# Patient Record
Sex: FEMALE | Race: WHITE | NOT HISPANIC OR LATINO | Employment: FULL TIME | URBAN - METROPOLITAN AREA
[De-identification: names, ages, dates, MRNs, and addresses within clinical notes are randomized per-mention and may not be internally consistent; named-entity substitution may affect disease eponyms.]

---

## 2019-03-16 ENCOUNTER — HOSPITAL ENCOUNTER (INPATIENT)
Facility: CLINIC | Age: 54
LOS: 4 days | Discharge: HOME | DRG: 603 | End: 2019-03-20
Attending: EMERGENCY MEDICINE | Admitting: INTERNAL MEDICINE
Payer: MEDICARE

## 2019-03-16 DIAGNOSIS — F20.9 SCHIZOPHRENIA, UNSPECIFIED TYPE (H): ICD-10-CM

## 2019-03-16 DIAGNOSIS — L03.116 CELLULITIS OF LEFT LOWER EXTREMITY: ICD-10-CM

## 2019-03-16 DIAGNOSIS — L03.116 CELLULITIS OF LEFT LOWER LIMB: Primary | ICD-10-CM

## 2019-03-16 DIAGNOSIS — Z59.00 HOMELESS: ICD-10-CM

## 2019-03-16 DIAGNOSIS — F15.10 METHAMPHETAMINE USE (H): ICD-10-CM

## 2019-03-16 LAB
ALBUMIN SERPL-MCNC: 3.4 G/DL (ref 3.4–5)
ALBUMIN UR-MCNC: NEGATIVE MG/DL
ALP SERPL-CCNC: 119 U/L (ref 40–150)
ALT SERPL W P-5'-P-CCNC: 106 U/L (ref 0–50)
AMPHETAMINES UR QL SCN: POSITIVE
ANION GAP SERPL CALCULATED.3IONS-SCNC: 4 MMOL/L (ref 3–14)
APPEARANCE UR: CLEAR
AST SERPL W P-5'-P-CCNC: 73 U/L (ref 0–45)
BARBITURATES UR QL: NEGATIVE
BASOPHILS # BLD AUTO: 0 10E9/L (ref 0–0.2)
BASOPHILS NFR BLD AUTO: 0.2 %
BENZODIAZ UR QL: NEGATIVE
BILIRUB SERPL-MCNC: 0.2 MG/DL (ref 0.2–1.3)
BILIRUB UR QL STRIP: NEGATIVE
BUN SERPL-MCNC: 8 MG/DL (ref 7–30)
CALCIUM SERPL-MCNC: 8.7 MG/DL (ref 8.5–10.1)
CANNABINOIDS UR QL SCN: NEGATIVE
CHLORIDE SERPL-SCNC: 106 MMOL/L (ref 94–109)
CO2 SERPL-SCNC: 30 MMOL/L (ref 20–32)
COCAINE UR QL: NEGATIVE
COLOR UR AUTO: YELLOW
CREAT SERPL-MCNC: 0.59 MG/DL (ref 0.52–1.04)
DIFFERENTIAL METHOD BLD: NORMAL
EOSINOPHIL # BLD AUTO: 0.3 10E9/L (ref 0–0.7)
EOSINOPHIL NFR BLD AUTO: 3.1 %
ERYTHROCYTE [DISTWIDTH] IN BLOOD BY AUTOMATED COUNT: 13.5 % (ref 10–15)
ETHANOL SERPL-MCNC: <0.01 G/DL
GFR SERPL CREATININE-BSD FRML MDRD: >90 ML/MIN/{1.73_M2}
GLUCOSE SERPL-MCNC: 114 MG/DL (ref 70–99)
GLUCOSE UR STRIP-MCNC: NEGATIVE MG/DL
GRAM STN SPEC: ABNORMAL
HCT VFR BLD AUTO: 37.5 % (ref 35–47)
HGB BLD-MCNC: 12.6 G/DL (ref 11.7–15.7)
HGB UR QL STRIP: NEGATIVE
IMM GRANULOCYTES # BLD: 0 10E9/L (ref 0–0.4)
IMM GRANULOCYTES NFR BLD: 0.2 %
INR PPP: 0.99 (ref 0.86–1.14)
KETONES UR STRIP-MCNC: NEGATIVE MG/DL
LACTATE BLD-SCNC: 1.7 MMOL/L (ref 0.7–2)
LEUKOCYTE ESTERASE UR QL STRIP: NEGATIVE
LYMPHOCYTES # BLD AUTO: 3.1 10E9/L (ref 0.8–5.3)
LYMPHOCYTES NFR BLD AUTO: 29.6 %
MCH RBC QN AUTO: 29.7 PG (ref 26.5–33)
MCHC RBC AUTO-ENTMCNC: 33.6 G/DL (ref 31.5–36.5)
MCV RBC AUTO: 88 FL (ref 78–100)
MONOCYTES # BLD AUTO: 1.1 10E9/L (ref 0–1.3)
MONOCYTES NFR BLD AUTO: 10.8 %
NEUTROPHILS # BLD AUTO: 5.8 10E9/L (ref 1.6–8.3)
NEUTROPHILS NFR BLD AUTO: 56.1 %
NITRATE UR QL: NEGATIVE
NRBC # BLD AUTO: 0 10*3/UL
NRBC BLD AUTO-RTO: 0 /100
OPIATES UR QL SCN: NEGATIVE
PCP UR QL SCN: NEGATIVE
PH UR STRIP: 5.5 PH (ref 5–7)
PLATELET # BLD AUTO: 384 10E9/L (ref 150–450)
POTASSIUM SERPL-SCNC: 4 MMOL/L (ref 3.4–5.3)
PROCALCITONIN SERPL-MCNC: 0.07 NG/ML
PROT SERPL-MCNC: 7.6 G/DL (ref 6.8–8.8)
RBC # BLD AUTO: 4.24 10E12/L (ref 3.8–5.2)
RBC #/AREA URNS AUTO: <1 /HPF (ref 0–2)
SODIUM SERPL-SCNC: 140 MMOL/L (ref 133–144)
SOURCE: NORMAL
SP GR UR STRIP: 1.01 (ref 1–1.03)
SPECIMEN SOURCE: ABNORMAL
SQUAMOUS #/AREA URNS AUTO: <1 /HPF (ref 0–1)
UROBILINOGEN UR STRIP-MCNC: NORMAL MG/DL (ref 0–2)
WBC # BLD AUTO: 10.3 10E9/L (ref 4–11)
WBC #/AREA URNS AUTO: <1 /HPF (ref 0–5)

## 2019-03-16 PROCEDURE — 85610 PROTHROMBIN TIME: CPT | Performed by: EMERGENCY MEDICINE

## 2019-03-16 PROCEDURE — 80329 ANALGESICS NON-OPIOID 1 OR 2: CPT | Performed by: PHYSICIAN ASSISTANT

## 2019-03-16 PROCEDURE — 93005 ELECTROCARDIOGRAM TRACING: CPT

## 2019-03-16 PROCEDURE — 80320 DRUG SCREEN QUANTALCOHOLS: CPT | Performed by: EMERGENCY MEDICINE

## 2019-03-16 PROCEDURE — 90791 PSYCH DIAGNOSTIC EVALUATION: CPT

## 2019-03-16 PROCEDURE — 25000128 H RX IP 250 OP 636: Performed by: EMERGENCY MEDICINE

## 2019-03-16 PROCEDURE — 87070 CULTURE OTHR SPECIMN AEROBIC: CPT | Performed by: EMERGENCY MEDICINE

## 2019-03-16 PROCEDURE — 12000000 ZZH R&B MED SURG/OB

## 2019-03-16 PROCEDURE — 25800030 ZZH RX IP 258 OP 636: Performed by: EMERGENCY MEDICINE

## 2019-03-16 PROCEDURE — 85025 COMPLETE CBC W/AUTO DIFF WBC: CPT | Performed by: EMERGENCY MEDICINE

## 2019-03-16 PROCEDURE — 83605 ASSAY OF LACTIC ACID: CPT | Performed by: EMERGENCY MEDICINE

## 2019-03-16 PROCEDURE — 99223 1ST HOSP IP/OBS HIGH 75: CPT | Mod: AI | Performed by: PHYSICIAN ASSISTANT

## 2019-03-16 PROCEDURE — 80307 DRUG TEST PRSMV CHEM ANLYZR: CPT | Performed by: EMERGENCY MEDICINE

## 2019-03-16 PROCEDURE — 87040 BLOOD CULTURE FOR BACTERIA: CPT | Performed by: EMERGENCY MEDICINE

## 2019-03-16 PROCEDURE — 87186 SC STD MICRODIL/AGAR DIL: CPT | Performed by: EMERGENCY MEDICINE

## 2019-03-16 PROCEDURE — 81001 URINALYSIS AUTO W/SCOPE: CPT | Performed by: EMERGENCY MEDICINE

## 2019-03-16 PROCEDURE — 87205 SMEAR GRAM STAIN: CPT | Performed by: EMERGENCY MEDICINE

## 2019-03-16 PROCEDURE — 80053 COMPREHEN METABOLIC PANEL: CPT | Performed by: EMERGENCY MEDICINE

## 2019-03-16 PROCEDURE — 87077 CULTURE AEROBIC IDENTIFY: CPT | Performed by: EMERGENCY MEDICINE

## 2019-03-16 PROCEDURE — 96365 THER/PROPH/DIAG IV INF INIT: CPT

## 2019-03-16 PROCEDURE — 99285 EMERGENCY DEPT VISIT HI MDM: CPT | Mod: 25

## 2019-03-16 PROCEDURE — 84145 PROCALCITONIN (PCT): CPT | Performed by: EMERGENCY MEDICINE

## 2019-03-16 RX ORDER — ONDANSETRON 2 MG/ML
4 INJECTION INTRAMUSCULAR; INTRAVENOUS EVERY 6 HOURS PRN
Status: DISCONTINUED | OUTPATIENT
Start: 2019-03-16 | End: 2019-03-20 | Stop reason: HOSPADM

## 2019-03-16 RX ORDER — PROCHLORPERAZINE MALEATE 5 MG
10 TABLET ORAL EVERY 6 HOURS PRN
Status: DISCONTINUED | OUTPATIENT
Start: 2019-03-16 | End: 2019-03-20 | Stop reason: HOSPADM

## 2019-03-16 RX ORDER — OMEPRAZOLE 10 MG/1
20 CAPSULE, DELAYED RELEASE ORAL DAILY PRN
COMMUNITY
End: 2019-05-30

## 2019-03-16 RX ORDER — CEFAZOLIN SODIUM 1 G/3ML
1 INJECTION, POWDER, FOR SOLUTION INTRAMUSCULAR; INTRAVENOUS EVERY 8 HOURS
Status: DISCONTINUED | OUTPATIENT
Start: 2019-03-17 | End: 2019-03-18

## 2019-03-16 RX ORDER — AMOXICILLIN 250 MG
1 CAPSULE ORAL 2 TIMES DAILY PRN
Status: DISCONTINUED | OUTPATIENT
Start: 2019-03-16 | End: 2019-03-20 | Stop reason: HOSPADM

## 2019-03-16 RX ORDER — LIDOCAINE 40 MG/G
CREAM TOPICAL
Status: DISCONTINUED | OUTPATIENT
Start: 2019-03-16 | End: 2019-03-20 | Stop reason: HOSPADM

## 2019-03-16 RX ORDER — POTASSIUM CHLORIDE 1500 MG/1
20-40 TABLET, EXTENDED RELEASE ORAL
Status: DISCONTINUED | OUTPATIENT
Start: 2019-03-16 | End: 2019-03-20 | Stop reason: HOSPADM

## 2019-03-16 RX ORDER — POTASSIUM CL/LIDO/0.9 % NACL 10MEQ/0.1L
10 INTRAVENOUS SOLUTION, PIGGYBACK (ML) INTRAVENOUS
Status: DISCONTINUED | OUTPATIENT
Start: 2019-03-16 | End: 2019-03-20 | Stop reason: HOSPADM

## 2019-03-16 RX ORDER — AMOXICILLIN 250 MG
2 CAPSULE ORAL 2 TIMES DAILY PRN
Status: DISCONTINUED | OUTPATIENT
Start: 2019-03-16 | End: 2019-03-20 | Stop reason: HOSPADM

## 2019-03-16 RX ORDER — POTASSIUM CHLORIDE 1.5 G/1.58G
20-40 POWDER, FOR SOLUTION ORAL
Status: DISCONTINUED | OUTPATIENT
Start: 2019-03-16 | End: 2019-03-20 | Stop reason: HOSPADM

## 2019-03-16 RX ORDER — AMPICILLIN AND SULBACTAM 2; 1 G/1; G/1
3 INJECTION, POWDER, FOR SOLUTION INTRAMUSCULAR; INTRAVENOUS ONCE
Status: COMPLETED | OUTPATIENT
Start: 2019-03-16 | End: 2019-03-16

## 2019-03-16 RX ORDER — CALCIUM CARBONATE 500 MG/1
1000 TABLET, CHEWABLE ORAL 4 TIMES DAILY PRN
Status: DISCONTINUED | OUTPATIENT
Start: 2019-03-16 | End: 2019-03-20 | Stop reason: HOSPADM

## 2019-03-16 RX ORDER — ACETAMINOPHEN 325 MG/1
650 TABLET ORAL EVERY 4 HOURS PRN
Status: DISCONTINUED | OUTPATIENT
Start: 2019-03-16 | End: 2019-03-20 | Stop reason: HOSPADM

## 2019-03-16 RX ORDER — ONDANSETRON 4 MG/1
4 TABLET, ORALLY DISINTEGRATING ORAL EVERY 6 HOURS PRN
Status: DISCONTINUED | OUTPATIENT
Start: 2019-03-16 | End: 2019-03-20 | Stop reason: HOSPADM

## 2019-03-16 RX ORDER — POTASSIUM CHLORIDE 7.45 MG/ML
10 INJECTION INTRAVENOUS
Status: DISCONTINUED | OUTPATIENT
Start: 2019-03-16 | End: 2019-03-20 | Stop reason: HOSPADM

## 2019-03-16 RX ORDER — POTASSIUM CHLORIDE 29.8 MG/ML
20 INJECTION INTRAVENOUS
Status: DISCONTINUED | OUTPATIENT
Start: 2019-03-16 | End: 2019-03-20 | Stop reason: HOSPADM

## 2019-03-16 RX ORDER — TOLTERODINE 2 MG/1
2 CAPSULE, EXTENDED RELEASE ORAL DAILY
Status: ON HOLD | COMMUNITY
End: 2019-04-25

## 2019-03-16 RX ORDER — TOLTERODINE 2 MG/1
2 CAPSULE, EXTENDED RELEASE ORAL DAILY
Status: DISCONTINUED | OUTPATIENT
Start: 2019-03-17 | End: 2019-03-20 | Stop reason: HOSPADM

## 2019-03-16 RX ORDER — NALOXONE HYDROCHLORIDE 0.4 MG/ML
.1-.4 INJECTION, SOLUTION INTRAMUSCULAR; INTRAVENOUS; SUBCUTANEOUS
Status: DISCONTINUED | OUTPATIENT
Start: 2019-03-16 | End: 2019-03-20 | Stop reason: HOSPADM

## 2019-03-16 RX ORDER — SODIUM CHLORIDE 9 MG/ML
1000 INJECTION, SOLUTION INTRAVENOUS CONTINUOUS
Status: DISCONTINUED | OUTPATIENT
Start: 2019-03-16 | End: 2019-03-18

## 2019-03-16 RX ORDER — PROCHLORPERAZINE 25 MG
25 SUPPOSITORY, RECTAL RECTAL EVERY 12 HOURS PRN
Status: DISCONTINUED | OUTPATIENT
Start: 2019-03-16 | End: 2019-03-20 | Stop reason: HOSPADM

## 2019-03-16 RX ORDER — ACETAMINOPHEN 650 MG/1
650 SUPPOSITORY RECTAL EVERY 4 HOURS PRN
Status: DISCONTINUED | OUTPATIENT
Start: 2019-03-16 | End: 2019-03-20 | Stop reason: HOSPADM

## 2019-03-16 RX ADMIN — AMPICILLIN SODIUM AND SULBACTAM SODIUM 3 G: 2; 1 INJECTION, POWDER, FOR SOLUTION INTRAMUSCULAR; INTRAVENOUS at 20:15

## 2019-03-16 RX ADMIN — SODIUM CHLORIDE 1000 ML: 9 INJECTION, SOLUTION INTRAVENOUS at 23:44

## 2019-03-16 RX ADMIN — SODIUM CHLORIDE 1000 ML: 9 INJECTION, SOLUTION INTRAVENOUS at 20:24

## 2019-03-16 SDOH — ECONOMIC STABILITY - HOUSING INSECURITY: HOMELESSNESS UNSPECIFIED: Z59.00

## 2019-03-16 ASSESSMENT — MIFFLIN-ST. JEOR
SCORE: 1412.35
SCORE: 1384.88

## 2019-03-16 ASSESSMENT — ENCOUNTER SYMPTOMS
HALLUCINATIONS: 1
FATIGUE: 1

## 2019-03-16 NOTE — ED TRIAGE NOTES
Pt here via EMS after family called to have her removed from their home. Pt was given a home from her mother but due to the inability to care for herself or the home, the home has been now condemned. Pt had other homeless people staying at her home as well which helped the home to get to the condition it is now in. Pt has been schizophrenic for decades and not taking any medications for it. Pt states she has had scabies and mites for six years now so she puts large amounts of powder that is used to kill mites off chickens and sheep. EMS stated pt was very argumentative with the police at the family's home. Pt is very unkept and dirty.

## 2019-03-16 NOTE — ED NOTES
Bed: ED16  Expected date:   Expected time:   Means of arrival:   Comments:  Primitivo 535- 53 F unable to care for self, calm/cooperative. hold

## 2019-03-17 LAB
ALBUMIN SERPL-MCNC: 2.7 G/DL (ref 3.4–5)
ALP SERPL-CCNC: 80 U/L (ref 40–150)
ALT SERPL W P-5'-P-CCNC: 94 U/L (ref 0–50)
ANION GAP SERPL CALCULATED.3IONS-SCNC: 6 MMOL/L (ref 3–14)
APAP SERPL-MCNC: <2 MG/L (ref 10–20)
AST SERPL W P-5'-P-CCNC: 77 U/L (ref 0–45)
BILIRUB SERPL-MCNC: 0.4 MG/DL (ref 0.2–1.3)
BUN SERPL-MCNC: 4 MG/DL (ref 7–30)
CALCIUM SERPL-MCNC: 7.9 MG/DL (ref 8.5–10.1)
CHLORIDE SERPL-SCNC: 112 MMOL/L (ref 94–109)
CO2 SERPL-SCNC: 25 MMOL/L (ref 20–32)
CREAT SERPL-MCNC: 0.54 MG/DL (ref 0.52–1.04)
ERYTHROCYTE [DISTWIDTH] IN BLOOD BY AUTOMATED COUNT: 13.7 % (ref 10–15)
GFR SERPL CREATININE-BSD FRML MDRD: >90 ML/MIN/{1.73_M2}
GLUCOSE SERPL-MCNC: 89 MG/DL (ref 70–99)
HCT VFR BLD AUTO: 36 % (ref 35–47)
HGB BLD-MCNC: 11.8 G/DL (ref 11.7–15.7)
INTERPRETATION ECG - MUSE: NORMAL
MCH RBC QN AUTO: 29.3 PG (ref 26.5–33)
MCHC RBC AUTO-ENTMCNC: 32.8 G/DL (ref 31.5–36.5)
MCV RBC AUTO: 89 FL (ref 78–100)
PLATELET # BLD AUTO: 365 10E9/L (ref 150–450)
POTASSIUM SERPL-SCNC: 3.5 MMOL/L (ref 3.4–5.3)
PROT SERPL-MCNC: 6.4 G/DL (ref 6.8–8.8)
RBC # BLD AUTO: 4.03 10E12/L (ref 3.8–5.2)
SALICYLATES SERPL-MCNC: <2 MG/DL
SODIUM SERPL-SCNC: 143 MMOL/L (ref 133–144)
WBC # BLD AUTO: 8 10E9/L (ref 4–11)

## 2019-03-17 PROCEDURE — 25800030 ZZH RX IP 258 OP 636: Performed by: EMERGENCY MEDICINE

## 2019-03-17 PROCEDURE — 12000000 ZZH R&B MED SURG/OB

## 2019-03-17 PROCEDURE — 36415 COLL VENOUS BLD VENIPUNCTURE: CPT | Performed by: PHYSICIAN ASSISTANT

## 2019-03-17 PROCEDURE — 99232 SBSQ HOSP IP/OBS MODERATE 35: CPT | Performed by: INTERNAL MEDICINE

## 2019-03-17 PROCEDURE — A9270 NON-COVERED ITEM OR SERVICE: HCPCS | Mod: GY | Performed by: PHYSICIAN ASSISTANT

## 2019-03-17 PROCEDURE — 25000132 ZZH RX MED GY IP 250 OP 250 PS 637: Mod: GY | Performed by: PHYSICIAN ASSISTANT

## 2019-03-17 PROCEDURE — 25000128 H RX IP 250 OP 636: Performed by: PHYSICIAN ASSISTANT

## 2019-03-17 PROCEDURE — 80053 COMPREHEN METABOLIC PANEL: CPT | Performed by: PHYSICIAN ASSISTANT

## 2019-03-17 PROCEDURE — 85027 COMPLETE CBC AUTOMATED: CPT | Performed by: PHYSICIAN ASSISTANT

## 2019-03-17 PROCEDURE — 99221 1ST HOSP IP/OBS SF/LOW 40: CPT | Performed by: PSYCHIATRY & NEUROLOGY

## 2019-03-17 RX ORDER — OLANZAPINE 5 MG/1
10 TABLET, ORALLY DISINTEGRATING ORAL
Status: DISCONTINUED | OUTPATIENT
Start: 2019-03-17 | End: 2019-03-20 | Stop reason: HOSPADM

## 2019-03-17 RX ORDER — HALOPERIDOL 5 MG/ML
2 INJECTION INTRAMUSCULAR EVERY 6 HOURS PRN
Status: DISCONTINUED | OUTPATIENT
Start: 2019-03-17 | End: 2019-03-20 | Stop reason: HOSPADM

## 2019-03-17 RX ADMIN — Medication 1 MG: at 22:44

## 2019-03-17 RX ADMIN — CEFAZOLIN 1 G: 1 INJECTION, POWDER, FOR SOLUTION INTRAMUSCULAR; INTRAVENOUS at 08:13

## 2019-03-17 RX ADMIN — SODIUM CHLORIDE 1000 ML: 9 INJECTION, SOLUTION INTRAVENOUS at 16:38

## 2019-03-17 RX ADMIN — CEFAZOLIN 1 G: 1 INJECTION, POWDER, FOR SOLUTION INTRAMUSCULAR; INTRAVENOUS at 00:46

## 2019-03-17 RX ADMIN — CEFAZOLIN 1 G: 1 INJECTION, POWDER, FOR SOLUTION INTRAMUSCULAR; INTRAVENOUS at 17:25

## 2019-03-17 RX ADMIN — SODIUM CHLORIDE 1000 ML: 9 INJECTION, SOLUTION INTRAVENOUS at 08:13

## 2019-03-17 RX ADMIN — TOLTERODINE TARTRATE 2 MG: 2 CAPSULE, EXTENDED RELEASE ORAL at 08:13

## 2019-03-17 RX ADMIN — OMEPRAZOLE 20 MG: 20 CAPSULE, DELAYED RELEASE ORAL at 06:38

## 2019-03-17 ASSESSMENT — ACTIVITIES OF DAILY LIVING (ADL)
ADLS_ACUITY_SCORE: 20
ADLS_ACUITY_SCORE: 14
ADLS_ACUITY_SCORE: 19
ADLS_ACUITY_SCORE: 14
ADLS_ACUITY_SCORE: 20
ADLS_ACUITY_SCORE: 11

## 2019-03-17 NOTE — PLAN OF CARE
A&Ox3. VSS on RA. Up with 1 assist, unsteady at times. Regular diet, good appetite. IVF infusing; continues on IV abx. LS clear. Incontinent of urine at times. 72 hour hold remains; sitter at bedside; pt is anxious at times, but cooperative; no hallucinations this shift. Wound care done to L thigh, moderate amount purulent drainage, dressing CDI; pt refused shower/cleansing. Plan to transfer to  after seen by WOC. Will continue to monitor.

## 2019-03-17 NOTE — PHARMACY-ADMISSION MEDICATION HISTORY
Admission medication history interview status for the 3/16/2019  admission is complete. See EPIC admission navigator for prior to admission medications     Medication history source reliability:Moderate    Actions taken by pharmacist (provider contacted, etc):None     Additional medication history information not noted on PTA med list :None    Medication reconciliation/reorder completed by provider prior to medication history? No    Time spent in this activity: 10 minutes    Prior to Admission medications    Medication Sig Last Dose Taking? Auth Provider   omeprazole (PRILOSEC) 10 MG DR capsule Take 20 mg by mouth daily as needed prn Yes Unknown, Entered By History   tolterodine ER (DETROL LA) 2 MG 24 hr capsule Take 2 mg by mouth daily 3/16/2019 at am Yes Unknown, Entered By History

## 2019-03-17 NOTE — PLAN OF CARE
Patient is confused, alert to self.  Up with assist of one to bathroom, unsteady on feet.  72 hour hold in place, sitter at bedside.  IV fluids and antibiotics per orders.  Dressing placed to left upper leg wound, small amount of drainage noted, area is tender and edematous.  Continue to monitor.

## 2019-03-17 NOTE — ED PROVIDER NOTES
"  History     Chief Complaint:  Failure to Thrive and Suicidal Ideation    The history is provided by the patient and the EMS personnel.      Anastasiya Simon is a 53 year old female with history significant for methamphetamine and alcohol abuse as well as schizophrenia who presents via EMS with failure to thrive. Per report, patient has been staying at the house her mother bought her for the past month and states the Fire Department condemned this house and kicked her out of this house tonight due to lack of upkeep as well as the many homeless people staying there. Patient has hallucinations so she has been covering her windows due to  incoming aliens.  Report indicates patient also made suicidal statements to her parents today where she stated she was going to jump off the river into a bridge. Patient states she has a wound to the mid-lateral thigh due to a  vitamin E injection.  She affirms associated pain in this area and also report neck discomfort. Currently, she states she is fatigued.     Allergies:  Tetracycline    Medications:    Medications reviewed. No current medications.     Past Medical History:    Schizophrenia  Alcohol and Meth Abuse    Past Surgical History:    History reviewed. No pertinent surgical history.     Family History:    History reviewed. No pertinent family history.     Social History:  Drug use: Meth  Alcohol Use: Positive    Review of Systems   Constitutional: Positive for fatigue.   Psychiatric/Behavioral: Positive for hallucinations (ailens) and suicidal ideas.   All other systems reviewed and are negative.    Physical Exam     Patient Vitals for the past 24 hrs:   BP Temp Temp src Pulse Resp SpO2 Height Weight   03/16/19 2320 127/73 98.4  F (36.9  C) Oral 97 18 98 % -- 78.9 kg (173 lb 15.1 oz)   03/16/19 2235 138/72 -- -- 100 -- 100 % -- --   03/16/19 2040 -- -- -- -- -- 99 % -- --   03/16/19 1900 157/78 98  F (36.7  C) Oral 101 18 97 % 1.651 m (5' 5\") 81.6 kg (180 lb)   03/16/19 " 1856 157/78 98.6  F (37  C) Oral 101 20 98 % -- --     Physical Exam  Constitutional: White female supine. Heavy set.  HENT: No signs of trauma. Multiple layers of facial makeup caked on face. Oropharynx moist.   Eyes: EOM are normal. Pupils are 3 mm, equal, round, and reactive to light.   Neck: Normal range of motion. No JVD present. No cervical adenopathy.  Cardiovascular: Regular rhythm.  Exam reveals no gallop and no friction rub.    No murmur heard.  Pulmonary/Chest: Bilateral breath sounds normal. No wheezes, rhonchi or rales.  Abdominal: Soft. No tenderness. No rebound or guarding.   Musculoskeletal: No edema. No tenderness. Left lateral mid thigh 8 x 8 cm area of redness with induration with central ulcerative lesions.   Lymphadenopathy: No lymphadenopathy.   Neurological: Alert and oriented to person, place, and time. Normal strength. Coordination normal.   Psychological: Made statements to police about covering her windows because the aliens are coming. Suicidal statements about jumping off bridge. Calm affect.   Skin: Skin is warm and dry. No rash noted. No erythema.     Emergency Department Course     ECG:  ECG taken at 2017  Normal sinus rhythm  Rate 97 bpm. ME interval 146 ms. QRS duration 82 ms. QT/QTc 378/480 ms. P-R-T axes 72 82 69.    Laboratory:  Laboratory findings were communicated with the patient who voiced understanding of the findings.    Gram stain: Pending  Blood culture x2: No growth after 1 hour  CBC: WBC 10.3, HGB 12.6,   INR: 0.99  CMP: glucose 114, , AST 73 o/w WNL (Creatinine 0.59)  Lactic Acid (Resulted: 1958): 1.7  Alcohol ethyl: <0.01  Drug abuse screen: amphetamine positive o/w negative  UA: WNL    Interventions:  2015: Unasyn 3 g in  mL IV  2024: NS 1L IV Bolus     Emergency Department Course:  1831 Nursing notes and vitals reviewed.    1900 I performed an exam of the patient as documented above.     1940 The patient provided a urine sample here in the  emergency department. This was sent for laboratory testing, findings above.    1945 IV was inserted and blood was drawn for laboratory testing, results above.    2017 EKG obtained in the ED, see results above.     2050 Patient declined x-ray.     2136 I spoke with Dr. Blanco of the hospitalist service regarding patient's presentation, findings, and plan of care.    I personally reviewed the laboratory results with the patient and answered all related questions prior to admission.    Impression & Plan      Medical Decision Making:  This is a 53 years old woman who has chronic schizophrenia and was kicked out of her house tonight because it had been condemned. Family had bought her a house to live in and unfortunately she was sharing this with homeless people and was not cleaning it. She states because of the aliens she kept the windows covered. Patient was brought here on hold by ambulance. On exam, she is caked with makeup on her face with some underlying erythema. There was a large area of redness and swelling on her left lateral thigh with some central ulcerations where she states she had been injected with something. Patient has a calm affect here. Her workup includes urine tox which shows methamphetamines. Lactate and white count are okay. She adamantly refused chest x-ray because of radiation concerns. Cultures are pending. I have started her on Unasyn and we will admit her to the hospital for further evaluation.     Diagnosis:    ICD-10-CM   1. Cellulitis of left lower extremity L03.116   2. Schizophrenia, unspecified type (H) F20.9   3. Methamphetamine use (H) F15.10   4. Homeless Z59.0     Disposition:   The patient is admitted into the hospitalist under the care of Dr. Blanco.    Scribe Disclosure:  I, Thierry Felipe, am serving as a scribe at 7:58 PM on 3/16/2019 to document services personally performed by Cirilo French MD based on my observations and the provider's statements to me.     EMERGENCY  DEPARTMENT       Cirilo French MD  03/17/19 001

## 2019-03-17 NOTE — PROGRESS NOTES
"[facility]    Hospitalist Progress Note      Assessment & Plan   Anastasiya Simon is a 53-year-old female with a past medical history of methamphetamine abuse, alcohol abuse, as well as schizophrenia, not currently on any medications, who presents to the Emergency Department via EMS with failure to thrive. She was also found to have a wound on her left lateral thigh.    1.  Left mid-thigh wound with associated cellulitis.    - she states this wound developed after she tried to inject \"vitamin C\" with a needle.  She states she has had the wound for \"a while,\" but cannot provide much more detail.  -  Afebrile, no leucocytosis, LA 1.7, procalcitonin 0.07  - mild tachycardia on admission - improved after IV fluids.    - received Unasyn in ER- switched to Ancef after admission  - wound G stain- positive for Positive cocci  - wound culture pending  - continue with Ancef for now, follow up cultures  - wound care nurse consult    2.  Schizophrenia, decompensated        Suicidal ideation.    - she does not appear to be on any medications PTA  - She has had hallucinations as well as delusions, believing that there are aliens trying to get into her house.  She also made suicidal statements to her parents that she was going to jump off a bridge.  She has not been able to take care of herself.   - Psych consult- plan to transfer her to psych serna once medically stable  - SW consult  - on 72h hold  - sitter at bedside    3.  Failure to thrive       Homelessness.  -  The patient was living at a house that her mother bought for her, but this house has since been condemned due to lack of upkeep, as well as the patient allowing many homeless people to stay there.  She is unable to take care of herself at this time.   - Psych consult  - SW consult  - she will likely need a more structured living environment outside of the hospital.     4.  Alcohol and meth abuse.  -  Last alcoholic drink was 2 days ago, per patient " report.  She has also been using methamphetamine.  Monitor for signs of withdrawal.   - Psych and chemical dependency consult.     5. Overactive bladder  - continue PTA Detrol LA      DVT Prophylaxis: Pneumatic Compression Devices  Code Status: Full Code     Expected discharge: plan to transfer her to Psych serna in 1-2 days pending the plan for her wound care    Vandana Hendrix MD    Interval History   Doing OK, denies chest pain, no SOB, no abd pain, no N/V; discussed with RN, ARAMIS and Dr Castle    -Data reviewed today: I reviewed all new labs and imaging results over the last 24 hours. I personally reviewed no images or EKG's today.    Physical Exam   Temp: 98.2  F (36.8  C) Temp src: Oral BP: 132/76 Pulse: 87   Resp: 18 SpO2: 100 % O2 Device: None (Room air)    Vitals:    03/16/19 1900 03/16/19 2320   Weight: 81.6 kg (180 lb) 78.9 kg (173 lb 15.1 oz)     Vital Signs with Ranges  Temp:  [98  F (36.7  C)-98.6  F (37  C)] 98.2  F (36.8  C)  Pulse:  [] 87  Resp:  [18-20] 18  BP: (127-157)/(72-78) 132/76  SpO2:  [97 %-100 %] 100 %  I/O last 3 completed shifts:  In: 1629 [P.O.:720; I.V.:909]  Out: -     Constitutional: Awake, alert, NAD, disheveled   Respiratory: Bilateral air entry, no wheezing, no rales, no crackles  Cardiovascular: S1S2, RRR, no murmurs, no rubs  GI: abdomen- soft, nonT, nond, BS present  Skin/Integumen: round wound 4cm/4cm over left lateral thigh with dark eschar, with surrounding erythema; dried, scaled skin over her nose bridge  Extremities- no leg swelling      Medications     sodium chloride 1,000 mL (03/17/19 0813)       ceFAZolin  1 g Intravenous Q8H     omeprazole  20 mg Oral QAM AC     sodium chloride (PF)  3 mL Intracatheter Q8H     tolterodine ER  2 mg Oral Daily       Data   Recent Labs   Lab 03/17/19  0805 03/16/19 1945   WBC 8.0 10.3   HGB 11.8 12.6   MCV 89 88    384   INR  --  0.99    140   POTASSIUM 3.5 4.0   CHLORIDE 112* 106   CO2 25 30   BUN 4* 8   CR  0.54 0.59   ANIONGAP 6 4   MATTHEW 7.9* 8.7   GLC 89 114*   ALBUMIN 2.7* 3.4   PROTTOTAL 6.4* 7.6   BILITOTAL 0.4 0.2   ALKPHOS 80 119   ALT 94* 106*   AST 77* 73*       No results found for this or any previous visit (from the past 24 hour(s)).

## 2019-03-17 NOTE — CONSULTS
Initial Psychiatric Consult: Time Spent: 55 Minutes  Timi Castle MD.     Transfer to Psychiatry station 77.Intake confirmed bed.

## 2019-03-17 NOTE — CONSULTS
"[facility] Initial Psychiatric Consult Note      TIME SPENT IN PSYCHIATRY INITIAL CONSULT: 55 MINUTES    Consult ordered by: Dr. Hendrix  Reason: assess transfer to Psychiatry     Initial History     The patient's care was discussed, patient seen and chart notes were reviewed.    Patient examined for psychiatric consultation.         HISTORY OF PRESENT ILLNESS  Pt is now more alert and oriented.  Plan is to transfer to Psychiatry.  Pt previously not transferred second to leg woundwhich is been stabilized.  She has MRSA and is being treated with Clindamycin.  Psychiatry is aware of her MRSA status, the patient will need to be admitted to a [facility] bed.                 Medications     Medications Prior to Admission   Medication Sig Dispense Refill Last Dose     omeprazole (PRILOSEC) 10 MG DR capsule Take 20 mg by mouth daily as needed   prn     tolterodine ER (DETROL LA) 2 MG 24 hr capsule Take 2 mg by mouth daily   3/16/2019 at am       Scheduled Medications    ceFAZolin  1 g Intravenous Q8H     omeprazole  20 mg Oral QAM AC     sodium chloride (PF)  3 mL Intracatheter Q8H     tolterodine ER  2 mg Oral Daily     PRNs:  acetaminophen, acetaminophen, calcium carbonate, lidocaine 4%, lidocaine (buffered or not buffered), melatonin, naloxone, ondansetron **OR** ondansetron, potassium chloride, potassium chloride with lidocaine, potassium chloride, potassium chloride, potassium chloride, prochlorperazine **OR** prochlorperazine **OR** prochlorperazine, senna-docusate **OR** senna-docusate, sodium chloride (PF)      Allergies        Allergies   Allergen Reactions     Tetracycline         Previous Medical History     No past medical history on file.     Medical Review of Systems     /76 (BP Location: Left arm)   Pulse 87   Temp 98.2  F (36.8  C) (Oral)   Resp 18   Ht 1.651 m (5' 5\")   Wt 78.9 kg (173 lb 15.1 oz)   SpO2 100%   BMI 28.95 kg/m    Body mass index is 28.95 kg/m .       Mental Status Examination "     Appearance Lying in bed, dressed in gown. Appears stated age.   Attitude Cooperative   Orientation Oriented to person, place, time   Eye Contact Poor   Speech Regular rate, rhythm, volume and tone   Language Normal   Psychomotor Behavior Normal   Thought Process Goal-Oriented, Intact   Associations + looseness of associations   Thought Content + for paranoia and delusions.   Mood Irritable    Affect agitated   Fund of Knowledge impaired   Insight impaired   Judgement impaired   Attention Span & Concentration poor   Recent & Remote Memory Memory impaired   Gait Normal   Muscle Tone Intact      Labs     Labs reviewed.  Recent Results (from the past 24 hour(s))   Drug abuse screen 77 urine ([place],[facility])    Collection Time: 03/16/19  7:40 PM   Result Value Ref Range    Amphetamine Qual Urine Positive (A) NEG^Negative    Barbiturates Qual Urine Negative NEG^Negative    Benzodiazepine Qual Urine Negative NEG^Negative    Cannabinoids Qual Urine Negative NEG^Negative    Cocaine Qual Urine Negative NEG^Negative    Opiates Qualitative Urine Negative NEG^Negative    PCP Qual Urine Negative NEG^Negative   UA with Microscopic reflex to Culture    Collection Time: 03/16/19  7:40 PM   Result Value Ref Range    Color Urine Yellow     Appearance Urine Clear     Glucose Urine Negative NEG^Negative mg/dL    Bilirubin Urine Negative NEG^Negative    Ketones Urine Negative NEG^Negative mg/dL    Specific Gravity Urine 1.006 1.003 - 1.035    Blood Urine Negative NEG^Negative    pH Urine 5.5 5.0 - 7.0 pH    Protein Albumin Urine Negative NEG^Negative mg/dL    Urobilinogen mg/dL Normal 0.0 - 2.0 mg/dL    Nitrite Urine Negative NEG^Negative    Leukocyte Esterase Urine Negative NEG^Negative    Source Midstream Urine     WBC Urine <1 0 - 5 /HPF    RBC Urine <1 0 - 2 /HPF    Squamous Epithelial /HPF Urine <1 0 - 1 /HPF   CBC with platelets differential    Collection Time: 03/16/19  7:45 PM   Result Value Ref Range    WBC 10.3 4.0 - 11.0 10e9/L     RBC Count 4.24 3.8 - 5.2 10e12/L    Hemoglobin 12.6 11.7 - 15.7 g/dL    Hematocrit 37.5 35.0 - 47.0 %    MCV 88 78 - 100 fl    MCH 29.7 26.5 - 33.0 pg    MCHC 33.6 31.5 - 36.5 g/dL    RDW 13.5 10.0 - 15.0 %    Platelet Count 384 150 - 450 10e9/L    Diff Method Automated Method     % Neutrophils 56.1 %    % Lymphocytes 29.6 %    % Monocytes 10.8 %    % Eosinophils 3.1 %    % Basophils 0.2 %    % Immature Granulocytes 0.2 %    Nucleated RBCs 0 0 /100    Absolute Neutrophil 5.8 1.6 - 8.3 10e9/L    Absolute Lymphocytes 3.1 0.8 - 5.3 10e9/L    Absolute Monocytes 1.1 0.0 - 1.3 10e9/L    Absolute Eosinophils 0.3 0.0 - 0.7 10e9/L    Absolute Basophils 0.0 0.0 - 0.2 10e9/L    Abs Immature Granulocytes 0.0 0 - 0.4 10e9/L    Absolute Nucleated RBC 0.0    INR    Collection Time: 03/16/19  7:45 PM   Result Value Ref Range    INR 0.99 0.86 - 1.14   Comprehensive metabolic panel    Collection Time: 03/16/19  7:45 PM   Result Value Ref Range    Sodium 140 133 - 144 mmol/L    Potassium 4.0 3.4 - 5.3 mmol/L    Chloride 106 94 - 109 mmol/L    Carbon Dioxide 30 20 - 32 mmol/L    Anion Gap 4 3 - 14 mmol/L    Glucose 114 (H) 70 - 99 mg/dL    Urea Nitrogen 8 7 - 30 mg/dL    Creatinine 0.59 0.52 - 1.04 mg/dL    GFR Estimate >90 >60 mL/min/[1.73_m2]    GFR Estimate If Black >90 >60 mL/min/[1.73_m2]    Calcium 8.7 8.5 - 10.1 mg/dL    Bilirubin Total 0.2 0.2 - 1.3 mg/dL    Albumin 3.4 3.4 - 5.0 g/dL    Protein Total 7.6 6.8 - 8.8 g/dL    Alkaline Phosphatase 119 40 - 150 U/L     (H) 0 - 50 U/L    AST 73 (H) 0 - 45 U/L   Lactic acid whole blood    Collection Time: 03/16/19  7:45 PM   Result Value Ref Range    Lactic Acid 1.7 0.7 - 2.0 mmol/L   Alcohol ethyl    Collection Time: 03/16/19  7:45 PM   Result Value Ref Range    Ethanol g/dL <0.01 <0.01 g/dL   Blood culture    Collection Time: 03/16/19  7:45 PM   Result Value Ref Range    Specimen Description Blood Right Arm     Special Requests Aerobic and anaerobic bottles received      Culture Micro No growth after 7 hours    Procalcitonin    Collection Time: 03/16/19  7:45 PM   Result Value Ref Range    Procalcitonin 0.07 ng/ml   Blood culture    Collection Time: 03/16/19  7:53 PM   Result Value Ref Range    Specimen Description Blood Left Arm     Special Requests Aerobic and anaerobic bottles received     Culture Micro No growth after 7 hours    Gram stain    Collection Time: 03/16/19  8:10 PM   Result Value Ref Range    Specimen Description Left Thigh Wound     Gram Stain Many  Gram positive cocci   (A)     Gram Stain Rare  WBC'S seen  PMNs seen       Gram Stain       Preliminary Gram stain report called to and read back by  OCTAVIO BOX IN ER AT 2105 BY MS      Gram Stain       Gram stain review consistent with reported results.  Gram stain slide reviewed at the [facility]     Wound Culture Aerobic Bacterial    Collection Time: 03/16/19  8:10 PM   Result Value Ref Range    Specimen Description Left Thigh Wound     Culture Micro PENDING    EKG 12-lead, tracing only    Collection Time: 03/16/19  8:17 PM   Result Value Ref Range    Interpretation ECG Click View Image link to view waveform and result    Comprehensive metabolic panel    Collection Time: 03/17/19  8:05 AM   Result Value Ref Range    Sodium 143 133 - 144 mmol/L    Potassium 3.5 3.4 - 5.3 mmol/L    Chloride 112 (H) 94 - 109 mmol/L    Carbon Dioxide 25 20 - 32 mmol/L    Anion Gap 6 3 - 14 mmol/L    Glucose 89 70 - 99 mg/dL    Urea Nitrogen 4 (L) 7 - 30 mg/dL    Creatinine 0.54 0.52 - 1.04 mg/dL    GFR Estimate >90 >60 mL/min/[1.73_m2]    GFR Estimate If Black >90 >60 mL/min/[1.73_m2]    Calcium 7.9 (L) 8.5 - 10.1 mg/dL    Bilirubin Total 0.4 0.2 - 1.3 mg/dL    Albumin 2.7 (L) 3.4 - 5.0 g/dL    Protein Total 6.4 (L) 6.8 - 8.8 g/dL    Alkaline Phosphatase 80 40 - 150 U/L    ALT 94 (H) 0 - 50 U/L    AST 77 (H) 0 - 45 U/L   CBC with platelets    Collection Time: 03/17/19  8:05 AM   Result Value Ref  Range    WBC 8.0 4.0 - 11.0 10e9/L    RBC Count 4.03 3.8 - 5.2 10e12/L    Hemoglobin 11.8 11.7 - 15.7 g/dL    Hematocrit 36.0 35.0 - 47.0 %    MCV 89 78 - 100 fl    MCH 29.3 26.5 - 33.0 pg    MCHC 32.8 31.5 - 36.5 g/dL    RDW 13.7 10.0 - 15.0 %    Platelet Count 365 150 - 450 10e9/L        Impression     This is a 53 year old female with  Paranoid schizophrenia, Polysubstance use disoder patient is clearly impaired and needs psychiatric stabilization for MRSA wound is now being treated with clindamycin, so will need to be in ITC bed.  Patient has not required antipsychotics at this point, will  give her Zyprexa 10 mg tonight. Transferred to psychiatry tomorrow.     Diagnoses     1. Paranoid schizophrenia  2. Polysubstance use disorder  3. Cognitive disorder     Plan     1. Explained side effects, benefits and complications of medications to the patient.  2. Medication Changes: Zyprexa 10 mg h.s.  3. Discussed treatment plan with patient and team.  4. Transfer to psychiatry tomorrow when bed available      TIME SPENT IN PSYCHIATRY INITIAL CONSULT: 25 MINUTES     Attestation:   Patient has been seen and evaluated by me, Timi Castle MD.    Patient ID:  Name: Anastasiya Simon MRN: [id number]  Admission: 3/16/2019 YOB: 1965
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
## 2019-03-17 NOTE — CONSULTS
3/17/2019       CD consult acknowledged. Per ERM, patient has Medicare  insurance. Patient would need to seek substance use services from a Medicare eligible facility for substance use. Social work can assist with resource and referral for patient.     Coty Alegria, [facility]  [phone]
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
## 2019-03-18 LAB
BACTERIA SPEC CULT: ABNORMAL
BACTERIA SPEC CULT: ABNORMAL
SPECIMEN SOURCE: ABNORMAL

## 2019-03-18 PROCEDURE — 25000128 H RX IP 250 OP 636: Performed by: PHYSICIAN ASSISTANT

## 2019-03-18 PROCEDURE — 40000915 ZZH STATISTIC SITTER, EVENING HOURS

## 2019-03-18 PROCEDURE — G0463 HOSPITAL OUTPT CLINIC VISIT: HCPCS

## 2019-03-18 PROCEDURE — 12000000 ZZH R&B MED SURG/OB

## 2019-03-18 PROCEDURE — 99233 SBSQ HOSP IP/OBS HIGH 50: CPT | Performed by: INTERNAL MEDICINE

## 2019-03-18 PROCEDURE — 25800030 ZZH RX IP 258 OP 636: Performed by: EMERGENCY MEDICINE

## 2019-03-18 PROCEDURE — A9270 NON-COVERED ITEM OR SERVICE: HCPCS | Mod: GY | Performed by: PHYSICIAN ASSISTANT

## 2019-03-18 PROCEDURE — 25000132 ZZH RX MED GY IP 250 OP 250 PS 637: Mod: GY | Performed by: PHYSICIAN ASSISTANT

## 2019-03-18 PROCEDURE — 25000125 ZZHC RX 250: Performed by: INTERNAL MEDICINE

## 2019-03-18 RX ORDER — CLINDAMYCIN PHOSPHATE 900 MG/50ML
900 INJECTION, SOLUTION INTRAVENOUS EVERY 8 HOURS
Status: DISCONTINUED | OUTPATIENT
Start: 2019-03-18 | End: 2019-03-20 | Stop reason: HOSPADM

## 2019-03-18 RX ADMIN — TOLTERODINE TARTRATE 2 MG: 2 CAPSULE, EXTENDED RELEASE ORAL at 09:13

## 2019-03-18 RX ADMIN — Medication 1 MG: at 21:46

## 2019-03-18 RX ADMIN — CEFAZOLIN 1 G: 1 INJECTION, POWDER, FOR SOLUTION INTRAMUSCULAR; INTRAVENOUS at 16:11

## 2019-03-18 RX ADMIN — OMEPRAZOLE 20 MG: 20 CAPSULE, DELAYED RELEASE ORAL at 06:35

## 2019-03-18 RX ADMIN — CLINDAMYCIN PHOSPHATE 900 MG: 900 INJECTION, SOLUTION INTRAVENOUS at 23:34

## 2019-03-18 RX ADMIN — CEFAZOLIN 1 G: 1 INJECTION, POWDER, FOR SOLUTION INTRAMUSCULAR; INTRAVENOUS at 09:13

## 2019-03-18 RX ADMIN — SODIUM CHLORIDE 1000 ML: 9 INJECTION, SOLUTION INTRAVENOUS at 00:26

## 2019-03-18 RX ADMIN — CEFAZOLIN 1 G: 1 INJECTION, POWDER, FOR SOLUTION INTRAMUSCULAR; INTRAVENOUS at 00:55

## 2019-03-18 ASSESSMENT — ACTIVITIES OF DAILY LIVING (ADL)
ADLS_ACUITY_SCORE: 21
ADLS_ACUITY_SCORE: 19
ADLS_ACUITY_SCORE: 19
ADLS_ACUITY_SCORE: 20

## 2019-03-18 NOTE — PLAN OF CARE
Care Plan Summary Note:  ORIENTATION/BEHAVIOR: A&O x 4, labile mood, cooperative at times and uncooperative at times. Refusing cleaning.   ABNL VS/O2: VSS on RA, saturating mid 90s  MOBILITY/FALL RISK: up x 1 with gait belt, risk  PAIN MANAGMENT: denied pain and SOB  DIET: Regular   BOWEL/BLADDER:  has frequency, uses bathroom, incontinent at times  ABNL LAB/BG: ALT 94 and AST 77  DRAIN/DEVICES: IVF at 125 mL/hr  SKIN: wound on left thigh, dressing changed, CDI  TESTS/PROCEDURES:   AGGRESSION TOOL COLOR: on 72 hours hold, sitter at bedside.  D/C DAY/GOALS/PLACE: Plan to transfer to mental health 1-2 days, pending WOC consult for wound care  OTHER:

## 2019-03-18 NOTE — PLAN OF CARE
A&Ox4; anxious/paranoid at times. VSS on RA. Up with SBA. Regular diet, good appetite. IVF infusing; continues on IV abx. LS clear. Denies pain. Incontinent of urine at times. 72 hour hold remains; sitter at bedside. No hallucinations this shift. Wound care done to L thigh, moderate amount purulent drainage, dressing CDI; pt refused shower/cleansing. Will continue to monitor.

## 2019-03-18 NOTE — PLAN OF CARE
Care Plan Summary Note:  ORIENTATION/BEHAVIOR: A&O x4, anxious/paranoid at times  ABNL VS/O2: /81  MOBILITY/FALL RISK: SBA with sitter at bedside due to being on 72 hr hold  PAIN MANAGMENT: Denies pain  DIET: Regular  BOWEL/BLADDER: Incontinent at times  ABNL LAB/BG: None  DRAIN/DEVICES: IV SL  SKIN: L lateral thigh wound, dressing CDI. Scabbing around scalp covered in makeup, refusing to let staff wash it off. Ruling out mites, trying to transfer to [facility] to get dermatology consult.  TESTS/PROCEDURES: Dermatology consult pending transfer to [facility]  AGGRESSION TOOL COLOR: Yellow  D/C DAY/GOALS/PLACE: Discharge pending, trying to transfer pt to [facility] for dermatology consult.  OTHER: On 72 hr hold, sitter at bedside, showered
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
## 2019-03-18 NOTE — CONSULTS
"Consult Date:  03/17/2019      PSYCHIATRIC CONSULTATION      REQUESTING PHYSICIAN:  LORENZO Colón      REASON FOR CONSULTATION:  Schizophrenia and substance abuse.      IDENTIFICATION:  Ms. Simon is a 53-year-old female with history of schizophrenia, methamphetamine abuse and alcohol abuse.      HISTORY OF PRESENT ILLNESS:  Ms. Simon has had a long history of schizophrenia and polysubstance use disorder.  She has avoided getting help and refused to get help.  The family has wanted her to for some time.  She was difficult to arouse and was very uncooperative with exam.  History came mostly from the chart.  The patient's sister, Tamika, has been contacted by the DEC.  She had indicated that the patient started using LSD when she was in college in [place] and ended up having strange behaviors and dropped out.  She has never been able to keep a job, is on Social Security Disability.  The patient will not go to outpatient providers and will not take her medicines.  She has avoided being civilly committed.  She does not have a guardian.  She told the ER that she thought she had mites and uses a zapper on her body and now has skin problems.  She put powders on her body in an effort to get rid of them.  The patient's parents bought her house in the winter and she is now having it condemned.   came out 2 weeks ago and told her she had to clean it up.  When they came back, she told them that she was unable to clean it because aliens were coming in through the window so she spent her time nailing felt coverings over the windows.  She also got rid of all of her technology items.  Delusions, \"thinks she sees God all the time.\"  [facility] worker found her a crisis bed, but she refused to go, was wandering the streets, the weather was cold, went to the [employer] walked around, she called her family asked for help.  She threatened suicide apparently that she would jump in the river.  Police " were called.  She was brought to the ED.  The patient apparently also let homeless people in her house and also trashed it.  She was unable to provide history for the DEC either.  She was quite disorganized.  She has stated she was very tired from carrying 50-pound boxes around all night.  Apparently had a wound on the mid lateral thigh.  She states she got it a long time ago due to vitamin C injection that had gone wrong.  She told the ER that her last alcohol drink was 2 days ago.  She admits using methamphetamines recently.  Her U-tox was positive for amphetamines only.      PAST MEDICAL HISTORY:  See above.      FAMILY HISTORY:  Unknown, patient not able to give history.      SOCIAL HISTORY:  The patient is living in a house parents bought, it is now trashed and has been condemned.  She has a problem using amphetamines, hallucinogens, alcohol in the past, never had treatment, no preadmission medicines.      ALLERGIES:  SHE IS ALLERGIC TO TETRACYCLINE.      MEDICAL REVIEW OF SYSTEMS:  Was  completed 10-point review of systems was performed and negative other than as previously mentioned above in the history of present illness by LORENZO Colón.  Dr. Castle was not able to review again.  The patient was not willing to give any answers regarding the review of systems.        VITAL SIGNS:  Blood pressure 157/78, pulse 101, respirations 18, temperature 98.      MENTAL STATUS EXAMINATION:  Appearance:  The patient was lying in bed, appeared to be asleep, but was arousable, only sat up partially, answered few questions and then refused to answer any more.  After that, she was uncooperative.  She was disoriented x 3.  Eye contact was poor.  Speech was garbled.  Language impaired.  Psychomotor behavior:  Some psychomotor slowing.  Thought process disorganized, positive for loose associations.  Thought content:  Positive for delusions.  Mood was irritable.  Affect agitated.  Fund of knowledge impaired.  Insight  impaired.  Judgment impaired.  Attention span and concentration poor.  Muscle tone normal.  Gait not tested.      ASSESSMENT:  Ms. Simon is a 53-year-old female, single, never , no children, history of schizophrenia and polysubstance use disorder, presents disorganized, confused, was brought in because her house was being completely trashed, let homeless in and it has been condemned.  She was wandering around the [facility] [employer], called her family.  Police came and brought her in.  She gave some history in the ER which revealed delusions and psychosis.  At this point, the patient was not able to cooperate with exam.  It is clear that she got a wound and it needs to be stabilized.  Once stabilized, would then transfer to Psychiatry.  She was placed on a 72-hour hold.  We will have to reassess commitment for tomorrow.  Would not start ticking until tonight.  We will offer p.r.n. Haldol 1-2 mg every 4 hours p.r.n. , also offer Zyprexa, either p.o. or IM q.4 hours of 5-10 mg p.r.n.         NIKHIL ALDRICH MD             D: 2019   T: 2019   MT: ROSA MARIA      Name:     DIONI SIMON   MRN:      [id number]        Account:       [id number]   :      1965           Consult Date:  2019      Document: [id number]

## 2019-03-18 NOTE — PROGRESS NOTES
"[facility]    Hospitalist Progress Note      Assessment & Plan   Anastasiya Simon is a 53-year-old female with a past medical history of methamphetamine abuse, alcohol abuse, as well as schizophrenia, not currently on any medications, who presents to the Emergency Department via EMS with failure to thrive. She was also found to have a wound on her left lateral thigh.    1.  Left mid-thigh wound with associated cellulitis.    - she states this wound developed after she tried to inject \"vitamin C\" with a needle.  She states she has had the wound for \"a while,\" but cannot provide much more detail.  -  Afebrile, no leucocytosis, LA 1.7, procalcitonin 0.07  - mild tachycardia on admission - improved after IV fluids.    - received Unasyn in ER- switched to Ancef after admission  - wound G stain- positive for heavy growth Staph aureus  - ID consult appreciated  - continue with Ancef for now, follow up cultures  - wound care nurse consult appreciated    2. Possible mites infestation?  - pt reports Demodex infestation for which she used \"permathrin\" in the past but did not help and then she used many other products/shampoons  - her hair is matted, unkept  - refused to take a shower yesterday but today she agreed to shower today  - ID recommends skin scrapings for diagnosis, unfortunately Derm consult not available at [facility]  - discussed with [facility], hospitalist and Dermatology- who recommended to reassess her after she will take a shower vs treating empirically  - Derm also thinks that given her untreated schizophrenia- her statement of Demodex infestation might represent delusion    3.  Schizophrenia, decompensated        Suicidal ideation.    - she does not appear to be on any medications PTA  - She has had hallucinations as well as delusions, believing that there are aliens trying to get into her house.  She also made suicidal statements to her parents that she was going to jump off a bridge.  She has not been " able to take care of herself.   - Psych consult- plan to transfer her to psych serna once medically stable  - on 72h hold  - sitter at bedside  - Haldol and Zyprexa prn for now    4.  Failure to thrive       Homelessness.  -  The patient was living at a house that her mother bought for her, but this house has since been condemned due to lack of upkeep, as well as the patient allowing many homeless people to stay there.  She is unable to take care of herself at this time.   - Psych consult  - SW consult  - she will likely need a more structured living environment outside of the hospital.     5.  Alcohol and meth abuse.  -  Last alcoholic drink was 2 days ago, per patient report.  She has also been using methamphetamine.  Monitor for signs of withdrawal.   - Psych and chemical dependency consult.     6. Overactive bladder  - continue PTA Detrol LA      DVT Prophylaxis: Pneumatic Compression Devices  Code Status: Full Code     I have spend 35 minutes taking care of Mrs Simon, with more than 50% of time spent coordinating the care with other health care team members.    Vandana Hendrix MD    Interval History   Doing fine, denies chest pain, no SOB, no abd pain, no N/V; agreed to shower today; discussed with hospitalist and Dermatology at [facility]    -Data reviewed today: I reviewed all new labs and imaging results over the last 24 hours. I personally reviewed no images or EKG's today.    Physical Exam   Temp: 97.9  F (36.6  C) Temp src: Oral BP: 152/81 Pulse: 93   Resp: 16 SpO2: 96 % O2 Device: None (Room air)    Vitals:    03/16/19 1900 03/16/19 2320   Weight: 81.6 kg (180 lb) 78.9 kg (173 lb 15.1 oz)     Vital Signs with Ranges  Temp:  [97.9  F (36.6  C)-98.2  F (36.8  C)] 97.9  F (36.6  C)  Pulse:  [89-95] 93  Resp:  [16-18] 16  BP: (145-152)/(74-81) 152/81  SpO2:  [95 %-96 %] 96 %  I/O last 3 completed shifts:  In: 2080 [P.O.:1080; I.V.:1000]  Out: -     Constitutional: Awake, alert, NAD, disheveled    Respiratory: Bilateral air entry, no wheezing, no rales, no crackles  Cardiovascular: S1S2, RRR, no murmurs, no rubs  GI: abdomen- soft, nonT, nond, BS present  Skin/Integumen: round wound 4cm/4cm over left lateral thigh with dark eschar, with surrounding erythema; dried, scaled skin over her nose bridge  Extremities- no leg swelling      Medications       ceFAZolin  1 g Intravenous Q8H     omeprazole  20 mg Oral QAM AC     sodium chloride (PF)  3 mL Intracatheter Q8H     tolterodine ER  2 mg Oral Daily       Data   Recent Labs   Lab 03/17/19  0805 03/16/19  1945   WBC 8.0 10.3   HGB 11.8 12.6   MCV 89 88    384   INR  --  0.99    140   POTASSIUM 3.5 4.0   CHLORIDE 112* 106   CO2 25 30   BUN 4* 8   CR 0.54 0.59   ANIONGAP 6 4   MATTHEW 7.9* 8.7   GLC 89 114*   ALBUMIN 2.7* 3.4   PROTTOTAL 6.4* 7.6   BILITOTAL 0.4 0.2   ALKPHOS 80 119   ALT 94* 106*   AST 77* 73*       No results found for this or any previous visit (from the past 24 hour(s)).

## 2019-03-18 NOTE — PLAN OF CARE
Patient is alert and oriented x 3 forgetful and anxious at times.  72 hour hold in place ends on 3-20 at 9:31 pm.  Sitter at bedside.  Lung sounds clear, 95% room air.  IV fluids and antibiotics per orders.  Dressing CDI to left leg.  Continue to monitor.

## 2019-03-18 NOTE — CONSULTS
[facility]    Infectious Disease Consultation     Date of Admission:  3/16/2019  Date of Consult (When I saw the patient): 03/18/19    Assessment & Plan   Anastasiya Simon is a 53 year old female who was admitted on 3/16/2019.     Impression:  1. 53 y.o patient with meth use.   2. Schizophrenia.   3. Had a left thigh wound cultures positive for staph aureus pending BRIGHT.   4. Also matted hair, eyebrow, unclear if a manifestation of mites or unkept from no showers for days.   5. On ancef for the thigh wound.     Recommendations:   To diagnose needs to be cleaned/shower bath first. Then possibly can use skin scrapings in KOH prep, derm consult will be helpful.   Ancef or Keflex for the thigh wound, follow up on the BRIGHT.      Martinez Hernandez MD    Reason for Consult   Reason for consult: I was asked by Dr. Hendrix  to evaluate this patient for matted skin/ eyebrow area lesions, concern for mites.    Primary Care Physician   Physician No Ref-Primary    Chief Complaint   Found wandering     History is obtained from the patient and medical records    History of Present Illness   Anastasiya Simon is a 53 year old female with methamphetamine abuse, alcohol abuse, as well as schizophrenia, not currently on any medications, who presents to the Emergency Department via EMS with failure to thrive. She was also found to have a wound on her left lateral thigh.           Past Medical History   I have reviewed this patient's medical history and updated it with pertinent information if needed.   No past medical history on file.    Past Surgical History   I have reviewed this patient's surgical history and updated it with pertinent information if needed.  No past surgical history on file.    Prior to Admission Medications   Prior to Admission Medications   Prescriptions Last Dose Informant Patient Reported? Taking?   omeprazole (PRILOSEC) 10 MG DR capsule prn Self Yes Yes   Sig: Take 20 mg by mouth daily as needed    tolterodine ER (DETROL LA) 2 MG 24 hr capsule 3/16/2019 at am Self Yes Yes   Sig: Take 2 mg by mouth daily      Facility-Administered Medications: None     Allergies   Allergies   Allergen Reactions     Tetracycline        Immunization History     There is no immunization history on file for this patient.    Social History   I have reviewed this patient's social history and updated it with pertinent information if needed. Anastasiya Simon  reports that  has never smoked. she has never used smokeless tobacco.    Family History   I have reviewed this patient's family history and updated it with pertinent information if needed.   No family history on file.    Review of Systems   The 10 point Review of Systems is negative other than noted in the HPI or here.     Physical Exam   Temp: 98.2  F (36.8  C) Temp src: Oral BP: 147/78 Pulse: 89   Resp: 18 SpO2: 95 % O2 Device: None (Room air)    Vital Signs with Ranges  Temp:  [98  F (36.7  C)-98.2  F (36.8  C)] 98.2  F (36.8  C)  Pulse:  [89-96] 89  Resp:  [16-18] 18  BP: (145-150)/(74-85) 147/78  SpO2:  [95 %] 95 %  173 lbs 15.09 oz  Body mass index is 28.95 kg/m .    GENERAL APPEARANCE:  alert and no distress  EYES: Eyes grossly normal to inspection, PERRL and conjunctivae and sclerae normal  HENT: unkept hair, ? Makeup in the skin lesions   NECK: no adenopathy, no asymmetry, masses, or scars and thyroid normal to palpation  RESP: lungs clear to auscultation - no rales, rhonchi or wheezes  CV: regular rates and rhythm, normal S1 S2, no S3 or S4 and no murmur, click or rub  LYMPHATICS: normal ant/post cervical and supraclavicular nodes  ABDOMEN: soft, nontender, without hepatosplenomegaly or masses and bowel sounds normal  MS: thigh wound   hannah skin. Hair and eyebrow   SKIN: no suspicious lesions or rashes      Data   Lab Results   Component Value Date    WBC 8.0 03/17/2019    HGB 11.8 03/17/2019    HCT 36.0 03/17/2019     03/17/2019     03/17/2019     POTASSIUM 3.5 03/17/2019    CHLORIDE 112 (H) 03/17/2019    CO2 25 03/17/2019    BUN 4 (L) 03/17/2019    CR 0.54 03/17/2019    GLC 89 03/17/2019    AST 77 (H) 03/17/2019    ALT 94 (H) 03/17/2019    ALKPHOS 80 03/17/2019    BILITOTAL 0.4 03/17/2019    INR 0.99 03/16/2019     Recent Labs   Lab 03/16/19 2010 03/16/19 1953 03/16/19 1945   CULT Heavy growth  Staphylococcus aureus  Susceptibility testing in progress  * No growth after 2 days No growth after 2 days     Recent Labs   Lab Test 03/16/19 2010 03/16/19 1953 03/16/19 1945   CULT Heavy growth  Staphylococcus aureus  Susceptibility testing in progress  * No growth after 2 days No growth after 2 days

## 2019-03-19 PROCEDURE — 36415 COLL VENOUS BLD VENIPUNCTURE: CPT | Performed by: INTERNAL MEDICINE

## 2019-03-19 PROCEDURE — 99232 SBSQ HOSP IP/OBS MODERATE 35: CPT | Performed by: PSYCHIATRY & NEUROLOGY

## 2019-03-19 PROCEDURE — 99232 SBSQ HOSP IP/OBS MODERATE 35: CPT | Performed by: INTERNAL MEDICINE

## 2019-03-19 PROCEDURE — 87389 HIV-1 AG W/HIV-1&-2 AB AG IA: CPT | Performed by: INTERNAL MEDICINE

## 2019-03-19 PROCEDURE — A9270 NON-COVERED ITEM OR SERVICE: HCPCS | Mod: GY | Performed by: PHYSICIAN ASSISTANT

## 2019-03-19 PROCEDURE — 12000000 ZZH R&B MED SURG/OB

## 2019-03-19 PROCEDURE — A9270 NON-COVERED ITEM OR SERVICE: HCPCS | Mod: GY | Performed by: INTERNAL MEDICINE

## 2019-03-19 PROCEDURE — 25000125 ZZHC RX 250: Performed by: INTERNAL MEDICINE

## 2019-03-19 PROCEDURE — 25000132 ZZH RX MED GY IP 250 OP 250 PS 637: Mod: GY | Performed by: PHYSICIAN ASSISTANT

## 2019-03-19 PROCEDURE — 25000132 ZZH RX MED GY IP 250 OP 250 PS 637: Mod: GY | Performed by: INTERNAL MEDICINE

## 2019-03-19 RX ORDER — PERMETHRIN 50 MG/G
CREAM TOPICAL ONCE
Status: DISCONTINUED | OUTPATIENT
Start: 2019-04-02 | End: 2019-03-20 | Stop reason: HOSPADM

## 2019-03-19 RX ORDER — PERMETHRIN 50 MG/G
CREAM TOPICAL ONCE
Status: COMPLETED | OUTPATIENT
Start: 2019-03-19 | End: 2019-03-19

## 2019-03-19 RX ADMIN — OMEPRAZOLE 20 MG: 20 CAPSULE, DELAYED RELEASE ORAL at 06:58

## 2019-03-19 RX ADMIN — PERMETHRIN: 50 CREAM TOPICAL at 11:13

## 2019-03-19 RX ADMIN — TOLTERODINE TARTRATE 2 MG: 2 CAPSULE, EXTENDED RELEASE ORAL at 08:01

## 2019-03-19 RX ADMIN — CLINDAMYCIN PHOSPHATE 900 MG: 900 INJECTION, SOLUTION INTRAVENOUS at 14:04

## 2019-03-19 RX ADMIN — Medication 1 MG: at 22:34

## 2019-03-19 RX ADMIN — CLINDAMYCIN PHOSPHATE 900 MG: 900 INJECTION, SOLUTION INTRAVENOUS at 21:24

## 2019-03-19 RX ADMIN — CLINDAMYCIN PHOSPHATE 900 MG: 900 INJECTION, SOLUTION INTRAVENOUS at 06:00

## 2019-03-19 ASSESSMENT — ACTIVITIES OF DAILY LIVING (ADL)
ADLS_ACUITY_SCORE: 18
ADLS_ACUITY_SCORE: 19
ADLS_ACUITY_SCORE: 18

## 2019-03-19 NOTE — PROGRESS NOTES
[facility]    Infectious Disease Progress Note    Date of Service (when I saw the patient): 03/19/2019     Assessment & Plan   Anastasiya Simon is a 53 year old female who was admitted on 3/16/2019.     Impression:  1. 53 y.o patient with meth use.   2. Schizophrenia.   3. Had a left thigh wound cultures positive for MRSA.  Contact iso for MRSA.   4. Also matted hair, eyebrow, unclear if a manifestation of mites or unkept from no showers for days.   5. After shower today no more crusty lesions but multiple scabs, possible picking on skin and then applying makeup to cover.        Recommendations:   Clindamycin for the thigh wound appropriate 7 -10 days course. Can be oral.   Can empirically do permethrin cream. Needs to stay on for 8- 12 hours, after that repeat treatment in 2 weeks. This should eliminate isolation for possible infestation, though on exam no scabies, ? May be pickers disease.   Check for HIV.                Martinez Hernandez MD    Interval History   Afebrile   No more crusty lesions after shower so most likely makeup and dirt       Physical Exam   Temp: 98  F (36.7  C) Temp src: Oral BP: 142/86 Pulse: 74   Resp: 16 SpO2: 97 % O2 Device: None (Room air)    Vitals:    03/16/19 1900 03/16/19 2320   Weight: 81.6 kg (180 lb) 78.9 kg (173 lb 15.1 oz)     Vital Signs with Ranges  Temp:  [97.5  F (36.4  C)-98  F (36.7  C)] 98  F (36.7  C)  Pulse:  [74-93] 74  Resp:  [16] 16  BP: (134-153)/(77-87) 142/86  SpO2:  [96 %-97 %] 97 %    Constitutional: Awake, alert, cooperative, no apparent distress  Lungs: Clear to auscultation bilaterally, no crackles or wheezing  Cardiovascular: Regular rate and rhythm, normal S1 and S2, and no murmur noted  Abdomen: Normal bowel sounds, soft, non-distended, non-tender  Skin: No rashes, no cyanosis, no edema  Other:    Medications       clindamycin  900 mg Intravenous Q8H     omeprazole  20 mg Oral QAM AC     sodium chloride (PF)  3 mL Intracatheter Q8H      tolterodine ER  2 mg Oral Daily       Data   All microbiology laboratory data reviewed.  Recent Labs   Lab Test 03/17/19 0805 03/16/19 1945   WBC 8.0 10.3   HGB 11.8 12.6   HCT 36.0 37.5   MCV 89 88    384     Recent Labs   Lab Test 03/17/19 0805 03/16/19 1945   CR 0.54 0.59     No lab results found.  Recent Labs   Lab Test 03/16/19 2010 03/16/19 1953 03/16/19 1945   CULT Heavy growth  Methicillin resistant Staphylococcus aureus (MRSA)  This isolate DOES NOT demonstrate inducible clindamycin resistance in vitro. Clindamycin   is susceptible and could be used when indicated, however, erythromycin is resistant and   should not be used.  *  Critical Value/Significant Value called to and read back by  TIFFANY ENCINAS, RN  3.18.19 ABIGAIL.   No growth after 3 days No growth after 3 days

## 2019-03-19 NOTE — PROVIDER NOTIFICATION
MD Notification    Notified Person: MD    Notified Person Name: Dr. Fong    Notification Date/Time: 3/18/19 10:06pm    Notification Interaction: Phone    Purpose of Notification: Critical lab MRSA,    Orders Received:    Comments: Md ordered Cleocin, discontinued ancef

## 2019-03-19 NOTE — PROVIDER NOTIFICATION
MD Notification    Notified Person: MD    Notified Person Name: Dr Blanco    Notification Date/Time:3/18/2019, 9:26    Notification Interaction: Phone    Purpose of Notification: Critical lab: MRSA    Orders Received:    Comments: Awaiting return call

## 2019-03-19 NOTE — PROGRESS NOTES
"[facility]  Transfer Triage Note    Date of call: 03/18/19  Time of call: 10:09 PM    Reason for Transfer:Further diagnostic work up, management, and consultation for specialized care  Diagnosis: Possible lice vs Mites needing Derm consult and w/u    Outside Records: Available  Additional records requested to be faxed to [phone].    Stability of Patient: Patient is vitally stable, with no critical labs, and will likely remain stable throughout the transfer process      Recommendations for Management and Stabilization: Not needed    Additional Comments   Patient has acute psychosis 2/2 schizophrenia and has been placed on a 72-hour hold. She reports she has \"demodex\" mites. She has matted hair and unwilling for an exam 2/2 psychosis.  Request was for transfer to [facility] for Derm c/s, skin scrapings, r/o parasites prior to transferring to [facility] for IP Psych  [facility] hospitalist was on the line as well as she could be seen by Dermatology there. However, per Derm who was also on the line, possible this is delusional parasitosis if she is fixated on demodex mites as these are normal skin steve     Plan as of right now is to see if we can wash her hair and do an exam for lice/nits and can proceed with skin scrapings at [facility]. There was also a question of whether we can empirically Rx her with Permethrin.   - If she is felt to need Derm in-person eval, we should try to get her into [facility],as she can get both Derm and Psych issues taken care of there   Currently not being accepted for transfer to [place]    Humza Fleming MD      "

## 2019-03-19 NOTE — PROGRESS NOTES
SW:  D:  Consult was entered to address CD, housing,d/c planning and mental health issues.    Patient will be admitting to mental health and the above concerns will be addressed by the mental health .

## 2019-03-19 NOTE — PROVIDER NOTIFICATION
Brief update:    Staph aureus MRSA from initial wound culture    Clindamycin IV TID    Stopped ancef.    Await sensitivities.    Aron Fong MD  10:30 PM
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
## 2019-03-19 NOTE — PLAN OF CARE
A&Ox self and place. Up with SBA, unsteady gait to BR, amb to shower, enc OOB activity and declined. VSS. Denies pain, although L MARK drsg changed painful to touch, pt screaming. Kael diet, good appetite. Many scabs present on face, softened and cleaned. Pt showered and Rx cream applied at 1115, to remain on for 8-12 hrs, if hands are washed, reapply. MRSA in wound, contact iso, receiving cleocin. Plan to tx to [facility] tomorrow. Sitter removed and VPM started, 72 hr hold in place.

## 2019-03-20 ENCOUNTER — HOSPITAL ENCOUNTER (INPATIENT)
Facility: CLINIC | Age: 54
LOS: 36 days | Discharge: HOME | DRG: 603 | End: 2019-04-25
Attending: PSYCHIATRY & NEUROLOGY | Admitting: PSYCHIATRY & NEUROLOGY
Payer: MEDICARE

## 2019-03-20 VITALS
DIASTOLIC BLOOD PRESSURE: 78 MMHG | HEART RATE: 88 BPM | TEMPERATURE: 97.7 F | BODY MASS INDEX: 28.98 KG/M2 | HEIGHT: 65 IN | RESPIRATION RATE: 16 BRPM | WEIGHT: 173.94 LBS | SYSTOLIC BLOOD PRESSURE: 135 MMHG | OXYGEN SATURATION: 97 %

## 2019-03-20 DIAGNOSIS — L03.116 CELLULITIS OF LEFT LOWER LIMB: ICD-10-CM

## 2019-03-20 DIAGNOSIS — R45.851 SUICIDAL IDEATION: Primary | ICD-10-CM

## 2019-03-20 LAB — HIV 1+2 AB+HIV1 P24 AG SERPL QL IA: NONREACTIVE

## 2019-03-20 PROCEDURE — 97602 WOUND(S) CARE NON-SELECTIVE: CPT

## 2019-03-20 PROCEDURE — A9270 NON-COVERED ITEM OR SERVICE: HCPCS | Mod: GY | Performed by: PHYSICIAN ASSISTANT

## 2019-03-20 PROCEDURE — A9270 NON-COVERED ITEM OR SERVICE: HCPCS | Mod: GY | Performed by: PSYCHIATRY & NEUROLOGY

## 2019-03-20 PROCEDURE — 25000132 ZZH RX MED GY IP 250 OP 250 PS 637: Mod: GY | Performed by: PHYSICIAN ASSISTANT

## 2019-03-20 PROCEDURE — G0463 HOSPITAL OUTPT CLINIC VISIT: HCPCS | Mod: 25

## 2019-03-20 PROCEDURE — 25000125 ZZHC RX 250: Performed by: INTERNAL MEDICINE

## 2019-03-20 PROCEDURE — 99238 HOSP IP/OBS DSCHRG MGMT 30/<: CPT | Performed by: INTERNAL MEDICINE

## 2019-03-20 PROCEDURE — 25000132 ZZH RX MED GY IP 250 OP 250 PS 637: Mod: GY | Performed by: PSYCHIATRY & NEUROLOGY

## 2019-03-20 PROCEDURE — 12400000 ZZH R&B MH

## 2019-03-20 RX ORDER — CLINDAMYCIN HCL 300 MG
600 CAPSULE ORAL 3 TIMES DAILY
Qty: 48 CAPSULE | Refills: 0 | Status: ON HOLD | OUTPATIENT
Start: 2019-03-20 | End: 2019-04-25

## 2019-03-20 RX ORDER — HYDROXYZINE HYDROCHLORIDE 25 MG/1
25 TABLET, FILM COATED ORAL EVERY 4 HOURS PRN
Status: DISCONTINUED | OUTPATIENT
Start: 2019-03-20 | End: 2019-04-25 | Stop reason: HOSPADM

## 2019-03-20 RX ORDER — ACETAMINOPHEN 325 MG/1
650 TABLET ORAL EVERY 4 HOURS PRN
Status: DISCONTINUED | OUTPATIENT
Start: 2019-03-20 | End: 2019-04-25 | Stop reason: HOSPADM

## 2019-03-20 RX ORDER — ACETAMINOPHEN 325 MG/1
650 TABLET ORAL EVERY 4 HOURS PRN
Status: ON HOLD
Start: 2019-03-20 | End: 2019-04-25

## 2019-03-20 RX ORDER — CLINDAMYCIN HCL 300 MG
600 CAPSULE ORAL 3 TIMES DAILY
Status: DISCONTINUED | OUTPATIENT
Start: 2019-03-20 | End: 2019-03-24

## 2019-03-20 RX ORDER — CLINDAMYCIN HCL 300 MG
600 CAPSULE ORAL 3 TIMES DAILY
Qty: 48 CAPSULE | Refills: 0 | Status: SHIPPED | OUTPATIENT
Start: 2019-03-20 | End: 2019-03-20

## 2019-03-20 RX ORDER — OLANZAPINE 5 MG/1
5-10 TABLET, ORALLY DISINTEGRATING ORAL EVERY 4 HOURS PRN
Status: DISCONTINUED | OUTPATIENT
Start: 2019-03-20 | End: 2019-04-25 | Stop reason: HOSPADM

## 2019-03-20 RX ORDER — TOLTERODINE 2 MG/1
2 CAPSULE, EXTENDED RELEASE ORAL DAILY
Status: DISCONTINUED | OUTPATIENT
Start: 2019-03-21 | End: 2019-04-25

## 2019-03-20 RX ADMIN — CLINDAMYCIN HYDROCHLORIDE 600 MG: 300 CAPSULE ORAL at 21:53

## 2019-03-20 RX ADMIN — TOLTERODINE TARTRATE 2 MG: 2 CAPSULE, EXTENDED RELEASE ORAL at 08:02

## 2019-03-20 RX ADMIN — CLINDAMYCIN PHOSPHATE 900 MG: 900 INJECTION, SOLUTION INTRAVENOUS at 06:08

## 2019-03-20 RX ADMIN — OMEPRAZOLE 20 MG: 20 CAPSULE, DELAYED RELEASE ORAL at 07:16

## 2019-03-20 RX ADMIN — Medication 1 MG: at 21:53

## 2019-03-20 RX ADMIN — CLINDAMYCIN PHOSPHATE 900 MG: 900 INJECTION, SOLUTION INTRAVENOUS at 14:12

## 2019-03-20 ASSESSMENT — ACTIVITIES OF DAILY LIVING (ADL)
ADLS_ACUITY_SCORE: 18
COGNITION: 0 - NO COGNITION ISSUES REPORTED
AMBULATION: 0-->INDEPENDENT
ADLS_ACUITY_SCORE: 18
ADLS_ACUITY_SCORE: 18
AMBULATION: 0-->INDEPENDENT
TOILETING: 0-->INDEPENDENT
ADLS_ACUITY_SCORE: 18
RETIRED_COMMUNICATION: 0-->UNDERSTANDS/COMMUNICATES WITHOUT DIFFICULTY
RETIRED_COMMUNICATION: 2-->DIFFICULTY UNDERSTANDING (NOT RELATED TO LANGUAGE BARRIER)
TRANSFERRING: 0-->INDEPENDENT
BATHING: 0-->INDEPENDENT
DRESS: 0-->INDEPENDENT
COGNITION: 0 - NO COGNITION ISSUES REPORTED
ADLS_ACUITY_SCORE: 18
TRANSFERRING: 0-->INDEPENDENT
TOILETING: 0-->INDEPENDENT
SWALLOWING: 0-->SWALLOWS FOODS/LIQUIDS WITHOUT DIFFICULTY
FALL_HISTORY_WITHIN_LAST_SIX_MONTHS: NO
FALL_HISTORY_WITHIN_LAST_SIX_MONTHS: NO
BATHING: 0-->INDEPENDENT
DRESS: 0-->INDEPENDENT
SWALLOWING: 0-->SWALLOWS FOODS/LIQUIDS WITHOUT DIFFICULTY
RETIRED_EATING: 0-->INDEPENDENT
RETIRED_EATING: 0-->INDEPENDENT

## 2019-03-20 NOTE — PLAN OF CARE
A&Ox3, napping between cares. Up with SBA to BR, amb to shower. VSS. C/O pain in left thigh with wound drsg changes. Kael diet, good appetite. WOCN redressed and updated orders for drsg changes, cont cleocin. Contact iso, ID s/o. Plan to tx to [facility] when bed avail next shift.
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
## 2019-03-20 NOTE — PLAN OF CARE
Pt is A&O x 4, anxious and cooperative. VSS on RA, denied pain and SOB. Up SBA to bathroom, regular diet with good appetite. Receiving IV abx. Wound on left thigh CDI. Contact precaution maintained. VBM in placed, on 72 hour hold. Psych following. Discharge pending in progress.

## 2019-03-20 NOTE — PLAN OF CARE
Care Plan Summary Note:  ORIENTATION/BEHAVIOR: A&Ox4  ABNL VS/O2: VSS on RA  MOBILITY/FALL RISK: Yes, SBA  PAIN MANAGMENT: Denies pain  DIET: Regular  BOWEL/BLADDER: Continent  ABNL LAB/BG:  DRAIN/DEVICES: PIV SL  SKIN: Left lateral thigh wound, small dry drainage on dressing  TESTS/PROCEDURES:  AGGRESSION TOOL COLOR: Green  D/C DAY/GOALS/PLACE: Pending   OTHER: VPM in use, long arm sit. She is on 72 hour Hold. Contact precaution for MRSA
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
## 2019-03-20 NOTE — PROGRESS NOTES
.Welcome packet reviewed with patient. Information reviewed includes getting emergency help, preventing infections, understanding your care, using medication safely, reducing falls, preventing pressure ulcers, smoking cessation, powerful choices and Patients Bill of Rights. Pt. given tour of the unit and instruction on use of facility including emergency call light. Program schedule reviewed with patient. Questions regarding the unit addressed. Pt. Search completed and belongings inventoried.    .Nursing assessment complete including patient and medication profiles. Risk assessments completed addressing suicide,fall,skin,nutrition and safety issues. Care plan initiated. Assessments reviewed with physician and admit orders received. Video monitoring in progress, Patient Informed.

## 2019-03-20 NOTE — PLAN OF CARE
Discharge    Patient discharged to MHU via wheelchair with staff and security  Care plan note: Pt is A&O x 4, calm and cooperative for cares. VSS on RA, denied pain and SOB. AVS printout explained to pt, questions answered. Pt expressed understanding. Pt on 72 hour hold. Pt is being discharged to mental health unit. Security escorted to MHU. Discharge medication sent with pt. Report given to receiving RN. Pt left  from station 66 at 1820.    Listed belongings gathered and returned to patient. Yes  Care Plan and Patient education resolved: Yes  Prescriptions if needed, hard copies sent with patient  Yes  Home and hospital acquired medications returned to patient: Yes  Medication Bin checked and emptied on discharge Yes  Follow up appointment made for patient: No

## 2019-03-21 PROCEDURE — A9270 NON-COVERED ITEM OR SERVICE: HCPCS | Mod: GY | Performed by: STUDENT IN AN ORGANIZED HEALTH CARE EDUCATION/TRAINING PROGRAM

## 2019-03-21 PROCEDURE — 12400000 ZZH R&B MH

## 2019-03-21 PROCEDURE — A9270 NON-COVERED ITEM OR SERVICE: HCPCS | Mod: GY | Performed by: PSYCHIATRY & NEUROLOGY

## 2019-03-21 PROCEDURE — 25000132 ZZH RX MED GY IP 250 OP 250 PS 637: Mod: GY | Performed by: PSYCHIATRY & NEUROLOGY

## 2019-03-21 PROCEDURE — G0463 HOSPITAL OUTPT CLINIC VISIT: HCPCS

## 2019-03-21 PROCEDURE — 40000901 ZZH STATISTIC WOC PT EDUCATION, 0-15 MIN

## 2019-03-21 PROCEDURE — 25000132 ZZH RX MED GY IP 250 OP 250 PS 637: Mod: GY | Performed by: STUDENT IN AN ORGANIZED HEALTH CARE EDUCATION/TRAINING PROGRAM

## 2019-03-21 RX ORDER — PERMETHRIN 50 MG/G
CREAM TOPICAL ONCE
Status: COMPLETED | OUTPATIENT
Start: 2019-03-21 | End: 2019-03-21

## 2019-03-21 RX ORDER — OLANZAPINE 10 MG/1
10 TABLET ORAL 2 TIMES DAILY
Status: DISCONTINUED | OUTPATIENT
Start: 2019-03-21 | End: 2019-03-26

## 2019-03-21 RX ADMIN — CLINDAMYCIN HYDROCHLORIDE 600 MG: 300 CAPSULE ORAL at 21:12

## 2019-03-21 RX ADMIN — MICONAZOLE NITRATE: 2 POWDER TOPICAL at 21:12

## 2019-03-21 RX ADMIN — TOLTERODINE TARTRATE 2 MG: 2 CAPSULE, EXTENDED RELEASE ORAL at 09:39

## 2019-03-21 RX ADMIN — PERMETHRIN: 50 CREAM TOPICAL at 17:08

## 2019-03-21 RX ADMIN — CLINDAMYCIN HYDROCHLORIDE 600 MG: 300 CAPSULE ORAL at 17:21

## 2019-03-21 RX ADMIN — OLANZAPINE 10 MG: 10 TABLET, FILM COATED ORAL at 21:12

## 2019-03-21 RX ADMIN — CLINDAMYCIN HYDROCHLORIDE 600 MG: 300 CAPSULE ORAL at 09:38

## 2019-03-21 RX ADMIN — OLANZAPINE 5 MG: 5 TABLET, ORALLY DISINTEGRATING ORAL at 17:21

## 2019-03-21 NOTE — PLAN OF CARE
Isolative to room, withdrawn, quiet. Med compliant. Mood stable but one episode of irritablity about bathroom door locked and not getting access to coloring supplies in her locker.

## 2019-03-21 NOTE — PROGRESS NOTES
03/20/19 1910   Patient Belongings   Did you bring any home meds/supplements to the hospital?  No   Patient Belongings locker   Patient Belongings Put in Hospital Secure Location (Security or Locker, etc.) other (see comments)   Belongings Search Yes   Clothing Search Yes   Second Staff [place]           5 bags of contaminated personal belongings retrieved from Station [address] double bagged for isolation and placed in locker. Do not open!               A               Admission:  I am responsible for any personal items that are not sent to the safe or pharmacy.  [facility] is not responsible for loss, theft or damage of any property in my possession.    Signature:  _________________________________ Date: _______  Time: _____                                              Staff Signature:  ____________________________ Date: ________  Time: _____      2nd Staff person, if patient is unable/unwilling to sign:    Signature: ________________________________ Date: ________  Time: _____     Discharge:  [place] has returned all of my personal belongings:    Signature: _________________________________ Date: ________  Time: _____                                          Staff Signature:  ____________________________ Date: ________  Time: _____
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
## 2019-03-21 NOTE — PROGRESS NOTES
"[facility] Psychiatric Progress Note       Interim History   The patient's care was discussed with the treatment team and chart notes were reviewed. Patient was transferred from Medicine to Psychiatry on 3/02/19 for medication management and further stabilization. Pt seen on 3/21/19 by Dr. Castle. The patient is still flat, disorganized, and delusional. Per staff report, the patient stated she was going to [employer] \"because \"bad instructions for giving a shot\". Due to her her disorganization, will increase Zyprexa to 10mg bid. Continue to monitor for psychosis and agitation.      Hospital Course   This is 53 year old single female with a psychiatric history of schizophrenia and polysubstance use disorder. She was initially presented to [facility] in a disorganized and confused state. Patient was cared for on Medicine and was then transferred to Psychiatry on 3/20/19 for medication management and stabilization. On 03/21/19, the patient's Zyprexa was increased to 10mg bid.      Medications     Current Facility-Administered Medications Ordered in Epic   Medication Dose Route Frequency Last Rate Last Dose     acetaminophen (TYLENOL) tablet 650 mg  650 mg Oral Q4H PRN         clindamycin (CLEOCIN) capsule 600 mg  600 mg Oral TID   600 mg at 03/21/19 0938     hydrOXYzine (ATARAX) tablet 25 mg  25 mg Oral Q4H PRN         melatonin tablet 1 mg  1 mg Oral At Bedtime PRN   1 mg at 03/20/19 2153     OLANZapine zydis (zyPREXA) ODT tab 5-10 mg  5-10 mg Oral Q4H PRN         omeprazole (priLOSEC) CR capsule 20 mg  20 mg Oral Daily PRN         tolterodine ER (DETROL LA) 24 hr capsule 2 mg  2 mg Oral Daily   2 mg at 03/21/19 0939     No current [facility]-ordered outpatient medications on file.         Allergies      Allergies   Allergen Reactions     Tetracycline         Medical Review of Systems     There were no vitals taken for this visit.  There is no height or weight on file to calculate BMI.  A 10-point " review of systems was performed by Timi Castle MD and is negative, no new findings.      Psychiatric Examination     Appearance Sitting in chair, dressed in hospital scrubs. Appears stated age.   Attitude Cooperative   Orientation Oriented to person, place, time   Eye Contact Poor   Speech Regular rate, rhythm, volume and tone   Language Normal   Psychomotor Behavior Normal   Mood Disorganized    Affect Erratic    Thought Process Goal-Oriented, Intact   Associations Intact   Thought Content Patient is currently negative for suicidal ideation, negative for plan or intent, able to contract no self harm and identify barriers to suicide. Positive for obsessions, compulsions or psychosis.     Fund of Knowledge Impaired   Insight Impaired   Judgement Impaired   Attention Span & Concentration Poor   Recent & Remote Memory Intact   Gait Normal   Muscle Tone Intact        Labs     Labs reviewed.  No results found for this or any previous visit (from the past 24 hour(s)).     Impression   This is 53 year old single female with a psychiatric history of schizophrenia and polysubstance use disorder. She was initially presented to [facility] in a disorganized and confused state. Patient was cared for on Medicine and was then transferred to Psychiatry on 3/20/19 for medication management and further stabilization. The patient continues to present as disorganized and erratic. Dr. Castle has  increased Zyprexa dose to 10mg BID. Continue to monitor.      Diagnoses   1. Paranoid schizophrenia  2. Polysubstance use disorder  3. Cognitive disorder     Plan     1. Explained side effects, benefits, and complications of medications to the patient, Pt gave verbal consent.  2. Medication changes: Increase Zyprexa to 10mg BID. Continue all other medications.  3. Discussed treatment plan with patient and team.  4. Projected length of stay: 7+ days      Attestation:   Patient has been seen and evaluated by me, Timi Castle,  MD.    Patient ID:  Name: Anastasiya Simon    MRN: [id number]  Admission: 3/20/2019   YOB: 1965

## 2019-03-21 NOTE — PLAN OF CARE
"Flat affect, pt remained in her room all shift except to come out to get tray and to use the bathroom. Pt still disorganized in thought, stated she was going to \"[employer]\" because of the \"bad instructions for giving a shot\". Observed talking to herself in her room, pt denies auditory or visual hallucinations. Wound cleaned and dressing changed (wound care instructions will be updated by Nadja ([facility])). Med compliant  "

## 2019-03-22 LAB
BACTERIA SPEC CULT: NO GROWTH
BACTERIA SPEC CULT: NO GROWTH
Lab: NORMAL
Lab: NORMAL
SPECIMEN SOURCE: NORMAL
SPECIMEN SOURCE: NORMAL

## 2019-03-22 PROCEDURE — 25000132 ZZH RX MED GY IP 250 OP 250 PS 637: Mod: GY | Performed by: PSYCHIATRY & NEUROLOGY

## 2019-03-22 PROCEDURE — A9270 NON-COVERED ITEM OR SERVICE: HCPCS | Mod: GY | Performed by: PSYCHIATRY & NEUROLOGY

## 2019-03-22 PROCEDURE — 12400000 ZZH R&B MH

## 2019-03-22 RX ADMIN — CLINDAMYCIN HYDROCHLORIDE 600 MG: 300 CAPSULE ORAL at 21:19

## 2019-03-22 RX ADMIN — CLINDAMYCIN HYDROCHLORIDE 600 MG: 300 CAPSULE ORAL at 09:05

## 2019-03-22 RX ADMIN — CLINDAMYCIN HYDROCHLORIDE 600 MG: 300 CAPSULE ORAL at 16:17

## 2019-03-22 RX ADMIN — TOLTERODINE TARTRATE 2 MG: 2 CAPSULE, EXTENDED RELEASE ORAL at 09:05

## 2019-03-22 RX ADMIN — HYDROXYZINE HYDROCHLORIDE 25 MG: 25 TABLET, FILM COATED ORAL at 09:05

## 2019-03-22 RX ADMIN — OLANZAPINE 10 MG: 10 TABLET, FILM COATED ORAL at 09:05

## 2019-03-22 RX ADMIN — OLANZAPINE 10 MG: 10 TABLET, FILM COATED ORAL at 21:20

## 2019-03-22 NOTE — PLAN OF CARE
Adult Behavioral Health Plan of Care  Team Discussion  Description  Team Plan:  3/21/2019 2245 - No Change by Chata Moreno  Note  BEHAVIORAL TEAM DISCUSSION    Participants: Dr. Castle, , nursing staff, and psych assistants  Progress: No change. Remains disorganized and delusional.   Continued Stay Criteria/Rationale: Needs further stabilization.   Medical/Physical: n/a   Precautions: n/a  Behavioral Orders   Procedures    Code 1 - Restrict to Unit    Routine Programming     As clinically indicated    Status 15     Every 15 minutes.     Plan: Increase Zyprexa to 10 mg BID   Rationale for change in precautions or plan: Needs further stabilization.

## 2019-03-22 NOTE — PLAN OF CARE
Pt kept to her room for much of the shift.  Irritable and short with staff, demanding some soiled belongings.  Staff offered some replacement items.  She was able to calm and did take her offered PRN.  Pt declined showering this evening, but allowed staff to apply her topical lotion to scalp.  Pt reported irritation to her chris area, area is reddened; good hygiene, topical PRN powdered ordered.  After voicing her issues to staff pt calmed and has been agreeable.  Med compliant this evening. Ate >%75 of meal.

## 2019-03-22 NOTE — PROGRESS NOTES
[facility] Psychiatric Progress Note       Interim History   The patient's care was discussed with the treatment team and chart notes were reviewed. According to attending nursing and psych associate staff members on [address], the patient has spent much of her time in her room, isolative. She has been irritable and short with staff members at times, however is agreeable after discussing. Patient was attempted to be seen on 3/22/19 by Dr. Castle, however patient refused to wake up and speak with Dr. Castle this morning. She has not expressed any concerns to attending staff in regards of the Zyprexa medication, thus there will be no changes made. Continue to monitor for psychosis and agitation.      Hospital Course   This is 53 year old single female with a psychiatric history of schizophrenia and polysubstance use disorder. She was initially presented to [facility] in a disorganized and confused state. Patient was cared for on Medicine and was then transferred to Psychiatry on 3/20/19 for medication management and stabilization. On 03/21/19, the patient's Zyprexa was increased to 10mg bid.      Medications     Current Facility-Administered Medications Ordered in Epic   Medication Dose Route Frequency Last Rate Last Dose     acetaminophen (TYLENOL) tablet 650 mg  650 mg Oral Q4H PRN         clindamycin (CLEOCIN) capsule 600 mg  600 mg Oral TID   600 mg at 03/21/19 2112     hydrOXYzine (ATARAX) tablet 25 mg  25 mg Oral Q4H PRN         melatonin tablet 1 mg  1 mg Oral At Bedtime PRN   1 mg at 03/20/19 2153     miconazole (MICATIN/MICRO GUARD) 2 % powder   Topical Q1H PRN         OLANZapine (zyPREXA) tablet 10 mg  10 mg Oral BID   10 mg at 03/21/19 2112     OLANZapine zydis (zyPREXA) ODT tab 5-10 mg  5-10 mg Oral Q4H PRN   5 mg at 03/21/19 1721     omeprazole (priLOSEC) CR capsule 20 mg  20 mg Oral Daily PRN         tolterodine ER (DETROL LA) 24 hr capsule 2 mg  2 mg Oral Daily   2 mg at 03/21/19  0939     No current [facility]-ordered outpatient medications on file.         Allergies      Allergies   Allergen Reactions     Tetracycline         Medical Review of Systems     /87   Pulse 89   Temp 97.7  F (36.5  C) (Oral)   Resp 16   There is no height or weight on file to calculate BMI.  A 10-point review of systems was performed by Timi Castle MD and is negative, no new findings.      Psychiatric Examination     Appearance Sitting in chair, dressed in hospital scrubs. Appears stated age.   Attitude Cooperative   Orientation Oriented to person, place, time   Eye Contact Poor   Speech Regular rate, rhythm, volume and tone   Language Normal   Psychomotor Behavior Normal   Mood Disorganized    Affect Erratic    Thought Process Goal-Oriented, Intact   Associations Intact   Thought Content Patient is currently negative for suicidal ideation, negative for plan or intent, able to contract no self harm and identify barriers to suicide. Positive for obsessions, compulsions or psychosis.     Fund of Knowledge Impaired   Insight Impaired   Judgement Impaired   Attention Span & Concentration Poor   Recent & Remote Memory Intact   Gait Normal   Muscle Tone Intact        Labs     Labs reviewed.  No results found for this or any previous visit (from the past 24 hour(s)).     Impression   This is 53 year old single female with a psychiatric history of schizophrenia and polysubstance use disorder. Per attending psych associates and nursing staff, the patient has been quite isolative to her room. She proceeds to be somewhat irritable and agitated, particularly with staff members. She however is redirectable. When trying to speak with patient this morning, she refused to wake up and talk with Dr. Castle. From this, there will be medication adjustments made today. Continue to monitor.      Diagnoses   1. Paranoid schizophrenia  2. Polysubstance use disorder  3. Cognitive disorder     Plan     1. Explained side  effects, benefits, and complications of medications to the patient, Pt gave verbal consent.  2. Medication changes: None today  3. Discussed treatment plan with patient and team.  4. Projected length of stay: 7+ days      Attestation:   Patient has been seen and evaluated by me, Timi Castle MD.    Patient ID:  Name: Anastasiya Simon    MRN: [id number]  Admission: 3/20/2019   YOB: 1965

## 2019-03-22 NOTE — PLAN OF CARE
Pt sleep most of the day. Was compliant with cares. Left thigh wound dressing changed completed. Wound is tunneling, drainage is brownish tan, moderate amount oozing out of dressing. Grimacing noted with dressing change. Pt denies SI, AVH. Will continue monitor for safety.

## 2019-03-22 NOTE — PROGRESS NOTES
03/22/19 1300   General Information   Has Not Attended OT as of: 03/22/19     Pt has not attended OT since admit.  Will continue to encourage participation and completion of  self-assessment as able. OT staff will explain the purpose of being involved with treatment plan and provide options to meet current needs and goals.

## 2019-03-23 PROCEDURE — 25000132 ZZH RX MED GY IP 250 OP 250 PS 637: Mod: GY | Performed by: PSYCHIATRY & NEUROLOGY

## 2019-03-23 PROCEDURE — 12400000 ZZH R&B MH

## 2019-03-23 PROCEDURE — 25000131 ZZH RX MED GY IP 250 OP 636 PS 637: Mod: GY | Performed by: PHYSICIAN ASSISTANT

## 2019-03-23 PROCEDURE — A9270 NON-COVERED ITEM OR SERVICE: HCPCS | Mod: GY | Performed by: PSYCHIATRY & NEUROLOGY

## 2019-03-23 RX ORDER — ONDANSETRON 4 MG/1
4 TABLET, ORALLY DISINTEGRATING ORAL EVERY 6 HOURS PRN
Status: DISCONTINUED | OUTPATIENT
Start: 2019-03-23 | End: 2019-04-25 | Stop reason: HOSPADM

## 2019-03-23 RX ORDER — ALUMINA, MAGNESIA, AND SIMETHICONE 2400; 2400; 240 MG/30ML; MG/30ML; MG/30ML
30 SUSPENSION ORAL
Status: COMPLETED | OUTPATIENT
Start: 2019-03-23 | End: 2019-03-23

## 2019-03-23 RX ADMIN — TOLTERODINE TARTRATE 2 MG: 2 CAPSULE, EXTENDED RELEASE ORAL at 09:17

## 2019-03-23 RX ADMIN — OLANZAPINE 10 MG: 10 TABLET, FILM COATED ORAL at 20:52

## 2019-03-23 RX ADMIN — OLANZAPINE 10 MG: 10 TABLET, FILM COATED ORAL at 09:17

## 2019-03-23 RX ADMIN — ONDANSETRON 4 MG: 4 TABLET, ORALLY DISINTEGRATING ORAL at 21:24

## 2019-03-23 RX ADMIN — ALUMINUM HYDROXIDE, MAGNESIUM HYDROXIDE, AND DIMETHICONE 30 ML: 400; 400; 40 SUSPENSION ORAL at 16:47

## 2019-03-23 RX ADMIN — CLINDAMYCIN HYDROCHLORIDE 600 MG: 300 CAPSULE ORAL at 15:36

## 2019-03-23 RX ADMIN — CLINDAMYCIN HYDROCHLORIDE 600 MG: 300 CAPSULE ORAL at 09:17

## 2019-03-23 RX ADMIN — CLINDAMYCIN HYDROCHLORIDE 600 MG: 300 CAPSULE ORAL at 20:45

## 2019-03-23 RX ADMIN — OMEPRAZOLE 20 MG: 20 CAPSULE, DELAYED RELEASE ORAL at 12:42

## 2019-03-23 ASSESSMENT — ACTIVITIES OF DAILY LIVING (ADL)
HYGIENE/GROOMING: SHOWER;WITH ASSISTANCE
DRESS: SCRUBS (BEHAVIORAL HEALTH)

## 2019-03-23 NOTE — PLAN OF CARE
"Spent most of shift sleeping. Did get up and get lunch tray. Showered w/ encouragement with minimal assistance. States she \"feels better than yesterday.\" Withdrawn, isolative. Slightly more interactive this afternoon.   "

## 2019-03-23 NOTE — PLAN OF CARE
Pt has been bed resting and napping since the start of the evening shift. Med compliant. No shower; untidy. Presents a flat, anxious, tense, irritable, hopeless and labile affect. Contact ISO

## 2019-03-24 LAB
ANION GAP SERPL CALCULATED.3IONS-SCNC: 8 MMOL/L (ref 3–14)
BUN SERPL-MCNC: 16 MG/DL (ref 7–30)
CALCIUM SERPL-MCNC: 8.2 MG/DL (ref 8.5–10.1)
CHLORIDE SERPL-SCNC: 104 MMOL/L (ref 94–109)
CO2 SERPL-SCNC: 26 MMOL/L (ref 20–32)
CREAT SERPL-MCNC: 0.62 MG/DL (ref 0.52–1.04)
ERYTHROCYTE [DISTWIDTH] IN BLOOD BY AUTOMATED COUNT: 13.4 % (ref 10–15)
GFR SERPL CREATININE-BSD FRML MDRD: >90 ML/MIN/{1.73_M2}
GLUCOSE SERPL-MCNC: 165 MG/DL (ref 70–99)
HCT VFR BLD AUTO: 42.3 % (ref 35–47)
HGB BLD-MCNC: 14.3 G/DL (ref 11.7–15.7)
MCH RBC QN AUTO: 29.8 PG (ref 26.5–33)
MCHC RBC AUTO-ENTMCNC: 33.8 G/DL (ref 31.5–36.5)
MCV RBC AUTO: 88 FL (ref 78–100)
PLATELET # BLD AUTO: 356 10E9/L (ref 150–450)
POTASSIUM SERPL-SCNC: 3.6 MMOL/L (ref 3.4–5.3)
RBC # BLD AUTO: 4.8 10E12/L (ref 3.8–5.2)
SODIUM SERPL-SCNC: 138 MMOL/L (ref 133–144)
WBC # BLD AUTO: 8.9 10E9/L (ref 4–11)

## 2019-03-24 PROCEDURE — 85027 COMPLETE CBC AUTOMATED: CPT | Performed by: PSYCHIATRY & NEUROLOGY

## 2019-03-24 PROCEDURE — 80048 BASIC METABOLIC PNL TOTAL CA: CPT | Performed by: PHYSICIAN ASSISTANT

## 2019-03-24 PROCEDURE — 25000132 ZZH RX MED GY IP 250 OP 250 PS 637: Mod: GY | Performed by: PSYCHIATRY & NEUROLOGY

## 2019-03-24 PROCEDURE — 36415 COLL VENOUS BLD VENIPUNCTURE: CPT | Performed by: PHYSICIAN ASSISTANT

## 2019-03-24 PROCEDURE — A9270 NON-COVERED ITEM OR SERVICE: HCPCS | Mod: GY | Performed by: PSYCHIATRY & NEUROLOGY

## 2019-03-24 PROCEDURE — 12400000 ZZH R&B MH

## 2019-03-24 PROCEDURE — 25000131 ZZH RX MED GY IP 250 OP 636 PS 637: Mod: GY | Performed by: PHYSICIAN ASSISTANT

## 2019-03-24 PROCEDURE — 85027 COMPLETE CBC AUTOMATED: CPT | Performed by: PHYSICIAN ASSISTANT

## 2019-03-24 PROCEDURE — 25000132 ZZH RX MED GY IP 250 OP 250 PS 637: Mod: GY | Performed by: STUDENT IN AN ORGANIZED HEALTH CARE EDUCATION/TRAINING PROGRAM

## 2019-03-24 PROCEDURE — 82306 VITAMIN D 25 HYDROXY: CPT | Performed by: PHYSICIAN ASSISTANT

## 2019-03-24 PROCEDURE — A9270 NON-COVERED ITEM OR SERVICE: HCPCS | Mod: GY | Performed by: STUDENT IN AN ORGANIZED HEALTH CARE EDUCATION/TRAINING PROGRAM

## 2019-03-24 RX ORDER — SULFAMETHOXAZOLE/TRIMETHOPRIM 800-160 MG
1 TABLET ORAL 2 TIMES DAILY
Status: COMPLETED | OUTPATIENT
Start: 2019-03-24 | End: 2019-03-28

## 2019-03-24 RX ADMIN — ONDANSETRON 4 MG: 4 TABLET, ORALLY DISINTEGRATING ORAL at 11:15

## 2019-03-24 RX ADMIN — SULFAMETHOXAZOLE AND TRIMETHOPRIM 1 TABLET: 800; 160 TABLET ORAL at 20:22

## 2019-03-24 RX ADMIN — ACETAMINOPHEN 650 MG: 325 TABLET, FILM COATED ORAL at 13:12

## 2019-03-24 RX ADMIN — TOLTERODINE TARTRATE 2 MG: 2 CAPSULE, EXTENDED RELEASE ORAL at 09:01

## 2019-03-24 RX ADMIN — CLINDAMYCIN HYDROCHLORIDE 300 MG: 300 CAPSULE ORAL at 15:48

## 2019-03-24 RX ADMIN — CLINDAMYCIN HYDROCHLORIDE 600 MG: 300 CAPSULE ORAL at 09:01

## 2019-03-24 RX ADMIN — OLANZAPINE 10 MG: 10 TABLET, FILM COATED ORAL at 09:01

## 2019-03-24 RX ADMIN — ONDANSETRON 4 MG: 4 TABLET, ORALLY DISINTEGRATING ORAL at 17:04

## 2019-03-24 RX ADMIN — OLANZAPINE 10 MG: 10 TABLET, FILM COATED ORAL at 20:22

## 2019-03-24 ASSESSMENT — ACTIVITIES OF DAILY LIVING (ADL): DRESS: SCRUBS (BEHAVIORAL HEALTH)

## 2019-03-24 NOTE — PROGRESS NOTES
Pt vomited during shift that appeared a dark brown coffee ground resemblance in color and texture.

## 2019-03-24 NOTE — PROGRESS NOTES
BRIEF IM PROGRESS NOTE    Had emesis overnight. No further symptoms. Possible side effect of Clindamycin. Will switch to Bactrim BID, also provides MRSA coverage Continue Zofran as needed. Will re-assess in AM    /73 (BP Location: Left arm)   Pulse 100   Temp 97.9  F (36.6  C) (Oral)   Resp 16     Scott Barkley MD

## 2019-03-24 NOTE — PLAN OF CARE
Pt. remains isolative to room. Bed resting on and off. Exhausted after shower. Vomited late afternoon. Nausea eventually resolved with Maalox and gingerale. Denies any suicidal ideation. Denies anxiety. Pain of wound during dressing change  but declined pain medication.  Minimal to no drainage of wound. Cleaned and re-dressed. After HS medication had another episode of vomiting. Call placed to hospitalist on-call. Plan-ordered zofran and BMP for AM. Hospitalist will see in AM. Zofran given. Currently afebrile.

## 2019-03-24 NOTE — PROGRESS NOTES
Called regarding emesis x 2 today after receiving oral clindamycin. No further symptoms. VSS. ? If related Clindamycin. Zofran ordered. BMP in am. Hospitalist tomorrow to determine if change in antibiotic indicated.       Altagracia Schmitt PA-C  Hospitalist Service  [phone]

## 2019-03-24 NOTE — PROVIDER NOTIFICATION
Pt. has vomited x 2 this evening. Once after taking 1600 medication, having a shower and dressing change. The second after 2100 medication. Given Maalox earlier due to patient c/o of indigestion. Call placed to hospitalist after second episode. Vitals 137/87,  115 and 98.5.

## 2019-03-24 NOTE — PLAN OF CARE
Spent entire shift bedresting except came out to lounge to get meal tray. Isolative. Irritable. Guarded. Somewhat pressured speech. Med compliant. C/o nausea about midmorning, denied previously, ate majority of breakfast. PRN Zofran given x 1. C/o indigestion but states she wants her prilosec in the evening. Tylenol given before dressing change. Dressing change completed. Diarrhea x 1 this shift.

## 2019-03-25 PROCEDURE — A9270 NON-COVERED ITEM OR SERVICE: HCPCS | Mod: GY | Performed by: PSYCHIATRY & NEUROLOGY

## 2019-03-25 PROCEDURE — 25000132 ZZH RX MED GY IP 250 OP 250 PS 637: Mod: GY | Performed by: PSYCHIATRY & NEUROLOGY

## 2019-03-25 PROCEDURE — 25000132 ZZH RX MED GY IP 250 OP 250 PS 637: Mod: GY | Performed by: STUDENT IN AN ORGANIZED HEALTH CARE EDUCATION/TRAINING PROGRAM

## 2019-03-25 PROCEDURE — 12400000 ZZH R&B MH

## 2019-03-25 PROCEDURE — 99232 SBSQ HOSP IP/OBS MODERATE 35: CPT | Performed by: STUDENT IN AN ORGANIZED HEALTH CARE EDUCATION/TRAINING PROGRAM

## 2019-03-25 PROCEDURE — A9270 NON-COVERED ITEM OR SERVICE: HCPCS | Mod: GY | Performed by: STUDENT IN AN ORGANIZED HEALTH CARE EDUCATION/TRAINING PROGRAM

## 2019-03-25 RX ORDER — PERMETHRIN 50 MG/G
CREAM TOPICAL SEE ADMIN INSTRUCTIONS
Status: DISCONTINUED | OUTPATIENT
Start: 2019-03-25 | End: 2019-03-25

## 2019-03-25 RX ORDER — PERMETHRIN 50 MG/G
CREAM TOPICAL SEE ADMIN INSTRUCTIONS
Status: DISCONTINUED | OUTPATIENT
Start: 2019-04-02 | End: 2019-04-01

## 2019-03-25 RX ORDER — CALCIUM CARBONATE 500 MG/1
1000 TABLET, CHEWABLE ORAL EVERY 4 HOURS PRN
Status: DISCONTINUED | OUTPATIENT
Start: 2019-03-25 | End: 2019-04-25 | Stop reason: HOSPADM

## 2019-03-25 RX ADMIN — TOLTERODINE TARTRATE 2 MG: 2 CAPSULE, EXTENDED RELEASE ORAL at 08:09

## 2019-03-25 RX ADMIN — SULFAMETHOXAZOLE AND TRIMETHOPRIM 1 TABLET: 800; 160 TABLET ORAL at 08:09

## 2019-03-25 RX ADMIN — CALCIUM CARBONATE (ANTACID) CHEW TAB 500 MG 1000 MG: 500 CHEW TAB at 17:57

## 2019-03-25 RX ADMIN — OLANZAPINE 10 MG: 10 TABLET, FILM COATED ORAL at 20:03

## 2019-03-25 RX ADMIN — SULFAMETHOXAZOLE AND TRIMETHOPRIM 1 TABLET: 800; 160 TABLET ORAL at 20:03

## 2019-03-25 RX ADMIN — Medication 1 MG: at 20:03

## 2019-03-25 RX ADMIN — OLANZAPINE 10 MG: 10 TABLET, FILM COATED ORAL at 08:09

## 2019-03-25 NOTE — PLAN OF CARE
"Continued with nausea and refused 1/2 of antibiotic dose so call placed to hospitalist and antibiotic changed.  Nausea slowly improved as evening progressed. No vomiting tonight and no further diarrhea. Slightly more alert. Mood stable. Talking about wanting to get back to her home and the people who she cares about ( other homeless people she has invited into her home-feels she has \"saved lives\" by taking them in)   "

## 2019-03-25 NOTE — PROGRESS NOTES
[facility] Psychiatric Progress Note       Interim History   The patient's care was discussed with the treatment team and chart notes were reviewed. According to attending hospital staff members, the patient proceeds to be isolative, irritable, and guarded. She has spent majoirty of her time in her room. Patient was attempted to be seen on 3/25/19 by Dr. Castle. On interview, the patient was found laying in bed. As Dr. Castle tried to talk with her, the patient reported she did not feel good thus did not want to talk.      Hospital Course   This is 53 year old single female with a psychiatric history of schizophrenia and polysubstance use disorder. She was initially presented to [facility] in a disorganized and confused state. Patient was cared for on Medicine and was then transferred to Psychiatry on 3/20/19 for medication management and stabilization. On 03/21/19, the patient's Zyprexa was increased to 10mg bid. When trying to speak with patient on 3/22, patient refused to wake up and talk with Dr. Castle. From this, there were  no medication adjustments made. Again, the patient did not wish to converse with Dr. Castle on 3/25 due to her expressing that she did not feel well physically.      Medications     Current Facility-Administered Medications Ordered in Epic   Medication Dose Route Frequency Last Rate Last Dose     acetaminophen (TYLENOL) tablet 650 mg  650 mg Oral Q4H PRN   650 mg at 03/24/19 1312     hydrOXYzine (ATARAX) tablet 25 mg  25 mg Oral Q4H PRN   25 mg at 03/22/19 0905     melatonin tablet 1 mg  1 mg Oral At Bedtime PRN   1 mg at 03/20/19 2153     miconazole (MICATIN/MICRO GUARD) 2 % powder   Topical Q1H PRN         OLANZapine (zyPREXA) tablet 10 mg  10 mg Oral BID   10 mg at 03/25/19 0809     OLANZapine zydis (zyPREXA) ODT tab 5-10 mg  5-10 mg Oral Q4H PRN   5 mg at 03/21/19 1721     omeprazole (priLOSEC) CR capsule 20 mg  20 mg Oral Daily PRN   20 mg at 03/23/19 1242      ondansetron (ZOFRAN-ODT) ODT tab 4 mg  4 mg Oral Q6H PRN   4 mg at 03/24/19 1704     sulfamethoxazole-trimethoprim (BACTRIM DS/SEPTRA DS) 800-160 MG per tablet 1 tablet  1 tablet Oral BID   1 tablet at 03/25/19 0809     tolterodine ER (DETROL LA) 24 hr capsule 2 mg  2 mg Oral Daily   2 mg at 03/25/19 0809     No current Epic-ordered outpatient medications on file.         Allergies      Allergies   Allergen Reactions     Tetracycline         Medical Review of Systems     /71   Pulse 83   Temp 97.9  F (36.6  C) (Oral)   Resp 16   There is no height or weight on file to calculate BMI.  A 10-point review of systems was performed by Timi Castle MD and is negative, no new findings.      Psychiatric Examination     Appearance Sitting in chair, dressed in hospital scrubs. Appears stated age.   Attitude Cooperative   Orientation Oriented to person, place, time   Eye Contact Poor   Speech Regular rate, rhythm, volume and tone   Language Normal   Psychomotor Behavior Normal   Mood Disorganized    Affect Erratic    Thought Process Goal-Oriented, Intact   Associations Intact   Thought Content Patient is currently negative for suicidal ideation, negative for plan or intent, able to contract no self harm and identify barriers to suicide. Positive for obsessions, compulsions or psychosis.     Fund of Knowledge Impaired   Insight Impaired   Judgement Impaired   Attention Span & Concentration Poor   Recent & Remote Memory Intact   Gait Unsteady    Muscle Tone Intact        Labs     Labs reviewed.  No results found for this or any previous visit (from the past 24 hour(s)).     Impression   This is 53 year old single female with a psychiatric history of schizophrenia and polysubstance use disorder. Over the weekend, the patient was found to be irritable, isolative, and guarded. She spent majority of her time secluded to her room however was medication compliant and cooperative in care. When Dr. Castle  attempted to speak with patient today, she denied wanting to participate in the interview due to her feeling physically unwell. There will be no medication adjustments made today.      Diagnoses   1. Paranoid schizophrenia  2. Polysubstance use disorder  3. Cognitive disorder     Plan     1. Explained side effects, benefits, and complications of medications to the patient, Pt gave verbal consent.  2. Medication changes: None today  3. Discussed treatment plan with patient and team.  4. Projected length of stay: 7+ days      Attestation:   Patient has been seen and evaluated by me, Timi Castle MD.    Patient ID:  Name: Anastasiya Simon    MRN: [id number]  Admission: 3/20/2019   YOB: 1965

## 2019-03-25 NOTE — PROGRESS NOTES
"Patient's sister Tamika Sawyer [phone] called.  Patient signed a release of information.  She provided the following history.  The home patient was living in which is owned by their parents has been condemned and is being put for sale. Tamika and three other sisters have been trying to help patient.  Tamika stated patient is a hoarder and not clean.  For the past 10 years she has taken in homeless people, many of whom are drug addicts. For the last 2 years she has been believing she has mites and has been purchasing electrical \"Zappers\" of increasing strength to zap the mites on her body.  This has resulted in numerous infections.  Patient neglects her personal care.  Her bedroom windows were covered with wool to \"keep out aliens\".  The room is so full of things they could barely get the door opened.  The police and  found methamphetamine paraphernalia and propane tanks in the kitchen near the gas stove.  There was stuff all around the furnace making it a fire hazard.  She stated there is broken furniture everywhere.  Tamika stated she and her 3 sisters try to help patient but patient doesn't cooperate.  They made arrangements for patient to go to a crisis home just prior to admission but patient refused and ended on the street for a night before she called her family again.  Patient's parents ages 90 and 93 have dementia and have been placed in an assisted living.  The family has spoken with patient about the fact that the home has been condemned and that she can not return.  But she still insists she has to go home.  Tamika met with the  and was given a list of things that had to be taken care of in 2 weeks.  Patient was given this list but made no attempt to clean up.  She had some homeless friends that made an attempt.  Tamika and her sisters are cleaning the home now, but to sell the home.  The family believes patient is a danger to herself.  She has no home to discharge " to.  Patient's given name is Radha and this is what her family calls her.

## 2019-03-25 NOTE — PLAN OF CARE
BEHAVIORAL TEAM DISCUSSION    Participants: Dr. Castle, nurses, social workers, occupational therapist, psych assistants, scribe.  Progress: No change. Pt was not feeling good today and did not want to talk to Dr. Castle.   Continued Stay Criteria/Rationale: Until pt becomes adequately stabilized.   Medical/Physical: n/a  Precautions:   Behavioral Orders   Procedures    Code 1 - Restrict to Unit    Routine Programming     As clinically indicated    Status 15     Every 15 minutes.     Plan: Continue with current treatment plan.   Rationale for change in precautions or plan: Pt needs further observation here on the unit.

## 2019-03-25 NOTE — PLAN OF CARE
Pt spent entire shift isolative to room and coming out only to use bathroom possibl due to MRSA isolation Precaution. Pt presents with blunt affect , irritable but redirectable mood. Pt's sister called and told writer that she is a hoarder and her house has been seized by the [facility] for clean up. Pt is now homeless.   Still monitoring for multiple stools to collect sample for C-diff. Antibiotic that was probably causing nausea and vomiting.  Pt denies any SI and was medication compliant. Wound care done with purulent drainage noted on the dressing with some slight discomfort from patient.

## 2019-03-26 PROCEDURE — 25000131 ZZH RX MED GY IP 250 OP 636 PS 637: Mod: GY | Performed by: PHYSICIAN ASSISTANT

## 2019-03-26 PROCEDURE — A9270 NON-COVERED ITEM OR SERVICE: HCPCS | Mod: GY | Performed by: STUDENT IN AN ORGANIZED HEALTH CARE EDUCATION/TRAINING PROGRAM

## 2019-03-26 PROCEDURE — 25000132 ZZH RX MED GY IP 250 OP 250 PS 637: Mod: GY | Performed by: PSYCHIATRY & NEUROLOGY

## 2019-03-26 PROCEDURE — 12400000 ZZH R&B MH

## 2019-03-26 PROCEDURE — G0463 HOSPITAL OUTPT CLINIC VISIT: HCPCS

## 2019-03-26 PROCEDURE — 25000132 ZZH RX MED GY IP 250 OP 250 PS 637: Mod: GY | Performed by: STUDENT IN AN ORGANIZED HEALTH CARE EDUCATION/TRAINING PROGRAM

## 2019-03-26 PROCEDURE — 99232 SBSQ HOSP IP/OBS MODERATE 35: CPT | Performed by: INTERNAL MEDICINE

## 2019-03-26 PROCEDURE — 87493 C DIFF AMPLIFIED PROBE: CPT | Performed by: PSYCHIATRY & NEUROLOGY

## 2019-03-26 PROCEDURE — A9270 NON-COVERED ITEM OR SERVICE: HCPCS | Mod: GY | Performed by: PSYCHIATRY & NEUROLOGY

## 2019-03-26 PROCEDURE — 99207 ZZC CDG-MDM COMPONENT: MEETS MODERATE - UP CODED: CPT | Performed by: INTERNAL MEDICINE

## 2019-03-26 RX ORDER — OLANZAPINE 10 MG/1
10 TABLET ORAL DAILY
Status: DISCONTINUED | OUTPATIENT
Start: 2019-03-27 | End: 2019-04-25 | Stop reason: HOSPADM

## 2019-03-26 RX ORDER — OLANZAPINE 10 MG/1
20 TABLET ORAL AT BEDTIME
Status: DISCONTINUED | OUTPATIENT
Start: 2019-03-26 | End: 2019-04-25 | Stop reason: HOSPADM

## 2019-03-26 RX ADMIN — OLANZAPINE 20 MG: 10 TABLET, FILM COATED ORAL at 20:11

## 2019-03-26 RX ADMIN — ONDANSETRON 4 MG: 4 TABLET, ORALLY DISINTEGRATING ORAL at 22:21

## 2019-03-26 RX ADMIN — Medication 1 MG: at 20:14

## 2019-03-26 RX ADMIN — SULFAMETHOXAZOLE AND TRIMETHOPRIM 1 TABLET: 800; 160 TABLET ORAL at 20:11

## 2019-03-26 RX ADMIN — OLANZAPINE 10 MG: 10 TABLET, FILM COATED ORAL at 09:16

## 2019-03-26 RX ADMIN — SULFAMETHOXAZOLE AND TRIMETHOPRIM 1 TABLET: 800; 160 TABLET ORAL at 09:16

## 2019-03-26 RX ADMIN — TOLTERODINE TARTRATE 2 MG: 2 CAPSULE, EXTENDED RELEASE ORAL at 09:16

## 2019-03-26 RX ADMIN — ONDANSETRON 4 MG: 4 TABLET, ORALLY DISINTEGRATING ORAL at 11:08

## 2019-03-26 ASSESSMENT — ACTIVITIES OF DAILY LIVING (ADL)
HYGIENE/GROOMING: PROMPTS
HYGIENE/GROOMING: PROMPTS
DRESS: SCRUBS (BEHAVIORAL HEALTH)

## 2019-03-26 NOTE — PROGRESS NOTES
"[facility]    Medicine Progress Note - Hospitalist Service       Date of Admission:  3/20/2019  Date of Service: 03/26/2019    Assessment & Plan      1.  Left mid-thigh wound with associated cellulitis.         MRSA infection  - she states this wound developed after she tried to inject \"vitamin C\" with a needle.  She states she has had the wound for \"a while,\" but cannot provide much more detail.  - on admission. Afebrile, no leucocytosis, LA 1.7, procalcitonin 0.07  - received Unasyn in ER- switched to Ancef after admission  - wound G stain- positive for heavy growth MRSA  - switched to Clindamycin iv on 3/18; plan for a total 10 days course but had nausea/vomiting and was switched to Bactrim DS on 03/25, continue until 03/28.  - wound care and dressing as per wound care nurse who saw her again today     2. Possible mites infestation?  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  Schizophrenia, decompensated        Suicidal ideation.    - she does not appear to be on any medications PTA  - She has had hallucinations as well as delusions, believing that there are aliens trying to get into her house.  She also made suicidal statements to her parents that she was going to jump off a bridge.  She has not been able to take care of herself.   - Psych managing      4.  Failure to thrive       Homelessness.  -  The patient was living at a house that her mother bought for her, but this house has since been condemned due to lack of upkeep, as well as the patient allowing many homeless people to stay there.  She is unable to take " care of herself at this time.   - Psych following   - she will likely need a more structured living environment outside of the hospital.      5.  Alcohol and meth abuse.  -  Last alcoholic drink was 2 days PTA per patient report.  She has also been using methamphetamine.   - no evidence of withdrawal.   - Psych managing     6. Overactive bladder  - continue PTA Detrol LA    7. GERD  - PTA on Prilosec prn  - has intermittent nausea/vomiting which initially attributed to Doxy but she states that she has long h/o intermittent episodes of emesis which are related to GERD, as per the patient  - c/w Prilosec, Tums, prn, Zofran prn         Diet: Regular Diet Adult  Snacks/Supplements Pediatric: Ensure Plus; Between Meals    DVT Prophylaxis: Defer to primary service  Perez Catheter: not present  Code Status: Full Code      Disposition Plan   Expected discharge: unknown, may be committed, recommended to transitional care unit once discharged by psychiatry.  Entered: Vandana Hendrix MD 03/26/2019, 6:33 PM       The patient's care was discussed with the Bedside Nurse and Patient.    Vandana Hendrix MD  Hospitalist Service  [facility]    ______________________________________________________________________    Interval History    Had 2 episodes of emesis today; no abd pain, no chest pain, no SOB; told me that she had problems with vomiting before admission; states that her left thigh wound is less painful.      Data reviewed today: I reviewed all medications, new labs and imaging results over the last 24 hours. I personally reviewed no images or EKG's today.    Physical Exam   Vital Signs: Temp: 98.6  F (37  C) Temp src: Oral BP: 151/83 Pulse: 100   Resp: 16        Weight: 0 lbs 0 oz     Constitutional: NAD, disheveled appearing  Respiratory: Bilateral air entry, no wheezing, no rales, no crackles  Cardiovascular: S1S2, RRR, no murmurs, no rubs  GI: abdomen- soft, nonT, nond, BS  present  Skin/Integumen: left lateral thigh wound dressed; her face looks much  now  Extremities- no leg swelling         Data   Recent Labs   Lab 03/24/19  0928   WBC 8.9   HGB 14.3   MCV 88         POTASSIUM 3.6   CHLORIDE 104   CO2 26   BUN 16   CR 0.62   ANIONGAP 8   MATTHEW 8.2*   *     No results found for this or any previous visit (from the past 24 hour(s)).  Medications       [START ON 3/27/2019] OLANZapine  10 mg Oral Daily     OLANZapine  20 mg Oral At Bedtime     [START ON 4/2/2019] permethrin   Topical See Admin Instructions     sulfamethoxazole-trimethoprim  1 tablet Oral BID     tolterodine ER  2 mg Oral Daily

## 2019-03-26 NOTE — PLAN OF CARE
"Patient asked if she was hearing any voices and she said\" I am dreaming so I don\"t know. Pt rambles in conversation and at times does not make sense. She is compliant with AM meds and states she did have some diarrhea last fito. Room is a mess and her bathroom is in disarray. Her floor is sticky. This writer will clean up and use bleach on the floors. Pt C/O  nausea at 11:00  and was given Zofran for this. Pt is eating lunch so nausea is much better. Zyprexa at HS increased to 20 mg.  "
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
## 2019-03-26 NOTE — PLAN OF CARE
"Withdrawn, resting in bed most of the shift. Unkempt. Pt became irritated when questioned about thoughts stating \"it may be God talking to me. I don't know. That's very disrespectful. I know in another dimension she is trying to make me more sick and wipe my brain out\". Wound red, drainage on mepilex, painful with touch and movement. C/o slight nausea. Denies any bowel movements. Afebrile. Med compliant. Contact precautions maintained.   "

## 2019-03-27 LAB
C DIFF TOX B STL QL: NEGATIVE
SPECIMEN SOURCE: NORMAL

## 2019-03-27 PROCEDURE — 25000132 ZZH RX MED GY IP 250 OP 250 PS 637: Mod: GY | Performed by: INTERNAL MEDICINE

## 2019-03-27 PROCEDURE — A9270 NON-COVERED ITEM OR SERVICE: HCPCS | Mod: GY | Performed by: STUDENT IN AN ORGANIZED HEALTH CARE EDUCATION/TRAINING PROGRAM

## 2019-03-27 PROCEDURE — 25000132 ZZH RX MED GY IP 250 OP 250 PS 637: Mod: GY | Performed by: STUDENT IN AN ORGANIZED HEALTH CARE EDUCATION/TRAINING PROGRAM

## 2019-03-27 PROCEDURE — A9270 NON-COVERED ITEM OR SERVICE: HCPCS | Mod: GY | Performed by: PSYCHIATRY & NEUROLOGY

## 2019-03-27 PROCEDURE — 25000131 ZZH RX MED GY IP 250 OP 636 PS 637: Mod: GY | Performed by: PHYSICIAN ASSISTANT

## 2019-03-27 PROCEDURE — 25000132 ZZH RX MED GY IP 250 OP 250 PS 637: Mod: GY | Performed by: PSYCHIATRY & NEUROLOGY

## 2019-03-27 PROCEDURE — 12400000 ZZH R&B MH

## 2019-03-27 PROCEDURE — A9270 NON-COVERED ITEM OR SERVICE: HCPCS | Mod: GY | Performed by: INTERNAL MEDICINE

## 2019-03-27 RX ORDER — DIVALPROEX SODIUM 500 MG/1
500 TABLET, EXTENDED RELEASE ORAL AT BEDTIME
Status: DISCONTINUED | OUTPATIENT
Start: 2019-03-27 | End: 2019-04-05

## 2019-03-27 RX ADMIN — TOLTERODINE TARTRATE 2 MG: 2 CAPSULE, EXTENDED RELEASE ORAL at 09:16

## 2019-03-27 RX ADMIN — ONDANSETRON 4 MG: 4 TABLET, ORALLY DISINTEGRATING ORAL at 20:20

## 2019-03-27 RX ADMIN — SULFAMETHOXAZOLE AND TRIMETHOPRIM 1 TABLET: 800; 160 TABLET ORAL at 20:20

## 2019-03-27 RX ADMIN — HYDROXYZINE HYDROCHLORIDE 25 MG: 25 TABLET, FILM COATED ORAL at 16:42

## 2019-03-27 RX ADMIN — OMEPRAZOLE 20 MG: 20 CAPSULE, DELAYED RELEASE ORAL at 16:42

## 2019-03-27 RX ADMIN — ONDANSETRON 4 MG: 4 TABLET, ORALLY DISINTEGRATING ORAL at 14:01

## 2019-03-27 RX ADMIN — OLANZAPINE 20 MG: 10 TABLET, FILM COATED ORAL at 20:20

## 2019-03-27 RX ADMIN — DIVALPROEX SODIUM 500 MG: 500 TABLET, FILM COATED, EXTENDED RELEASE ORAL at 20:20

## 2019-03-27 RX ADMIN — SULFAMETHOXAZOLE AND TRIMETHOPRIM 1 TABLET: 800; 160 TABLET ORAL at 09:16

## 2019-03-27 RX ADMIN — OLANZAPINE 10 MG: 10 TABLET, FILM COATED ORAL at 09:16

## 2019-03-27 RX ADMIN — Medication 1 MG: at 20:20

## 2019-03-27 ASSESSMENT — ACTIVITIES OF DAILY LIVING (ADL)
HYGIENE/GROOMING: PROMPTS;HANDWASHING
DRESS: PROMPTS;INDEPENDENT;SCRUBS (BEHAVIORAL HEALTH)
ORAL_HYGIENE: PROMPTS
LAUNDRY: WITH SUPERVISION
HYGIENE/GROOMING: PROMPTS
ORAL_HYGIENE: PROMPTS
LAUNDRY: UNABLE TO COMPLETE
DRESS: INDEPENDENT

## 2019-03-27 NOTE — PLAN OF CARE
Pt has been isolative to her room bed resting.Pt c/o feeling very tired. Pt c/o mild stomach pain. Dressing changed to left thigh wound. Pt's wound appears to be healing with some granulation and bed of the wound is pink and red. Dressing has small amount of drainage. Continue contact precautions. Pt remains disorganized at times needs prompts to do ADLs.Pt has been encouraged to keep her room clean by picking up after herself. Pt did not shower. Pt had one incident of emesis after lunch given Zofran. No diarrhea or BM this shift.

## 2019-03-27 NOTE — PROGRESS NOTES
Late entry:  Pt ,s room in disarray 3/26 with food on the floor. Floor was stickey from juice being spilled. Pt had some brownish  drainage on the bed and  On her scrubs ,not sure what this is from. Room cleaned and multiple empty cups and food removed and put in iso bags. Wound care done and it is deep with moderate bloody drainage. ISO clean of room and bathroom done

## 2019-03-27 NOTE — PROGRESS NOTES
Patient spent the shift bed resting. She had frequent loose stools x6 this shift as well as 2 episodes of vomiting. C-diff culture sent down and zofran given. Patient mood is calm but depressed. Affect is blunt. Speech is rambling.

## 2019-03-27 NOTE — PROGRESS NOTES
Chart-check: she will continue with Bactrim for 3 more doses to complete 10 days course of ABX; wound care as per [facility] nurse; 2nd dose of Permethrin ordered for 4/2/2019; will sign off at this time but can call Hospitalist service again if any new issues arise.    Radha Hendrix MD

## 2019-03-27 NOTE — PROGRESS NOTES
[facility] Psychiatric Progress Note       Interim History   The patient's care was discussed with the treatment team and chart notes were reviewed. Per attending hospital staff members on [address], the patient proceeds to isolative to her room, bed resting with stomach pain. She continues to attain delusional thoughts and mumbled and pressured speech. In regards of patient's wound, it appears to be healing properly. There has been a small amount of drainage, will need to continue with contact precaution. Again, was not able to participate in interview with Dr. Castle this afternoon. Dr. Castle would like to start the patient on Depakote 500mg in hopes to clear patient up.      Hospital Course   This is 53 year old single female with a psychiatric history of schizophrenia and polysubstance use disorder. She was initially presented to [facility] in a disorganized and confused state. Patient was cared for on Medicine and was then transferred to Psychiatry on 3/20/19 for medication management and stabilization. On 03/21/19, the patient's Zyprexa was increased to 10mg bid. When trying to speak with patient on 3/22, patient refused to wake up and talk with Dr. Castle. From this, there were  no medication adjustments made. Again, the patient did not wish to converse with Dr. Castle on 3/25 due to her expressing that she did not feel well physically.      Medications     Current Facility-Administered Medications Ordered in Epic   Medication Dose Route Frequency Last Rate Last Dose     acetaminophen (TYLENOL) tablet 650 mg  650 mg Oral Q4H PRN   650 mg at 03/24/19 1312     calcium carbonate (TUMS) chewable tablet 1,000 mg  1,000 mg Oral Q4H PRN   1,000 mg at 03/25/19 1757     divalproex sodium extended-release (DEPAKOTE ER) 24 hr tablet 500 mg  500 mg Oral At Bedtime         hydrOXYzine (ATARAX) tablet 25 mg  25 mg Oral Q4H PRN   25 mg at 03/22/19 0905     melatonin tablet 1 mg  1 mg Oral At  Bedtime PRN   1 mg at 03/26/19 2014     miconazole (MICATIN/MICRO GUARD) 2 % powder   Topical Q1H PRN         OLANZapine (zyPREXA) tablet 10 mg  10 mg Oral Daily   10 mg at 03/27/19 0916     OLANZapine (zyPREXA) tablet 20 mg  20 mg Oral At Bedtime   20 mg at 03/26/19 2011     OLANZapine zydis (zyPREXA) ODT tab 5-10 mg  5-10 mg Oral Q4H PRN   5 mg at 03/21/19 1721     omeprazole (priLOSEC) CR capsule 20 mg  20 mg Oral Daily PRN   20 mg at 03/23/19 1242     ondansetron (ZOFRAN-ODT) ODT tab 4 mg  4 mg Oral Q6H PRN   4 mg at 03/26/19 2221     [START ON 4/2/2019] permethrin (ELIMITE) 5 % cream   Topical See Admin Instructions         sulfamethoxazole-trimethoprim (BACTRIM DS/SEPTRA DS) 800-160 MG per tablet 1 tablet  1 tablet Oral BID   1 tablet at 03/27/19 0916     tolterodine ER (DETROL LA) 24 hr capsule 2 mg  2 mg Oral Daily   2 mg at 03/27/19 0916     No current Epic-ordered outpatient medications on file.         Allergies      Allergies   Allergen Reactions     Tetracycline         Medical Review of Systems     /73   Pulse 86   Temp 98.2  F (36.8  C) (Oral)   Resp 16   There is no height or weight on file to calculate BMI.  A 10-point review of systems was performed by Timi Castle MD and is negative, no new findings.      Psychiatric Examination     Appearance Sitting in chair, dressed in hospital scrubs. Appears stated age.   Attitude Cooperative   Orientation Oriented to person, place, time   Eye Contact Poor   Speech Regular rate, rhythm, volume and tone   Language Normal   Psychomotor Behavior Normal   Mood Disorganized    Affect Erratic    Thought Process Goal-Oriented, Intact   Associations Intact   Thought Content Patient is currently negative for suicidal ideation, negative for plan or intent, able to contract no self harm and identify barriers to suicide. Positive for obsessions, compulsions or psychosis.     Fund of Knowledge Impaired   Insight Impaired   Judgement Impaired   Attention  Span & Concentration Poor   Recent & Remote Memory Intact   Gait Unsteady    Muscle Tone Intact        Labs     Labs reviewed.  Recent Results (from the past 24 hour(s))   Clostridium difficile toxin B PCR    Collection Time: 03/26/19  8:46 PM   Result Value Ref Range    Specimen Description Feces     C Diff Toxin B PCR Negative NEG^Negative        Impression   This is 53 year old single female with a psychiatric history of schizophrenia and polysubstance use disorder. The patient continues to be isolative to her room. Contact precaution is still in place due to patient's wound still draining small amounts. Patient was unable to participate in today's interview once again due to complaining of being in pain. Staff however note that patient proceeds to be delusional in thought process. From this, Dr. Castle started the patient on Depakote 500mg daily in hopes to clear patient.      Diagnoses   1. Paranoid schizophrenia  2. Polysubstance use disorder  3. Cognitive disorder     Plan     1. Explained side effects, benefits, and complications of medications to the patient, Pt gave verbal consent.  2. Medication changes: Started Depakote 500mg at bedtime   3. Discussed treatment plan with patient and team.  4. Projected length of stay: 7+ days      Attestation:   Patient has been seen and evaluated by me, Timi Castle MD.    Patient ID:  Name: Anastasiya Simon    MRN: [id number]  Admission: 3/20/2019   YOB: 1965

## 2019-03-28 LAB — DEPRECATED CALCIDIOL+CALCIFEROL SERPL-MC: 36 UG/L (ref 20–75)

## 2019-03-28 PROCEDURE — A9270 NON-COVERED ITEM OR SERVICE: HCPCS | Mod: GY | Performed by: PSYCHIATRY & NEUROLOGY

## 2019-03-28 PROCEDURE — 12400000 ZZH R&B MH

## 2019-03-28 PROCEDURE — 25000132 ZZH RX MED GY IP 250 OP 250 PS 637: Mod: GY | Performed by: PSYCHIATRY & NEUROLOGY

## 2019-03-28 PROCEDURE — 25000132 ZZH RX MED GY IP 250 OP 250 PS 637: Mod: GY | Performed by: INTERNAL MEDICINE

## 2019-03-28 PROCEDURE — A9270 NON-COVERED ITEM OR SERVICE: HCPCS | Mod: GY | Performed by: INTERNAL MEDICINE

## 2019-03-28 RX ADMIN — SULFAMETHOXAZOLE AND TRIMETHOPRIM 1 TABLET: 800; 160 TABLET ORAL at 09:06

## 2019-03-28 RX ADMIN — Medication 1 MG: at 20:15

## 2019-03-28 RX ADMIN — OLANZAPINE 20 MG: 10 TABLET, FILM COATED ORAL at 20:11

## 2019-03-28 RX ADMIN — OLANZAPINE 10 MG: 10 TABLET, FILM COATED ORAL at 09:06

## 2019-03-28 RX ADMIN — DIVALPROEX SODIUM 500 MG: 500 TABLET, FILM COATED, EXTENDED RELEASE ORAL at 20:11

## 2019-03-28 RX ADMIN — OMEPRAZOLE 20 MG: 20 CAPSULE, DELAYED RELEASE ORAL at 20:15

## 2019-03-28 RX ADMIN — TOLTERODINE TARTRATE 2 MG: 2 CAPSULE, EXTENDED RELEASE ORAL at 09:06

## 2019-03-28 RX ADMIN — SULFAMETHOXAZOLE AND TRIMETHOPRIM 1 TABLET: 800; 160 TABLET ORAL at 20:11

## 2019-03-28 ASSESSMENT — ACTIVITIES OF DAILY LIVING (ADL)
ORAL_HYGIENE: PROMPTS
DRESS: PROMPTS
HYGIENE/GROOMING: PROMPTS

## 2019-03-28 NOTE — PLAN OF CARE
Flat affect, mood calm. Still disorganized in thought. Pt spent all of shift resting in her room. No complaints of nausea or reports of loose stools. Pt eating and drinking appropriately. Wound care done, pt cooperative throughout the process. Med compliant.

## 2019-03-28 NOTE — PLAN OF CARE
BEHAVIORAL TEAM DISCUSSION    Participants:  Dr. Castle, nurses, social workers, occupational therapist, psych assistants, medical scribe.   Progress: No change. Pt's sister visited last night and the pt made paranoid accusations towards her sister.   Continued Stay Criteria/Rationale: Until pt becomes adequately stabilized.   Medical/Physical: Pt proceeds to be isolative and confined to her room due to contact isolation restrictions.   Precautions:   Behavioral Orders   Procedures     Code 1 - Restrict to Unit     Routine Programming     As clinically indicated     Status 15     Every 15 minutes.     Plan: Continue with current treatment plan.   Rationale for change in precautions or plan: Pt needs further observation here on the unit.

## 2019-03-28 NOTE — PLAN OF CARE
Pt again spent a bulk of the shift resting in her room, she is aware of her contact isolation restrictions and was mindful of them this evening.  Reported some nausea with no emesis. No loose stools this shift.  Sister visited, she helped her get some bills in order.  The visited ended poorly, with Pt making paranoid accusations towards her sister.  Later she was agreeable to let staff assist with a bedding change and clean her room.  Med compliant.

## 2019-03-28 NOTE — PROGRESS NOTES
[facility] Psychiatric Progress Note       Interim History   The patient's care was discussed with the treatment team and chart notes were reviewed. Patient proceeds to be isolative and confined to her room due to contact isolation restrictions. Patient's sister had visited last night, which apparently ended up poorly due to patient making paranoid accusations towards her sister. Patient seen on 3/28/19 by Dr. Castle. Today the patient was able to and willing to engage in interview. She reports she is doing fine today. Dr. Castle informed patient about the medication adjustment made yesterday (the start of Depakote). Patient denies any issues with this addition. We will continue to monitor and care for patient's wound.      Hospital Course   This is 53 year old single female with a psychiatric history of schizophrenia and polysubstance use disorder. She was initially presented to [facility] in a disorganized and confused state. Patient was cared for on Medicine and was then transferred to Psychiatry on 3/20/19 for medication management and stabilization. On 03/21/19, the patient's Zyprexa was increased to 10mg bid. When trying to speak with patient on 3/22, patient refused to wake up and talk with Dr. Castle. From this, there were  no medication adjustments made. Again, the patient did not wish to converse with Dr. Castle on 3/25 due to her expressing that she did not feel well physically. Contact precaution is still in place on 3/28 due to patient's wound still draining small amounts. Patient was unable to participate in 3/28 interview once again due to complaining of being in pain. Staff however noted that patient proceeded to be delusional in thought process. From this, Dr. Castle started the patient on Depakote 500mg daily in hopes to clear patient.      Medications     Current Facility-Administered Medications Ordered in Epic   Medication Dose Route Frequency Last Rate Last Dose      acetaminophen (TYLENOL) tablet 650 mg  650 mg Oral Q4H PRN   650 mg at 03/24/19 1312     calcium carbonate (TUMS) chewable tablet 1,000 mg  1,000 mg Oral Q4H PRN   1,000 mg at 03/25/19 1757     divalproex sodium extended-release (DEPAKOTE ER) 24 hr tablet 500 mg  500 mg Oral At Bedtime   500 mg at 03/27/19 2020     hydrOXYzine (ATARAX) tablet 25 mg  25 mg Oral Q4H PRN   25 mg at 03/27/19 1642     melatonin tablet 1 mg  1 mg Oral At Bedtime PRN   1 mg at 03/27/19 2020     miconazole (MICATIN/MICRO GUARD) 2 % powder   Topical Q1H PRN         OLANZapine (zyPREXA) tablet 10 mg  10 mg Oral Daily   10 mg at 03/28/19 0906     OLANZapine (zyPREXA) tablet 20 mg  20 mg Oral At Bedtime   20 mg at 03/27/19 2020     OLANZapine zydis (zyPREXA) ODT tab 5-10 mg  5-10 mg Oral Q4H PRN   5 mg at 03/21/19 1721     omeprazole (priLOSEC) CR capsule 20 mg  20 mg Oral Daily PRN   20 mg at 03/27/19 1642     ondansetron (ZOFRAN-ODT) ODT tab 4 mg  4 mg Oral Q6H PRN   4 mg at 03/27/19 2020     [START ON 4/2/2019] permethrin (ELIMITE) 5 % cream   Topical See Admin Instructions         sulfamethoxazole-trimethoprim (BACTRIM DS/SEPTRA DS) 800-160 MG per tablet 1 tablet  1 tablet Oral BID   1 tablet at 03/28/19 0906     tolterodine ER (DETROL LA) 24 hr capsule 2 mg  2 mg Oral Daily   2 mg at 03/28/19 0906     No current Epic-ordered outpatient medications on file.         Allergies      Allergies   Allergen Reactions     Tetracycline         Medical Review of Systems     /58   Pulse 80   Temp 98  F (36.7  C) (Oral)   Resp 16   There is no height or weight on file to calculate BMI.  A 10-point review of systems was performed by Timi Castle MD and is negative, no new findings.      Psychiatric Examination     Appearance Sitting in chair, dressed in hospital scrubs. Appears stated age.   Attitude Cooperative   Orientation Oriented to person, place, time   Eye Contact Poor   Speech Regular rate, rhythm, volume and tone    Language Normal   Psychomotor Behavior Normal   Mood Disorganized    Affect Erratic    Thought Process Goal-Oriented, Intact   Associations Intact   Thought Content Patient is currently negative for suicidal ideation, negative for plan or intent, able to contract no self harm and identify barriers to suicide. Positive for obsessions, compulsions or psychosis.     Fund of Knowledge Impaired   Insight Impaired   Judgement Impaired   Attention Span & Concentration Poor   Recent & Remote Memory Intact   Gait Unsteady    Muscle Tone Intact        Labs     Labs reviewed.  No results found for this or any previous visit (from the past 24 hour(s)).     Impression   This is 53 year old single female with a psychiatric history of schizophrenia and polysubstance use disorder. Patient's wound continues to heal each day, however contact isolation restrictions are still in action. Patient was able and willing to engage in an interview today with Dr. Castle. Her medications were reviewed in detail, particularly the start of Depakote 500mg. Patient denied any issues with the start of this medication. Will continue to monitor and care for patient's healing wound.      Diagnoses   1. Paranoid schizophrenia  2. Polysubstance use disorder  3. Cognitive disorder     Plan     1. Explained side effects, benefits, and complications of medications to the patient, Pt gave verbal consent.  2. Medication changes: None today   3. Discussed treatment plan with patient and team.  4. Projected length of stay: 7+ days  5. Patient care order has been made: wound care is to be left in patient's room     Attestation:   Patient has been seen and evaluated by me, Timi Castle MD.    Patient ID:  Name: Anastasiya Simon    MRN: [id number]  Admission: 3/20/2019   YOB: 1965

## 2019-03-29 PROCEDURE — A9270 NON-COVERED ITEM OR SERVICE: HCPCS | Mod: GY | Performed by: PSYCHIATRY & NEUROLOGY

## 2019-03-29 PROCEDURE — 12400000 ZZH R&B MH

## 2019-03-29 PROCEDURE — 25000132 ZZH RX MED GY IP 250 OP 250 PS 637: Mod: GY | Performed by: PSYCHIATRY & NEUROLOGY

## 2019-03-29 RX ADMIN — DIVALPROEX SODIUM 500 MG: 500 TABLET, FILM COATED, EXTENDED RELEASE ORAL at 19:51

## 2019-03-29 RX ADMIN — TOLTERODINE TARTRATE 2 MG: 2 CAPSULE, EXTENDED RELEASE ORAL at 08:56

## 2019-03-29 RX ADMIN — Medication 1 MG: at 19:59

## 2019-03-29 RX ADMIN — OLANZAPINE 10 MG: 10 TABLET, FILM COATED ORAL at 08:56

## 2019-03-29 RX ADMIN — OLANZAPINE 20 MG: 10 TABLET, FILM COATED ORAL at 19:50

## 2019-03-29 ASSESSMENT — ACTIVITIES OF DAILY LIVING (ADL)
ORAL_HYGIENE: PROMPTS
HYGIENE/GROOMING: PROMPTS
DRESS: PROMPTS

## 2019-03-29 NOTE — PLAN OF CARE
"Flat affect, mood calm. Patient more able to have conversation and answer questions when asked by staff. Pt cooperative with all cares. Bed linen changed and patient agreed to bed bath, but still will not allow staff to wash her hair. Pt inquired about when her \"MRSA will clear up\" so \"I can go out and watch TV\". Staff explained isolation precautions and why it was still needed. Patient drinking and eating appropriately. Med compliant  "

## 2019-03-29 NOTE — PROGRESS NOTES
[facility] Psychiatric Progress Note       Interim History   The patient's care was discussed with the treatment team and chart notes were reviewed. No change, pt still has weeping MRSA infected wound and facility is not designed for taking care of her medical care.      Hospital Coursebatrhro   This is 53 year old single female with a psychiatric history of schizophrenia and polysubstance use disorder. She was initially presented to [facility] in a disorganized and confused state. Patient was cared for on Medicine and was then transferred to Psychiatry on 3/20/19 for medication management and stabilization. On 03/21/19, the patient's Zyprexa was increased to 10mg bid. When trying to speak with patient on 3/22, patient refused to wake up and talk with Dr. Castle. From this, there were  no medication adjustments made. Again, the patient did not wish to converse with Dr. Castle on 3/25 due to her expressing that she did not feel well physically. Contact precaution is still in place on 3/28 due to patient's wound still draining small amounts. Patient was unable to participate in 3/28 interview once again due to complaining of being in pain. Staff however noted that patient proceeded to be delusional in thought process. From this, Dr. Castle started the patient on Depakote 500mg daily in hopes to clear patient.      Medications     Current Facility-Administered Medications Ordered in Epic   Medication Dose Route Frequency Last Rate Last Dose     acetaminophen (TYLENOL) tablet 650 mg  650 mg Oral Q4H PRN   650 mg at 03/24/19 1312     calcium carbonate (TUMS) chewable tablet 1,000 mg  1,000 mg Oral Q4H PRN   1,000 mg at 03/25/19 1757     divalproex sodium extended-release (DEPAKOTE ER) 24 hr tablet 500 mg  500 mg Oral At Bedtime   500 mg at 03/28/19 2011     hydrOXYzine (ATARAX) tablet 25 mg  25 mg Oral Q4H PRN   25 mg at 03/27/19 1642     melatonin tablet 1 mg  1 mg Oral At Bedtime PRN   1 mg at  03/28/19 2015     miconazole (MICATIN/MICRO GUARD) 2 % powder   Topical Q1H PRN         OLANZapine (zyPREXA) tablet 10 mg  10 mg Oral Daily   10 mg at 03/29/19 0856     OLANZapine (zyPREXA) tablet 20 mg  20 mg Oral At Bedtime   20 mg at 03/28/19 2011     OLANZapine zydis (zyPREXA) ODT tab 5-10 mg  5-10 mg Oral Q4H PRN   5 mg at 03/21/19 1721     omeprazole (priLOSEC) CR capsule 20 mg  20 mg Oral Daily PRN   20 mg at 03/28/19 2015     ondansetron (ZOFRAN-ODT) ODT tab 4 mg  4 mg Oral Q6H PRN   4 mg at 03/27/19 2020     [START ON 4/2/2019] permethrin (ELIMITE) 5 % cream   Topical See Admin Instructions         tolterodine ER (DETROL LA) 24 hr capsule 2 mg  2 mg Oral Daily   2 mg at 03/29/19 0856     No current Epic-ordered outpatient medications on file.         Allergies      Allergies   Allergen Reactions     Tetracycline         Medical Review of Systems     /70 (BP Location: Left arm)   Pulse 86   Temp 98.2  F (36.8  C) (Oral)   Resp 16   There is no height or weight on file to calculate BMI.  A 10-point review of systems was performed by Timi Castle MD and is negative, no new findings.      Psychiatric Examination     Appearance Sitting in chair, dressed in hospital scrubs. Appears stated age.   Attitude Cooperative   Orientation Oriented to person, place, time   Eye Contact Poor   Speech Regular rate, rhythm, volume and tone   Language Normal   Psychomotor Behavior Normal   Mood Disorganized    Affect Erratic    Thought Process Goal-Oriented, Intact   Associations Intact   Thought Content Patient is currently negative for suicidal ideation, negative for plan or intent, able to contract no self harm and identify barriers to suicide. Positive for obsessions, compulsions or psychosis.     Fund of Knowledge Impaired   Insight Impaired   Judgement Impaired   Attention Span & Concentration Poor   Recent & Remote Memory Intact   Gait Unsteady    Muscle Tone Intact        Labs     Labs reviewed.  No  results found for this or any previous visit (from the past 24 hour(s)).     Impression   This is 53 year old single female with a psychiatric history of schizophrenia and polysubstance use disorder. No change, patient still has weeping MRSA infected wound. Facility is not designed for taking care of her medical care.       Diagnoses   1. Paranoid schizophrenia  2. Polysubstance use disorder  3. Cognitive disorder     Plan     1. Explained side effects, benefits, and complications of medications to the patient, Pt gave verbal consent.  2. Medication changes: None today   3. Discussed treatment plan with patient and team.  4. Projected length of stay: 7+ days  5. Patient care order has been made: wound care is to be left in patient's room     Attestation:   Patient has been seen and evaluated by me, Timi Castle MD.    Patient ID:  Name: Anastasiya Simon    MRN: [id number]  Admission: 3/20/2019   YOB: 1965

## 2019-03-29 NOTE — PLAN OF CARE
Patient has been up moving around in her room. [facility] to use the phone or bathroom.   Patient did not make any delusional or paranoid comments to writer this shift (except for some talk about mites?). Patient has been eating and drinking well. Multiple snacks given to patient. She washed her hair in her bathroom sink, but did not shower.

## 2019-03-30 PROCEDURE — 25000132 ZZH RX MED GY IP 250 OP 250 PS 637: Mod: GY | Performed by: PSYCHIATRY & NEUROLOGY

## 2019-03-30 PROCEDURE — 12400000 ZZH R&B MH

## 2019-03-30 PROCEDURE — A9270 NON-COVERED ITEM OR SERVICE: HCPCS | Mod: GY | Performed by: PSYCHIATRY & NEUROLOGY

## 2019-03-30 RX ADMIN — Medication 1 MG: at 21:01

## 2019-03-30 RX ADMIN — OLANZAPINE 20 MG: 10 TABLET, FILM COATED ORAL at 21:01

## 2019-03-30 RX ADMIN — OMEPRAZOLE 20 MG: 20 CAPSULE, DELAYED RELEASE ORAL at 08:20

## 2019-03-30 RX ADMIN — TOLTERODINE TARTRATE 2 MG: 2 CAPSULE, EXTENDED RELEASE ORAL at 08:20

## 2019-03-30 RX ADMIN — DIVALPROEX SODIUM 500 MG: 500 TABLET, FILM COATED, EXTENDED RELEASE ORAL at 21:01

## 2019-03-30 RX ADMIN — OLANZAPINE 10 MG: 10 TABLET, FILM COATED ORAL at 08:20

## 2019-03-30 RX ADMIN — Medication 1 LOZENGE: at 22:19

## 2019-03-30 RX ADMIN — ACETAMINOPHEN 650 MG: 325 TABLET, FILM COATED ORAL at 11:45

## 2019-03-30 RX ADMIN — Medication 1 LOZENGE: at 15:12

## 2019-03-30 RX ADMIN — OLANZAPINE 10 MG: 5 TABLET, ORALLY DISINTEGRATING ORAL at 02:06

## 2019-03-30 ASSESSMENT — ACTIVITIES OF DAILY LIVING (ADL)
HYGIENE/GROOMING: PROMPTS
ORAL_HYGIENE: PROMPTS
HYGIENE/GROOMING: PROMPTS
DRESS: PROMPTS
LAUNDRY: UNABLE TO COMPLETE
LAUNDRY: WITH SUPERVISION
ORAL_HYGIENE: PROMPTS
DRESS: PROMPTS

## 2019-03-30 NOTE — PLAN OF CARE
Pt presents with flat affect and increasing insight. Pt denies any SI nor hallucination. Wound care dressing done per protocol and wound care order. Pt tolerated procedure as pt was premedicated with tylenol for comfort. Pt was medication and reported no diarrhea this shift.

## 2019-03-30 NOTE — PLAN OF CARE
Flat affect , mood calm and no irritability . Able to converse more sensibly . Eating and drinking well . Room is messy and needs reminders to clean it up. Withdrawn , quiet and  isolative . Bed resting and sleeping . Mood stable , pleasant and cooperative . Med compliant. Her 3 sisters visited and this went well.

## 2019-03-31 PROCEDURE — A9270 NON-COVERED ITEM OR SERVICE: HCPCS | Mod: GY | Performed by: PSYCHIATRY & NEUROLOGY

## 2019-03-31 PROCEDURE — 25000132 ZZH RX MED GY IP 250 OP 250 PS 637: Mod: GY | Performed by: PSYCHIATRY & NEUROLOGY

## 2019-03-31 PROCEDURE — 12400000 ZZH R&B MH

## 2019-03-31 RX ADMIN — OLANZAPINE 10 MG: 10 TABLET, FILM COATED ORAL at 08:51

## 2019-03-31 RX ADMIN — Medication 1 LOZENGE: at 13:42

## 2019-03-31 RX ADMIN — HYDROXYZINE HYDROCHLORIDE 25 MG: 25 TABLET, FILM COATED ORAL at 20:10

## 2019-03-31 RX ADMIN — DIVALPROEX SODIUM 500 MG: 500 TABLET, FILM COATED, EXTENDED RELEASE ORAL at 20:10

## 2019-03-31 RX ADMIN — TOLTERODINE TARTRATE 2 MG: 2 CAPSULE, EXTENDED RELEASE ORAL at 08:51

## 2019-03-31 RX ADMIN — ACETAMINOPHEN 650 MG: 325 TABLET, FILM COATED ORAL at 19:15

## 2019-03-31 RX ADMIN — OLANZAPINE 20 MG: 10 TABLET, FILM COATED ORAL at 20:10

## 2019-03-31 RX ADMIN — HYDROXYZINE HYDROCHLORIDE 25 MG: 25 TABLET, FILM COATED ORAL at 08:51

## 2019-03-31 RX ADMIN — Medication 1 MG: at 20:10

## 2019-03-31 RX ADMIN — Medication 1 LOZENGE: at 22:39

## 2019-03-31 ASSESSMENT — ACTIVITIES OF DAILY LIVING (ADL)
ORAL_HYGIENE: PROMPTS
DRESS: PROMPTS
HYGIENE/GROOMING: PROMPTS
HYGIENE/GROOMING: PROMPTS
DRESS: PROMPTS
ORAL_HYGIENE: PROMPTS

## 2019-03-31 NOTE — PLAN OF CARE
Pt remains in the room the entire shift except coming to use bathroom. Pt appears depressed but calm. Pt care done and with some noticeable drainage on the dressing. Pt reports minimal pain and communicates with some insight. Pt is med compliant and denies SI and no more loose stools.

## 2019-03-31 NOTE — PLAN OF CARE
"Withdrawn and isolative . Stayed in room bed resting and sleeping. Less call light activity. Mood stable. Flat affect , pleasant, calm and cooperative. Eating and taking fluids well. Speech more sensible and logical. Improving some. Stated \" I am okay, just bored. \" .   "

## 2019-04-01 PROCEDURE — 25000132 ZZH RX MED GY IP 250 OP 250 PS 637: Mod: GY | Performed by: PSYCHIATRY & NEUROLOGY

## 2019-04-01 PROCEDURE — A9270 NON-COVERED ITEM OR SERVICE: HCPCS | Mod: GY | Performed by: PSYCHIATRY & NEUROLOGY

## 2019-04-01 PROCEDURE — 25000132 ZZH RX MED GY IP 250 OP 250 PS 637: Mod: GY | Performed by: STUDENT IN AN ORGANIZED HEALTH CARE EDUCATION/TRAINING PROGRAM

## 2019-04-01 PROCEDURE — 12400000 ZZH R&B MH

## 2019-04-01 PROCEDURE — A9270 NON-COVERED ITEM OR SERVICE: HCPCS | Mod: GY | Performed by: STUDENT IN AN ORGANIZED HEALTH CARE EDUCATION/TRAINING PROGRAM

## 2019-04-01 RX ORDER — PERMETHRIN 50 MG/G
CREAM TOPICAL ONCE
Status: COMPLETED | OUTPATIENT
Start: 2019-04-01 | End: 2019-04-01

## 2019-04-01 RX ADMIN — OLANZAPINE 10 MG: 5 TABLET, ORALLY DISINTEGRATING ORAL at 00:44

## 2019-04-01 RX ADMIN — Medication 1 MG: at 21:11

## 2019-04-01 RX ADMIN — OLANZAPINE 20 MG: 10 TABLET, FILM COATED ORAL at 21:11

## 2019-04-01 RX ADMIN — TOLTERODINE TARTRATE 2 MG: 2 CAPSULE, EXTENDED RELEASE ORAL at 08:40

## 2019-04-01 RX ADMIN — DIVALPROEX SODIUM 500 MG: 500 TABLET, FILM COATED, EXTENDED RELEASE ORAL at 21:11

## 2019-04-01 RX ADMIN — PERMETHRIN: 50 CREAM TOPICAL at 21:11

## 2019-04-01 RX ADMIN — OMEPRAZOLE 20 MG: 20 CAPSULE, DELAYED RELEASE ORAL at 08:40

## 2019-04-01 RX ADMIN — OLANZAPINE 10 MG: 10 TABLET, FILM COATED ORAL at 08:40

## 2019-04-01 ASSESSMENT — ACTIVITIES OF DAILY LIVING (ADL)
ORAL_HYGIENE: PROMPTS
HYGIENE/GROOMING: PROMPTS
LAUNDRY: WITH SUPERVISION
DRESS: SCRUBS (BEHAVIORAL HEALTH)

## 2019-04-01 NOTE — PLAN OF CARE
"Spent shift in room  mostly in bed and napping. Withdrawn, quiet and speech a little more sensible and logical. Eating well and taking fluids. She changed all sheets and linen in room and put on clean scrubs. Stated ' I am bored here . And I am worried about these mites and how to get rid of them. I think they are getting worse. \" .   "

## 2019-04-01 NOTE — PLAN OF CARE
Pt is alert and oriented. Pt beliefs she is getting better, denies any SI nor hallucination. Stated that the pain from the wound area is much decreased. Continues to be isolated and withdrawn to room. Wound care done and patient tolerated it well.

## 2019-04-01 NOTE — PROGRESS NOTES
[facility] Psychiatric Progress Note       Interim History   The patient's care was discussed with the treatment team and chart notes were reviewed. According to nursing and psych associate staff members, the patient proceeds to be withdrawn to her room. Patient's speech and insight have slightly improved in comparison to admission. She has reported multiple times that she is bored her on Psych, however has denied any thoughts of suicide of SI. Wound care is still in place.      Hospital Coursebatrhro   This is 53 year old single female with a psychiatric history of schizophrenia and polysubstance use disorder. She was initially presented to [facility] in a disorganized and confused state. Patient was cared for on Medicine and was then transferred to Psychiatry on 3/20/19 for medication management and stabilization. On 03/21/19, the patient's Zyprexa was increased to 10mg bid. When trying to speak with patient on 3/22, patient refused to wake up and talk with Dr. Castle. From this, there were  no medication adjustments made. Again, the patient did not wish to converse with Dr. Castle on 3/25 due to her expressing that she did not feel well physically. Contact precaution is still in place on 3/28 due to patient's wound still draining small amounts. Patient was unable to participate in 3/28 interview once again due to complaining of being in pain. Staff however noted that patient proceeded to be delusional in thought process. From this, Dr. Castle started the patient on Depakote 500mg daily in hopes to clear patient.      Medications     Current Facility-Administered Medications Ordered in Epic   Medication Dose Route Frequency Last Rate Last Dose     acetaminophen (TYLENOL) tablet 650 mg  650 mg Oral Q4H PRN   650 mg at 03/31/19 1915     benzocaine-menthol (CHLORASEPTIC) 6-10 MG lozenge 1 lozenge  1 lozenge Buccal Q1H PRN   1 lozenge at 03/31/19 2232     calcium carbonate (TUMS) chewable tablet  1,000 mg  1,000 mg Oral Q4H PRN   1,000 mg at 03/25/19 1757     divalproex sodium extended-release (DEPAKOTE ER) 24 hr tablet 500 mg  500 mg Oral At Bedtime   500 mg at 03/31/19 2010     hydrOXYzine (ATARAX) tablet 25 mg  25 mg Oral Q4H PRN   25 mg at 03/31/19 2010     melatonin tablet 1 mg  1 mg Oral At Bedtime PRN   1 mg at 03/31/19 2010     miconazole (MICATIN/MICRO GUARD) 2 % powder   Topical Q1H PRN         OLANZapine (zyPREXA) tablet 10 mg  10 mg Oral Daily   10 mg at 03/31/19 0851     OLANZapine (zyPREXA) tablet 20 mg  20 mg Oral At Bedtime   20 mg at 03/31/19 2010     OLANZapine zydis (zyPREXA) ODT tab 5-10 mg  5-10 mg Oral Q4H PRN   10 mg at 04/01/19 0044     omeprazole (priLOSEC) CR capsule 20 mg  20 mg Oral Daily PRN   20 mg at 03/30/19 0820     ondansetron (ZOFRAN-ODT) ODT tab 4 mg  4 mg Oral Q6H PRN   4 mg at 03/27/19 2020     [START ON 4/2/2019] permethrin (ELIMITE) 5 % cream   Topical See Admin Instructions         tolterodine ER (DETROL LA) 24 hr capsule 2 mg  2 mg Oral Daily   2 mg at 03/31/19 0851     No current Epic-ordered outpatient medications on file.         Allergies      Allergies   Allergen Reactions     Tetracycline         Medical Review of Systems     /75 (BP Location: Right arm)   Pulse 100   Temp 98.8  F (37.1  C) (Oral)   Resp 18   There is no height or weight on file to calculate BMI.  A 10-point review of systems was performed by Timi Castle MD and is negative, no new findings.      Psychiatric Examination     Appearance Sitting in chair, dressed in hospital scrubs. Appears stated age.   Attitude Cooperative   Orientation Oriented to person, place, time   Eye Contact Poor   Speech Regular rate, rhythm, volume and tone   Language Normal   Psychomotor Behavior Normal   Mood Disorganized    Affect Erratic    Thought Process Goal-Oriented, Intact   Associations Intact   Thought Content Patient is currently negative for suicidal ideation, negative for plan or intent,  able to contract no self harm and identify barriers to suicide. Positive for obsessions, compulsions or psychosis.     Fund of Knowledge Impaired   Insight Impaired   Judgement Impaired   Attention Span & Concentration Poor   Recent & Remote Memory Intact   Gait Unsteady    Muscle Tone Intact        Labs     Labs reviewed.  No results found for this or any previous visit (from the past 24 hour(s)).     Impression   This is 53 year old single female with a psychiatric history of schizophrenia and polysubstance use disorder. Per attending staff members on [address], the patient continues withdrawn to her room. Both speech and insight have improved slightly. Wound care is still active.      Diagnoses   1. Paranoid schizophrenia  2. Polysubstance use disorder  3. Cognitive disorder     Plan     1. Explained side effects, benefits, and complications of medications to the patient, Pt gave verbal consent.  2. Medication changes: None today   3. Discussed treatment plan with patient and team.  4. Projected length of stay: 7+ days  5. Patient care order has been made: wound care is to be left in patient's room     Attestation:   Patient has been seen and evaluated by me, Timi Castle MD.    Patient ID:  Name: Anastasiya Simon    MRN: [id number]  Admission: 3/20/2019   YOB: 1965

## 2019-04-01 NOTE — PLAN OF CARE
A&O. VSS. Denies pain. Pt has been restless, states she is bored. Dressing CDI. Denies SI or hallucination. Has been isolative in room. Will continue to monitor.

## 2019-04-01 NOTE — PLAN OF CARE
BEHAVIORAL TEAM DISCUSSION    Participants: RN, PA, CM, Psychiatrist  Progress: No Change  Continued Stay Criteria/Rationale: Continue treatment plan  Medical/Physical: MRSA, potential mites/lice  Precautions: MRSA  Behavioral Orders   Procedures    Code 1 - Restrict to Unit    Routine Programming     As clinically indicated    Status 15     Every 15 minutes.     Plan: Continue treatment plan  Rationale for change in precautions or plan: No change in care plan.

## 2019-04-02 LAB — VALPROATE SERPL-MCNC: 56 MG/L (ref 50–100)

## 2019-04-02 PROCEDURE — 36415 COLL VENOUS BLD VENIPUNCTURE: CPT | Performed by: PSYCHIATRY & NEUROLOGY

## 2019-04-02 PROCEDURE — 12400000 ZZH R&B MH

## 2019-04-02 PROCEDURE — 25000132 ZZH RX MED GY IP 250 OP 250 PS 637: Mod: GY | Performed by: PSYCHIATRY & NEUROLOGY

## 2019-04-02 PROCEDURE — A9270 NON-COVERED ITEM OR SERVICE: HCPCS | Mod: GY | Performed by: PSYCHIATRY & NEUROLOGY

## 2019-04-02 PROCEDURE — G0463 HOSPITAL OUTPT CLINIC VISIT: HCPCS

## 2019-04-02 PROCEDURE — 40000901 ZZH STATISTIC WOC PT EDUCATION, 0-15 MIN

## 2019-04-02 PROCEDURE — 80164 ASSAY DIPROPYLACETIC ACD TOT: CPT | Performed by: PSYCHIATRY & NEUROLOGY

## 2019-04-02 RX ADMIN — Medication 1 MG: at 20:36

## 2019-04-02 RX ADMIN — OLANZAPINE 10 MG: 10 TABLET, FILM COATED ORAL at 10:19

## 2019-04-02 RX ADMIN — TOLTERODINE TARTRATE 2 MG: 2 CAPSULE, EXTENDED RELEASE ORAL at 10:19

## 2019-04-02 RX ADMIN — DIVALPROEX SODIUM 500 MG: 500 TABLET, FILM COATED, EXTENDED RELEASE ORAL at 20:31

## 2019-04-02 RX ADMIN — OLANZAPINE 20 MG: 10 TABLET, FILM COATED ORAL at 20:31

## 2019-04-02 ASSESSMENT — ACTIVITIES OF DAILY LIVING (ADL)
ORAL_HYGIENE: PROMPTS
DRESS: PROMPTS
HYGIENE/GROOMING: PROMPTS

## 2019-04-02 NOTE — PLAN OF CARE
Flat affect, mood calm. Pleasant and cooperative when interacting with staff. Denies auditory or visuall hallucinations, thoughts are still somewhat disorganized, speech clear, but tangential at times. Wound care done, denies pain. [facility] nurse came to see patient, wound continues to improve. Med compliant
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
## 2019-04-02 NOTE — PLAN OF CARE
Patient has been isolative in room during shift. Pleasant, calm and cooperative. Med compliant. Tolerated cream to head and face. States mites are coming from her ears, staff looked in ears and nothing was seen.  Wound is CDI w/mepilex dressing covering. Denies SI and hallucinations. Will continue to monitor.

## 2019-04-03 PROCEDURE — A9270 NON-COVERED ITEM OR SERVICE: HCPCS | Mod: GY | Performed by: PSYCHIATRY & NEUROLOGY

## 2019-04-03 PROCEDURE — 12400000 ZZH R&B MH

## 2019-04-03 PROCEDURE — 25000132 ZZH RX MED GY IP 250 OP 250 PS 637: Mod: GY | Performed by: PSYCHIATRY & NEUROLOGY

## 2019-04-03 RX ORDER — LOPERAMIDE HCL 2 MG
2 CAPSULE ORAL 4 TIMES DAILY PRN
Status: DISCONTINUED | OUTPATIENT
Start: 2019-04-03 | End: 2019-04-25 | Stop reason: HOSPADM

## 2019-04-03 RX ADMIN — OLANZAPINE 10 MG: 10 TABLET, FILM COATED ORAL at 09:04

## 2019-04-03 RX ADMIN — TOLTERODINE TARTRATE 2 MG: 2 CAPSULE, EXTENDED RELEASE ORAL at 09:06

## 2019-04-03 RX ADMIN — LOPERAMIDE HYDROCHLORIDE 2 MG: 2 CAPSULE ORAL at 16:02

## 2019-04-03 RX ADMIN — Medication 1 MG: at 20:07

## 2019-04-03 RX ADMIN — DIVALPROEX SODIUM 500 MG: 500 TABLET, FILM COATED, EXTENDED RELEASE ORAL at 20:07

## 2019-04-03 RX ADMIN — OLANZAPINE 20 MG: 10 TABLET, FILM COATED ORAL at 20:07

## 2019-04-03 ASSESSMENT — ACTIVITIES OF DAILY LIVING (ADL)
ORAL_HYGIENE: PROMPTS
HYGIENE/GROOMING: PROMPTS
DRESS: PROMPTS
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
## 2019-04-03 NOTE — PLAN OF CARE
Pt is A&O, presents as tense and restless , mood is labile, with blunted affect. Speech is tangential and circumstantial. Behavior is appropriate for situation. Pt has limited insight into her situation and slightly improving. Denies any  suicidal ideation. Pt showered and washed her hair before dressing was changed. Left Lateral Thigh wound had scant blood with granulation tissue at the base. She did not c/o any pruritus. Med compliant and no med changes today. Nursing will continue to monitor.

## 2019-04-03 NOTE — PROGRESS NOTES
[facility] Psychiatric Progress Note       Interim History   The patient's care was discussed with the treatment team and chart notes were reviewed. According to hospital staff members, the patient has been pleasant and cooperative when interacting with staff members. She has denies experiencing auditory or visual hallucinations. It should be noted that she continues to be disorganized and tangential at times. Upon interview with Dr. Castle, the patient is found sleeping in bed. Patient reports the pain in her wound continues to subside. When asked how she received this wound, she states she was trying to inject vitamins from [place] into her. Patient continues to be in need of an aftercare plan, however it has been difficult to become in contact with patient's family.      Hospital Course   This is 53 year old single female with a psychiatric history of schizophrenia and polysubstance use disorder. She was initially presented to [facility] in a disorganized and confused state. Patient was cared for on Medicine and was then transferred to Psychiatry on 3/20/19 for medication management and stabilization. On 03/21/19, the patient's Zyprexa was increased to 10mg bid. When trying to speak with patient on 3/22, patient refused to wake up and talk with Dr. Castle. From this, there were  no medication adjustments made. Again, the patient did not wish to converse with Dr. Castle on 3/25 due to her expressing that she did not feel well physically. Contact precaution is still in place on 3/28 due to patient's wound still draining small amounts. Patient was unable to participate in 3/28 interview once again due to complaining of being in pain. Staff however noted that patient proceeded to be delusional in thought process. From this, Dr. Castle started the patient on Depakote 500mg daily in hopes to clear patient.      Medications     Current Facility-Administered Medications Ordered in Epic    Medication Dose Route Frequency Last Rate Last Dose     acetaminophen (TYLENOL) tablet 650 mg  650 mg Oral Q4H PRN   650 mg at 03/31/19 1915     benzocaine-menthol (CHLORASEPTIC) 6-10 MG lozenge 1 lozenge  1 lozenge Buccal Q1H PRN   1 lozenge at 03/31/19 2239     calcium carbonate (TUMS) chewable tablet 1,000 mg  1,000 mg Oral Q4H PRN   1,000 mg at 03/25/19 1757     divalproex sodium extended-release (DEPAKOTE ER) 24 hr tablet 500 mg  500 mg Oral At Bedtime   500 mg at 04/02/19 2031     hydrOXYzine (ATARAX) tablet 25 mg  25 mg Oral Q4H PRN   25 mg at 03/31/19 2010     melatonin tablet 1 mg  1 mg Oral At Bedtime PRN   1 mg at 04/02/19 2036     miconazole (MICATIN/MICRO GUARD) 2 % powder   Topical Q1H PRN         OLANZapine (zyPREXA) tablet 10 mg  10 mg Oral Daily   10 mg at 04/03/19 0904     OLANZapine (zyPREXA) tablet 20 mg  20 mg Oral At Bedtime   20 mg at 04/02/19 2031     OLANZapine zydis (zyPREXA) ODT tab 5-10 mg  5-10 mg Oral Q4H PRN   10 mg at 04/01/19 0044     omeprazole (priLOSEC) CR capsule 20 mg  20 mg Oral Daily PRN   20 mg at 04/01/19 0840     ondansetron (ZOFRAN-ODT) ODT tab 4 mg  4 mg Oral Q6H PRN   4 mg at 03/27/19 2020     tolterodine ER (DETROL LA) 24 hr capsule 2 mg  2 mg Oral Daily   2 mg at 04/03/19 0906     No current Epic-ordered outpatient medications on file.         Allergies      Allergies   Allergen Reactions     Tetracycline         Medical Review of Systems     /80   Pulse 95   Temp 98.8  F (37.1  C) (Oral)   Resp 17   Wt 79.9 kg (176 lb 3.2 oz)   BMI 29.32 kg/m    Body mass index is 29.32 kg/m .  A 10-point review of systems was performed by Timi Castle MD and is negative, no new findings.      Psychiatric Examination     Appearance Sitting in chair, dressed in hospital scrubs. Appears stated age.   Attitude Cooperative   Orientation Oriented to person, place, time   Eye Contact Poor   Speech Regular rate, rhythm, volume and tone   Language Normal   Psychomotor  Behavior Normal   Mood Disorganized    Affect Erratic    Thought Process Goal-Oriented, Intact   Associations Intact   Thought Content Patient is currently negative for suicidal ideation, negative for plan or intent, able to contract no self harm and identify barriers to suicide. Positive for obsessions, compulsions or psychosis.     Fund of Knowledge Impaired   Insight Impaired   Judgement Impaired   Attention Span & Concentration Poor   Recent & Remote Memory Intact   Gait Unsteady    Muscle Tone Intact        Labs     Labs reviewed.  Recent Results (from the past 24 hour(s))   Valproic acid    Collection Time: 04/02/19 12:12 PM   Result Value Ref Range    Valproic Acid Level 56 50 - 100 mg/L        Impression   This is 53 year old single female with a psychiatric history of schizophrenia and polysubstance use disorder. Patient proceeds to be on contact isolation restrictions due to a MRSA infected wound. Today, the patient still demonstrated tangential and disorganized thought process, however has shown slight improvement. Her Valproic Acid level read to be 56 on 4/02, however Dr. Castle will not make any changes to medications today. We will need to continue to arrange for an aftercare plan for patient.      Diagnoses   1. Paranoid schizophrenia  2. Polysubstance use disorder  3. Cognitive disorder     Plan     1. Explained side effects, benefits, and complications of medications to the patient, Pt gave verbal consent.  2. Medication changes: None today   3. Discussed treatment plan with patient and team.  4. Projected length of stay: 7+ days  5. Patient care order has been made: wound care is to be left in patient's room     Attestation:   Patient has been seen and evaluated by me, Timi Castle MD.    Patient ID:  Name: Anastasiya Simon    MRN: [id number]  Admission: 3/20/2019   YOB: 1965

## 2019-04-03 NOTE — PLAN OF CARE
Pt remains withdrawn and isolative to room for the most part of the shift. Pt is pleasant and cooperative with blunt affect with bright mood upon approach.  Pt stated that she wish she can go home as she is so bored here in the hospital nothing to do. Med compliant and denies any SI nor Hallucination. Pt was able to come and socialized in the lounge on the chairs close to her door.

## 2019-04-04 PROCEDURE — 25000132 ZZH RX MED GY IP 250 OP 250 PS 637: Mod: GY | Performed by: PSYCHIATRY & NEUROLOGY

## 2019-04-04 PROCEDURE — A9270 NON-COVERED ITEM OR SERVICE: HCPCS | Mod: GY | Performed by: PSYCHIATRY & NEUROLOGY

## 2019-04-04 PROCEDURE — 90791 PSYCH DIAGNOSTIC EVALUATION: CPT

## 2019-04-04 PROCEDURE — 12400000 ZZH R&B MH

## 2019-04-04 PROCEDURE — 25000131 ZZH RX MED GY IP 250 OP 636 PS 637: Mod: GY | Performed by: PHYSICIAN ASSISTANT

## 2019-04-04 RX ADMIN — OLANZAPINE 10 MG: 10 TABLET, FILM COATED ORAL at 09:40

## 2019-04-04 RX ADMIN — ONDANSETRON 4 MG: 4 TABLET, ORALLY DISINTEGRATING ORAL at 12:03

## 2019-04-04 RX ADMIN — Medication 1 LOZENGE: at 20:26

## 2019-04-04 RX ADMIN — Medication 1 MG: at 20:26

## 2019-04-04 RX ADMIN — TOLTERODINE TARTRATE 2 MG: 2 CAPSULE, EXTENDED RELEASE ORAL at 09:39

## 2019-04-04 RX ADMIN — LOPERAMIDE HYDROCHLORIDE 2 MG: 2 CAPSULE ORAL at 09:40

## 2019-04-04 RX ADMIN — OLANZAPINE 20 MG: 10 TABLET, FILM COATED ORAL at 20:25

## 2019-04-04 RX ADMIN — DIVALPROEX SODIUM 500 MG: 500 TABLET, FILM COATED, EXTENDED RELEASE ORAL at 20:26

## 2019-04-04 ASSESSMENT — ACTIVITIES OF DAILY LIVING (ADL)
LAUNDRY: UNABLE TO COMPLETE
ORAL_HYGIENE: INDEPENDENT
HYGIENE/GROOMING: INDEPENDENT
DRESS: SCRUBS (BEHAVIORAL HEALTH)
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
## 2019-04-04 NOTE — PLAN OF CARE
Withdrawn and isolative . Stayed in room bed resting or drawing and coloring . Mood more stable with blunt affect . Speech tangential and rambling. Pleasant and cooperative. Had acid reflux earlier in shift and diarrhea. Was given Tums and Imodium and experienced relief. Sister visited and this went well.

## 2019-04-04 NOTE — PROGRESS NOTES
Contacted [facility] case management intake. They need diagnostic assessment/ H&P and a needs statement ( form being emailed) to initiate process.

## 2019-04-04 NOTE — PLAN OF CARE
flat/blunt affect, mood calm. Able to answer staff questions more appropriately. Told writer about how she lost her house and that her parents will be moving to nursing home. Pt stated that she believes she can get her house back and that is what she wants to do. Observed in room, coloring or resting in bed. Eating and drinking appropriately. Tolerated wound care/dressing change. Linen changed/scrubs changed. Complaints of some nausea around lunchtime, prn zofran given. Med compliant

## 2019-04-04 NOTE — PROGRESS NOTES
[facility] Psychiatric Progress Note       Interim History   The patient's care was discussed with the treatment team and chart notes were reviewed. Per attending staff members on [address], the patients speech proceeds to be tangential and rambling. Her insight and judgment continue to be poor. She however has been medication compliant and cooperative.  Antonietta was able to meet with patient yesterday evening, for the first time since admission, in order to undergo an assessment. We continue to plan for aftercare plans for patient, considering TCU due to her inability to care for her wound.      Hospital Course   This is 53 year old single female with a psychiatric history of schizophrenia and polysubstance use disorder. She was initially presented to [facility] in a disorganized and confused state. Patient was cared for on Medicine and was then transferred to Psychiatry on 3/20/19 for medication management and stabilization. On 03/21/19, the patient's Zyprexa was increased to 10mg bid. When trying to speak with patient on 3/22, patient refused to wake up and talk with Dr. Castle. From this, there were  no medication adjustments made. Again, the patient did not wish to converse with Dr. Castle on 3/25 due to her expressing that she did not feel well physically. Contact precaution is still in place on 3/28 due to patient's wound still draining small amounts. Patient was unable to participate in 3/28 interview once again due to complaining of being in pain. Staff however noted that patient proceeded to be delusional in thought process. From this, Dr. Castle started the patient on Depakote 500mg daily in hopes to clear patient.      Medications     Current Facility-Administered Medications Ordered in Epic   Medication Dose Route Frequency Last Rate Last Dose     acetaminophen (TYLENOL) tablet 650 mg  650 mg Oral Q4H PRN   650 mg at 03/31/19 1915     benzocaine-menthol (CHLORASEPTIC) 6-10 MG  lozenge 1 lozenge  1 lozenge Buccal Q1H PRN   1 lozenge at 03/31/19 2239     calcium carbonate (TUMS) chewable tablet 1,000 mg  1,000 mg Oral Q4H PRN   1,000 mg at 03/25/19 1757     divalproex sodium extended-release (DEPAKOTE ER) 24 hr tablet 500 mg  500 mg Oral At Bedtime   500 mg at 04/03/19 2007     hydrOXYzine (ATARAX) tablet 25 mg  25 mg Oral Q4H PRN   25 mg at 03/31/19 2010     loperamide (IMODIUM) capsule 2 mg  2 mg Oral 4x Daily PRN   2 mg at 04/04/19 0940     melatonin tablet 1 mg  1 mg Oral At Bedtime PRN   1 mg at 04/03/19 2007     miconazole (MICATIN/MICRO GUARD) 2 % powder   Topical Q1H PRN         OLANZapine (zyPREXA) tablet 10 mg  10 mg Oral Daily   10 mg at 04/04/19 0940     OLANZapine (zyPREXA) tablet 20 mg  20 mg Oral At Bedtime   20 mg at 04/03/19 2007     OLANZapine zydis (zyPREXA) ODT tab 5-10 mg  5-10 mg Oral Q4H PRN   10 mg at 04/01/19 0044     omeprazole (priLOSEC) CR capsule 20 mg  20 mg Oral Daily PRN   20 mg at 04/01/19 0840     ondansetron (ZOFRAN-ODT) ODT tab 4 mg  4 mg Oral Q6H PRN   4 mg at 04/04/19 1203     tolterodine ER (DETROL LA) 24 hr capsule 2 mg  2 mg Oral Daily   2 mg at 04/04/19 0939     No current Epic-ordered outpatient medications on file.         Allergies      Allergies   Allergen Reactions     Tetracycline         Medical Review of Systems     /90   Pulse 95   Temp 98.8  F (37.1  C) (Oral)   Resp 16   Wt 79.9 kg (176 lb 3.2 oz)   BMI 29.32 kg/m    Body mass index is 29.32 kg/m .  A 10-point review of systems was performed by Timi Castle MD and is negative, no new findings.      Psychiatric Examination     Appearance Sitting in chair, dressed in hospital scrubs. Appears stated age.   Attitude Cooperative   Orientation Oriented to person, place, time   Eye Contact Poor   Speech Regular rate, rhythm, volume and tone   Language Normal   Psychomotor Behavior Normal   Mood Disorganized    Affect Erratic    Thought Process Goal-Oriented, Intact    Associations Intact   Thought Content Patient is currently negative for suicidal ideation, negative for plan or intent, able to contract no self harm and identify barriers to suicide. Positive for obsessions, compulsions or psychosis.     Fund of Knowledge Impaired   Insight Impaired   Judgement Impaired   Attention Span & Concentration Poor   Recent & Remote Memory Intact   Gait Unsteady    Muscle Tone Intact        Labs     Labs reviewed.  No results found for this or any previous visit (from the past 24 hour(s)).     Impression   This is 53 year old single female with a psychiatric history of schizophrenia and polysubstance use disorder. Patient proceeds to be on contact isolation restrictions due to a MRSA infected wound. She continues to be tangential and disorganized, however has been more compliant in care in comparison to admission. We continue to be planning for after care plans with .      Diagnoses   1. Paranoid schizophrenia  2. Polysubstance use disorder  3. Cognitive disorder     Plan     1. Explained side effects, benefits, and complications of medications to the patient, Pt gave verbal consent.  2. Medication changes: None today   3. Discussed treatment plan with patient and team.  4. Projected length of stay: 7+ days  5. Patient care order has been made: wound care is to be left in patient's room     Attestation:   Patient has been seen and evaluated by me, Timi Castle MD.    Patient ID:  Name: Anastasiya Simon    MRN: [id number]  Admission: 3/20/2019   YOB: 1965

## 2019-04-05 PROCEDURE — 25000132 ZZH RX MED GY IP 250 OP 250 PS 637: Mod: GY | Performed by: PSYCHIATRY & NEUROLOGY

## 2019-04-05 PROCEDURE — 25000131 ZZH RX MED GY IP 250 OP 636 PS 637: Mod: GY | Performed by: PHYSICIAN ASSISTANT

## 2019-04-05 PROCEDURE — 12400000 ZZH R&B MH

## 2019-04-05 PROCEDURE — A9270 NON-COVERED ITEM OR SERVICE: HCPCS | Mod: GY | Performed by: PSYCHIATRY & NEUROLOGY

## 2019-04-05 RX ORDER — DIVALPROEX SODIUM 500 MG/1
1000 TABLET, EXTENDED RELEASE ORAL AT BEDTIME
Status: DISCONTINUED | OUTPATIENT
Start: 2019-04-05 | End: 2019-04-25 | Stop reason: HOSPADM

## 2019-04-05 RX ADMIN — Medication 1 LOZENGE: at 16:14

## 2019-04-05 RX ADMIN — ONDANSETRON 4 MG: 4 TABLET, ORALLY DISINTEGRATING ORAL at 08:53

## 2019-04-05 RX ADMIN — DIVALPROEX SODIUM 1000 MG: 500 TABLET, FILM COATED, EXTENDED RELEASE ORAL at 21:52

## 2019-04-05 RX ADMIN — TOLTERODINE TARTRATE 2 MG: 2 CAPSULE, EXTENDED RELEASE ORAL at 08:56

## 2019-04-05 RX ADMIN — OMEPRAZOLE 20 MG: 20 CAPSULE, DELAYED RELEASE ORAL at 06:07

## 2019-04-05 RX ADMIN — OLANZAPINE 10 MG: 10 TABLET, FILM COATED ORAL at 08:53

## 2019-04-05 RX ADMIN — HYDROXYZINE HYDROCHLORIDE 25 MG: 25 TABLET, FILM COATED ORAL at 23:32

## 2019-04-05 RX ADMIN — OLANZAPINE 20 MG: 10 TABLET, FILM COATED ORAL at 21:52

## 2019-04-05 RX ADMIN — Medication 1 MG: at 22:39

## 2019-04-05 ASSESSMENT — ACTIVITIES OF DAILY LIVING (ADL)
HYGIENE/GROOMING: SHOWER
ORAL_HYGIENE: INDEPENDENT
DRESS: SCRUBS (BEHAVIORAL HEALTH)
LAUNDRY: UNABLE TO COMPLETE

## 2019-04-05 NOTE — PLAN OF CARE
Pt is A&O x4. Continues to remain stable in mood. She c/o of nausea resolved with prn Zofran. Pt is pleasant and cooperative. Speech is less rambling and more sensible. Pt's Depakote dose was increased from 500mg to 1,000mg at HS. Pt showered and washed her hair before dressing was changed. She tolerated dressing change well. Left lateral thigh wound continues to have scant blood with granulation tissue at the base. Med compliant.

## 2019-04-05 NOTE — PLAN OF CARE
Flat affect  , mood calm and stable . Able to answer questions more sensibly. Less rambling . Withdrawn, isolative and bed resting / sleeping. Using call light less. Pleasant and cooperative. Eating and taking fluids well . Med compliant. .

## 2019-04-05 NOTE — PROGRESS NOTES
[facility] Psychiatric Progress Note       Interim History   The patient's care was discussed with the treatment team and chart notes were reviewed. Patient proceeds to be on contact isolation restrictions due to a MRSA infected wound. According to nursing and PA staff members on station 77, the patient has proceeded to be stable, calm, cooperative, medication compliant, and isolative to her room. She has been less rambling and more sensible. Patient's Valproic Acid level read to be 56 on 4/02/19, thus Dr. Castle has increased Depakote dose from 500mg to 1,000mg at bedtime to continue to stabilize patient.      Hospital Course   This is 53 year old single female with a psychiatric history of schizophrenia and polysubstance use disorder. She was initially presented to [facility] in a disorganized and confused state. Patient was cared for on Medicine and was then transferred to Psychiatry on 3/20/19 for medication management and stabilization. On 03/21/19, the patient's Zyprexa was increased to 10mg bid. When trying to speak with patient on 3/22, patient refused to wake up and talk with Dr. Castle. From this, there were  no medication adjustments made. Again, the patient did not wish to converse with Dr. Castle on 3/25 due to her expressing that she did not feel well physically. Contact precaution is still in place on 3/28 due to patient's wound still draining small amounts. Patient was unable to participate in 3/28 interview once again due to complaining of being in pain. Staff however noted that patient proceeded to be delusional in thought process. From this, Dr. Castle started the patient on Depakote 500mg daily in hopes to clear patient.      Medications     Current Facility-Administered Medications Ordered in Epic   Medication Dose Route Frequency Last Rate Last Dose     acetaminophen (TYLENOL) tablet 650 mg  650 mg Oral Q4H PRN   650 mg at 03/31/19 1915     benzocaine-menthol  (CHLORASEPTIC) 6-10 MG lozenge 1 lozenge  1 lozenge Buccal Q1H PRN   1 lozenge at 04/04/19 2026     calcium carbonate (TUMS) chewable tablet 1,000 mg  1,000 mg Oral Q4H PRN   1,000 mg at 03/25/19 1757     divalproex sodium extended-release (DEPAKOTE ER) 24 hr tablet 1,000 mg  1,000 mg Oral At Bedtime         hydrOXYzine (ATARAX) tablet 25 mg  25 mg Oral Q4H PRN   25 mg at 03/31/19 2010     loperamide (IMODIUM) capsule 2 mg  2 mg Oral 4x Daily PRN   2 mg at 04/04/19 0940     melatonin tablet 1 mg  1 mg Oral At Bedtime PRN   1 mg at 04/04/19 2026     miconazole (MICATIN/MICRO GUARD) 2 % powder   Topical Q1H PRN         OLANZapine (zyPREXA) tablet 10 mg  10 mg Oral Daily   10 mg at 04/04/19 0940     OLANZapine (zyPREXA) tablet 20 mg  20 mg Oral At Bedtime   20 mg at 04/04/19 2025     OLANZapine zydis (zyPREXA) ODT tab 5-10 mg  5-10 mg Oral Q4H PRN   10 mg at 04/01/19 0044     omeprazole (priLOSEC) CR capsule 20 mg  20 mg Oral Daily PRN   20 mg at 04/05/19 0607     ondansetron (ZOFRAN-ODT) ODT tab 4 mg  4 mg Oral Q6H PRN   4 mg at 04/04/19 1203     tolterodine ER (DETROL LA) 24 hr capsule 2 mg  2 mg Oral Daily   2 mg at 04/04/19 0939     No current Epic-ordered outpatient medications on file.         Allergies      Allergies   Allergen Reactions     Tetracycline         Medical Review of Systems     /82   Pulse 106   Temp 98  F (36.7  C) (Oral)   Resp 16   Wt 79.9 kg (176 lb 3.2 oz)   SpO2 95%   BMI 29.32 kg/m    Body mass index is 29.32 kg/m .  A 10-point review of systems was performed by Timi Castle MD and is negative, no new findings.      Psychiatric Examination     Appearance Sitting in chair, dressed in hospital scrubs. Appears stated age.   Attitude Cooperative   Orientation Oriented to person, place, time   Eye Contact Fair    Speech Regular rate, rhythm, volume and tone   Language Normal   Psychomotor Behavior Normal   Mood Less disorganized    Affect Calmer     Thought Process  Goal-Oriented, Intact   Associations Intact   Thought Content Patient is currently negative for suicidal ideation, negative for plan or intent, able to contract no self harm and identify barriers to suicide. Positive for obsessions, compulsions or psychosis.     Fund of Knowledge Impaired   Insight Impaired   Judgement Impaired   Attention Span & Concentration Poor   Recent & Remote Memory Intact   Gait Unsteady    Muscle Tone Intact        Labs     Labs reviewed.  No results found for this or any previous visit (from the past 24 hour(s)).     Impression   This is 53 year old single female with a psychiatric history of schizophrenia and polysubstance use disorder. Patient proceeds to be on contact isolation restrictions due to a MRSA infected wound. According to nursing and PA staff members on station 77, the patient has proceeded to be stable, calm, cooperative, medication compliant, and isolative to her room. She has been less rambling and more sensible. Patient's Valproic Acid level read to be 56 on 4/02/19, thus Dr. Castle has increased Depakote dose from 500mg to 1,000mg at bedtime to continue to stabilize patient.      Diagnoses   1. Paranoid schizophrenia  2. Polysubstance use disorder  3. Cognitive disorder     Plan     1. Explained side effects, benefits, and complications of medications to the patient, Pt gave verbal consent.  2. Medication changes: Increased Depakote to 1,000mg at bedtime   3. Discussed treatment plan with patient and team.  4. Projected length of stay: 7+ days  5. Patient care order has been made: wound care is to be left in patient's room     Attestation:   Patient has been seen and evaluated by me, Timi Castle MD.    Patient ID:  Name: Anastasiya Simon    MRN: [id number]  Admission: 3/20/2019   YOB: 1965

## 2019-04-06 LAB — VALPROATE SERPL-MCNC: 73 MG/L (ref 50–100)

## 2019-04-06 PROCEDURE — A9270 NON-COVERED ITEM OR SERVICE: HCPCS | Mod: GY | Performed by: PSYCHIATRY & NEUROLOGY

## 2019-04-06 PROCEDURE — 12400000 ZZH R&B MH

## 2019-04-06 PROCEDURE — 25000132 ZZH RX MED GY IP 250 OP 250 PS 637: Mod: GY | Performed by: PSYCHIATRY & NEUROLOGY

## 2019-04-06 PROCEDURE — 36415 COLL VENOUS BLD VENIPUNCTURE: CPT | Performed by: PSYCHIATRY & NEUROLOGY

## 2019-04-06 PROCEDURE — 80164 ASSAY DIPROPYLACETIC ACD TOT: CPT | Performed by: PSYCHIATRY & NEUROLOGY

## 2019-04-06 RX ADMIN — DIVALPROEX SODIUM 1000 MG: 500 TABLET, FILM COATED, EXTENDED RELEASE ORAL at 20:44

## 2019-04-06 RX ADMIN — TOLTERODINE TARTRATE 2 MG: 2 CAPSULE, EXTENDED RELEASE ORAL at 08:15

## 2019-04-06 RX ADMIN — OLANZAPINE 10 MG: 5 TABLET, ORALLY DISINTEGRATING ORAL at 14:55

## 2019-04-06 RX ADMIN — OLANZAPINE 20 MG: 10 TABLET, FILM COATED ORAL at 20:44

## 2019-04-06 RX ADMIN — OLANZAPINE 10 MG: 10 TABLET, FILM COATED ORAL at 08:15

## 2019-04-06 NOTE — PLAN OF CARE
"Patient appears to be brighter in affect. She c/o being \"bored\", so writer got patient art supplies (in locker now) to paint. Patient has been working on her painting all night . She likes to talk about art and \"persecuted artists.\" Patient is aware that she will not be returning to her home. She would like to set up appointments to look for housing. Patient states that if she could, she would like to live in a loft in [facility]. Patient expressed wanting to leave her room. \"I can't stand this solitary confinement, It's not good for my psychology.\" She also misses her parents and would like to visit them at their nursing home.   "

## 2019-04-06 NOTE — PROGRESS NOTES
"Patient in bed but very restless, rolling from side to side and moving feet continuously. States she feels like she has \"cabin fever from being here to long.\" patient requesting to leave to go shopping.  Upon questioning she states she is still hearing voices \"but they are always there.\" States the voices tell her that they are going to \"annihilate her family\" and she is feeling anxious about protecting them. States she would feel safer if she was \"sitting at the [place].\" She also states \"they are still watching me.\" She jesters to the left side of the room and indicates that is where they are watching her from.  Patient states \"they make everything look like a [employer] Kend[employer] cartoon.\" Patient agreed to take zyprexa.  "

## 2019-04-06 NOTE — PLAN OF CARE
VSS. A&O. Denies pain. Pt has been resting majority of shift. She denies SI or hallucinations. Pt showered  today. Has been declining dressing change twice, states she wants to wait until later. Will continue to monitor.

## 2019-04-07 PROCEDURE — 25000132 ZZH RX MED GY IP 250 OP 250 PS 637: Performed by: PSYCHIATRY & NEUROLOGY

## 2019-04-07 PROCEDURE — A9270 NON-COVERED ITEM OR SERVICE: HCPCS | Performed by: PSYCHIATRY & NEUROLOGY

## 2019-04-07 PROCEDURE — 12400000 ZZH R&B MH

## 2019-04-07 RX ADMIN — DIVALPROEX SODIUM 1000 MG: 500 TABLET, FILM COATED, EXTENDED RELEASE ORAL at 21:03

## 2019-04-07 RX ADMIN — OLANZAPINE 10 MG: 10 TABLET, FILM COATED ORAL at 09:03

## 2019-04-07 RX ADMIN — OLANZAPINE 20 MG: 10 TABLET, FILM COATED ORAL at 21:03

## 2019-04-07 RX ADMIN — Medication 1 MG: at 22:41

## 2019-04-07 RX ADMIN — TOLTERODINE TARTRATE 2 MG: 2 CAPSULE, EXTENDED RELEASE ORAL at 09:03

## 2019-04-07 RX ADMIN — OMEPRAZOLE 20 MG: 20 CAPSULE, DELAYED RELEASE ORAL at 06:51

## 2019-04-07 NOTE — PLAN OF CARE
Pt. Rambling during contacts tonight -talking about various subjects. Slightly more sedated tonight. Had visit with sister which appeared uneventful. Offered several activities to night to cope with boredom but declined due to lack of energy. Dressing change completed with minimal pain/very scant brown drainage. Medication compliant.

## 2019-04-07 NOTE — PROGRESS NOTES
"Squirmy in bed, anxious, she requested a pass to go to the [employer] \"  I am beginning to get bored here and I would like to get out\"  Reported hearing voices,  They are always there, she is complaint with medications.   Dressing changed on left leg wound, is healing the size of wound slowly decreasing.  On Contact Precautions for MRSA.  "

## 2019-04-08 PROCEDURE — A9270 NON-COVERED ITEM OR SERVICE: HCPCS | Performed by: PSYCHIATRY & NEUROLOGY

## 2019-04-08 PROCEDURE — 25000132 ZZH RX MED GY IP 250 OP 250 PS 637: Performed by: PSYCHIATRY & NEUROLOGY

## 2019-04-08 PROCEDURE — 12400000 ZZH R&B MH

## 2019-04-08 RX ADMIN — TOLTERODINE TARTRATE 2 MG: 2 CAPSULE, EXTENDED RELEASE ORAL at 08:16

## 2019-04-08 RX ADMIN — OMEPRAZOLE 20 MG: 20 CAPSULE, DELAYED RELEASE ORAL at 08:17

## 2019-04-08 RX ADMIN — CALCIUM CARBONATE (ANTACID) CHEW TAB 500 MG 1000 MG: 500 CHEW TAB at 17:09

## 2019-04-08 RX ADMIN — Medication 1 MG: at 23:08

## 2019-04-08 RX ADMIN — DIVALPROEX SODIUM 1000 MG: 500 TABLET, FILM COATED, EXTENDED RELEASE ORAL at 21:11

## 2019-04-08 RX ADMIN — OLANZAPINE 20 MG: 10 TABLET, FILM COATED ORAL at 21:11

## 2019-04-08 RX ADMIN — OLANZAPINE 10 MG: 10 TABLET, FILM COATED ORAL at 08:16

## 2019-04-08 ASSESSMENT — ACTIVITIES OF DAILY LIVING (ADL)
LAUNDRY: WITH SUPERVISION
HYGIENE/GROOMING: PROMPTS
LAUNDRY: WITH SUPERVISION
ORAL_HYGIENE: PROMPTS
ORAL_HYGIENE: PROMPTS
DRESS: SCRUBS (BEHAVIORAL HEALTH)
DRESS: SCRUBS (BEHAVIORAL HEALTH)
HYGIENE/GROOMING: PROMPTS

## 2019-04-08 NOTE — PLAN OF CARE
"Pt. Increasingly alert. Spent most of the evening out of her room. Rambling conversation - at times delusional content. Talked about the \"new meth\" being much safer than in the past-\"much safer than whiskey' and \"little to no side-effects\" Talking about needing to buy a heater to expose her belongings to a \"high temperature \" to \"get rid\" of various parasites. Very concerned about finding a place to live after discharge. Reports plan to stay in an extended stay hotel until she finds a realtor to find her a new place to live. Talking about needing to listen to sound frequencies on you tube to clear her \"chakaras\" .Very disorganized and unkept. Spilling her food and drink all over the lounge. Requires ongoing re-direction and assist to clean up after herself.  Medication compliant. No nausea tonight.   "

## 2019-04-08 NOTE — PLAN OF CARE
BEHAVIORAL TEAM DISCUSSION    Participants: Dr. Castle, nurses, social workers, occupational therapist, psych assistants, medical scribe.  Progress: No change.   Continued Stay Criteria/Rationale: Until pt becomes adequately stabilized.  Medical/Physical: n/a  Precautions:   Behavioral Orders   Procedures     Code 1 - Restrict to Unit     Routine Programming     As clinically indicated     Status 15     Every 15 minutes.     Plan: Continue with current treatment plan.  Rationale for change in precautions or plan: Pt needs further observation here on the unit.

## 2019-04-08 NOTE — PLAN OF CARE
"Pt is more visible in the unit this shift and presents with calm mood, blunt affect and cooperative. Denies any SI and tolerated wound care well. Pt is medication compliant. Pt spent time in the lounge painting but remains withdrawn. Pt\"s Speech continue to ramble but able to understand directions although occasional delusions.  Pt is not on isolation precautions per there the policy of the company but staff still needs to infection control precautions.         "

## 2019-04-08 NOTE — PLAN OF CARE
Pt presents with anxious affect and tense mood. Observed improvement in her ability to politely express her requests. Less demanding, more patient, more willingness to wait if staff is busy. Spent significant amount of time painting in the lounge. Still on isolative precautions, styrofoam meal tray, and extensive cleaning needed after use of table for painting. No SI stated.

## 2019-04-09 PROCEDURE — A9270 NON-COVERED ITEM OR SERVICE: HCPCS | Performed by: PSYCHIATRY & NEUROLOGY

## 2019-04-09 PROCEDURE — 25000132 ZZH RX MED GY IP 250 OP 250 PS 637: Performed by: PSYCHIATRY & NEUROLOGY

## 2019-04-09 PROCEDURE — 12400000 ZZH R&B MH

## 2019-04-09 PROCEDURE — G0463 HOSPITAL OUTPT CLINIC VISIT: HCPCS

## 2019-04-09 RX ADMIN — OMEPRAZOLE 20 MG: 20 CAPSULE, DELAYED RELEASE ORAL at 07:57

## 2019-04-09 RX ADMIN — OLANZAPINE 10 MG: 10 TABLET, FILM COATED ORAL at 07:57

## 2019-04-09 RX ADMIN — OLANZAPINE 20 MG: 10 TABLET, FILM COATED ORAL at 20:48

## 2019-04-09 RX ADMIN — DIVALPROEX SODIUM 1000 MG: 500 TABLET, FILM COATED, EXTENDED RELEASE ORAL at 20:48

## 2019-04-09 RX ADMIN — Medication 1 MG: at 20:48

## 2019-04-09 RX ADMIN — TOLTERODINE TARTRATE 2 MG: 2 CAPSULE, EXTENDED RELEASE ORAL at 07:57

## 2019-04-09 ASSESSMENT — ACTIVITIES OF DAILY LIVING (ADL)
HYGIENE/GROOMING: PROMPTS
ORAL_HYGIENE: PROMPTS
DRESS: SCRUBS (BEHAVIORAL HEALTH)

## 2019-04-09 NOTE — PROGRESS NOTES
Patient asked to speak to  about housing. Told her a TCU placement is under consideration for ongoing wound care and reminded her that a case management referral has been made. A  should be able to help look at housing resources. Patient advised to speak with her family about place to stay following discharge from hospital and reminding her she does have a small income through social security. She said there is also money available through a trust.
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
## 2019-04-09 NOTE — PLAN OF CARE
Pt has been sitting in the lounge off and on. Talking with staff and peers. Wound dressing change done by WOC, Wound care simplified, pt to do with guidance from staff. See WCO note. Pt is concerned about housing and spoke with SW. Napping off and on through day. Speech is understandable, rambles.

## 2019-04-09 NOTE — PROGRESS NOTES
Call placed to [facility] Case management. Asked if referral information received yet. Was told nothing posted on spread sheet- which is completed through 4/2/19. Was told to check in end of week. Also was told intakes are running 4-5 weeks after referral information received.

## 2019-04-09 NOTE — PROGRESS NOTES
"[facility] Psychiatric Progress Note       Interim History   The patient's care was discussed with the treatment team and chart notes were reviewed. Contact precautions continue to be in place. According to attending hospital staff members on [address], the patient proceeds to be disorganized, unkept, rambling in speech, and in need of on-going redirection. She had expressed hearing voices over this past weekend, however declared \"they are always there,\" for her. Patient proceeds to make delusional statements, however is more pleasant, cooperative in care.      Hospital Course   This is 53 year old single female with a psychiatric history of schizophrenia and polysubstance use disorder. She was initially presented to [facility] in a disorganized and confused state. Patient was cared for on Medicine and was then transferred to Psychiatry on 3/20/19 for medication management and stabilization. On 03/21/19, the patient's Zyprexa was increased to 10mg bid. When trying to speak with patient on 3/22, patient refused to wake up and talk with Dr. Castle. From this, there were  no medication adjustments made. Again, the patient did not wish to converse with Dr. Castle on 3/25 due to her expressing that she did not feel well physically. Contact precaution is still in place on 3/28 due to patient's wound still draining small amounts. Patient was unable to participate in 3/28 interview once again due to complaining of being in pain. Staff however noted that patient proceeded to be delusional in thought process. From this, Dr. Castle started the patient on Depakote 500mg daily in hopes to clear patient. Patient's Valproic Acid level read to be 56 on 4/02/19, thus Dr. Castle has increased Depakote dose from 500mg to 1,000mg at bedtime to continue to stabilize patient.      Medications     Current Facility-Administered Medications Ordered in Epic   Medication Dose Route Frequency Last Rate Last Dose     " acetaminophen (TYLENOL) tablet 650 mg  650 mg Oral Q4H PRN   650 mg at 03/31/19 1915     benzocaine-menthol (CHLORASEPTIC) 6-10 MG lozenge 1 lozenge  1 lozenge Buccal Q1H PRN   1 lozenge at 04/05/19 1614     calcium carbonate (TUMS) chewable tablet 1,000 mg  1,000 mg Oral Q4H PRN   1,000 mg at 04/08/19 1709     divalproex sodium extended-release (DEPAKOTE ER) 24 hr tablet 1,000 mg  1,000 mg Oral At Bedtime   1,000 mg at 04/08/19 2111     hydrOXYzine (ATARAX) tablet 25 mg  25 mg Oral Q4H PRN   25 mg at 04/05/19 2332     loperamide (IMODIUM) capsule 2 mg  2 mg Oral 4x Daily PRN   2 mg at 04/04/19 0940     melatonin tablet 1 mg  1 mg Oral At Bedtime PRN   1 mg at 04/08/19 2308     miconazole (MICATIN/MICRO GUARD) 2 % powder   Topical Q1H PRN         OLANZapine (zyPREXA) tablet 10 mg  10 mg Oral Daily   10 mg at 04/08/19 0816     OLANZapine (zyPREXA) tablet 20 mg  20 mg Oral At Bedtime   20 mg at 04/08/19 2111     OLANZapine zydis (zyPREXA) ODT tab 5-10 mg  5-10 mg Oral Q4H PRN   10 mg at 04/06/19 1455     omeprazole (priLOSEC) CR capsule 20 mg  20 mg Oral Daily PRN   20 mg at 04/08/19 0817     ondansetron (ZOFRAN-ODT) ODT tab 4 mg  4 mg Oral Q6H PRN   4 mg at 04/05/19 0853     tolterodine ER (DETROL LA) 24 hr capsule 2 mg  2 mg Oral Daily   2 mg at 04/08/19 0816     No current Epic-ordered outpatient medications on file.         Allergies      Allergies   Allergen Reactions     Tetracycline         Medical Review of Systems     /83 (BP Location: Left arm)   Pulse 108   Temp 98.9  F (37.2  C) (Oral)   Resp 16   Wt 79.9 kg (176 lb 3.2 oz)   SpO2 98%   BMI 29.32 kg/m    Body mass index is 29.32 kg/m .  A 10-point review of systems was performed by Timi Castle MD and is negative, no new findings.      Psychiatric Examination     Appearance Sitting in chair, dressed in hospital scrubs. Appears stated age.   Attitude Cooperative   Orientation Oriented to person, place, time   Eye Contact Fair    Speech  Regular rate, rhythm, volume and tone   Language Normal   Psychomotor Behavior Normal   Mood Less disorganized    Affect Calmer     Thought Process Goal-Oriented, Intact   Associations Intact   Thought Content Patient is currently negative for suicidal ideation, negative for plan or intent, able to contract no self harm and identify barriers to suicide. Positive for obsessions, compulsions or psychosis.     Fund of Knowledge Impaired   Insight Impaired   Judgement Impaired   Attention Span & Concentration Poor   Recent & Remote Memory Intact   Gait Unsteady    Muscle Tone Intact        Labs     Labs reviewed.  No results found for this or any previous visit (from the past 24 hour(s)).     Impression   This is 53 year old single female with a psychiatric history of schizophrenia and polysubstance use disorder. Per [address] nursing and PA staff members, the patient proceeds to be unkept, disorganized, rambling in speech, and in need of constant redirection. She however has been medication compliant, cooperative in care, and more pleasant in comparison to admission. Isolation precautions continue to be in place.      Diagnoses   1. Paranoid schizophrenia  2. Polysubstance use disorder  3. Cognitive disorder     Plan     1. Explained side effects, benefits, and complications of medications to the patient, Pt gave verbal consent.  2. Medication changes: None    3. Discussed treatment plan with patient and team.  4. Projected length of stay: 7+ days  5. Patient care order has been made: wound care is to be left in patient's room     Attestation:   Patient has been seen and evaluated by me, Timi Castle MD.    Patient ID:  Name: Anastasiya Simon    MRN: [id number]  Admission: 3/20/2019   YOB: 1965

## 2019-04-10 PROCEDURE — A9270 NON-COVERED ITEM OR SERVICE: HCPCS | Performed by: PSYCHIATRY & NEUROLOGY

## 2019-04-10 PROCEDURE — 12400000 ZZH R&B MH

## 2019-04-10 PROCEDURE — 25000132 ZZH RX MED GY IP 250 OP 250 PS 637: Performed by: PSYCHIATRY & NEUROLOGY

## 2019-04-10 RX ADMIN — TOLTERODINE TARTRATE 2 MG: 2 CAPSULE, EXTENDED RELEASE ORAL at 08:45

## 2019-04-10 RX ADMIN — DIVALPROEX SODIUM 1000 MG: 500 TABLET, FILM COATED, EXTENDED RELEASE ORAL at 20:45

## 2019-04-10 RX ADMIN — OLANZAPINE 10 MG: 10 TABLET, FILM COATED ORAL at 08:45

## 2019-04-10 RX ADMIN — Medication 1 MG: at 20:45

## 2019-04-10 RX ADMIN — Medication 1 LOZENGE: at 20:45

## 2019-04-10 RX ADMIN — HYDROXYZINE HYDROCHLORIDE 25 MG: 25 TABLET, FILM COATED ORAL at 02:18

## 2019-04-10 RX ADMIN — OLANZAPINE 20 MG: 10 TABLET, FILM COATED ORAL at 20:45

## 2019-04-10 ASSESSMENT — ACTIVITIES OF DAILY LIVING (ADL)
ORAL_HYGIENE: PROMPTS
DRESS: SCRUBS (BEHAVIORAL HEALTH);INDEPENDENT
HYGIENE/GROOMING: PROMPTS
HYGIENE/GROOMING: PROMPTS
ORAL_HYGIENE: PROMPTS
DRESS: SCRUBS (BEHAVIORAL HEALTH)

## 2019-04-10 NOTE — PROGRESS NOTES
[facility] Psychiatric Progress Note       Interim History   The patient's care was discussed with the treatment team and chart notes were reviewed. On interview with Dr. Castle on 4/11/19, the patient proceeds to be elevated in mood and attains racing thoughts with pressured speech. She however has demonstrated some improvement in coherence. She proceeds to be fixated on discharge date and where she will reside after hospitalization. Our  continues to be working on possible TCU placement.      Hospital Course   This is 53 year old single female with a psychiatric history of schizophrenia and polysubstance use disorder. She was initially presented to [facility] in a disorganized and confused state. Patient was cared for on Medicine and was then transferred to Psychiatry on 3/20/19 for medication management and stabilization. On 03/21/19, the patient's Zyprexa was increased to 10mg bid. When trying to speak with patient on 3/22, patient refused to wake up and talk with Dr. Castle. From this, there were  no medication adjustments made. Again, the patient did not wish to converse with Dr. Castle on 3/25 due to her expressing that she did not feel well physically. Contact precaution is still in place on 3/28 due to patient's wound still draining small amounts. Patient was unable to participate in 3/28 interview once again due to complaining of being in pain. Staff however noted that patient proceeded to be delusional in thought process. From this, Dr. Castle started the patient on Depakote 500mg daily in hopes to clear patient. Patient's Valproic Acid level read to be 56 on 4/02/19, thus Dr. Castle has increased Depakote dose from 500mg to 1,000mg at bedtime to continue to stabilize patient.      Medications     Current Facility-Administered Medications Ordered in Epic   Medication Dose Route Frequency Last Rate Last Dose     acetaminophen (TYLENOL) tablet 650 mg  650 mg Oral  Q4H PRN   650 mg at 03/31/19 1915     benzocaine-menthol (CHLORASEPTIC) 6-10 MG lozenge 1 lozenge  1 lozenge Buccal Q1H PRN   1 lozenge at 04/05/19 1614     calcium carbonate (TUMS) chewable tablet 1,000 mg  1,000 mg Oral Q4H PRN   1,000 mg at 04/08/19 1709     divalproex sodium extended-release (DEPAKOTE ER) 24 hr tablet 1,000 mg  1,000 mg Oral At Bedtime   1,000 mg at 04/09/19 2048     hydrOXYzine (ATARAX) tablet 25 mg  25 mg Oral Q4H PRN   25 mg at 04/10/19 0218     loperamide (IMODIUM) capsule 2 mg  2 mg Oral 4x Daily PRN   2 mg at 04/04/19 0940     melatonin tablet 1 mg  1 mg Oral At Bedtime PRN   1 mg at 04/09/19 2048     miconazole (MICATIN/MICRO GUARD) 2 % powder   Topical Q1H PRN         OLANZapine (zyPREXA) tablet 10 mg  10 mg Oral Daily   10 mg at 04/09/19 0757     OLANZapine (zyPREXA) tablet 20 mg  20 mg Oral At Bedtime   20 mg at 04/09/19 2048     OLANZapine zydis (zyPREXA) ODT tab 5-10 mg  5-10 mg Oral Q4H PRN   10 mg at 04/06/19 1455     omeprazole (priLOSEC) CR capsule 20 mg  20 mg Oral Daily PRN   20 mg at 04/09/19 0757     ondansetron (ZOFRAN-ODT) ODT tab 4 mg  4 mg Oral Q6H PRN   4 mg at 04/05/19 0853     tolterodine ER (DETROL LA) 24 hr capsule 2 mg  2 mg Oral Daily   2 mg at 04/09/19 0757     No current Epic-ordered outpatient medications on file.         Allergies      Allergies   Allergen Reactions     Tetracycline         Medical Review of Systems     /74   Pulse 102   Temp 97.7  F (36.5  C) (Oral)   Resp 16   Wt 82.1 kg (181 lb 1.6 oz)   SpO2 98%   BMI 30.14 kg/m    Body mass index is 30.14 kg/m .  A 10-point review of systems was performed by Timi Castle MD and is negative, no new findings.      Psychiatric Examination     Appearance Sitting in chair, dressed in hospital scrubs. Appears stated age.   Attitude Cooperative   Orientation Oriented to person, place, time   Eye Contact Fair    Speech Regular rate, rhythm, volume and tone   Language Normal   Psychomotor  Behavior Normal   Mood Less disorganized    Affect Calmer     Thought Process Goal-Oriented, Intact   Associations Intact   Thought Content Patient is currently negative for suicidal ideation, negative for plan or intent, able to contract no self harm and identify barriers to suicide. Positive for obsessions, compulsions or psychosis.     Fund of Knowledge Impaired   Insight Impaired   Judgement Impaired   Attention Span & Concentration Poor   Recent & Remote Memory Intact   Gait Unsteady    Muscle Tone Intact        Labs     Labs reviewed.  No results found for this or any previous visit (from the past 24 hour(s)).     Impression   This is 53 year old single female with a psychiatric history of schizophrenia and polysubstance use disorder. While discussing with Dr. Castle this afternoon, the patient was found to be a little more coherent, however proceeds to be elevated in mood, pressured speech, with racing thoughts. She has been fixated on discharge date and living arrangements for after hospitalization.  proceeds to work on arrangements, considering TCU.       Diagnoses   1. Paranoid schizophrenia  2. Polysubstance use disorder  3. Cognitive disorder     Plan     1. Explained side effects, benefits, and complications of medications to the patient, Pt gave verbal consent.  2. Medication changes: None    3. Discussed treatment plan with patient and team.  4. Projected length of stay: 7+ days  5. Patient care order has been made: wound care is to be left in patient's room     Attestation:   Patient has been seen and evaluated by me, Timi Castle MD.    Patient ID:  Name: Anastasiya Simon    MRN: [id number]  Admission: 3/20/2019   YOB: 1965
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
## 2019-04-10 NOTE — PLAN OF CARE
"[facility] but remains withdrawn. Upon talking with her she states she is feeling better but remains fearful that she is being watched and that \"they are trying to steal one of my bodies.\" States she does not want to back to the place she was previously living in as it is \"too scary their\" States she still hears voices but that they are always present and are not getting worse. Mood is anxious. Affect is flat and blunt   "

## 2019-04-10 NOTE — PLAN OF CARE
Pt withdrawn in room most of day. Apperance disheveled refused shower but did change scrubs. Out to lounge to eat dressing changed. Appears paranoid.

## 2019-04-11 PROCEDURE — A9270 NON-COVERED ITEM OR SERVICE: HCPCS | Performed by: PSYCHIATRY & NEUROLOGY

## 2019-04-11 PROCEDURE — 25000132 ZZH RX MED GY IP 250 OP 250 PS 637: Performed by: PSYCHIATRY & NEUROLOGY

## 2019-04-11 PROCEDURE — 12400000 ZZH R&B MH

## 2019-04-11 PROCEDURE — 25000131 ZZH RX MED GY IP 250 OP 636 PS 637: Performed by: PHYSICIAN ASSISTANT

## 2019-04-11 RX ADMIN — OLANZAPINE 10 MG: 10 TABLET, FILM COATED ORAL at 08:28

## 2019-04-11 RX ADMIN — TOLTERODINE TARTRATE 2 MG: 2 CAPSULE, EXTENDED RELEASE ORAL at 08:28

## 2019-04-11 RX ADMIN — CALCIUM CARBONATE (ANTACID) CHEW TAB 500 MG 1000 MG: 500 CHEW TAB at 09:59

## 2019-04-11 RX ADMIN — OLANZAPINE 20 MG: 10 TABLET, FILM COATED ORAL at 20:24

## 2019-04-11 RX ADMIN — ONDANSETRON 4 MG: 4 TABLET, ORALLY DISINTEGRATING ORAL at 10:55

## 2019-04-11 RX ADMIN — DIVALPROEX SODIUM 1000 MG: 500 TABLET, FILM COATED, EXTENDED RELEASE ORAL at 20:24

## 2019-04-11 ASSESSMENT — ACTIVITIES OF DAILY LIVING (ADL)
HYGIENE/GROOMING: PROMPTS
ORAL_HYGIENE: PROMPTS
HYGIENE/GROOMING: PROMPTS
DRESS: SCRUBS (BEHAVIORAL HEALTH)
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
## 2019-04-11 NOTE — PLAN OF CARE
Goal: Team Discussion  Outcome: Improving   BEHAVIORAL TEAM DISCUSSION     Participants: Dr. Castle, nurses, social workers, psych asst   Progress: Pt is out of her room talking to other pts, pt is more social and improving.   Continued Stay Criteria/Rationale: Social workers are looking for a TCU for pt after discharge.   Medical/Physical:N/A  Precautions:        Behavioral Orders   Procedures    Code 1 - Restrict to Unit    Routine Programming       As clinically indicated    Seizure precautions    Status 15       Every 15 minutes.      Plan: Pt will remain on the ITC side, pt is not allowed to go to groups.   Rationale for change in precautions or plan: Pt has shown improvement.

## 2019-04-11 NOTE — PLAN OF CARE
Pt up in lounge most of day. Showered refused to cut or comb hair. C/o of nausea asking for milk and meds given, Wound changed.

## 2019-04-12 PROCEDURE — 25000132 ZZH RX MED GY IP 250 OP 250 PS 637: Performed by: PSYCHIATRY & NEUROLOGY

## 2019-04-12 PROCEDURE — A9270 NON-COVERED ITEM OR SERVICE: HCPCS | Performed by: PSYCHIATRY & NEUROLOGY

## 2019-04-12 PROCEDURE — 12400000 ZZH R&B MH

## 2019-04-12 RX ADMIN — LOPERAMIDE HYDROCHLORIDE 2 MG: 2 CAPSULE ORAL at 14:30

## 2019-04-12 RX ADMIN — DIVALPROEX SODIUM 1000 MG: 500 TABLET, FILM COATED, EXTENDED RELEASE ORAL at 20:17

## 2019-04-12 RX ADMIN — Medication 1 MG: at 01:33

## 2019-04-12 RX ADMIN — LOPERAMIDE HYDROCHLORIDE 2 MG: 2 CAPSULE ORAL at 18:09

## 2019-04-12 RX ADMIN — TOLTERODINE TARTRATE 2 MG: 2 CAPSULE, EXTENDED RELEASE ORAL at 08:34

## 2019-04-12 RX ADMIN — OLANZAPINE 20 MG: 10 TABLET, FILM COATED ORAL at 20:17

## 2019-04-12 RX ADMIN — OLANZAPINE 10 MG: 10 TABLET, FILM COATED ORAL at 08:34

## 2019-04-12 RX ADMIN — Medication 1 MG: at 20:17

## 2019-04-12 ASSESSMENT — ACTIVITIES OF DAILY LIVING (ADL)
HYGIENE/GROOMING: PROMPTS
HYGIENE/GROOMING: PROMPTS
ORAL_HYGIENE: PROMPTS
ORAL_HYGIENE: PROMPTS
LAUNDRY: WITH SUPERVISION
DRESS: SCRUBS (BEHAVIORAL HEALTH)
DRESS: SCRUBS (BEHAVIORAL HEALTH)
LAUNDRY: WITH SUPERVISION

## 2019-04-12 NOTE — PROGRESS NOTES
Patient woke up at 0130 stating she was having trouble sleeping. PRN melatonin given, during 2am round patient was back alseep.

## 2019-04-12 NOTE — PROGRESS NOTES
[facility] Psychiatric Progress Note       Interim History   The patient's care was discussed with the treatment team and chart notes were reviewed. Attending hospital staff on [address] have noted the patient continues to attain rambling and pressured speech, however no psychosis or internal stimuli have been noted. She was able to report some effectiveness in regards of her current medications.      Hospital Course   This is 53 year old single female with a psychiatric history of schizophrenia and polysubstance use disorder. She was initially presented to [facility] in a disorganized and confused state. Patient was cared for on Medicine and was then transferred to Psychiatry on 3/20/19 for medication management and stabilization. On 03/21/19, the patient's Zyprexa was increased to 10mg bid. When trying to speak with patient on 3/22, patient refused to wake up and talk with Dr. Castle. From this, there were  no medication adjustments made. Again, the patient did not wish to converse with Dr. Castle on 3/25 due to her expressing that she did not feel well physically. Contact precaution is still in place on 3/28 due to patient's wound still draining small amounts. Patient was unable to participate in 3/28 interview once again due to complaining of being in pain. Staff however noted that patient proceeded to be delusional in thought process. From this, Dr. Castle started the patient on Depakote 500mg daily in hopes to clear patient. Patient's Valproic Acid level read to be 56 on 4/02/19, thus Dr. Castle has increased Depakote dose from 500mg to 1,000mg at bedtime to continue to stabilize patient.      Medications     Current Facility-Administered Medications Ordered in Epic   Medication Dose Route Frequency Last Rate Last Dose     acetaminophen (TYLENOL) tablet 650 mg  650 mg Oral Q4H PRN   650 mg at 03/31/19 1915     benzocaine-menthol (CHLORASEPTIC) 6-10 MG lozenge 1 lozenge  1 lozenge  Buccal Q1H PRN   1 lozenge at 04/10/19 2045     calcium carbonate (TUMS) chewable tablet 1,000 mg  1,000 mg Oral Q4H PRN   1,000 mg at 04/11/19 0959     divalproex sodium extended-release (DEPAKOTE ER) 24 hr tablet 1,000 mg  1,000 mg Oral At Bedtime   1,000 mg at 04/11/19 2024     hydrOXYzine (ATARAX) tablet 25 mg  25 mg Oral Q4H PRN   25 mg at 04/10/19 0218     loperamide (IMODIUM) capsule 2 mg  2 mg Oral 4x Daily PRN   2 mg at 04/04/19 0940     melatonin tablet 1 mg  1 mg Oral At Bedtime PRN   1 mg at 04/12/19 0133     miconazole (MICATIN/MICRO GUARD) 2 % powder   Topical Q1H PRN         OLANZapine (zyPREXA) tablet 10 mg  10 mg Oral Daily   10 mg at 04/11/19 0828     OLANZapine (zyPREXA) tablet 20 mg  20 mg Oral At Bedtime   20 mg at 04/11/19 2024     OLANZapine zydis (zyPREXA) ODT tab 5-10 mg  5-10 mg Oral Q4H PRN   10 mg at 04/06/19 1455     omeprazole (priLOSEC) CR capsule 20 mg  20 mg Oral Daily PRN   20 mg at 04/09/19 0757     ondansetron (ZOFRAN-ODT) ODT tab 4 mg  4 mg Oral Q6H PRN   4 mg at 04/11/19 1055     tolterodine ER (DETROL LA) 24 hr capsule 2 mg  2 mg Oral Daily   2 mg at 04/11/19 0828     No current Epic-ordered outpatient medications on file.         Allergies      Allergies   Allergen Reactions     Tetracycline         Medical Review of Systems     /88   Pulse 95   Temp 98.5  F (36.9  C) (Oral)   Resp 16   Wt 82.1 kg (181 lb 1.6 oz)   SpO2 98%   BMI 30.14 kg/m    Body mass index is 30.14 kg/m .  A 10-point review of systems was performed by Timi Castle MD and is negative, no new findings.      Psychiatric Examination     Appearance Sitting in chair, dressed in hospital scrubs. Appears stated age.   Attitude Cooperative   Orientation Oriented to person, place, time   Eye Contact Fair    Speech Regular rate, rhythm, volume and tone   Language Normal   Psychomotor Behavior Normal   Mood Less disorganized    Affect Calmer     Thought Process Goal-Oriented, Intact   Associations  Intact   Thought Content Patient is currently negative for suicidal ideation, negative for plan or intent, able to contract no self harm and identify barriers to suicide. Positive for obsessions, compulsions or psychosis.     Fund of Knowledge Impaired   Insight Impaired   Judgement Impaired   Attention Span & Concentration Poor   Recent & Remote Memory Intact   Gait Unsteady    Muscle Tone Intact        Labs     Labs reviewed.  No results found for this or any previous visit (from the past 24 hour(s)).     Impression   This is 53 year old single female with a psychiatric history of schizophrenia and polysubstance use disorder. Per attending hospital staff notes, the patient proceeds to demonstrate pressured and rambled speech, but no internal stimuli or psychosis was noted. Patient has been able to acknowledge that her current medication regiment has been effective. She remains cooperative and medication compliant.      Diagnoses   1. Paranoid schizophrenia  2. Polysubstance use disorder  3. Cognitive disorder     Plan     1. Explained side effects, benefits, and complications of medications to the patient, Pt gave verbal consent.  2. Medication changes: None    3. Discussed treatment plan with patient and team.  4. Projected length of stay: 7+ days  5. Patient care order has been made: wound care is to be left in patient's room     Attestation:   Patient has been seen and evaluated by me, Timi Castle MD.    Patient ID:  Name: Anastasiya Simon    MRN: [id number]  Admission: 3/20/2019   YOB: 1965

## 2019-04-12 NOTE — PLAN OF CARE
"Pt isolative to self, spent time in lounge eating lunch and watching TV at times. Pt continues to be resistive to prompts to attend to personal hygiene, refused shower, changed into new scrubs and underwear with staff assistance. Pt irritable regarding being in the hospital for so long, stating that she feels she should be able to go home. Dressing change complete, wound appears to be healing well. Per MD, pt is okay to participate in milieu as long as wound is covered and contained. Pt reports diarrhea, Imodium given per PRN list, pt offered assistance to clean up and stated she would shower in \"10 minutes\"      "

## 2019-04-12 NOTE — PLAN OF CARE
3-7 PM update. Pt observed in milieu watching TV, eating. Minimally social with peers. Cooperative with medications.

## 2019-04-12 NOTE — PLAN OF CARE
Pt napped for about an hour this evening. States that she is doing well but is unsure about the doctor's plan. Pt was visible in the lounge, med compliant, denying any SI, AVH, and pain. Pt complained of upset stomach, requested Prilosec and ginger ale. Pt states some effectiveness of meds, ate 100% of supper. No psychosis noted, no internal stimuli noted. Pt does exhibit some rambling of speech and some momentarily confusion with waking up.

## 2019-04-13 PROCEDURE — A9270 NON-COVERED ITEM OR SERVICE: HCPCS | Performed by: PSYCHIATRY & NEUROLOGY

## 2019-04-13 PROCEDURE — 25000132 ZZH RX MED GY IP 250 OP 250 PS 637: Performed by: PSYCHIATRY & NEUROLOGY

## 2019-04-13 PROCEDURE — 12400000 ZZH R&B MH

## 2019-04-13 RX ADMIN — OLANZAPINE 10 MG: 10 TABLET, FILM COATED ORAL at 08:34

## 2019-04-13 RX ADMIN — Medication 1 MG: at 20:54

## 2019-04-13 RX ADMIN — OLANZAPINE 20 MG: 10 TABLET, FILM COATED ORAL at 20:54

## 2019-04-13 RX ADMIN — DIVALPROEX SODIUM 1000 MG: 500 TABLET, FILM COATED, EXTENDED RELEASE ORAL at 20:54

## 2019-04-13 RX ADMIN — TOLTERODINE TARTRATE 2 MG: 2 CAPSULE, EXTENDED RELEASE ORAL at 08:34

## 2019-04-13 RX ADMIN — OMEPRAZOLE 20 MG: 20 CAPSULE, DELAYED RELEASE ORAL at 08:34

## 2019-04-13 ASSESSMENT — ACTIVITIES OF DAILY LIVING (ADL)
LAUNDRY: WITH SUPERVISION
DRESS: SCRUBS (BEHAVIORAL HEALTH)
ORAL_HYGIENE: PROMPTS
HYGIENE/GROOMING: PROMPTS

## 2019-04-13 NOTE — PLAN OF CARE
Pt stayed in room during and rested.  Cooperative with staff and taking medications.  Pt still refused a shower at this time.

## 2019-04-13 NOTE — PLAN OF CARE
"A/O. Up independently. Head to toe assessment WDL Ex wound: slightly erythremic and moist dressing change complete. Patient took a shower this shift. Refused any help from staff but it appears that she used soap. Refused any help combing out her hair stating that the comb would pull it all out due to the tangles. Upon conversation patient stated that she would like to be discharged from the hospital and that she could stay with her friend KATHLEEN SALDANA [phone]. Was advised that only the doctor could write a discharge order and that a safe situation needs to be verified and that that [facility]  paperwork goes through. Patient seemed accepting of this information. Also mentioned paranoid thoughts that she does note \"feel safe out there.\" That she is targeted and that they target kids. When asked to clarify, patient stated, \"Oh like communists, I don't want to get into the whole thing.\"      "

## 2019-04-14 PROCEDURE — A9270 NON-COVERED ITEM OR SERVICE: HCPCS | Performed by: PSYCHIATRY & NEUROLOGY

## 2019-04-14 PROCEDURE — 12400000 ZZH R&B MH

## 2019-04-14 PROCEDURE — 25000132 ZZH RX MED GY IP 250 OP 250 PS 637: Performed by: PSYCHIATRY & NEUROLOGY

## 2019-04-14 RX ADMIN — Medication 1 MG: at 21:15

## 2019-04-14 RX ADMIN — DIVALPROEX SODIUM 1000 MG: 500 TABLET, FILM COATED, EXTENDED RELEASE ORAL at 21:15

## 2019-04-14 RX ADMIN — OMEPRAZOLE 20 MG: 20 CAPSULE, DELAYED RELEASE ORAL at 08:17

## 2019-04-14 RX ADMIN — TOLTERODINE TARTRATE 2 MG: 2 CAPSULE, EXTENDED RELEASE ORAL at 08:17

## 2019-04-14 RX ADMIN — HYDROXYZINE HYDROCHLORIDE 25 MG: 25 TABLET, FILM COATED ORAL at 01:03

## 2019-04-14 RX ADMIN — OLANZAPINE 20 MG: 10 TABLET, FILM COATED ORAL at 21:15

## 2019-04-14 RX ADMIN — OLANZAPINE 10 MG: 5 TABLET, ORALLY DISINTEGRATING ORAL at 23:03

## 2019-04-14 RX ADMIN — OLANZAPINE 10 MG: 10 TABLET, FILM COATED ORAL at 08:17

## 2019-04-14 ASSESSMENT — ACTIVITIES OF DAILY LIVING (ADL)
LAUNDRY: UNABLE TO COMPLETE
HYGIENE/GROOMING: PROMPTS
DRESS: PROMPTS
ORAL_HYGIENE: PROMPTS
ORAL_HYGIENE: PROMPTS
DRESS: SCRUBS (BEHAVIORAL HEALTH)
HYGIENE/GROOMING: PROMPTS
LAUNDRY: WITH SUPERVISION

## 2019-04-14 NOTE — PLAN OF CARE
Pt has been visible for the majority of the shift watching TV in the [facility] lounge and presents with blunt affect calm mood. Pt denies any SI but a little nervous about when and where she be discharge to. Wound care done and minimal drainage observed. Pt is med compliant and ate all meals. No diarrhea reported this shift.

## 2019-04-14 NOTE — PLAN OF CARE
"Pt has been visible for the majority of the shift watching TV in the lounge.  Pt social with peers and staff.  Pt anxious about where she is going to go after discharge.  Pt reported to writer that she is anxious about infecting other people and wants her own place to go to. Pt interested in looking at hotels or lofts.  Pt expressed frustration that her belongings are locked away.  Pt denied suicidal thoughts. Pt expressed to writer her medications make her feel \"weird\".  Medication and meal compliant.    "

## 2019-04-15 PROCEDURE — A9270 NON-COVERED ITEM OR SERVICE: HCPCS | Performed by: PSYCHIATRY & NEUROLOGY

## 2019-04-15 PROCEDURE — 12400000 ZZH R&B MH

## 2019-04-15 PROCEDURE — 25000132 ZZH RX MED GY IP 250 OP 250 PS 637: Performed by: PSYCHIATRY & NEUROLOGY

## 2019-04-15 PROCEDURE — G0463 HOSPITAL OUTPT CLINIC VISIT: HCPCS

## 2019-04-15 RX ADMIN — OLANZAPINE 20 MG: 10 TABLET, FILM COATED ORAL at 21:43

## 2019-04-15 RX ADMIN — Medication 1 LOZENGE: at 05:49

## 2019-04-15 RX ADMIN — Medication 1 MG: at 21:43

## 2019-04-15 RX ADMIN — OLANZAPINE 10 MG: 10 TABLET, FILM COATED ORAL at 08:54

## 2019-04-15 RX ADMIN — TOLTERODINE TARTRATE 2 MG: 2 CAPSULE, EXTENDED RELEASE ORAL at 08:54

## 2019-04-15 RX ADMIN — HYDROXYZINE HYDROCHLORIDE 25 MG: 25 TABLET, FILM COATED ORAL at 21:43

## 2019-04-15 RX ADMIN — HYDROXYZINE HYDROCHLORIDE 25 MG: 25 TABLET, FILM COATED ORAL at 00:43

## 2019-04-15 RX ADMIN — DIVALPROEX SODIUM 1000 MG: 500 TABLET, FILM COATED, EXTENDED RELEASE ORAL at 21:43
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
## 2019-04-15 NOTE — SAFE
"Blunt affect, slightly anxious, she still talks about bugs and parasites, refuses to cut her hair, stated \" I want to get out of here, I am going to live with a friend\"   Teaching provided by the wound nurse to change her dressing, has two wounds on left lateral leg,  There a small packet on the large wound, but they are healing. Small amount of serous sanguineous drainage and erythema noted.  Rambles at times, will encourage to take a shower today.  working on a plan to discharge patient.  "

## 2019-04-15 NOTE — PROGRESS NOTES
Call placed to Tamika Owensdeacontariq, patient's sister, to discuss discharge planning. No answer. Message left requesting return call.

## 2019-04-15 NOTE — PLAN OF CARE
Problem: Adult Behavioral Health Plan of Care  Goal: Team Discussion  Description  Team Plan:  4/15/2019 0859 by Arthur Coles  Outcome: Improving  Note:   BEHAVIORAL TEAM DISCUSSION    Participants: Dr. Castle, , Nursing staff and PA's   Progress: Pt's speech has become more fluid and slow, Pt's affect and behavior have improved  Continued Stay Criteria/Rationale: Until aftercare is in place  Medical/Physical: Continue wound changes and teach Pt how to dress wound  Precautions:   Behavioral Orders   Procedures    Code 1 - Restrict to Unit    Routine Programming     As clinically indicated    Status 15     Every 15 minutes.     Plan: Pt was given medication information and gave verbal consent. Plan of care was discussed with patient and team. Pt needs to be taught to change the dressing of her own wound. Continue plans for aftercare, while under hospitalization and continue current medication.  Rationale for change in precautions or plan: N/A

## 2019-04-15 NOTE — PLAN OF CARE
Pt presents with flat blunt affect and anxious mood. Has difficulty socializing with peers. Labor intensive with many requests for food. Pt refused to change out of her dirty scrubs. Outburst on the phone today but calmed herself down. Spent majority of the shift in the lounge - med compliant, no Si.

## 2019-04-15 NOTE — PROGRESS NOTES
Call placed to [facility] Case management to request confirmation referral being processed. Was told last update to pending list was on April 1. Instructed to call back next week.   [facility]. Called [facility]. Was told there was nothing that could be done to check on referral. Last recording of referrals done 4/1/19. It is now 3 weeks later and cannot even get confirmation referral information received.

## 2019-04-15 NOTE — PROGRESS NOTES
"[facility] Psychiatric Progress Note       Interim History   The patient's care was discussed with the treatment team and chart notes were reviewed. Per nursing and PA staff members on Station [address], the patient has been spending her time both isolative to her room and in the [facility] lounge. She has been more social with her peers and staff members, but continues to be anxious about her aftercare plans such as living arrangements. Patient seen on 4/15/19 by Dr. Castle. Upon interview patient is found in bed sleeping. She appears more coherent along with her speech being less pressured and less rambled. She notes that her wound is healing properly, and that there, \"is almost no pain.\" Patient asks more about her discharge date and reports that she is able to discharge to a friend's home. Dr. Castle will talk more with our  in regards of this.       Hospital Course   This is 53 year old single female with a psychiatric history of schizophrenia and polysubstance use disorder. She was initially presented to [facility] in a disorganized and confused state. Patient was cared for on Medicine and was then transferred to Psychiatry on 3/20/19 for medication management and stabilization. On 03/21/19, the patient's Zyprexa was increased to 10mg bid. When trying to speak with patient on 3/22, patient refused to wake up and talk with Dr. Castle. From this, there were  no medication adjustments made. Again, the patient did not wish to converse with Dr. Castle on 3/25 due to her expressing that she did not feel well physically. Contact precaution is still in place on 3/28 due to patient's wound still draining small amounts. Patient was unable to participate in 3/28 interview once again due to complaining of being in pain. Staff however noted that patient proceeded to be delusional in thought process. From this, Dr. Castle started the patient on Depakote 500mg daily in hopes to clear patient. " Patient's Valproic Acid level read to be 56 on 4/02/19, thus Dr. Castle has increased Depakote dose from 500mg to 1,000mg at bedtime to continue to stabilize patient.      Medications     Current Facility-Administered Medications Ordered in Epic   Medication Dose Route Frequency Last Rate Last Dose     acetaminophen (TYLENOL) tablet 650 mg  650 mg Oral Q4H PRN   650 mg at 03/31/19 1915     benzocaine-menthol (CHLORASEPTIC) 6-10 MG lozenge 1 lozenge  1 lozenge Buccal Q1H PRN   1 lozenge at 04/15/19 0549     calcium carbonate (TUMS) chewable tablet 1,000 mg  1,000 mg Oral Q4H PRN   1,000 mg at 04/11/19 0959     divalproex sodium extended-release (DEPAKOTE ER) 24 hr tablet 1,000 mg  1,000 mg Oral At Bedtime   1,000 mg at 04/14/19 2115     hydrOXYzine (ATARAX) tablet 25 mg  25 mg Oral Q4H PRN   25 mg at 04/15/19 0043     loperamide (IMODIUM) capsule 2 mg  2 mg Oral 4x Daily PRN   2 mg at 04/12/19 1809     melatonin tablet 1 mg  1 mg Oral At Bedtime PRN   1 mg at 04/14/19 2115     miconazole (MICATIN/MICRO GUARD) 2 % powder   Topical Q1H PRN         OLANZapine (zyPREXA) tablet 10 mg  10 mg Oral Daily   10 mg at 04/14/19 0817     OLANZapine (zyPREXA) tablet 20 mg  20 mg Oral At Bedtime   20 mg at 04/14/19 2115     OLANZapine zydis (zyPREXA) ODT tab 5-10 mg  5-10 mg Oral Q4H PRN   10 mg at 04/14/19 2303     omeprazole (priLOSEC) CR capsule 20 mg  20 mg Oral Daily PRN   20 mg at 04/14/19 0817     ondansetron (ZOFRAN-ODT) ODT tab 4 mg  4 mg Oral Q6H PRN   4 mg at 04/11/19 1055     tolterodine ER (DETROL LA) 24 hr capsule 2 mg  2 mg Oral Daily   2 mg at 04/14/19 0817     No current [facility]-ordered outpatient medications on file.         Allergies      Allergies   Allergen Reactions     Tetracycline         Medical Review of Systems     /57   Pulse 95   Temp 98  F (36.7  C) (Oral)   Resp 16   Wt 82.1 kg (181 lb 1.6 oz)   SpO2 98%   BMI 30.14 kg/m    Body mass index is 30.14 kg/m .  A 10-point review of systems was  performed by Timi Castle MD and is negative, no new findings.      Psychiatric Examination     Appearance Sitting in chair, dressed in hospital scrubs. Appears stated age.   Attitude Cooperative   Orientation Oriented to person, place, time   Eye Contact Fair    Speech Regular rate, rhythm, volume and tone   Language Normal   Psychomotor Behavior Normal   Mood Less disorganized   Affect More coherent     Thought Process Goal-Oriented, Intact   Associations Intact   Thought Content Patient is currently negative for suicidal ideation, negative for plan or intent, able to contract no self harm and identify barriers to suicide. Positive for obsessions, compulsions or psychosis.     Fund of Knowledge Impaired   Insight Impaired   Judgement Impaired   Attention Span & Concentration Poor   Recent & Remote Memory Intact   Gait Unsteady    Muscle Tone Intact        Labs     Labs reviewed.  No results found for this or any previous visit (from the past 24 hour(s)).     Impression   This is 53 year old single female with a psychiatric history of schizophrenia and polysubstance use disorder. While discussing with Dr. Castle this morning, the patient appeared much more coherent, her speech was less pressured and rambled. She however continues to be fixated on discharge date and living arrangements after this hospitalization. She apparently has a male friend that is willing to have patient stay with him. We will need to discuss this idea with our .      Diagnoses   1. Paranoid schizophrenia  2. Polysubstance use disorder  3. Cognitive disorder     Plan     1. Explained side effects, benefits, and complications of medications to the patient, Pt gave verbal consent.  2. Medication changes: None    3. Discussed treatment plan with patient and team.  4. Projected length of stay: 7+ days  5. Patient care order has been made: wound care is to be left in patient's room     Attestation:   Patient has been seen  and evaluated by me, Timi Castle MD.    Patient ID:  Name: Anastasiya Simon    MRN: [id number]  Admission: 3/20/2019   YOB: 1965

## 2019-04-15 NOTE — PROGRESS NOTES
Patient has trouble sleeping through the night, often wakes up several times a night to use the bathroom, ask for snacks or prn medications.

## 2019-04-16 PROCEDURE — A9270 NON-COVERED ITEM OR SERVICE: HCPCS | Performed by: PSYCHIATRY & NEUROLOGY

## 2019-04-16 PROCEDURE — 12400000 ZZH R&B MH

## 2019-04-16 PROCEDURE — 25000131 ZZH RX MED GY IP 250 OP 636 PS 637: Performed by: PHYSICIAN ASSISTANT

## 2019-04-16 PROCEDURE — 25000132 ZZH RX MED GY IP 250 OP 250 PS 637: Performed by: PSYCHIATRY & NEUROLOGY

## 2019-04-16 RX ADMIN — OLANZAPINE 10 MG: 10 TABLET, FILM COATED ORAL at 09:25

## 2019-04-16 RX ADMIN — OLANZAPINE 20 MG: 10 TABLET, FILM COATED ORAL at 20:09

## 2019-04-16 RX ADMIN — Medication 1 MG: at 20:56

## 2019-04-16 RX ADMIN — TOLTERODINE TARTRATE 2 MG: 2 CAPSULE, EXTENDED RELEASE ORAL at 09:25

## 2019-04-16 RX ADMIN — HYDROXYZINE HYDROCHLORIDE 25 MG: 25 TABLET, FILM COATED ORAL at 20:56

## 2019-04-16 RX ADMIN — OLANZAPINE 10 MG: 5 TABLET, ORALLY DISINTEGRATING ORAL at 22:47

## 2019-04-16 RX ADMIN — OMEPRAZOLE 20 MG: 20 CAPSULE, DELAYED RELEASE ORAL at 16:24

## 2019-04-16 RX ADMIN — DIVALPROEX SODIUM 1000 MG: 500 TABLET, FILM COATED, EXTENDED RELEASE ORAL at 20:09

## 2019-04-16 RX ADMIN — ONDANSETRON 4 MG: 4 TABLET, ORALLY DISINTEGRATING ORAL at 16:24

## 2019-04-16 ASSESSMENT — ACTIVITIES OF DAILY LIVING (ADL)
ORAL_HYGIENE: PROMPTS
HYGIENE/GROOMING: PROMPTS
LAUNDRY: UNABLE TO COMPLETE
DRESS: SCRUBS (BEHAVIORAL HEALTH);PROMPTS

## 2019-04-16 NOTE — PLAN OF CARE
Pt reported that she was able to sleep better last night. Visible out in lounge watching TV for short periods to eat or watch TV, otherwise spent the majority of the shift sleeping. Med compliant

## 2019-04-16 NOTE — PROGRESS NOTES
Spoke with patient's sister, Tamika Johnson.- [phone]. Discussed patient's progress healing from wounds. Wound care has pronounced patient able to care for her wounds without assistance at this time, so no referral toTCU appropriate. [facility] case management referral has been made but [facility] extremely slow to respond- expect 4 weeks before an intake can be done.  has access to other housing alternatives. Patient has proposed moving in with a male friend. Sister suspects it would be a fellow meth user. Patient cannot return to her former home. Family will discuss and look at what resources they can provide for patient. Tamika out of town until Monday.

## 2019-04-16 NOTE — PLAN OF CARE
Pt presents calm and flat spending entire shift in lounge. Pt states she thinks she will be here forever and does not see why she can not go live with her friend. Was educated by wound nurse on how to dress her wound. Pt was advised she should be doing her own dressing changes daily to be prepared to do them once she discharges. Requested more medication to help her sleep since she claims she has not been sleeping at night.

## 2019-04-17 PROCEDURE — A9270 NON-COVERED ITEM OR SERVICE: HCPCS | Performed by: PSYCHIATRY & NEUROLOGY

## 2019-04-17 PROCEDURE — 25000132 ZZH RX MED GY IP 250 OP 250 PS 637: Performed by: PSYCHIATRY & NEUROLOGY

## 2019-04-17 PROCEDURE — 12400000 ZZH R&B MH

## 2019-04-17 RX ADMIN — OLANZAPINE 10 MG: 10 TABLET, FILM COATED ORAL at 09:07

## 2019-04-17 RX ADMIN — OLANZAPINE 20 MG: 10 TABLET, FILM COATED ORAL at 21:25

## 2019-04-17 RX ADMIN — LOPERAMIDE HYDROCHLORIDE 2 MG: 2 CAPSULE ORAL at 11:11

## 2019-04-17 RX ADMIN — TOLTERODINE TARTRATE 2 MG: 2 CAPSULE, EXTENDED RELEASE ORAL at 09:07

## 2019-04-17 RX ADMIN — DIVALPROEX SODIUM 1000 MG: 500 TABLET, FILM COATED, EXTENDED RELEASE ORAL at 21:25

## 2019-04-17 ASSESSMENT — ACTIVITIES OF DAILY LIVING (ADL)
LAUNDRY: UNABLE TO COMPLETE
ORAL_HYGIENE: PROMPTS
DRESS: INDEPENDENT;SCRUBS (BEHAVIORAL HEALTH)
HYGIENE/GROOMING: PROMPTS

## 2019-04-17 NOTE — PLAN OF CARE
"Patient has been trying to get RNs (\"people in charge\") to talk with her male friend, \"Emmit,\" whom she wants to move in with after discharge. Per ARAMIS's note, pt's sister \"suspects it would be a fellow meth user.\" When he called the office to talk about her discharge plans, he sounded intoxicated and was using aggressive language towards RN. She continues to talk to him on the phone and to try to get staff to talk to him from the lounge phone.  "

## 2019-04-17 NOTE — PLAN OF CARE
Pt was visible in the [facility] unit lounge watching TV. Pt told writer she was able to wash her hair. Writer tied to have pt do her own wound but she keep bringing excuses and staff had to do wound care. PT  Friend Yaya  Called to find out if pt could be discharge to his place but the lucio sound to rude with F and S words and speech was slured like he was drunk or on drugs. Pt is med compliant. Prn melatonin and hydroxyzine given.

## 2019-04-17 NOTE — PLAN OF CARE
"A&O. Denies pain. Denies SI or hallucinations. Pt incontinent to BM this am. Feces noted all over floor, walls, and toilet. Pt used blanket  to cover shelf after incident and used same dirty blanket to dry hair after she had showered. When asked if that is the dirty blanket, pt states \"well, no one gave me a towel.\" Pt  received 2 towels for shower and did not ask for anymore. Lots of soap also noted on pt's hair after shower. Staff member encouraged pt to change bed sheets and clean room. Pt first stated that she was \"not a maid.\" Staff told pt that it is part of being accountable. Pt changed her sheets and cleaned her room. Will continue to monitor.   "

## 2019-04-17 NOTE — PROGRESS NOTES
[facility] Psychiatric Progress Note       Interim History   The patient's care was discussed with the treatment team and chart notes were reviewed. According to , she has been trying to be in contact with patient's sister, Tamika Johnson for the past week. Tamika was finally able to be contacted yesterday afternoon where she was updated on patient's current state and aftercare plans. As of right now, the patient is not deemed appropriate or TCU placement due to her ability to care for her own wound (per wound care team). Although the patient has noted that she is allowed to stay with a male friend at his home, the patient's sister suspects this male friend is a fellow meth user. Patient is not allowed to her former home, thus her family will disucss and look at what rescoures they can provide for patient. During team meeting, staff noted that patient had an episode approximately 30 minutes prior to team meeting where she went to the bathroom and spread her feces everywhere in the bathroom, herself, and her bedroom. She did not have the ability to recognize these actions were not appropriate.      Hospital Course   This is 53 year old single female with a psychiatric history of schizophrenia and polysubstance use disorder. She was initially presented to [facility] in a disorganized and confused state. Patient was cared for on Medicine and was then transferred to Psychiatry on 3/20/19 for medication management and stabilization. On 03/21/19, the patient's Zyprexa was increased to 10mg bid. When trying to speak with patient on 3/22, patient refused to wake up and talk with Dr. Castle. From this, there were  no medication adjustments made. Again, the patient did not wish to converse with Dr. Castle on 3/25 due to her expressing that she did not feel well physically. Contact precaution is still in place on 3/28 due to patient's wound still draining small amounts. Patient was unable to  participate in 3/28 interview once again due to complaining of being in pain. Staff however noted that patient proceeded to be delusional in thought process. From this, Dr. Castle started the patient on Depakote 500mg daily in hopes to clear patient. Patient's Valproic Acid level read to be 56 on 4/02/19, thus Dr. Castle has increased Depakote dose from 500mg to 1,000mg at bedtime to continue to stabilize patient. On 4/12, the patient appeared much more coherent, her speech was less pressured and rambled. She however continues to be fixated on discharge date and living arrangements      Medications     Current Facility-Administered Medications Ordered in Epic   Medication Dose Route Frequency Last Rate Last Dose     acetaminophen (TYLENOL) tablet 650 mg  650 mg Oral Q4H PRN   650 mg at 03/31/19 1915     benzocaine-menthol (CHLORASEPTIC) 6-10 MG lozenge 1 lozenge  1 lozenge Buccal Q1H PRN   1 lozenge at 04/15/19 0549     calcium carbonate (TUMS) chewable tablet 1,000 mg  1,000 mg Oral Q4H PRN   1,000 mg at 04/11/19 0959     divalproex sodium extended-release (DEPAKOTE ER) 24 hr tablet 1,000 mg  1,000 mg Oral At Bedtime   1,000 mg at 04/16/19 2009     hydrOXYzine (ATARAX) tablet 25 mg  25 mg Oral Q4H PRN   25 mg at 04/16/19 2056     loperamide (IMODIUM) capsule 2 mg  2 mg Oral 4x Daily PRN   2 mg at 04/12/19 1809     melatonin tablet 1 mg  1 mg Oral At Bedtime PRN   1 mg at 04/16/19 2056     miconazole (MICATIN/MICRO GUARD) 2 % powder   Topical Q1H PRN         OLANZapine (zyPREXA) tablet 10 mg  10 mg Oral Daily   10 mg at 04/16/19 0925     OLANZapine (zyPREXA) tablet 20 mg  20 mg Oral At Bedtime   20 mg at 04/16/19 2009     OLANZapine zydis (zyPREXA) ODT tab 5-10 mg  5-10 mg Oral Q4H PRN   10 mg at 04/16/19 2247     omeprazole (priLOSEC) CR capsule 20 mg  20 mg Oral Daily PRN   20 mg at 04/16/19 1624     ondansetron (ZOFRAN-ODT) ODT tab 4 mg  4 mg Oral Q6H PRN   4 mg at 04/16/19 1624     tolterodine ER (DETROL  LA) 24 hr capsule 2 mg  2 mg Oral Daily   2 mg at 04/16/19 0925     No current Epic-ordered outpatient medications on file.         Allergies      Allergies   Allergen Reactions     Tetracycline         Medical Review of Systems     /86   Pulse 95   Temp 98  F (36.7  C) (Oral)   Resp 18   Wt 82.1 kg (181 lb 1.6 oz)   SpO2 98%   BMI 30.14 kg/m    Body mass index is 30.14 kg/m .  A 10-point review of systems was performed by Timi Castle MD and is negative, no new findings.      Psychiatric Examination     Appearance Sitting in chair, dressed in hospital scrubs. Appears stated age.   Attitude Cooperative   Orientation Oriented to person, place, time   Eye Contact Fair    Speech Regular rate, rhythm, volume and tone   Language Normal   Psychomotor Behavior Normal   Mood Less disorganized   Affect More coherent     Thought Process Goal-Oriented, Intact   Associations Intact   Thought Content Patient is currently negative for suicidal ideation, negative for plan or intent, able to contract no self harm and identify barriers to suicide. Positive for obsessions, compulsions or psychosis.     Fund of Knowledge Impaired   Insight Impaired   Judgement Impaired   Attention Span & Concentration Poor   Recent & Remote Memory Intact   Gait Unsteady    Muscle Tone Intact        Labs     Labs reviewed.  No results found for this or any previous visit (from the past 24 hour(s)).     Impression   This is 53 year old single female with a psychiatric history of schizophrenia and polysubstance use disorder. Our  was able to contact patient's sister, Tamika Johnson, where she was updated on patient's current state and aftercare plans. As of right now, the patient is not deemed appropriate for TCU placement due to her ability to care for her own wound. Although the patient has noted that she will be allowed to stay at a male friends house, the patient's sister suspects this male friend is a fellow meth  user. Patient is not allowed to her former home, thus her family will disucss and look at what rescoures they can provide for patient.        Diagnoses   1. Paranoid schizophrenia  2. Polysubstance use disorder  3. Cognitive disorder     Plan     1. Explained side effects, benefits, and complications of medications to the patient, Pt gave verbal consent.  2. Medication changes: None    3. Discussed treatment plan with patient and team.  4. Projected length of stay: 7+ days  5. Patient care order has been made: wound care is to be left in patient's room     Attestation:   Patient has been seen and evaluated by me, Timi Castle MD.    Patient ID:  Name: Anastasiya Simon    MRN: [id number]  Admission: 3/20/2019   YOB: 1965

## 2019-04-18 PROCEDURE — 25000132 ZZH RX MED GY IP 250 OP 250 PS 637: Performed by: PSYCHIATRY & NEUROLOGY

## 2019-04-18 PROCEDURE — 12400000 ZZH R&B MH

## 2019-04-18 PROCEDURE — A9270 NON-COVERED ITEM OR SERVICE: HCPCS | Performed by: PSYCHIATRY & NEUROLOGY

## 2019-04-18 RX ADMIN — Medication 1 MG: at 20:16

## 2019-04-18 RX ADMIN — OLANZAPINE 20 MG: 10 TABLET, FILM COATED ORAL at 20:16

## 2019-04-18 RX ADMIN — LOPERAMIDE HYDROCHLORIDE 2 MG: 2 CAPSULE ORAL at 20:16

## 2019-04-18 RX ADMIN — TOLTERODINE TARTRATE 2 MG: 2 CAPSULE, EXTENDED RELEASE ORAL at 08:36

## 2019-04-18 RX ADMIN — LOPERAMIDE HYDROCHLORIDE 2 MG: 2 CAPSULE ORAL at 00:00

## 2019-04-18 RX ADMIN — DIVALPROEX SODIUM 1000 MG: 500 TABLET, FILM COATED, EXTENDED RELEASE ORAL at 20:16

## 2019-04-18 RX ADMIN — OLANZAPINE 10 MG: 10 TABLET, FILM COATED ORAL at 08:36

## 2019-04-18 RX ADMIN — LOPERAMIDE HYDROCHLORIDE 2 MG: 2 CAPSULE ORAL at 09:06

## 2019-04-18 RX ADMIN — CALCIUM CARBONATE (ANTACID) CHEW TAB 500 MG 1000 MG: 500 CHEW TAB at 20:16

## 2019-04-18 ASSESSMENT — ACTIVITIES OF DAILY LIVING (ADL)
ORAL_HYGIENE: PROMPTS
HYGIENE/GROOMING: PROMPTS
LAUNDRY: UNABLE TO COMPLETE
DRESS: SCRUBS (BEHAVIORAL HEALTH)

## 2019-04-18 NOTE — PLAN OF CARE
Problem: Adult Behavioral Health Plan of Care  Goal: Team Discussion  Description  Team Plan:  4/18/2019 1520 by Nanette Milian  Outcome: Improving  Note:   BEHAVIORAL TEAM DISCUSSION    Participants: Dr. Castle, Social Workers, Nursing Staff and PA's  Progress: Improving   Continued Stay Criteria/Rationale: Until aftercare in place  Medical/Physical: N/A  Precautions: Behavioral Orders   Procedures    Code 1 - Restrict to Unit    Routine Programming     As clinically indicated    Status 15     Every 15 minutes.     Plan: Plan of care discussed with patient and treatment team. Continue hospitalization until stabilized with aftercare in place. Working on aftercare/placement. Working on obtaining county .  Rationale for change in precautions or plan: Aftercare

## 2019-04-18 NOTE — PLAN OF CARE
"Blunt affect, mood calm. Pt still reporting that stools are \"kind of loose\" but no incontinence noted, prn imodium given. Pt visble in lounge and various times during shift  watching TV and listening to headphones. Pt able to make her needs known. Still needs reminders about cleaning up after herself. Med compliant  "

## 2019-04-18 NOTE — PROGRESS NOTES
[facility] Psychiatric Progress Note       Interim History   The patient's care was discussed with the treatment team and chart notes were reviewed. According to attending hospital staff, the patient has been spending her time either sitting in the [facility] lounge or in her room. She has been medication compliant, cooperative in care, and respectful to staff. Patient seen on 4/18/19 by Dr. Castle. On interview, the patient appears more congruent in comparison to previous days. She was able to hold an appropriate conversation today, however was fixated on her discharge. Patient expressed her desire to be discharged before [facility], however this is not likely. We continue to be in contact with patient's family in regards of after care living arrangements.      Hospital Course   This is 53 year old single female with a psychiatric history of schizophrenia and polysubstance use disorder. She was initially presented to [facility] in a disorganized and confused state. Patient was cared for on Medicine and was then transferred to Psychiatry on 3/20/19 for medication management and stabilization. On 03/21/19, the patient's Zyprexa was increased to 10mg bid. When trying to speak with patient on 3/22, patient refused to wake up and talk with Dr. Castle. From this, there were  no medication adjustments made. Again, the patient did not wish to converse with Dr. Castle on 3/25 due to her expressing that she did not feel well physically. Contact precaution is still in place on 3/28 due to patient's wound still draining small amounts. Patient was unable to participate in 3/28 interview once again due to complaining of being in pain. Staff however noted that patient proceeded to be delusional in thought process. From this, Dr. Castle started the patient on Depakote 500mg daily in hopes to clear patient. Patient's Valproic Acid level read to be 56 on 4/02/19, thus Dr. Castle has increased Depakote dose from  500mg to 1,000mg at bedtime to continue to stabilize patient. On 4/12, the patient appeared much more coherent, her speech was less pressured and rambled. She however continues to be fixated on discharge date and living arrangements      Medications     Current Facility-Administered Medications Ordered in Epic   Medication Dose Route Frequency Last Rate Last Dose     acetaminophen (TYLENOL) tablet 650 mg  650 mg Oral Q4H PRN   650 mg at 03/31/19 1915     benzocaine-menthol (CHLORASEPTIC) 6-10 MG lozenge 1 lozenge  1 lozenge Buccal Q1H PRN   1 lozenge at 04/15/19 0549     calcium carbonate (TUMS) chewable tablet 1,000 mg  1,000 mg Oral Q4H PRN   1,000 mg at 04/11/19 0959     divalproex sodium extended-release (DEPAKOTE ER) 24 hr tablet 1,000 mg  1,000 mg Oral At Bedtime   1,000 mg at 04/17/19 2125     hydrOXYzine (ATARAX) tablet 25 mg  25 mg Oral Q4H PRN   25 mg at 04/16/19 2056     loperamide (IMODIUM) capsule 2 mg  2 mg Oral 4x Daily PRN   2 mg at 04/18/19 0906     melatonin tablet 1 mg  1 mg Oral At Bedtime PRN   1 mg at 04/16/19 2056     miconazole (MICATIN/MICRO GUARD) 2 % powder   Topical Q1H PRN         OLANZapine (zyPREXA) tablet 10 mg  10 mg Oral Daily   10 mg at 04/18/19 0836     OLANZapine (zyPREXA) tablet 20 mg  20 mg Oral At Bedtime   20 mg at 04/17/19 2125     OLANZapine zydis (zyPREXA) ODT tab 5-10 mg  5-10 mg Oral Q4H PRN   10 mg at 04/16/19 2247     omeprazole (priLOSEC) CR capsule 20 mg  20 mg Oral Daily PRN   20 mg at 04/16/19 1624     ondansetron (ZOFRAN-ODT) ODT tab 4 mg  4 mg Oral Q6H PRN   4 mg at 04/16/19 1624     tolterodine ER (DETROL LA) 24 hr capsule 2 mg  2 mg Oral Daily   2 mg at 04/18/19 0836     No current [facility]-ordered outpatient medications on file.         Allergies      Allergies   Allergen Reactions     Tetracycline         Medical Review of Systems     /67   Pulse 104   Temp 98  F (36.7  C) (Oral)   Resp 16   Wt 82.1 kg (181 lb 1.6 oz)   SpO2 98%   BMI 30.14 kg/m     Body mass index is 30.14 kg/m .  A 10-point review of systems was performed by Timi Castle MD and is negative, no new findings.      Psychiatric Examination     Appearance Sitting in chair, dressed in hospital scrubs. Appears stated age.   Attitude Cooperative   Orientation Oriented to person, place, time   Eye Contact Fair    Speech Regular rate, rhythm, volume and tone   Language Normal   Psychomotor Behavior Normal   Mood Less disorganized   Affect More coherent     Thought Process Goal-Oriented, Intact   Associations Intact   Thought Content Patient is currently negative for suicidal ideation, negative for plan or intent, able to contract no self harm and identify barriers to suicide. Positive for obsessions, compulsions or psychosis.     Fund of Knowledge Impaired   Insight Impaired   Judgement Impaired   Attention Span & Concentration Poor   Recent & Remote Memory Intact   Gait Unsteady    Muscle Tone Intact        Labs     Labs reviewed.  No results found for this or any previous visit (from the past 24 hour(s)).     Impression   This is 53 year old single female with a psychiatric history of schizophrenia and polysubstance use disorder. Per attending nursing and PA staff members, the patient has been pleasant, cooperative in care, and medication compliant. Today while discussing with Dr. Castle, the patient did appear far more congruent in comparison to admission. She was able to hold a coherent conversation, however proceeded to be fixated on the idea of her discharging. Patient believes she needs to be discharged before [facility], however this does not deem appropriate due to not having any aftercare plans made as of right now.  continues to be in contact with patient's family members, particularly patient's sister, in regards of living arrangements.      Diagnoses   1. Paranoid schizophrenia  2. Polysubstance use disorder  3. Cognitive disorder     Plan     1. Explained side  effects, benefits, and complications of medications to the patient, Pt gave verbal consent.  2. Medication changes: None    3. Discussed treatment plan with patient and team.  4. Projected length of stay: 7+ days  5. Patient care order has been made: wound care is to be left in patient's room     Attestation:   Patient has been seen and evaluated by me, Timi Castle MD.    Patient ID:  Name: Anastasiya Simon    MRN: [id number]  Admission: 3/20/2019   YOB: 1965

## 2019-04-18 NOTE — PLAN OF CARE
Problem: Depression  Goal: Improved Mood  4/17/2019 2148 by Arthur Coles  Outcome: No Change  Note:   Pt was alert and oriented within the ITC. Pt presents with a blunt affect and calm mood. Pt spent the majority of the shift in the lounge watching tv. Pt was minimally social with staff and peers, cordial and appropriate upon interaction. Pt was tasked with being more aware of the mess she makes and to clean up after herself, including in her room. Pt ate all of her food and was med compliant.

## 2019-04-19 PROCEDURE — A9270 NON-COVERED ITEM OR SERVICE: HCPCS | Performed by: PSYCHIATRY & NEUROLOGY

## 2019-04-19 PROCEDURE — 12400000 ZZH R&B MH

## 2019-04-19 PROCEDURE — 25000132 ZZH RX MED GY IP 250 OP 250 PS 637: Performed by: PSYCHIATRY & NEUROLOGY

## 2019-04-19 RX ADMIN — TOLTERODINE TARTRATE 2 MG: 2 CAPSULE, EXTENDED RELEASE ORAL at 07:34

## 2019-04-19 RX ADMIN — OMEPRAZOLE 20 MG: 20 CAPSULE, DELAYED RELEASE ORAL at 16:28

## 2019-04-19 RX ADMIN — OLANZAPINE 10 MG: 10 TABLET, FILM COATED ORAL at 07:34

## 2019-04-19 RX ADMIN — DIVALPROEX SODIUM 1000 MG: 500 TABLET, FILM COATED, EXTENDED RELEASE ORAL at 20:45

## 2019-04-19 RX ADMIN — OLANZAPINE 20 MG: 10 TABLET, FILM COATED ORAL at 20:45

## 2019-04-19 ASSESSMENT — ACTIVITIES OF DAILY LIVING (ADL)
LAUNDRY: WITH SUPERVISION
ORAL_HYGIENE: INDEPENDENT
HYGIENE/GROOMING: INDEPENDENT
DRESS: SCRUBS (BEHAVIORAL HEALTH);INDEPENDENT

## 2019-04-19 NOTE — PLAN OF CARE
PT MOVED TO CHAIR FOR DINNER. PT HAS SOME SWELLING IN HIS RIGHT WRIST, CUT OFF
BAND AND WILL REPLACE. PT DENIES PAIN OR FEELING OF SWELLING IN HIS HAND OR
WRIST. STATES IT FEELS NORMAL. ADMINSITERED LIPITOR. Patient pleasant and cooperative. Spent some time in lounge hanging out, painting.

## 2019-04-19 NOTE — PLAN OF CARE
Pt has been present and pleasant in the lounge throughout the day shift. Presents an anxious but full range affect and has been watching TV or staying occupied with drawings. Pt is respectful to peers and staff. Med compliant. No shower; untidy and neglected grooming. Another peer came out of their room around 1245 and took some of her drawing pencils and they were yelling at each other, but the situation was revolved and the pencils were returned to her after talking with the other individual.

## 2019-04-19 NOTE — PLAN OF CARE
Pt is visible in the unit watching TV and painting  with Blunt affect , calm mood. Pt requested for imodium x 1. Pt is able to make her needs. Pt denies SI and participated in the wound care done this shift. Med compliant.

## 2019-04-20 PROCEDURE — 12400000 ZZH R&B MH

## 2019-04-20 PROCEDURE — 25000132 ZZH RX MED GY IP 250 OP 250 PS 637: Performed by: PSYCHIATRY & NEUROLOGY

## 2019-04-20 PROCEDURE — A9270 NON-COVERED ITEM OR SERVICE: HCPCS | Performed by: PSYCHIATRY & NEUROLOGY

## 2019-04-20 RX ADMIN — Medication 1 MG: at 20:26

## 2019-04-20 RX ADMIN — TOLTERODINE TARTRATE 2 MG: 2 CAPSULE, EXTENDED RELEASE ORAL at 08:23

## 2019-04-20 RX ADMIN — DIVALPROEX SODIUM 1000 MG: 500 TABLET, FILM COATED, EXTENDED RELEASE ORAL at 20:25

## 2019-04-20 RX ADMIN — LOPERAMIDE HYDROCHLORIDE 2 MG: 2 CAPSULE ORAL at 09:06

## 2019-04-20 RX ADMIN — OLANZAPINE 10 MG: 10 TABLET, FILM COATED ORAL at 08:23

## 2019-04-20 RX ADMIN — OLANZAPINE 20 MG: 10 TABLET, FILM COATED ORAL at 20:25

## 2019-04-20 NOTE — PLAN OF CARE
Pt presents as disheveled, hair unkempt, disorganized, flat in affect, calm in mood. Pt spent time in her room bed resting and in the lounge watching tv. She socialized when prompted. Reports she is doing okay. Pt is eating and taking fluids adequately.

## 2019-04-20 NOTE — PLAN OF CARE
Disheveled, unkept, disorganized, need reassurance to do activities of daily care, she is complaint with medications.  Changed her own dressing with nurses guidance, wound is getting smaller, small amount of serous sanguineous drainage.    A care plan was written for patient.

## 2019-04-21 PROCEDURE — A9270 NON-COVERED ITEM OR SERVICE: HCPCS | Performed by: PSYCHIATRY & NEUROLOGY

## 2019-04-21 PROCEDURE — 12400000 ZZH R&B MH

## 2019-04-21 PROCEDURE — 25000132 ZZH RX MED GY IP 250 OP 250 PS 637: Performed by: PSYCHIATRY & NEUROLOGY

## 2019-04-21 RX ADMIN — HYDROXYZINE HYDROCHLORIDE 25 MG: 25 TABLET, FILM COATED ORAL at 22:47

## 2019-04-21 RX ADMIN — Medication 1 MG: at 20:38

## 2019-04-21 RX ADMIN — OMEPRAZOLE 20 MG: 20 CAPSULE, DELAYED RELEASE ORAL at 16:06

## 2019-04-21 RX ADMIN — OLANZAPINE 20 MG: 10 TABLET, FILM COATED ORAL at 20:38

## 2019-04-21 RX ADMIN — OLANZAPINE 10 MG: 10 TABLET, FILM COATED ORAL at 08:52

## 2019-04-21 RX ADMIN — DIVALPROEX SODIUM 1000 MG: 500 TABLET, FILM COATED, EXTENDED RELEASE ORAL at 20:38

## 2019-04-21 RX ADMIN — LOPERAMIDE HYDROCHLORIDE 2 MG: 2 CAPSULE ORAL at 10:50

## 2019-04-21 RX ADMIN — TOLTERODINE TARTRATE 2 MG: 2 CAPSULE, EXTENDED RELEASE ORAL at 08:52

## 2019-04-21 RX ADMIN — OLANZAPINE 5 MG: 5 TABLET, ORALLY DISINTEGRATING ORAL at 22:47

## 2019-04-21 NOTE — PLAN OF CARE
Pt presents flat and calm on unit spending half of shift in lounge and half of shift in bed resting. Pt expresses desire to go live with her friend and get out of here. Care plan was put in place for patient to clean room, shower, and do her own dressing change with assistance of nurse daily. Patient agreed to follow this plan of care. Denies any SI or hallucinations.

## 2019-04-21 NOTE — PROGRESS NOTES
Irritable at times, wants to get out of here. She cleaned her room, changed her scrubs and did her own treatment on her leg.  Wound is slowly healing, getting better ,small amount of serosanguineous drainage.  Patient needs to be encourage to follow her care plan.

## 2019-04-22 PROCEDURE — A9270 NON-COVERED ITEM OR SERVICE: HCPCS | Performed by: PSYCHIATRY & NEUROLOGY

## 2019-04-22 PROCEDURE — 12400000 ZZH R&B MH

## 2019-04-22 PROCEDURE — 25000132 ZZH RX MED GY IP 250 OP 250 PS 637: Performed by: PSYCHIATRY & NEUROLOGY

## 2019-04-22 RX ADMIN — DIVALPROEX SODIUM 1000 MG: 500 TABLET, FILM COATED, EXTENDED RELEASE ORAL at 20:46

## 2019-04-22 RX ADMIN — OMEPRAZOLE 20 MG: 20 CAPSULE, DELAYED RELEASE ORAL at 08:13

## 2019-04-22 RX ADMIN — OLANZAPINE 20 MG: 10 TABLET, FILM COATED ORAL at 20:46

## 2019-04-22 RX ADMIN — TOLTERODINE TARTRATE 2 MG: 2 CAPSULE, EXTENDED RELEASE ORAL at 08:13

## 2019-04-22 RX ADMIN — Medication 1 MG: at 20:47

## 2019-04-22 RX ADMIN — OLANZAPINE 10 MG: 10 TABLET, FILM COATED ORAL at 08:13

## 2019-04-22 ASSESSMENT — ACTIVITIES OF DAILY LIVING (ADL)
DRESS: SCRUBS (BEHAVIORAL HEALTH)
DRESS: INDEPENDENT;PROMPTS
ORAL_HYGIENE: INDEPENDENT
HYGIENE/GROOMING: INDEPENDENT
HYGIENE/GROOMING: INDEPENDENT;PROMPTS
LAUNDRY: WITH SUPERVISION
ORAL_HYGIENE: INDEPENDENT;PROMPTS
LAUNDRY: WITH SUPERVISION

## 2019-04-22 NOTE — PROGRESS NOTES
Spoke with patient's sister, Tamika Johnson.- [phone]. She and her sisters discussed situation and said their plan is to provide an extended stay motel for patient until such time as case management services can be identified. None are willing to take er in due to her long history of noncompliance with rules, self care, use of drugs. Discussed that she should continue to reside in [facility] to keep her residence there. They will set up hotel and be prepared for Thursday discharge. Will confer with Dr. Castle tomorrow about services needed with a view to patient's history of non-compliance. Will then communicate plan to Tamika.

## 2019-04-22 NOTE — PLAN OF CARE
Problem: Adult Behavioral Health Plan of Care  Goal: Team Discussion  Description  Team Plan:  Outcome: No Change  Note:   BEHAVIORAL TEAM DISCUSSION    Participants: Dr. Castle, Social Workers, Nursing Staff and PA's  Progress: No change  Continued Stay Criteria/Rationale: Until stabilized with aftercare in place  Medical/Physical: N/A  Precautions:   Behavioral Orders   Procedures    Code 1 - Restrict to Unit    Routine Programming     As clinically indicated    Status 15     Every 15 minutes.     Plan: Plan of care discussed with patient and treatment team. Continue hospitalization until stabilized with aftercare in place. Encourage independence with dressing change/cleaning.   Rationale for change in precautions or plan: N/A

## 2019-04-22 NOTE — PLAN OF CARE
Pt presents calm but irritable on unit spending time in lounge and bed resting. Pt had visitors late evening and was overheard by staff that she wanted to leave. Pt has not been compliant with care plan as she has not showered and would not shower on either shift. Denies SI.

## 2019-04-22 NOTE — PLAN OF CARE
Pt spent most of the shift in her room napping/resting and keeps asking why she is discharging today. Pt participated in wound care although not interested in infection control precautions during wound care. Wound care is progressing appropriately. Pt is planned for possible discharge on Thursday to a [facility] while they look for long term placement. SW discuss with Sister Tamika MILAN and no one wants to her to their house because she is very non compliant with medications and likes doing drugs. Pt wants to remain within the [facility] when finally discharge.

## 2019-04-23 PROCEDURE — A9270 NON-COVERED ITEM OR SERVICE: HCPCS | Performed by: PSYCHIATRY & NEUROLOGY

## 2019-04-23 PROCEDURE — G0463 HOSPITAL OUTPT CLINIC VISIT: HCPCS

## 2019-04-23 PROCEDURE — 12400000 ZZH R&B MH

## 2019-04-23 PROCEDURE — 25000132 ZZH RX MED GY IP 250 OP 250 PS 637: Performed by: PSYCHIATRY & NEUROLOGY

## 2019-04-23 RX ADMIN — TOLTERODINE TARTRATE 2 MG: 2 CAPSULE, EXTENDED RELEASE ORAL at 08:43

## 2019-04-23 RX ADMIN — OLANZAPINE 20 MG: 10 TABLET, FILM COATED ORAL at 21:08

## 2019-04-23 RX ADMIN — OLANZAPINE 10 MG: 10 TABLET, FILM COATED ORAL at 08:43

## 2019-04-23 RX ADMIN — DIVALPROEX SODIUM 1000 MG: 500 TABLET, FILM COATED, EXTENDED RELEASE ORAL at 21:08

## 2019-04-23 RX ADMIN — Medication 1 MG: at 21:08

## 2019-04-23 ASSESSMENT — ACTIVITIES OF DAILY LIVING (ADL)
ORAL_HYGIENE: INDEPENDENT;PROMPTS
DRESS: INDEPENDENT;PROMPTS
HYGIENE/GROOMING: INDEPENDENT;PROMPTS
LAUNDRY: WITH SUPERVISION

## 2019-04-23 NOTE — PROGRESS NOTES
[facility] Psychiatric Progress Note       Interim History   The patient's care was discussed with the treatment team and chart notes were reviewed. According to attending hospital staff members, the patient proceeds to be anxious in regards of discharge plans and date. Patient has been able to participate in wound care, however is not interested in infection control precautions during care. In regards of patients aftercare plans, our  has been in contact with [facility] case management in regards of intake. They have reported expected 4-5 weeks for intake to occur. In addition to this, family members have declared their unwillingness to take patient in after hospital stay due to patients history of non-compliance with medications and her drug abuse.      Hospital Course   This is 53 year old single female with a psychiatric history of schizophrenia and polysubstance use disorder. She was initially presented to [facility] in a disorganized and confused state. Patient was cared for on Medicine and was then transferred to Psychiatry on 3/20/19 for medication management and stabilization. On 03/21/19, the patient's Zyprexa was increased to 10mg bid. When trying to speak with patient on 3/22, patient refused to wake up and talk with Dr. Castle. From this, there were  no medication adjustments made. Again, the patient did not wish to converse with Dr. Castle on 3/25 due to her expressing that she did not feel well physically. Contact precaution is still in place on 3/28 due to patient's wound still draining small amounts. Patient was unable to participate in 3/28 interview once again due to complaining of being in pain. Staff however noted that patient proceeded to be delusional in thought process. From this, Dr. Castle started the patient on Depakote 500mg daily in hopes to clear patient. Patient's Valproic Acid level read to be 56 on 4/02/19, thus Dr. Castle has  increased Depakote dose from 500mg to 1,000mg at bedtime to continue to stabilize patient. On 4/12, the patient appeared much more coherent, her speech was less pressured and rambled. She however continues to be fixated on discharge date and living arrangements      Medications     Current Facility-Administered Medications Ordered in Epic   Medication Dose Route Frequency Last Rate Last Dose     acetaminophen (TYLENOL) tablet 650 mg  650 mg Oral Q4H PRN   650 mg at 03/31/19 1915     benzocaine-menthol (CHLORASEPTIC) 6-10 MG lozenge 1 lozenge  1 lozenge Buccal Q1H PRN   1 lozenge at 04/15/19 0549     calcium carbonate (TUMS) chewable tablet 1,000 mg  1,000 mg Oral Q4H PRN   1,000 mg at 04/18/19 2016     divalproex sodium extended-release (DEPAKOTE ER) 24 hr tablet 1,000 mg  1,000 mg Oral At Bedtime   1,000 mg at 04/22/19 2046     hydrOXYzine (ATARAX) tablet 25 mg  25 mg Oral Q4H PRN   25 mg at 04/21/19 2247     loperamide (IMODIUM) capsule 2 mg  2 mg Oral 4x Daily PRN   2 mg at 04/21/19 1050     melatonin tablet 1 mg  1 mg Oral At Bedtime PRN   1 mg at 04/22/19 2047     miconazole (MICATIN/MICRO GUARD) 2 % powder   Topical Q1H PRN         OLANZapine (zyPREXA) tablet 10 mg  10 mg Oral Daily   10 mg at 04/23/19 0843     OLANZapine (zyPREXA) tablet 20 mg  20 mg Oral At Bedtime   20 mg at 04/22/19 2046     OLANZapine zydis (zyPREXA) ODT tab 5-10 mg  5-10 mg Oral Q4H PRN   5 mg at 04/21/19 2247     omeprazole (priLOSEC) CR capsule 20 mg  20 mg Oral Daily PRN   20 mg at 04/22/19 0813     ondansetron (ZOFRAN-ODT) ODT tab 4 mg  4 mg Oral Q6H PRN   4 mg at 04/16/19 1624     tolterodine ER (DETROL LA) 24 hr capsule 2 mg  2 mg Oral Daily   2 mg at 04/23/19 0843     No current [facility]-ordered outpatient medications on file.         Allergies      Allergies   Allergen Reactions     Tetracycline         Medical Review of Systems     /78   Pulse 98   Temp 97.2  F (36.2  C) (Oral)   Resp 16   Wt 83.7 kg (184 lb 8 oz)   SpO2  98%   BMI 30.70 kg/m    Body mass index is 30.7 kg/m .  A 10-point review of systems was performed by Timi Castle MD and is negative, no new findings.      Psychiatric Examination     Appearance Sitting in chair, dressed in hospital scrubs. Appears stated age.   Attitude Cooperative   Orientation Oriented to person, place, time   Eye Contact Fair    Speech Regular rate, rhythm, volume and tone   Language Normal   Psychomotor Behavior Normal   Mood Less disorganized   Affect More coherent     Thought Process Goal-Oriented, Intact   Associations Intact   Thought Content Patient is currently negative for suicidal ideation, negative for plan or intent, able to contract no self harm and identify barriers to suicide. Positive for obsessions, compulsions or psychosis.     Fund of Knowledge Impaired   Insight Impaired   Judgement Impaired   Attention Span & Concentration Poor   Recent & Remote Memory Intact   Gait Unsteady    Muscle Tone Intact        Labs     Labs reviewed.  No results found for this or any previous visit (from the past 24 hour(s)).     Impression   This is 53 year old single female with a psychiatric history of schizophrenia and polysubstance use disorder. In regards of patients aftercare plans, our  has been in contact with [facility] case management in regards of intake status. They have reported expected 4-5 weeks for intake to occur. In addition to this, family members have declared their unwillingness to take patient in after hospital stay due to patients history of non-compliance with medications and her drug abuse.      Diagnoses   1. Paranoid schizophrenia  2. Polysubstance use disorder  3. Cognitive disorder     Plan     1. Explained side effects, benefits, and complications of medications to the patient, Pt gave verbal consent.  2. Medication changes: None    3. Discussed treatment plan with patient and team.  4. Projected length of stay: 7+  days  5. Patient care order has been made: wound care is to be left in patient's room     Attestation:   Patient has been seen and evaluated by me, Timi Castle MD.    Patient ID:  Name: Anastasiya Simon    MRN: [id number]  Admission: 3/20/2019   YOB: 1965

## 2019-04-23 NOTE — PLAN OF CARE
Pt divided her time between the lounge and her room. Pt has not been social today keeping to herself. Pt is med compliant. [facility] Nurse saw pt today and did dressing change with pt. Dressing changes are daily until discharge then pt can do dressing change every other day. Pt is med compliant. Pt is refusing to have hair cut prefers to keep the dread locks.

## 2019-04-23 NOTE — PROGRESS NOTES
Spoke with patient's sister. They are having difficulty finding an extended stay place in [place] that is reasonable- also looking for a hotel without a pool, since patient would likely go to the pool with her wounds. They will try for hotel in [facility] to keep residence consistent and will be looking for apartment to rent furnished. Ruby bullard would like home care for wound rather than appointment if possible. She is willing to bring patient to a psychiatric appointment- hoping patient will comply. Tamika will call with address when a place is secured.    [facility] in [place] - room has been secured according to Tamika. She will be looking for an apartment from there. Told her medication management has been scheduled at [facility] and [facility] in [place]. Will work on a home care visit for wound care through [facility].   Home care cannot see patient until she establishes a primary care provider outpatient (as in completes an appointment there). [facility] R.N. will review wound care with patient's sister prior to patient discharging. Home Care said that once seen in primary care, care coordinator could involve home care if necessary.   Primary care appointment set up at [facility] in [place] to connect with a provider who can provide ongoing care.
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
## 2019-04-23 NOTE — PLAN OF CARE
Pt has present on the unit. Pt has been following care plan completed ADLs with prompts. Pt took shower dressing changed. Pt anxious about when she is going to DC. Pt encouraged to stay focused on daily tasks and that she is able to take care of herself. Pt is pleasant and cooperative. Plan to discharge Thursday to hotel or friend.

## 2019-04-24 PROCEDURE — A9270 NON-COVERED ITEM OR SERVICE: HCPCS | Performed by: PSYCHIATRY & NEUROLOGY

## 2019-04-24 PROCEDURE — 25000132 ZZH RX MED GY IP 250 OP 250 PS 637: Performed by: PSYCHIATRY & NEUROLOGY

## 2019-04-24 PROCEDURE — 12400000 ZZH R&B MH

## 2019-04-24 RX ADMIN — OLANZAPINE 10 MG: 10 TABLET, FILM COATED ORAL at 08:40

## 2019-04-24 RX ADMIN — Medication 1 LOZENGE: at 11:23

## 2019-04-24 RX ADMIN — DIVALPROEX SODIUM 1000 MG: 500 TABLET, FILM COATED, EXTENDED RELEASE ORAL at 20:32

## 2019-04-24 RX ADMIN — OMEPRAZOLE 20 MG: 20 CAPSULE, DELAYED RELEASE ORAL at 19:12

## 2019-04-24 RX ADMIN — OLANZAPINE 20 MG: 10 TABLET, FILM COATED ORAL at 20:32

## 2019-04-24 RX ADMIN — CALCIUM CARBONATE (ANTACID) CHEW TAB 500 MG 1000 MG: 500 CHEW TAB at 16:53

## 2019-04-24 RX ADMIN — TOLTERODINE TARTRATE 2 MG: 2 CAPSULE, EXTENDED RELEASE ORAL at 08:40

## 2019-04-24 ASSESSMENT — ACTIVITIES OF DAILY LIVING (ADL)
HYGIENE/GROOMING: PROMPTS;INDEPENDENT
LAUNDRY: WITH SUPERVISION
LAUNDRY: WITH SUPERVISION
HYGIENE/GROOMING: INDEPENDENT
ORAL_HYGIENE: INDEPENDENT
ORAL_HYGIENE: INDEPENDENT;PROMPTS
DRESS: STREET CLOTHES;SCRUBS (BEHAVIORAL HEALTH);INDEPENDENT;PROMPTS

## 2019-04-24 NOTE — PLAN OF CARE
Pt has been presents on the unit out in the lounge watching TV. Pt remains anxious and speech is pressured. Pt still lacks insight and is not sure about her wound care despite nursing education provided and staff reinforcing treatment plan. Pt has some disorganized thinking. Pt encouraged to complete ALDs and follow daily care plan. Plan to find home care to manage wound and discharge to hotel.

## 2019-04-24 NOTE — PLAN OF CARE
Pt has been cooperative with staff, spent much of the morning resting in her room.  Accepting of medications.  Needing some prompting to complete her menu and encourage ADLs.  Later in the shift she did shower and complete WOC care independently.  Watched a film in the [facility] lounge this afternoon.

## 2019-04-25 VITALS
RESPIRATION RATE: 16 BRPM | BODY MASS INDEX: 30.7 KG/M2 | SYSTOLIC BLOOD PRESSURE: 112 MMHG | OXYGEN SATURATION: 98 % | DIASTOLIC BLOOD PRESSURE: 68 MMHG | WEIGHT: 184.5 LBS | HEART RATE: 99 BPM | TEMPERATURE: 97.7 F

## 2019-04-25 PROCEDURE — A9270 NON-COVERED ITEM OR SERVICE: HCPCS | Performed by: PSYCHIATRY & NEUROLOGY

## 2019-04-25 PROCEDURE — 25000132 ZZH RX MED GY IP 250 OP 250 PS 637: Performed by: PSYCHIATRY & NEUROLOGY

## 2019-04-25 RX ORDER — LOPERAMIDE HCL 2 MG
2 CAPSULE ORAL 4 TIMES DAILY PRN
Qty: 7 CAPSULE | Refills: 0 | Status: SHIPPED | OUTPATIENT
Start: 2019-04-25 | End: 2019-05-30

## 2019-04-25 RX ORDER — OLANZAPINE 20 MG/1
20 TABLET ORAL AT BEDTIME
Qty: 60 TABLET | Refills: 0 | Status: SHIPPED | OUTPATIENT
Start: 2019-04-25 | End: 2019-07-03

## 2019-04-25 RX ORDER — OXYBUTYNIN CHLORIDE 5 MG/1
5 TABLET, EXTENDED RELEASE ORAL AT BEDTIME
Qty: 7 TABLET | Refills: 0 | Status: SHIPPED | OUTPATIENT
Start: 2019-04-25 | End: 2019-05-30

## 2019-04-25 RX ORDER — TOLTERODINE TARTRATE 1 MG/1
1 TABLET, EXTENDED RELEASE ORAL 2 TIMES DAILY
Status: DISCONTINUED | OUTPATIENT
Start: 2019-04-25 | End: 2019-04-25

## 2019-04-25 RX ORDER — OXYBUTYNIN CHLORIDE 5 MG/1
5 TABLET, EXTENDED RELEASE ORAL AT BEDTIME
Status: DISCONTINUED | OUTPATIENT
Start: 2019-04-25 | End: 2019-04-25 | Stop reason: HOSPADM

## 2019-04-25 RX ORDER — DIVALPROEX SODIUM 500 MG/1
1000 TABLET, EXTENDED RELEASE ORAL AT BEDTIME
Qty: 120 TABLET | Refills: 0 | Status: SHIPPED | OUTPATIENT
Start: 2019-04-25 | End: 2019-07-03

## 2019-04-25 RX ORDER — TOLTERODINE 2 MG/1
2 CAPSULE, EXTENDED RELEASE ORAL DAILY
Qty: 7 CAPSULE | Refills: 0 | Status: SHIPPED | OUTPATIENT
Start: 2019-04-26 | End: 2019-04-25

## 2019-04-25 RX ORDER — TOLTERODINE TARTRATE 1 MG/1
1 TABLET, EXTENDED RELEASE ORAL 2 TIMES DAILY
Qty: 14 TABLET | Refills: 0 | Status: SHIPPED | OUTPATIENT
Start: 2019-04-25 | End: 2019-04-25

## 2019-04-25 RX ORDER — OLANZAPINE 10 MG/1
10 TABLET ORAL DAILY
Qty: 60 TABLET | Refills: 0 | Status: SHIPPED | OUTPATIENT
Start: 2019-04-26 | End: 2019-07-03

## 2019-04-25 RX ADMIN — TOLTERODINE TARTRATE 2 MG: 2 CAPSULE, EXTENDED RELEASE ORAL at 08:32

## 2019-04-25 RX ADMIN — OLANZAPINE 10 MG: 10 TABLET, FILM COATED ORAL at 08:32

## 2019-04-25 NOTE — PLAN OF CARE
Problem: General Plan of Care (Inpatient Behavioral)  Goal: Team Discussion  Description  Team Plan:   4/25/2019 1628 by Arthur Coles  Outcome: Adequate for Discharge  Note:   BEHAVIORAL TEAM DISCUSSION    Participants: Dr. Castle, , Nursing staff and PA's  Progress: Pt has returned to baseline  Continued Stay Criteria/Rationale: Pt is discharging today  Medical/Physical: N/A  Precautions:   Behavioral Orders   Procedures    Code 1 - Restrict to Unit    Routine Programming     As clinically indicated    Status 15     Every 15 minutes.     Plan: Pt was given medication information and gave verbal consent. Plan of care was discussed with patient and team. Pt will be discharging today to [facility].  Rationale for change in precautions or plan: Pt is adequate for discharge.

## 2019-04-25 NOTE — PLAN OF CARE
Reviewed AVS/discharge medications with patient and her sister. Demonstrated wound care and went over supplies and instructions. Pt and sister verbalized understanding of discharge plan. Denies SI or self harm intent. Patient discharged with sister.

## 2019-04-25 NOTE — DISCHARGE SUMMARY
[facility] Psychiatric Discharge Summary      DATE OF ADMISSION: 3/20/2019     DATE OF DISCHARGE: 4/25/19    PRIMARY CARE PHYSICIAN: No Ref-Primary, Physician    IDENTIFICATION: For history, see dictation by Dr. Castle on 3/17/19. For physical summary, see dictation by Cirilo French MD on 3/16/19.     HOSPITAL COURSE:   This is 53 year old single female with a psychiatric history of schizophrenia and polysubstance use disorder. She was initially presented to [facility] on 3/16/19 in a disorganized and confused state. Patient was cared for on the Medicine Unit for some time and was then transferred to Psychiatry on 3/20/19 for medication management and further stabilization. While on Station 77, the patient refused to speak to Dr. Iyre for the few 4-5 days. From this, patient was started on the medication Zyprexa. She was started on 10mg BID in attempts to help clear patient from her delusional state, however this medication was needed to be increased to 10mg daily and 20mg at bedtime. Patient was needed to be on strict contact precaution while on Station 77 due to her wound on her leg which had been draining. Due to patient continued delusional state even with Zyprexa, Dr. Castle started the patient on Depakote 500mg daily in hopes to clear this apparent delusion. Her Valproic Acid level read to be 56 on 4/02/19, thus Dr. Castle had increased Depakote dose from 500mg to 1,000mg at bedtime to continue to stabilize patient. Finally, on 4/12, the patient appeared much more coherent, with her speech much less pressured and much less rambled. Our  had been in contact with patients family members throughout patients stay, which was a long process. It was finally finalized by family members that they were not willing to take patient into their homes after patients hospital stay due to patients history of non-compliance with medications and her drug abuse. Our   had also tried to arrange with [facility] case management multiple times throughout patients hospital stay. They finally were able to report that patient should expect 4-5 weeks for intake to occur.       DISCHARGE MENTAL STATUS EXAMINATION:  Appearance Sitting in chair, dressed in hospital scrubs. Appears stated age.   Attitude Cooperative   Orientation Oriented to person, place, time   Eye Contact Fair    Speech Regular rate, rhythm, volume and tone   Language Normal   Psychomotor Behavior Normal   Mood Less disorganized   Affect More coherent     Thought Process Goal-Oriented, Intact   Associations Intact   Thought Content Patient is currently negative for suicidal ideation, negative for plan or intent, able to contract no self harm and identify barriers to suicide. Positive for obsessions, compulsions or psychosis.     Fund of Knowledge Impaired   Insight Impaired   Judgement Impaired   Attention Span & Concentration Poor   Recent & Remote Memory Intact   Gait Unsteady    Muscle Tone Intact       LABORATORY DATA:    Refer to hospitalist admission dictation.  No results found for this or any previous visit (from the past 24 hour(s)).     /68   Pulse 99   Temp 97.7  F (36.5  C) (Oral)   Resp 16   Wt 83.7 kg (184 lb 8 oz)   SpO2 98%   BMI 30.70 kg/m       DISCHARGE MEDICATIONS:      Review of your medicines      START taking      Dose / Directions   divalproex sodium extended-release 500 MG 24 hr tablet  Commonly known as:  DEPAKOTE ER      Dose:  1000 mg  Take 2 tablets (1,000 mg) by mouth At Bedtime  Quantity:  120 tablet  Refills:  0     loperamide 2 MG capsule  Commonly known as:  IMODIUM      Dose:  2 mg  Take 1 capsule (2 mg) by mouth 4 times daily as needed for diarrhea  Quantity:  7 capsule  Refills:  0     * OLANZapine 20 MG tablet  Commonly known as:  zyPREXA      Dose:  20 mg  Take 1 tablet (20 mg) by mouth At Bedtime  Quantity:  60 tablet  Refills:  0     * OLANZapine 10 MG  tablet  Commonly known as:  zyPREXA      Dose:  10 mg  Start taking on:  4/26/2019  Take 1 tablet (10 mg) by mouth daily  Quantity:  60 tablet  Refills:  0         * This list has 2 medication(s) that are the same as other medications prescribed for you. Read the directions carefully, and ask your doctor or other care provider to review them with you.            CONTINUE these medicines which have NOT CHANGED      Dose / Directions   omeprazole 10 MG DR capsule  Commonly known as:  priLOSEC      Dose:  20 mg  Take 20 mg by mouth daily as needed  Refills:  0     tolterodine ER 2 MG 24 hr capsule  Commonly known as:  DETROL LA      Dose:  2 mg  Start taking on:  4/26/2019  Take 1 capsule (2 mg) by mouth daily for 7 days  Quantity:  7 capsule  Refills:  0        STOP taking    acetaminophen 325 MG tablet  Commonly known as:  TYLENOL        clindamycin 300 MG capsule  Commonly known as:  CLEOCIN              Where to get your medicines      These medications were sent to [facility] [place] - [address] [address]  [address] [address] [address]    Phone:  [phone]     divalproex sodium extended-release 500 MG 24 hr tablet    loperamide 2 MG capsule    OLANZapine 10 MG tablet    OLANZapine 20 MG tablet    tolterodine ER 2 MG 24 hr capsule         DISCHARGE DIAGNOSES:  1. Paranoid schizophrenia  2. Polysubstance use disorder  3. Cognitive disorder    DISCHARGE PLAN:   1. Continue with current medication regiment: Zyprexa 10mg daily, Zyprexa 20mg at bedtime, Depakote 1,000mg daily  2. Patient will discharge to the [facility] in [place], [place]  3. Patient will follow-up with LORENZO Arguelles on Wednesday (5/29/19) at [facility]   4. For on-going medical care, patient will be seen by Dr. Tiwari on Monday (4/29/19) through [facility]     DISCHARGE FOLLOW-UP:  See Reconciliation Note     Attestation:   Patient has been seen and evaluated by me, Timi  CLAUDIA Castle MD.    Patient ID:    Name: Anastasiya Simon MRN: [id number]  Admission: 3/20/2019  YOB: 1965

## 2019-04-25 NOTE — PLAN OF CARE
Patient has been complaining of acid reflux. Requested tums, prilosec, and milk. She presents as withdrawn and isolative. She has not been social. Went to bed early.

## 2019-04-25 NOTE — DISCHARGE INSTRUCTIONS
Behavioral Discharge Planning and Instructions     Summary: Admitted to hospital with psychosis, polysubstance use and wounds with MRSA    Main Diagnosis: Paranoid schizophrenia;Polysustance use disorder;Cognitive disorder     Major Treatments, Procedures and Findings: Psychiatric assessment;medication adjustment; wound care    Symptoms to Report: feeling more aggressive, increased confusion,  mood getting worse, craving use of drugs.    Lifestyle Adjustment: Take medications as prescribed; attend medical and mental health appointments and follow recommendations. Sober lifestyle essential to your well-being. Follow through with case management referral for housing and other resources.    Psychiatry Follow-up:     Patient to discharge  to:  [facility]  [address]  [place], [place]  [phone]  Family will be searching for apartment for patient to transition into.     Case management referral made to [facility]. They have patient on a wait list for intake. Phone # to check on status is . Referral made 4/4/19. Was posted on wait list on 4/12/19. There is a 4-5 week wait for intake.    Medication management appointment with LORENZO Arguelles, on Wednesday 5/29/19 at noon. Come at 11 am to complete paperwork. Bring photo ID and insurance card.  There is a 24 hour notice to cancel policy.( she will need med refill at discharge).  [facility]  [address], [place] [id number] [phone] fax: [phone]    To establish primary care- for ongoing medical care.  Appointment with Dr. Twiari - Monday 4/29/19 @ 11:20 am  [facility]  [address] [place] [address]  [phone]    Wound care:  Upon discharge may change dressing every other to every third day    Plan of care for wound located on left lateral thigh: daily  1. Clean wound with MicroKlenz spray, pat dry  2.  Apply small smear of Iodosorb Gel to  Mepilex Dressing and press to wound bed.  Time and date dressing changeTime and date dressing change.            Resources:   [facility] Crisis- [phone]  Crisis Intervention: [phone] or 701-381-0937 (TTY: [phone]).  Call anytime for help.  National [place] [employer] Mental Illness (www.[place].org): 946.308.1097 or 573-375-7617.  Alcoholics Anonymous (www.alcoholics-anonymous.org): Check your phone book for your local chapter.  National Suicide Prevention Line (www.mentalSpeek.org): 348-058-KPLG (0194)    General Medication Instructions:   See your medication sheet(s) for instructions.   Take all medicines as directed.  Make no changes unless your doctor suggests them.   Go to all your doctor visits.  Be sure to have all your required lab tests. This way, your medicines can be refilled on time.  Do not use any drugs not prescribed by your doctor.  Avoid alcohol.    Plan of care for wound on left lateral thigh:  Daily in the hospital  Upon discharge may change dressing every other to every third day  1. Clean with wound cleanser, pat dry  2. Cover with clean dressing (mepilex dressing, polymem foam or dry gauze and tape)  Time and date dressing change.

## 2019-04-25 NOTE — PLAN OF CARE
Flat affect, mood calm. Pt visible all shift out in [facility] lounge watching TV. Pt's sister will be picking her up this afternoon. Wound care supplies ordered. Appointments set-up. Will need to show sister how to do dressing change before pt leaves. Med compliant

## 2019-04-26 NOTE — CONSULTS
"[facility]      TIME SPENT IN PSYCHIATRY FOLLOW UP CONSULT: 15 MINUTES    Consult ordered by: Dr. Hendrix  Reason: assess transfer to Psychiatry     Initial History     The patient's care was discussed, patient seen and chart notes were reviewed.    Patient examined for psychiatric consultation.         Interval history:    Pt is now more alert and oriented to person only. plan is to transfer to Psychiatry.  Pt previously not transferred second to leg wound which is been stabilized.  She has MRSA and is being treated with Clindamycin.  Psychiatry is aware of her MRSA status, the patient will need to be admitted to a [facility] bed.  We will continue Zyprexa sublingual and/or IM.                 Medications     No medications prior to admission.       Scheduled Medications    PRNs:        Allergies        Allergies   Allergen Reactions     Tetracycline         Previous Medical History     No past medical history on file.     Medical Review of Systems     /78   Pulse 88   Temp 97.7  F (36.5  C) (Oral)   Resp 16   Ht 1.651 m (5' 5\")   Wt 78.9 kg (173 lb 15.1 oz)   SpO2 97%   BMI 28.95 kg/m    Body mass index is 28.95 kg/m .       Mental Status Examination     Appearance Lying in bed, dressed in gown. Appears stated age.   Attitude Cooperative   Orientation Oriented to person, place, time   Eye Contact Poor   Speech Regular rate, rhythm, volume and tone   Language Normal   Psychomotor Behavior Normal   Thought Process Goal-Oriented, Intact   Associations + looseness of associations   Thought Content + for paranoia and delusions.   Mood Irritable    Affect agitated   Fund of Knowledge impaired   Insight impaired   Judgement impaired   Attention Span & Concentration poor   Recent & Remote Memory Memory impaired   Gait Normal   Muscle Tone Intact      Labs     Labs reviewed.  No results found for this or any previous visit (from the past 24 hour(s)).     Impression     This is " a 53 year old female with  Paranoid schizophrenia, Polysubstance use disoder patient is clearly impaired and needs psychiatric stabilization for MRSA wound is now being treated with clindamycin, so will need to be in ITC bed.  Patient has not required antipsychotics at this point, will  give her Zyprexa 10 mg tonight. Transferred to psychiatry tomorrow.     Diagnoses     1. Paranoid schizophrenia  2. Polysubstance use disorder  3. Cognitive disorder     Plan     1. Explained side effects, benefits and complications of medications to the patient.  2. Medication Changes: Zyprexa 10 mg h.s.  3. Discussed treatment plan with patient and team.  4. Transfer to psychiatry todaywhen bed available      TIME SPENT IN PSYCHIATRY INITIAL CONSULT: 15 MINUTES     Attestation:   Patient has been seen and evaluated by me, Timi Castle MD.    Patient ID:  Name: Anastasiya Simon MRN: [id number]  Admission: 3/16/2019 YOB: 1965

## 2019-04-26 NOTE — CONSULTS
"Consult Date:  03/17/2019      PSYCHIATRIC CONSULTATION       REQUESTING PHYSICIAN:  LORENZO Colón       REASON FOR CONSULTATION:  Schizophrenia and substance abuse.       IDENTIFICATION:  Ms. Simon is a 53-year-old female with history of schizophrenia, methamphetamine abuse and alcohol abuse.       HISTORY OF PRESENT ILLNESS:  Ms. Simon has had a long history of schizophrenia and polysubstance use disorder.  She has avoided getting help and refused to get help.  The family has wanted her to for some time.  She was difficult to arouse and was very uncooperative with exam.  History came mostly from the chart.  The patient's sister, Tamika, has been contacted by the DEC.  She had indicated that the patient started using LSD when she was in college in [place] and ended up having strange behaviors and dropped out.  She has never been able to keep a job, is on Social Security Disability.  The patient will not go to outpatient providers and will not take her medicines.  She has avoided being civilly committed.  She does not have a guardian.  She told the ER that she thought she had mites and uses a zapper on her body and now has skin problems.  She put powders on her body in an effort to get rid of them.  The patient's parents bought her house in the winter and she is now having it condemned.   came out 2 weeks ago and told her she had to clean it up.  When they came back, she told them that she was unable to clean it because aliens were coming in through the window so she spent her time nailing felt coverings over the windows.  She also got rid of all of her technology items.  Delusions, \"thinks she sees God all the time.\"  [facility] worker found her a crisis bed, but she refused to go, was wandering the streets, the weather was cold, went to the [place] walked around, she called her family asked for help.  She threatened suicide apparently that she would jump in the river.  " Police were called.  She was brought to the ED.  The patient apparently also let homeless people in her house and also trashed it.  She was unable to provide history for the DEC either.  She was quite disorganized.  She has stated she was very tired from carrying 50-pound boxes around all night.  Apparently had a wound on the mid lateral thigh.  She states she got it a long time ago due to vitamin C injection that had gone wrong.  She told the ER that her last alcohol drink was 2 days ago.  She admits using methamphetamines recently.  Her U-tox was positive for amphetamines only.       PAST MEDICAL HISTORY:  See above.       FAMILY HISTORY:  Unknown, patient not able to give history.       SOCIAL HISTORY:  The patient is living in a house parents bought, it is now trashed and has been condemned.  She has a problem using amphetamines, hallucinogens, alcohol in the past, never had treatment, no preadmission medicines.       ALLERGIES:  SHE IS ALLERGIC TO TETRACYCLINE.       MEDICAL REVIEW OF SYSTEMS:  Was  completed 10-point review of systems was performed and negative other than as previously mentioned above in the history of present illness by LORENZO Colón.  Dr. Castle was not able to review again.  The patient was not willing to give any answers regarding the review of systems.          VITAL SIGNS:  Blood pressure 157/78, pulse 101, respirations 18, temperature 98.       MENTAL STATUS EXAMINATION:  Appearance:  The patient was lying in bed, appeared to be asleep, but was arousable, only sat up partially, answered few questions and then refused to answer any more.  After that, she was uncooperative.  She was disoriented x 3.  Eye contact was poor.  Speech was garbled.  Language impaired.  Psychomotor behavior:  Some psychomotor slowing.  Thought process disorganized, positive for loose associations.  Thought content:  Positive for delusions.  Mood was irritable.  Affect agitated.  Fund of knowledge  impaired.  Insight impaired.  Judgment impaired.  Attention span and concentration poor.  Muscle tone normal.  Gait not tested.       ASSESSMENT:  Ms. Simon is a 53-year-old female, single, never , no children, history of schizophrenia and polysubstance use disorder, presents disorganized, confused, was brought in because her house was being completely trashed, let homeless in and it has been condemned.  She was wandering around the [facility] [employer], called her family.  Police came and brought her in.  She gave some history in the ER which revealed delusions and psychosis.  At this point, the patient was not able to cooperate with exam.  It is clear that she got a wound and it needs to be stabilized.  Once stabilized, would then transfer to Psychiatry.  She was placed on a 72-hour hold.  We will have to reassess commitment for tomorrow.  Would not start ticking until tonight.  We will offer p.r.n. Haldol 1-2 mg every 4 hours p.r.n. , also offer Zyprexa, either p.o. or IM q.4 hours of 5-10 mg p.r.n.       Revised acct lg 19                  NIKHIL ALDRICH MD             D: 2019   T: 2019   MT: ROSA MARIA      Name:     DIONI SIMON   MRN:      [id number]        Account:       [id number]   :      1965           Consult Date:  2019      Document: [id number]

## 2019-04-30 ENCOUNTER — OFFICE VISIT (OUTPATIENT)
Dept: FAMILY MEDICINE | Facility: CLINIC | Age: 54
End: 2019-04-30
Payer: MEDICARE

## 2019-04-30 VITALS
OXYGEN SATURATION: 97 % | HEIGHT: 65 IN | BODY MASS INDEX: 31.16 KG/M2 | HEART RATE: 115 BPM | SYSTOLIC BLOOD PRESSURE: 120 MMHG | WEIGHT: 187 LBS | DIASTOLIC BLOOD PRESSURE: 70 MMHG | TEMPERATURE: 98.4 F

## 2019-04-30 DIAGNOSIS — L03.119 CELLULITIS AND ABSCESS OF LEG: Primary | ICD-10-CM

## 2019-04-30 DIAGNOSIS — F20.9 SCHIZOPHRENIA, UNSPECIFIED TYPE (H): ICD-10-CM

## 2019-04-30 DIAGNOSIS — L02.419 CELLULITIS AND ABSCESS OF LEG: Primary | ICD-10-CM

## 2019-04-30 PROCEDURE — 99204 OFFICE O/P NEW MOD 45 MIN: CPT | Performed by: FAMILY MEDICINE

## 2019-04-30 RX ORDER — CLINDAMYCIN HCL 300 MG
300 CAPSULE ORAL 3 TIMES DAILY
Qty: 21 CAPSULE | Refills: 0 | Status: SHIPPED | OUTPATIENT
Start: 2019-04-30 | End: 2019-05-30

## 2019-04-30 ASSESSMENT — MIFFLIN-ST. JEOR: SCORE: 1444.11

## 2019-04-30 NOTE — PATIENT INSTRUCTIONS
Wound look infected slighlty  Take antibiotics for 5-7 days   Follow up with provider to re check  Leave the wound alone, no picking at it     3. Patient will follow-up with LORENZO Arguelles on Wednesday (5/29/19) at [facility]

## 2019-04-30 NOTE — PROGRESS NOTES
SUBJECTIVE:   Anastasiya Simon is a 53 year old female who presents to clinic today for the following   health issues:          Hospital Follow-up Visit:    Hospital/Nursing Home/IP Rehab Facility: [facility]  Date of Admission: 3/20/2019  Date of Discharge: 4/25/2019  Reason(s) for Admission: phychiatric issues             Problems taking medications regularly:  None       Medication changes since discharge: None       Problems adhering to non-medication therapy:  None    Summary of hospitalization:  [facility] discharge summary reviewed  Diagnostic Tests/Treatments reviewed.  Follow up needed: none  Other Healthcare Providers Involved in Patient s Care:         None  Update since discharge: improved.       Post Discharge Medication Reconciliation: discharge medications reconciled, continue medications without change.  Plan of care communicated with patient     Coding guidelines for this visit:  Type of Medical   Decision Making Face-to-Face Visit       within 7 Days of discharge Face-to-Face Visit        within 14 days of discharge   Moderate Complexity 18490 [id number]   High Complexity 09547 64699          Patient is new to this clinic , she reports of being a drug addict and     Here to recheck her infection  Left thigh      History schizophrenia who as hopitailzed for  of suicidal ideation from 3-4/25/19.  She Is taking all her meds.  She is saying she needs adderall and energy pills  She reports of being her with her nurse  She lives in [facility]    She was treated for wounds on her lateral thigh in the hopsital and reports that she was here to have it rechecked, no notation in her discharge summary of this    Left lateral thigh  2 open  Sores with redness open wound  No fever ., no chills, no pus drainag, no other issues      She denies any other medical problems          Additional history: as documented    Reviewed  and updated as needed this visit by clinical staff  Tobacco   "Allergies  Meds  Med Hx  Surg Hx  Fam Hx  Soc Hx        Reviewed and updated as needed this visit by Provider         Patient Active Problem List   Diagnosis     Cellulitis of left lower limb     Suicidal ideation     History reviewed. No pertinent surgical history.    Social History     Tobacco Use     Smoking status: Never Smoker     Smokeless tobacco: Never Used   Substance Use Topics     Alcohol use: Not on file     History reviewed. No pertinent family history.        ROS:  Constitutional, HEENT, cardiovascular, pulmonary, GI, , musculoskeletal, neuro, skin, endocrine and psych systems are negative, except as otherwise noted.    OBJECTIVE:     /70   Pulse 115   Temp 98.4  F (36.9  C) (Oral)   Ht 1.651 m (5' 5\")   Wt 84.8 kg (187 lb)   SpO2 97%   BMI 31.12 kg/m    Body mass index is 31.12 kg/m .  GENERAL: healthy,sleepy, disheveled   NECK: no adenopathy, no asymmetry, masses, or scars and thyroid normal to palpation  RESP: lungs clear to auscultation - no rales, rhonchi or wheezes  CV: regular rate and rhythm, normal S1 S2, no S3 or S4, no murmur, click or rub, no peripheral edema and peripheral pulses strong  ABDOMEN: soft, nontender, no hepatosplenomegaly, no masses and bowel sounds normal  MS: no gross musculoskeletal defects noted, no edema  PSYCH: answers questions, sleepy in the room  Skin -2 open sores that are granulation on the lateral thigh with erythema and tenderness and swelling     Diagnostic Test Results:  none     ASSESSMENT/PLAN:       ICD-10-CM    1. Cellulitis and abscess of leg L03.119 clindamycin (CLEOCIN) 300 MG capsule    L02.419    2. Schizophrenia, unspecified type (H) F20.9    new patient to this clinic   Wound look infected slighlty  Take antibiotics for 5-7 days   Follow up with provider to re check  Leave the wound alone, no picking at it     3. Patient will follow-up with LORENZO Arguelles on Wednesday (5/29/19) at [facility]       See Patient " Instructions    Paul Tiwari, [facility]

## 2019-05-17 DIAGNOSIS — L03.119 CELLULITIS AND ABSCESS OF LEG: ICD-10-CM

## 2019-05-17 DIAGNOSIS — L02.419 CELLULITIS AND ABSCESS OF LEG: ICD-10-CM

## 2019-05-17 NOTE — TELEPHONE ENCOUNTER
"Reason for Call:  Medication or medication refill:    Do you use a [facility]?  Name of the pharmacy and phone number for the current request:   [facility] [address] [place] [phone]      Name of the medication requested: clindamycin (CLEOCIN) 300 MG capsule    Other request: Patient would like a return call to discuss refills for \"all\" medications.      Can we leave a detailed message on this number? YES    Phone number patient can be reached at: Home number on file [phone] (home)    Best Time: any    Call taken on 5/17/2019 at 12:08 PM by Omaira Ibanez      "

## 2019-05-17 NOTE — TELEPHONE ENCOUNTER
clindamycin (CLEOCIN) 300 MG capsule      Last Written Prescription Date:  4/30/2019  Last Fill Quantity: 21 capsule,   # refills: 0  Last Office Visit: 4/30/2019  charity/ BETH Tiwari    Future Office visit:       Routing refill request to provider for review/approval because:  Drug not on the [facility], [facility] or [facility] refill protocol or controlled substance

## 2019-05-21 NOTE — TELEPHONE ENCOUNTER
Patient needs to establish care with provider, was seen as acute visit  Not a medicine that needs to be refilled  Paul Tiwari D.O.

## 2019-05-21 NOTE — TELEPHONE ENCOUNTER
"Reached out to patient to relay that she will need another visit in clinic to establish care and for discussion of medications  to be prescribed safely, as she only saw Dr. Tiwari once for an acute issue. Patient was irate, began raising her voice and speaking so quickly and loudly that I could barely understand her. I did ask her to lower her voice and warned that I would need to disconnect the call if she continued to yell at me on the phone. She states she is refusing to come in and see provider again, has seen 30 doctors in the hospital and \"could die if I don't get my medications.\"  She continued to yell, so I ended the call. Will notify supervisor and route back to provider as FYI.   Per chart review, most meds on list that are long-term were filled until 6/25/19 by hospitalist, so patient will not be without medication anytime soon.    Cass Torres RN    "

## 2019-05-21 NOTE — TELEPHONE ENCOUNTER
Per [facility], routing 10 refills to each provider due to being non-compliant.      Anderson Jacobs RN

## 2019-05-22 RX ORDER — CLINDAMYCIN HCL 300 MG
300 CAPSULE ORAL 3 TIMES DAILY
Qty: 21 CAPSULE | Refills: 0 | OUTPATIENT
Start: 2019-05-22

## 2019-05-22 NOTE — TELEPHONE ENCOUNTER
"Spoke to patient. I explained to her that Dr. Tiwari does not prescribe psych medications. Also, if she still has an infection, she needs to see the provider that if following her treatment for this.    Patient was angry. She states that she only wants \"energy pills\". She went on a long dissertation about why she needs energy. Is also upset that she can't get \"white crosses\" on the street anymore.    I advised her to keep her appointment at [facility] so that she can discuss her healthcare.     She does not have a primary care physician. Usually goes to ER when she needs care.    She has gone to [facility] a few times. I advised her to think about picking a PCP once she see's [facility].    Cancelled appointment with Dr. Tiwari.    Eric Tineo RN    "

## 2019-05-22 NOTE — TELEPHONE ENCOUNTER
She was seen for hospital follow up for psych reasons and cellulitis  If not resolving infection, needs to be seen sooner than that  If for psych reasons then needs to see psych  Paul Tiwari D.O.

## 2019-05-22 NOTE — TELEPHONE ENCOUNTER
Called pt to request pt follow up in clinic per provider request in order to completer refill request.    Scheduled pt to be seen in clinic 5/29/19 at 1pm.    Pt recently established care with Dr. Tiwari on 4/30/19.    Will forward to provider to determine if appointment is still needed.    Aster George RN [facility] on 5/22/2019 at 9:59 AM

## 2019-05-30 ENCOUNTER — PATIENT OUTREACH (OUTPATIENT)
Dept: CARE COORDINATION | Facility: CLINIC | Age: 54
End: 2019-05-30

## 2019-05-30 ENCOUNTER — TELEPHONE (OUTPATIENT)
Dept: FAMILY MEDICINE | Facility: CLINIC | Age: 54
End: 2019-05-30

## 2019-05-30 ENCOUNTER — OFFICE VISIT (OUTPATIENT)
Dept: FAMILY MEDICINE | Facility: CLINIC | Age: 54
End: 2019-05-30
Payer: MEDICARE

## 2019-05-30 VITALS
HEIGHT: 65 IN | WEIGHT: 170.6 LBS | HEART RATE: 87 BPM | RESPIRATION RATE: 21 BRPM | DIASTOLIC BLOOD PRESSURE: 60 MMHG | TEMPERATURE: 97.8 F | BODY MASS INDEX: 28.42 KG/M2 | OXYGEN SATURATION: 95 % | SYSTOLIC BLOOD PRESSURE: 108 MMHG

## 2019-05-30 DIAGNOSIS — E11.65 TYPE 2 DIABETES MELLITUS WITH HYPERGLYCEMIA, WITHOUT LONG-TERM CURRENT USE OF INSULIN (H): ICD-10-CM

## 2019-05-30 DIAGNOSIS — Z11.59 NEED FOR HEPATITIS C SCREENING TEST: ICD-10-CM

## 2019-05-30 DIAGNOSIS — R73.09 OTHER ABNORMAL GLUCOSE: ICD-10-CM

## 2019-05-30 DIAGNOSIS — Z12.4 SCREENING FOR MALIGNANT NEOPLASM OF CERVIX: ICD-10-CM

## 2019-05-30 DIAGNOSIS — L03.116 CELLULITIS OF LEFT LOWER EXTREMITY: ICD-10-CM

## 2019-05-30 DIAGNOSIS — Z13.220 SCREENING FOR HYPERLIPIDEMIA: ICD-10-CM

## 2019-05-30 DIAGNOSIS — Z11.4 ENCOUNTER FOR SCREENING FOR HIV: ICD-10-CM

## 2019-05-30 DIAGNOSIS — F22 DELUSIONS OF PARASITOSIS (H): ICD-10-CM

## 2019-05-30 DIAGNOSIS — R32 URINARY INCONTINENCE, UNSPECIFIED TYPE: Primary | ICD-10-CM

## 2019-05-30 DIAGNOSIS — R81 GLUCOSURIA: Primary | ICD-10-CM

## 2019-05-30 DIAGNOSIS — K21.9 GASTROESOPHAGEAL REFLUX DISEASE, ESOPHAGITIS PRESENCE NOT SPECIFIED: ICD-10-CM

## 2019-05-30 DIAGNOSIS — L29.9 PRURITUS: ICD-10-CM

## 2019-05-30 DIAGNOSIS — F20.89 OTHER SCHIZOPHRENIA (H): ICD-10-CM

## 2019-05-30 DIAGNOSIS — R32 URINARY INCONTINENCE, UNSPECIFIED TYPE: ICD-10-CM

## 2019-05-30 LAB
ALBUMIN UR-MCNC: NEGATIVE MG/DL
APPEARANCE UR: CLEAR
BILIRUB UR QL STRIP: NEGATIVE
COLOR UR AUTO: YELLOW
GLUCOSE UR STRIP-MCNC: 500 MG/DL
HBA1C MFR BLD: 12.5 % (ref 0–5.6)
HGB UR QL STRIP: NEGATIVE
KETONES UR STRIP-MCNC: NEGATIVE MG/DL
LEUKOCYTE ESTERASE UR QL STRIP: NEGATIVE
NITRATE UR QL: NEGATIVE
PH UR STRIP: 7 PH (ref 5–7)
SOURCE: ABNORMAL
SP GR UR STRIP: 1.01 (ref 1–1.03)
UROBILINOGEN UR STRIP-ACNC: 0.2 EU/DL (ref 0.2–1)

## 2019-05-30 PROCEDURE — 87522 HEPATITIS C REVRS TRNSCRPJ: CPT | Performed by: FAMILY MEDICINE

## 2019-05-30 PROCEDURE — 36415 COLL VENOUS BLD VENIPUNCTURE: CPT | Performed by: FAMILY MEDICINE

## 2019-05-30 PROCEDURE — 84443 ASSAY THYROID STIM HORMONE: CPT | Performed by: FAMILY MEDICINE

## 2019-05-30 PROCEDURE — G0472 HEP C SCREEN HIGH RISK/OTHER: HCPCS | Performed by: FAMILY MEDICINE

## 2019-05-30 PROCEDURE — 99000 SPECIMEN HANDLING OFFICE-LAB: CPT | Performed by: FAMILY MEDICINE

## 2019-05-30 PROCEDURE — 80061 LIPID PANEL: CPT | Performed by: FAMILY MEDICINE

## 2019-05-30 PROCEDURE — 81003 URINALYSIS AUTO W/O SCOPE: CPT | Performed by: FAMILY MEDICINE

## 2019-05-30 PROCEDURE — 83036 HEMOGLOBIN GLYCOSYLATED A1C: CPT | Performed by: FAMILY MEDICINE

## 2019-05-30 PROCEDURE — G0476 HPV COMBO ASSAY CA SCREEN: HCPCS | Performed by: FAMILY MEDICINE

## 2019-05-30 PROCEDURE — G0145 SCR C/V CYTO,THINLAYER,RESCR: HCPCS | Performed by: FAMILY MEDICINE

## 2019-05-30 PROCEDURE — 80053 COMPREHEN METABOLIC PANEL: CPT | Performed by: FAMILY MEDICINE

## 2019-05-30 PROCEDURE — 87389 HIV-1 AG W/HIV-1&-2 AB AG IA: CPT | Performed by: FAMILY MEDICINE

## 2019-05-30 PROCEDURE — 87902 NFCT AGT GNTYP ALYS HEP C: CPT | Mod: 90 | Performed by: FAMILY MEDICINE

## 2019-05-30 PROCEDURE — 99214 OFFICE O/P EST MOD 30 MIN: CPT | Performed by: FAMILY MEDICINE

## 2019-05-30 RX ORDER — FLUCONAZOLE 200 MG/1
200 TABLET ORAL DAILY
Qty: 7 TABLET | Refills: 0 | Status: SHIPPED | OUTPATIENT
Start: 2019-05-30 | End: 2019-07-03

## 2019-05-30 RX ORDER — TOLTERODINE 2 MG/1
2 CAPSULE, EXTENDED RELEASE ORAL DAILY
Qty: 90 CAPSULE | Refills: 1 | Status: SHIPPED | OUTPATIENT
Start: 2019-05-30 | End: 2019-07-03

## 2019-05-30 RX ORDER — OXYBUTYNIN CHLORIDE 15 MG/1
15 TABLET, EXTENDED RELEASE ORAL DAILY
Qty: 90 TABLET | Refills: 1 | Status: SHIPPED | OUTPATIENT
Start: 2019-05-30 | End: 2019-09-03

## 2019-05-30 RX ORDER — OMEPRAZOLE 20 MG/1
20 TABLET, DELAYED RELEASE ORAL DAILY
COMMUNITY
End: 2019-05-30

## 2019-05-30 RX ORDER — TOLTERODINE 2 MG/1
2 CAPSULE, EXTENDED RELEASE ORAL DAILY
COMMUNITY
Start: 2012-05-31 | End: 2019-05-30

## 2019-05-30 ASSESSMENT — PAIN SCALES - GENERAL: PAINLEVEL: NO PAIN (0)

## 2019-05-30 ASSESSMENT — MIFFLIN-ST. JEOR: SCORE: 1369.72

## 2019-05-30 NOTE — TELEPHONE ENCOUNTER
Reason for Call:  Other prescription    Detailed comments: Medication that was prescribed today is not cover by insurance. Patient would like to discuss other options.   Please call sister number, patient number does not work    Phone Number Patient can be reached at: Cell number on file:    Telephone Information:   Mobile  [phone]       Best Time: Any time.    Can we leave a detailed message on this number? YES    Call taken on 5/30/2019 at 1:13 PM by Mirta Roa

## 2019-05-30 NOTE — PROGRESS NOTES
"   SUBJECTIVE:   CC: Anastasiya Simon is an 53 year old woman who presents for preventive health visit.     HPI  {Add if <65 person on Medicare  - Required Questions (Optional):612002}  {Outside tests to abstract? :535460}    {additional problems to add (Optional):016509}    Today's PHQ-2 Score: No flowsheet data found.    Abuse: Current or Past(Physical, Sexual or Emotional)- { :802982}  Do you feel safe in your environment? { :512943}    Social History     Tobacco Use     Smoking status: Never Smoker     Smokeless tobacco: Never Used   Substance Use Topics     Alcohol use: Not on file     {Rooming Staff- Complete this question if Prescreen response is not shown below for today's visit. If you drink alcohol do you typically have >3 drinks per day or >7 drinks per week? (Optional):757786}    No flowsheet data found.{add AUDIT responses (Optional) (A score of 7 for adult men is an indication of hazardous drinking; a score of 8 or more is an indication of an alcohol use disorder.  A score of 7 or more for adult women is an indication of hazardous drinking or an alchohol use disorder):707458}    Reviewed orders with patient.  Reviewed health maintenance and updated orders accordingly - { :471733::\"Yes\"}  {Chronicprobdata (optional):139460}    {Mammo Decision Support (Optional):924966}    Pertinent mammograms are reviewed under the imaging tab.  History of abnormal Pap smear: { :222592}     Reviewed and updated as needed this visit by clinical staff         Reviewed and updated as needed this visit by Provider        {HISTORY OPTIONS (Optional):725032}    Review of Systems  {FEMALE ROS (Optional):028102}     OBJECTIVE:   There were no vitals taken for this visit.  Physical Exam  {Exam Choices (Optional):343847}    {Diagnostic Test Results (Optional):808115::\"Diagnostic Test Results:\",\"Labs reviewed in Epic\"}    ASSESSMENT/PLAN:   {Diag Picklist:057790}    COUNSELING:  {FEMALE COUNSELING MESSAGES:067958::\"Reviewed " "preventive health counseling, as reflected in patient instructions\"}    Estimated body mass index is 31.12 kg/m  as calculated from the following:    Height as of 4/30/19: 1.651 m (5' 5\").    Weight as of 4/30/19: 84.8 kg (187 lb).    {Weight Management Plan (ACO) Complete if BMI is abnormal-  Ages 18-64  BMI >24.9.  Age 65+ with BMI <23 or >30 (Optional):686129}     reports that she has never smoked. She has never used smokeless tobacco.  {Tobacco Cessation -- Complete if patient is a smoker (Optional):516311}    Counseling Resources:  ATP IV Guidelines  Pooled Cohorts Equation Calculator  Breast Cancer Risk Calculator  FRAX Risk Assessment  ICSI Preventive Guidelines  Dietary Guidelines for Americans, 2010  USDA's MyPlate  ASA Prophylaxis  Lung CA Screening    Trudi Rubio,   [facility]  "

## 2019-05-30 NOTE — TELEPHONE ENCOUNTER
Reason for Call:  Other call back    Detailed comments: Sister called back and phone to reach PT is [phone] (room)    Phone Number Patient can be reached at: Other phone number:  [phone] room 320    Best Time: Anytime    Can we leave a detailed message on this number? NO    Call taken on 5/30/2019 at 6:08 PM by Elva Wright

## 2019-05-30 NOTE — PROGRESS NOTES
"Subjective     Anastasiya Simon is a 53 year old female who presents to clinic today for the following health issues:    HPI     Patient was recently admitted at [facility] from 3/16-3/20 with a LLE cellulitis and possible mite infestation.  She was discharged on oral clindamycin (total 10 day course).  She was then re-admitted from 3/20-4/25 for SI related to her schizophrenia and polysubstance use disorder.  She followed up with Dr. Tiwari on 4/30 and she thought it appeared infected again, so treated her with clindamycin again.      At this point pt feels that her leg infection is much better.  The sores that were previously there are gone.  She worried she might have a yeast infection.  She has had vaginal discharge for the past few weeks.  She reports some mild pain when she urinates.      She needs some refills on her medications.  She would like more omeprazole, detrol, and is also asking for a prescription to \"treat internal mange\".  Previous admission mentioned concern for mite infestation.  She states that right now the only spot on her body that is itchy is her scalp.  She has been using Demodex on her scalp and reports that it is helping.  She also shaved a portion of her head, which helped.      Feeling tired and would like thyroid checked.       Patient Active Problem List   Diagnosis     Cellulitis of left lower limb     Suicidal ideation     Delusions of parasitosis (H)     GERD (gastroesophageal reflux disease)     Other schizophrenia (H)     No past surgical history on file.    Social History     Tobacco Use     Smoking status: Never Smoker     Smokeless tobacco: Never Used   Substance Use Topics     Alcohol use: Not on file     No family history on file.      Reviewed and updated as needed this visit by Provider         Review of Systems   ROS COMP: Constitutional, HEENT, cardiovascular, pulmonary, GI, , musculoskeletal, neuro, skin, endocrine and psych systems are negative, except as " "otherwise noted.      Objective    /60 (BP Location: Right arm, Patient Position: Chair, Cuff Size: Adult Large)   Pulse 87   Temp 97.8  F (36.6  C) (Oral)   Resp 21   Ht 1.651 m (5' 5\")   Wt 77.4 kg (170 lb 9.6 oz)   SpO2 95%   BMI 28.39 kg/m    Body mass index is 28.39 kg/m .  Physical Exam   GENERAL: healthy, alert and no distress  RESP: lungs clear to auscultation - no rales, rhonchi or wheezes  CV: regular rate and rhythm, normal S1 S2, no S3 or S4, no murmur, click or rub, no peripheral edema and peripheral pulses strong  ABDOMEN: soft, nontender, no hepatosplenomegaly, no masses and bowel sounds normal   (female): normal urethral meatus , vaginal mucosa pink, moist, well rugated, vaginal skin findings: thickened beefy red rash on vulva, and normal cervix, adnexae, and uterus without masses.  SKIN: Site of previous open sores and cellulitis on left lateral thigh appears to be well healed with no current signs of infection   PSYCH: tangential, affect flat, judgement and insight impaired and appearance disheveled    Diagnostic Test Results:  Results for orders placed or performed in visit on 05/30/19 (from the past 24 hour(s))   UA reflex to Microscopic and Culture   Result Value Ref Range    Color Urine Yellow     Appearance Urine Clear     Glucose Urine 500 (A) NEG^Negative mg/dL    Bilirubin Urine Negative NEG^Negative    Ketones Urine Negative NEG^Negative mg/dL    Specific Gravity Urine 1.010 1.003 - 1.035    Blood Urine Negative NEG^Negative    pH Urine 7.0 5.0 - 7.0 pH    Protein Albumin Urine Negative NEG^Negative mg/dL    Urobilinogen Urine 0.2 0.2 - 1.0 EU/dL    Nitrite Urine Negative NEG^Negative    Leukocyte Esterase Urine Negative NEG^Negative    Source Midstream Urine            Assessment & Plan     1. Intertrigo labialis  - fluconazole (DIFLUCAN) 200 MG tablet; Take 1 tablet (200 mg) by mouth daily for 7 days  Dispense: 7 tablet; Refill: 0    2. Glucosuria  - Concern for diabetes, " especially since she's been on chronic antipsychotics   - Check further labs   - UA reflex to Microscopic and Culture  - TSH with free T4 reflex  - Comprehensive metabolic panel  - Hemoglobin A1c    3. Other schizophrenia (H)  4. Delusions of parasitosis (H)  - Patient is currently refusing to see a psychiatrist since she feels they have not been helpful in the past   - Currently on Zyprexa, which was given to her after discharge, but will run out next month     5. Cellulitis of left lower extremity  - Resolved     6. Screening for malignant neoplasm of cervix  - Pap imaged thin layer screen with HPV - recommended age 30 - 65 years (select HPV order below)    7. Screening for hyperlipidemia  - Lipid panel reflex to direct LDL Non-fasting    8. Gastroesophageal reflux disease, esophagitis presence not specified  - omeprazole (PRILOSEC) 20 MG DR capsule; Take 1 capsule (20 mg) by mouth daily as needed (GERD)  Dispense: 90 capsule; Refill: 1    9. Urinary incontinence, unspecified type  - tolterodine ER (DETROL LA) 2 MG 24 hr capsule; Take 1 capsule (2 mg) by mouth daily  Dispense: 90 capsule; Refill: 1    10. Need for hepatitis C screening test  - Hepatitis C Screen Reflex to HCV RNA Quant and Genotype    11. Encounter for screening for HIV  - HIV Antigen Antibody Combo    12. Pruritus   - TSH with free T4 reflex    13. Other abnormal glucose   - Hemoglobin A1c       Return in about 1 week (around 6/6/2019). To go over labs and further discuss psych referral     Trudi Rubio,   [facility]

## 2019-05-30 NOTE — LETTER
June 5, 2019    Anastasiya Braswellyayo  [address] [facility] [address]    Dear ,  This letter is regarding your recent Pap smear (cervical cancer screening) and Human Papillomavirus (HPV) test.  We are happy to inform you that your Pap smear result is normal. Cervical cancer is closely linked with certain types of HPV. Your results showed no evidence of high-risk HPV.  We recommend you have your next PAP smear in 3 years.  You will still need to return to the clinic every year for an annual exam and other preventive tests.  If you have additional questions regarding this result, please call our registered nurse, Aga at [phone].  Sincerely,    Trudi Rubio DO/raji

## 2019-05-30 NOTE — PROGRESS NOTES
Clinic Care Coordination Contact    (I am covering for MOHIT Pearl who is away from the office today.)      Situation: Patient chart reviewed by care coordinator.    Background: Pt in clinic today seeing provider at [facility]. Referral received for CC involvement:   Reason for Referral: Patient with schizophrenia.  Multiple ER visits in the past.  Refusing outpt psychiatric care.  Diagnosed with diabetes today with A1C of 12.      Additional pertinent details:  Did not discuss referral with pt yet.  Care coordinator will likely need to work with pt in the clinic because she currently resides in a hotel.  Consider visit with pt next week when pt comes for lab follow up     Assessment: PCP recommends f/u in clinic with pt face to face next week when comes in for lab work. Pt currently homeless/residing in hotel.     Plan/Recommendations: Will route to primary SW CC for that clinic. May need to involve RN CC as well with new diagnosis of diabetes.       VANCE Chowdary   Primary Care Clinic- Social Work Care Coordinator  [facility], and [facility]  5/30/2019 2:25 PM  [phone]  Covering for MOHIT Pearl  Primary Care Clinic- Social Work Care Coordinator for [facility]

## 2019-05-30 NOTE — PATIENT INSTRUCTIONS
[facility]     Discharged by : Danika Serrano CMA      If you have any questions regarding your visit please contact your care team:     Team Kate              Clinic Hours Telephone Number     Dr. Samuel Lux, CNP   7am-7pm  Monday - Thursday   7am-5pm  Fridays  [phone]   (Appointment scheduling available 24/7)     RN Line  [phone] option 2     Urgent Care - [place] and [place] - 11am-9pm Monday-Friday Saturday-Sunday- 9am-5pm     [place] -   5pm-9pm Monday-Friday Saturday-Sunday- 9am-5pm    [phone] - [place]    [phone] - [place]     For a Price Quote for your services, please call our [employer] Price Line at [phone].     What options do I have for visits at the clinic other than the traditional office visit?     To expand how we care for you, many of our providers are utilizing electronic visits (e-visits) and telephone visits, when medically appropriate, for interactions with their patients rather than a visit in the clinic. We also offer nurse visits for many medical concerns. Just like any other service, we will bill your insurance company for this type of visit based on time spent on the phone with your provider. Not all insurance companies cover these visits. Please check with your medical insurance if this type of visit is covered. You will be responsible for any charges that are not paid by your insurance.     E-visits via [employer]: generally incur a $45.00 fee.     Telephone visits:  Time spent on the phone: *charged based on time that is spent on the phone in increments of 10 minutes. Estimated cost:   5-10 mins $30.00   11-20 mins. $59.00   21-30 mins. $85.00       Use [employer] (secure email communication and access to your chart) to send your primary care provider a message or make an appointment. Ask someone on your Team how to sign up for [employer].     As always, Thank you for  trusting us with your health care needs!      [facility]:    Scheduling Appointments  [facility]  Call: [phone]    [facility], [facility] [place]  Call: [phone]    [facility]  Call: [phone]    For Gastroenterology referrals   [facility]   Clinics and Surgery Center, 4th Floor   [address] [place] [address]   Appointments: [phone]    WHERE TO GO FOR CARE?    Clinic    Make an appointment if you:       Are sick (cold, cough, flu, sore throat, earache or in pain).       Have a small injury (sprain, small cut, burn or broken bone).       Need a physical exam, Pap smear, vaccine or prescription refill.       Have questions about your health or medicines.    To reach us:      Call [phone] ([phone]). Open 24 hours every day. (For counseling services, call [phone].)    Log into [employer] at [employer]. (Visit [facility] to create an account.) Hospital emergency room    An emergency is a serious or life- threatening problem that must be treated right away.    Call 889 or get to the hospital if you have:      Very bad or sudden:            - Chest pain or pressure         - Bleeding         - Head or belly pain         - Dizziness or trouble seeing, walking or                          Speaking      Problems breathing      Blood in your vomit or you are coughing up blood      A major injury (knocked out, loss of a finger or limb, rape, broken bone protruding from skin)    A mental health crisis. (Or call the Mental Health Crisis line at 1-831.204.4615 or Suicide Prevention Hotline at 1-536.876.3664.)    Open 24 hours every day. You don't need an appointment.     Urgent care    Visit urgent care for sickness or small injuries when the clinic is closed. You don't need an appointment. To check hours or find an urgent care near you, visit [email or web address]. Online  care    Get online care from [facility] for more than 70 common problems, like colds, allergies and infections. Open 24 hours every day at:   [email or web address]   Need help deciding?    For advice about where to be seen, you may call your clinic and ask to speak with a nurse. We're here for you 24 hours every day.         If you are deaf or hard of hearing, please let us know. We provide many free services including sign language interpreters, oral interpreters, TTYs, telephone amplifiers, note takers and written materials.

## 2019-05-30 NOTE — TELEPHONE ENCOUNTER
Sister states that the Detrol is not covered by insurance. She does not know what is.    Eric Tineo RN

## 2019-05-30 NOTE — TELEPHONE ENCOUNTER
Attempted to reach patient at the hotel number given below. Line rings with no answer and then requests to enter a remote access code. Will have to try reaching patient at the hotel number again later.     Jessica Resendiz RN

## 2019-05-30 NOTE — TELEPHONE ENCOUNTER
Reached out to patient's sister, Tamika to get in contact with the patient. Tamika stated that patient was staying in a hotel and would call back with the information.     Jessica Resendiz RN

## 2019-05-31 LAB
ALBUMIN SERPL-MCNC: 3.7 G/DL (ref 3.4–5)
ALP SERPL-CCNC: 162 U/L (ref 40–150)
ALT SERPL W P-5'-P-CCNC: 597 U/L (ref 0–50)
ANION GAP SERPL CALCULATED.3IONS-SCNC: 9 MMOL/L (ref 3–14)
AST SERPL W P-5'-P-CCNC: 431 U/L (ref 0–45)
BILIRUB SERPL-MCNC: 0.3 MG/DL (ref 0.2–1.3)
BUN SERPL-MCNC: 15 MG/DL (ref 7–30)
CALCIUM SERPL-MCNC: 9.2 MG/DL (ref 8.5–10.1)
CHLORIDE SERPL-SCNC: 102 MMOL/L (ref 94–109)
CHOLEST SERPL-MCNC: 147 MG/DL
CO2 SERPL-SCNC: 23 MMOL/L (ref 20–32)
CREAT SERPL-MCNC: 0.59 MG/DL (ref 0.52–1.04)
GFR SERPL CREATININE-BSD FRML MDRD: >90 ML/MIN/{1.73_M2}
GLUCOSE SERPL-MCNC: 369 MG/DL (ref 70–99)
HCV AB SERPL QL IA: REACTIVE
HDLC SERPL-MCNC: 28 MG/DL
HIV 1+2 AB+HIV1 P24 AG SERPL QL IA: NONREACTIVE
LDLC SERPL CALC-MCNC: 80 MG/DL
NONHDLC SERPL-MCNC: 119 MG/DL
POTASSIUM SERPL-SCNC: 4.2 MMOL/L (ref 3.4–5.3)
PROT SERPL-MCNC: 8.2 G/DL (ref 6.8–8.8)
SODIUM SERPL-SCNC: 134 MMOL/L (ref 133–144)
TRIGL SERPL-MCNC: 195 MG/DL
TSH SERPL DL<=0.005 MIU/L-ACNC: 1.71 MU/L (ref 0.4–4)

## 2019-06-03 LAB
COPATH REPORT: NORMAL
PAP: NORMAL

## 2019-06-04 LAB
FINAL DIAGNOSIS: NORMAL
HPV HR 12 DNA CVX QL NAA+PROBE: NEGATIVE
HPV16 DNA SPEC QL NAA+PROBE: NEGATIVE
HPV18 DNA SPEC QL NAA+PROBE: NEGATIVE
SPECIMEN DESCRIPTION: NORMAL
SPECIMEN SOURCE CVX/VAG CYTO: NORMAL

## 2019-06-05 ENCOUNTER — PATIENT OUTREACH (OUTPATIENT)
Dept: CARE COORDINATION | Facility: CLINIC | Age: 54
End: 2019-06-05

## 2019-06-05 ASSESSMENT — ACTIVITIES OF DAILY LIVING (ADL): DEPENDENT_IADLS:: INDEPENDENT

## 2019-06-05 NOTE — LETTER
Health Care Home - Access Care Plan    About Me:    Patient Name:  Anastasiya Simon    YOB: 1965  Age:                      53 year old   [place] MRN:     [id number] Telephone Information:   Home Phone [phone]   Mobile [phone]       Address:  [place] [address]  [place] [id number] Email address:  No e-mail address on record      Emergency Contact(s)   Name Relationship Lgl Grd Work Phone Home Phone Mobile Phone   TUNDE BYRNE Sister No  [phone]             Health Maintenance: Routine Health maintenance Reviewed: Due/Overdue   Health Maintenance Due   Topic Date Due     MICROALBUMIN  1965     DIABETIC FOOT EXAM  1965     EYE EXAM  1965     MAMMO SCREENING  1965     COLONOSCOPY  07/08/1973     ZOSTER IMMUNIZATION (1 of 2) 07/08/2013       My Access Plan  Medical Emergency 911   Questions or concerns during clinic hours Primary Clinic Line, I will call the clinic directly:   -     24 Hour Appointment Line [phone] or  [phone]) (toll free)   24 Hour Nurse Line [phone] (toll free)   Questions or concerns outside clinic hours 24 Hour Appointment Line, I will call the after-hours on-call line:   [facility] [phone] or [phone]) (toll-free)   Preferred Urgent Care [facility] [phone]   Preferred Hospital [facility]  [phone]   Preferred Pharmacy [facility] [address] [place] [facility] [address] [place] [address]     Behavioral Health Crisis Line The National Suicide Prevention Lifeline at 1-515.591.4180 or 911                     My Care Team Members  Patient Care Team       Relationship Specialty Notifications Start End    No Ref-Primary, Physician PCP - General   5/22/19     Fax: [phone]         Paul Tiwari,  Assigned PCP   4/26/19     Phone: [phone] Fax: [phone]  [facility] [address]    Mariia Eduardo BSW Lead Care Coordinator Primary Care - CC Admissions 5/30/19     Phone: [phone] Fax: [phone]               My Medical and Care Information  Problem List   Patient Active Problem List   Diagnosis     Cellulitis of left lower limb     Suicidal ideation     Delusions of parasitosis (H)     GERD (gastroesophageal reflux disease)     Other schizophrenia (H)

## 2019-06-05 NOTE — LETTER
[facility]  Complex Care Plan  About Me:    Patient Name:  Anastasiya Simon    YOB: 1965  Age:         53 year old   [place] MRN:    [id number] Telephone Information:  Home Phone [phone]   Mobile [phone]       Address:  [address]  [place] [id number] Email address:  No e-mail address on record      Emergency Contact(s)    Name Relationship Lgl Grd Work Phone Home Phone Mobile Phone   TUNDE BYRNE Sister No  [phone]           Primary language:  English     needed? No   [facility] [employer]:  [phone] op. 1  Other communication barriers: Other(tangential )  Preferred Method of Communication:     Current living arrangement: Other(hotel)  Mobility Status/ Medical Equipment: Independent    Health Maintenance  Health Maintenance Reviewed: Due/Overdue   Health Maintenance Due   Topic Date Due     MICROALBUMIN  1965     DIABETIC FOOT EXAM  1965     EYE EXAM  1965     MAMMO SCREENING  1965     COLONOSCOPY  07/08/1973     ZOSTER IMMUNIZATION (1 of 2) 07/08/2013       My Access Plan  Medical Emergency 911   Primary Clinic Line   -     24 Hour Appointment Line [phone] or  [phone]) (toll-free)   24 Hour Nurse Line [phone] (toll-free)   Preferred Urgent Care [facility] [phone]   Preferred Hospital [facility]  [phone]   Preferred Pharmacy [facility] [address] [place] [facility] [address]     Behavioral Health Crisis Line The National Suicide Prevention Lifeline at 1-744.648.2904 or 911             My Care Team Members  Patient Care Team       Relationship Specialty Notifications Start End    No Ref-Primary, Physician PCP - General   5/22/19     Fax: [phone]         Paul Tiwari DO Assigned PCP   4/26/19     Phone: [phone] Fax: [phone]  [place] [facility] [address]    Mariia Eduardo BSW Lead Care Coordinator Primary Care - CC Admissions 5/30/19     Phone: [phone] Fax: [phone]                My Care Plans  Self Management and Treatment Plan  Goals and (Comments)  Goals        General    1. Mental Health Management (pt-stated)     Notes - Note created  6/5/2019 11:47 AM by Mariia Eduardo BSW    Goal Statement: I will call housing person noted below to discuss next steps for housing within 2 weeks   Measure of Success: Obtain housing   Supportive Steps to Achieve: Patient will call housing person noted below to discuss next steps for housing within 2 weeks   Barriers: Patient currently living in a hotel and needs housing   Strengths: Patient reports she is working with someone to assist with housing, believes her mental health will improve if she finds housing   Date to Achieve By: 6/19/2019  Patient expressed understanding of goal: Yes               Action Plans on File: none                      Advance Care Plans/Directives Type: none       My Medical and Care Information  Problem List   Patient Active Problem List   Diagnosis     Cellulitis of left lower limb     Suicidal ideation     Delusions of parasitosis (H)     GERD (gastroesophageal reflux disease)     Other schizophrenia (H)          Care Coordination Start Date: 6/5/2019   Frequency of Care Coordination: 2 weeks   Form Last Updated: 06/05/2019

## 2019-06-05 NOTE — PROGRESS NOTES
"Clinic Care Coordination Contact--Social Work Initial Call/Assessment    Clinic Care Coordination Contact  OUTREACH    Referral Information: Patient has schizophrenia, multiple ED visits in past.  Declines psychiatric services. Diagnosed with DM recently at clinic visit    Referral Source: Care Team    Primary Diagnosis: Psychosocial    Chief Complaint   Patient presents with     Clinic Care Coordination - Initial     SW        Universal Utilization: Multiple ED visits in the past.  Most recent 3/20/2019 for suicidal ideation   Clinic Utilization  Difficulty keeping appointments:: Yes  Compliance Concerns: Yes  No-Show Concerns: No  No PCP office visit in Past Year: Yes(No PCP)  Utilization    Last refreshed: 6/5/2019  9:31 AM:  Hospital Admissions 1           Last refreshed: 6/5/2019  9:31 AM:  ED Visits 0           Last refreshed: 6/5/2019  9:31 AM:  No Show Count (past year) 0              Current as of: 6/5/2019  9:31 AM            Clinical Concerns:  Patient recently diagnosed with DM, A1C of 12.  Patient stated she was unaware she had DM     Current Medical Concerns:    Patient Active Problem List   Diagnosis     Cellulitis of left lower limb     Suicidal ideation     Delusions of parasitosis (H)     GERD (gastroesophageal reflux disease)     Other schizophrenia (H)     Current Behavioral Concerns: Patient has diagnosis of schizophrenia, she was incredibly tangential in our conversation and somewhat paranoid of SW until she connected that she worked with the provider that she liked.  Patient has stated plan to establish with this provider (ARABELLA Rubio)    Patient reports she is currently under \"psychological\" stress having been \"thrown out\" of her townhouse, having no housing options an living in a hotel.  She reports her family is paying for the hotel at present.  She reports she lost prior housing as the \"government got mad\", she was allowing several people (at least 5-6 regulars and then their friends, " "possibly 20 people at once) to stay with her in subsidized housing, she stated she could not allow them to freeze in the tents this winter when it was 30 below zero.  She reports she did not obtain an eviction notice and should be eligible for alternate housing but has not found as of yet.  She is not interested in housing in [facility] as does not trust that area, also stated there are \"not enough trees\" in that area.  SW discussed psychiatry, Patient states she does not find this helpful.  Patient states she was \"dull and confused\" when previously taking medications for her mood, she does not want to take anything such as this again.  Patient could not recall the medication and states \"she does not need that in her life\".      Patient tangential, overwhelmed and somewhat paranoid in our conversation     Education Provided to patient: SW discussed Patient's current circumstances of living in a hotel, and housing plan going forward.  Patient states she is working with a friend of her sister's and is waiting to look at 4 places in the near future.  SW discussed an [facility] worker to assist with housing search, applications and mental health resources.  Patient is mildly accepting.  She does not wish to use [facility] for ARMHS services, as does not wish for a \"young girl\" and would like someone with life experience.  SW stated she can utilize other resource for ARM services but cannot guarantee the person will be an older person.  Patient is accepting of this referral    Pain  Pain (GOAL):: No  Health Maintenance Reviewed: Due/Overdue   Health Maintenance Due   Topic Date Due     MICROALBUMIN  1965     DIABETIC FOOT EXAM  1965     EYE EXAM  1965     MAMMO SCREENING  1965     COLONOSCOPY  07/08/1973     ZOSTER IMMUNIZATION (1 of 2) 07/08/2013     Clinical Pathway: None    Medication Management: Patient states she is taking her medications as prescribed, this is questionable as Patient could not " "recall which ones SW was referring to and again was tangential     Functional Status:  Dependent ADL's:: Independent  Dependent IADLs:: Independent  Bed or wheelchair confined:: No  Mobility Status: Independent  Fallen 2 or more times in the past year?: No  Any fall with injury in the past year?: No    Living Situation:  Current living arrangement:: Other(hotel)  Type of residence:: Other    Diet/Exercise/Sleep: Most likely inadequate nutrition as is living in a hotel   Inadequate nutrition (GOAL):: No  Food Insecurity: No  Tube Feeding: No  Exercise:: Currently not exercising  Inadequate activity/exercise (GOAL):: No  Significant changes in sleep pattern (GOAL): No    Transportation:  Transportation concerns (GOAL):: (needs assessment )     Psychosocial:  Mormonism or spiritual beliefs that impact treatment:: No  Mental health DX:: Yes  Mental health DX how managed:: Medication(declines psychiatrist, therapist as \"these did not work for her\")  Mental health management concern (GOAL):: Yes  Informal Support system:: Family(she reports somewhat supportive)     Financial/Insurance: not assessed   Financial/Insurance concerns (GOAL):: No    Resources and Interventions: SW discussed Patient's current circumstances of living in a hotel, and housing plan going forward.  Patient states she is working with a friend of her sister's and is waiting to look at 4 places in the near future.  SW discussed an [facility] worker to assist with housing search, applications and mental health resources.  Patient is mildly accepting.  She does not wish to use [facility] for ARMHS services, as does not wish for a \"young girl\" and would like someone with life experience.  SW stated she can utilize other resource for ARMHS services but cannot guarantee the person will be an older person.  Patient is accepting of this referral.  SW will make to [facility]     Current Resources: informal family support, \"someone\" assisting " her with housing search      Community Resources: Other (see comment)(It seems Patient is working with someone for housing--see SW note.  She is accepting of [facility] referral to [facility])  Supplies used at home:: None  Equipment Currently Used at Home: none    Advance Care Plan/Directive  Did not address, Patient very overwhelmed in this call   Advanced Care Plans/Directives on file:: No    Referrals Placed: None     Goals:   Goals        General    1. Mental Health Management (pt-stated)     Notes - Note created  6/5/2019 11:47 AM by Mariia Eduardo BSW    Goal Statement: I will call housing person noted below to discuss next steps for housing within 2 weeks   Measure of Success: Obtain housing   Supportive Steps to Achieve: Patient will call housing person noted below to discuss next steps for housing within 2 weeks   Barriers: Patient currently living in a hotel and needs housing   Strengths: Patient reports she is working with someone to assist with housing, believes her mental health will improve if she finds housing   Date to Achieve By: 6/19/2019  Patient expressed understanding of goal: Yes              Patient/Caregiver understanding: Patient and SW agreed that Patient will contact her housing person to discuss next steps for housing    This call was cut short as Patient is very overwhelmed with discussion of [facility] services, we agreed to talk again in 2 weeks, SW provided her contact information     Outreach Frequency: 2 weeks  Future Appointments              Tomorrow Trudi Rubio,  [place], [place]          Plan:   1)  Patient will call housing person noted below to discuss next steps for housing within 2 weeks   2) SW will send introduction letter only--Patient is easily overwhelmed and Access, Complex plans may be too much for her  3) SW will make [facility] referral to [facility]  today   4)  SW will call Patient in 2 weeks for update and needs assessment, will update provider who recently saw  Patient     Mariia Eduardo, VANCE, MSW   [facility]   [phone]  6/5/2019 12:09 PM

## 2019-06-05 NOTE — LETTER
Pillow CARE COORDINATION    June 5, 2019    Anastasiya Simon  [facility]  [address]  [facility] [address]      Dear Anastasiya,    Thank you for your time today.     The clinic care coordinator is a registered nurse and/or  who understand the health care system. The goal of clinic care coordination is to help you manage your health and improve access to the [facility] system in the most efficient manner. The registered nurse can assist you in meeting your health care goals by providing education, coordinating services, and strengthening the communication among your providers. The  can assist you with financial, behavioral, psychosocial, chemical dependency, counseling, and/or psychiatric resources.    Please feel free to contact me at [phone] with any questions or concerns. We at [facility] are focused on providing you with the highest-quality healthcare experience possible and that all starts with you.     Sincerely,     Mariia Eduardo, VANCE, MSW    Clinical Care Coordination  [facility]  [phone]    Enclosed: I have enclosed a copy of a 24 Hour Access Plan. This has helpful phone numbers for you to call when needed. Please keep this in an easy to access place to use as needed.

## 2019-06-06 ENCOUNTER — OFFICE VISIT (OUTPATIENT)
Dept: FAMILY MEDICINE | Facility: CLINIC | Age: 54
End: 2019-06-06
Payer: MEDICARE

## 2019-06-06 VITALS
OXYGEN SATURATION: 98 % | TEMPERATURE: 99.1 F | DIASTOLIC BLOOD PRESSURE: 90 MMHG | HEART RATE: 111 BPM | SYSTOLIC BLOOD PRESSURE: 130 MMHG | BODY MASS INDEX: 28.59 KG/M2 | WEIGHT: 171.8 LBS

## 2019-06-06 DIAGNOSIS — B19.20 HEPATITIS C VIRUS INFECTION, UNSPECIFIED CHRONICITY: Primary | ICD-10-CM

## 2019-06-06 DIAGNOSIS — E11.65 TYPE 2 DIABETES MELLITUS WITH HYPERGLYCEMIA, WITHOUT LONG-TERM CURRENT USE OF INSULIN (H): ICD-10-CM

## 2019-06-06 PROCEDURE — 99214 OFFICE O/P EST MOD 30 MIN: CPT | Performed by: FAMILY MEDICINE

## 2019-06-06 RX ORDER — FLASH GLUCOSE SENSOR
1 KIT MISCELLANEOUS
Qty: 9 EACH | Refills: 3 | Status: SHIPPED | OUTPATIENT
Start: 2019-06-06 | End: 2019-09-03

## 2019-06-06 RX ORDER — FLASH GLUCOSE SCANNING READER
1 EACH MISCELLANEOUS
Qty: 1 DEVICE | Refills: 3 | Status: SHIPPED | OUTPATIENT
Start: 2019-06-06 | End: 2019-09-03

## 2019-06-06 ASSESSMENT — PAIN SCALES - GENERAL: PAINLEVEL: MILD PAIN (2)

## 2019-06-06 NOTE — PROGRESS NOTES
Subjective     Anastasiya Simon is a 53 year old female who presents to clinic today for the following health issues:    HPI     Patient is here to follow up regarding recent lab results.  Labs show that she has a new diagnosis of DM2 as well as hepatitis C.  She was previously unaware of these issues.  Need to discuss treatment plan.    Patient is schizophrenic and currently living in a hotel.  Her sister is here with her today and states that they are working together to obtain housing for her.  Patient has been in contact with our  and pts sister is requesting another call back from .    Patient Active Problem List   Diagnosis     Cellulitis of left lower limb     Suicidal ideation     Delusions of parasitosis (H)     GERD (gastroesophageal reflux disease)     Other schizophrenia (H)     Type 2 diabetes mellitus with hyperglycemia, without long-term current use of insulin (H)     Hepatitis C virus infection, unspecified chronicity     History reviewed. No pertinent surgical history.    Social History     Tobacco Use     Smoking status: Never Smoker     Smokeless tobacco: Never Used   Substance Use Topics     Alcohol use: Not on file     Family History   Problem Relation Age of Onset     Diabetes Mother            Reviewed and updated as needed this visit by Provider         Review of Systems   ROS COMP: Constitutional, HEENT, cardiovascular, pulmonary, gi and gu systems are negative, except as otherwise noted.      Objective    /90 (BP Location: Right arm, Patient Position: Sitting, Cuff Size: Adult Small)   Pulse 111   Temp 99.1  F (37.3  C) (Tympanic)   Wt 77.9 kg (171 lb 12.8 oz)   SpO2 98%   BMI 28.59 kg/m    Body mass index is 28.59 kg/m .  Physical Exam   GENERAL: healthy, alert and no distress    Diagnostic Test Results:  Results for orders placed or performed in visit on 05/30/19   Hepatitis C Screen Reflex to HCV RNA Quant and Genotype   Result Value Ref Range    Hepatitis C  Antibody Reactive (AA) NR^Nonreactive   Lipid panel reflex to direct LDL Non-fasting   Result Value Ref Range    Cholesterol 147 <200 mg/dL    Triglycerides 195 (H) <150 mg/dL    HDL Cholesterol 28 (L) >49 mg/dL    LDL Cholesterol Calculated 80 <100 mg/dL    Non HDL Cholesterol 119 <130 mg/dL   Pap imaged thin layer screen with HPV - recommended age 30 - 65 years (select HPV order below)   Result Value Ref Range    PAP NIL     Copath Report         Patient Name: DIONI VO  MR#: [id number]  Specimen #: [id number]  Collected: 5/30/2019  Received: 5/31/2019  Reported: 6/3/2019 14:29  Ordering Phy(s): BAUTISTA PUGH    For improved result formatting, select 'View Enhanced Report Format' under   Linked Documents section.    SPECIMEN/STAIN PROCESS:  Pap imaged thin layer prep screening (Surepath, FocalPoint with guided   screening)       Pap-Cyto x 1, HPV ordered x 1    SOURCE: Cervical, endocervical  ----------------------------------------------------------------   Pap imaged thin layer prep screening (Surepath, FocalPoint with guided   screening)  SPECIMEN ADEQUACY:  Satisfactory for evaluation.  -Transformation zone component absent.    CYTOLOGIC INTERPRETATION:    Negative for intraepithelial lesion or malignancy    Electronically signed out by:  ERIN Brown (ASCP)    CLINICAL HISTORY:    Papanicolaou Test Limitations:  Cervical cytology is a screening test with   limited sensitivity; regular  screening is critical  for cancer prevention; Pap tests are primarily   effective for the diagnosis/prevention of  squamous cell carcinoma, not adenocarcinomas or other cancers.    COLLECTION SITE:  Client:  [facility]  Location: [facility] (B)    The [facility] component of [facility], with the professional component performed   at the [facility]  [address] [address] ([phone])       UA reflex to Microscopic and Culture   Result Value Ref Range    Color Urine Yellow     Appearance Urine Clear     Glucose Urine 500 (A) NEG^Negative mg/dL    Bilirubin Urine Negative NEG^Negative    Ketones Urine Negative NEG^Negative mg/dL    Specific Gravity Urine 1.010 1.003 - 1.035    Blood Urine Negative NEG^Negative    pH Urine 7.0 5.0 - 7.0 pH    Protein Albumin Urine Negative NEG^Negative mg/dL    Urobilinogen Urine 0.2 0.2 - 1.0 EU/dL    Nitrite Urine Negative NEG^Negative    Leukocyte Esterase Urine Negative NEG^Negative    Source Midstream Urine    TSH with free T4 reflex   Result Value Ref Range    TSH 1.71 0.40 - 4.00 mU/L   Comprehensive metabolic panel   Result Value Ref Range    Sodium 134 133 - 144 mmol/L    Potassium 4.2 3.4 - 5.3 mmol/L    Chloride 102 94 - 109 mmol/L    Carbon Dioxide 23 20 - 32 mmol/L    Anion Gap 9 3 - 14 mmol/L    Glucose 369 (H) 70 - 99 mg/dL    Urea Nitrogen 15 7 - 30 mg/dL    Creatinine 0.59 0.52 - 1.04 mg/dL    GFR Estimate >90 >60 mL/min/[1.73_m2]    GFR Estimate If Black >90 >60 mL/min/[1.73_m2]    Calcium 9.2 8.5 - 10.1 mg/dL    Bilirubin Total 0.3 0.2 - 1.3 mg/dL    Albumin 3.7 3.4 - 5.0 g/dL    Protein Total 8.2 6.8 - 8.8 g/dL    Alkaline Phosphatase 162 (H) 40 - 150 U/L     (HH) 0 - 50 U/L     (H) 0 - 45 U/L   Hemoglobin A1c   Result Value Ref Range    Hemoglobin A1C 12.5 (H) 0 - 5.6 %   HIV Antigen Antibody Combo   Result Value Ref Range    HIV Antigen Antibody Combo Nonreactive NR^Nonreactive       Hepatitis C RNA Quantitative   Result Value Ref Range    HCV RNA Quant IU/ml 7,607,921 (A) HCVND^HCV RNA Not Detected [IU]/mL    Log of HCV RNA Qt 6.9 (H) <1.2 Log IU/mL   HPV High Risk Types DNA Cervical   Result Value Ref Range    HPV Source SurePath     HPV 16 DNA Negative NEG^Negative    HPV 18 DNA Negative NEG^Negative    Other HR HPV Negative NEG^Negative    Final  Diagnosis This patient's sample is negative for HPV DNA.     Specimen Description Cervical Cells            Assessment & Plan     1. Hepatitis C virus infection, unspecified chronicity  - Referred to hepatology for further eval/treatment   - Discussed with pt that cost of the treatment could be prohibitive for her   - GASTROENTEROLOGY ADULT REF CONSULT ONLY    2. Type 2 diabetes mellitus with hyperglycemia, without long-term current use of insulin (H)  - New diagnosis  - A1C 12.5  - Start metformin 500mg BID  - Plan to start slow with medication for this patient since she's been paranoid about medications in the past.  I want to try to prevent side effects because I'm worried she won't take medications for her diabetes otherwise  - We discussed the option of starting with insulin, but she is scared of using needles and is also in a living situation where I have concern for others stealing her needles and potentially spreading hepatitis c   - Will get her a Freestyle Tanya monitor since she is hesitant to check her blood sugars.  Advised her to bring it with her to her next appt.    - Offered DM education, but she refuses at this time.  She feels like it would be overwhelming for her  - Plan to increase dose of metformin at her next visit as long as she isn't having side effects  - metFORMIN (GLUCOPHAGE) 500 MG tablet; Take 1 tablet (500 mg) by mouth 2 times daily (with meals)  Dispense: 60 tablet; Refill: 0  - Continuous Blood Gluc  (FREESTYLE TANYA 14 DAY READER) STACI; 1 each every 10 days  Dispense: 1 Device; Refill: 3  - Continuous Blood Gluc Sensor (FREESTYLE TANYA 14 DAY SENSOR) MISC; 1 each every 10 days  Dispense: 9 each; Refill: 3    Greater than 50% of this visit was spent counseling face to face regarding the above diagnosis  Visit lasted approximately 30 minutes      Return in about 1 month (around 7/4/2019).    Trudi Rubio DO  [facility]

## 2019-06-06 NOTE — PATIENT INSTRUCTIONS
[facility]     Discharged by : Amilcar Comer CMA on 6/6/2019 at 2:32 PM    Paper scripts provided to patient :      If you have any questions regarding your visit please contact your care team:     Team Kate              Clinic Hours Telephone Number     Dr. Samuel Lux, [facility]   7am-7pm  Monday - Thursday   7am-5pm  Fridays  [phone]   (Appointment scheduling available 24/7)     RN Line  [phone] option 2     Urgent Care - [place] and [place] - 11am-9pm Monday-Friday Saturday-Sunday- 9am-5pm     [place] -   5pm-9pm Monday-Friday Saturday-Sunday- 9am-5pm    [phone] - [place]    [phone] - [place]     For a Price Quote for your services, please call our Consumer Price Line at [phone].     What options do I have for visits at the clinic other than the traditional office visit?     To expand how we care for you, many of our providers are utilizing electronic visits (e-visits) and telephone visits, when medically appropriate, for interactions with their patients rather than a visit in the clinic. We also offer nurse visits for many medical concerns. Just like any other service, we will bill your insurance company for this type of visit based on time spent on the phone with your provider. Not all insurance companies cover these visits. Please check with your medical insurance if this type of visit is covered. You will be responsible for any charges that are not paid by your insurance.     E-visits via 7fgame: generally incur a $45.00 fee.     Telephone visits:  Time spent on the phone: *charged based on time that is spent on the phone in increments of 10 minutes. Estimated cost:   5-10 mins $30.00   11-20 mins. $59.00   21-30 mins. $85.00       Use 7fgame (secure email communication and access to your chart) to send your primary care provider a message or make an appointment. Ask someone on your Team how  to sign up for [employer].     As always, Thank you for trusting us with your health care needs!      [facility]:    Scheduling Appointments  [place] [place]  Call: [phone]    [facility]  Call: [phone]    [facility]  Call: [phone]    For Gastroenterology referrals   [facility]enter[facility]   Clinics and Surgery Center, 4th Floor   [address] [place] [address]   Appointments: [phone]    WHERE TO GO FOR CARE?    Clinic    Make an appointment if you:       Are sick (cold, cough, flu, sore throat, earache or in pain).       Have a small injury (sprain, small cut, burn or broken bone).       Need a physical exam, Pap smear, vaccine or prescription refill.       Have questions about your health or medicines.    To reach us:      Call [phone] ([phone]). Open 24 hours every day. (For counseling services, call [phone].)    Log into [employer] at [email or web address]. (Visit [email or web address] to create an account.) Hospital emergency room    An emergency is a serious or life- threatening problem that must be treated right away.    Call 956 or get to the hospital if you have:      Very bad or sudden:            - Chest pain or pressure         - Bleeding         - Head or belly pain         - Dizziness or trouble seeing, walking or                          Speaking      Problems breathing      Blood in your vomit or you are coughing up blood      A major injury (knocked out, loss of a finger or limb, rape, broken bone protruding from skin)    A mental health crisis. (Or call the Mental Health Crisis line at 1-748.173.3546 or Suicide Prevention Hotline at 1-540.438.2892.)    Open 24 hours every day. You don't need an appointment.     Urgent care    Visit urgent care for sickness or small injuries when the clinic is closed. You don't need an appointment. To check hours or find an urgent  care near you, visit [email or web address]. Online care    Get online care from [facility] for more than 70 common problems, like colds, allergies and infections. Open 24 hours every day at:   [email or web address]   Need help deciding?    For advice about where to be seen, you may call your clinic and ask to speak with a nurse. We're here for you 24 hours every day.         If you are deaf or hard of hearing, please let us know. We provide many free services including sign language interpreters, oral interpreters, TTYs, telephone amplifiers, note takers and written materials.

## 2019-06-07 LAB
HCV GENTYP SERPL NAA+PROBE: NORMAL
HCV RNA SERPL NAA+PROBE-ACNC: ABNORMAL [IU]/ML
HCV RNA SERPL NAA+PROBE-LOG IU: 6.9 LOG IU/ML

## 2019-06-13 DIAGNOSIS — B19.20 HEPATITIS C VIRUS INFECTION, UNSPECIFIED CHRONICITY: Primary | ICD-10-CM

## 2019-06-13 DIAGNOSIS — Z11.59 ENCOUNTER FOR SCREENING FOR OTHER VIRAL DISEASES: ICD-10-CM

## 2019-06-21 NOTE — TELEPHONE ENCOUNTER
RECORDS RECEIVED FROM: Hep C // Referred by Trudi Rubio // scheduled per patient's sister Melvi   DATE RECEIVED: 06.26.2019   NOTES STATUS DETAILS   OFFICE NOTE from referring provider Internal 06.06.2019   OFFICE NOTES from other specialists N/A    DISCHARGE SUMMARY from hospital N/A    MEDICATION LIST Internal    LIVER BIOSPY (IF APPLICABLE)      PATHOLOGY REPORTS  N/A    IMAGING     ENDOSCOPY (IF AVAILABLE) N/A    COLONOSCOPY (IF AVAILABLE) N/A    ULTRASOUND LIVER N/A    CT OF ABDOMEN N/A    MRI OF LIVER N/A    FIBROSCAN, US ELASTOGRAPHY, FIBROSIS SCAN, MR ELASTOGRAPHY N/A    LABS     HEPATIC PANEL (LIVER PANEL) In process    BASIC METABOLIC PANEL In process    COMPLETE METABOLIC PANEL In process    COMPLETE BLOOD COUNT (CBC) In process    INTERNATIONAL NORMALIZED RATIO (INR) In process    HEPATITIS C ANTIBODY N/A    HEPATITIS C VIRAL LOAD/PCR N/A    HEPATITIS C GENOTYPE N/A    HEPATITIS B SURFACE ANTIGEN In process    HEPATITIS B SURFACE ANTIBODY In process    HEPATITIS B DNA QUANT LEVEL N/A    HEPATITIS B CORE ANTIBODY In process

## 2019-06-25 NOTE — PROGRESS NOTES
Hepatology Clinic Note  Anastasiya Simon   Date of Birth 1965  Date of Service 6/25/2019    REASON FOR CONSULTATION: Hepatitis C  REFERRING PROVIDER: Trudi Rubio,           Assessment/plan:   Anastasiya Simon is a 53 year old female with Hepatitis C, genotype 3a. Currently there are no biochemical or physical signs of cirrhosis. Transaminases trending up over the past few months due to uncontrolled diabetes. We discussed the natural course of Hepatitis C virus and the benefits of treating the disease. We discussed the treatment regimen and medication side effects.    Patient currently living in hotel and will possibly be moving in coming months. She has a history of schizophrenia and has not been taking her medications for the past month so concern for noncompliance. Lately, she has been experiencing significant insomnia. Discussed that would like to see her have a more stable living situation and have her set up with a consistent . Discussed that I would also like her to follow up with psychiatry to discuss insomnia and alternative medications for schizophrenia and to demonstrate compliance with medications.     - Fibrosis scan today to assess baseline fibrosis   - Follow up in clinic in a few months to discuss ability to start Hepatitis C treatment   - Optimization of blood glucose levels  - Follow up with PCP for optimization of medical co-morbidities  - Follow up in clinic in 2 months     Cristina Flood PA-C   [facility]    -----------------------------------------------------       HPI:   Anastasiya Simon is a 53 year old female  presenting for evaluation and treatment of Hepatitis C. She is accompanied by her two sisters today.     Hepatitis C   -Genotype 3a  -Diagnosed:   -History: ? Tattoo, polysubstance abuse   -Prior biopsy: No   -Prior treatments: Naive     Patient states she was diagnosed with Hep C in the past few months. She is not certain how she acquired the  virus. She does have a history of amateur tattoos and per chart review, history of meth use. Recently diagnosed with DM type 2 and hemoglobin A1c is 12.5.     Patient denies jaundice, lower extremity edema, abdominal distension or confusion.  Patient also denies melena, hematochezia or hematemesis. Patient denies weight loss, fevers, sweats or chills.    She has not been taking her Zyprexa or Depakote over the past month. She did not like the way the medications made her feel. She states she is not an aggressive person and thinks they are only used for individuals that are aggressive and violent.     She has complaints of insomnia and not sleeping. She states she might sleep two hours a night.     PMH: DM type 2, paranoid schizophrenia, history of suicide ideation, history of alcohol intoxication, OAB, GERD, insomnia    SMH: No significant surgical history     Medications: See below     She does not smoke cigarettes. She denies a history of IV/ANTONIA. Per chart review, she has history of meth use. She has been in a hotel the past few months. She was hospitalized for schizophrenia and suicidal ideation a few months ago and has been in a hotel since that time. Her sisters are working with [employer] and looking to get her into housing closer to the [facility]. They are working with [employer]. Her previous  is not longer working there.  Patient has a history of homelessness.     Lab work-up thus far:  HCV RNA [id number]  HAV Ab unknown   HBV SAb unknown   HBV SAg unknown   HBV CAb unknown   HIV nonreactive      Medical hx Surgical hx   No past medical history on file. No past surgical history on file.              Medications:     Current Outpatient Medications   Medication     Continuous Blood Gluc  (FREESTYLE EVANGELIST 14 DAY READER) STACI     Continuous Blood Gluc Sensor (FREESTYLE EVANGELIST 14 DAY SENSOR) MISC     metFORMIN (GLUCOPHAGE) 500 MG tablet     omeprazole (PRILOSEC) 20  MG DR capsule     oxybutynin ER (DITROPAN XL) 15 MG 24 hr tablet     tolterodine ER (DETROL LA) 2 MG 24 hr capsule     divalproex sodium extended-release (DEPAKOTE ER) 500 MG 24 hr tablet     OLANZapine (ZYPREXA) 10 MG tablet     OLANZapine (ZYPREXA) 20 MG tablet     No current facility-administered medications for this visit.             Allergies:     Allergies   Allergen Reactions     Tetracycline             Social History:     Social History     Socioeconomic History     Marital status: Single     Spouse name: Not on file     Number of children: Not on file     Years of education: Not on file     Highest education level: Not on file   Occupational History     Not on file   Social Needs     Financial resource strain: Not on file     Food insecurity:     Worry: Not on file     Inability: Not on file     Transportation needs:     Medical: Not on file     Non-medical: Not on file   Tobacco Use     Smoking status: Never Smoker     Smokeless tobacco: Never Used   Substance and Sexual Activity     Alcohol use: Not on file     Drug use: Not on file     Sexual activity: Not on file   Lifestyle     Physical activity:     Days per week: Not on file     Minutes per session: Not on file     Stress: Not on file   Relationships     Social connections:     Talks on phone: Not on file     Gets together: Not on file     Attends Amish service: Not on file     Active member of club or organization: Not on file     Attends meetings of clubs or organizations: Not on file     Relationship status: Not on file     Intimate partner violence:     Fear of current or ex partner: Not on file     Emotionally abused: Not on file     Physically abused: Not on file     Forced sexual activity: Not on file   Other Topics Concern     Not on file   Social History Narrative     Not on file            Family History:     Family History   Problem Relation Age of Onset     Diabetes Mother             Review of Systems:   GEN: See HPI  HEENT: No  "change in vision or hearing, mouth sores, dysphagia, lymph nodes  Resp: No shortness of breath, coughing, hx of asthma  CV: No chest pain, palpitations, syncope   GI: See HPI  : No dysuria, history of stones, urine color    Skin: No rash; no pruritus or psoriasis  MS: No arthralgias, myalgias, joint swelling  Neuro: No memory changes, confusion, numbness    Heme: No difficulty clotting, bruising, bleeding  Psych:  No anxiety, depression, agitation          Physical Exam:   VS:  /84   Pulse 109   Temp 97.6  F (36.4  C)   Ht 1.651 m (5' 5\")   Wt 78 kg (172 lb)   SpO2 95%   BMI 28.62 kg/m        Gen: A&Ox3, NAD, well developed  HEENT: non-icteric   CV: RRR, no overt murmurs  Lung: CTA Bilatererally, no wheezing or crackles.   Lym- no palpable lymphadenopathy  Abd: central adiposity, soft, NT, ND, no palpable splenomegaly, liver is not palpable.   Ext: no edema, intact pulses.   Skin: No rash, no palmar erythema, telangiectasias or jaundice  Neuro: grossly intact, no asterixis   Psych: appropriate mood, anxious           Data:   Reviewed in person and significant for:    Lab Results   Component Value Date     05/30/2019      Lab Results   Component Value Date    POTASSIUM 4.2 05/30/2019     Lab Results   Component Value Date    CHLORIDE 102 05/30/2019     Lab Results   Component Value Date    CO2 23 05/30/2019     Lab Results   Component Value Date    BUN 15 05/30/2019     Lab Results   Component Value Date    CR 0.59 05/30/2019       Lab Results   Component Value Date    WBC 8.9 03/24/2019     Lab Results   Component Value Date    HGB 14.3 03/24/2019     Lab Results   Component Value Date    HCT 42.3 03/24/2019     Lab Results   Component Value Date    MCV 88 03/24/2019     Lab Results   Component Value Date     03/24/2019       Lab Results   Component Value Date     05/30/2019     Lab Results   Component Value Date     05/30/2019     No results found for: KISHA   Lab Results "   Component Value Date    BILITOTAL 0.3 05/30/2019       Lab Results   Component Value Date    ALBUMIN 3.7 05/30/2019     Lab Results   Component Value Date    PROTTOTAL 8.2 05/30/2019      Lab Results   Component Value Date    ALKPHOS 162 05/30/2019       Lab Results   Component Value Date    INR 0.99 03/16/2019

## 2019-06-26 ENCOUNTER — TELEPHONE (OUTPATIENT)
Dept: GASTROENTEROLOGY | Facility: CLINIC | Age: 54
End: 2019-06-26

## 2019-06-26 ENCOUNTER — OFFICE VISIT (OUTPATIENT)
Dept: GASTROENTEROLOGY | Facility: CLINIC | Age: 54
End: 2019-06-26
Attending: FAMILY MEDICINE
Payer: MEDICARE

## 2019-06-26 ENCOUNTER — PRE VISIT (OUTPATIENT)
Dept: GASTROENTEROLOGY | Facility: CLINIC | Age: 54
End: 2019-06-26

## 2019-06-26 VITALS
SYSTOLIC BLOOD PRESSURE: 155 MMHG | WEIGHT: 172 LBS | BODY MASS INDEX: 28.66 KG/M2 | TEMPERATURE: 97.6 F | HEIGHT: 65 IN | OXYGEN SATURATION: 95 % | DIASTOLIC BLOOD PRESSURE: 84 MMHG | HEART RATE: 109 BPM

## 2019-06-26 DIAGNOSIS — B19.20 HEPATITIS C VIRUS INFECTION, UNSPECIFIED CHRONICITY: ICD-10-CM

## 2019-06-26 DIAGNOSIS — Z11.59 ENCOUNTER FOR SCREENING FOR OTHER VIRAL DISEASES: ICD-10-CM

## 2019-06-26 LAB
ALBUMIN SERPL-MCNC: 3.6 G/DL (ref 3.4–5)
ALP SERPL-CCNC: 190 U/L (ref 40–150)
ALT SERPL W P-5'-P-CCNC: 476 U/L (ref 0–50)
ANION GAP SERPL CALCULATED.3IONS-SCNC: 12 MMOL/L (ref 3–14)
AST SERPL W P-5'-P-CCNC: 402 U/L (ref 0–45)
BILIRUB DIRECT SERPL-MCNC: 0.1 MG/DL (ref 0–0.2)
BILIRUB SERPL-MCNC: 0.3 MG/DL (ref 0.2–1.3)
BUN SERPL-MCNC: 12 MG/DL (ref 7–30)
CALCIUM SERPL-MCNC: 9.3 MG/DL (ref 8.5–10.1)
CHLORIDE SERPL-SCNC: 99 MMOL/L (ref 94–109)
CO2 SERPL-SCNC: 22 MMOL/L (ref 20–32)
CREAT SERPL-MCNC: 0.47 MG/DL (ref 0.52–1.04)
ERYTHROCYTE [DISTWIDTH] IN BLOOD BY AUTOMATED COUNT: 12.7 % (ref 10–15)
GFR SERPL CREATININE-BSD FRML MDRD: >90 ML/MIN/{1.73_M2}
GLUCOSE SERPL-MCNC: 351 MG/DL (ref 70–99)
HBV CORE AB SERPL QL IA: NONREACTIVE
HBV SURFACE AB SERPL IA-ACNC: 852.77 M[IU]/ML
HBV SURFACE AG SERPL QL IA: NONREACTIVE
HCT VFR BLD AUTO: 45.6 % (ref 35–47)
HGB BLD-MCNC: 15 G/DL (ref 11.7–15.7)
INR PPP: 1.12 (ref 0.86–1.14)
MCH RBC QN AUTO: 30.1 PG (ref 26.5–33)
MCHC RBC AUTO-ENTMCNC: 32.9 G/DL (ref 31.5–36.5)
MCV RBC AUTO: 91 FL (ref 78–100)
PLATELET # BLD AUTO: 294 10E9/L (ref 150–450)
POTASSIUM SERPL-SCNC: 3.8 MMOL/L (ref 3.4–5.3)
PROT SERPL-MCNC: 8.1 G/DL (ref 6.8–8.8)
RBC # BLD AUTO: 4.99 10E12/L (ref 3.8–5.2)
SODIUM SERPL-SCNC: 133 MMOL/L (ref 133–144)
WBC # BLD AUTO: 6 10E9/L (ref 4–11)

## 2019-06-26 PROCEDURE — 80048 BASIC METABOLIC PNL TOTAL CA: CPT | Performed by: PHYSICIAN ASSISTANT

## 2019-06-26 PROCEDURE — 85027 COMPLETE CBC AUTOMATED: CPT | Performed by: PHYSICIAN ASSISTANT

## 2019-06-26 PROCEDURE — 85610 PROTHROMBIN TIME: CPT | Performed by: PHYSICIAN ASSISTANT

## 2019-06-26 PROCEDURE — 86706 HEP B SURFACE ANTIBODY: CPT | Performed by: PHYSICIAN ASSISTANT

## 2019-06-26 PROCEDURE — 86704 HEP B CORE ANTIBODY TOTAL: CPT | Performed by: PHYSICIAN ASSISTANT

## 2019-06-26 PROCEDURE — G0463 HOSPITAL OUTPT CLINIC VISIT: HCPCS | Mod: ZF

## 2019-06-26 PROCEDURE — 87340 HEPATITIS B SURFACE AG IA: CPT | Performed by: PHYSICIAN ASSISTANT

## 2019-06-26 PROCEDURE — 91200 LIVER ELASTOGRAPHY: CPT | Mod: ZF

## 2019-06-26 PROCEDURE — 36415 COLL VENOUS BLD VENIPUNCTURE: CPT | Performed by: PHYSICIAN ASSISTANT

## 2019-06-26 PROCEDURE — 80076 HEPATIC FUNCTION PANEL: CPT | Performed by: PHYSICIAN ASSISTANT

## 2019-06-26 ASSESSMENT — MIFFLIN-ST. JEOR: SCORE: 1376.07

## 2019-06-26 ASSESSMENT — PAIN SCALES - GENERAL: PAINLEVEL: NO PAIN (0)

## 2019-06-26 NOTE — LETTER
6/26/2019      RE: Anastasiya Simon  [facility]  [address]  [place] [address]       Hepatology Clinic Note  Anastasiya Simon   Date of Birth 1965  Date of Service 6/25/2019    REASON FOR CONSULTATION: Hepatitis C  REFERRING PROVIDER: Trudi Rubio DO          Assessment/plan:   Anastasiya Simon is a 53 year old female with Hepatitis C, genotype 3a. Currently there are no biochemical or physical signs of cirrhosis. Transaminases trending up over the past few months due to uncontrolled diabetes. We discussed the natural course of Hepatitis C virus and the benefits of treating the disease. We discussed the treatment regimen and medication side effects.    Patient currently living in [facility] and will possibly be moving in coming months. She has a history of schizophrenia and has not been taking her medications for the past month so concern for noncompliance. Lately, she has been experiencing significant insomnia. Discussed that would like to see her have a more stable living situation and have her set up with a consistent . Discussed that I would also like her to follow up with psychiatry to discuss insomnia and alternative medications for schizophrenia and to demonstrate compliance with medications.     - Fibrosis scan today to assess baseline fibrosis   - Follow up in clinic in a few months to discuss ability to start Hepatitis C treatment   - Optimization of blood glucose levels  - Follow up with PCP for optimization of medical co-morbidities  - Follow up in clinic in 2 months     Cristina Flood PA-C   [facility]    -----------------------------------------------------       HPI:   Anastasiya Simon is a 53 year old female  presenting for evaluation and treatment of Hepatitis C. She is accompanied by her two sisters today.     Hepatitis C   -Genotype 3a  -Diagnosed:   -History: ? Tattoo, polysubstance abuse   -Prior biopsy: No   -Prior treatments: Naive     Patient  states she was diagnosed with Hep C in the past few months. She is not certain how she acquired the virus. She does have a history of amateur tattoos and per chart review, history of meth use. Recently diagnosed with DM type 2 and hemoglobin A1c is 12.5.     Patient denies jaundice, lower extremity edema, abdominal distension or confusion.  Patient also denies melena, hematochezia or hematemesis. Patient denies weight loss, fevers, sweats or chills.    She has not been taking her Zyprexa or Depakote over the past month. She did not like the way the medications made her feel. She states she is not an aggressive person and thinks they are only used for individuals that are aggressive and violent.     She has complaints of insomnia and not sleeping. She states she might sleep two hours a night.     PMH: DM type 2, paranoid schizophrenia, history of suicide ideation, history of alcohol intoxication, OAB, GERD, insomnia    SMH: No significant surgical history     Medications: See below     She does not smoke cigarettes. She denies a history of IV/ANTONIA. Per chart review, she has history of meth use. She has been in a hotel the past few months. She was hospitalized for schizophrenia and suicidal ideation a few months ago and has been in a hotel since that time. Her sisters are working with [facility] and looking to get her into housing closer to the [facility]. They are working with [facility]. Her previous  is not longer working there.  Patient has a history of homelessness.     Lab work-up thus far:  HCV RNA [id number]  HAV Ab unknown   HBV SAb unknown   HBV SAg unknown   HBV CAb unknown   HIV nonreactive      Medical hx Surgical hx   No past medical history on file. No past surgical history on file.              Medications:     Current Outpatient Medications   Medication     Continuous Blood Gluc  (FREESTYLE EVANGELIST 14 DAY READER) STACI     Continuous Blood Gluc Sensor  (FREESTYLE EVANGELIST 14 DAY SENSOR) MISC     metFORMIN (GLUCOPHAGE) 500 MG tablet     omeprazole (PRILOSEC) 20 MG DR capsule     oxybutynin ER (DITROPAN XL) 15 MG 24 hr tablet     tolterodine ER (DETROL LA) 2 MG 24 hr capsule     divalproex sodium extended-release (DEPAKOTE ER) 500 MG 24 hr tablet     OLANZapine (ZYPREXA) 10 MG tablet     OLANZapine (ZYPREXA) 20 MG tablet     No current facility-administered medications for this visit.             Allergies:     Allergies   Allergen Reactions     Tetracycline             Social History:     Social History     Socioeconomic History     Marital status: Single     Spouse name: Not on file     Number of children: Not on file     Years of education: Not on file     Highest education level: Not on file   Occupational History     Not on file   Social Needs     Financial resource strain: Not on file     Food insecurity:     Worry: Not on file     Inability: Not on file     Transportation needs:     Medical: Not on file     Non-medical: Not on file   Tobacco Use     Smoking status: Never Smoker     Smokeless tobacco: Never Used   Substance and Sexual Activity     Alcohol use: Not on file     Drug use: Not on file     Sexual activity: Not on file   Lifestyle     Physical activity:     Days per week: Not on file     Minutes per session: Not on file     Stress: Not on file   Relationships     Social connections:     Talks on phone: Not on file     Gets together: Not on file     Attends Moravian service: Not on file     Active member of club or organization: Not on file     Attends meetings of clubs or organizations: Not on file     Relationship status: Not on file     Intimate partner violence:     Fear of current or ex partner: Not on file     Emotionally abused: Not on file     Physically abused: Not on file     Forced sexual activity: Not on file   Other Topics Concern     Not on file   Social History Narrative     Not on file            Family History:     Family History  "  Problem Relation Age of Onset     Diabetes Mother             Review of Systems:   GEN: See HPI  HEENT: No change in vision or hearing, mouth sores, dysphagia, lymph nodes  Resp: No shortness of breath, coughing, hx of asthma  CV: No chest pain, palpitations, syncope   GI: See HPI  : No dysuria, history of stones, urine color    Skin: No rash; no pruritus or psoriasis  MS: No arthralgias, myalgias, joint swelling  Neuro: No memory changes, confusion, numbness    Heme: No difficulty clotting, bruising, bleeding  Psych:  No anxiety, depression, agitation          Physical Exam:   VS:  /84   Pulse 109   Temp 97.6  F (36.4  C)   Ht 1.651 m (5' 5\")   Wt 78 kg (172 lb)   SpO2 95%   BMI 28.62 kg/m         Gen: A&Ox3, NAD, well developed  HEENT: non-icteric   CV: RRR, no overt murmurs  Lung: CTA Bilatererally, no wheezing or crackles.   Lym- no palpable lymphadenopathy  Abd: central adiposity, soft, NT, ND, no palpable splenomegaly, liver is not palpable.   Ext: no edema, intact pulses.   Skin: No rash, no palmar erythema, telangiectasias or jaundice  Neuro: grossly intact, no asterixis   Psych: appropriate mood, anxious           Data:   Reviewed in person and significant for:    Lab Results   Component Value Date     05/30/2019      Lab Results   Component Value Date    POTASSIUM 4.2 05/30/2019     Lab Results   Component Value Date    CHLORIDE 102 05/30/2019     Lab Results   Component Value Date    CO2 23 05/30/2019     Lab Results   Component Value Date    BUN 15 05/30/2019     Lab Results   Component Value Date    CR 0.59 05/30/2019       Lab Results   Component Value Date    WBC 8.9 03/24/2019     Lab Results   Component Value Date    HGB 14.3 03/24/2019     Lab Results   Component Value Date    HCT 42.3 03/24/2019     Lab Results   Component Value Date    MCV 88 03/24/2019     Lab Results   Component Value Date     03/24/2019       Lab Results   Component Value Date     05/30/2019 "     Lab Results   Component Value Date     05/30/2019     No results found for: BILICONJ   Lab Results   Component Value Date    BILITOTAL 0.3 05/30/2019       Lab Results   Component Value Date    ALBUMIN 3.7 05/30/2019     Lab Results   Component Value Date    PROTTOTAL 8.2 05/30/2019      Lab Results   Component Value Date    ALKPHOS 162 05/30/2019       Lab Results   Component Value Date    INR 0.99 03/16/2019       QI AvilaC

## 2019-06-26 NOTE — NURSING NOTE
"Chief Complaint   Patient presents with     Consult     New Patient Hepatitis C     /84   Pulse 109   Temp 97.6  F (36.4  C)   Ht 1.651 m (5' 5\")   Wt 78 kg (172 lb)   SpO2 95%   BMI 28.62 kg/m    Ibeth Arriola CMA on 6/26/2019 at 1:02 PM    "

## 2019-06-27 ENCOUNTER — PATIENT OUTREACH (OUTPATIENT)
Dept: CARE COORDINATION | Facility: CLINIC | Age: 54
End: 2019-06-27

## 2019-06-27 NOTE — PROGRESS NOTES
"Clinic Care Coordination Contact--Social Work Follow Up Call   [facility]/Voicemail       Clinical Data: Care Coordinator Outreach.  ARAMIS recently spoke with Patient, her main goal is housing.  SW was informed that Patient's sister was recently at her clinic visit and is now working on this goal with her.  SW noted in recent clinic visit that she was to call Patient's sister to discuss Patient needs     Outreach attempted x 1.  ARAMIS left message for Patient's sister Tamika requesting return call   Plan: Care Coordinator will try to reach patient again in 3-5 business days.    6/28/2019:     Patient's sister left message for SW requesting return call.      SW will call Patient and sister early next week for needs assessment     Mariia Eduardo, KEYONAW, MSW   [facility]   [phone]  6/28/2019 11:48 AM      Clinic Care Coordination Contact--Social Work Follow Up Call   [facility]/Voicemail    Clinical Data: Care Coordinator Outreach.  ARAMIS recently spoke with Patient, her main goal is housing.  SW was informed that Patient's sister was recently at her clinic visit and is now working on this goal with her.  ARAMIS noted in recent clinic visit that she was to call Patient's sister to discuss Patient needs     Outreach attempted x 1.  ARAMIS left message for Patient's sister Tamika requesting return call   Plan: Care Coordinator will try to reach patient again in 3-5 business days.    Patient's sister Tamika left message for SW yesterday requesting return call.  Per her message, Patient needs a \"ARAMIS real bad\" and needs support.  She is requesting SW to attend Patient's scheduled clinic visit on 7/3/2019 to discuss Patient needs.      Call to Patient's sister Tamika.  ARAMIS informed she cannot attend 7/3/2019 appointment.  ARAMIS discussed and suggested she make referral for an Adult Mental Health  to assist Patient.  Sister is accepting.  ARAMIS is however, uncertain where to make this referral.  " Patient's last address was [facility], she is currently living in [facility] [place] in [place] with potential move to [place]/[facility].  SW will call to clarify.  Patient's sister reports their goal to have Patient move to a trailer home, they are trying to obtain for her.  Patient's sister reports that Patient was living in her father's home, but the home was condemned as she has hoarding behavior, she also allowed many homeless individuals  live with her who were addicts..  She reports Patient has no rental history.  Patient's sister reports that Patient [facility], but they only met a few times and lost contact.  SW mentioned that another opportunity is [facility], which is a minimal daily rate.      SW stated she will call [facility] to address next steps for Patient.  Call to [facility] to discuss.  After lengthy hold, hung up. Will try in a few days     VANCE Perez, MSW   [facility]   [phone]  7/1/2019 2:47 PM

## 2019-06-28 ENCOUNTER — TELEPHONE (OUTPATIENT)
Dept: GASTROENTEROLOGY | Facility: CLINIC | Age: 54
End: 2019-06-28

## 2019-07-03 ENCOUNTER — TELEPHONE (OUTPATIENT)
Dept: FAMILY MEDICINE | Facility: CLINIC | Age: 54
End: 2019-07-03

## 2019-07-03 ENCOUNTER — OFFICE VISIT (OUTPATIENT)
Dept: FAMILY MEDICINE | Facility: CLINIC | Age: 54
End: 2019-07-03
Payer: MEDICARE

## 2019-07-03 VITALS
WEIGHT: 171.6 LBS | DIASTOLIC BLOOD PRESSURE: 80 MMHG | HEIGHT: 65 IN | TEMPERATURE: 97.9 F | OXYGEN SATURATION: 98 % | HEART RATE: 78 BPM | BODY MASS INDEX: 28.59 KG/M2 | SYSTOLIC BLOOD PRESSURE: 142 MMHG

## 2019-07-03 DIAGNOSIS — E11.65 TYPE 2 DIABETES MELLITUS WITH HYPERGLYCEMIA, WITHOUT LONG-TERM CURRENT USE OF INSULIN (H): ICD-10-CM

## 2019-07-03 DIAGNOSIS — B19.20 HEPATITIS C VIRUS INFECTION, UNSPECIFIED CHRONICITY: Primary | ICD-10-CM

## 2019-07-03 DIAGNOSIS — B18.2 CHRONIC HEPATITIS C WITHOUT HEPATIC COMA (H): ICD-10-CM

## 2019-07-03 DIAGNOSIS — F20.89 OTHER SCHIZOPHRENIA (H): ICD-10-CM

## 2019-07-03 PROCEDURE — 99214 OFFICE O/P EST MOD 30 MIN: CPT | Performed by: FAMILY MEDICINE

## 2019-07-03 RX ORDER — TOLTERODINE 2 MG/1
2 CAPSULE, EXTENDED RELEASE ORAL DAILY
Qty: 90 CAPSULE | Refills: 1 | Status: CANCELLED | OUTPATIENT
Start: 2019-07-03

## 2019-07-03 ASSESSMENT — MIFFLIN-ST. JEOR: SCORE: 1374.25

## 2019-07-03 NOTE — Clinical Note
Just an FYI that I saw Anastasiya and her sister on 7/3.  She signed a consent to communicate.  She is waiting on approval to get into a new apartment in [place], so may be transferring to the [facility].

## 2019-07-03 NOTE — LETTER
7/3/2019    INSURER: Payor: MEDICARE / Plan: MEDICARE / Product Type: Medicare /     Re: Prior Authorization Request  Patient: Anastasiya Simon  Policy ID#:  [id number]  : 1965      To Whom it May Concern:    I am writing to formally request a prior authorization of coverage for my patient,  Anastasiya Simon, for treatment using the Freestyle Tanya Continuous Glucose Monitor.  I am requesting authorization for applicable provider professional and facility services associated with this therapy.    The therapy involves using a continuous glucose monitor to track blood glucose.    The benefits of the therapy include better glucose control in newly diagnosed diabetes.    I have treated Anastasiya iSmon since 17 and I have determined that it is medically appropriate for  this patient to receive a Freestyle Tanya Continuous Glucose monitor  for the reason(s) stated below:      Patient has schizophrenia.  She was diagnosed with diabetes type 2 in May 2019 and is unable to use traditional fingerstick glucose monitoring due to her psychiatric symptoms.  Her A1C is over 12 and we are requesting a continuous glucose monitor to help track her glucose more closely.       If the patient does not receive this device, her diabetes will likely remain uncontrolled and could cause numerous complications including hospital admission, pancreatitis, kidney failure, and myocardial infarction     I have included medical records pertaining to the patient s medical history, current condition and treatment plan.  In addition, the following billing codes will be used for therapy and follow-up: E11.65 and F20.89    I firmly believe that this therapy is clinically appropriate and that Anastasiya Simon would benefit from improved clinical outcomes if allowed the opportunity to receive this device.  Please contact me at Dept: [phone] if you require additional information to ensure the prompt approval for coverage.    Please send  your written decision to me at this address:  [address] [address]  [phone]  Dept: [phone]  E-mail: [email or web address]      Sincerely,        Trudi Rubio MD

## 2019-07-03 NOTE — TELEPHONE ENCOUNTER
TC, please fax prior authorization letter for continuous glucose meter to insurance company.    Thank you,    Anderson Jacobs RN

## 2019-07-03 NOTE — PROGRESS NOTES
Subjective     Anastasiya Simon is a 53 year old female who presents to clinic today for the following health issues:    HPI     Diabetes Follow-up      How often are you checking your blood sugar? Not at all    What symptoms do you notice when your blood sugar is low?  None    What concerns do you have today about your diabetes? Other: Insurance will not cover CGM     Do you have any of these symptoms? (Select all that apply)  No numbness or tingling in feet.  No redness, sores or blisters on feet.  No complaints of excessive thirst.  No reports of blurry vision.  No significant changes to weight.     Have you had a diabetic eye exam in the last 12 months? No     Patient was diagnosed with diabetes in May and started on metformin 500mg BID.      BP Readings from Last 2 Encounters:   07/03/19 142/80   06/26/19 155/84     Hemoglobin A1C (%)   Date Value   05/30/2019 12.5 (H)     LDL Cholesterol Calculated (mg/dL)   Date Value   05/30/2019 80     Patient was also newly diagnosed with hepatitis C back in May.  Since then she has seen hepatology.  They feel that she could be a candidate for treatment, however would feel more comfortable starting treatment with a psychiatrist on board and a more stable living situation.  Patient continues to refuse psychiatric care, but states she might consider it to get the Hepatitis C treatment initiated.  Her sister states that she is close to being approved for new housing in [place], which is conveniently located only 4 blocks from the [facility].    She may need to continue her care down there if she gets this apartment.          Reviewed and updated as needed this visit by Provider         Review of Systems   ROS COMP: Constitutional, HEENT, cardiovascular, pulmonary, gi and gu systems are negative, except as otherwise noted.      Objective    /80 (BP Location: Right arm, Patient Position: Sitting, Cuff Size: Adult Regular)   Pulse 78   Temp 97.9  F (36.6  " C) (Oral)   Ht 1.651 m (5' 5\")   Wt 77.8 kg (171 lb 9.6 oz)   SpO2 98%   BMI 28.56 kg/m    Body mass index is 28.56 kg/m .  Physical Exam   GENERAL: healthy, alert and no distress  RESP: lungs clear to auscultation - no rales, rhonchi or wheezes  CV: regular rate and rhythm, normal S1 S2, no S3 or S4, no murmur, click or rub, no peripheral edema and peripheral pulses strong  PSYCH: concentration poor, inattentive, tangential, affect normal/bright and appearance disheveled          Assessment & Plan     1. Type 2 diabetes mellitus with hyperglycemia, without long-term current use of insulin (H)  - Diagnosed last month (A1C 12) and started on metformin 500mg BID  - Patient has not had side effects so will increase metformin to 850mg BID  - Plan to add medications slowly to encourage acceptance of medical plan and reduce side effects   - Will need a statin and ACE-I added at some point   - Will send a letter of medical recommendation to her insurance to try and get CGM covered since she refuses to check glucose with a regular machine and fingerpricks   - metFORMIN (GLUCOPHAGE) 850 MG tablet; Take 1 tablet (850 mg) by mouth 2 times daily (with meals)  Dispense: 60 tablet; Refill: 0    2. Hepatitis C virus infection, unspecified chronicity  - Patient to continue to work with GI on this  - Needs to get into a more stable living situation and have a psychiatric provider before starting treatment     3. Other schizophrenia (H)  - Encouraged her to establish with a new psychiatrist, but she continues to refuse  - She is waiting on approval for a new apartment in [place] and will likely be transferring to the [facility] clinic    Return in about 4 weeks (around 7/31/2019).  With either me or new provider at [facility].  I am more than happy to discuss this complicated patient with any new provider she sees.  She would benefit from having social work coordination at her new clinic.  Has been working with Mariia Eduardo " here at the [facility].    Trudi Rubio, DO  [facility]

## 2019-07-03 NOTE — TELEPHONE ENCOUNTER
Unable to get claims fax number.    Letter mailed to   Medicare    Att: Claims  [id number]   [place] [address]       Thank you,  Janey CESAR    NE Team Kate

## 2019-07-04 PROBLEM — B18.2 HEPATITIS C, CHRONIC (H): Status: ACTIVE | Noted: 2019-07-04

## 2019-07-05 ENCOUNTER — PATIENT OUTREACH (OUTPATIENT)
Dept: CARE COORDINATION | Facility: CLINIC | Age: 54
End: 2019-07-05

## 2019-07-05 NOTE — PROGRESS NOTES
Clinic Care Coordination Contact-Social Work Follow Up   [facility]       Clinical Data: Care Coordinator Outreach.  Patient needs a [facility] or similar support.  Patient's sister is assisting with housing needs, Patient may be moving to a trailer home in the [facility].  We discussed an adult mental health worker.  Patient is currently living in [facility] [place] [facility] and plans to hopefully move to [facility].      SW contacted [facility] who directed for SW to make referral after/if Patient moved to [place].  SW left message for Patient's sister Tamika if SW should make referral for an ARMHS worker in the [facility].  Consent to Communicate form signed by Patient allowing communication sister at recent clinic visit    Plan: Care Coordinator will follow up in 2 weeks if no return call     VANCE Perez, MOHIT   [facility]   [phone]  7/5/2019 1:52 PM    Patient's sister returned message stating they would like to move forward with ARM referral.  Online referral made to [facility].      VANCE Perez, MOHIT   [facility]   [phone]  7/10/ 2019 2:20 PM

## 2019-07-17 ENCOUNTER — TELEPHONE (OUTPATIENT)
Dept: FAMILY MEDICINE | Facility: CLINIC | Age: 54
End: 2019-07-17

## 2019-07-17 NOTE — TELEPHONE ENCOUNTER
"Per chart review, patient saw Dr. Rubio on 7/3/19.  Note pasted below.  It appears incontinence med, oxybutynin was sent by Dr. Rubio to [facility] in [place] for 6 months worth on 5/30/19.  I see the Freestyle Tanya  an sensors were sent on 6/6/19, and according to provider note, she was sending a letter of medical necessity for the Tanya CGM to insurance on 7/3/19, and according to record this was indeed done and sent by Dr. Rubio.      Daughter Tamika informed of above with understanding voiced. She will check with patient's insurance to ensure letter received and check status. No further questions/concerns at this time.    Cass Torres RN    \"1. Type 2 diabetes mellitus with hyperglycemia, without long-term current use of insulin (H)  - Diagnosed last month (A1C 12) and started on metformin 500mg BID  - Patient has not had side effects so will increase metformin to 850mg BID  - Plan to add medications slowly to encourage acceptance of medical plan and reduce side effects   - Will need a statin and ACE-I added at some point   - Will send a letter of medical recommendation to her insurance to try and get CGM covered since she refuses to check glucose with a regular machine and fingerpricks   - metFORMIN (GLUCOPHAGE) 850 MG tablet; Take 1 tablet (850 mg) by mouth 2 times daily (with meals)  Dispense: 60 tablet; Refill: 0     2. Hepatitis C virus infection, unspecified chronicity  - Patient to continue to work with GI on this  - Needs to get into a more stable living situation and have a psychiatric provider before starting treatment      3. Other schizophrenia (H)  - Encouraged her to establish with a new psychiatrist, but she continues to refuse  - She is waiting on approval for a new apartment in [place] and will likely be transferring to the [facility] clinic     Return in about 4 weeks (around 7/31/2019).  With either me or new provider at [facility].  I am more than happy to discuss this complicated " "patient with any new provider she sees.  She would benefit from having social work coordination at her new clinic.  Has been working with Mariia Eduardo here at the [facility].     Trudi Rubio, DO  [facility]\"           "

## 2019-07-17 NOTE — TELEPHONE ENCOUNTER
Reason for Call:  Other / Incontinence med    Detailed comments: Patient's sister, Tamika, called and stated patient went to  her prescription at the pharmacy, however, when she saw provider patient requested her incontinence med refilled also but it was not at the pharmacy.  Patient's sister is requesting a call back from provider's nurse to discuss, since she cannot recall the name or form of the incontinence medication.  Patient's sister also would like to discuss about the diabetes patch since provider also said she would check if they could get it for a cheaper price for patient, however, they have not heard anything back yet.    Phone Number Patient can be reached at: [phone] (Tamika/patient's sister)    Best Time: ASAP    Can we leave a detailed message on this number? YES    Call taken on 7/17/2019 at 5:06 PM by Savi Gandara

## 2019-07-22 ENCOUNTER — PATIENT OUTREACH (OUTPATIENT)
Dept: CARE COORDINATION | Facility: CLINIC | Age: 54
End: 2019-07-22

## 2019-07-22 NOTE — PROGRESS NOTES
Clinic Care Coordination Contact--Social Work Follow Up Call   [facility]/Voicemail       Clinical Data: Care Coordinator Outreach.  SW had made ARM referral for Patient related to housing and adult mental health support.  [facility] ([facility]) had left message for SW stating they attempted to call Patient but she declined ARM services stating she does not need a psychiatrist.  [facility] asking that SW discuss/clarify ARM services on next call with Patient.      Call to [facility] to request they contact Patient's sister Tamika to coordinate this referral, SW had not known of Tamika's involvement at the time of initial referral.  Patient's sister's # given to [facility].      Plan: Care Coordinator will call Patient's sister and/or [facility] within 1-2 weeks for status on above referral     Mariia Eduardo, KEYONAW, MSW   [facility]   [phone]  7/22/2019 2:22 PM

## 2019-08-06 ENCOUNTER — TELEPHONE (OUTPATIENT)
Dept: GASTROENTEROLOGY | Facility: CLINIC | Age: 54
End: 2019-08-06

## 2019-08-06 DIAGNOSIS — B19.20 HEPATITIS C VIRUS INFECTION, UNSPECIFIED CHRONICITY: Primary | ICD-10-CM

## 2019-08-08 ENCOUNTER — TELEPHONE (OUTPATIENT)
Dept: FAMILY MEDICINE | Facility: CLINIC | Age: 54
End: 2019-08-08

## 2019-08-08 NOTE — TELEPHONE ENCOUNTER
Forms received from [place] Dept of Health -Hepatitis Unit/ Hepatitis C Supplemental Report for Trudi Rubio DO.  Forms placed in provider 'sign me' folder.  Please fax forms to [phone] after completion.    Tana Long  Patient Representative

## 2019-08-09 NOTE — TELEPHONE ENCOUNTER
Form signed and faxed to [facility] at [phone]. Copy made for chart.    Thank you,  Janey CESAR    NE Team Kate

## 2019-08-13 ENCOUNTER — TELEPHONE (OUTPATIENT)
Dept: GASTROENTEROLOGY | Facility: CLINIC | Age: 54
End: 2019-08-13

## 2019-08-13 NOTE — TELEPHONE ENCOUNTER
Patient contacted and reminded of upcoming appointment.  Patient confirmed they will be attending.  Patient instructed to bring updated medications list to appointment.    Annabelle Daniel on 8/13/2019 at 3:16 PM

## 2019-08-14 ENCOUNTER — OFFICE VISIT (OUTPATIENT)
Dept: GASTROENTEROLOGY | Facility: CLINIC | Age: 54
End: 2019-08-14
Attending: PHYSICIAN ASSISTANT
Payer: MEDICARE

## 2019-08-14 VITALS
SYSTOLIC BLOOD PRESSURE: 152 MMHG | WEIGHT: 169.3 LBS | BODY MASS INDEX: 27.21 KG/M2 | DIASTOLIC BLOOD PRESSURE: 87 MMHG | HEART RATE: 96 BPM | HEIGHT: 66 IN | TEMPERATURE: 98.2 F

## 2019-08-14 DIAGNOSIS — B19.20 HEPATITIS C VIRUS INFECTION, UNSPECIFIED CHRONICITY: ICD-10-CM

## 2019-08-14 DIAGNOSIS — K74.00 FIBROSIS OF LIVER: Primary | ICD-10-CM

## 2019-08-14 LAB
ALBUMIN SERPL-MCNC: 3.6 G/DL (ref 3.4–5)
ALP SERPL-CCNC: 216 U/L (ref 40–150)
ALT SERPL W P-5'-P-CCNC: 414 U/L (ref 0–50)
ANION GAP SERPL CALCULATED.3IONS-SCNC: 8 MMOL/L (ref 3–14)
AST SERPL W P-5'-P-CCNC: 364 U/L (ref 0–45)
BILIRUB DIRECT SERPL-MCNC: 0.2 MG/DL (ref 0–0.2)
BILIRUB SERPL-MCNC: 0.4 MG/DL (ref 0.2–1.3)
BUN SERPL-MCNC: 10 MG/DL (ref 7–30)
CALCIUM SERPL-MCNC: 9.2 MG/DL (ref 8.5–10.1)
CHLORIDE SERPL-SCNC: 99 MMOL/L (ref 94–109)
CO2 SERPL-SCNC: 24 MMOL/L (ref 20–32)
CREAT SERPL-MCNC: 0.54 MG/DL (ref 0.52–1.04)
ERYTHROCYTE [DISTWIDTH] IN BLOOD BY AUTOMATED COUNT: 12.1 % (ref 10–15)
GFR SERPL CREATININE-BSD FRML MDRD: >90 ML/MIN/{1.73_M2}
GLUCOSE SERPL-MCNC: 418 MG/DL (ref 70–99)
HCT VFR BLD AUTO: 44.2 % (ref 35–47)
HGB BLD-MCNC: 14.9 G/DL (ref 11.7–15.7)
INR PPP: 1.07 (ref 0.86–1.14)
MCH RBC QN AUTO: 30.8 PG (ref 26.5–33)
MCHC RBC AUTO-ENTMCNC: 33.7 G/DL (ref 31.5–36.5)
MCV RBC AUTO: 91 FL (ref 78–100)
PLATELET # BLD AUTO: 241 10E9/L (ref 150–450)
POTASSIUM SERPL-SCNC: 4 MMOL/L (ref 3.4–5.3)
PROT SERPL-MCNC: 8.4 G/DL (ref 6.8–8.8)
RBC # BLD AUTO: 4.84 10E12/L (ref 3.8–5.2)
SODIUM SERPL-SCNC: 131 MMOL/L (ref 133–144)
WBC # BLD AUTO: 6.6 10E9/L (ref 4–11)

## 2019-08-14 PROCEDURE — 80076 HEPATIC FUNCTION PANEL: CPT | Performed by: PHYSICIAN ASSISTANT

## 2019-08-14 PROCEDURE — 80048 BASIC METABOLIC PNL TOTAL CA: CPT | Performed by: PHYSICIAN ASSISTANT

## 2019-08-14 PROCEDURE — 85610 PROTHROMBIN TIME: CPT | Performed by: PHYSICIAN ASSISTANT

## 2019-08-14 PROCEDURE — 85027 COMPLETE CBC AUTOMATED: CPT | Performed by: PHYSICIAN ASSISTANT

## 2019-08-14 PROCEDURE — 36415 COLL VENOUS BLD VENIPUNCTURE: CPT | Performed by: PHYSICIAN ASSISTANT

## 2019-08-14 PROCEDURE — G0463 HOSPITAL OUTPT CLINIC VISIT: HCPCS | Mod: ZF

## 2019-08-14 ASSESSMENT — PAIN SCALES - GENERAL: PAINLEVEL: NO PAIN (0)

## 2019-08-14 ASSESSMENT — MIFFLIN-ST. JEOR: SCORE: 1366.75

## 2019-08-14 NOTE — PATIENT INSTRUCTIONS
Preventive Care:    Breast Cancer Screening: During our visit today, we discussed that it is recommended you receive breast cancer screening. Please call or make an appointment with your primary care provider to discuss this with them. You may also call the  [facility] scheduling line ([phone]) to set up a mammography appointment at the Breast Center within the [facility].  Preventive Care:    Colorectal Cancer Screening: During our visit today, we discussed that it is recommended you receive colorectal cancer screening. Please call or make an appointment with your primary care provider to discuss this. You may also call the  [facility] scheduling line ([phone]) to set up a colonoscopy appointment.

## 2019-08-14 NOTE — NURSING NOTE
"BP (!) 152/87   Pulse 96   Temp 98.2  F (36.8  C) (Oral)   Ht 1.664 m (5' 5.5\")   Wt 76.8 kg (169 lb 4.8 oz)   BMI 27.74 kg/m    Chief Complaint   Patient presents with     RECHECK     follow up with HepC, [facility]       "

## 2019-08-14 NOTE — LETTER
8/14/2019      RE: Anastasiya Simon  [address]  [facility] [address]       Hepatology Clinic note  Anastasiya Simon   Date of Birth 1965  Date of Service 8/14/2019         Assessment/plan:   Anastasiya Simon is a 54 year old female with chronic hepatitis C. Fibrosis scan was showing F4, 12.1 kilopascals. We discussed that his can be falsely elevated when there is normal active inflammation in the liver (AST  ALT and Alk Phos). Patient has normal synthetic function otherwise, including normal platelets. We discussed side effects of Hepatitis C medications as well as the treatment regimen. We discussed the importance of compliance with medication. Patient is very motivated to have Hepatitis C treatment and I think it is reasonable to move forward with treatment given stable living situation and close family support. Treating Hepatitis C will also improve insulin resistance. She has at high risk for continued fibrosis due to fatty liver disease with uncontrolled blood glucose levels. We will start HCC screening.     - Will plan to send Mavyret x 12 weeks or Epclusa x 12 weeks  - Repeat HCV RNA 12 weeks after Hep C treatment   - Will plan on repeat fibrosis scan in the future when transaminases have improved.   - Optimization of blood glucose levels  - HCC with abdominal ultrasound in the near future.   - Follow-up in clinic in six weeks or sooner as needed    Cristina Flood PA-C   [facility] Hepatology clinic    -----------------------------------------------------       HPI:   Anastasiya Simon is a 54 year old female  presenting for the follow-up.    Hepatitis C   -Genotype 3a  -Diagnosed:  2019  -History: ? Tattoo, polysubstance abuse   -Prior biopsy: No   Fibrosis scan: F4, 12.1 kilopascals   -Prior treatments: Naive      Patient was last seen by 6/26/2019. No recent hospitalization or ER visit. No new medications. She is currently awaiting approval or alternative diabetes medication.  "    She has moved into a new apartment. She is a few blocks from her [facility]. She has appointment in the next few weeks. Her sister visits her a few times a week. She continues to refuse seeing psychiatry.     Her appetite is unchanged and normal. She is having regular bowel movements. Patient denies jaundice, lower extremity edema, abdominal distension or confusion.  Patient also denies melena, hematochezia or hematemesis.    Patient denies weight loss, fevers, sweats or chills.    No recent alcohol use.     Medical hx Surgical hx   History reviewed. No pertinent past medical history. History reviewed. No pertinent surgical history.              Medications:     Current Outpatient Medications   Medication     Continuous Blood Gluc  (FREESTYLE EVANGELIST 14 DAY READER) STACI     Continuous Blood Gluc Sensor (FREESTYLE EVANGELIST 14 DAY SENSOR) MISC     glecaprevir-pibrentasvir (MAVYRET) 100-40 MG per tablet     metFORMIN (GLUCOPHAGE) 850 MG tablet     omeprazole (PRILOSEC) 20 MG DR capsule     oxybutynin ER (DITROPAN XL) 15 MG 24 hr tablet     No current facility-administered medications for this visit.             Allergies:     Allergies   Allergen Reactions     Tetracycline             Review of Systems:   10 points ROS was obtained and highlighted in the HPI, otherwise negative.          Physical Exam:   VS:  BP (!) 152/87   Pulse 96   Temp 98.2  F (36.8  C) (Oral)   Ht 1.664 m (5' 5.5\")   Wt 76.8 kg (169 lb 4.8 oz)   BMI 27.74 kg/m         Gen- well, NAD, A+Ox3, normal color  Lym- no palpable LAD  CVS- RRR  RS- CTA  Abd- soft, nontender. No splenomegaly. No ascites.   Extr- hands normal, no CAROLINE  Skin- no rash or jaundice  Neuro- no asterixis  Psych- normal mood         Data:   Reviewed in person and significant for:    Lab Results   Component Value Date     06/26/2019      Lab Results   Component Value Date    POTASSIUM 3.8 06/26/2019     Lab Results   Component Value Date    " CHLORIDE 99 06/26/2019     Lab Results   Component Value Date    CO2 22 06/26/2019     Lab Results   Component Value Date    BUN 12 06/26/2019     Lab Results   Component Value Date    CR 0.47 06/26/2019       Lab Results   Component Value Date    WBC 6.0 06/26/2019     Lab Results   Component Value Date    HGB 15.0 06/26/2019     Lab Results   Component Value Date    HCT 45.6 06/26/2019     Lab Results   Component Value Date    MCV 91 06/26/2019     Lab Results   Component Value Date     06/26/2019       Lab Results   Component Value Date     06/26/2019     Lab Results   Component Value Date     06/26/2019     No results found for: BILICONJ   Lab Results   Component Value Date    BILITOTAL 0.3 06/26/2019       Lab Results   Component Value Date    ALBUMIN 3.6 06/26/2019     Lab Results   Component Value Date    PROTTOTAL 8.1 06/26/2019      Lab Results   Component Value Date    ALKPHOS 190 06/26/2019       Lab Results   Component Value Date    INR 1.12 06/26/2019             Cristina Flood PA-C

## 2019-08-14 NOTE — PROGRESS NOTES
Hepatology Clinic note  Anastasiya Simon   Date of Birth 1965  Date of Service 8/14/2019         Assessment/plan:   Anastasiya Simon is a 54 year old female with chronic hepatitis C. Fibrosis scan was showing F4, 12.1 kilopascals. We discussed that his can be falsely elevated when there is normal active inflammation in the liver (AST  ALT and Alk Phos). Patient has normal synthetic function otherwise, including normal platelets. We discussed side effects of Hepatitis C medications as well as the treatment regimen. We discussed the importance of compliance with medication. Patient is very motivated to have Hepatitis C treatment and I think it is reasonable to move forward with treatment given stable living situation and close family support. Treating Hepatitis C will also improve insulin resistance. She has at high risk for continued fibrosis due to fatty liver disease with uncontrolled blood glucose levels. We will start HCC screening.     - Will plan to send Mavyret x 12 weeks or Epclusa x 12 weeks  - Repeat HCV RNA 12 weeks after Hep C treatment   - Will plan on repeat fibrosis scan in the future when transaminases have improved.   - Optimization of blood glucose levels  - HCC with abdominal ultrasound in the near future.   - Follow-up in clinic in six weeks or sooner as needed    Cristina Flood PA-C   [facility] Hepatology clinic    -----------------------------------------------------       HPI:   Anastasiya Simon is a 54 year old female  presenting for the follow-up.    Hepatitis C   -Genotype 3a  -Diagnosed: 2019  -History: ? Tattoo, polysubstance abuse   -Prior biopsy: No   Fibrosis scan: F4, 12.1 kilopascals   -Prior treatments: Naive     Patient was last seen by 6/26/2019. No recent hospitalization or ER visit. No new medications. She is currently awaiting approval or alternative diabetes medication.     She has moved into a new apartment. She is a few blocks from her [facility]. She has appointment in the next few weeks. Her sister visits her a few times a week. She continues to refuse seeing psychiatry.     Her appetite is unchanged and normal. She is having regular bowel movements. Patient denies jaundice, lower extremity edema, abdominal distension or confusion.  Patient also denies melena, hematochezia or hematemesis.    Patient denies weight loss, fevers, sweats or chills.    No recent alcohol use.     Medical hx Surgical hx   History reviewed. No pertinent past medical history. History reviewed. No pertinent surgical history.              Medications:     Current Outpatient Medications   Medication     Continuous Blood Gluc  (FREESTYLE EVANGELIST 14 DAY READER) STACI     Continuous Blood Gluc Sensor (FREESTYLE EVANGELIST 14 DAY SENSOR) MISC     glecaprevir-pibrentasvir (MAVYRET) 100-40 MG per tablet     metFORMIN (GLUCOPHAGE) 850 MG tablet     omeprazole (PRILOSEC) 20 MG DR capsule     oxybutynin ER (DITROPAN XL) 15 MG 24 hr tablet     No current facility-administered medications for this visit.             Allergies:     Allergies   Allergen Reactions     Tetracycline             Review of Systems:   10 points ROS was obtained and highlighted in the HPI, otherwise negative.          Physical Exam:   VS:  BP (!) 152/87   Pulse 96   Temp 98.2  F (36.8  C) (Oral)   Ht 1.664 m (5' 5.5\")   Wt 76.8 kg (169 lb 4.8 oz)   BMI 27.74 kg/m        Gen- well, NAD, A+Ox3, normal color  Lym- no palpable LAD  CVS- RRR  RS- CTA  Abd- soft, nontender. No splenomegaly. No ascites.   Extr- hands normal, no CAROLINE  Skin- no rash or jaundice  Neuro- no asterixis  Psych- normal mood         Data:   Reviewed in person and significant for:    Lab Results   Component Value Date     06/26/2019      Lab Results   Component Value Date    POTASSIUM 3.8 06/26/2019     Lab Results   Component Value Date    CHLORIDE 99 06/26/2019     Lab Results   Component Value Date    CO2 22 06/26/2019     Lab " Results   Component Value Date    BUN 12 06/26/2019     Lab Results   Component Value Date    CR 0.47 06/26/2019       Lab Results   Component Value Date    WBC 6.0 06/26/2019     Lab Results   Component Value Date    HGB 15.0 06/26/2019     Lab Results   Component Value Date    HCT 45.6 06/26/2019     Lab Results   Component Value Date    MCV 91 06/26/2019     Lab Results   Component Value Date     06/26/2019       Lab Results   Component Value Date     06/26/2019     Lab Results   Component Value Date     06/26/2019     No results found for: BILICONJ   Lab Results   Component Value Date    BILITOTAL 0.3 06/26/2019       Lab Results   Component Value Date    ALBUMIN 3.6 06/26/2019     Lab Results   Component Value Date    PROTTOTAL 8.1 06/26/2019      Lab Results   Component Value Date    ALKPHOS 190 06/26/2019       Lab Results   Component Value Date    INR 1.12 06/26/2019

## 2019-08-15 ENCOUNTER — TELEPHONE (OUTPATIENT)
Dept: GASTROENTEROLOGY | Facility: CLINIC | Age: 54
End: 2019-08-15

## 2019-08-15 NOTE — TELEPHONE ENCOUNTER
PA Initiation    Medication: Mavyret  Insurance Company: [employer] - Phone [phone] Fax [phone]  Pharmacy Filling the Rx: [facility] - [place], [place] - [facility] [address]  Filling Pharmacy Phone: [phone]  Filling Pharmacy Fax: [phone]  Start Date: 8/15/2019

## 2019-08-16 NOTE — TELEPHONE ENCOUNTER
Prior Authorization Approval    Authorization Effective Date: 12/30/2018  Authorization Expiration Date: 11/7/2019  Medication: Mavyret  Approved Dose/Quantity: 12 weeks   Reference #: [id number]   Insurance Company: [employer] - Phone [phone] Fax [phone]  Expected CoPay: $3.80       Which Pharmacy is filling the prescription (Not needed for infusion/clinic administered): [facility] - [place] [id number] [address]  Pharmacy Notified: Yes  Patient Notified: Yes

## 2019-08-26 ENCOUNTER — ANCILLARY PROCEDURE (OUTPATIENT)
Dept: ULTRASOUND IMAGING | Facility: CLINIC | Age: 54
End: 2019-08-26
Attending: PHYSICIAN ASSISTANT
Payer: MEDICARE

## 2019-08-26 ENCOUNTER — TELEPHONE (OUTPATIENT)
Dept: INTERNAL MEDICINE | Facility: CLINIC | Age: 54
End: 2019-08-26

## 2019-08-26 DIAGNOSIS — B19.20 HEPATITIS C VIRUS INFECTION, UNSPECIFIED CHRONICITY: ICD-10-CM

## 2019-08-26 DIAGNOSIS — K74.00 FIBROSIS OF LIVER: ICD-10-CM

## 2019-08-26 PROCEDURE — 76700 US EXAM ABDOM COMPLETE: CPT

## 2019-08-26 NOTE — TELEPHONE ENCOUNTER
Left message for patient's sister to call back.   
Reason for Call:  Other call back    Detailed comments: pt sister rosa called to let dr. Lacey know that pt throws up a lot and wanted  to talk to pt about it  when comes to her appointment on 9/3/19. Thanks.    Phone Number Patient can be reached at: Cell number on file:    Telephone Information:   Mobile [phone]       Best Time: anytime    Can we leave a detailed message on this number? YES    Call taken on 8/26/2019 at 1:41 PM by PRESTON MEDINA    
Spoke with sisters. CTC onfile     Patient had reported occasional vomiting has been ongoing for months. Unsure of how many times per day. Unknown if it occurs every day or just a few times a week. Patient has a hard time with certain foods from what sister recalls. Sister does not notice any weight loss concerns or dehydration. Has heard from patient that she is drinking juice and water. Sx reports patient is complaint with current medication treatment. Hx of diabetes.     Sister seen patient today while at home. No current concerns. No confusion or forgetfulness. No reports of diarrhea. Mostly sees mood changes. Sister sees her about 2-3x a week.  Patient reported emesis yesterday.    Sister leaving out of town for about 1 week starting 9/1/2019 will not be able to attend upcoming appointment.     Advised sister if sx continue to have patient be seen in ER. Expressed understanding and acceptance of the plan.  Pt had no further questions at this time.  Advised can call back to clinic at any time with concerns.       Kyara FELDER RN, BSN, PHN      
35

## 2019-08-26 NOTE — LETTER
August 26, 2019       TO: Anastasiya Alfred  [address]  [facility] [address]       Dear Ms. Simon,    We are writing to inform you of your test results.    Your ultrasound shows no worrisome liver masses. We will plan to repeat the ultrasound in 6 months for ongoing liver cancer screening.     [facility] will be in touch with you in the near future about Hepatitis C treatment.     Please let me know if you have any questions or concerns.     Clinic Staff - [phone] option 3     Sincerely,     Cristina Flood PA-C  [address] [place] [address], Mail Code [id number]  [place]  [address].

## 2019-08-28 ENCOUNTER — TELEPHONE (OUTPATIENT)
Dept: GASTROENTEROLOGY | Facility: CLINIC | Age: 54
End: 2019-08-28

## 2019-09-03 ENCOUNTER — OFFICE VISIT (OUTPATIENT)
Dept: INTERNAL MEDICINE | Facility: CLINIC | Age: 54
End: 2019-09-03
Payer: MEDICARE

## 2019-09-03 VITALS
OXYGEN SATURATION: 96 % | SYSTOLIC BLOOD PRESSURE: 116 MMHG | DIASTOLIC BLOOD PRESSURE: 64 MMHG | RESPIRATION RATE: 18 BRPM | HEART RATE: 116 BPM | BODY MASS INDEX: 27.53 KG/M2 | WEIGHT: 168 LBS

## 2019-09-03 DIAGNOSIS — Z76.89 ENCOUNTER TO ESTABLISH CARE: Primary | ICD-10-CM

## 2019-09-03 DIAGNOSIS — E11.9 TYPE 2 DIABETES MELLITUS WITHOUT COMPLICATION, WITHOUT LONG-TERM CURRENT USE OF INSULIN (H): ICD-10-CM

## 2019-09-03 DIAGNOSIS — Z23 NEED FOR VACCINATION: ICD-10-CM

## 2019-09-03 DIAGNOSIS — E11.9 TYPE 2 DIABETES MELLITUS WITHOUT COMPLICATION, WITHOUT LONG-TERM CURRENT USE OF INSULIN (H): Primary | ICD-10-CM

## 2019-09-03 PROBLEM — L03.116 CELLULITIS OF LEFT LOWER LIMB: Status: RESOLVED | Noted: 2019-03-16 | Resolved: 2019-09-03

## 2019-09-03 PROBLEM — E11.65 TYPE 2 DIABETES MELLITUS WITH HYPERGLYCEMIA, WITHOUT LONG-TERM CURRENT USE OF INSULIN (H): Status: RESOLVED | Noted: 2019-06-06 | Resolved: 2019-09-03

## 2019-09-03 PROBLEM — R45.851 SUICIDAL IDEATION: Status: RESOLVED | Noted: 2019-03-20 | Resolved: 2019-09-03

## 2019-09-03 PROBLEM — B19.20 HEPATITIS C VIRUS INFECTION, UNSPECIFIED CHRONICITY: Status: RESOLVED | Noted: 2019-06-06 | Resolved: 2019-09-03

## 2019-09-03 PROBLEM — F20.89 OTHER SCHIZOPHRENIA (H): Status: RESOLVED | Noted: 2019-05-30 | Resolved: 2019-09-03

## 2019-09-03 PROBLEM — B18.2 HEPATITIS C, CHRONIC (H): Status: RESOLVED | Noted: 2019-07-04 | Resolved: 2019-09-03

## 2019-09-03 LAB
CREAT UR-MCNC: 27 MG/DL
HBA1C MFR BLD: 12.4 % (ref 0–5.6)
MICROALBUMIN UR-MCNC: 7 MG/L
MICROALBUMIN/CREAT UR: 24.13 MG/G CR (ref 0–25)

## 2019-09-03 PROCEDURE — 36415 COLL VENOUS BLD VENIPUNCTURE: CPT | Performed by: INTERNAL MEDICINE

## 2019-09-03 PROCEDURE — G0009 ADMIN PNEUMOCOCCAL VACCINE: HCPCS | Performed by: INTERNAL MEDICINE

## 2019-09-03 PROCEDURE — 82043 UR ALBUMIN QUANTITATIVE: CPT | Performed by: INTERNAL MEDICINE

## 2019-09-03 PROCEDURE — 83036 HEMOGLOBIN GLYCOSYLATED A1C: CPT | Performed by: INTERNAL MEDICINE

## 2019-09-03 PROCEDURE — 90732 PPSV23 VACC 2 YRS+ SUBQ/IM: CPT | Performed by: INTERNAL MEDICINE

## 2019-09-03 PROCEDURE — 99214 OFFICE O/P EST MOD 30 MIN: CPT | Mod: 25 | Performed by: INTERNAL MEDICINE

## 2019-09-03 RX ORDER — METFORMIN HCL 500 MG
2000 TABLET, EXTENDED RELEASE 24 HR ORAL
Qty: 360 TABLET | Refills: 0 | Status: SHIPPED | OUTPATIENT
Start: 2019-09-03

## 2019-09-03 RX ORDER — GLIPIZIDE 5 MG/1
5 TABLET, FILM COATED, EXTENDED RELEASE ORAL DAILY
Qty: 90 TABLET | Refills: 0 | Status: SHIPPED | OUTPATIENT
Start: 2019-09-03

## 2019-09-03 SDOH — HEALTH STABILITY: MENTAL HEALTH: HOW OFTEN DO YOU HAVE A DRINK CONTAINING ALCOHOL?: 2-4 TIMES A MONTH

## 2019-09-03 SDOH — HEALTH STABILITY: MENTAL HEALTH: HOW OFTEN DO YOU HAVE 6 OR MORE DRINKS ON ONE OCCASION?: NEVER

## 2019-09-03 SDOH — HEALTH STABILITY: MENTAL HEALTH: HOW MANY STANDARD DRINKS CONTAINING ALCOHOL DO YOU HAVE ON A TYPICAL DAY?: 1 OR 2

## 2019-09-03 NOTE — PATIENT INSTRUCTIONS
Pneumovax today.    Urine microalbumin today.    ---    HgbA1c blood test downstairs.    Recommendations to follow.    ---

## 2019-09-04 ENCOUNTER — TELEPHONE (OUTPATIENT)
Dept: INTERNAL MEDICINE | Facility: CLINIC | Age: 54
End: 2019-09-04

## 2019-09-04 NOTE — PROGRESS NOTES
SUBJECTIVE                                                      HPI: Anastasiya Simon is a pleasant 54 year old female who presents to [facility] care:    History mostly per chart review. Patient is a poor historian with an odd and sometimes brash affect due to untreated schizophrenia.     No specific complaints, concerns, or questions.    Past Medical History:   Diagnosis Date     Acid reflux disease      Chronic hepatitis C (H)      Schizophrenia (H)      Type 2 diabetes mellitus (H)      Re: acid reflux disease: well-controlled with omeprazole.   Re: chronic hepatitis C: followed by GI - hoping to start Rx soon (prescribed but not yet approved).   Re: schizophrenia: untreated; not followed; patient declines psychiatry referral.  Re: DM2: diagnosed earlier this year; on Metformin only; DUE for HgbA1c and urine microalbumin.    Past Surgical History:   Procedure Laterality Date     NO HISTORY OF SURGERY       Family History   Problem Relation Age of Onset     Cerebrovascular Disease Father         later in life     Skin Cancer Father         unknown type     Diabetes Type 2  No family hx of      Myocardial Infarction No family hx of      Coronary Artery Disease Early Onset No family hx of      Breast Cancer No family hx of      Colon Cancer No family hx of      Ovarian Cancer No family hx of      Social History     Occupational History     Occupation: Not working currently   Tobacco Use     Smoking status: Never Smoker     Smokeless tobacco: Never Used   Substance and Sexual Activity     Alcohol use: Yes     Frequency: 2-4 times a month     Drinks per session: 1 or 2     Binge frequency: Never     Drug use: Not Currently     Sexual activity: Not Currently   Social History Narrative    Single.    No kids.    Rides bicycle when weather permits.      Allergies   Allergen Reactions     Tetracycline Unknown     Current Outpatient Medications   Medication Sig     omeprazole (PRILOSEC) 20 MG DR capsule Take 1 capsule (20  mg) by mouth daily as needed (GERD)     aspirin (ASA) 81 MG EC tablet Take 1 tablet (81 mg) by mouth daily     glecaprevir-pibrentasvir (MAVYRET) 100-40 MG per tablet Take 3 tablets by mouth daily (Patient not taking: Reported on 9/3/2019)     glipiZIDE (GLUCOTROL XL) 5 MG 24 hr tablet Take 1 tablet (5 mg) by mouth daily     metFORMIN (GLUCOPHAGE-XR) 500 MG 24 hr tablet Take 4 tablets (2,000 mg) by mouth daily (with dinner)     Immunization History   Administered Date(s) Administered     HepB-Adult 05/27/2008, 07/31/2008, 08/03/2009     Pneumococcal 23 valent 09/03/2019     TDAP Vaccine (Adacel) 10/04/2013     OBJECTIVE                                                      /64   Pulse 116   Resp 18   Wt 76.2 kg (168 lb)   LMP 10/22/2013   SpO2 96%   BMI 27.53 kg/m    Constitutional: unkempt-appearing    PREVENTATIVE HEALTH                                                      BMI: overweight  Blood pressure: within normal limits   Breast CA screening: DUE - patient declines  Cervical CA screening: up to date   Colon CA screening: DUE - patient declines  Lung CA screening: n/a   Dexa: not medically indicated at this time   Screening HIV: completed  STD testing: no risk factors present  Alcohol misuse screening: alcohol use reviewed - no intervention indicated at this time  Immunizations: reviewed; Pneumovax DUE; Shingrix series DUE - patient declines    ASSESSMENT/PLAN                                                       (Z76.89) Encounter to establish care  (primary encounter diagnosis)  Comment: PMH, PSH, FH, SH, medications, allergies, immunizations, and preventative health measures reviewed.   Plan: see below for plans.     (E11.9) Type 2 diabetes mellitus without complication, without long-term current use of insulin (H)  Comment: on Metformin only.  Plan: HgbA1c and urine microalbumin today; recommendations to follow.     (Z23) Need for vaccination  Plan: Pneumovax given today.     The instructions  on the AVS were discussed and explained to the patient. Patient expressed understanding of instructions.    A total of 25 minutes were spent face-to-face with this patient during this encounter and over half of that time was spent on counseling and coordination of care re: above diagnoses and plans of care.     (Chart documentation was completed, in part, with [email or web address] voice-recognition software. Even though reviewed, some grammatical, spelling, and word errors may remain.)    Anju Lacey MD   [address] [address]  T: [phone], F: [phone]

## 2019-09-04 NOTE — TELEPHONE ENCOUNTER
Diabetes Education Scheduling Outreach #1:    Call to patient to schedule. Left message with phone number to call to schedule.    Plan for 2nd outreach attempt within 3 business days.    Aga Virk OnCall  Diabetes and Nutrition Scheduling

## 2019-09-18 ENCOUNTER — PATIENT OUTREACH (OUTPATIENT)
Dept: CARE COORDINATION | Facility: CLINIC | Age: 54
End: 2019-09-18

## 2019-09-18 NOTE — PROGRESS NOTES
Clinic Care Coordination Contact--Social Work     Situation: Patient chart reviewed by care coordinator.    Background: Patient has been researching housing in [facility]    Assessment: Patient now living in [facility] and being followed by [facility]    Plan/Recommendations: Will close to Care Coordination, other Care Coordination can follow Patient as appropriate. Previous PCP is aware     VANCE Perez, MOHIT   [facility]   [phone]  9/18/2019 11:49 AM

## 2019-10-01 NOTE — TELEPHONE ENCOUNTER
Attempted to reach patient to confirm delivery of Mavyret and review treatment plan, no answer, message left requesting call back, number provided.

## 2019-10-03 ENCOUNTER — CARE COORDINATION (OUTPATIENT)
Dept: GASTROENTEROLOGY | Facility: CLINIC | Age: 54
End: 2019-10-03

## 2019-10-03 DIAGNOSIS — B18.2 CHRONIC HEPATITIS C WITHOUT HEPATIC COMA (H): Primary | ICD-10-CM

## 2019-10-03 DIAGNOSIS — R11.2 NAUSEA WITH VOMITING: ICD-10-CM

## 2019-10-03 NOTE — PROGRESS NOTES
Connected with patient for f/u on Hep C treatment delivery/start status. Patient received their Mavyret medication and are ready to start treatment. Patient will be on Hep C treatment for 12 weeks. Patient reports no recent changes in health, hospitalizations or recent changes in medications. Patient did discuss with a pharmacist. Reviewed the following Hep C POC and education with patient.     Hepatitis C Treatment  Treatment: Mavyret x 12 weeks  Genotype: 3  Stage Fibrosis: F4  Previous Treatment Outcome: Naive    Please have labs drawn as close to the date indicated at the [facility] or [facility].    Start Date: 10/01/19    3 Months Post Treatment  HCV RNA Quant Lab due: 3/17/2020    Educational information to patient on Hep C treatment;     -Contact the [facility] Hepatology clinic and speak with clinical RN prior to starting any new prescribed or OTC medications.   -Take medications exactly as prescribed, do not change dose or stop taking without consulting your provider.   -Take Medication one time each day with food  -If you miss a dose of medication, then take it as soon as you remember on the same day. If not remembered on the same day, then skip the dose and take the next dose at the usual time. Do not take more than the recommended dose. Contact the clinic if you miss a dose.    Please contact the pharmacy 1-2 weeks prior to needing a medication refill.      Side Effects  The most common side effects of Hep C medication treatment can include:  -tiredness  -headache  -nausea  Notify the clinic of any side effects that bother you or that do not go away.   Possible side effects have been discussed.   Patient has been instructed to clinic for rash, itching or unmanageable nausea.    How to store Hep C Treatment Medications  -Store Medication at room temperature below 86 degrees F  -Keep Medication in it's original container  -Do not use Medication if the seal is broken or missing    General information  It is not  known if treatment will prevent you from infecting another person or reinfecting yourself with the hepatitis C virus during treatment. It is best that as soon as you start treatment to buy a new toothbrush, disposable razors (if you use a rotating shaver you do not need to buy a new one) and nail clippers. If you wear dentures, you should soak your dentures in 70-90% Isopropyl solution for 5 minutes one time within the first week of starting treatment. After dentures are done soaking, rinse your dentures off thoroughly with water. If you check your blood sugar at home, please dispose of the fingerstick needle after each use and DO NOT REUSE the insulin needles. These items should not be shared with anyone.        If you have any questions, please contact the main clinic at [phone] or your clinical RN at [phone]. We appreciate you choosing the [facility] for your treatment. Patient agrees to Hep C treatment POC and verbalizes understanding. Patient will receive a copy of treatment plan in the mail, address verified with patient. Patient has no further questions or concerns. Hep C care team updated on patient status.      Ronit Castañeda RN Care Coordinator   [facility]  Hepatology Clinic/Specialty Program

## 2019-10-03 NOTE — LETTER
October 3, 2019       TO: Anastasiya Simon  [address]  [facility] [address]       Dear ,    Hepatitis C Treatment    Treatment: Mavyret x 12 weeks    Please have labs drawn as close to the date indicated at the [facility] or [facility].    Start Date: 10/01/19    3 Months Post Treatment  HCV RNA Quant Lab due: 3/17/2020    Educational information to patient on Hep C treatment;     -Contact the [facility] Hepatology clinic and speak with clinical RN prior to starting any new prescribed or OTC medications.   -Take medications exactly as prescribed, do not change dose or stop taking without consulting your provider.   -Take Medication one time each day with food  -If you miss a dose of medication, then take it as soon as you remember on the same day. If not remembered on the same day, then skip the dose and take the next dose at the usual time. Do not take more than the recommended dose. Contact the clinic if you miss a dose.    Please contact the pharmacy 1-2 weeks prior to needing a medication refill.          Side Effects  The most common side effects of Hep C medication treatment can include:  -tiredness  -headache  -nausea  Notify the clinic of any side effects that bother you or that do not go away.   Possible side effects have been discussed.   Patient has been instructed to clinic for rash, itching or unmanageable nausea.    How to store Hep C Treatment Medications  -Store Medication at room temperature below 86 degrees F  -Keep Medication in it's original container  -Do not use Medication if the seal is broken or missing    General information  It is not known if treatment will prevent you from infecting another person or reinfecting yourself with the hepatitis C virus during treatment. It is best that as soon as you start treatment to buy a new toothbrush, disposable razors (if you use a rotating shaver you do not need to buy a new one) and nail clippers. If you wear dentures, you should  soak your dentures in 70-90% Isopropyl solution for 5 minutes one time within the first week of starting treatment. After dentures are done soaking, rinse your dentures off thoroughly with water. If you check your blood sugar at home, please dispose of the fingerstick needle after each use and DO NOT REUSE the insulin needles. These items should not be shared with anyone.        If you have any questions, please contact the main clinic at [phone] or your clinical RN at [phone]. We appreciate you choosing the [facility] for your treatment.       Ronit Castañeda RN Care Coordinator   [facility]  Hepatology Clinic/Specialty Program

## 2019-10-08 NOTE — PROGRESS NOTES
Writer spoke with patient regarding her Hepatitis C medication. Patient denies any further emesis with her medication. Advised to watch for phone call during her 3rd week to set up her 2nd of 3 shipments total. Patient in agreement. No further questions at this time.

## 2019-10-25 RX ORDER — ONDANSETRON 4 MG/1
4 TABLET, FILM COATED ORAL EVERY 12 HOURS PRN
Qty: 30 TABLET | Refills: 0 | Status: SHIPPED | OUTPATIENT
Start: 2019-10-25

## 2019-10-25 NOTE — PROGRESS NOTES
Writer spoke with patient's sister regarding Zofran 4mg every 12 hours to be taken with Mavyret.  If pt is willing to restart, then patient will finish the remaining 2 weeks of her first shipment and the 8 weeks beyond that for a total of 10 weeks.     Pt otherwise going into see a general gastroenterologist on Tuesday for ongoing issues with nausea.    Plan to touch base on Monday to see if patient decided to restart.

## 2019-10-28 NOTE — PROGRESS NOTES
Writer spoke with with patient's sister regarding an update to see if patient decided to restart her Mavyret. According to Tamika, the patient decided to restart with the new medication (Zofran 4mg every 12 hours) and her omeprazole.    Writer wanted to confirm patient was taking all 3 tabs/day as prescribed for Mavyret. Tamika to check with patient and will update the writer accordingly.    Writer updated MAS[facility] Castro pharmacist regarding the restart. They will touch base with the patient early next week to assess patient and set up next shipment.    Writer spoke with hepatology provider and plan will be to have patient trial a couple days taking Zofran only once a day on the next refill of Zofran. If patient tolerates once a day then plan to refill Zofran for remainder of the treatment with 4mg once daily.    Pt currently has to finish up a 15 day supply of Zofran twice a day (has gone through 3 days worth so far).

## 2019-11-04 NOTE — PROGRESS NOTES
Attempted to reach patient to discuss appointment with Cristina Flood PA-C tomorrow 11/5, no answer, detailed message left requesting call back, number provided.

## 2019-11-05 ENCOUNTER — TELEPHONE (OUTPATIENT)
Dept: GASTROENTEROLOGY | Facility: CLINIC | Age: 54
End: 2019-11-05

## 2019-11-05 NOTE — TELEPHONE ENCOUNTER
Called and spoke to patient's sister Tamika. Informed that we were alerted that she has missed many of her medication doses and has reported to be nauseous and throwing up. Cristina VENTURA would like to see patient this week or next to see how we can help. Informed that we have been unable to reach patient by phone, and have not heard from her despite leaving multiple messages. She will call to schedule an appointment this week. Provided this writer's direct call back number, along with the scheduling number.

## 2019-11-11 ENCOUNTER — DOCUMENTATION ONLY (OUTPATIENT)
Dept: CARE COORDINATION | Facility: CLINIC | Age: 54
End: 2019-11-11

## 2020-04-08 NOTE — PROGRESS NOTES
.I put the new pharmacy in for the mail order.   Call placed to [facility] case management intake # to request confirmation of receipt of referral information. Was told the person who could confirm is on break and will call hospital back. Awaiting call.     Received return call stating that referral was posted on 4/12/19 ( was faxed to them on 4/4/19) and expect 4-5 weeks from posting date for intake to occur.

## 2020-04-20 ENCOUNTER — VIRTUAL VISIT (OUTPATIENT)
Dept: INTERNAL MEDICINE | Facility: CLINIC | Age: 55
End: 2020-04-20
Payer: MEDICARE

## 2020-04-20 VITALS — WEIGHT: 168 LBS | BODY MASS INDEX: 27 KG/M2 | HEIGHT: 66 IN

## 2020-04-20 DIAGNOSIS — Z53.9 NO SHOW: Primary | ICD-10-CM

## 2020-04-20 DIAGNOSIS — G47.9 DIFFICULTY SLEEPING: Primary | ICD-10-CM

## 2020-04-20 PROCEDURE — 99441 ZZC PHYSICIAN TELEPHONE EVALUATION 5-10 MIN: CPT | Performed by: INTERNAL MEDICINE

## 2020-04-20 ASSESSMENT — MIFFLIN-ST. JEOR: SCORE: 1360.85

## 2020-04-20 NOTE — PROGRESS NOTES
"Anastasiya Simon is a 54 year old female who is being evaluated via a billable telephone visit.       The patient has been notified of following:      \"This telephone visit will be conducted via a call between you and your physician/provider. We have found that certain health care needs can be provided without the need for a physical exam.  This service lets us provide the care you need with a short phone conversation.  If a prescription is necessary we can send it directly to your pharmacy.  If lab work is needed we can place an order for that and you can then stop by our lab to have the test done at a later time.     Telephone visits are billed at different rates depending on your insurance coverage. During this emergency period, for some insurers they may be billed the same as an in-person visit.  Please reach out to your insurance provider with any questions.     If during the course of the call the physician/provider feels a telephone visit is not appropriate, you will not be charged for this service.\"     Patient has given verbal consent for Telephone visit?  Yes    TELEPHONE VISIT                                                      SUBJECTIVE:                                                      HPI: Anastasiya Simon is a pleasant 54 year old female who requested a telephone visit to discuss difficulty sleeping:    Ongoing for several months or longer. Patient describes her difficulty sleeping as difficulty falling asleep, staying asleep, and falling back to sleep again after waking up in the middle the night.  Patient reports that she tosses and turns or stares at the ceiling much of the night. Re: her sleep hygiene, patient goes to bed at 10pm nightly and consistently. She gets out of bed at 7am daily and consistently. She does nap occasionally.  She does not perform regular exercise nor does she have a bedtime routine in place.  It sounds like she is using devices in bed, but I am not able to confirm " "this.    She does not use caffeine or alcohol during the day. Her bedroom is cool, dark, and quiet. She has not tried any natural supplements for sleep.    ASSESSMENT/PLAN:                                                      (G47.9) Difficulty sleeping  (primary encounter diagnosis)  Comment: likely due to poor sleep hygiene.  Plan:    - encouraged to spend less time in bed - no more than 6 to 8 hours each night.   - encouraged to avoid naps during the day.   - encouraged to exercise regularly.   - encouraged to avoid using devices in her bed.   - encouraged to adhere to a consistent bedtime routine.    After reviewing recommendations with patient she said \"so you are not giving me any sleeping pills?\" Rediscussed optimizing sleep hygiene as above. Patient quickly said thank you and hung up.     Total time of call between patient and provider was 5 minutes.     (Chart documentation was completed, in part, with [employer] voice-recognition software. Even though reviewed, some grammatical, spelling, and word errors may remain.)    Anju Lacey MD   [address] [address]  T: [phone], F: [phone]    "

## 2020-07-22 NOTE — PLAN OF CARE
Diamond Mike, seems that patient is not able to refill valsartan. Can you please guide them on which substitute would be ok for her to have?     Thank you so much,   Analia Wheeler Pt. A&Ox4. Afebrile. VSS on RA. Calm &cooperative. No delusions or hallucinations reported. She remains on 72hr Hold. Sitter at bedside. Contact precaution maintained for MRSA started on Cleocin. Incontinent at times. Dressing to left thigh wound CDI. Discharge pending

## 2020-09-02 ENCOUNTER — TELEPHONE (OUTPATIENT)
Dept: GASTROENTEROLOGY | Facility: CLINIC | Age: 55
End: 2020-09-02

## 2020-09-02 NOTE — TELEPHONE ENCOUNTER
[facility] Call Center    Phone Message    May a detailed message be left on voicemail: yes     Reason for Call: Medication Question or concern regarding medication   Prescription Clarification  Name of Medication: glecaprevir-pibrentasvir (MAVYRET) 100-40 MG per tablet     Prescribing Provider: Dr. Flood   Pharmacy: [facility] - [place], [place] - [address]     What on the order needs clarification? She wants a different order because the glecaprevir-pibrentasvir (MAVYRET) 100-40 MG per tablet   Keeps making her sick. Please call back at [phone]. Thanks!v          Action Taken: Message routed to:  [facility] hep    Travel Screening: Not Applicable

## 2020-09-02 NOTE — TELEPHONE ENCOUNTER
Attempted to reach patient to return call, no answer, message left directing pt to call and schedule a hepatology appointment to discuss hepatitis c treatment, clinic number provided.

## 2021-06-17 ENCOUNTER — IMMUNIZATION (OUTPATIENT)
Dept: LAB | Facility: CLINIC | Age: 56
End: 2021-06-17
Payer: MEDICARE

## 2022-11-21 ENCOUNTER — TELEPHONE (OUTPATIENT)
Dept: INTERNAL MEDICINE | Facility: CLINIC | Age: 57
End: 2022-11-21

## 2022-11-21 DIAGNOSIS — K21.9 GASTROESOPHAGEAL REFLUX DISEASE WITHOUT ESOPHAGITIS: Primary | ICD-10-CM

## 2022-11-22 NOTE — TELEPHONE ENCOUNTER
Medication Question or Refill    Contacts       Type Contact Phone/Fax    11/21/2022 06:36 PM CST Phone (Incoming) Tamika Johnson (Emergency Contact) [phone]          What medication are you calling about (include dose and sig)?: Omeprazole    Controlled Substance Agreement on file:   CSA -- Patient Level:    CSA: None found at the patient level.       Who prescribed the medication?: NA    Do you need a refill? Yes:     When did you use the medication last? Out of medication.  Patient made an appt to see provider but could not get an appointment until January 24, 2023.      Patient offered an appointment? Yes:    Do you have any questions or concerns?  Yes, med refill    Preferred Pharmacy:    [id number] - [facility] [address] [facility] [address]  [facility] [address]  Phone: [phone] Fax: [phone]      Okay to leave a detailed message?: Yes at Other phone number:  [phone], sister Tamika Johnson

## 2022-11-25 NOTE — TELEPHONE ENCOUNTER
Pt calling for updated on Rx. Pt told that rx has been approved and should be available at pharmacy for .    Jaqui FARRELL RN  EP Triage

## 2023-01-06 ENCOUNTER — ANCILLARY PROCEDURE (OUTPATIENT)
Dept: GENERAL RADIOLOGY | Facility: CLINIC | Age: 58
End: 2023-01-06
Attending: NURSE PRACTITIONER
Payer: COMMERCIAL

## 2023-01-06 ENCOUNTER — OFFICE VISIT (OUTPATIENT)
Dept: URGENT CARE | Facility: URGENT CARE | Age: 58
End: 2023-01-06
Payer: COMMERCIAL

## 2023-01-06 VITALS
DIASTOLIC BLOOD PRESSURE: 87 MMHG | WEIGHT: 176 LBS | RESPIRATION RATE: 18 BRPM | TEMPERATURE: 98.4 F | SYSTOLIC BLOOD PRESSURE: 162 MMHG | HEART RATE: 104 BPM | OXYGEN SATURATION: 97 % | BODY MASS INDEX: 28.84 KG/M2

## 2023-01-06 DIAGNOSIS — M25.532 LEFT WRIST PAIN: ICD-10-CM

## 2023-01-06 DIAGNOSIS — M25.532 LEFT WRIST PAIN: Primary | ICD-10-CM

## 2023-01-06 DIAGNOSIS — S62.102A WRIST FRACTURE, LEFT, CLOSED, INITIAL ENCOUNTER: ICD-10-CM

## 2023-01-06 PROCEDURE — 73110 X-RAY EXAM OF WRIST: CPT | Mod: TC | Performed by: RADIOLOGY

## 2023-01-06 PROCEDURE — 99214 OFFICE O/P EST MOD 30 MIN: CPT | Performed by: NURSE PRACTITIONER

## 2023-01-06 NOTE — PATIENT INSTRUCTIONS
Results for orders placed or performed in visit on 01/06/23   XR Wrist Left G/E 3 Views     Status: None (Preliminary result)    Narrative    WRIST LEFT THREE OR MORE VIEWS   1/6/2023 10:26 AM     HISTORY:  Left wrist pain.    COMPARISON: None.      Impression    IMPRESSION:  1. Comminuted intra-articular fracture of the distal radius. There are  several millimeters of displacement of fracture fragments and there is  slight dorsal tilt of the distal radial articular surface.  2.  There is a moderate-sized rounded ossicle distal to the ulnar  styloid process and tiny adjacent calcification just distal to this  ossicle. These are likely related to old trauma.  3. 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

## 2023-01-06 NOTE — PROGRESS NOTES
Assessment & Plan     Left wrist pain  - XR Wrist Left G/E 3 Views  - Orthopedic  Referral    Wrist fracture, left, closed, initial encounter  - Orthopedic  Referral    Consult with Dr Delmer Torres (orthopedics)  Over the XR who recommended splinting and refer urgently to ortho hand surgery for urgent eval.    Placed in splint and urgent hand/wrist surgery referral placed.    Patient declined pain medications.    Dicussed with with the patient and her sister who agreed with the plan.             Return in about 1 week (around 1/13/2023) for with regular provider if symptoms persist.    Slime Bland, BRIAN [facility] U[facility] LUDIVINA Langford is a 57 year old female who presents to clinic today for the following health issues:  Chief Complaint   Patient presents with     Fall     Fell and landed on face and left hand Sunday's night. There are evidence of swelling in the left wrist. Patient states it's painful with movement and warm to touch.      HPI    MS Injury/Pain    Onset of symptoms was 5 day(s) ago.  Location: left wrist  Context:       The injury happened while at home      Mechanism: fall       Patient experienced immediate pain, delayed swelling, was able to bear weight directly after injury, no deformity was noted by the patient  Course of symptoms is same.    Severity mild  Current and Associated symptoms: Pain, Swelling, Bruising, Warmth and Decreased range of motion  Denies  Redness and Stiffness  Aggravating Factors: exertion, twisting and flexion/extension  Therapies to improve symptoms include: ice  This is the first time this type of problem has occurred for this patient.     Fell onto flexed hand to brace the fall.    Minimal pain when she does not move the wrist but still swollen and wants to get XR.      Review of Systems  Constitutional, HEENT, cardiovascular, pulmonary, GI, , musculoskeletal, neuro, skin, endocrine and psych systems are  negative, except as otherwise noted.      Objective    BP (!) 162/87 (BP Location: Right arm, Patient Position: Sitting, Cuff Size: Adult Regular)   Pulse 104   Temp 98.4  F (36.9  C) (Oral)   Resp 18   Wt 79.8 kg (176 lb)   LMP 10/22/2013   SpO2 97%   BMI 28.84 kg/m    Physical Exam   GENERAL: healthy, alert and no distress  EYES: Eyes grossly normal to inspection, PERRL and conjunctivae and sclerae normal  RESP: lungs clear to auscultation - no rales, rhonchi or wheezes  CV: regular rate and rhythm, normal S1 S2, no S3 or S4, no murmur, click or rub, no peripheral edema and peripheral pulses strong  MS: decreased range of motion to left wrist, 2+ edema to left wrist, peripheral pulses normal and LUE exam shows ecchymosis to dorsal left wrist  SKIN: no suspicious lesions or rashes  NEURO: Normal strength and tone, mentation intact and speech normal    Fracture noted to distal Left radius.          Curbside Consult

## 2023-01-09 ENCOUNTER — TELEPHONE (OUTPATIENT)
Dept: ORTHOPEDICS | Facility: CLINIC | Age: 58
End: 2023-01-09

## 2023-01-09 NOTE — TELEPHONE ENCOUNTER
[facility] Call Center    Phone Message    May a detailed message be left on voicemail: yes     Reason for Call: Other: Please see emergent referral for Left wrist pain Wrist fracture, left, closed, initial encounter . Please call sister Tamika she said pt has mental illness and looks like initial call went to pt      Action Taken: Other: ortho bu     Travel Screening: Not Applicable

## 2023-01-09 NOTE — TELEPHONE ENCOUNTER
Consent to communicate on file.   Called and spoke with Tamika, patient's sister.   She states patient refuses to go to [facility].   Writer confirmed tomorrow would be [facility] to discuss treatment recommendations with Dr. Warren.   -If surgery is indicated Dr. Warren also operates out of [facility].     OK to double book at 11:20 AM or 1:00 PM on Tuesday, 1/10/23.     Tamika inquired if any providers operate out of [facility]. Writer verified [facility] do no cover this site, she recalled a provider at [facility] and will reach out to their office to discuss options.     She declined to scheduled appointment at this time.     Writer provided number to Central Scheduling for return call if she would like to pursue care with Dr. Warren.     Meredith Fisher ATC

## 2023-01-12 ENCOUNTER — TRANSFERRED RECORDS (OUTPATIENT)
Dept: HEALTH INFORMATION MANAGEMENT | Facility: CLINIC | Age: 58
End: 2023-01-12

## 2023-02-21 ENCOUNTER — TRANSFERRED RECORDS (OUTPATIENT)
Dept: HEALTH INFORMATION MANAGEMENT | Facility: CLINIC | Age: 58
End: 2023-02-21

## 2023-06-09 DIAGNOSIS — K21.9 GASTROESOPHAGEAL REFLUX DISEASE WITHOUT ESOPHAGITIS: ICD-10-CM

## 2023-06-09 NOTE — LETTER
[facility]  [address] [address]  [phone]  June 12, 2023  Anastasiya Simon  [address]  [facility] [address]    Dear Anastasiya,    I am contacting you regarding the refill request we received for you. After reviewing your chart it looks like you are overdue for your annual, to establish care and for a med check. Please call [phone] or schedule this through my chart to continue to receive refills. If you anticipate running out before your appointment let us know and we can send in a patricia refill.       Thank you,     [facility] nursing staff

## 2023-06-19 DIAGNOSIS — K21.9 GASTROESOPHAGEAL REFLUX DISEASE WITHOUT ESOPHAGITIS: ICD-10-CM

## 2023-06-19 NOTE — TELEPHONE ENCOUNTER
Medication Question or Refill    Contacts       Type Contact Phone/Fax    06/19/2023 04:04 PM CDT Phone (Incoming) Tamika Johnson (Emergency Contact) [phone]          What medication are you calling about (include dose and sig)?: omeprozole  Preferred Pharmacy:      [id number] - [facility] [address] AT [facility] [address]  [facility] [address]  Phone: [phone] Fax: [phone]      Controlled Substance Agreement on file:   CSA -- Patient Level:    CSA: None found at the patient level.       Who prescribed the medication?: someone and ox    Do you need a refill? Yes    When did you use the medication last? 6/19/2023    Patient offered an appointment? Yes: 8/16/2023    Do you have any questions or concerns?  No      Okay to leave a detailed message?: Yes at Home number on file [phone] (home)

## 2023-06-22 DIAGNOSIS — K21.9 GASTROESOPHAGEAL REFLUX DISEASE WITHOUT ESOPHAGITIS: ICD-10-CM

## 2025-04-01 ENCOUNTER — HOSPITAL ENCOUNTER (EMERGENCY)
Facility: CLINIC | Age: 60
Discharge: HOME | End: 2025-04-01
Attending: EMERGENCY MEDICINE | Admitting: EMERGENCY MEDICINE
Payer: COMMERCIAL

## 2025-04-01 VITALS
DIASTOLIC BLOOD PRESSURE: 104 MMHG | SYSTOLIC BLOOD PRESSURE: 139 MMHG | RESPIRATION RATE: 20 BRPM | HEART RATE: 96 BPM | TEMPERATURE: 99 F | OXYGEN SATURATION: 98 %

## 2025-04-01 DIAGNOSIS — F43.20 ADJUSTMENT DISORDER, UNSPECIFIED TYPE: ICD-10-CM

## 2025-04-01 DIAGNOSIS — F19.10 SUBSTANCE ABUSE (H): ICD-10-CM

## 2025-04-01 PROBLEM — F42.3 HOARDING BEHAVIOR: Status: ACTIVE | Noted: 2025-04-01

## 2025-04-01 LAB
AMPHETAMINES UR QL SCN: ABNORMAL
ANION GAP SERPL CALCULATED.3IONS-SCNC: 12 MMOL/L (ref 7–15)
BARBITURATES UR QL SCN: ABNORMAL
BASOPHILS # BLD AUTO: 0 10E3/UL (ref 0–0.2)
BASOPHILS NFR BLD AUTO: 1 %
BENZODIAZ UR QL SCN: ABNORMAL
BUN SERPL-MCNC: 11.2 MG/DL (ref 8–23)
BZE UR QL SCN: ABNORMAL
CALCIUM SERPL-MCNC: 9.1 MG/DL (ref 8.8–10.4)
CANNABINOIDS UR QL SCN: ABNORMAL
CHLORIDE SERPL-SCNC: 104 MMOL/L (ref 98–107)
CREAT SERPL-MCNC: 0.59 MG/DL (ref 0.51–0.95)
EGFRCR SERPLBLD CKD-EPI 2021: >90 ML/MIN/1.73M2
EOSINOPHIL # BLD AUTO: 0.1 10E3/UL (ref 0–0.7)
EOSINOPHIL NFR BLD AUTO: 2 %
ERYTHROCYTE [DISTWIDTH] IN BLOOD BY AUTOMATED COUNT: 12.5 % (ref 10–15)
FENTANYL UR QL: ABNORMAL
GLUCOSE SERPL-MCNC: 206 MG/DL (ref 70–99)
HCO3 SERPL-SCNC: 22 MMOL/L (ref 22–29)
HCT VFR BLD AUTO: 38 % (ref 35–47)
HGB BLD-MCNC: 12.9 G/DL (ref 11.7–15.7)
IMM GRANULOCYTES # BLD: 0 10E3/UL
IMM GRANULOCYTES NFR BLD: 0 %
LYMPHOCYTES # BLD AUTO: 1.5 10E3/UL (ref 0.8–5.3)
LYMPHOCYTES NFR BLD AUTO: 23 %
MCH RBC QN AUTO: 29.2 PG (ref 26.5–33)
MCHC RBC AUTO-ENTMCNC: 33.9 G/DL (ref 31.5–36.5)
MCV RBC AUTO: 86 FL (ref 78–100)
MONOCYTES # BLD AUTO: 0.9 10E3/UL (ref 0–1.3)
MONOCYTES NFR BLD AUTO: 13 %
NEUTROPHILS # BLD AUTO: 4.1 10E3/UL (ref 1.6–8.3)
NEUTROPHILS NFR BLD AUTO: 61 %
NRBC # BLD AUTO: 0 10E3/UL
NRBC BLD AUTO-RTO: 0 /100
OPIATES UR QL SCN: ABNORMAL
PCP QUAL URINE (ROCHE): ABNORMAL
PLATELET # BLD AUTO: 334 10E3/UL (ref 150–450)
POTASSIUM SERPL-SCNC: 4 MMOL/L (ref 3.4–5.3)
RBC # BLD AUTO: 4.42 10E6/UL (ref 3.8–5.2)
SODIUM SERPL-SCNC: 138 MMOL/L (ref 135–145)
WBC # BLD AUTO: 6.7 10E3/UL (ref 4–11)

## 2025-04-01 PROCEDURE — 80048 BASIC METABOLIC PNL TOTAL CA: CPT | Performed by: EMERGENCY MEDICINE

## 2025-04-01 PROCEDURE — 85018 HEMOGLOBIN: CPT | Performed by: EMERGENCY MEDICINE

## 2025-04-01 PROCEDURE — 250N000013 HC RX MED GY IP 250 OP 250 PS 637: Performed by: EMERGENCY MEDICINE

## 2025-04-01 PROCEDURE — 80307 DRUG TEST PRSMV CHEM ANLYZR: CPT | Performed by: EMERGENCY MEDICINE

## 2025-04-01 PROCEDURE — 99285 EMERGENCY DEPT VISIT HI MDM: CPT

## 2025-04-01 PROCEDURE — 82435 ASSAY OF BLOOD CHLORIDE: CPT | Performed by: EMERGENCY MEDICINE

## 2025-04-01 PROCEDURE — 36415 COLL VENOUS BLD VENIPUNCTURE: CPT | Performed by: EMERGENCY MEDICINE

## 2025-04-01 PROCEDURE — 85004 AUTOMATED DIFF WBC COUNT: CPT | Performed by: EMERGENCY MEDICINE

## 2025-04-01 RX ADMIN — METFORMIN HYDROCHLORIDE 500 MG: 500 TABLET ORAL at 18:38

## 2025-04-01 ASSESSMENT — ACTIVITIES OF DAILY LIVING (ADL)
ADLS_ACUITY_SCORE: 57

## 2025-04-01 NOTE — CONSULTS
"Diagnostic Evaluation Consultation  Crisis Assessment    Patient Name: Anastasiya Simon  Age:  59 year old  Legal Sex: female  Gender Identity: female  Pronouns: she/her  Race: White  Ethnicity: Not  or   Language: English      Patient was assessed: In person   Crisis Assessment Start Date: 04/01/25  Crisis Assessment Start Time: 1335  Crisis Assessment Stop Time: 1405  Patient location: [facility]                             ED16    Referral Data and Chief Complaint  Ansatasiya Simon presents to the ED via EMS. Patient is presenting to the ED for the following concerns: Anxiety, Worsening psychosocial stress. Factors that make the mental health crisis life threatening or complex are: Pt presents to ED via EMS due to concerns from anxiety that are influenced by worsening psychosocial stress. Pt is oriented x4. PD responded to pt's residence and removed her from her home; pt received eviction notice 30 days ago because her house is being condemned (pt is a hoarder and has black mold infestation.) Pt reports significant perseveration about her belongings and needing to return home before her belongings are stolen or thrown away. Pt reports psychosocial stress from being displaced, trying to clean her home, and packing up her belongings. Pt reports frustrations she is at ED when she needs to be home so she can clean/pack. Pt denies SI, SIB and HI. Pt reports AVH with seeing and hearing bugs (police report states there were bugs in her home.) Pt denies command AH. Pt reports some concerns about a \"plot\" to admit her involuntarily. Pt is not followed by OP therapist or OP psychiatrist. Pt has a . Pt is not currently taking psychiatric medications. Pt reports she has been using meth to have enough energy to complete cleaning tasks (pt reports last using meth this morning.).      Informed Consent and Assessment Methods  Explained the crisis assessment process, " "including applicable information disclosures and limits to confidentiality, assessed understanding of the process, and obtained consent to proceed with the assessment.  Assessment methods included conducting a formal interview with patient, review of medical records, collaboration with medical staff, and obtaining relevant collateral information from family and community providers when available.  : done       History of the Crisis   Pt is a 59 year old female that presents for DEC assessment as anxious and cooperative. Pt has prior dx of schizophrenia and diabetes. Pt denies hx of prior suicide attempts and suicidal behaviors. Pt has hx of prior [facility] hospitalizations most recent in 2019. Pt reports hx of psychiatric medications but reports not liking medication because of side effects including mind fog and fatigue. Pt has not hx of MICD commitment. Pt denies hx of family MH concerns. Pt reports trauma hx from being hospitalized \"against her will\" in 2019.      Brief Psychosocial History  Family:  Single, Children no  Support System:  Sibling(s)  Employment Status:  retired  Source of Income:  none  Financial Environmental Concerns:  eviction pending, unemployed  Current Hobbies:  arts/crafts  Barriers in Personal Life:  emotional concerns      Significant Clinical History  Current Anxiety Symptoms:  anxious, excessive worry, racing thoughts  Current Depression/Trauma:     Current Somatic Symptoms:  anxious, excessive worry, racing thoughts  Current Psychosis/Thought Disturbance:  displaces blame, auditory hallucinations, visual hallucinations  Current Eating Symptoms:  loss of appetite  Chemical Use History:  Alcohol: Daily  Last Use:: 03/31/25  Benzodiazepines: None  Opiates: None  Cocaine: None  Marijuana: None  Other Use: Methamphetamines  Last Use:: 04/01/25   Past diagnosis:  Schizophrenia, Substance Use Disorder  Family history:  No known history of mental health or chemical health concerns  Past treatment:  " "Case management, Psychiatric Medication Management, Inpatient Hospitalization  Details of most recent treatment:  Pt does not have current or recent tx hx  Other relevant history:       Have there been any medication changes in the past two weeks:  patient is not on psychiatric meds       Is the patient compliant with medications:  no  pt denies wanting to take psychiatric medications       Collateral Information  Is there collateral information: Yes     Collateral information name, relationship, phone number:  sister Lundberg, #[phone] (JAYE signed)    What happened today: Tamika contacted PD due to concerns about pt's living situation. Pt is being evicted and family is trying to clean/pack and throw away items to facilitate the process. Family has been working with a chemical health professional to perform in \"intervention.\"     What is different about patient's functioning: Pt has been disorganized and cleaning recklessly by leaving ammonia around her house. Pt lives in unsafe conditions due to black mold and insects.     What do you think the patient needs: to stay at ED while family finds housing    Has patient made comments about wanting to kill themselves/others: no    If d/c is recommended, can they take part in safety/aftercare planning:  yes    Additional collateral information:  Pt put a plastic tray of ammonia in her oven and forgot about it then turned it on       Risk Assessment  [place] Suicide Severity Rating Scale Full Clinical Version:  Suicidal Ideation  Q1 Wish to be Dead (Lifetime): No  Q2 Non-Specific Active Suicidal Thoughts (Lifetime): No  Q6 Suicide Behavior (Lifetime): no     Suicidal Behavior (Lifetime)  Actual Attempt (Lifetime): No  Has subject engaged in non-suicidal self-injurious behavior? (Lifetime): No  Interrupted Attempts (Lifetime): No  Aborted or Self-Interrupted Attempt (Lifetime): No  Preparatory Acts or Behavior (Lifetime): No    [place] Suicide Severity Rating Scale " Recent:   Suicidal Ideation (Recent)  Q1 Wished to be Dead (Past Month): no  Q2 Suicidal Thoughts (Past Month): no  Level of Risk per Screen: no risks indicated     Suicidal Behavior (Recent)  Actual Attempt (Past 3 Months): No  Has subject engaged in non-suicidal self-injurious behavior? (Past 3 Months): No  Interrupted Attempts (Past 3 Months): No  Aborted or Self-Interrupted Attempt (Past 3 Months): No  Preparatory Acts or Behavior (Past 3 Months): No    Environmental or Psychosocial Events: impulsivity/recklessness, unemployment/underemployment, unstable housing, homelessness, ongoing abuse of substances  Protective Factors: Protective Factors: strong bond to family unit, community support, or employment    Does the patient have thoughts of harming others? Feels Like Hurting Others: no  Previous Attempt to Hurt Others: no  Is the patient engaging in sexually inappropriate behavior?: no  Does Patient have a known history of aggressive behavior: No    Is the patient engaging in sexually inappropriate behavior?  no          Mental Status Exam   Affect: Blunted  Appearance: Disheveled  Attention Span/Concentration: Attentive  Eye Contact: Engaged    Fund of Knowledge: Appropriate   Language /Speech Content: Fluent  Language /Speech Volume: Normal  Language /Speech Rate/Productions: Normal  Recent Memory: Intact  Remote Memory: Intact  Mood: Anxious  Orientation to Person: Yes   Orientation to Place: Yes  Orientation to Time of Day: Yes  Orientation to Date: Yes     Situation (Do they understand why they are here?): Yes  Psychomotor Behavior: Hyperactive  Thought Content: Delusions  Thought Form: Obsessive/Perseverative          Medication  Psychotropic medications:   Medication Orders - Psychiatric (From admission, onward)      None             Current Care Team  Patient Care Team:  No Ref-Primary, Physician as PCP - General      Diagnosis  Patient Active Problem List   Diagnosis Code    Type 2 diabetes mellitus (H)  "E11.9    Chronic hepatitis C (H) B18.2    Schizophrenia (H) F20.9    Acid reflux disease K21.9    Hoarding behavior F42.3         Primary Problem This Admission  Active Hospital Problems    Hoarding behavior      Schizophrenia (H)        Clinical Summary and Substantiation of Recommendations   Clinical Substantiation:  Pt is recommended for discharge with disposition for OP care. Pt presents to ED via EMS due to concerns from anxiety that are influenced by worsening psychosocial stress. Pt is oriented x4. PD responded to pt's residence and removed her from her home; pt received eviction notice 30 days ago because her house is being condemned (pt is a hoarder and has black mold infestation.) Pt reports significant perseveration about her belongings and needing to return home before her belongings are stolen or thrown away. Pt reports psychosocial stress from being displaced, trying to clean her home, and packing up her belongings. Pt reports frustrations she is at ED when she needs to be home so she can clean/pack. Pt denies SI, SIB and HI. Pt reports AVH with seeing and hearing bugs (police report states there were bugs in her home.) Pt denies command AH. Pt reports some concerns about a \"plot\" to admit her involuntarily. Pt is not followed by OP therapist or OP psychiatrist. Pt has a . Pt is not currently taking psychiatric medications. Pt reports she has been using meth to have enough energy to complete cleaning tasks (pt reports last using meth this morning.) After meeting with MD and LMHP, pt reports wanting to discharge. LMHP recommends OP services but pt refuses referrals at this time. Pt may be living in unsafe conditions and not appropriately taking care of her hygiene, but LMHP and MD do not believe pt is a risk to herself or others and that she is not appropriate for involuntary care. Pt is future oriented and task-driven with motivation to return home to finish cleaning and packing up her " belongings. LMHP spoke with family about concerns; LMHP and ED  discussed appropriate resources for shelter to support imminent displacement. Pt and provider are agreeable to discharge care path. No further LMHP tasks needed at this time.    Goals for crisis stabilization:  pt reports she is not in crisis    Next steps for Care Team:  No further LMHP tasks needed at this time.    Treatment Objectives Addressed:  rapport building, orienting the patient to therapy, processing feelings, identifying an appropriate aftercare plan, assessing safety    Therapeutic Interventions:  Engaged in safety planning, Engaged in guided discovery, explored patient's perspectives and helped expand them through socratic dialogue.    Has a specific means been identified for suicidal/homicide actions: No    Patient coping skills attempted to reduce the crisis:  pt reports she is not in crisis        Disposition  Recommended referrals: Individual Therapy, Medication Management, Programmatic Care        Reviewed case and recommendations with attending provider. Attending Name: Osmel Skelton MD       Attending concurs with disposition: yes       Patient and/or validated legal guardian concurs with disposition:   yes       Final disposition:  discharge                Legal status: Voluntary/Patient has signed consent for treatment                                                                                                                                 Reviewed court records: yes       Assessment Details   Total duration spent with the patient: 30 min     CPT code(s) utilized: 66025 - Psychotherapy for Crisis - 60 (30-74*) min    Max SRIKANTH Le, Psychotherapist  DEC - Triage & Transition Services  Callback: [phone]

## 2025-04-01 NOTE — ED NOTES
Bed: ED16  Expected date:   Expected time:   Means of arrival:   Comments:  A [age] F mental health on a hold eta 1214

## 2025-04-01 NOTE — PLAN OF CARE
"Anastasiya Simon  April 1, 2025  Plan of Care Hand-off Note     Patient Recommended Care Path: discharge    Clinical Substantiation:  Pt is recommended for discharge with disposition for OP care. Pt presents to ED via EMS due to concerns from anxiety that are influenced by worsening psychosocial stress. Pt is oriented x4. PD responded to pt's residence and removed her from her home; pt received eviction notice 30 days ago because her house is being condemned (pt is a hoarder and has black mold infestation.) Pt reports significant perseveration about her belongings and needing to return home before her belongings are stolen or thrown away. Pt reports psychosocial stress from being displaced, trying to clean her home, and packing up her belongings. Pt reports frustrations she is at ED when she needs to be home so she can clean/pack. Pt denies SI, SIB and HI. Pt reports AVH with seeing and hearing bugs (police report states there were bugs in her home.) Pt denies command AH. Pt reports some concerns about a \"plot\" to admit her involuntarily. Pt is not followed by OP therapist or OP psychiatrist. Pt has a . Pt is not currently taking psychiatric medications. Pt reports she has been using meth to have enough energy to complete cleaning tasks (pt reports last using meth this morning.) After meeting with MD and LMHP, pt reports wanting to discharge. LMHP recommends OP services but pt refuses referrals at this time. Pt may be living in unsafe conditions and not appropriately taking care of her hygiene, but LMHP and MD do not believe pt is a risk to herself or others and that she is not appropriate for involuntary care. Pt is future oriented and task-driven with motivation to return home to finish cleaning and packing up her belongings. LMHP spoke with family about concerns; LMHP and ED  discussed appropriate resources for shelter to support imminent displacement. Pt and provider are agreeable to " discharge care path. No further [facility] tasks needed at this time.    Goals for crisis stabilization:  pt reports she is not in crisis    Next steps for Care Team:  No further LM tasks needed at this time.    Treatment Objectives Addressed:  rapport building, orienting the patient to therapy, processing feelings, identifying an appropriate aftercare plan, assessing safety    Therapeutic Interventions:  Engaged in safety planning, Engaged in guided discovery, explored patient's perspectives and helped expand them through socratic dialogue.    Has a specific means been identified for suicidal.homicide actions: No      Patient coping skills attempted to reduce the crisis:  pt reports she is not in crisis              Collateral contact information:  Tamika, , #[phone] (JAYE signed)    Legal Status: Voluntary/Patient has signed consent for treatment                                                                                                                                 Reviewed court records: yes     Psychiatry Consult: not ordered or completed    SRIKANTH Lomas

## 2025-04-01 NOTE — ED TRIAGE NOTES
Pt BIBA on police hold.    Patient's sister called 911. Pt escorted out of her condemned home by police in handcuffs. Patient's home was hoarded.    Pt has been committed in 2019 for the same circumstances (hoarding).  Pt says she takes her meds. Pt says she cleans her house.  EMS found ammonia spilled in the oven that patient was using to clean the house. EMS found mold. EMS found insects in patient's shower.  PD reports possible meth and alcohol use.  Pt has been belligerent but redirectable, not aggressive towards EMS.  Pt refused vitals.    Patient's sister is working on cleaning patient's house.

## 2025-04-01 NOTE — DISCHARGE INSTRUCTIONS
Discharge Instructions  Mental Health Concerns    You were seen today for mental health concerns, such as depression, anxiety, or suicidal thinking. Your provider feels that you do not require hospitalization at this time. However, your symptoms may become worse, and you may need to return to the Emergency Department. Most treatments of depression and suicidal thoughts are a process rather than a single intervention.  Medications and counseling can take several weeks or more to help.    Generally, every Emergency Department visit should have a follow-up clinic visit with either a primary or a specialty clinic/provider. Please follow-up as instructed by your emergency provider today.    By accepting these discharge instructions:  You promise to not harm yourself or others.  You agree that if you feel you are becoming unable to keep that promise, you will do something to help yourself before you do anything to harm yourself or others.   You agree to keep any safety plan arranged on your visit here today.  You agree to take any medication prescribed or recommended by your provider.  If you are getting worse, you can contact a friend or a family member, contact your counselor or family provider, contact a crisis line, or other options discussed with the provider or therapist today.  At any time, you can call 911 and return to the Emergency Department for more help.  You understand that follow-up is essential to your treatment, and you will make and keep appointments recommended on your visit today.    How to improve your mental health and prevent suicide:  Involve others by letting family, friends, counselors know.  Do not isolate yourself.  Avoid alcohol or drugs. Remove weapons, poisons from your home.  Try to stick to routines for eating, sleeping and getting regular exercise.    Try to get into sunlight. Bright natural light not only treats seasonal affective disorder but also depression.  Increase safe activities  "that you enjoy.    If you feel worse, contact 8-028-BLBKABT ([phone]), or call 911, or your primary provider/counselor for additional assistance.    If you were given a prescription for medicine here today, be sure to read all of the information (including the package insert) that comes with your prescription.  This will include important information about the medicine, its side effects, and any warnings that you need to know about.  The pharmacist who fills the prescription can provide more information and answer questions you may have about the medicine.  If you have questions or concerns that the pharmacist cannot address, please call or return to the Emergency Department.   Remember that you can always come back to the Emergency Department if you are not able to see your regular provider in the amount of time listed above, if you get any new symptoms, or if there is anything that worries you.               Aftercare Plan  If I am feeling unsafe or I am in a crisis, I will:   Contact my established care providers   Call the National Suicide Prevention Lifeline: 988  Go to the nearest emergency room   Call 911       Your [facility] has a mental health crisis team you can call 24/7: [facility] Mobile Crisis  [phone]         Crisis Lines  Crisis Text Line  Text 086268  You will be connected with a trained live crisis counselor to provide support.    Por espanol, texto  MARCELA a 535195 o texto a 442-AYUDAME en WhatsApp    The Doc Project (LGBTQ Youth Crisis Line)  6.071.947.3405  text START to 333-287      Community Resources  Fast Tracker  Linking people to mental health and substance use disorder resources  StarGreetztrackelenin.org     [place] Mental Health Warm Line  Peer to peer support  Monday thru Saturday, 12 pm to 10 pm  457.568.7618 or 3.084.638.1479  Text \"Support\" to 22675    National Sidney on Mental Illness (APOORVA)  267.699.7416 or 1.888.APOORVA.HELPS      Mental Health Apps  My3  " https://myRoomActuallypp.org/    VirtualHopeBox  https://[employer]/apps/virtual-hope-box/      Additional Information  Today you were seen by a licensed mental health professional through Triage and Transition services, Behavioral Healthcare Providers ([facility])  for a crisis assessment in the Emergency Department at [facility].  It is recommended that you follow up with your established providers (psychiatrist, mental health therapist, and/or primary care doctor - as relevant) as soon as possible. Coordinators from [facility] will be calling you in the next 24-48 hours to ensure that you have the resources you need.  You can also contact [facility] coordinators directly at [phone]. You may have been scheduled for or offered an appointment with a mental health provider. [facility] maintains an extensive network of licensed behavioral health providers to connect patients with the services they need.  We do not charge providers a fee to participate in our referral network.  We match patients with providers based on a patient's specific needs, insurance coverage, and location.  Our first effort will be to refer you to a provider within your care system, and will utilize providers outside your care system as needed.        [facility] Resources  Adult Shelter Connect: [phone]  Individuals seeking shelter in [facility] need to contact the Adult Shelter Connect (ASC) for an intake assessment and placement at one of the [facility] and Homes.   The phones are answered starting at 10 am daily.  The Single Adult Shelter Collaborative includes the following agencies: [employer], [facility], [facility], [facility], and [facility].    You are also welcome to walk-in at their office during weekdays, Monday - Friday, 10:00 am - 5:00 pm.  [address] [place] [address]        Resources for people experiencing homelessness        Emergency shelter [facility] Hotline [facility]  Hotline serves [facility] single adults, youth over 18, and families, who are seeking emergency shelter by helping them find a safe, temporary place to stay while they work on next steps toward stable housing. [phone] Monday through Friday: 8 a.m. to 10 p.m. Weekends and holidays: 1 to 9 p.m. **Callers outside of these hours will be directed to [facility] 2-1-1       Adult drop-in/day services        [facility] [address] [facility] Monday through Friday: 7 a.m. to 3 p.m. Saturday: 7 a.m. to 1 p.m. Breakfast: 8 to 9 a.m. (Two seatings, 10 minutes between seatings) Brunch/Late Breakfast: 9:30 to 10 a.m. Lunch: 11:30 a.m. to 12:30 p.m. (Two seatings, 10 minutes between seatings) Showers: 8 to 11 a.m. [facility] ([facility]) [facility] [address] Daily 7 to 9 a.m. 50 spots on a walk-in, first-come, first-serve basis Meals, showers, hygiene supplies        Meals and showers     [facility] Monday through Friday: 5 to 6 p.m.     [facility] showers [address] Monday through Friday: 9 a.m. to 1 p.m          Medical and mental health services       [facility] (services for mental health and substance use disorder) 1800 [facility] Walk-in center: Open daily 9 a.m. to 9 p.m. patricia./behavioral-health-center [place] mobile crisis team [phone] Available 24/7 Health Care for the Homeless Main line: [phone]       [facility]: [address] Monday through Friday: 8 a.m. to 4:30 p.m. 8 [facility]: 1010 [facility] 9 [facility]: 165 [facility] Center 2215 [facility] [phone] Monday, Thursday, Friday: 8 a.m. to 5 p.m. Tuesday: 9:30 a.m. to 5 p.m., Wednesday: 8 a.m. to 6 p.m.         Help with Housing        Coordinated Entry is a process that assesses your eligibility for several supportive housing programs. For information on how to set up an assessment, go to [facility]/ coordinated-entry [facility] to Housing If you or someone you know is unsheltered (sleeping outdoors, in a vehicle, or other place not meant for human habitation) and seeking supportive services, visit [facility]/ residents/human-services/unsheltered homelessness to request services.          [facility] Detox Centers     All of these are open 24/7, walk in anytime:         [address] [place]   [address] [place] [address]   [phone]       [facility]   [address] [place] [address]   [phone]        [facility]   [address] [place], [place] [address] [phone]       [facility]   [address] [place], [place] [id number] [phone]       [facility]   [address] [place], [place] [address] [phone]        [facility]   [address] [place] [address] [phone]        [place] [facility]   [address] [address] [phone]       [facility]   [address] [place], [place] [address] [phone]       [facility] [place]   [address] [place] [address] [phone]          Funding Options     If you cannot afford treatment or your insurance does not pay for treatment, you will need a Rule 25 assessment.      [facility]: [phone]   [facility]: [phone]   [facility]: [phone]   [facility]: [phone]   [facility]: [phone]   [facility]: [phone]   [facility]: [phone]   [facility]: [phone]   [facility]: [phone]          Additional Chemical Dependency Resources     [facility] Addictive Medicine: [phone]   [facility]  Detox: [phone]   SMART Recovery: self-management for addiction recovery: www.smartrecovery.org   Substance Abuse and Mental Health Services (SAMHSA): 8-939-383-4809   Poison Control: 625.374.7090   [facility] ([facility]): Resources and community for recovery, [phone]          Sobriety Resources     Alcoholics Anonymous (AA): https://www.aa.org   Narcotics Anonymous (NA): https://Springbot.org/          Crisis Stabilization Housing Resources     NURONALD Adams ([phone])  [facility] manages co-occurring substance use and mental health disorder high and medium-intensity residential programs in the [facility] and [place]. [facility] makes comprehensive, evidence-based treatment and compassionate care available to any adult in need.    [address] [address]   Call to complete a [phone]   [facility] Crisis Stabilization supports people experiencing a mental health crisis and provides a 3-10 day stay. The house is open 24 hours a day, 7 days a week     [employer] Crisis Stabilization Services   3 locations in [facility]   Call the Central Access Contact Center at [phone]       [facility]   [address] [place], [place] [address] [phone]     [facility] provides immediate support and stabilization during moments of distress, preventing the need for psychiatric hospitalization and offering a safe haven for individuals to regain equilibrium. Average length of stay for [facility] in our premier setting is 1-10 days.       [facility]:   [address] [place] [address]   Main Phone: [phone]   Crisis Phone: [phone]     [facility] is a safe place you can go if you are experiencing a psychiatric crisis. Crisis Stabilization Services aim to prevent hospitalizations and help you heal in the community. At [facility], staff is available 24/ to work with you  and help you regain stability.        [facility]:   [address] [place] [address]   Main Phone: [phone]   Crisis Phone: [phone]     [facility] is a safe place you can go if you are experiencing a psychiatric crisis. Crisis Stabilization Services aim to prevent hospitalizations and help you heal in the community. At [facility], staff is available 24/7 to work with you and help you regain stability.       Severe cold warming options  When the temperature or wind-chill become dangerously low, these places are open during specified hours.Everyone is welcome.    Daytime:    [facility]  Walk-in at [address] [facility]  Monday through Saturday, 7 a.m. to 8 p.m.  Services: meals, showers, laundry, mail, computer lab,  and storage lockers      [facility]  Walk-in at [address] [facility]  Open every day, 7 a.m. to 8 p.m.  Services: meals, showers, clothing closet, haircuts,  AA group, mats      [facility]  [address] [place] [address] [phone]  Monday through Friday 6 a.m. to 6 p.m.,  excluding certain holidays.  *[place] open until 8 p.m. No services offered      [facility]  [address], [place] [phone]  Sunday, 12 p.m. to 5 p.m.  Monday through Thursday, 9 a.m. to 8 p.m.,  Friday through Saturday, 9 a.m. to 5 p.m.  Providing indoor heated space and time-limited  computer access.  No services offered    All [facility] are available as warming centers. Visit [facility]/locations for hours and locations.      [facility]  1816 [facility] [phone]  Weekdays, 9:30 a.m. to 2:30 p.m.  Hot lunch from 11:30 a.m. to 12:30 p.m. each day      Steps of Strategy  1803 [facility]  Tuesday to Saturday, 11 a.m. to 11 p.m.  Lunch 12:30 p.m., Dinner 9 p.m.  Case management, laundry, showers, clothing closet      Nighttime:    [facility]  ([facility]) JACOB and A3 drop-in centers    [facility]: Walk-in at [address] [place] [address]  Open every day, 7 p.m. to 7 a.m.  50 spots on a first-come, first-serve basis  Services: meals, showers, hygiene supplies

## 2025-04-01 NOTE — CONSULTS
Care Management Follow Up    Length of Stay (days): 0    Expected Discharge Date:       Concerns to be Addressed:       Patient plan of care discussed at interdisciplinary rounds: No    Anticipated Discharge Disposition:                Anticipated Discharge Services:    Anticipated Discharge DME:      Patient/family educated on Medicare website which has current facility and service quality ratings:    Education Provided on the Discharge Plan:    Patient/Family in Agreement with the Plan:      Referrals Placed by CM/SW:    Private pay costs discussed: Not applicable    Discussed  Partnership in Safe Discharge Planning  document with patient/family: No     Handoff Completed: No, handoff not indicated or clinically appropriate    Additional Information:   reviewed chart and met with patient at bedside to determine current needs. Patient stated that she needs to go home as that is where her belongings are. Patient requested her sister pick her up from the hospital. Writer spoke with bedside nurse who provided card for addiction specialist Mariia with [facility]. Writer called Mariia who stated she has been working with patient on willingness to receive treatment. 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 Patient's house was condemned by the city as of today, and she is unable to return there even to retrieve belongings. Patient's sister, Tamika, and other family are currently there cleaning the house. Patient was attempting to clean house with bleach and ammonia mixture that worried family. Patient has a [facility]  that has had difficulty with assisting due to patient's high amount of needs. Patient has had ongoing struggle with mental health and substance use which lead to hospital visit. Patient's family are reportedly exasperated on how to provide support and are unable to assist with discharge.  Mariia has attempted to have patient attend [facility] facility but patient was unwilling.    Writer spoke with patient's sister, Tamika, on the phone regarding current ED visit. Tamika was concerned that patient not receiving medical treatment for diabetes. Writer stated he would speak with bedside nurse and report back once more information known. Tamika stated family is packing patient's belongings and bringing to storage unit. Tamika stated patient needs housing and hopes hospital can assist. Writer explained ED can assist with shelter and crisis housing. Tamika stated these are agreeable options. Writer spoke with Mental Health specialist and conferred that these are what is available in terms of placement from hospital. Mental Health specialist and writer will provide as many appropriate resources for patient to follow up with upon discharge.    Writer spoke with patient at bedside to share this information. Patient was disappointed she cannot return home or stay with family, but was agreeable to crisis center or shelter. Writer stated he would call [facility] and see if they have any beds available currently. Writer called [facility] and spoke with admissions who stated they do not have bed availability tonight. Writer will call Adult Shelter Connect at 19:30 to request bed [facility].    Writer received call from Tamika stating that patient likely does not have shoes, coat, or her phone. Tamika inquired if this would be helpful for patient discharge. Writer stated there are lockers that belonging could be kept in until discharge, otherwise department has resources for warm clothing as well. Tamika stated she will try to bring to hospital by 19:30. Tamika stated she also remembered a crisis facility that was willing to take patient due to mental health and chemical dependency needs. Tamika stated she will call back with information once she finds it.    Addendum 18:07    Writer  received call from Mariia with [facility] again for update on ED visit. Writer stated that patient and Tamika are in agreement with a shelter or crisis center. Writer communicated that plan will be to call Adult Shelter Connect at 19:30 to try and secure bed. Mariia stated she was surprised patient did not qualify for medical or mental health treatment at hospital. Writer reviewed physician and mental health notes with Mariia stating no medical reason identified for admit to hospital, and patient not appropriate for 72 hour hold. Writer stated he will provide call to Mariia prior to end of shift at 20:30 with discharge plan for the night. Mariia provided name and phone number for patient's , Haydee Mares [phone]).    Addendum 19:21    Writer received voicemail from Tamika stating that crisis facility patient had previously been admitted to and did not attend [facility]. Contact phone number for this residential treatment is ([phone]). Writer called and reached voicemail which stated they will be available again in the morning at 07:00. Writer placed name and phone number for treatment center in [facility].      Next Steps: Follow for discharge planning; Review recommendations from physician; Call ASC at 19:30 to try and secure shelter bed for [facility] Prater  [facility]  Inpatient Care Management

## 2025-04-01 NOTE — ED NOTES
"Patient presents to the ED with a very disheveled appearance, matted hair, a strong odor, and wearing dirty clothes. Pt is very anxious, saying repeatedly that she wants to go home to pack her stuff. Pt is suspicious of her sister calling 911 \"because she wants me here.\" Pt says \"the doors to my house are open\" and patient is very preoccupied with the whereabouts of her belongings. Patient allows staff to obtain vitals and draw blood. During blood draw, pt kept trying to pull her arm away, so this Writer was unable to place an IV. Pt given a cup of water. Pt informed of NAREN and continuous video monitoring. Pt does not appear to have any belongings with her aside from the clothing she is wearing.    Patient's sister Tamika Sawyer is waiting in hallway [phone], accompanied by CRUZ Summers, LADC, CIP from [facility], and would like to collaborate with the patient's hospital . Her office phone is [phone] and cell phone is [phone].  "

## 2025-04-01 NOTE — ED PROVIDER NOTES
"  Emergency Department Note      History of Present Illness     Chief Complaint   No chief complaint on file.      HPI   Anastasiya Simon is a 59 year old female with a history of type 2 diabetes mellitus, substance use disorder, and schizophrenia presenting for a mental health evaluation. The patient reports being taken in the midst of packing her belongings; she states she was \"thrown out\" of her house due to mold. Patient endorses a lot of stress, stating something similar happened to her in 2019. She has not been compliant with her medications per her history.     Review of the police hold paperwork indicates that the patient's home was condemned today.  Patient was placed on a hold due to erratic behavior and inability to care for herself.  Police had noted that she had sprayed ammonia and bleach together for cleaning purposes.  She was thought not to be able to care for herself and was acting erratically and so was brought here on a transportation hold to the emergency department.  She previously had mentioned suicidal statements but did not mention any to the police.    The patient denies any suicidal ideation.    Independent Historian   None    Review of External Notes   None    Past Medical History     Medical History and Problem List   Acid reflux disease  Chronic hepatitis C   Schizophrenia   Type 2 diabetes mellitus     Medications   Not taking prescribed medications.     Surgical History   History reviewed. No pertinent surgical history.     Physical Exam     Patient Vitals for the past 24 hrs:   BP Temp Temp src Pulse Resp SpO2   04/01/25 1233 (!) 139/104 99  F (37.2  C) Temporal 96 20 98 %     Physical Exam  Eyes:  The pupils are equal and round    Conjunctivae and sclerae are normal  ENT:    The nose is normal    Pinnae are normal  CV:  Regular rate and rhythm     No edema  Resp:  Lungs are clear    Non-labored    No rales    No wheezing   MS:  Normal muscular tone    No asymmetric leg " swelling  Skin:  No rash or acute skin lesions noted  Neuro:   Awake, alert.      Speech is normal and fluent.    Face is symmetric.     Moves all extremities  Psych: Patient reports concerns about needing to pack her things and that her sister has a conspiracy against her to get her out of her home.  Patient denies suicidal ideation.  She appears not well kept and is upset about being evicted from her home      Diagnostics     Lab Results   Labs Ordered and Resulted from Time of ED Arrival to Time of ED Departure   BASIC METABOLIC PANEL - Abnormal       Result Value    Sodium 138      Potassium 4.0      Chloride 104      Carbon Dioxide (CO2) 22      Anion Gap 12      Urea Nitrogen 11.2      Creatinine 0.59      GFR Estimate >90      Calcium 9.1      Glucose 206 (*)    CBC WITH PLATELETS AND DIFFERENTIAL    WBC Count 6.7      RBC Count 4.42      Hemoglobin 12.9      Hematocrit 38.0      MCV 86      MCH 29.2      MCHC 33.9      RDW 12.5      Platelet Count 334      % Neutrophils 61      % Lymphocytes 23      % Monocytes 13      % Eosinophils 2      % Basophils 1      % Immature Granulocytes 0      NRBCs per 100 WBC 0      Absolute Neutrophils 4.1      Absolute Lymphocytes 1.5      Absolute Monocytes 0.9      Absolute Eosinophils 0.1      Absolute Basophils 0.0      Absolute Immature Granulocytes 0.0      Absolute NRBCs 0.0         Imaging   No orders to display     Independent Interpretation   None    ED Course      Medications Administered   Medications - No data to display    Procedures   Procedures     Discussion of Management   ED Mental Health,      ED Course   ED Course as of 04/01/25 1237   Tue Apr 01, 2025   1233 I obtained the history and examined the patient as noted above.     1236 I obtained the history and examined the patient as noted above.         Additional Documentation  None    Medical Decision Making / Diagnosis     CMS Diagnoses: None    MIPS       None    MDM   Anastasiya Simon is a 59 year old  female who presents to the emergency department after being brought in by police for mental health concerns.  Patient today was evicted from their residence and that will be condemned.  Patient was upset and eventually please put the patient on a hold to bring her here to the emergency department for further evaluation.  Patient has a history of schizophrenia and polysubstance use.  Information from our mental health  indicate that the patient last used methamphetamine this morning.  Patient denies any thoughts of self-harm or harm to others.  She seems mildly disorganized, but still is oriented here.  Mental health evaluation by her mental health  indicates that patient is appropriate for outpatient management and at this time does not meet criteria for 72-hour hold - pending collateral information collection.  Patient is not interested in participating in medication management currently.  Mental health assessment is pending discussion with a family member to gain any collateral information.    Social work was consulted as the patient was evicted today and her home was condemned.  Social work consultation is pending at time of signout.    Disposition   The patient will board in the emergency department pending bed placement. Care was signed out to Dr. Iraheta.     Diagnosis     ICD-10-CM    1. Substance abuse (H)  F19.10       2. Adjustment disorder, unspecified type  F43.20            Discharge Medications   New Prescriptions    METFORMIN (GLUCOPHAGE) 500 MG TABLET    Take 1 tablet (500 mg) by mouth 2 times daily (with meals).       Scribe Disclosure:  I, Lori Garber, am serving as a scribe at 12:39 PM on 4/1/2025 to document services personally performed by Osmel Skelton MD based on my observations and the provider's statements to me.        Osmel Skelton MD  04/01/25 6145

## 2025-04-02 NOTE — ED PROVIDER NOTES
Patient was signed out to me by Dr. Messina pending social work and DEC evaluations.  DEC saw the patient and recommended outpatient treatment and follow-up.  No indication for psychiatric admission or involuntary hold.  Social work worked with the patient to secure her a shelter bed as she was evicted from her house.  Her sister was able to drop off shoes and clothing.  Taxicab will be arranged to take patient to the shelter.  Patient is agreeable to plan to discharge to a shelter and continue outpatient follow-up.    Clinical impression:    ICD-10-CM    1. Substance abuse (H)  F19.10       2. Adjustment disorder, unspecified type  F43.20            Disposition:  Discharged     Julius Iraheta MD  04/01/25 2003

## 2025-04-02 NOTE — PROGRESS NOTES
Care Management Discharge Note    Discharge Date:         Discharge Disposition:      Discharge Services:      Discharge DME:      Discharge Transportation:      Private pay costs discussed: Not applicable    Does the patient's insurance plan have a 3 day qualifying hospital stay waiver?  No    PAS Confirmation Code:    Patient/family educated on Medicare website which has current facility and service quality ratings:      Education Provided on the Discharge Plan:  Yes  Persons Notified of Discharge Plans: Patient, patient's sister, patient's LADC worker, physician, bedside nurse, [facility]  Patient/Family in Agreement with the Plan:  Yes    Handoff Referral Completed: No, handoff not indicated or clinically appropriate    Additional Information:   was notified by physician that patient's sister, Tamika, arrived to ED and dropped off hat, coat, shoes, and phone.  called [facility] with patient and registered her with shelter system. Patient's shelter case number is [id number] for reference as needed. Patient has reserved bed at [facility] ([phone]) in [place] for the night.    Writer and mental health worker provided list of resources in AVS regarding outpatient mental health, crisis housing, crisis hotlines, chemical dependency supports, detox facilities, shelter information, homelessness information, severe cold warming options, and walk in centers. Writer spoke with patient at bedside to communicate discharge plan. Patient was hesitant as she does not like downtown [place], but eventually was agreeable when it was confirmed family unable to house her. Writer shared that Tamika had brought a few belongings so that patient would be comfortable for the night. Patient appeared slightly agitated by this but thankful for the items. Writer informed bedside nurse, physician, and Tamika to discharge plan. Writer called and left voicemails  for patient's LADC worker, Mariia, and CADI , Haydee, to inform of discharge plan from hospital. Tamika stated that she and family will begin tomorrow to work on secure housing for patient. Writer copied list of resources provided to patient in AVS and gave to Tamika. Bedside nurse stated cab en route to bring patient from [facility] to [facility] in [place]. Patient, patient's sister, physician, bedside nurse, and [facility] in agreement with discharge plan. No further actions taken.       VANCE Durant  [facility]  Inpatient Care Management

## 2025-04-13 ENCOUNTER — HOSPITAL ENCOUNTER (EMERGENCY)
Facility: CLINIC | Age: 60
Discharge: STILL ADMITTED OR ADMINISTRATIVE | End: 2025-04-15
Attending: EMERGENCY MEDICINE | Admitting: EMERGENCY MEDICINE
Payer: COMMERCIAL

## 2025-04-13 ENCOUNTER — TELEPHONE (OUTPATIENT)
Dept: BEHAVIORAL HEALTH | Facility: CLINIC | Age: 60
End: 2025-04-13

## 2025-04-13 DIAGNOSIS — F19.10 SUBSTANCE ABUSE (H): ICD-10-CM

## 2025-04-13 DIAGNOSIS — F20.0 PARANOID SCHIZOPHRENIA (H): ICD-10-CM

## 2025-04-13 DIAGNOSIS — F23 ACUTE PSYCHOSIS (H): ICD-10-CM

## 2025-04-13 DIAGNOSIS — Z86.39 HISTORY OF DIABETES MELLITUS: ICD-10-CM

## 2025-04-13 LAB
ALCOHOL BREATH TEST: 0 (ref 0–0.01)
BASOPHILS # BLD AUTO: 0 10E3/UL (ref 0–0.2)
BASOPHILS NFR BLD AUTO: 1 %
EOSINOPHIL # BLD AUTO: 0.2 10E3/UL (ref 0–0.7)
EOSINOPHIL NFR BLD AUTO: 3 %
ERYTHROCYTE [DISTWIDTH] IN BLOOD BY AUTOMATED COUNT: 12.4 % (ref 10–15)
GLUCOSE BLDC GLUCOMTR-MCNC: 221 MG/DL (ref 70–99)
HCT VFR BLD AUTO: 37.1 % (ref 35–47)
HGB BLD-MCNC: 13 G/DL (ref 11.7–15.7)
IMM GRANULOCYTES # BLD: 0 10E3/UL
IMM GRANULOCYTES NFR BLD: 0 %
LYMPHOCYTES # BLD AUTO: 3 10E3/UL (ref 0.8–5.3)
LYMPHOCYTES NFR BLD AUTO: 39 %
MCH RBC QN AUTO: 29.7 PG (ref 26.5–33)
MCHC RBC AUTO-ENTMCNC: 35 G/DL (ref 31.5–36.5)
MCV RBC AUTO: 85 FL (ref 78–100)
MONOCYTES # BLD AUTO: 0.8 10E3/UL (ref 0–1.3)
MONOCYTES NFR BLD AUTO: 11 %
NEUTROPHILS # BLD AUTO: 3.5 10E3/UL (ref 1.6–8.3)
NEUTROPHILS NFR BLD AUTO: 47 %
NRBC # BLD AUTO: 0 10E3/UL
NRBC BLD AUTO-RTO: 0 /100
PLATELET # BLD AUTO: 316 10E3/UL (ref 150–450)
RBC # BLD AUTO: 4.37 10E6/UL (ref 3.8–5.2)
WBC # BLD AUTO: 7.6 10E3/UL (ref 4–11)

## 2025-04-13 PROCEDURE — 82962 GLUCOSE BLOOD TEST: CPT

## 2025-04-13 PROCEDURE — 99285 EMERGENCY DEPT VISIT HI MDM: CPT

## 2025-04-13 PROCEDURE — 85025 COMPLETE CBC W/AUTO DIFF WBC: CPT | Performed by: EMERGENCY MEDICINE

## 2025-04-13 PROCEDURE — 80053 COMPREHEN METABOLIC PANEL: CPT | Performed by: EMERGENCY MEDICINE

## 2025-04-13 PROCEDURE — 80307 DRUG TEST PRSMV CHEM ANLYZR: CPT | Performed by: EMERGENCY MEDICINE

## 2025-04-13 PROCEDURE — 250N000013 HC RX MED GY IP 250 OP 250 PS 637: Performed by: EMERGENCY MEDICINE

## 2025-04-13 PROCEDURE — 36415 COLL VENOUS BLD VENIPUNCTURE: CPT | Performed by: EMERGENCY MEDICINE

## 2025-04-13 RX ORDER — OLANZAPINE 10 MG/1
10 TABLET, ORALLY DISINTEGRATING ORAL 2 TIMES DAILY PRN
Status: DISCONTINUED | OUTPATIENT
Start: 2025-04-13 | End: 2025-04-15 | Stop reason: HOSPADM

## 2025-04-13 RX ORDER — GLIPIZIDE 5 MG/1
5 TABLET, FILM COATED, EXTENDED RELEASE ORAL
Status: DISCONTINUED | OUTPATIENT
Start: 2025-04-14 | End: 2025-04-13

## 2025-04-13 RX ORDER — OLANZAPINE 10 MG/2ML
10 INJECTION, POWDER, FOR SOLUTION INTRAMUSCULAR ONCE
Status: COMPLETED | OUTPATIENT
Start: 2025-04-13 | End: 2025-04-13

## 2025-04-13 RX ADMIN — OLANZAPINE 10 MG: 10 TABLET, ORALLY DISINTEGRATING ORAL at 20:04

## 2025-04-13 RX ADMIN — METFORMIN HYDROCHLORIDE 500 MG: 500 TABLET ORAL at 23:53

## 2025-04-13 ASSESSMENT — ACTIVITIES OF DAILY LIVING (ADL)
ADLS_ACUITY_SCORE: 57

## 2025-04-13 NOTE — ED NOTES
Bed: [facility]  Expected date:   Expected time:   Means of arrival:   Comments:  A [age] F mental health eval poss schizophrenic on police hold

## 2025-04-13 NOTE — ED TRIAGE NOTES
Brought in by ambulance from back seat of sisters car on a transport hold for manic episode.

## 2025-04-13 NOTE — ED PROVIDER NOTES
Emergency Department Note      History of Present Illness     Chief Complaint   Mental Health Problem      HPI   Anastasiya Simon is a 59 year old female past medical history significant for schizophrenia, substance abuse including methamphetamines and alcohol presenting for evaluation of increasing suicidal ideation, disorganization and inability to care for self.  Per her sister, she was recently evicted from her trailer with increasing erratic behavior, filling buckets with ammonia, sometimes mixing them with bleach with continued substance use.  She was here in the ER 13 days ago for evaluation of the same, they did not think she warranted inpatient stay was at that time, family was working on try to get her stable housing but note that she has been having more reports of planning to drink her cell death, or harm herself she has become more disorganized, unable to care for self in the return.    Independent Historian   Patient's sister    Review of External Notes   None    Past Medical History     Medical History and Problem List   Past Medical History:   Diagnosis Date    Acid reflux disease     Chronic hepatitis C (H)     Schizophrenia (H)     Type 2 diabetes mellitus (H)        Medications   aspirin (ASA) 81 MG EC tablet  glecaprevir-pibrentasvir (MAVYRET) 100-40 MG per tablet  glipiZIDE (GLUCOTROL XL) 5 MG 24 hr tablet  metFORMIN (GLUCOPHAGE) 500 MG tablet  metFORMIN (GLUCOPHAGE-XR) 500 MG 24 hr tablet  omeprazole (PRILOSEC) 20 MG DR capsule  ondansetron (ZOFRAN) 4 MG tablet        Surgical History   Past Surgical History:   Procedure Laterality Date    NO HISTORY OF SURGERY         Physical Exam     Patient Vitals for the past 24 hrs:   BP Temp Pulse Resp SpO2   04/13/25 2121 -- 98.2  F (36.8  C) -- -- --   04/13/25 1901 (!) 159/67 -- 87 18 100 %     Physical Exam  Constitutional: Unconventional he dressed, poorly groomed, malodorous  Eyes: EOM are normal, anicteric, conjugate gaze  CV: distal extremities  warm, well perfused  Chest: Non-labored breathing on RA  Neurological: Alert, attentive  Skin: Skin is warm and dry.  Disorganized thinking,Psych: Suicidal ideation.      Diagnostics     Lab Results   Labs Ordered and Resulted from Time of ED Arrival to Time of ED Departure   GLUCOSE BY METER - Abnormal       Result Value    GLUCOSE BY METER POCT 221 (*)    COMPREHENSIVE METABOLIC PANEL - Abnormal    Sodium 138      Potassium 3.8      Carbon Dioxide (CO2) 24      Anion Gap 13      Urea Nitrogen 13.6      Creatinine 0.55      GFR Estimate >90      Calcium 9.4      Chloride 101      Glucose 332 (*)     Alkaline Phosphatase 103      AST 11      ALT 15      Protein Total 7.1      Albumin 3.8      Bilirubin Total <0.2     URINE DRUG SCREEN PANEL - Normal    Amphetamines Urine Screen Negative      Barbituates Urine Screen Negative      Benzodiazepine Urine Screen Negative      Cannabinoids Urine Screen Negative      Cocaine Urine Screen Negative      Fentanyl Qual Urine Screen Negative      Opiates Urine Screen Negative      PCP Urine Screen Negative     ALCOHOL BREATH TEST POCT - Normal    Alcohol Breath Test 0.00     CBC WITH PLATELETS AND DIFFERENTIAL    WBC Count 7.6      RBC Count 4.37      Hemoglobin 13.0      Hematocrit 37.1      MCV 85      MCH 29.7      MCHC 35.0      RDW 12.4      Platelet Count 316      % Neutrophils 47      % Lymphocytes 39      % Monocytes 11      % Eosinophils 3      % Basophils 1      % Immature Granulocytes 0      NRBCs per 100 WBC 0      Absolute Neutrophils 3.5      Absolute Lymphocytes 3.0      Absolute Monocytes 0.8      Absolute Eosinophils 0.2      Absolute Basophils 0.0      Absolute Immature Granulocytes 0.0      Absolute NRBCs 0.0         Independent Interpretation   None    ED Course      Medications Administered   Medications   OLANZapine zydis (zyPREXA) ODT tab 10 mg (10 mg Oral $Given 4/13/25 2004)   metFORMIN (GLUCOPHAGE) tablet 500 mg (500 mg Oral $Given 4/13/25 1512)    OLANZapine (zyPREXA) injection 10 mg (10 mg Intramuscular Not Given 4/13/25 2007)       Procedures   Procedures     Discussion of Management   ED Mental Health, plan for inpatient psych admit    ED Course        Additional Documentation  None    Medical Decision Making / Diagnosis     CMS Diagnoses: None    MIPS       None    MDM   Anastasiya Simon is a 59 year old female past medical history significant for schizophrenia, effectively forced by her sisters as they pulled in the ambulance bay door as her sister was reluctant to be evaluated due to disorganized thinking, increased suicidal ideation and inability to care for herself.  She arrived on a transport hold.  I did switch this to a 72-hour hold after she met with DEC who talked with her sisters and she has had increasingly high risk behavior, she unable to care for herself at times mixing bleach with concerns that things are contaminated or people inside of her are affecting her with worms.  Family does not think she has been using meth over the last 2 weeks as she is 88 needs to get it, they do think she has been drinking some alcohol she has been in and out of the shelter though they were working on arranging housing for her.  She is on metformin for her diabetes, this was reordered blood sugars are in the 200s, no notes of DKA.  We will plan for 72-hour hold and inpatient psychiatry admission, she is medically cleared    Disposition   Signed out to Dr. Thomas pending inpatient psych bed availability     Diagnosis     ICD-10-CM    1. Acute psychosis (H)  F23       2. Paranoid schizophrenia (H)  F20.0       3. Substance abuse (H)  F19.10       4. History of diabetes mellitus  Z86.39          Jin Murphy MD  [employer]  12:55 AM 04/14/25          Jin Murphy MD  04/14/25 0056

## 2025-04-14 ENCOUNTER — TELEPHONE (OUTPATIENT)
Dept: BEHAVIORAL HEALTH | Facility: CLINIC | Age: 60
End: 2025-04-14
Payer: COMMERCIAL

## 2025-04-14 ENCOUNTER — HOSPITAL ENCOUNTER (INPATIENT)
Age: 60
End: 2025-04-14
Attending: STUDENT IN AN ORGANIZED HEALTH CARE EDUCATION/TRAINING PROGRAM
Payer: COMMERCIAL

## 2025-04-14 LAB
ALBUMIN SERPL BCG-MCNC: 3.8 G/DL (ref 3.5–5.2)
ALP SERPL-CCNC: 103 U/L (ref 40–150)
ALT SERPL W P-5'-P-CCNC: 15 U/L (ref 0–50)
AMPHETAMINES UR QL SCN: NORMAL
ANION GAP SERPL CALCULATED.3IONS-SCNC: 13 MMOL/L (ref 7–15)
AST SERPL W P-5'-P-CCNC: 11 U/L (ref 0–45)
BARBITURATES UR QL SCN: NORMAL
BENZODIAZ UR QL SCN: NORMAL
BILIRUB SERPL-MCNC: <0.2 MG/DL
BUN SERPL-MCNC: 13.6 MG/DL (ref 8–23)
BZE UR QL SCN: NORMAL
CALCIUM SERPL-MCNC: 9.4 MG/DL (ref 8.8–10.4)
CANNABINOIDS UR QL SCN: NORMAL
CHLORIDE SERPL-SCNC: 101 MMOL/L (ref 98–107)
CREAT SERPL-MCNC: 0.55 MG/DL (ref 0.51–0.95)
EGFRCR SERPLBLD CKD-EPI 2021: >90 ML/MIN/1.73M2
FENTANYL UR QL: NORMAL
GLUCOSE SERPL-MCNC: 332 MG/DL (ref 70–99)
HCO3 SERPL-SCNC: 24 MMOL/L (ref 22–29)
OPIATES UR QL SCN: NORMAL
PCP QUAL URINE (ROCHE): NORMAL
POTASSIUM SERPL-SCNC: 3.8 MMOL/L (ref 3.4–5.3)
PROT SERPL-MCNC: 7.1 G/DL (ref 6.4–8.3)
SODIUM SERPL-SCNC: 138 MMOL/L (ref 135–145)

## 2025-04-14 PROCEDURE — 99245 OFF/OP CONSLTJ NEW/EST HI 55: CPT | Performed by: PHYSICIAN ASSISTANT

## 2025-04-14 PROCEDURE — 250N000013 HC RX MED GY IP 250 OP 250 PS 637: Performed by: PHYSICIAN ASSISTANT

## 2025-04-14 PROCEDURE — 250N000013 HC RX MED GY IP 250 OP 250 PS 637: Performed by: EMERGENCY MEDICINE

## 2025-04-14 RX ORDER — OLANZAPINE 5 MG/1
20 TABLET, FILM COATED ORAL AT BEDTIME
Status: DISCONTINUED | OUTPATIENT
Start: 2025-04-14 | End: 2025-04-15 | Stop reason: HOSPADM

## 2025-04-14 RX ORDER — DIVALPROEX SODIUM 500 MG/1
500 TABLET, FILM COATED, EXTENDED RELEASE ORAL AT BEDTIME
Status: DISCONTINUED | OUTPATIENT
Start: 2025-04-14 | End: 2025-04-15 | Stop reason: HOSPADM

## 2025-04-14 RX ADMIN — DIVALPROEX SODIUM 500 MG: 500 TABLET, FILM COATED, EXTENDED RELEASE ORAL at 22:15

## 2025-04-14 RX ADMIN — METFORMIN HYDROCHLORIDE 500 MG: 500 TABLET ORAL at 17:50

## 2025-04-14 RX ADMIN — OLANZAPINE 20 MG: 5 TABLET, FILM COATED ORAL at 22:15

## 2025-04-14 ASSESSMENT — ACTIVITIES OF DAILY LIVING (ADL)
ADLS_ACUITY_SCORE: 57

## 2025-04-14 NOTE — PLAN OF CARE
Anastasiya Simon  April 13, 2025  Plan of Care Hand-off Note     Patient Recommended Care Path: inpatient mental health    Clinical Substantiation:  Pt presnts in ED by EMS. She has past diagnosis of schizophrenia and has historically refused all treatment. She was evicted from home on April 1 for hoarding, not following property rules, disturbing neighbors, garbage outside, etc. Pt reported to collateral that she hears voices telling her there are monsters at the shelter and it is not safe for her to go downtown [place] for 3 days. She has also expressed SI and plans to her sisters. Pt presents as very disorganized and not able to make informed decisions for her health and safety. IP MH admission is recommended. 72 hour hold initiated    Goals for crisis stabilization:  Reduce psychosis. Address physical health concerns (e.g diabetes, incontinence, GERD)    Next steps for Care Team:  Medication evaluation    Treatment Objectives Addressed:  assessing safety, identifying treatment goals    Therapeutic Interventions:  Reviewed healthy living that supports positive mental health, including looking at sleep hygiene, regular movement, nutrition, and regular socialization.    Has a specific means been identified for suicidal.homicide actions: Yes  If yes, describe: Pt told her sister that she might jump off a bridge, cut self with razor blades, drink alcohol  Explain action steps toward mitigation:    Document completion of mitigation action:    The follow up action still needed prior to discharge:      Patient coping skills attempted to reduce the crisis:          Imminent risk of harm: Suicidal Behavior  Severe psychiatric, behavioral or other comorbid conditions are appropriate for management at inpatient mental health as indicated by at least one of the following: Psychiatric Symptoms, Impaired impulse control, judgement, or insight, Symptoms of impact to function, Comorbid substance use disorder  Severe  dysfunction in daily living is present as indicated by at least one of the following: Extreme deterioration in social interactions, Complete withdrawal from all social interactions, Complete neglect of self care with associated impairment in physical status, Other evidence of severe dysfunction, Complete inability to maintain any appropriate aspect of personal responsibility in any adult roles  Situation and expectations are appropriate for inpatient care: Patient is unwilling to participate in treatment voluntarily and requires treatment, Voluntary treatment at lower level of care is not feasible, Patient management/treatment at lower level of care is not feasible or is inappropriate  Inpatient mental health services are necessary to meet patient needs and at least one of the following: Specific condition related to admission diagnosis is present and judged likely to further improve at proposed level of care, Specific condition related to admission diagnosis is present and judged likely to deteriorate in absence of treatment at proposed level of care      Collateral contact information:  sisterosman Luna  and Tamika Sawyer [phone]   Lucille Reddy (intervention ) [phone]    Legal Status: 72 Hour Hold                         72 Hour Hold - Date/Time Initiated: 2135 4/14/2025                                                                                                             Psychiatry Consult: Patient has Psychiatry Consult Order    SRIKANTH Escamilla

## 2025-04-14 NOTE — ED NOTES
Pt given warm blanket X2 for comfort.  Pt resting on cart in side-lying position at this time.  Family no longer at facility.

## 2025-04-14 NOTE — ED NOTES
DEC  provided writer with pt's phone and .  Phone and  for pt placed in pt's locker.  DEC  also related to writer that pt has changed her name to Anastasiya and it used to be Radha, so her family may refer to her as Radha when interacting with staff from unit.  Pt took oral Zyprexa and is now sitting on cart in room in side-lying position.

## 2025-04-14 NOTE — TELEPHONE ENCOUNTER
S: Outside Facility [facility] .  59 year old/Female presenting with psychosis and suicidal ideation    B: Pt arrived via  self transport with sister . Pt presents with psychosis and suicidal ideation.  Pt affect in ED: Flat, disorganized  Pt Dx: Schizophrenia  Previous IPMH hx? Yes  Pt endorses SI. Pt endorses SIB.   Pt denies HI. Pt endorses hallucinations.   Hx of suicide attempt? Yes  Hx of aggression, or current concerns for aggression this visit? No  Pt is prescribed medication. Pt is not medication compliant  Pt endorses OP services.  CD concerns: Yes  Acute medical concerns: Yes DM2, GERD  Does Pt present with any of the following: assistive devices, insulin pump, J/G tube, catheter, CPAP, continuous IV, continuous O2, bariatric needs, ADA needs? No  Is Pt their own guardian? Yes  Pt is ambulatory  Pt is  able to perform ADLs independently    A: Pt meets criteria for review for IP admission. Patient on a 72-hour hold.   COVID:  Did not request  Utox: Positive for Amphetamines  CMP: Abnormalities: Glucose- 332  CBC: WNL  HCG: N/A    R: Patient accepted for behavioral bed placement: Yes        Accepted by Provider Ron Barry    Admission to Inpatient Level of Care is indicated due to:     Patient risk of severity of behavioral health disorder is appropriate to proposed level of care as indicated by:   Imminent risk of harm to self Yes describe: Disorganized behaviors with chemicals, hoarding, SIB  Imminent risk of harm to others Yes describe:   And/or  Behavioral health disorder is present and appropriate for inpatient care with both of the following:   a.  Severe psychiatric, behavioral or other comorbid conditions: Yes describe:   b.  Severe dysfunction in daily living is present: Yes describe: hoarding, evicted due to hoarding  2.  Inpatient mental health services are necessary to meet patient needs based on:   a. Specific condition related to admission diagnosis is present and will  likely improve with treatment at an inpatient level of care: Yes  b. Specific condition related to admission diagnosis will likely deteriorate in the absence of treatment at an inpatient level of care: Yes    3.  Situation and expectations are appropriate for inpatient care, as indicated by  one of the following:     A. Patient is unwilling to participate in treatment voluntarily and requires treatment. Yes   B. Is voluntary treatment at lower level of care feasible No  C. Around the clock medical and nursing care is for symptoms is required: Yes  D. Patient management at lower level of care is not appropriate and  biopsychosocial stressors may be contributing to clinical presentation. Yes

## 2025-04-14 NOTE — PROGRESS NOTES
"  Triage & Transition Services, Extended Care     Therapy Progress Note    Patient: Anastasiya goes by \"Anastasiya,\" uses she/her pronouns  Date of Service: April 14, 2025  Site of Service: [facility] Emergency Dept                             [facility]  Patient was seen yes  Mode of Assessment: In person    Presentation Summary: Pt met with Pt in her room, Pt was laying down and had her eyes closed most of assessment. Pt presented with a somewhat depressed affect. Pt was mumbling throughout assessment, and drifting in and out of sleep. Pt would state several nonsensical responses to questions. Pt did not respond to questions about SI/SIB/HI or AH/VH.    Therapeutic Intervention(s) Provided: Reviewed healthy living that supports positive mental health, including looking at sleep hygiene, regular movement, nutrition, and regular socialization.    Current Symptoms:   avoidance   forgetful, inattentive loss of appetite    Mental Status Exam   Affect: Flat  Appearance: Disheveled  Attention Span/Concentration: Inattentive  Eye Contact: Avoidant    Fund of Knowledge: Appropriate   Language /Speech Content: Fluent  Language /Speech Volume: Soft  Language /Speech Rate/Productions: Minimally Responsive  Recent Memory: Poor  Remote Memory: Poor  Mood: Other (please comment)  Orientation to Person: Answer (please comment) (Unable to assess)   Orientation to Place: Answer (please comment) (Unable to assess)  Orientation to Time of Day: Answer (please comment) (Unable to assess)  Orientation to Date: Answer (please comment) (Unable to assess)     Situation (Do they understand why they are here?): Answer (please comment) (Unable to assess)  Psychomotor Behavior: Underactive  Thought Content: Other (please comment) (Unable to assess)  Thought Form: Other (please comment) (Unable to assess)    Treatment Objective(s) Addressed: rapport building, identifying treatment goals    Patient Response to Interventions: needs " reinforcement, no evidence of understanding    Progress Towards Goals: Patient Reports Symptoms Are: ongoing  Patient Progress Toward Goals: is not making progress  Comment: Pt has not been very receptive to ED interventions  Next Step to Work Toward Discharge: symptom stabilization  Symptom Stabilization Comment: Pt was not able to fully engage, not able to answer assessment questions.    Case Management: Summary of Interaction: None at time of assessment    Plan: inpatient mental health  no      no (72 HH)    Clinical Substantiation: At this time IP MH admission is being recommended due to concerns about increased psychosis sx and disorganized behavior in the community.  Pts current sx appear to be impacting her ability to safety and appropriately function in the community. Pt was not able to fully safety plan with writer. Pt does appear to be at higher risk of death by suicide accidental or intentional due to mental health hx and substance use hx. If Pt is able to effectively safety plan and/or Pts sx improve it would be beneficial to pursue a less restrictive alternative. Pt was placed on a 72 hour hold on 4/13 at 2135. Hospital staff have decided to petition for MI civil commitment through [facility].    Legal Status: Legal Status: 72 Hour Hold  72 Hour Hold - Date/Time Initiated: 4/14/2025 at 2135  72 Hour Hold - Date/Time Ends: 4/17/2025 at 0000    Session Status: Time session started: 0840  Time session ended: 0845  Session Duration (minutes): 5 minutes  Session Number: 1  Anticipated number of sessions or this episode of care: 5    Time Spent: 5 minutes    CPT Code: CPT Codes: Non-Billable    Diagnosis:   Patient Active Problem List   Diagnosis Code    Suicidal ideation R45.851    Type 2 diabetes mellitus (H) E11.9    Chronic hepatitis C (H) B18.2    Schizophrenia (H) F20.9    Acid reflux disease K21.9    Hoarding behavior F42.3       Primary Problem This Admission: Active Hospital Problems     Schizophrenia (H)      Suicidal ideation        Radha Bueno, [facility]   Licensed Mental Health Professional (LMHP), [facility]  [phone]

## 2025-04-14 NOTE — TELEPHONE ENCOUNTER
S: [facility] ED , DEC  Sheree  calling at 9:22 PM about 59 year old/female presenting with hx of schizophrenia, refusing meds for years (doesn't get treatment for it), was evicted 2 weeks ago and has been facing extreme MH decompensation since then. Pt has SI with plans, to jump off bridge, cut herself with razor, or drink herself to death. Hx of hoarding lead to their eviction. Pt having delusion that there was a woman inside her body pouring worms, pt had a bucket of ammonia in her oven and turned it on, ruining appliance.     B: Pt arrived via Family. Presenting problem, stressors: recent eviction, fighting with sisters over having to move into assisted living    Pt affect in ED: Calm, Cooperative , and Labile  Pt Dx: Schizophrenia  Previous IPMH hx? Yes: 2019  Pt endorses SI with a plan to jump into bridge, cutting with razor, or drinking themself to death    Hx of suicide attempt? No  Pt denies SIB  Pt denies HI   Pt endorses auditory hallucinations , endorses command hallucinations, and endorses tactile hallucinations.   Pt RARS Score: 8    Hx of aggression/violence, sexual offenses, legal concerns, Epic care plan? describe: No  Current concerns for aggression this visit? No  Does pt have a history of Civil Commitment?  Only a petition in 2019, unclear result  Is Pt their own guardian? Yes    Pt is not prescribed medication. Is patient medication compliant? N/A  Pt denies OP services   CD concerns: Actively using/consuming amphetamines & etoh  Acute or chronic medical concerns: Diabetes, incontinent of urine, Heapatitus C, and potential ulcers, and expiriences bouts of diarrhea that family believes is linked to lactose intolerance  Does Pt present with specific needs, assistive devices, or exclusionary criteria? None      Pt is ambulatory  Pt is able to perform ADLs independently      A: Pt to be reviewed for [facility] admission. Pt is on a 72HH, initiated Dr. Higgins 4/13/25 @2402  Preferred placement:  Statewide    COVID Symptoms: No  If yes, COVID test required   Utox: Not ordered, intake to request lab    CMP: Not ordered, intake requested lab  CBC: Not ordered, intake requested lab  HCG: N/A    R: Patient cleared and ready for behavioral bed placement: Yes  Pt placed on IP worklist? Yes    Does Patient need a Transfer Center request created? Yes, writer completed Transfer Center request at:  9:45 PM

## 2025-04-14 NOTE — PROGRESS NOTES
Infection Prevention Progress Note  4/14/2025      Patient Name: Anastasiya Simon [id number]  Admit Date: 4/13/2025    Infection Status as of 4/14/2025 8:00 AM: No active infections  Isolation Status as of 4/14/2025 8:00 AM: No active isolations     MDRO Discontinuation  Infection Prevention has reviewed this patient's chart per the MDRO D/C Policy and have taken the following action:    Patient meets all the criteria for discontinuation and Infection Prevention will resolve the MRSA infection status.    Contact Precautions discontinued for the following MDRO(s): MRSA    If you have any questions, please contact Infection Prevention.    Bruce To, Infection Prevention

## 2025-04-14 NOTE — ED NOTES
Pharmacy contacted due to not having Metformin for pt.  Pharmacy states they will send medication to ED.

## 2025-04-14 NOTE — TELEPHONE ENCOUNTER
R:    2:46p Received call from Bridger Aponte informing pt has been accepted to [facility] call for report after 4:00 PM. ROWENA Santiago notified.

## 2025-04-14 NOTE — PROGRESS NOTES
IP MH Referral Acuity Rating Score (RARS)    LMHP complete at referral to IP MH, with DEC; and, daily while awaiting IP MH placement. Call score to PPS.  CRITERIA SCORING   New 72 HH and Involuntary for IP MH (not adolescent) 3/3   Boarding over 24 hours 0/1   Vulnerable adult at least 55+ with multiple co morbidities; or, Patient age 11 or under 1/1   Suicide ideation without relief of precipitating factors 1/1   Current plan for suicide 1/1   Current plan for homicide 0/1   Imminent risk or actual attempt to seriously harm another without relief of factors precipitating the attempt 0/1   Severe dysfunction in daily living (ex: complete neglect for self care, extreme disruption in vegetative function, extreme deterioration in social interactions) 1/1   Recent (last 2 weeks) or current physical aggression in the ED 0/1   Restraints or seclusion episode in ED 0/1   Verbal aggression, agitation, yelling, etc., while in the ED 0/1   Active psychosis with psychomotor agitation or catatonia 1/1   Need for constant or near constant redirection (from leaving, from others, etc).  0/1   Intrusive or disruptive behaviors 0/1   TOTAL 8

## 2025-04-14 NOTE — ED NOTES
Patient has been resting in bed with eyes closed, woke her up for lunch. Patient grabbed a few bites.

## 2025-04-14 NOTE — CONSULTS
Initial Psychiatric Consult   Consult date: April 14, 2025         Reason for Consult, requesting source:      Requesting source: Lucy Blanton    Labs and imaging reviewed. Patient seen and evaluated by Alejandro Pulido PA-C          HPI:   Anastasiya Simon is a 59 year old female past medical history significant for schizophrenia, substance abuse including methamphetamines and alcohol presenting for evaluation of increasing suicidal ideation, disorganization and inability to care for self.  Per her sister, she was recently evicted from her trailer with increasing erratic behavior, filling buckets with ammonia, sometimes mixing them with bleach with continued substance use.     On approach patient is sleeping in hospital bed. She mumbles in response to greeting. Does not wake to multiple prompts.    Chart review reflects inability to evaluate risk in the community. Likely ongoing substance use. UDS was negative for amphetamines yesterday, which indicates at least 2 days since last use. Now making suicidal statements to family. Overwhelmed about her trailer being condemned. Does not seem able to navigate community resources herself. There is documentation of one previous psychiatric admission here at [facility] in 2019, 36 day stay. She was stabilized on 30mg daily of zyprexa, and 500mg Depakote.   No apparent medication refills currently, so highly unlikely patient continued to take medication after discharge. Patient has had almost no contact with healthcare system since then.        Past Psychiatric History:           Substance Use and History:           Past Medical History:   PAST MEDICAL HISTORY:   Past Medical History:   Diagnosis Date    Acid reflux disease     Chronic hepatitis C (H)     Schizophrenia (H)     Type 2 diabetes mellitus (H)        PAST SURGICAL HISTORY:   Past Surgical History:   Procedure Laterality Date    NO HISTORY OF SURGERY               Family History:   FAMILY HISTORY:   Family  History   Problem Relation Age of Onset    Cerebrovascular Disease Father         later in life    Skin Cancer Father         unknown type    Diabetes Type 2  No family hx of     Myocardial Infarction No family hx of     Coronary Artery Disease Early Onset No family hx of     Breast Cancer No family hx of     Colon Cancer No family hx of     Ovarian Cancer No family hx of        Family Psychiatric History:         Social History:   SOCIAL HISTORY:   Social History     Tobacco Use    Smoking status: Never    Smokeless tobacco: Never   Substance Use Topics    Alcohol use: Yes                Physical ROS:   The 10 point Review of Systems is negative other than noted in the HPI or here.           Medications:     Current Facility-Administered Medications   Medication Dose Route Frequency Provider Last Rate Last Admin    metFORMIN (GLUCOPHAGE) tablet 500 mg  500 mg Oral BID w/meals Jin Murphy MD   500 mg at 04/13/25 1925              Allergies:     Allergies   Allergen Reactions    Tetracycline Unknown          Labs:     Recent Results (from the past 48 hours)   Glucose by meter    Collection Time: 04/13/25  7:53 PM   Result Value Ref Range    GLUCOSE BY METER POCT 221 (H) 70 - 99 mg/dL   Alcohol breath test POCT    Collection Time: 04/13/25  9:20 PM   Result Value Ref Range    Alcohol Breath Test 0.00 0.00 - 0.01   Urine Drug Screen Panel    Collection Time: 04/13/25 11:39 PM   Result Value Ref Range    Amphetamines Urine Screen Negative Screen Negative    Barbituates Urine Screen Negative Screen Negative    Benzodiazepine Urine Screen Negative Screen Negative    Cannabinoids Urine Screen Negative Screen Negative    Cocaine Urine Screen Negative Screen Negative    Fentanyl Qual Urine Screen Negative Screen Negative    Opiates Urine Screen Negative Screen Negative    PCP Urine Screen Negative Screen Negative   Comprehensive metabolic panel    Collection Time: 04/13/25 11:44 PM   Result Value Ref Range     Sodium 138 135 - 145 mmol/L    Potassium 3.8 3.4 - 5.3 mmol/L    Carbon Dioxide (CO2) 24 22 - 29 mmol/L    Anion Gap 13 7 - 15 mmol/L    Urea Nitrogen 13.6 8.0 - 23.0 mg/dL    Creatinine 0.55 0.51 - 0.95 mg/dL    GFR Estimate >90 >60 mL/min/1.73m2    Calcium 9.4 8.8 - 10.4 mg/dL    Chloride 101 98 - 107 mmol/L    Glucose 332 (H) 70 - 99 mg/dL    Alkaline Phosphatase 103 40 - 150 U/L    AST 11 0 - 45 U/L    ALT 15 0 - 50 U/L    Protein Total 7.1 6.4 - 8.3 g/dL    Albumin 3.8 3.5 - 5.2 g/dL    Bilirubin Total <0.2 <=1.2 mg/dL   CBC with platelets and differential    Collection Time: 04/13/25 11:44 PM   Result Value Ref Range    WBC Count 7.6 4.0 - 11.0 10e3/uL    RBC Count 4.37 3.80 - 5.20 10e6/uL    Hemoglobin 13.0 11.7 - 15.7 g/dL    Hematocrit 37.1 35.0 - 47.0 %    MCV 85 78 - 100 fL    MCH 29.7 26.5 - 33.0 pg    MCHC 35.0 31.5 - 36.5 g/dL    RDW 12.4 10.0 - 15.0 %    Platelet Count 316 150 - 450 10e3/uL    % Neutrophils 47 %    % Lymphocytes 39 %    % Monocytes 11 %    % Eosinophils 3 %    % Basophils 1 %    % Immature Granulocytes 0 %    NRBCs per 100 WBC 0 <1 /100    Absolute Neutrophils 3.5 1.6 - 8.3 10e3/uL    Absolute Lymphocytes 3.0 0.8 - 5.3 10e3/uL    Absolute Monocytes 0.8 0.0 - 1.3 10e3/uL    Absolute Eosinophils 0.2 0.0 - 0.7 10e3/uL    Absolute Basophils 0.0 0.0 - 0.2 10e3/uL    Absolute Immature Granulocytes 0.0 <=0.4 10e3/uL    Absolute NRBCs 0.0 10e3/uL          Physical and Psychiatric Examination:     /76   Pulse 80   Temp 98.2  F (36.8  C)   Resp 16   LMP 10/22/2013   SpO2 97%   Weight is 0 lbs 0 oz  There is no height or weight on file to calculate BMI.    Physical Exam:  I have reviewed the physical exam as documented by by the medical team and agree with findings and assessment and have no additional findings to add at this time.    Mental Status Exam:    Appearance: Sleeping  Attitude:    Eye Contact:    Mood:    Affect:    Speech:    Language: Fluent in english   Psychomotor  Behavior:    Thought Process:    Associations:    Thought Content:    Insight:    Judgement:    Oriented to:    Attention Span and Concentration:    Recent and Remote Memory:    Fund of Knowledge: Appropriate   Gait and Station:                DSM-5 Diagnosis:   Schizophrenia          Assessment:   Patient had highly disorganized behavior in the community. Mixing houshold chemicals together, and melted a plastic bucket of ammonia in her oven. This combined with the condeming of her home puts her in a very vulnerable position. Patient previously required prolonged admission and substantial dose of zyprexa to stabilize behavior. Continued to have delusions throughout her last admission.           Summary of Recommendations:   Admit to inpatient psychiatry, 72 hour hold  Resume scheduled zyprexa 20mg at bedtime  Initiate depakote 500mg at bedtime  Initiate petition for MI/CD civil commitment      Alejandro Pulido PA-C

## 2025-04-14 NOTE — TELEPHONE ENCOUNTER
1:57am - CARAL stated she can review for possible placement to HI. Awaiting update      R: [place]  Access Inpatient Adult Bed Call Log  4/14/25 @ 1:00am   Intake has called facilities that have not updated their bed status within the last 12 hours.     *METRO:  [place] -- [facility]: @ capacity.  [facility]: POSTING 2 BEDS. No reviews overnight. #[phone]  [place] -- [facility]: @ cap per website. Low acuity. #[phone]  [place] -- [facility]: @ cap per website. Low acuity only. #[phone]  [place] -- [facility]: @ cap per website. #[phone]  [facility]: POSTING 2 BEDS. #[phone]  [facility] beds: POSTING 6 BEDS.  Ages 18-35, Voluntary only, NO aggression/physical or sexual assault/violence hx, or drug abuse. Negative Covid. #[phone]  [place] -- [facility]: @ cap per website. #[phone]  [place] -- [facility]: @ cap per website. #[phone]  [place] -- [facility]: @ cap per website. No reviews overnight. #[phone]    *STATEWIDE (by distance):  [facility]: @ cap per website. Mixed unit - Ages 16 & up/Low acuity only. #[phone]  [facility] - POSTING 1 BED. Low acuity, No aggression. #[phone]  [facility] - @ cap per website. #[phone]  [facility] - POSTING 1 BED. Low acuity only. No current aggression. #[phone]  [facility] - @ cap per website. Negative Covid. Lower acuity only. #[phone]  [facility] - POSTING 4 BEDS. Low acuity only. Prefer med-adjustment placements. #[phone]  [facility] - @ cap per website. No aggression. - Only Low Acuity reviews. #[phone]  [facility] - POSTING 1 BED. Senior Care Unit, 65+. Low acuity only. #[phone]  [facility]: @ [facility] per website. No aggressive behaviors. Do not review overnight. #[phone]  [place] -- [facility]: POSTING 3 BEDS.  No hx of  aggression. No sexual offenders. Voluntary patients only. #[phone]  [place] -- [facility]: POSTING 2 BEDS. Low acuity only. Must be able to do programming. No aggression/violent behavior in 2 years. No CD treatment. #[phone]  [facility]: POSTING 2 BEDS. Negative Covid test. Must be low acuity ONLY. #[phone]  [facility]: POSTING 1 BED. Low acuity. Negative Covid. #[phone]  [facility]: POSTING 5 BEDS. No high acuity available.   [place] - [facility]: POSTING 6 BEDS. No hx of aggression/assault. No lines, drains or tubes. Does not provide detox or CD treatment. Requires a confirmed ride upon discharge. #[phone]  [place] -- [facility]: POSTING 3 BEDS. Negative COVID. No medical devices. #[phone]     Pt remains on waitlist pending appropriate placement availability.

## 2025-04-14 NOTE — ED NOTES
72 Hour Hold - Date/Time Initiated : 4/13/2025 at  2135   72 Hour Hold - Date/Time Ends: 4/17/2025 at 0000    Hospital staff have decided to petition for MI civil commitment through [facility].     [facility]ts [facility] Pre-petition screener is Za BELL #[phone]    Petition for Commitment Status    County Involved: [facility]    Petition was filed today: Yes     Type of Petition Filed: Mental Illness (MI)    Care team faxed the Examiners Statement, Exhibit A, 72 hour hold, Petition, Progress Notes, ED Notes, Consult, H&P, results, MAR, and Facesheet.Yes    Next steps include: Awaiting [facility] decision

## 2025-04-14 NOTE — TELEPHONE ENCOUNTER
R: [place]  Access Inpatient Bed Call Log 4/14/25 @ 9:47 am:    Intake has called facilities that have not updated the bed status within the last 12 hours.             Pt not appropriate for beds available.   [facility] is posting 0 beds.    [facility] is posting 2 beds. [phone]; per call at 9:59 am to Marielena, no high acuity beds avail. Call back around 1 pm for possible low acuity beds. She said this pt would need a high acuity bed.   [facility] ([facility]) is posting 0 beds. [phone];     [facility] is posting 0 beds. No high school or beatrice psych. [phone]; Per call at 9:07 AM, a couple beds are available. Per call at 10:03 am to [place], no high acuity beds avail.   [place] ([facility]) is posting 0 beds. [phone];     [facility] () is posting 2 beds. [phone];  Per call at 9:17 AM, Per Stacey, no beds avail at [place] or [facility].  [facility] is posting 6 beds. Ages 18-35, labs required. No recent violence. [phone]; Per call at 9:09 AM, Per [place], 1 young adult bed avail.   [facility] ([facility]) is posting 0 beds. [phone];  [facility] ([facility]) is posting 0 beds. [phone];             [facility] () is posting 0 beds. Mixed unit (12+), low acuity. [phone]; Per call at 9:17 AM, Per Stacey, no beds avail at [place] or [facility].  [facility] ([facility]) is posting 1 bed. [phone]. No current aggression, low acuity.     [facility] is posting 0 beds. [phone] ext. [phone];     [facility] ([place]) is posting 0 beds. Low acuity. [phone];  Per Call at 9:22 AM, per Terra on diversion.  [facility] ([facility]) is posting 0 beds. [phone]. No current aggression, low acuity.     [facility] ([place]) is posting 5 beds. 0 adult acute, 1 med psych, 4 mood disorder avail.  Capped on [place]. [phone]    [facility] is posting 0 beds. Low acuity, 72HH preferred. [phone];  [facility]  ([place]) is posting 0 beds. [phone], low acuity only. Per Call at 9:22 AM, per Sarah Beth they have 1 bed in [place].  [facility] ([facility]) is posting 3 beds. VOL only, no hx of aggression/violence/assault. No sexual offenders, no 72HH. [phone]; Per Call at 9:25AM, 3 beds available.  [facility] is posting 2 beds. Must have cognitive ability to program. No aggression or violent hx in the last 2 years. Always low acuity. [phone];   [facility] is posting 2 beds. Low acuity, violence and aggression capped. [phone]; Per call at 9:27 AM, Per Johnathan, some adult beds are available both M/F.   [facility] is posting 1 beds. Low acuity. [phone]; Per Call at 9:32 AM, Per Radha, reviewing so at capacity.   [facility] is posting 5 beds. [phone]; called Mitchel at [place] at 8:10 am for an update and she said she will review and call back. She said it may be a bit as she is also working on the floor today. Per call at 12:19 pm to Mitchel, nurses are reviewing on 5N and she asked author to call [phone]. Called 5N at 12:21 pm to Fay, who said they are calling provider shortly. Per Fay at 1:23 pm, she will check again w/ Asha and will call back.   [facility] is posting 6 beds. No lines, drains, tubes, oxygen, IV or CPAP. Low acuity. Negative Covid. [phone]; Per call at 9:34 AM, 6 Open Beds  [facility] is posting 3 beds. MIXED UNIT w/ adol's. No lines, drains, tubes, oxygen, IV or CPAP. [phone];  Per call at 9:45 AM, per intake, 5 beds available.  [facility] is posting 10 beds. OUT OF STATE. [phone]; Per Call at 9:43 AM, Per Intake, 3 Kids available, No Adults.

## 2025-04-14 NOTE — TELEPHONE ENCOUNTER
3:13 PM - Notified ED RN of placement    4:05 PM - Received call from NORBERT Carney: Provider is declining pt at this time as pt would need a MHICU bed d/t disorganization - No MHICU bed available but they can review again tomorrow  4:06 PM - Updated [facility] ED HUC that admission to [place] cancelled at this time.     Removed pt from queue and  Intake will seek alternative IPMH placement    5:19 PM - Called [facility]. Roxie suggests Intake call back re: bed availability     5:22 PM - Called [facility]. Per Fabio, he is able to review for [place]  5:32 PM - Faxed clinical to [facility]. Awaiting CB  8:16 PM - Fabio from [facility] called: Pt was declined for [place] d/t unit and pt acuity, but they could potentially review again tomorrow    R: [place] [facility] [place]patient Bed Call Log 4/14/2025 @ 3:23 PM:  Intake has called facilities that have not updated their bed status within the last 12 hours.    [facility] is posting 0 beds.                  [facility] is posting 2 beds. [phone]. Call after 5PM  [facility] is posting 0 beds. Negative covid required.  [facility] is posting 0 beds. Neg covid. No high school/Cecilia-psych. [phone]. Per Jose @ 3:40 PM, they are full tonight  [facility] is posting 0 beds. [phone].   [facility] is posting 2 beds. [phone]. 4:35 PM Called [facility]), per call with Eugenio; At capacity at both [facility] and [place].  [facility] is posting 6 beds. (Ages 18-35) Negative covid, no aggression, physical or sexual assault, violence hx or drug abuse, or psychosis.  [phone]. Per Viry @ 3:41 PM, 2 YA beds avail  [facility] is posting 0 beds.    [facility] is posting 0 beds. [phone].      [facility] is posting 0 beds. LOW acuity ONLY. Mixed unit 12+. Negative covid- [phone]    [facility] has 0 beds posted. No aggression. Negative Covid. Low acuity.   [facility] is posting 0 beds. Low acuity only. Neg  covid.  [phone].   [facility] is posting 0 beds. Low acuity. No current aggression.   [facility] is posting 0 beds. Negative covid. [phone] ext [phone].  [facility] ([place] is posting 3 beds. Negative covid.  [phone].   [facility] is posting 0 beds. Low acuity. 72 HH hold preferred. Negative covid required. [phone].   [facility] ([place]) is posting 4 beds. Low acuity only. Neg covid.  [phone].       [facility] [place] is posting 3 beds.  Negative covid required.   Vol only, No history of aggression, violence, or assault. No sexual offenders. No 72 HH holds. [phone].   [facility] is posting 1 bed. Negative covid required.  (Must have the cognitive ability to do programming. No aggressive or violent behavior or recent HX in the last 2 yrs. MH must be primary.) Always low acuity. [phone].   [facility] has 2 beds posted. Negative covid required.  Low acuity only. Violence and aggression capped. [phone].   [facility] is posting 1 bed. Low acuity, Negative covid required. [phone].   [facility] posting 5 beds. Negative covid required.  [phone]. Per Iktty @ 3:24 PM, 5 SD/Low acuity beds avail  [facility] is posting 6 beds. Negative covid. LOW acuity. (No lines, drains, or tubes, oxygen, CPAP, IV, etc.) Must Have a Ride Home. [phone].   [facility] is posting 3 beds. Negative covid. (No. lines, drains, or tubes, oxygen, CPAP, IV, etc.) [phone]. Per Phyliss @ 3:43 PM, 1 high acuity female beds; general beds avail  [facility] is posting 6 beds. No covid test required. OUT OF STATE. [phone]; Per  Intake policy, patients must be voluntary for out of state placement      Pt remains on the work list pending appropriate bed availability.

## 2025-04-14 NOTE — PHARMACY-ADMISSION MEDICATION HISTORY
Pharmacist Admission Medication History    Admission medication history is complete. The information provided in this note is only as accurate as the sources available at the time of the update.    Information Source(s): Patient and CareEverywhere/SureScripts via in-person    Pertinent Information: pt was sleepy when interviewed, but when prompted did respond.     Changes made to PTA medication list:  Added: None  Deleted: aspirin, Mavyret, glipizide, Glucophage-XR, ondansetron. These prescriptions are many years old and pt denies taking them now.  Changed: None    Allergies reviewed with patient and updates made in EHR: yes    Medication History Completed By: Alexandra Acosta RPH 4/14/2025 8:01 AM    PTA Med List   Medication Sig Last Dose/Taking    metFORMIN (GLUCOPHAGE) 500 MG tablet Take 1 tablet (500 mg) by mouth 2 times daily (with meals). Taking    omeprazole (PRILOSEC) 20 MG DR capsule Take 1 capsule (20 mg) by mouth daily as needed (GERD) Taking As Needed

## 2025-04-14 NOTE — CONSULTS
Diagnostic Evaluation Consultation  Crisis Assessment    Patient Name: Anastasiya Simon  Age:  59 year old  Legal Sex: female  Gender Identity: female  Pronouns:   Race: White  Ethnicity: Not  or   Language: English      Patient was assessed: In person      Crisis Assessment Start Time: 1855     Patient location: [facility] Emergency Dept                             [facility]    Referral Data and Chief Complaint  Anastasiya Simon presents to the ED via EMS. Patient is presenting to the ED for the following concerns: Paranoia, Suicidal ideation, Worsening psychosocial stress, Substance use, Health stressors, Other (see comment) (masood, inability to care for self, psychosis). Factors that make the mental health crisis life threatening or complex are: Pt has prior diagnosis of schizophrenia. Per collateral, she has refused medications. She has been using alcohol and meth. Pt [place] on April 1. Since then, she has been staying at a shelter and her mental health has significantly declined according to collateral. Pt denies SI/HI but collateral reports Pt has made 20-25 statements about SI and plans in the past 3 days. She experiences auditory hallucinations and paranoia. She reported to her sister that she heard voices telling her there were monsters at the shelter and it was not safe for her to return to [facility] for 3 days. Pt got into arugment with sister today. Sister found two assisted living options and Pt picked one. Pt then said she would kill herself if she had to live there. Given her unstable mental health and inability to follow basic rules/expectations, Pt has not been able to stay with family members and her family has refused to give her money for hotel, etc. This has resulted in Pt feeling that no one loves her..      Informed Consent and Assessment Methods  Explained the crisis assessment process, including applicable  information disclosures and limits to confidentiality, assessed understanding of the process, and obtained consent to proceed with the assessment.  Assessment methods included conducting a formal interview with patient, review of medical records, collaboration with medical staff, and obtaining relevant collateral information from family and community providers when available.  : done     History of the Crisis   Pt has been evicted from 2 homes for hoarding and not following property rules.Most recently, she believed there was a woman living inside her who was pouring worms out of her. Pt had buckets of amonia throughout her home, including in the oven. She forgot it was there and started the oven--melting the bucket. She also attempted to mix bleach and amonia.    Brief Psychosocial History  Family:  Single, Children no  Support System:  Sibling(s)  Employment Status:  disabled  Source of Income:  disability  Financial Environmental Concerns:  other (see comments) (homeless)  Current Hobbies:     Barriers in Personal Life:       Significant Clinical History  Current Anxiety Symptoms:  anxious  Current Depression/Trauma:  impaired decision making, helplessness, thoughts of death/suicide  Current Somatic Symptoms:     Current Psychosis/Thought Disturbance:  displaces blame, agitation, distractability, high risk behavior, auditory hallucinations  Current Eating Symptoms:     Chemical Use History:  Alcohol: Other (comments) (abuse--unknown amount and frequency)  Last Use:: 04/13/25  Benzodiazepines: None  Opiates: None  Cocaine: None  Marijuana: None  Other Use: Methamphetamines   Past diagnosis:  Schizophrenia  Family history:  No known history of mental health or chemical health concerns  Past treatment:  Case management, Other (CADI waiver)  Details of most recent treatment:  Pt has CADI .  Family working with intervention  to get help for Pt.  Other relevant history:  Pt has refused psych meds and  medical care in general. Has not seen doctor in 3-4 years as she believes she will get arrested for meth use. Petition for civil commitment in 2019    Have there been any medication changes in the past two weeks:  patient is not on psychiatric meds       Is the patient compliant with medications:  yes (Pt was precribed metformin for diabetes in ED and she said that has been helpful)        Collateral Information  Is there collateral information: Yes     Collateral information name, relationship, phone number:  sisters Nadja Luna  and Tamika Sawyer [phone]   Lucille Gallatin (intervention ) [phone]    What happened today: Got into fight with sisters re:moving to assisted livonmg. Said she would kill herself if she had to live there     What is different about patient's functioning: MH has declined since eviction April 1. Staying in shelter at night. Making suicidal statements with plans. Not able to care for hygiene. Tryong not tp drink so she can stay at shelter. Auditory and tactile hallucinations.     What do you think the patient needs:      Has patient made comments about wanting to kill themselves/others: yes    If d/c is recommended, can they take part in safety/aftercare planning:  yes    Additional collateral information:  Family and intervention  request commitment. Pt has been denied at multiple programs due to urinary incontinance and refual of MH treatment. [facility] may be an option if Pt's MH stabilizes     Risk Assessment  [place] Suicide Severity Rating Scale Full Clinical Version:             [place] Suicide Severity Rating Scale Recent:   Suicidal Ideation (Recent)  Q1 Wished to be Dead (Past Month): yes  Q2 Suicidal Thoughts (Past Month): yes  Q3 Suicidal Thought Method: yes  Level of Risk per Screen: moderate risk     Suicidal Behavior (Recent)  Actual Attempt (Past 3 Months): No  Has subject engaged in non-suicidal self-injurious behavior? (Past 3 Months):  No  Interrupted Attempts (Past 3 Months): No  Aborted or Self-Interrupted Attempt (Past 3 Months): No  Preparatory Acts or Behavior (Past 3 Months): No    Environmental or Psychosocial Events: impulsivity/recklessness, helplessness/hopelessness, barriers to accessing healthcare, challenging interpersonal relationships, unstable housing, homelessness, other life stressors, ongoing abuse of substances, recent life events (see comment) (recent eviction)  Protective Factors: Protective Factors:  (none identified)    Does the patient have thoughts of harming others? Feels Like Hurting Others: no  Previous Attempt to Hurt Others: no  Is the patient engaging in sexually inappropriate behavior?: no  Does Patient have a known history of aggressive behavior: No    Is the patient engaging in sexually inappropriate behavior?  no        Mental Status Exam   Affect: Labile  Appearance: Disheveled (very poor hygiene, body odor, matted hair)  Attention Span/Concentration: Attentive  Eye Contact: Variable    Fund of Knowledge: Appropriate   Language /Speech Content: Fluent  Language /Speech Volume: Normal  Language /Speech Rate/Productions: Hyperverbal  Recent Memory: Variable  Remote Memory: Variable  Mood: Irritable, Sad  Orientation to Person: Yes   Orientation to Place: Yes  Orientation to Time of Day:    Orientation to Date:       Situation (Do they understand why they are here?): Yes  Psychomotor Behavior: Agitated, Normal  Thought Content: Delusions, Hallucinations, Paranoia  Thought Form: Paranoia, Obsessive/Perseverative, Tangential     Mini-Cog Assessment  Number of Words Recalled:    Clock-Drawing Test:     Three Item Recall:    Mini-Cog Total Score:       Medication  Psychotropic medications:   Medication Orders - Psychiatric (From admission, onward)      Start     Dose/Rate Route Frequency Ordered Stop    04/13/25 1836  OLANZapine zydis (zyPREXA) ODT tab 10 mg         10 mg Oral 2 TIMES DAILY PRN 04/13/25 1837                Current Care Team  Patient Care Team:  No Ref-Primary, Physician as PCP - General    Diagnosis  Patient Active Problem List   Diagnosis Code    Suicidal ideation R45.851    Type 2 diabetes mellitus (H) E11.9    Chronic hepatitis C (H) B18.2    Schizophrenia (H) F20.9    Acid reflux disease K21.9    Hoarding behavior F42.3       Primary Problem This Admission  Active Hospital Problems    Schizophrenia (H)      Suicidal ideation        Clinical Summary and Substantiation of Recommendations   Clinical Substantiation:  Pt presnts in ED by EMS. She has past diagnosis of schizophrenia and has historically refused all treatment. She was evicted from home on April 1 for hoarding, not following property rules, disturbing neighbors, garbage outside, etc. Pt reported to collateral that she hears voices telling her there are monsters at the shelter and it is not safe for her to go downtown [place] for 3 days. She has also expressed SI and plans to her sisters. Pt presents as very disorganized and not able to make informed decisions for her health and safety. IP MH admission is recommended. 72 hour hold initiated    Goals for crisis stabilization:  Reduce psychosis. Address physical health concerns (e.g diabetes, incontinence, GERD)    Next steps for Care Team:  Medication evaluation    Treatment Objectives Addressed:  assessing safety, identifying treatment goals    Therapeutic Interventions:  Reviewed healthy living that supports positive mental health, including looking at sleep hygiene, regular movement, nutrition, and regular socialization.    Has a specific means been identified for suicidal/homicide actions: Yes    If yes, describe:  Pt told her sister that she might jump off a bridge, cut self with razor blades, drink alcohol    Explain action steps toward mitigation:       Document completion of mitigation actions:       The follow up action still needed prior to discharge:       Patient coping skills  attempted to reduce the crisis:       Disposition  Recommended referrals: Medication Management, Other. please comment (IRTS)        Reviewed case and recommendations with attending provider. Attending Name:         Attending concurs with disposition: yes       Patient and/or validated legal guardian concurs with disposition:   yes (Pt was initially amenable to staying but she does not seem to be able to make informed decision about her care and MD initiated 72 hour hold)       Final disposition:  inpatient mental health         Imminent risk of harm: Suicidal Behavior  Severe psychiatric, behavioral or other comorbid conditions are appropriate for management at inpatient mental health as indicated by at least one of the following: Psychiatric Symptoms, Impaired impulse control, judgement, or insight, Symptoms of impact to function, Comorbid substance use disorder  Severe dysfunction in daily living is present as indicated by at least one of the following: Extreme deterioration in social interactions, Complete withdrawal from all social interactions, Complete neglect of self care with associated impairment in physical status, Other evidence of severe dysfunction, Complete inability to maintain any appropriate aspect of personal responsibility in any adult roles  Situation and expectations are appropriate for inpatient care: Patient is unwilling to participate in treatment voluntarily and requires treatment, Voluntary treatment at lower level of care is not feasible, Patient management/treatment at lower level of care is not feasible or is inappropriate  Inpatient mental health services are necessary to meet patient needs and at least one of the following: Specific condition related to admission diagnosis is present and judged likely to further improve at proposed level of care, Specific condition related to admission diagnosis is present and judged likely to deteriorate in absence of treatment at proposed level of  care      Legal status: 72 Hour Hold                         72 Hour Hold - Date/Time Initiated: 2135 4/14/2025                                                                                                               Assessment Details   Total duration spent with the patient: 32 min (plus 31 billable minutes with collateral)     CPT code(s) utilized: 06840 - Psychotherapy for Crisis - 60 (30-74*) min    SRIKANTH Escamilla, Psychotherapist  [facility] - Triage & Transition Services  Callback: [phone]

## 2025-04-15 ENCOUNTER — TELEPHONE (OUTPATIENT)
Dept: BEHAVIORAL HEALTH | Facility: CLINIC | Age: 60
End: 2025-04-15
Payer: COMMERCIAL

## 2025-04-15 ENCOUNTER — HOSPITAL ENCOUNTER (INPATIENT)
Facility: CLINIC | Age: 60
End: 2025-04-15
Attending: STUDENT IN AN ORGANIZED HEALTH CARE EDUCATION/TRAINING PROGRAM | Admitting: STUDENT IN AN ORGANIZED HEALTH CARE EDUCATION/TRAINING PROGRAM
Payer: COMMERCIAL

## 2025-04-15 VITALS
TEMPERATURE: 98 F | SYSTOLIC BLOOD PRESSURE: 122 MMHG | RESPIRATION RATE: 18 BRPM | HEART RATE: 99 BPM | OXYGEN SATURATION: 100 % | DIASTOLIC BLOOD PRESSURE: 57 MMHG

## 2025-04-15 DIAGNOSIS — E11.69 TYPE 2 DIABETES MELLITUS WITH OTHER SPECIFIED COMPLICATION, WITHOUT LONG-TERM CURRENT USE OF INSULIN (H): Primary | ICD-10-CM

## 2025-04-15 DIAGNOSIS — K21.9 GASTROESOPHAGEAL REFLUX DISEASE, UNSPECIFIED WHETHER ESOPHAGITIS PRESENT: ICD-10-CM

## 2025-04-15 DIAGNOSIS — G25.71 AKATHISIA: ICD-10-CM

## 2025-04-15 DIAGNOSIS — N32.81 OVERACTIVE BLADDER: ICD-10-CM

## 2025-04-15 DIAGNOSIS — F20.9 SCHIZOPHRENIA, UNSPECIFIED TYPE (H): ICD-10-CM

## 2025-04-15 DIAGNOSIS — K58.2 IRRITABLE BOWEL SYNDROME WITH BOTH CONSTIPATION AND DIARRHEA: ICD-10-CM

## 2025-04-15 LAB — GLUCOSE BLDC GLUCOMTR-MCNC: 300 MG/DL (ref 70–99)

## 2025-04-15 PROCEDURE — 250N000013 HC RX MED GY IP 250 OP 250 PS 637: Performed by: EMERGENCY MEDICINE

## 2025-04-15 PROCEDURE — 250N000013 HC RX MED GY IP 250 OP 250 PS 637

## 2025-04-15 PROCEDURE — 124N000002 HC R&B MH UMMC

## 2025-04-15 RX ORDER — HYDROXYZINE HYDROCHLORIDE 25 MG/1
25 TABLET, FILM COATED ORAL EVERY 4 HOURS PRN
Status: DISPENSED | OUTPATIENT
Start: 2025-04-15

## 2025-04-15 RX ORDER — DIVALPROEX SODIUM 500 MG/1
500 TABLET, FILM COATED, EXTENDED RELEASE ORAL AT BEDTIME
Status: DISCONTINUED | OUTPATIENT
Start: 2025-04-15 | End: 2025-04-16

## 2025-04-15 RX ORDER — OLANZAPINE 10 MG/1
10 TABLET, FILM COATED ORAL 3 TIMES DAILY PRN
Status: ACTIVE | OUTPATIENT
Start: 2025-04-15

## 2025-04-15 RX ORDER — PANTOPRAZOLE SODIUM 40 MG/1
40 TABLET, DELAYED RELEASE ORAL
Status: DISPENSED | OUTPATIENT
Start: 2025-04-16

## 2025-04-15 RX ORDER — DEXTROSE MONOHYDRATE 25 G/50ML
25-50 INJECTION, SOLUTION INTRAVENOUS
Status: ACTIVE | OUTPATIENT
Start: 2025-04-15

## 2025-04-15 RX ORDER — PANTOPRAZOLE SODIUM 40 MG/1
40 TABLET, DELAYED RELEASE ORAL ONCE
Status: COMPLETED | OUTPATIENT
Start: 2025-04-15 | End: 2025-04-15

## 2025-04-15 RX ORDER — NICOTINE POLACRILEX 4 MG
15-30 LOZENGE BUCCAL
Status: ACTIVE | OUTPATIENT
Start: 2025-04-15

## 2025-04-15 RX ORDER — OLANZAPINE 10 MG/2ML
10 INJECTION, POWDER, FOR SOLUTION INTRAMUSCULAR 3 TIMES DAILY PRN
Status: ACTIVE | OUTPATIENT
Start: 2025-04-15

## 2025-04-15 RX ADMIN — METFORMIN HYDROCHLORIDE 500 MG: 500 TABLET ORAL at 08:29

## 2025-04-15 RX ADMIN — PANTOPRAZOLE SODIUM 40 MG: 40 TABLET, DELAYED RELEASE ORAL at 15:52

## 2025-04-15 RX ADMIN — METFORMIN HYDROCHLORIDE 500 MG: 500 TABLET, FILM COATED ORAL at 19:42

## 2025-04-15 RX ADMIN — DIVALPROEX SODIUM 500 MG: 500 TABLET, FILM COATED, EXTENDED RELEASE ORAL at 20:50

## 2025-04-15 ASSESSMENT — ACTIVITIES OF DAILY LIVING (ADL)
CHANGE_IN_FUNCTIONAL_STATUS_SINCE_ONSET_OF_CURRENT_ILLNESS/INJURY: YES
DOING_ERRANDS_INDEPENDENTLY_DIFFICULTY: NO
WEAR_GLASSES_OR_BLIND: YES
ADLS_ACUITY_SCORE: 57
ADLS_ACUITY_SCORE: 57
DIFFICULTY_EATING/SWALLOWING: NO
ADLS_ACUITY_SCORE: 57
ADLS_ACUITY_SCORE: 52
ADLS_ACUITY_SCORE: 52
VISION_MANAGEMENT: VISION
ADLS_ACUITY_SCORE: 57
ADLS_ACUITY_SCORE: 52
CONCENTRATING,_REMEMBERING_OR_MAKING_DECISIONS_DIFFICULTY: YES
ADLS_ACUITY_SCORE: 57
HEARING_DIFFICULTY_OR_DEAF: NO
ADLS_ACUITY_SCORE: 52
FALL_HISTORY_WITHIN_LAST_SIX_MONTHS: YES
ADLS_ACUITY_SCORE: 52
DRESSING/BATHING_DIFFICULTY: NO
WALKING_OR_CLIMBING_STAIRS_DIFFICULTY: NO
ADLS_ACUITY_SCORE: 57
TOILETING_ISSUES: NO
NUMBER_OF_TIMES_PATIENT_HAS_FALLEN_WITHIN_LAST_SIX_MONTHS: 3
ADLS_ACUITY_SCORE: 57
DIFFICULTY_COMMUNICATING: NO
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
## 2025-04-15 NOTE — PROGRESS NOTES
LMHP Note:    Writer took a phone call from Za ('s [facility] Pre-petition screener: #[phone]) and was informed they will be supporting the petition for commitment.

## 2025-04-15 NOTE — TELEPHONE ENCOUNTER
R: [place]  Access Inpatient Bed Call Log 04/15/2025 @ 8:07 AM:  Intake has called facilities that have not updated the bed status within the last 12 hours.           [place]:  [facility] is posting 0 beds.  [facility] is posting 2 beds. [phone] Per call @ 8:35 AM, call after 9AM.   [facility] ([facility]) is posting 0 beds.   [facility] is posting  0 beds. No high school or beatrice psych. [phone] Call after morning meeting.  [facility] ([facility]) is posting  0 beds.  [facility] () is posting  2 beds. [phone]   [facility] is posting  6 beds. Ages 18-35, labs required. [phone] Per call @ 8:25 AM, 2 YA beds.  [facility] ([facility]) is posting  0 beds.  [facility] ([facility]) is posting  0 beds. [phone]     [place]:   [facility] () is posting  0 beds. Mixed unit (12+), low acuity. [phone]   [facility] ([facility]) is posting  0 beds. [phone]. No current aggression, low acuity.   [facility] is posting  0 beds. [phone]. [phone] Per VM @ 8:29 AM, adult/adolescent at capacity.  [facility] ([place]) is posting  0 beds. [phone]   [facility] ([facility]) is posting  0 beds. [phone]. No current aggression, low acuity.   [facility] ([place]) is posting  4 beds. [phone]; 0 adult acute, 2 med psych, 2 mood disorder. Capped on IA  [facility] is posting  1 bed. Low acuity, 72HH preferred. [phone]    [facility] ([facility]) is posting  0 beds. [phone]     [facility] ([facility]) is posting  2 beds. VOL only, no hx of aggression/violence/assault. No sexual offenders, no 72HH. [phone]   [facility] is posting  1 bed. Must have cognitive ability to program. No aggression or violent hx in the last 2 years. [phone]   [facility] is posting  2 beds. Low acuity, violence and aggression capped. [phone] Per call @ 8:33  AM, requested cb from CARLA.   [facility] is posting 1 bed. [phone] Per call @ 8:32 AM, no beds.    [facility] is posting  5 beds. [phone] Per MB's call @ 6:30AM, 6 SDU beds  [facility] is posting  6 beds. No lines, drains, tubes, oxygen, IV or CPAP. [phone] Per call @ 8:31 AM, 6 open beds.   [facility] is posting  5 beds. MIXED UNIT. No lines, drains, tubes, oxygen, IV or CPAP. [phone]     Pt remains on work list pending appropriate bed availability.       8:50 AM Provided [facility] JAREK Veras with BO for review as unit is case by case for admission.   9:38 AM Per call back from [facility] Veras, pt appropriate for admission.   9:42 AM Paged Machelle to review for [facility].   10:35 AM Paged Machelle.   11:41 AM Per call with Machelle, pt is accepted for admission to [facility].  11:47 AM [facility] in [facility]. She is going to call back with a timeline for report.   11:54 AM Per call back from [facility] Veras call call unit for report @ 2PM.   11:56 AM Provided ED with placement.        Final Disposition: Norah/[facility]le

## 2025-04-15 NOTE — PROGRESS NOTES
"Patient is disorganized , forgetful, her insight is poor, she does not remember  mixing ammonia and bleach, hoarding and having her trailer condemned.  She is evasive and guarded.  She blames to her sister for her problems  \" She is crazy, she is making things up, they are not a reliable source \"  "

## 2025-04-15 NOTE — ED NOTES
IP MH Referral Acuity Rating Score (RARS)    LMHP complete at referral to IP MH, with DEC; and, daily while awaiting IP MH placement. Call score to PPS.  CRITERIA SCORING   New 72 HH and Involuntary for IP MH (not adolescent) 3/3   Boarding over 24 hours 1/1   Vulnerable adult at least 55+ with multiple co morbidities; or, Patient age 11 or under 1/1   Suicide ideation without relief of precipitating factors 0/1   Current plan for suicide 0/1   Current plan for homicide 0/1   Imminent risk or actual attempt to seriously harm another without relief of factors precipitating the attempt 0/1   Severe dysfunction in daily living (ex: complete neglect for self care, extreme disruption in vegetative function, extreme deterioration in social interactions) 1/1   Recent (last 2 weeks) or current physical aggression in the ED 0/1   Restraints or seclusion episode in ED 0/1   Verbal aggression, agitation, yelling, etc., while in the ED 0/1   Active psychosis with psychomotor agitation or catatonia 1/1   Need for constant or near constant redirection (from leaving, from others, etc).  0/1   Intrusive or disruptive behaviors 0/1   TOTAL 7

## 2025-04-15 NOTE — TELEPHONE ENCOUNTER
R: [place]  Access Inpatient Bed Call Log 4/15/2025 @ 12:00 AM:  Intake has called facilities that have not updated their bed status within the last 12 hours.   Adults:    [facility] is posting 0 beds.                  [facility] is posting 1 bed. [phone].  Per Jourdan @ 12:01 AM 1 F shared bed.  [facility] is posting 0 beds. Negative covid required.  [facility] is posting 0 beds. Neg covid. No high school/Cecilia-psych. [phone]. Per Jose @ 3:40 PM, they are full tonight  [facility] is posting 0 beds. [phone].  [facility] is posting 0 beds. [phone]. Per Cat @ 12:03 AM @ [facility]  [facility] is posting 2 beds. (Ages 18-35) Negative covid, no aggression, physical or sexual assault, violence hx or drug abuse, or psychosis.  [phone]. Per Nadja @ 12:05 AM.  [facility] is posting 0 beds.   [facility] ([facility]) is posting 0 beds. [phone].       [facility] is posting 0 beds. LOW acuity ONLY. Mixed unit 12+. Negative covid- [phone]   [facility] has 0 beds posted. No aggression. Negative Covid. Low acuity.  [facility] ([place]) is posting 0 beds. Low acuity only. Neg covid. [phone]. Per Ada @ 12:11 AM @ [facility]  [facility] is posting 0 beds. Low acuity. No current aggression.  [facility] is posting 0 beds. Negative covid. [phone] ext [phone].  [facility] ([place]) is posting 0 beds. Negative covid.  [phone]. Per Ada @ 12:11 AM @ [facility]  [facility] is posting 1 bed. Low acuity. 72 HH hold preferred. Negative covid required. [phone].  [facility] ([place]) is posting 0 beds. Low acuity only. Neg covid.  [phone]. Per Ada @ 12:11 AM @ [facility]     [facility] in [place] is posting 2 beds.  Negative covid required.   Vol only, No history of aggression, violence, or assault. No sexual offenders. No 72  holds. [phone]. Per Dianne @  12:15 AM.  [facility] is posting 1 bed. Negative covid required.  (Must have the cognitive ability to do programming. No aggressive or violent behavior or recent HX in the last 2 yrs. MH must be primary.) Always low acuity. [phone].  [facility] has 3 beds posted. Negative covid required.  Low acuity only. Violence and aggression capped. [phone]. Per Vanessa @ 12:17 AM.  [facility] is posting 0 beds. Low acuity, Negative covid required. [phone]. Elissa @ 12:19 AM @ [facility] [place] posting 2 beds. Negative covid required.  [phone]. Per Za @ 12:22 AM, Low acuity beds avail  [facility] [place] is posting 0 beds. Negative covid. LOW acuity. (No lines, drains, or tubes, oxygen, CPAP, IV, etc.) Must Have a Ride Home. [phone]. Per Staff @ 12:24 AM, call back after 8:00 AM for availability.  [facility] is posting 2 beds. Negative covid. (No. lines, drains, or tubes, oxygen, CPAP, IV, etc.) [phone]. Per Aga @ 12:23 AM, low acuity; general beds avail  [facility] is posting 5 beds. No covid test required. OUT OF STATE. [phone]; Per  Intake policy, patients must be voluntary for out of state placement Per Amanda @ 12:26 AM.      Pt remains on the work list pending appropriate bed availability.

## 2025-04-15 NOTE — PHARMACY-ADMISSION MEDICATION HISTORY
Please see Admission Medication History completed on 4/14/2025 under previous encounter at [facility] Emergency Department for information regarding prior to admission medications.     Millie Cantu, AmayaD

## 2025-04-15 NOTE — PLAN OF CARE
"s: Pt is a 58 yo female admitted to station 20 from [facility] ED for SI with a plan: cut themself with a blade, jump off a bridge, or drink herself to death.    b: Pt with hx of psychiatric dx for schizophrenia and polysubstance use. (ETOH and meth) Pt hasn't been med compliant for years, according to ED. She was recently evicted from two homes in the past for hoarding, where once she had buckets of ammonia in the house, she once turned on the oven with the ammonia bucket in. On 4/1/25, the patient was again ev[place] from [place] trailer park; later, the patient went to a shelter where she refused to return for the last 5 days, d/t AH and paranoia, and voices were telling her there were monsters at the shelter. Sister offered to get her to Assisted Living, and she agreed, then refused and said she'd kill herself if she had to live there.      a: UTOX positive for amphetamines. RASHIDA negative. Diabetic. Pt states she has never been suicidal but was forced to tell \"them\" that she'd kill herself to scare \"them\" and stop putting her in an institution. Pt also complained that \"they\" are unreasonable, chased her from her property, a trailer she was renting from the family, and controlled her on what she should and should not do. Pt states her problem is her legs and that she needs vitamins. She also states her diabetes is a concern. She is disorganized and verbal and requires redirection to focus, but she is cooperative. She denies SI and AVH. She is paranoid that people are not \"nice\" to her, and she has to shout from frustration. HS . Pt had elevated BP, provider was notified, BP (!) 170/90 (BP Location: Left arm, Patient Position: Sitting, Cuff Size: Adult Regular)   Pulse 103   Temp 97.6  F (36.4  C) (Temporal)   Resp 16   Ht 1.651 m (5' 5\")   Wt 71.4 kg (157 lb 8 oz)   LMP 10/22/2013   BMI 26.21 kg/m    . She reported pain at HS but declined medication intervention and stated all she needs are vitamins. She is, " however, compliant with scheduled meds. Pt is cooperative.    r: Follow up psyche eval.

## 2025-04-15 NOTE — H&P
"  ----------------------------------------------------------------------------------------------------------  [facility]   Psychiatry History and Physical    Name: Anastasiya Simon   MRN#: [id number]  Age: 59 year old YOB: 1965    Date of Admission: (Not on file)  Attending Physician: ***     Contacts:     Primary Outpatient Psychiatrist: *** M.D. @ ***   Primary Physician: Libra Ref-Primary, Physician  Therapist: ***  [facility] : ***  Probation/: ***  Family Members: ***     Chief Concern:     \"***\"     History of Present Illness:     Anastasiya Simon is a 59 year old female with previous psychiatric diagnoses of *** admitted from the {admitplace:817884} on 04/15/2025 due to concern for {psychadmitreason:442451} in the context of {psychadmitcontext:783526}.    [facility]/DEC Assessment:  *** (briefly summarize notable presenting information)    ED/Hospital Course:  Anastasiya Simon was medically cleared for admission to inpatient psychiatric unit. In the ED, *** include brief ED course including psychiatric medications given, reason why, & response.     Patient interview:  ***     Psychiatric Review of Systems:     Depressive:   Reports {Depressive Sxs:628996}    Denies {Depressive Sxs:450820}   Dysregulation:    Reports {Dysregulation sxs:834769}    Denies {Dysregulation sxs:610929}   Psychosis:    Reports {Psychotic Sxs:356026}   Denies {Psychotic Sxs:118855}  Starr:    Reports {Starr Sxs:444989}   Denies {Starr Sxs:652328}  Anxiety:    Reports {Anxiety Sxs:323587}   Denies {Anxiety Sxs:619048}  PTSD:    Reports {PTSD Sxs:843541}   Denies {PTSD Sxs:668026}  ADHD:    Reports {ADHD Sxs:584432}   Denies {ADHD Sxs:963980}  Disordered Eating:   Reports {ED sxs:351295}, {:657288}  Denies {ED sxs:162861}, {:408688}  Cluster B:   Reports {clusterbsx:540428}  Denies {clusterbsx:481434}     Medical Review of Systems:     The Review of Systems is " negative other than what is noted in the HPI.     Psychiatric History:     Prior diagnoses: Previous psychiatric diagnoses include ***.     Hospitalizations: {PREVHOSP:219329}. Most recent hospitalization was at *** in ***/*** for ***.     Court Commitments: None per {PatientReported_ChartReview:820891}. *** Currently committed with [facility] until *** . [facility] meds include ***     Suicide attempts: None per {PatientReported_ChartReview:030420}..     Self-injurious behavior: None per {PatientReported_ChartReview:392861}..     Violence towards others: None per {PatientReported_ChartReview:860959}..     ECT/TMS: None per {PatientReported_ChartReview:688237}..    Past medications:   Per {PatientReported_ChartReview:699169}.: ***     Substance Use History:     Alcohol: {EndorsesDenies:414379} ***      Nicotine: {EndorsesDenies:343724} ***    Illicit Substances: {EndorsesDenies:081033}  current or past addiction to {subuse:475514}    Chemical Dependency Treatment: {EndorsesDenies:174099} history of chemical dependency treatment      Social History:     Upbringing: Grew up in ***.     Family/Relationships: {RelationshipStatus:214455}    Living Situation: {LivingSettin}    Education: Highest level of education obtained is: {Education:972743}    Occupation: Works as a ***     Legal: {EndorsesDenies:666842} history of legal issues.     Guns: {guns:415654}    Abuse/Trauma: {EndorsesDenies:139564} history of trauma      Service: None ***    Spirituality: ***     Hobbies/Interests: ***      Past Medical/Surgical History:     {:8896697}  Reports *** Denies history of: {Head trauma, seizures, HIV, hepatitis:619046}  Past Medical History:   Diagnosis Date    Acid reflux disease     Chronic hepatitis C (H)     Schizophrenia (H)     Type 2 diabetes mellitus (H)        {:0603358}  Past Surgical History:   Procedure Laterality Date    NO HISTORY OF SURGERY          Family History:     Psychiatric Family Hx:  {psyfax:600670}  Family History   Problem Relation Age of Onset    Cerebrovascular Disease Father         later in life    Skin Cancer Father         unknown type    Diabetes Type 2  No family hx of     Myocardial Infarction No family hx of     Coronary Artery Disease Early Onset No family hx of     Breast Cancer No family hx of     Colon Cancer No family hx of     Ovarian Cancer No family hx of         Allergies:      Allergies   Allergen Reactions    Tetracycline Unknown        Medications:     No medications prior to admission.       See current inpatient medications below.     Vitals and Physical Exam:     [facility] 10/22/2013     See ED assessment note by ED physician on ***.     Labs and Imaging:     Recent Results (from the past 72 hours)   Glucose by meter    Collection Time: 04/13/25  7:53 PM   Result Value Ref Range    GLUCOSE BY METER POCT 221 (H) 70 - 99 mg/dL   Alcohol breath test POCT    Collection Time: 04/13/25  9:20 PM   Result Value Ref Range    Alcohol Breath Test 0.00 0.00 - 0.01   Urine Drug Screen Panel    Collection Time: 04/13/25 11:39 PM   Result Value Ref Range    Amphetamines Urine Screen Negative Screen Negative    Barbituates Urine Screen Negative Screen Negative    Benzodiazepine Urine Screen Negative Screen Negative    Cannabinoids Urine Screen Negative Screen Negative    Cocaine Urine Screen Negative Screen Negative    Fentanyl Qual Urine Screen Negative Screen Negative    Opiates Urine Screen Negative Screen Negative    PCP Urine Screen Negative Screen Negative   Comprehensive metabolic panel    Collection Time: 04/13/25 11:44 PM   Result Value Ref Range    Sodium 138 135 - 145 mmol/L    Potassium 3.8 3.4 - 5.3 mmol/L    Carbon Dioxide (CO2) 24 22 - 29 mmol/L    Anion Gap 13 7 - 15 mmol/L    Urea Nitrogen 13.6 8.0 - 23.0 mg/dL    Creatinine 0.55 0.51 - 0.95 mg/dL    GFR Estimate >90 >60 mL/min/1.73m2    Calcium 9.4 8.8 - 10.4 mg/dL    Chloride 101 98 - 107 mmol/L    Glucose 332 (H) 70 -  "99 mg/dL    Alkaline Phosphatase 103 40 - 150 U/L    AST 11 0 - 45 U/L    ALT 15 0 - 50 U/L    Protein Total 7.1 6.4 - 8.3 g/dL    Albumin 3.8 3.5 - 5.2 g/dL    Bilirubin Total <0.2 <=1.2 mg/dL   CBC with platelets and differential    Collection Time: 04/13/25 11:44 PM   Result Value Ref Range    WBC Count 7.6 4.0 - 11.0 10e3/uL    RBC Count 4.37 3.80 - 5.20 10e6/uL    Hemoglobin 13.0 11.7 - 15.7 g/dL    Hematocrit 37.1 35.0 - 47.0 %    MCV 85 78 - 100 fL    MCH 29.7 26.5 - 33.0 pg    MCHC 35.0 31.5 - 36.5 g/dL    RDW 12.4 10.0 - 15.0 %    Platelet Count 316 150 - 450 10e3/uL    % Neutrophils 47 %    % Lymphocytes 39 %    % Monocytes 11 %    % Eosinophils 3 %    % Basophils 1 %    % Immature Granulocytes 0 %    NRBCs per 100 WBC 0 <1 /100    Absolute Neutrophils 3.5 1.6 - 8.3 10e3/uL    Absolute Lymphocytes 3.0 0.8 - 5.3 10e3/uL    Absolute Monocytes 0.8 0.0 - 1.3 10e3/uL    Absolute Eosinophils 0.2 0.0 - 0.7 10e3/uL    Absolute Basophils 0.0 0.0 - 0.2 10e3/uL    Absolute Immature Granulocytes 0.0 <=0.4 10e3/uL    Absolute NRBCs 0.0 10e3/uL        Mental Status Examination:     Oriented to:  {PSYCHORIENTATION:947742}  General:  {General:464875}  Appearance:  {Appearance:849798}  Behavior/Attitude:  {Behavior:984055}  Eye Contact: {EyeContact:967853}  Psychomotor: {PSYCHMOT:217065} {Catatonia:686688}  Speech:  {Speech:250385}  Language: Fluent in English with appropriate syntax and vocabulary.  Mood:  \"***\"  Affect:  {Affect:241287}  Thought Process:  {ThoughtProcess:278674}  Thought Content:   {ThoughtContent:848689}; {Delusions:852541}  Associations:  {Association:199685}  Insight:  {Quality:962090} due to ***  Judgment:  {Quality:393247} due to ***  Impulse control: {Quality:250527}  Attention Span:  {Attention:649539}  Concentration:  {Concentration:200706}  Recent and Remote Memory:  {Memory:823219}  Fund of Knowledge: {Intellect:264839}  Muscle Strength and Tone: { :254736}  Gait and Station: { :991553}     " Psychiatric Assessment:     Anastasiya Simon is a 59 year old female previously diagnosed with *** who presented voluntarily/by *** with *** in the context of ***. Most recent psychiatric hospitalization was***. Significant symptoms on admission include ***. The MSE on admission was pertinent for***. Biological contributions to mental health presentation include *** (previous diagnosis, medications, substance use, PMH). Psychological contributions to mental health presentation include *** (insight, coping, personality). Social factors contributing to mental health presentation include *** (support system, interpersonal conflicts, work, education, legal, financial). Protective factors include ***.     In summary, the patient's reported symptoms of *** in the context of *** are consistent with ***. (OR Patient's definitive diagnosis is still in evolution; differential includes ***.) She will likely benefit from *** this admission.    Given that she currently has {psychadmitreason:643579}, patient warrants inpatient psychiatric hospitalization to maintain her safety.      Psychiatric Plan by Diagnosis      # ***  1. Medications:  - ***     2. Pertinent Labs/Monitoring:   - *** (QTc, medication levels, ANC, etc)     3. Additional Plans:  - Patient will be treated in therapeutic milieu with appropriate individual and group therapies as described  - ***    # ***  -     Psychiatric Hospital Course:      (***for discharge summary - explain in past tense what was done this hospitalization, include medication choices/changes/rationale/efficacy)  Anastasiya Simon was admitted to Station *** as a voluntary patient/ on a 72 hour hold***.   Medications:  PTA *** & *** were continued.   PTA *** and *** were held due to ***.    New medications started at the time of admission include ***.     The risks, benefits, alternatives, and side effects were discussed and understood by the patient*** and other caregivers***.     Medical  Assessment and Plan     Medical diagnoses to be addressed this admission:    # ***    # ***     Medical course: (****medical work up (ie: admission labs), PTA medical problems, consult summary and changes) Patient was physically examined by the ED prior to being transferred to the unit and was found to be medically stable and appropriate for admission.     Consults:  {consultpsych:530630}     Checklist     Legal Status: None      Safety Assessment:        Risk Assessment:  Risk for harm is {harm:835495}.  Risk factors: {RF harm:126593}  Protective factors: {pf harm:363633}     SIO: ***    Dispo: TBD. Disposition pending clinical stabilization, medication optimization and development of an appropriate discharge plan.     Attestations:     {ATTESTATIONS:170953}

## 2025-04-15 NOTE — PROGRESS NOTES
Nurse to nurses report given to Eda RIVERA from Station 20.  Dispatch called and they will be here in an hour.

## 2025-04-15 NOTE — PROGRESS NOTES
"Triage & Transition Services, Extended Care     Therapy Progress Note    Patient: Anastasiya goes by \"Anastasiya,\" uses she/her pronouns  Date of Service: April 15, 2025  Site of Service: [facility] Emergency Dept                             BH1  Patient was seen yes  Mode of Assessment: In person    Presentation Summary: Writer presented to Pt's room to engage in therapeutic check-in. Writer introduced self and stated purpose of interaction. Pt was observed to be resting on her bed. Pt reports she was \"tired\" and wanted to meet at another time. Writer expressed understanding Pt's request. Writer discussed how he could return after lunch if Pt would like to which Pt asked what the questions were that were going to be asked. Writer stated he could do them now or return and Pt again asked what the questions were. When prompted, Pt denies any SI, HI, VH. Pt does endorse auditory hallucinations, which Pt described as \"noises\" that are \"bothering\" her that occur \"all the time.\" When pressed for futher descriptions, Pt denies them as voices or commanding her to do anything and identified them as \"loud, scary, big huge monsters, percussion\" and reports they can be close or they can be \"blocks away.\" Writer thanked Pt for answering his questions and stated he could return in a little bit after more time of rest, to which Pt did not provide a verbal response to. Writer stated he will check back in a while to provide Pt rest. Pt stated \"okay.\"    Therapeutic Intervention(s) Provided: Engaged in social skills training.    Current Symptoms:   avoidance   auditory hallucinations loss of appetite    Mental Status Exam   Affect: Blunted  Appearance: Disheveled  Attention Span/Concentration: Other (please comment) (varying)  Eye Contact: Variable, Other (please comment) (Pt was on her side, resting, with her eyes closed, yet would open them for brief moments and then close them.)    Fund of Knowledge: Appropriate " "  Language /Speech Content: Fluent  Language /Speech Volume: Soft  Language /Speech Rate/Productions: Minimally Responsive  Recent Memory: Poor  Remote Memory: Poor  Mood: Other (please comment) (Pt reports feeling \"tired.\")  Orientation to Person: Yes   Orientation to Place: Answer (please comment) (did not assess)  Orientation to Time of Day: Answer (please comment) (did not assess)  Orientation to Date: Answer (please comment) (did not assess)     Situation (Do they understand why they are here?): Answer (please comment) (did not assess)  Psychomotor Behavior: Underactive  Thought Content: Hallucinations  Thought Form: Intact, Other (please comment) (Was able to identify and describe auditory hallucinations)    Treatment Objective(s) Addressed: rapport building, assessing safety    Patient Response to Interventions: verbalizes understanding    Progress Towards Goals: Patient Reports Symptoms Are: ongoing  Patient Progress Toward Goals: is not making progress  Comment: Pt continues to endorse auditory hallucinations.  Next Step to Work Toward Discharge: symptom stabilization  Symptom Stabilization Comment: Awaiting IP level of care. Petition for commitment has been submitted.    Case Management: Summary of Interaction: None at time of reassessment.    Plan: inpatient mental health  yes provider, RN Dr. Becca galan (72 hh, petitioning for commitment)    Clinical Substantiation:     Pt was observed to be resting on her bed. Pt reports she was \"tired\" and wanted to meet at another time. When prompted, Pt denies any SI, HI, VH. Pt does endorse auditory hallucinations, which Pt described as \"noises\" that are \"bothering\" her that occur \"all the time.\" When pressed for futher descriptions, Pt denies them as voices or commanding her to do anything and identified them as \"loud, scary, big huge monsters, percussion\" and reports they can be close or they can be \"blocks away.\" Writer thanked Pt for answering his questions and " "stated he could return in a little bit after more time of rest, to which Pt did not provide a verbal response to. Writer stated he will check back in a while to provide Pt rest. Pt stated \"okay.\"    At this time IP MH admission is being recommended due to concerns about increased psychosis symptoms and disorganized behavior in the community. Pt's current symptoms appear to be impacting her ability to safety and appropriately function in the community. Writer did not engage Pt in safety planning. Pt does appear to be at higher risk of death by suicide accidental or intentional due to mental health history and substance use history. If Pt is able to effectively safety plan and/or Pt's symptoms improve it would be beneficial to pursue a less restrictive alternative. Pt has been placed on a 72 hour hold on 4/13 at 2135. Hospital staff have decided to petition for MI civil commitment through [facility]. Chart review indicates Pt completed a phone interview with the [facility] PPS screener on 04/15/2025.      Legal Status: Legal Status: 72 Hour Hold  72 Hour Hold - Date/Time Initiated: 4/14/2025 at 2135  72 Hour Hold - Date/Time Ends: 4/17/2025 at 0000    Session Status: Time session started: 0955  Time session ended: 0958  Session Duration (minutes): 3 minutes  Session Number: 2  Anticipated number of sessions or this episode of care: 2    Time Spent: 3 minutes    CPT Code: CPT Codes: Non-Billable    Diagnosis:   Patient Active Problem List   Diagnosis Code    Suicidal ideation R45.851    Type 2 diabetes mellitus (H) E11.9    Chronic hepatitis C (H) B18.2    Schizophrenia (H) F20.9    Acid reflux disease K21.9    Hoarding behavior F42.3       Primary Problem This Admission: Active Hospital Problems    Schizophrenia (H)      Suicidal ideation        Alverto Royal, [facility]   Licensed Mental Health Professional (LMHP), Extended Care  [phone]          "

## 2025-04-15 NOTE — PLAN OF CARE
Goal Outcome Evaluation:    ...A               Admission:  I am responsible for any personal items that are not sent to the safe or pharmacy.  [facility] is not responsible for loss, theft or damage of any property in my possession.    Signature:  _________________________________ Date: _______  Time: _____                                              Staff Signature:  ____________________________ Date: ________  Time: _____      2nd Staff person, if patient is unable/unwilling to sign:    Signature: ________________________________ Date: ________  Time: _____     Discharge:  [facility] has returned all of my personal belongings:    Signature: _________________________________ Date: ________  Time: _____                                          Staff Signature:  ____________________________ Date: ________  Time: _____                       Necklace  Green yoga pants   Shoes   Brown belt   Phone/   Green sweater  Clear makeup bag  Black wallet  $4 dollars left inside the the wallet     SENT TO SECURITY      [facility] #[id number]  [facility] #[id number]  [id number]  [id number]

## 2025-04-16 LAB
EST. AVERAGE GLUCOSE BLD GHB EST-MCNC: 243 MG/DL
FOLATE SERPL-MCNC: 13.7 NG/ML (ref 4.6–34.8)
GLUCOSE BLDC GLUCOMTR-MCNC: 239 MG/DL (ref 70–99)
GLUCOSE BLDC GLUCOMTR-MCNC: 316 MG/DL (ref 70–99)
HBA1C MFR BLD: 10.1 %
TSH SERPL DL<=0.005 MIU/L-ACNC: 2.4 UIU/ML (ref 0.3–4.2)
VIT B12 SERPL-MCNC: 812 PG/ML (ref 232–1245)

## 2025-04-16 PROCEDURE — H2032 ACTIVITY THERAPY, PER 15 MIN: HCPCS

## 2025-04-16 PROCEDURE — 82746 ASSAY OF FOLIC ACID SERUM: CPT

## 2025-04-16 PROCEDURE — 250N000013 HC RX MED GY IP 250 OP 250 PS 637

## 2025-04-16 PROCEDURE — 84443 ASSAY THYROID STIM HORMONE: CPT

## 2025-04-16 PROCEDURE — 124N000002 HC R&B MH UMMC

## 2025-04-16 PROCEDURE — 99232 SBSQ HOSP IP/OBS MODERATE 35: CPT | Mod: GC | Performed by: PSYCHIATRY & NEUROLOGY

## 2025-04-16 PROCEDURE — 97150 GROUP THERAPEUTIC PROCEDURES: CPT | Mod: GO

## 2025-04-16 PROCEDURE — 82607 VITAMIN B-12: CPT

## 2025-04-16 PROCEDURE — 36415 COLL VENOUS BLD VENIPUNCTURE: CPT

## 2025-04-16 PROCEDURE — 83036 HEMOGLOBIN GLYCOSYLATED A1C: CPT

## 2025-04-16 PROCEDURE — 99253 IP/OBS CNSLTJ NEW/EST LOW 45: CPT

## 2025-04-16 RX ORDER — OLANZAPINE 15 MG/1
15 TABLET, ORALLY DISINTEGRATING ORAL AT BEDTIME
Status: DISCONTINUED | OUTPATIENT
Start: 2025-04-16 | End: 2025-04-17

## 2025-04-16 RX ORDER — MULTIPLE VITAMINS W/ MINERALS TAB 9MG-400MCG
1 TAB ORAL DAILY
Status: DISPENSED | OUTPATIENT
Start: 2025-04-16

## 2025-04-16 RX ORDER — DIVALPROEX SODIUM 500 MG/1
1000 TABLET, FILM COATED, EXTENDED RELEASE ORAL AT BEDTIME
Status: DISPENSED | OUTPATIENT
Start: 2025-04-16

## 2025-04-16 RX ORDER — OLANZAPINE 5 MG/1
5 TABLET, ORALLY DISINTEGRATING ORAL DAILY
Status: DISPENSED | OUTPATIENT
Start: 2025-04-16

## 2025-04-16 RX ORDER — TOLTERODINE 2 MG/1
2 CAPSULE, EXTENDED RELEASE ORAL DAILY
Status: DISPENSED | OUTPATIENT
Start: 2025-04-16

## 2025-04-16 RX ORDER — LOPERAMIDE HYDROCHLORIDE 2 MG/1
2 CAPSULE ORAL 4 TIMES DAILY PRN
Status: DISPENSED | OUTPATIENT
Start: 2025-04-16

## 2025-04-16 RX ADMIN — DIVALPROEX SODIUM 1000 MG: 500 TABLET, FILM COATED, EXTENDED RELEASE ORAL at 20:42

## 2025-04-16 RX ADMIN — METFORMIN HYDROCHLORIDE 500 MG: 500 TABLET, FILM COATED ORAL at 08:07

## 2025-04-16 RX ADMIN — PANTOPRAZOLE SODIUM 40 MG: 40 TABLET, DELAYED RELEASE ORAL at 08:07

## 2025-04-16 RX ADMIN — OLANZAPINE 15 MG: 15 TABLET, ORALLY DISINTEGRATING ORAL at 20:42

## 2025-04-16 RX ADMIN — TOLTERODINE TARTRATE 2 MG: 2 CAPSULE, EXTENDED RELEASE ORAL at 13:15

## 2025-04-16 RX ADMIN — Medication 1 TABLET: at 12:44

## 2025-04-16 RX ADMIN — METFORMIN HYDROCHLORIDE 500 MG: 500 TABLET, FILM COATED ORAL at 09:52

## 2025-04-16 RX ADMIN — METFORMIN HYDROCHLORIDE 1000 MG: 500 TABLET, FILM COATED ORAL at 18:17

## 2025-04-16 RX ADMIN — LOPERAMIDE HYDROCHLORIDE 2 MG: 2 CAPSULE ORAL at 12:44

## 2025-04-16 RX ADMIN — OLANZAPINE 5 MG: 5 TABLET, ORALLY DISINTEGRATING ORAL at 12:44

## 2025-04-16 ASSESSMENT — ACTIVITIES OF DAILY LIVING (ADL)
ADLS_ACUITY_SCORE: 52
ORAL_HYGIENE: INDEPENDENT
HYGIENE/GROOMING: INDEPENDENT
ADLS_ACUITY_SCORE: 52
ORAL_HYGIENE: INDEPENDENT
ADLS_ACUITY_SCORE: 52
HYGIENE/GROOMING: INDEPENDENT
ADLS_ACUITY_SCORE: 52
LAUNDRY: WITH SUPERVISION
ADLS_ACUITY_SCORE: 52
ADLS_ACUITY_SCORE: 52
DRESS: INDEPENDENT
ADLS_ACUITY_SCORE: 52
DRESS: INDEPENDENT
ADLS_ACUITY_SCORE: 52
ADLS_ACUITY_SCORE: 52
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
## 2025-04-16 NOTE — PROGRESS NOTES
Rehab Group    Start time: 1015  End time: 1115  Patient time total: 45 minutes    attended full group    #2 attended   Group Type: art   Group Topic Covered: activity therapy       Group Session Detail:  Art Therapy directive was to create mixed media positive thoughts and/or affirmations cards.  Goals of directive: emotional regulation, distress tolerance, media exploration, emotional expression.     Patient Response/Contribution:  cooperative with task       Patient Detail:    Pt was disorganized, initially had difficulty focusing on/self initiating task. Pt eventually self initiated a detailed flower painting on the top of her affirmation card and wrote down a positive affirmation on the back of card.  Pt briefly shared with author and group.  Pts mood was initially irritable, calmer after engaging in painting.      Activity Therapy Per 15 min ()      Patient Active Problem List   Diagnosis    Suicidal ideation    Type 2 diabetes mellitus (H)    Chronic hepatitis C (H)    Schizophrenia (H)    Acid reflux disease    Hoarding behavior

## 2025-04-16 NOTE — PROGRESS NOTES
"  Rehab Group    Start time: 1115  End time: 1155  Patient time total: 40 minutes    attended full group     #3 attended   Group Type: occupational therapy   Group Topic Covered: activity therapy and coping skills     Group Session Detail:  Activity highlighting personal values, traits, hobbies, fears, anxieties, ect. Followed by group discussion      Patient Response/Contribution:  actively engaged, nonsensical verbalizations, needed prompts to redirect, and interrupted others/ frequently     Patient Detail:  Pt arrived  to group with blunted affect. Pt presented as disorganized throughout, becoming tangential at times requiring redirection. Pt was somewhat receptive to gentle redirection. Pt shared having difficultly transitioning into becoming \"elderly\", sharing having difficulty with housing. She became tangential discussing different nursing homes and services they offer. Pt partially filled out provided worksheet, although had difficulty following prompt as directed. Pt perseverated on discussing the outdoors, drawing a map on her page, listing different outdoor areas she enjoys. Pt then request a song, interrupting other group members. Pt became quiet, mumbling to herself at times and gently rocking, pt then politely excused self from group room reporting \"I have to use the bathroom\", pt did not return. Will continue to encourage attendance and participation.       [id number] OT Group (2 or more in attendance)      Patient Active Problem List   Diagnosis    Suicidal ideation    Type 2 diabetes mellitus (H)    Chronic hepatitis C (H)    Schizophrenia (H)    Acid reflux disease    Hoarding behavior       "

## 2025-04-16 NOTE — CONSULTS
[facility]  Consult Note - Hospitalist Service  Date of Admission:  4/15/2025  Consult Requested by: Dr. Dela Cruz  Reason for Consult: DM & HTN    Assessment & Plan   Anastasiya Simon is a 59 year old female with a history of diabetes mellitus type II, history of hepatitis C, schizophrenia, polysubstance use, who was admitted to [facility] on 4/15/2025 for further evaluation and treatment of psychosis and SI. Medicine was consulted for further evaluation and treatment of uncontrolled DM and high blood pressure.     Uncontrolled Type II DM  On admission, hemoglobin A1c of 10.1. PTA on metformin 500 mg BID, but patient reports she was only taking 500 mg daily. Prior prescriptions for glipizide as well from several years prior. Will increase her metformin to 1000 mg BID. Hesitant to start insulin or other medication given no recent relationship with PCP and un housed.   - Increase metformin to 1000 mg BID  - Please ensure patient has follow up with a PCP to recheck her hemoglobin A1c in ~3 months    Peripheral Neuropathy  Patient endorses long standing nerve pain in bilateral feet. Suspect this is likely due to uncontrolled DM. Vitamin B12 normal.   - Psych could consider low dose of gabapentin for treatment of neuropathy as this may benefit her mental health as well   - Defer decision to start gabapentin to primary team  - Management of DM as above    Elevated Blood Pressure  Patient with labile blood pressure. Highest BP noted to be 170 systolic on 4/15. Suspect this is likely due to her mental health. Given normal readings also obtained, would not start antihypertensive at this time.  - Continue to follow  - Management of mental health per psych  - Ensure patient is sitting down, resting and is calm at time of BP readings    History of hepatitis C  Per chart review, patient with history of hepatitis C though unclear if she ever completed treatment. Last seen by  "hepatology in 2019. On admission, her hepatic panel was unremarkable. No indication to recheck any labs or imaging at this time given patient will need to establish follow-up in order to have treatment.   - Please ensure patient has follow-up with hepatology on discharge     The patient's care was discussed with the Bedside Nurse, Patient, and Primary team via this note.    Medicine will continue to follow up on blood sugars peripherally for the next couple of days while metformin increasing and likely sign off after that point. Thank you for allowing us to be a part of this patient's care. Please notify on call VINCENZO if any intercurrent medical issues arise.     Clinically Significant Risk Factors Present on Admission                            # DMII: A1C = 10.1 % (Ref range: <5.7 %) within past 6 months    # Overweight: Estimated body mass index is 26.21 kg/m  as calculated from the following:    Height as of this encounter: 1.651 m (5' 5\").    Weight as of this encounter: 71.4 kg (157 lb 8 oz).         # Financial/Environmental Concerns:    # Housing Instability: noted in nursing assessment         Charlotte Jasmine PA-C  Hospitalist Service  Securely message with [employer] (more info)  Text page via [facility] Paging/Directory   ______________________________________________________________________    Chief Complaint   Psychosis    History is obtained from the patient and patient's chart    History of Present Illness   Anastasiya Simon is a 59 year old female with a history of diabetes mellitus type II, schizophrenia, polysubstance use, who was admitted to [facility] on 4/15/2025 for further evaluation and treatment of psychosis and SI. Medicine was consulted for further evaluation and treatment of uncontrolled DM and high blood pressure.     Patient reports she has been taking 500 mg of Metformin daily, which she states she has been compliant with. Excited to increase metformin dosing. She complains of nerve " pain in her feet that has been ongoing for months-years. Also endorses concern she is malnourished and not obtaining enough vitamins.       Past Medical History    Past Medical History:   Diagnosis Date    Acid reflux disease     Chronic hepatitis C (H)     Schizophrenia (H)     Type 2 diabetes mellitus (H)        Past Surgical History   Past Surgical History:   Procedure Laterality Date    NO HISTORY OF SURGERY         Medications   I have reviewed this patient's current medications        Physical Exam   Vital Signs: Temp: 97.9  F (36.6  C) Temp src: Temporal BP: (!) 155/85 Pulse: 98   Resp: 16 SpO2: 96 % O2 Device: None (Room air)    Weight: 157 lbs 8 oz    General Appearance: Disheveled, but comfortable, nontoxic appearing female seen laying in bed.  Eyes: PERRLA.  No conjunctival icterus.  HEENT: Atraumatic.  Respiratory: Breathing comfortably on room air.   GI: Bowel sounds present throughout.  Abdomen soft, nontender.  Lymph/Hematologic: No bruising on exposed skin.  Skin: No lesions or rashes noted on exposed skin.  Musculoskeletal: Moving all extremities spontaneously.  Neurologic: Cranial nerves II through XII grossly intact.  Psychiatric: Disorganized.       Medical Decision Making       45 MINUTES SPENT BY ME on the date of service doing chart review, history, exam, documentation & further activities per the note.      Data   Imaging results reviewed over the past 24 hrs:   No results found for this or any previous visit (from the past 24 hours).  Recent Labs   Lab 04/16/25  0803 04/15/25  2227 04/13/25  2344   WBC  --   --  7.6   HGB  --   --  13.0   MCV  --   --  85   PLT  --   --  316   NA  --   --  138   POTASSIUM  --   --  3.8   CHLORIDE  --   --  101   CO2  --   --  24   BUN  --   --  13.6   CR  --   --  0.55   ANIONGAP  --   --  13   MATTHEW  --   --  9.4   * 300* 332*   ALBUMIN  --   --  3.8   PROTTOTAL  --   --  7.1   BILITOTAL  --   --  <0.2   ALKPHOS  --   --  103   ALT  --   --  15   AST   --   --  11

## 2025-04-16 NOTE — PLAN OF CARE
Team Note Due:  Wednesday    Assessment/Intervention/Current Symtoms and Care Coordination:  Chart review and met with team, discussed pt progress, symptomology, and response to treatment.  Discussed the discharge plan and any potential impediments to discharge.    Contacted PPS to notify of pt's transfer to [facility]. Provided unit phone number and unit fax for court hold/future court paperwork.    Received call from [facility] Atty's Office to confirm unit contact information and discussed paperwork being filed today. Court hold will be called into the unit.    Writer met with pt to attempt to complete initial psychosocial assessment. Pt was slightly irritable, expressed being confused multiple times, and discussed needing to find somewhere to live after this. Attempted to explain commitment process but pt stated she was confused and requested to end the conversation. We agreed to take things day by day and discuss court paperwork as it comes in.     Discharge Plan or Goal:  [facility]      Barriers to Discharge:  Patient requires further psychiatric stabilization due to current symptomology, medication management with changes subject to provider, coordination with outside supports, and aftercare planning. Pt is also on a court hold and involved in the commitment process.     Referral Status:  None at this time     Legal Status:  Court hold   [facility]: [place]  File Number: [id number]  Start and expiration date of commitment: TBD    Castellanos meds requested: Zyprexa, Haldol, Abilify, Risperdal    PPS:  [place]: [phone]   [email or web address]    Future Hearing Information:  TBD    Contacts:   Haydee Mares (CADI CM): [phone]   Tamika Johnson- (sister): [phone]  Lucille Reddy (Intervention ): [phone]     Upcoming Meetings and Dates/Important Information and next steps:  Follow commitment process  DA to be completed and returned to [facility]  Discharge planning when appropriate    PD and COS needed  at discharge - TBD

## 2025-04-16 NOTE — PLAN OF CARE
Initial meeting note:    Therapist introduced self to patient and discussed psychotherapy service available to patient.     Pt response: Pt not currently interested in meeting 1:1. Reports she has been talking to people all day. Pt did want to discuss her preference to go to either her own home, asking for a , or to a nice assisted living. Pt shared the hospital feels very comfortable for her. Pt was fairly tangential, agitated, made a few paranoid statements about wanting to get away from people who are out to get her.    Plan: Will continue to check in with Pt for 1:1 sessions.

## 2025-04-16 NOTE — CONSULTS
Consulted to run a test claim for GLP-1.    Patient has pharmacy benefits through [employer]. Per insurance, the following are covered and preferred under the patient's plans:     Mounjaro - $0  Ozempic - $0  Trulicity - $0  Rybelsus tabs - $0     The following are not covered:  Liraglutide  Victoza      Please feel free to contact me with any other test claims, prior authorizations, or insurance questions regarding outpatient medications.     Thanks!      Caitie Bautista [facility]  Discharge Pharmacy Liaison  [facility]  Pronouns: She/Her/Hers    Securely message with [employer], [employer] Secure Chat, or [place]  Phone: [phone]  Fax: [phone]  [email or web address]

## 2025-04-16 NOTE — PROVIDER NOTIFICATION
04/16/25 1107   Individualization/Patient Specific Goals   Patient Personal Strengths resilient;resourceful;family/social support   Patient Vulnerabilities family/relationship conflict;housing insecurity;substance abuse/addiction   Interprofessional Rounds   Summary New admit. Admitted due to concern for SI and psychosis in the context of psychosocial stressors including living situation and argument with family member. Pt is currently on a 72HH, petition for MI/CD Commitment and Castellanos was filed in ED. Court hold pending.   Participants nursing;CTC;OT;psychiatrist;other (see comments)   Behavioral Team Discussion   Participants Dr. Carty; Dr. Gonzalez; Lucy Galindo RN; Rhoda Casarez [facility]; Gayle Kong OT; medical students   Progress Initial assessment   Anticipated length of stay 10-20 days   Continued Stay Criteria/Rationale Symptom stabilization, medication management, care coordination   Medical/Physical See H&P   Precautions See below   Plan Psychiatric assessment/Medication management. Therapeutic Milieu. Individual care planning and after care planning. Patient to participate in unit groups and activities. Individual and group support on unit.   Safety Plan Completed by unit therapist   Anticipated Discharge Disposition homeless shelter;IRTS     PRECAUTIONS AND SAFETY    Behavioral Orders   Procedures    Code 1 - Restrict to Unit    Routine Programming     As clinically indicated    Status 15     Every 15 minutes.    Suicide precautions: Suicide Risk: MODERATE; Clinical rationale to override score: modification to the care environment, response to medication, lack of access to a plan for self-harm, Exhibiting Suicidal/self-harm behaviors or thoughts     Patients on Suicide Precautions should have a Combination Diet ordered that includes a Diet selection(s) AND a Behavioral Tray selection for Safe Tray - with utensils, or Safe Tray - NO utensils       Order Specific Question:   Suicide Risk      Answer:   MODERATE     Order Specific Question:   Clinical rationale to override score:     Answer:   modification to the care environment     Order Specific Question:   Clinical rationale to override score:     Answer:   response to medication     Order Specific Question:   Clinical rationale to override score:     Answer:   lack of access to a plan for self-harm     Order Specific Question:   Clinical rationale to override score:     Answer:   Exhibiting Suicidal/self-harm behaviors or thoughts

## 2025-04-16 NOTE — PLAN OF CARE
BEH IP Unit Acuity Rating Score (UARS)  Patient is given one point for every criteria they meet.    CRITERIA SCORING   On a 72 hour hold, court hold, committed, stay of commitment, or revocation. 1    Patient LOS on BEH unit exceeds 20 days. 0  LOS: 1   Patient under guardianship, 55+, otherwise medically complex, or under age 11. 1   Suicide ideation without relief of precipitating factors. 0   Current plan for suicide. 0   Current plan for homicide. 0   Imminent risk or actual attempt to seriously harm another without relief of factors precipitating the attempt. 0   Severe dysfunction in daily living (ex: complete neglect for self care, extreme disruption in vegetative function, extreme deterioration in social interactions). 1   Recent (last 7 days) or current physical aggression in the ED or on unit. 0   Restraints or seclusion episode in past 72 hours. 0   Recent (last 7 days) or current verbal aggression, agitation, yelling, etc., while in the ED or unit. 0   Active psychosis. 1   Need for constant or near constant redirection (from leaving, from others, etc).  0   Intrusive or disruptive behaviors. 0   Patient requires 3 or more hours of individualized nursing care per 8-hour shift (i.e. for ADLs, meds, therapeutic interventions). 0   TOTAL 4

## 2025-04-16 NOTE — PROGRESS NOTES
[facility] [address] Orders Hold orders: Pt is on court hold today from 1556. Order faxed and filed. Pt given a copy of the order.

## 2025-04-16 NOTE — PROGRESS NOTES
"  ----------------------------------------------------------------------------------------------------------  [facility]  Psychiatry Progress Note  Hospital Day #1     Interim History:     The patient's care was discussed with the treatment team and chart notes were reviewed.    Patient ID: Anastasiya Simon is a 59 year old female with a previous psychiatric diagnosis of schizophrenia and polysubstance use admitted from [facility] on 04/15/2025 due to concern for SI and psychosis in the context of psychosocial stressors including living situation and argument with family member.     Vitals: Temp: 97.6  F (36.4  C) Temp  Min: 97.6  F (36.4  C)  Max: 98.2  F (36.8  C)  Resp: 16 Resp  Min: 16  Max: 18    SpO2  Min: 95 %  Max: 100 %  Pulse: 103 Pulse  Min: 98  Max: 103  BP: (!) 170/90 Systolic (24hrs), Av , Min:122 , Max:170  Diastolic (24hrs), Av, Min:57, Max:90    Sleep: 7 hours (25 0600)  Scheduled medications: Took all scheduled medications as prescribed  Psychiatric PRN medications: No psychiatric prns given    Staff Report:   Admitted on 04/15/2025 to [facility]. Nursing Note: Pt states she has never been suicidal but was forced to tell \"them\" that she'd kill herself to scare \"them\" and stop putting her in an institution. Pt also complained that \"they\" are unreasonable, chased her from her property (a trailer she was renting from the family) and controlled her on what she should and should not do. Pt states her problem is her legs and that she needs vitamins. Also states her diabetes is a concern. She is disorganized, verbal, and requires redirection to focus, but she is cooperative. She denies SI/AVH. She is paranoid that people are not \"nice\" to her, and she has to shout from frustration. HS . Pt had elevated BP. She reported pain at HS but declined medication intervention and stated all she needs are vitamins. She is compliant with " "scheduled meds. Pt is cooperative. Patient appears to have slept a total of 7 hours.  416 AM B   Subjective:     Patient Interview:  Anastasiya was interviewed in her room while she was sitting on her bed. Pt was very anxious and felt overwhelmed. She said \"you are making me paranoid\". Requested only two people in her room while interviewing. Pt insisted on anti-diarrheal medication. Reports that OTC medications don't work as well for her. She has had diarrhea for years and does not think it's a big deal. Pt doesn't understand why we are in her room and quickly ended the interview.     2025: Collateral Information from Tamika.  Tamika is very involved with her sister. Reports that pt was evicted for non-compliance regarding her cats, leaving trash outside, yelling in the middle of the night, putting a pan of bleach in the oven, and destroying the floors. Reports that pt couldn't care for herself anymore. Tamika hired an intervention  on  to talk to pt about going to a hospital. Goals were to be stabilized and [facility]. She was discharged from the ED, Tamika is unsure why the hospital let her go. After that, she was homeless for two weeks. She started making suicidal ideation comments. Tamika said she could see the hopelessness in her eyes, and then Tamika became emotional. Pt goes to the [employer] from 4pm until 9am. Pt really likes it. She was not able to stay there on  so she had no where to sleep that night. Tamika called the police to take her to the hospital so she wouldn't be left on the street. Tamika reports that pt uses meth and alcohol, but is unsure of how much. She knows she was currently on meth at the  ED visit.     Pt was diagnosed with schizophrenia in her mid-20s. Pt refused to see a psychiatrist or or a primary care provider for diabetes management. Tamika schedules appointments on behalf of the pt and pt cancels. Although, Tamika reports " "that Anastasiya is becoming more interested in treatment and care for her conditions. Pt is scared of being arrested, so she doesn't go in.     She has not taken medications since her 20s, except for a hospitalization in 2019 for schizophrenia and an untreated MRSA infection. Pt doesn't like Zyprexa due to weight gain. Tamika expressed concern about weight gain and worsening of diabetes.   Tamika is also concerned about pt's urinary incontinence. This has been going on for the past 4-5 years. Pt also wants to get this under control so she can go to [facility] after [facility]. Tamika wants pt to realize how much she cares for her. Tamika will be gone for 8 days starting 04/18 with a 5 hour time difference. Will be reachable by phone.     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 Tamika says she is a very caring and kind person. For example, pt let unhoused people stay in her house during the winter because of the cold. Pt likes diet pepsi and candy.     ROS:  Patient has diarrhea. Unable to inquire more information.      Objective:     Vitals:  BP (!) 170/90 (BP Location: Left arm, Patient Position: Sitting, Cuff Size: Adult Regular)   Pulse 103   Temp 97.6  F (36.4  C) (Temporal)   Resp 16   Ht 1.651 m (5' 5\")   Wt 71.4 kg (157 lb 8 oz)   LMP 10/22/2013   BMI 26.21 kg/m      Allergies:  Allergies   Allergen Reactions    Tetracycline Unknown     It happened when pt was a baby       Current Medications:  Scheduled:  Current Facility-Administered Medications   Medication Dose Route Frequency Provider Last Rate Last Admin    glucose gel 15-30 g  15-30 g Oral Q15 Min PRKatina Knox MD        Or    dextrose 50 % injection 25-50 mL  25-50 mL Intravenous Q15 Min PRKatina Knox MD        Or    glucagon injection 1 mg  1 mg Subcutaneous Q15 Min PRKatina Knox MD        divalproex sodium extended-release (DEPAKOTE ER) 24 hr tablet 500 mg  500 mg Oral " At Bedtime Lucy Chandler MD   500 mg at 04/15/25 2050    hydrOXYzine HCl (ATARAX) tablet 25 mg  25 mg Oral Q4H PRN Lucy Chandler MD        metFORMIN (GLUCOPHAGE) tablet 500 mg  500 mg Oral BID w/meals Lucy Chandler MD   500 mg at 04/15/25 1942    OLANZapine (zyPREXA) tablet 10 mg  10 mg Oral TID PRN Lucy Chandler MD        Or    OLANZapine (zyPREXA) injection 10 mg  10 mg Intramuscular TID PRN Lucy Chandler MD        pantoprazole (PROTONIX) EC tablet 40 mg  40 mg Oral QAM AC Lucy Chandler MD         PRN:  Current Facility-Administered Medications   Medication Dose Route Frequency Provider Last Rate Last Admin    glucose gel 15-30 g  15-30 g Oral Q15 Min PRN Katina Armendariz MD        Or    dextrose 50 % injection 25-50 mL  25-50 mL Intravenous Q15 Min PRN Katina Armendariz MD        Or    glucagon injection 1 mg  1 mg Subcutaneous Q15 Min PRN Katina Armendariz MD        divalproex sodium extended-release (DEPAKOTE ER) 24 hr tablet 500 mg  500 mg Oral At Bedtime Lucy Chandler MD   500 mg at 04/15/25 2050    hydrOXYzine HCl (ATARAX) tablet 25 mg  25 mg Oral Q4H PRN Lucy Chandler MD        metFORMIN (GLUCOPHAGE) tablet 500 mg  500 mg Oral BID w/meals Lucy Chandler MD   500 mg at 04/15/25 1942    OLANZapine (zyPREXA) tablet 10 mg  10 mg Oral TID PRN Lucy Chandler MD        Or    OLANZapine (zyPREXA) injection 10 mg  10 mg Intramuscular TID PRN Lucy Chandler MD        pantoprazole (PROTONIX) EC tablet 40 mg  40 mg Oral QAM AC Lucy Chandler MD         Labs and Imaging:  New results:   Recent Results (from the past 24 hours)   Glucose by meter    Collection Time: 04/15/25 10:27 PM   Result Value Ref Range    GLUCOSE BY METER POCT 300  (H) 70 - 99 mg/dL     Data this admission:  CBC: WNL. WBC: 7.6, RBC: 4.37, Hb: 13.0, Platelets: 316.  CMP: Unremarkable. AST: 11, ALT: 15, Creatinine: 0.55.  UDS: Negative  RASHIDA: 0.00 (4/13)  HbA1c: 10.1%  TSH: 2.40  Vit: B12: 812  Folate: 13.7     Mental Status Exam:     Oriented to:  Grossly Oriented  General:  Awake and Alert  Appearance:  appears younger than stated age, grooming is inadequate due to matted hair, wears glasses, wearing clothing provided by unit.  Behavior/Attitude:  Disengaged, Refuses to participate, Guarded, and Defensive  Eye Contact: Avoids or Evasive  Psychomotor: No evidence of tics, dystonia, or tardive dyskinesia  no catatonia present  Speech:  appropriate volume/tone  Language: Fluent in English with appropriate syntax and vocabulary.  Mood:  Not able to assess.   Affect:  anxious  Thought Process:  linear, coherent, and goal directed  Thought Content:     Associations:  intact  Insight:  limited   Judgment:  limited   Impulse control: fair  Attention Span:  adequate for conversation  Concentration:  grossly intact  Recent and Remote Memory:  not formally assessed  Fund of Knowledge: average  Muscle Strength and Tone: normal  Gait and Station: Normal     Psychiatric Assessment     Anastasiya Simon is a 59 year old female previously diagnosed with schizophrenia and polysubstance use disorder who presented with suicidal ideation, disorganization, and inability to care for self. Most recent psychiatric hospitalization was 03/20/2019 for disorganization and suicidal ideation. Significant symptoms on admission include increasing suicidal ideation, disorganization, erratic behavior such as filling buckets with ammonia and mixing with bleach, substance use, auditory hallucinations, and paranoia. The MSE on admission was pertinent for AVH, paranoia, limited insight into MH. Today on interview Anastasiya denies SI and reports she had only made suicidal statements to her sisters previously as she did  not want to move into a group home.  Psychological contributions to mental health presentation include maladaptive coping through substance use and limited insight into MH. Social factors contributing to mental health presentation include interpersonal conflicts and unstable living environment. Protective factors include support from two sisters and absence of previous suicide attempts. On 4/16 we resumed Zyprexa 20 mg and increased Depakote to 1000 mg to address the ongoing psychosis, Tolterodine and PRN loperamide were started to address incontinence and diarrhea.      In summary, the patient's reported symptoms of SI, erractic behavior, AVH, and paranoia in the context of psychosocial stressers are consistent with decompensated schizophrenia and polysubstance use disorder.  She will likely benefit from medication optimization and outpatient MH referrals this admission.     Psychiatric Plan by Diagnosis      Today's changes:  - Increase metformin from 500 mg to 1000 mg BID per medicine consult  - Increase depakote from 500 mg to 1000 mg HS  - Resume scheduled olanzapine 20 mg HS. (5 mg in the AM and 15 mg HS)  - Started Tolterodine for incontinence and Loperamide for Diarrhea     # Schizophrenia  Medications:  - olanzapine 5 mg in the morning, 15 mg HS  - depakote 500 mg HS    #Polysubstance Use Disorder  - CD consult once psychotic sx more stable, if pt amenable    2. Pertinent Labs/Monitoring:  - Labs done 04/13 at OSH. CBC wnl, glucose elevated to 332, BMP wnl.  - UDS negative on 04/13.  - 4/16 HbA1c: 10.1    3. Additional Plans:  - Patient will be treated in therapeutic milieu with appropriate individual and group therapies as described.   - Consult with Internal Medicine for diabetes management.      Psychiatric Hospital Course:      Anastasiya Simon was admitted to [facility] on a 72 hour hold (started 4/13/25 21:35) from [facility] on 04/15/25.   Medications:  Continued Medications:  Pt seen by  psychiatry consult at [facility] who started zyprexa 20 mg at bedtime and depakote 500 mg at bedtime. Both of these medications were continued on admission to [facility].   New Medications:      4/16: Increased depakote from 500 mg to 1000 mg HS for mood regulation  4/16: Increased metformin from 500 mg to 1000 mg BID per medicine consult  4/16: Zyprexa: 5 mg in the morning and 15 mg HS  4/16: Started PRN Loperamide 2 mg 4x/day for diarrhea  4/16: Started Tolterodine 2 mg daily for urinary incontinence    The risks, benefits, alternatives, and side effects were discussed and understood by the patient and other caregivers.     Medical Assessment and Plan     Medical diagnoses to be addressed this admission:      #Type 2 Diabetes Mellitis with Complications  - PTA metformin 1000 mg BID with meals  - BID glucose checks; hypoglycemia protocol  - Internal Medicine consult for management     #Diarrhea  - Loperamide 2 mg PRN 4x/day    #Urinary Incontinence  - Tolterodine 2 mg daily    Medical course:  Patient was physically examined by the ED prior to being transferred to the unit and was found to be medically stable and appropriate for admission.    Consults: Internal Medicine consulted on 04/16/25 for management of diabetes and blood pressure.      Checklist     Legal Status: 72 hour hold    Safety Assessment:   Behavioral Orders   Procedures    Code 1 - Restrict to Unit    Routine Programming     As clinically indicated    Status 15     Every 15 minutes.    Suicide precautions: Suicide Risk: MODERATE; Clinical rationale to override score: modification to the care environment, response to medication, lack of access to a plan for self-harm, Exhibiting Suicidal/self-harm behaviors or thoughts     Patients on Suicide Precautions should have a Combination Diet ordered that includes a Diet selection(s) AND a Behavioral Tray selection for Safe Tray - with utensils, or Safe Tray - NO utensils       Order Specific  Question:   Suicide Risk     Answer:   MODERATE     Order Specific Question:   Clinical rationale to override score:     Answer:   modification to the care environment     Order Specific Question:   Clinical rationale to override score:     Answer:   response to medication     Order Specific Question:   Clinical rationale to override score:     Answer:   lack of access to a plan for self-harm     Order Specific Question:   Clinical rationale to override score:     Answer:   Exhibiting Suicidal/self-harm behaviors or thoughts     Risk Assessment:  Risk for harm is moderate-high.  Risk factors: maladaptive coping, substance use, impulsive, and past behaviors.  Protective factors: family.    SIO: None    Disposition: TBD. Disposition pending clinical stabilization, medication optimization and development of an appropriate discharge plan.     Attestations     Tatyana Mcclure, MS3, [facility] Medical Student     I was present with the medical student who participated in the service and in the documentation of the note.  I have verified the history and personally performed the physical exam and medical decision making. I agree with the assessment and plan of care as documented in the note.    This patient was seen and discussed with my attending physician.  Tanja Gnozalez MD   Psychiatry Resident Physician

## 2025-04-16 NOTE — PLAN OF CARE
"  Problem: Adult Behavioral Health Plan of Care  Goal: Adheres to Safety Considerations for Self and Others  Outcome: Progressing  Intervention: Develop and Maintain Individualized Safety Plan  Recent Flowsheet Documentation  Taken 4/16/2025 1300 by Lucy Galindo RN  Safety Measures: environmental rounds completed  Goal: Optimized Coping Skills in Response to Life Stressors  Outcome: Progressing     Problem: Suicide Risk  Goal: Absence of Self-Harm  Outcome: Progressing  Intervention: Assess Risk to Self and Maintain Safety  Recent Flowsheet Documentation  Taken 4/16/2025 1300 by Lucy Galindo RN  Behavior Management: impulse control promoted     Problem: Sleep Disturbance  Goal: Adequate Sleep/Rest  Outcome: Progressing   Goal Outcome Evaluation:     Pt presented with an irritable affect. She was disorganized and confused during check in and she refused to answer questions. Speech was tangential  does not like to participate in assessment. Pt complained of diarrhea. This staff tried to get more information about the complain, but pt was dismissive . Updated team about it and new orders made for diarrhea. Pt presented with labile mood. She was able to join OT group 2x this shift. She is eating and drinking adequately. In the morning her BG check was 236 mg/dl. Observed that she was snacking at bedside. She was also observed snacking on fruits after breakfast. Pt was observed with a limp when she gets up to walk, saying \"my legs lock\". After lunch, pt took a nap. No interaction with peers was observed.       "

## 2025-04-17 VITALS
TEMPERATURE: 98.2 F | WEIGHT: 157.5 LBS | HEART RATE: 119 BPM | SYSTOLIC BLOOD PRESSURE: 152 MMHG | OXYGEN SATURATION: 94 % | RESPIRATION RATE: 16 BRPM | DIASTOLIC BLOOD PRESSURE: 75 MMHG | HEIGHT: 65 IN | BODY MASS INDEX: 26.24 KG/M2

## 2025-04-17 LAB
GLUCOSE BLDC GLUCOMTR-MCNC: 249 MG/DL (ref 70–99)
GLUCOSE BLDC GLUCOMTR-MCNC: 374 MG/DL (ref 70–99)
GLUCOSE BLDC GLUCOMTR-MCNC: 424 MG/DL (ref 70–99)

## 2025-04-17 PROCEDURE — 250N000013 HC RX MED GY IP 250 OP 250 PS 637

## 2025-04-17 PROCEDURE — 250N000012 HC RX MED GY IP 250 OP 636 PS 637

## 2025-04-17 PROCEDURE — 97150 GROUP THERAPEUTIC PROCEDURES: CPT | Mod: GO

## 2025-04-17 PROCEDURE — 99232 SBSQ HOSP IP/OBS MODERATE 35: CPT | Mod: GC | Performed by: PSYCHIATRY & NEUROLOGY

## 2025-04-17 PROCEDURE — 124N000002 HC R&B MH UMMC

## 2025-04-17 RX ORDER — OLANZAPINE 15 MG/1
15 TABLET, ORALLY DISINTEGRATING ORAL AT BEDTIME
Status: DISPENSED | OUTPATIENT
Start: 2025-04-17

## 2025-04-17 RX ORDER — OLANZAPINE 10 MG/1
20 TABLET, ORALLY DISINTEGRATING ORAL AT BEDTIME
Status: DISCONTINUED | OUTPATIENT
Start: 2025-04-17 | End: 2025-04-17

## 2025-04-17 RX ORDER — MULTIPLE VITAMINS W/ MINERALS TAB 9MG-400MCG
1 TAB ORAL DAILY
Status: DISCONTINUED | OUTPATIENT
Start: 2025-04-17 | End: 2025-04-17

## 2025-04-17 RX ADMIN — OLANZAPINE 15 MG: 15 TABLET, ORALLY DISINTEGRATING ORAL at 21:29

## 2025-04-17 RX ADMIN — METFORMIN HYDROCHLORIDE 1000 MG: 500 TABLET, FILM COATED ORAL at 18:05

## 2025-04-17 RX ADMIN — TOLTERODINE TARTRATE 2 MG: 2 CAPSULE, EXTENDED RELEASE ORAL at 08:59

## 2025-04-17 RX ADMIN — Medication 1 TABLET: at 08:58

## 2025-04-17 RX ADMIN — METFORMIN HYDROCHLORIDE 1000 MG: 500 TABLET, FILM COATED ORAL at 08:58

## 2025-04-17 RX ADMIN — OLANZAPINE 5 MG: 5 TABLET, ORALLY DISINTEGRATING ORAL at 08:58

## 2025-04-17 RX ADMIN — INSULIN ASPART 5 UNITS: 100 INJECTION, SOLUTION INTRAVENOUS; SUBCUTANEOUS at 18:50

## 2025-04-17 RX ADMIN — DIVALPROEX SODIUM 1000 MG: 500 TABLET, FILM COATED, EXTENDED RELEASE ORAL at 21:28

## 2025-04-17 RX ADMIN — PANTOPRAZOLE SODIUM 40 MG: 40 TABLET, DELAYED RELEASE ORAL at 08:58

## 2025-04-17 ASSESSMENT — ACTIVITIES OF DAILY LIVING (ADL)
ADLS_ACUITY_SCORE: 52
ADLS_ACUITY_SCORE: 58
ADLS_ACUITY_SCORE: 52
DRESS: SCRUBS (BEHAVIORAL HEALTH);INDEPENDENT
ADLS_ACUITY_SCORE: 52
ORAL_HYGIENE: INDEPENDENT
LAUNDRY: UNABLE TO COMPLETE
LAUNDRY: WITH SUPERVISION
ADLS_ACUITY_SCORE: 52
HYGIENE/GROOMING: HANDWASHING;INDEPENDENT
ADLS_ACUITY_SCORE: 52
ADLS_ACUITY_SCORE: 58
ADLS_ACUITY_SCORE: 58
ADLS_ACUITY_SCORE: 47
DRESS: INDEPENDENT
ADLS_ACUITY_SCORE: 58
ORAL_HYGIENE: INDEPENDENT;WITH ASSISTANCE
ADLS_ACUITY_SCORE: 52
ADLS_ACUITY_SCORE: 58
ADLS_ACUITY_SCORE: 58
ADLS_ACUITY_SCORE: 47
ADLS_ACUITY_SCORE: 52
HYGIENE/GROOMING: HANDWASHING;INDEPENDENT
ADLS_ACUITY_SCORE: 47
ADLS_ACUITY_SCORE: 52
ADLS_ACUITY_SCORE: 52
ADLS_ACUITY_SCORE: 58
ADLS_ACUITY_SCORE: 52
ADLS_ACUITY_SCORE: 52

## 2025-04-17 NOTE — PROGRESS NOTES
"  ----------------------------------------------------------------------------------------------------------  [facility]  Psychiatry Progress Note  Hospital Day #2     Interim History:     The patient's care was discussed with the treatment team and chart notes were reviewed.    Patient ID: Anastasiya Simon is a 59 year old female with a previous psychiatric diagnosis of schizophrenia and polysubstance use admitted from [facility] on 04/15/2025 due to concern for SI and psychosis in the context of psychosocial stressors including living situation and argument with family member.     Vitals: Temp: 97.6  F (36.4  C) Temp  Min: 97.6  F (36.4  C)  Max: 97.6  F (36.4  C)  Resp: 16 Resp  Min: 16  Max: 16  SpO2: 97 % SpO2  Min: 97 %  Max: 97 %  Pulse: 107 Pulse  Min: 107  Max: 107  BP: 127/85 Systolic (24hrs), Av , Min:127 , Max:127   Diastolic (24hrs), Av, Min:85, Max:85  Sleep: 7 hours (25 0600)  Scheduled medications: Took all scheduled medications as prescribed  Psychiatric PRN medications:      Staff Report:    AM B   PM B   AM B  AM: Pt presented with an irritable affect. Pt was disorganized and confused during check in and refused to answer questions. Speech was tangential. Does not like to participate in assessment. Pt complained of diarrhea. Tried to get more information about the complain, but pt was dismissive. Updated team about it and new orders made for diarrhea. Pt presented with labile mood. She was able to join OT group 2x this shift. She is eating and drinking adequately. Pt was snacking at bedside and also observed snacking on fruits after breakfast. Pt was observed with a limp when she gets up to walk, saying \"my legs lock\". After lunch, pt took a nap. No interaction with peers was observed.     Therapist Note: Pt not currently interested in meeting 1:1. Reports she has been talking to people all day. " "Pt did want to discuss her preference to go to either her own home, asking for a , or to a nice assisted living. Pt shared the hospital feels very comfortable for her. Pt was fairly tangential, agitated, made a few paranoid statements about wanting to get away from people who are out to get her.    PM: Pt was visible in the milieu and spent most of the shift watching TV and appropriately chatting with another peer. Pt was seen to be in a good mood, but this changed with the assessment. She was unwilling to participate in assessment questions as she claimed this could lead to incriminating herself and getting confined in a \"mental institution.\" She has no insight into her mental health illness and feels she was forced into IP MH by her family, who amplified her words to say she was suicidal, which she used as leverage to get her wish. Pt got frustrated and angry and received the legal court commitment but later were seen as calm. She is disheveled, disorganized, and blames others for her behavior. She is easily redirectable and medication-compliant. She denies SI/SIB/AVH. Appetite was outstanding. Pt appears to have slept for 7 hours.      Subjective:     Patient Interview:  Pt was interviewed in her room. Pt was lying down on her bed with her eyes closed for the majority of the interview. Pt was somnolent and spoke softly.     Pt reports being sleepy. She slept okay last night. She feels okay being in the hospital. Reports that loperamide helped with her diarrhea. Pt endorses urinary incontinence. She is interested in having a procedure done to help with the urinary incontinence. Informed pt of Tolterodine and she was agreeable with that medication. Pt wants to take vitamins including Vitamin C, A, E, calcium, among others that were difficult to understand due to patient mumbling and quiet volume. Pt says that she takes them at home. Pt is interested in having her own clothes to wear since her " "sister dropped them off yesterday. Reports that the visit with her sister went well. No other concerns reported at this time.      Objective:     Vitals:  /85 (BP Location: Left arm, Patient Position: Sitting, Cuff Size: Adult Regular)   Pulse 107   Temp 97.6  F (36.4  C) (Temporal)   Resp 16   Ht 1.651 m (5' 5\")   Wt 71.4 kg (157 lb 8 oz)   LMP 10/22/2013   SpO2 97%   BMI 26.21 kg/m      Allergies:  Allergies   Allergen Reactions    Tetracycline Unknown     It happened when pt was a baby       Current Medications:  Scheduled:  Current Facility-Administered Medications   Medication Dose Route Frequency Provider Last Rate Last Admin    glucose gel 15-30 g  15-30 g Oral Q15 Min PRN Katina Armendariz MD        Or    dextrose 50 % injection 25-50 mL  25-50 mL Intravenous Q15 Min PRN Katina Armendariz MD        Or    glucagon injection 1 mg  1 mg Subcutaneous Q15 Min PRN Katina Armendariz MD        divalproex sodium extended-release (DEPAKOTE ER) 24 hr tablet 1,000 mg  1,000 mg Oral At Bedtime Tanja Gonzalez MD   1,000 mg at 04/16/25 2042    hydrOXYzine HCl (ATARAX) tablet 25 mg  25 mg Oral Q4H PRN Lucy Chandler MD        loperamide (IMODIUM) capsule 2 mg  2 mg Oral 4x Daily PRN Tanja Gonzalez MD   2 mg at 04/16/25 1244    metFORMIN (GLUCOPHAGE) tablet 1,000 mg  1,000 mg Oral BID w/meals Charlotte Jasmine PA-C   1,000 mg at 04/17/25 0858    multivitamin w/minerals (THERA-VIT-M) tablet 1 tablet  1 tablet Oral Daily Charlotte Jasmine PA-C   1 tablet at 04/17/25 0858    OLANZapine (zyPREXA) tablet 10 mg  10 mg Oral TID PRN Lucy Chandler MD        Or    OLANZapine (zyPREXA) injection 10 mg  10 mg Intramuscular TID PRN Lucy Chandler MD        OLANZapine zydis (zyPREXA) ODT tab 15 mg  15 mg Oral At Bedtime Lisa, Tanja, MD        OLANZapine zydis (zyPREXA) ODT tab 5 mg  5 mg Oral Daily Tanja Gonzalez MD   5 mg at 04/17/25 " 0858    pantoprazole (PROTONIX) EC tablet 40 mg  40 mg Oral QAM AC Lucy Chandler MD   40 mg at 04/17/25 0858    tolterodine ER (DETROL LA) 24 hr capsule 2 mg  2 mg Oral Daily Tanja Gonzalez MD   2 mg at 04/17/25 0859     PRN:  Current Facility-Administered Medications   Medication Dose Route Frequency Provider Last Rate Last Admin    glucose gel 15-30 g  15-30 g Oral Q15 Min PRN Katina Armendariz MD        Or    dextrose 50 % injection 25-50 mL  25-50 mL Intravenous Q15 Min PRN Katina Armendariz MD        Or    glucagon injection 1 mg  1 mg Subcutaneous Q15 Min PRN Katina Armendariz MD        divalproex sodium extended-release (DEPAKOTE ER) 24 hr tablet 1,000 mg  1,000 mg Oral At Bedtime Tanja Gonzalez MD   1,000 mg at 04/16/25 2042    hydrOXYzine HCl (ATARAX) tablet 25 mg  25 mg Oral Q4H PRN Lucy Chandler MD        loperamide (IMODIUM) capsule 2 mg  2 mg Oral 4x Daily PRN Tanja Gonzalez MD   2 mg at 04/16/25 1244    metFORMIN (GLUCOPHAGE) tablet 1,000 mg  1,000 mg Oral BID w/meals Charlotte Jasmine PA-C   1,000 mg at 04/17/25 0858    multivitamin w/minerals (THERA-VIT-M) tablet 1 tablet  1 tablet Oral Daily Charlotte Jasmine PA-C   1 tablet at 04/17/25 0858    OLANZapine (zyPREXA) tablet 10 mg  10 mg Oral TID PRN Lucy Chandler MD        Or    OLANZapine (zyPREXA) injection 10 mg  10 mg Intramuscular TID PRN Lucy Chandler MD        OLANZapine zydis (zyPREXA) ODT tab 15 mg  15 mg Oral At Bedtime Tanja Gonzalez MD        OLANZapine zydis (zyPREXA) ODT tab 5 mg  5 mg Oral Daily Tanja Gonzalez MD   5 mg at 04/17/25 0858    pantoprazole (PROTONIX) EC tablet 40 mg  40 mg Oral QAM AC Lucy Chandler MD   40 mg at 04/17/25 0858    tolterodine ER (DETROL LA) 24 hr capsule 2 mg  2 mg Oral Daily Tanja Gonzalez MD   2 mg at 04/17/25 0859     Labs and Imaging:  New results:   Recent Results (from the  "past 24 hours)   Glucose by meter    Collection Time: 04/16/25  6:02 PM   Result Value Ref Range    GLUCOSE BY METER POCT 316 (H) 70 - 99 mg/dL   Glucose by meter    Collection Time: 04/17/25  8:54 AM   Result Value Ref Range    GLUCOSE BY METER POCT 249 (H) 70 - 99 mg/dL     Data this admission:  CBC: WNL. WBC: 7.6, RBC: 4.37, Hb: 13.0, Platelets: 316.  CMP: Unremarkable. AST: 11, ALT: 15, Creatinine: 0.55.  UDS: Negative  RASHIDA: 0.00 (4/13)  HbA1c: 10.1%  TSH: 2.40  Vit: B12: 812  Folate: 13.7     Mental Status Exam:     Oriented to:  Grossly oriented.  General: Very somnolent.  Appearance:  Appears younger than stated age. Grooming is inadequate due to matted hair. Wears glasses and clothing provided by unit. Lying on bed with eyes closed and arms crossed.   Behavior/Attitude:  Cooperative, somnolent. No agitation like yesterday.   Eye Contact: Eyes closed for duration of interview.  Psychomotor: No evidence of tics, dystonia, or tardive dyskinesia. No catatonia present.  Speech: Very soft volume. Words were mumbled. Monotone.  Language: Fluent in English with appropriate syntax and vocabulary.  Mood:  \"Sleepy\"  Affect:  Very tired. Congruent with mood. Flat.   Thought Process:  Linear and goal directed.  Thought Content:  Denies SI/SIB/AVH per nursing note on 4/16 at 10:19 PM.   Associations:  intact  Insight:  limited   Judgment:  limited   Impulse control: fair  Attention Span:  adequate for conversation  Concentration:  grossly intact  Recent and Remote Memory:  not formally assessed  Fund of Knowledge: average  Muscle Strength and Tone: normal  Gait and Station: Not observed as pt was laying in bed.      Psychiatric Assessment     Anastasiya Simon is a 59 year old female previously diagnosed with schizophrenia and polysubstance use disorder who presented with suicidal ideation, disorganization, and inability to care for self. Most recent psychiatric hospitalization was 03/20/2019 for disorganization and " suicidal ideation. Significant symptoms on admission include increasing suicidal ideation, disorganization, erratic behavior such as filling buckets with ammonia and mixing with bleach, substance use, auditory hallucinations, and paranoia. The MSE on admission was pertinent for AVH, paranoia, limited insight into MH. Today on interview Anastasiya denies SI and reports she had only made suicidal statements to her sisters previously as she did not want to move into a group home.  Psychological contributions to mental health presentation include maladaptive coping through substance use and limited insight into MH. Social factors contributing to mental health presentation include interpersonal conflicts and unstable living environment. Protective factors include support from two sisters and absence of previous suicide attempts. On 4/16 we resumed Zyprexa 20 mg and increased Depakote to 1000 mg to address the ongoing psychosis, Tolterodine and PRN loperamide were started to address incontinence and diarrhea.    In summary, the patient's reported symptoms of SI, erractic behavior, AVH, and paranoia in the context of psychosocial stressers are consistent with decompensated schizophrenia and polysubstance use disorder.  She will likely benefit from medication optimization and outpatient MH referrals this admission.     Psychiatric Plan by Diagnosis      Today's changes:  - None     # Schizophrenia  Medications:  - olanzapine 5 mg in the morning, 15 mg HS  - depakote 1000 mg HS    #Polysubstance Use Disorder  - CD consult once psychotic sx more stable, if pt amenable    2. Pertinent Labs/Monitoring:  - Labs done 04/13 at OSH. CBC wnl, glucose elevated to 332, BMP wnl.  - UDS negative on 04/13  - 4/16 HbA1c: 10.1  - Check depakote levels on Tuesday (4/22) morning    3. Additional Plans:  - Patient will be treated in therapeutic milieu with appropriate individual and group therapies as described.   - Consult with Internal  Medicine for diabetes management.      Psychiatric Hospital Course:      Anastasiya Simon was admitted to [facility] on a 72 hour hold (started 4/13/25 21:35) from [facility] on 04/15/25.   Medications:  Continued Medications:  Pt seen by psychiatry consult at [facility] who started zyprexa 20 mg at bedtime and depakote 500 mg at bedtime. Both of these medications were continued on admission to [facility].   New Medications:      4/16: Increased depakote from 500 mg to 1000 mg HS for mood regulation  4/16: Increased metformin from 500 mg to 1000 mg BID per medicine consult  4/16: Zyprexa: 5 mg in the morning and 15 mg HS  4/16: Started PRN Loperamide 2 mg 4x/day for diarrhea  4/16: Started Tolterodine 2 mg daily for urinary incontinence    The risks, benefits, alternatives, and side effects were discussed and understood by the patient and other caregivers.     Medical Assessment and Plan     Medical diagnoses to be addressed this admission:      #Type 2 Diabetes Mellitis with Complications  - PTA metformin 1000 mg BID with meals  - BID glucose checks; hypoglycemia protocol  - Internal Medicine consult for management     #Diarrhea  - Loperamide 2 mg PRN 4x/day    #Urinary Incontinence  - Tolterodine 2 mg daily    Medical course:  Patient was physically examined by the ED prior to being transferred to the unit and was found to be medically stable and appropriate for admission.    Consults: Internal Medicine consulted on 04/16/25 for management of diabetes and blood pressure.      Checklist     Legal Status: Court Hold    Safety Assessment:   Behavioral Orders   Procedures    Code 1 - Restrict to Unit    Routine Programming     As clinically indicated    Status 15     Every 15 minutes.    Suicide precautions: Suicide Risk: MODERATE; Clinical rationale to override score: modification to the care environment, response to medication, lack of access to a plan for self-harm, Exhibiting Suicidal/self-harm  behaviors or thoughts     Patients on Suicide Precautions should have a Combination Diet ordered that includes a Diet selection(s) AND a Behavioral Tray selection for Safe Tray - with utensils, or Safe Tray - NO utensils       Order Specific Question:   Suicide Risk     Answer:   MODERATE     Order Specific Question:   Clinical rationale to override score:     Answer:   modification to the care environment     Order Specific Question:   Clinical rationale to override score:     Answer:   response to medication     Order Specific Question:   Clinical rationale to override score:     Answer:   lack of access to a plan for self-harm     Order Specific Question:   Clinical rationale to override score:     Answer:   Exhibiting Suicidal/self-harm behaviors or thoughts     Risk Assessment:  Risk for harm is moderate.  Risk factors: maladaptive coping, substance use, impulsive, and past behaviors.  Protective factors: family.    SIO: None    Disposition: TBD. Disposition pending clinical stabilization, medication optimization and development of an appropriate discharge plan.     Attestations     Tatyana Mcclure, MS3, [employer] Medical Student     I was present with the medical student who participated in the service and in the documentation of the note.  I have verified the history and personally performed the physical exam and medical decision making. I agree with the assessment and plan of care as documented in the note.    This patient was seen and discussed with my attending physician.  Tanja Gonzalez MD   Psychiatry Resident Physician

## 2025-04-17 NOTE — PLAN OF CARE
No PRNs given or requested this shift. Pt was in her room the entire shift. No c/o pain or discomfort noted/reported. Safety checks completed every 15 minutes ; no concerns noted. Pt appears to have slept for 7 hours; will continue to monitor and offer support.     Problem: Sleep Disturbance  Goal: Adequate Sleep/Rest  Outcome: Progressing   Goal Outcome Evaluation:

## 2025-04-17 NOTE — PLAN OF CARE
Team Note Due:  Wednesday    Assessment/Intervention/Current Symtoms and Care Coordination:  Chart review and met with team, discussed pt progress, symptomology, and response to treatment.  Discussed the discharge plan and any potential impediments to discharge.    Exam and prelim hearings scheduled on Monday afternoon. Sent update to coordinators to add to calendar.     Awaiting court paperwork to review with pt.     Discharge Plan or Goal:  [facility]      Barriers to Discharge:  Patient requires further psychiatric stabilization due to current symptomology, medication management with changes subject to provider, coordination with outside supports, and aftercare planning. Pt is also on a court hold and involved in the commitment process.     Referral Status:  None at this time     Legal Status:  Court hold   County: [place]  File Number: [id number]  Start and expiration date of commitment: TBD    [facility] meds requested: Zyprexa, Haldol, Abilify, Risperdal    PPS:  Za [place]: [phone]   [email or web address]    Future Hearing Information:  Exam Hearing on 04/21/2025 at 2:15 PM  Preliminary/Probable Cause Hearing on 04/21/2025 at 3:15 PM     Contacts:   Haydee Mares (CADI CM): [phone]   Tamika Johnson- (sister): [phone]  Lucille Reddy (Intervention ): [phone]     Upcoming Meetings and Dates/Important Information and next steps:  Follow commitment process  DA to be completed and returned to [facility]  Discharge planning when appropriate    PD and COS needed at discharge - TBD

## 2025-04-17 NOTE — PROGRESS NOTES
Rehab Group    Start time: 1015  End time: 1055  Patient time total: 25 minutes    came in and out of group session    #5 attended   Group Type: OT Clinic   Group Topic Covered: balanced lifestyle, coping skills, healthy leisure time, and social skills     Group Session Detail:  Pt actively participated in occupational therapy clinic to facilitate coping skill exploration, creative expression within personally meaningful activities, and clinical observation of social, cognitive, and kinesthetic performance skills.       Patient Response/Contribution:  socially appropriate, requested more information on topic, worked intermittently, and disorganized     Patient Detail:  Pt arrived to group, presenting with disheveled appearance and disorganized thought pattern. Pt entered group, spilling coffee on floor trailing across room, not noticing, seemingly unaware of surroundings. Pt required mod A to initiate, gather materials, sequence, and adjust to workspace demands as needed. Demonstrated intermittent focus, planning, and problem solving for selected creative expression task, requesting photos as inspiration. Pts speech was quiet and difficult to understand throughout. Will continue to encourage attendance and participation.       [id number] OT Group (2 or more in attendance)      Patient Active Problem List   Diagnosis    Suicidal ideation    Type 2 diabetes mellitus (H)    Chronic hepatitis C (H)    Schizophrenia (H)    Acid reflux disease    Hoarding behavior

## 2025-04-17 NOTE — PLAN OF CARE
List of patient's belonging brought from home in the locker:   Shirts(4), pants(2), underwear(3), Bra(2), and socks(2).                Goal Outcome Evaluation:

## 2025-04-17 NOTE — PLAN OF CARE
Goal Outcome Evaluation:    Plan of Care Reviewed With: patient Plan of Care Reviewed With: patient    Overall Patient Progress: no changeOverall Patient Progress: no change       Problem: Adult Behavioral Health Plan of Care  Goal: Plan of Care Review  4/17/2025 1545 by Chanel Xavier RN  Outcome: Not Progressing  Flowsheets (Taken 4/17/2025 1545)  Plan of Care Reviewed With: patient  Overall Patient Progress: no change  Patient Agreement with Plan of Care: agrees

## 2025-04-17 NOTE — PLAN OF CARE
Goal Outcome Evaluation:       List of patient's belonging brought from home in the locker:              Shirts(4), pants(2), underwear(3), Bra(2), and socks(2).         ..A               Admission:  I am responsible for any personal items that are not sent to the safe or pharmacy.  [facility] is not responsible for loss, theft or damage of any property in my possession.    Signature:  _________________________________ Date: _______  Time: _____                                              Staff Signature:  ____________________________ Date: ________  Time: _____      2nd Staff person, if patient is unable/unwilling to sign:    Signature: ________________________________ Date: ________  Time: _____     Discharge:  [facility] has returned all of my personal belongings:    Signature: _________________________________ Date: ________  Time: _____                                          Staff Signature:  ____________________________ Date: ________  Time: _____

## 2025-04-17 NOTE — PLAN OF CARE
"Goal Outcome Evaluation:    Plan of Care Reviewed With: patient Plan of Care Reviewed With: patient    Overall Patient Progress: no changeOverall Patient Progress: no change       Problem: Adult Behavioral Health Plan of Care  Goal: Plan of Care Review  Outcome: Progressing  Flowsheets  Taken 4/17/2025 1517  Plan of Care Reviewed With: patient  Overall Patient Progress: no change  Patient Agreement with Plan of Care: agrees  Taken 4/17/2025 1348  Patient Agreement with Plan of Care: (anything small I say now will lock  me in an institution) agrees     Behavioral  Pt slept  7 hours overnight; eating and hydrating adequately. Compliant with medications. Attending to ADL's independently with prompt; Pt is incontinent with bladder. Please provide hygiene and incontinence pads. no behavioral escalation or safety concerns noted this shift. Pt is dismissive with assessment. Pt has lack on insight to her mental illness. Pt  is isolated and withdrawn most part of this shift. Pt came out to the milieu for meals and watch a some TV towards the end of the shift. Pt hygiene is inadequate. Please encourage her to shower as she declined to shower this morning. Pt  attended group morning OT group shortly. Pt has restricted affect and mood is labile. No PRN was given this shift.     Medical  No complaints of physical pain/discomfort this shift. Pt has elevated  Blood pressure (!) 152/75, pulse 119, temperature 98.2  F (36.8  C), temperature source Oral, resp. rate 16, height 1.651 m (5' 5\"), weight 71.4 kg (157 lb 8 oz), last menstrual period 10/22/2013, SpO2 94%, not currently breastfeeding.        "

## 2025-04-17 NOTE — PLAN OF CARE
"Pt was visible in the milieu and spent most of the shift watching TV and appropriately chatting with another peer. Pt was seen to be in a good mood, but this changed with the assessment. She was unwilling to participate in assessment questions as she claimed this could lead to incriminating herself and getting confined in a \"mental institution.\" She has no insight into her mental health illness and feels she was forced into IP MH by her family, who amplified her words to say she was suicidal, which she used as leverage to get her wish.     Pt got frustrated and angry and received the legal court commitment but later were seen as calm. She is disheveled, disorganized, and blames others for her behavior. She is easily redirectable and medication-compliant. BG at dinner 316 mg/dl. She denies SI/SIB/AVH. Appetite was outstanding. Plan of care ongoing. S      Problem: Adult Behavioral Health Plan of Care  Goal: Adheres to Safety Considerations for Self and Others  Outcome: Progressing  Intervention: Develop and Maintain Individualized Safety Plan  Recent Flowsheet Documentation  Taken 4/16/2025 2000 by Fernando Mart RN  Safety Measures: environmental rounds completed   Goal Outcome Evaluation:    Plan of Care Reviewed With: patient                   "

## 2025-04-18 LAB
GLUCOSE BLDC GLUCOMTR-MCNC: 211 MG/DL (ref 70–99)
GLUCOSE BLDC GLUCOMTR-MCNC: 241 MG/DL (ref 70–99)
GLUCOSE BLDC GLUCOMTR-MCNC: 320 MG/DL (ref 70–99)
GLUCOSE BLDC GLUCOMTR-MCNC: 324 MG/DL (ref 70–99)

## 2025-04-18 PROCEDURE — 250N000012 HC RX MED GY IP 250 OP 636 PS 637

## 2025-04-18 PROCEDURE — 250N000013 HC RX MED GY IP 250 OP 250 PS 637

## 2025-04-18 PROCEDURE — 99232 SBSQ HOSP IP/OBS MODERATE 35: CPT | Mod: GC | Performed by: PSYCHIATRY & NEUROLOGY

## 2025-04-18 PROCEDURE — 124N000002 HC R&B MH UMMC

## 2025-04-18 PROCEDURE — 97150 GROUP THERAPEUTIC PROCEDURES: CPT | Mod: GO

## 2025-04-18 RX ORDER — GABAPENTIN 300 MG/1
300 CAPSULE ORAL 3 TIMES DAILY PRN
Status: DISPENSED | OUTPATIENT
Start: 2025-04-18

## 2025-04-18 RX ORDER — DEXTROSE MONOHYDRATE 25 G/50ML
25-50 INJECTION, SOLUTION INTRAVENOUS
Status: DISCONTINUED | OUTPATIENT
Start: 2025-04-18 | End: 2025-04-18

## 2025-04-18 RX ORDER — NICOTINE POLACRILEX 4 MG
15-30 LOZENGE BUCCAL
Status: DISCONTINUED | OUTPATIENT
Start: 2025-04-18 | End: 2025-04-18

## 2025-04-18 RX ADMIN — OLANZAPINE 15 MG: 15 TABLET, ORALLY DISINTEGRATING ORAL at 21:16

## 2025-04-18 RX ADMIN — INSULIN ASPART 3 UNITS: 100 INJECTION, SOLUTION INTRAVENOUS; SUBCUTANEOUS at 21:26

## 2025-04-18 RX ADMIN — OLANZAPINE 5 MG: 5 TABLET, ORALLY DISINTEGRATING ORAL at 08:36

## 2025-04-18 RX ADMIN — GABAPENTIN 300 MG: 300 CAPSULE ORAL at 20:05

## 2025-04-18 RX ADMIN — Medication 1 TABLET: at 08:36

## 2025-04-18 RX ADMIN — PANTOPRAZOLE SODIUM 40 MG: 40 TABLET, DELAYED RELEASE ORAL at 08:43

## 2025-04-18 RX ADMIN — METFORMIN HYDROCHLORIDE 1000 MG: 500 TABLET, FILM COATED ORAL at 18:04

## 2025-04-18 RX ADMIN — METFORMIN HYDROCHLORIDE 1000 MG: 500 TABLET, FILM COATED ORAL at 08:36

## 2025-04-18 RX ADMIN — GABAPENTIN 300 MG: 300 CAPSULE ORAL at 13:10

## 2025-04-18 RX ADMIN — TOLTERODINE TARTRATE 2 MG: 2 CAPSULE, EXTENDED RELEASE ORAL at 08:36

## 2025-04-18 RX ADMIN — DIVALPROEX SODIUM 1000 MG: 500 TABLET, FILM COATED, EXTENDED RELEASE ORAL at 21:16

## 2025-04-18 ASSESSMENT — ACTIVITIES OF DAILY LIVING (ADL)
ADLS_ACUITY_SCORE: 47
LAUNDRY: UNABLE TO COMPLETE
ADLS_ACUITY_SCORE: 47
HYGIENE/GROOMING: INDEPENDENT
ORAL_HYGIENE: INDEPENDENT;PROMPTS
ADLS_ACUITY_SCORE: 47
LAUNDRY: UNABLE TO COMPLETE
ADLS_ACUITY_SCORE: 47
DRESS: INDEPENDENT;SCRUBS (BEHAVIORAL HEALTH)
ADLS_ACUITY_SCORE: 47
HYGIENE/GROOMING: HANDWASHING;INDEPENDENT
ADLS_ACUITY_SCORE: 47
DRESS: SCRUBS (BEHAVIORAL HEALTH);INDEPENDENT
ADLS_ACUITY_SCORE: 47
ORAL_HYGIENE: INDEPENDENT;PROMPTS

## 2025-04-18 NOTE — PROGRESS NOTES
Brief Medicine Note  Medicine following for hyperglycemia in the setting of uncontrolled diabetes mellitus type II. On admission, increased metformin and held off on insulin/other antihyperglycemic agents given housing instability and decompensated mental health. Her blood sugars have consistently remained above goal and patient required short acting insulin last night.  Ideally, patient could plan to start a GLP medication, but these cannot be started while inpatient and does not appear she has been established with PCP. Will start sliding scale today and monitor her needs and plan to transition to a one time daily dose of Lantus in upcoming days.    Given patient is starting insulin, please notify medicine team of any impending discharge at least 2-3 days prior in order to have a safe management plan for her diabetes.    Medicine will continue to follow up on blood sugars peripherally. Thank you for allowing us to be a part of this patient's care. Please notify on call [facility] if any intercurrent medical issues arise.     Charlotte Jasmine PA-C  Hospitalist Service

## 2025-04-18 NOTE — PLAN OF CARE
Problem: Adult Behavioral Health Plan of Care  Goal: Plan of Care Review  Outcome: Progressing  Flowsheets (Taken 4/17/2025 1645)  Patient Agreement with Plan of Care: agrees   Goal Outcome Evaluation:    Plan of Care Reviewed With: patient          Pt was sleeping when the shift started. She woke up just before dinner and was out in the lounge watching TV and videos with peers. She socialized and interacted with selected peers. She was able to make needs known. She denied pain and all mental health psych symptoms. She was medication compliant. Pt is diabetic and her blood sugar before dinner was 424. Resident doctor on call and IM doctors were notified. Order for a one time dose of 5 units of Novolog insulin was ordered by the IM doctor and to recheck blood sugar one hour after administration. This was done and blood sugar was 374. The IM doctor was notified and she said patient will be seen tomorrow by the IM doctor and they will decide if they have to start pt on insulin or not. No other concern noted at this time. Will continue to monitor and will assist if need arise.

## 2025-04-18 NOTE — PROGRESS NOTES
"  Rehab Group    Start time: 1315  End time: 1400  Patient time total: 45 minutes    attended full group    #4 attended   Group Type: occupational therapy   Group Topic Covered: activity therapy and emotional regulation     Group Session Detail:  Group covered strategies for relaxation, meditation, and mindfulness via creative expression.    Patient Response/Contribution:  cooperative with task, socially appropriate, and actively engaged     Patient Detail:  Pt arrived to group with blunted affect, was oriented to group and verbalized understanding. Pt required set up A, although had good attention span and attention to detail throughout. Pt reported the activity was \"fine\" and when asked if it was relaxing she reported \"yes.\" Pt engaged in group discussion intermittently, remaining mostly withdrawn throughout. Will continue to encourage attendance and participation.       [id number] OT Group (2 or more in attendance)      Patient Active Problem List   Diagnosis    Suicidal ideation    Type 2 diabetes mellitus (H)    Chronic hepatitis C (H)    Schizophrenia (H)    Acid reflux disease    Hoarding behavior       "

## 2025-04-18 NOTE — PLAN OF CARE
"Goal Outcome Evaluation:    Plan of Care Reviewed With: patient Plan of Care Reviewed With: patient    Overall Patient Progress: improvingOverall Patient Progress: improving         Problem: Adult Behavioral Health Plan of Care  Goal: Plan of Care Review  Outcome: Progressing  Flowsheets  Taken 4/18/2025 1206  Plan of Care Reviewed With: patient  Overall Patient Progress: improving  Patient Agreement with Plan of Care: agrees  Taken 4/18/2025 0900  Patient Agreement with Plan of Care: agrees     Behavioral  Pt slept 7 hours overnight; eating and hydrating adequately. Compliant with medications. BG before breakfast 211 and before lunch was 241. Pt received coverage per order. Attending to ADL's independently but with prompt; Pt is incontinent with bladder. Pt has an order for Osmolarity urine and UA with microscopic reflex to culture. Collection hat and a cup is placed in pt bathroom, pt will notify staff once she has urine sample. Please provide hygiene and incontinence pads. no behavioral escalation or safety concerns noted this shift. Pt is dismissive with assessment. Pt has lack on insight to her mental illness. Pt  is isolated and withdrawn most part of this shift. Pt came out to the milieu for meals and watch a some TV but keep to herself. Pt hygiene took a shower and changed to her home clothing. Pt  attended groups. Pt has restricted affect and mood is labile. PRN Gapapentin was given this shift. Pt has pre milinary hearing on 4/21. Blood pressure (!) (P) 142/84, pulse (P) 110, temperature 97.7  F (36.5  C), temperature source Temporal, resp. rate (P) 16, height 1.651 m (5' 5\"), weight 71.4 kg (157 lb 8 oz), last menstrual period 10/22/2013, SpO2 96%, not currently breastfeeding.    "

## 2025-04-18 NOTE — PLAN OF CARE
"Team Note Due:  Wednesday    Assessment/Intervention/Current Symtoms and Care Coordination:  Chart review and met with team, discussed pt progress, symptomology, and response to treatment.  Discussed the discharge plan and any potential impediments to discharge.    Writer received a copy of pt's court paperwork. [facility] faxed copy to HIM. Writer met with pt to review court paperwork and explain hearings scheduled next week. Pt stated \"I don't understand all this jibberish and why this is happening.\" Writer explained the petition was started in the ED out of concern for her safety and wellbeing but reiterated she will have an  who is there to advocate for her throughout this process. Pt accepted paperwork and writer welcomed any questions. Agreed to check in again on Monday to discuss her exam and preliminary hearings.    Left vm for pt's sister Tamika requesting a call back to discuss commitment hearings and plan for placement after discharge.     Discharge Plan or Goal:  [facility]/nursing home      Barriers to Discharge:  Patient requires further psychiatric stabilization due to current symptomology, medication management with changes subject to provider, coordination with outside supports, and aftercare planning. Pt is also on a court hold and involved in the commitment process.     Referral Status:  None at this time     Legal Status:  Court hold   County: [place]  File Number: [id number]  Start and expiration date of commitment: TBD    [facility] meds requested: Zyprexa, Haldol, Abilify, Risperdal    PPS:  Za [place]: [phone]   [email or web address]    Future Hearing Information:  Exam Hearing on 04/21/2025 at 2:15 PM  Preliminary/Probable Cause Hearing on 04/21/2025 at 3:15 PM     Contacts:   Haydee Mares ([facility] CM): [phone]   Tamika Johnson- (sister): [phone]  Lucille Reddy (Intervention ): [phone]     Upcoming Meetings and Dates/Important Information and next steps:  Follow commitment process  DA to " be completed and returned to [facility]  Discharge planning when appropriate  Notify medicine team of any impending discharge at least 2-3 days prior in order to have a safe management plan for her diabetes    PD and COS needed at discharge - TBD

## 2025-04-18 NOTE — PROGRESS NOTES
----------------------------------------------------------------------------------------------------------  [facility]  Psychiatry Progress Note  Hospital Day #3     Interim History:     The patient's care was discussed with the treatment team and chart notes were reviewed.    Patient ID: Anastasiya Simon is a 59 year old female with a previous psychiatric diagnosis of schizophrenia and polysubstance use admitted from [facility] on 04/15/2025 due to concern for SI and psychosis in the context of psychosocial stressors including living situation and argument with family member.     Vitals: Temp: 97.7  F (36.5  C) Temp  Min: 97.7  F (36.5  C)  Max: 98.3  F (36.8  C)  Resp: (P) 16 Resp  Min: 16  Max: 16  SpO2: 96 % SpO2  Min: 95 %  Max: 96 %  Pulse: (P) 110 Pulse  Min: 110  Max: 110  BP: (!) (P) 142/84 Systolic (24hrs), Av , Min:142 , Max:142   Diastolic (24hrs), Av, Min:84, Max:84  Sleep: 7 hours (25 0636)  Scheduled medications: Took all scheduled medications as prescribed  Psychiatric PRN medications: None    Staff Report:   1800 B  2000 B   08 B  AM: Compliant with medications. Attending to ADL's independently with prompt; Pt is incontinent with bladder. No behavioral escalation or safety concerns noted. Pt is dismissive with assessment. Pt has lack on insight to her mental illness. Pt  is isolated and withdrawn. Pt came out to the milieu for meals and watch TV towards the end of the shift. Pt hygiene is inadequate. Declined to shower this morning. Attended group morning OT group shortly. Restricted affect and mood is labile. No PRN was given this shift.  PM:  Pt woke up before dinner. Out in the lounge watching TV and videos with peers. Socialized and interacted with selected peers. Able to make needs known. Denied pain and all mental health psych symptoms. Medication compliant. Pt is diabetic and her blood  "sugar before dinner was 424. Resident doctor on call and IM doctors were notified. Order for a one time dose of 5 units of Novolog insulin was ordered by the IM doctor and to recheck blood sugar one hour after administration. This was done and blood sugar was 374. The IM doctor was notified and said patient will be seen tomorrow by the IM doctor and they will decide if they have to start pt on insulin. No other concern noted at this time. Pt appears to have slept for 7 hours.      Subjective:     Patient Interview:  Pt was interviewed in the [facility] conference room.   Pt reports feeling groggy. Slept well last night, but wants coffee. Pt agrees that medications help her sleep and she has been sleeping a lot. Denies having sleeping issues prior to coming to the hospital. Reports mood as \"feels fine and okay\". Group sessions went okay. She was able to do artwork in group and enjoyed that. Pt reports that diarrhea is still happening. She says the issue is that she does not get her pills right away. It takes about an hour to get her pills, so there is a mess to clean up. She mentioned wanting an operation for her urinary incontinence. Her feet hurt when she starts to walk but does get better as she walks more. She describes it as tingling. The tingling sensation is bilateral and has been moving higher up her legs. Discussed gabapentin to help with this feeling. Pt agreeable to PRN gabapentin.   Pt requested her cell phone to do research to find a place to live. Informed pt that she has a  to do that for her. Pt prefers to look herself. Pt asked how long she needs to stay here. Informed pt about the commitment process and the court hearing on Monday. Pt was not pleased with that information. Feels like she didn't do anything wrong. Pt said she is not crazy and does not need to be committed. Pt feels like she only needs memory care. She gets mixed up sometimes but does not need a psych serna. Pt discussed another " "place she stayed at that made her extremely paranoid and reports that she doesn't need another one. Pt believes that other people need a place at the group home before she does. No other concerns.     Objective:     Vitals:  BP (!) (P) 142/84 (BP Location: Left arm, Patient Position: Sitting, Cuff Size: Adult Regular)   Pulse (P) 110   Temp 97.7  F (36.5  C) (Temporal)   Resp (P) 16   Ht 1.651 m (5' 5\")   Wt 71.4 kg (157 lb 8 oz)   LMP 10/22/2013   SpO2 96%   BMI 26.21 kg/m      Allergies:  Allergies   Allergen Reactions    Tetracycline Unknown     It happened when pt was a baby       Current Medications:  Scheduled:  Current Facility-Administered Medications   Medication Dose Route Frequency Provider Last Rate Last Admin    glucose gel 15-30 g  15-30 g Oral Q15 Min PRN Katina Armendariz MD        Or    dextrose 50 % injection 25-50 mL  25-50 mL Intravenous Q15 Min PRN Katina Armendariz MD        Or    glucagon injection 1 mg  1 mg Subcutaneous Q15 Min PRN Katina Armendariz MD        divalproex sodium extended-release (DEPAKOTE ER) 24 hr tablet 1,000 mg  1,000 mg Oral At Bedtime Tanja Gonzalez MD   1,000 mg at 04/17/25 2128    gabapentin (NEURONTIN) capsule 300 mg  300 mg Oral TID PRN Tanja Gonzalez MD        hydrOXYzine HCl (ATARAX) tablet 25 mg  25 mg Oral Q4H PRN Lucy Chandler MD        insulin aspart (NovoLOG) injection (RAPID ACTING)  1-7 Units Subcutaneous TID AC Charlotte Jasmine PA-C   2 Units at 04/18/25 0831    insulin aspart (NovoLOG) injection (RAPID ACTING)  1-5 Units Subcutaneous At Bedtime Charlotte Jasmine PA-C        loperamide (IMODIUM) capsule 2 mg  2 mg Oral 4x Daily PRN Tanja Gonzalez MD   2 mg at 04/16/25 1244    metFORMIN (GLUCOPHAGE) tablet 1,000 mg  1,000 mg Oral BID w/meals Charlotte Jasmine PA-C   1,000 mg at 04/18/25 0836    multivitamin w/minerals (THERA-VIT-M) tablet 1 tablet  1 tablet Oral Daily Charlotte Jasmine PA-C   1 tablet " at 04/18/25 0836    OLANZapine (zyPREXA) tablet 10 mg  10 mg Oral TID PRN Lucy Chandler MD        Or    OLANZapine (zyPREXA) injection 10 mg  10 mg Intramuscular TID PRN Lucy Chandler MD        OLANZapine zydis (zyPREXA) ODT tab 15 mg  15 mg Oral At Bedtime Tanja Gonzalez MD   15 mg at 04/17/25 2129    OLANZapine zydis (zyPREXA) ODT tab 5 mg  5 mg Oral Daily Tanja Gonzalez MD   5 mg at 04/18/25 0836    pantoprazole (PROTONIX) EC tablet 40 mg  40 mg Oral QAM AC Lucy Chandler MD   40 mg at 04/18/25 0843    tolterodine ER (DETROL LA) 24 hr capsule 2 mg  2 mg Oral Daily Tanja Gonzalez MD   2 mg at 04/18/25 0836     PRN:  Current Facility-Administered Medications   Medication Dose Route Frequency Provider Last Rate Last Admin    glucose gel 15-30 g  15-30 g Oral Q15 Min PRN Katina Armendariz MD        Or    dextrose 50 % injection 25-50 mL  25-50 mL Intravenous Q15 Min PRN Katina Armendariz MD        Or    glucagon injection 1 mg  1 mg Subcutaneous Q15 Min PRN Katina Armendariz MD        divalproex sodium extended-release (DEPAKOTE ER) 24 hr tablet 1,000 mg  1,000 mg Oral At Bedtime Tanja Gonzalez MD   1,000 mg at 04/17/25 2128    gabapentin (NEURONTIN) capsule 300 mg  300 mg Oral TID PRN Tanja Gonzalez MD        hydrOXYzine HCl (ATARAX) tablet 25 mg  25 mg Oral Q4H PRN Lucy Chandler MD        insulin aspart (NovoLOG) injection (RAPID ACTING)  1-7 Units Subcutaneous TID AC Charlotte Jasmine PA-C   2 Units at 04/18/25 0831    insulin aspart (NovoLOG) injection (RAPID ACTING)  1-5 Units Subcutaneous At Bedtime Charlotte Jasmine PA-C        loperamide (IMODIUM) capsule 2 mg  2 mg Oral 4x Daily PRN Tanja Gonzalez MD   2 mg at 04/16/25 1244    metFORMIN (GLUCOPHAGE) tablet 1,000 mg  1,000 mg Oral BID w/meals Charlotte Jasmine PA-C   1,000 mg at 04/18/25 0836    multivitamin w/minerals (THERA-VIT-M) tablet 1 tablet   1 tablet Oral Daily Charlotte Jasmine PA-C   1 tablet at 04/18/25 0836    OLANZapine (zyPREXA) tablet 10 mg  10 mg Oral TID PRN Lucy Chandler MD        Or    OLANZapine (zyPREXA) injection 10 mg  10 mg Intramuscular TID PRN Lucy Chandler MD        OLANZapine zydis (zyPREXA) ODT tab 15 mg  15 mg Oral At Bedtime Tanja Gonzalez MD   15 mg at 04/17/25 2129    OLANZapine zydis (zyPREXA) ODT tab 5 mg  5 mg Oral Daily Tanja Gonzalez MD   5 mg at 04/18/25 0836    pantoprazole (PROTONIX) EC tablet 40 mg  40 mg Oral QAM AC Lucy Chandler MD   40 mg at 04/18/25 0843    tolterodine ER (DETROL LA) 24 hr capsule 2 mg  2 mg Oral Daily Tanja Gonzalez MD   2 mg at 04/18/25 0836     Labs and Imaging:  New results:   Recent Results (from the past 24 hours)   Glucose by meter    Collection Time: 04/17/25  5:58 PM   Result Value Ref Range    GLUCOSE BY METER POCT 424 (H) 70 - 99 mg/dL   Glucose by meter    Collection Time: 04/17/25  8:04 PM   Result Value Ref Range    GLUCOSE BY METER POCT 374 (H) 70 - 99 mg/dL   Glucose by meter    Collection Time: 04/18/25  8:06 AM   Result Value Ref Range    GLUCOSE BY METER POCT 211 (H) 70 - 99 mg/dL     Data this admission:  CBC: WNL. WBC: 7.6, RBC: 4.37, Hb: 13.0, Platelets: 316.  CMP: Unremarkable. AST: 11, ALT: 15, Creatinine: 0.55.  UDS: Negative  RASHIDA: 0.00 (4/13)  HbA1c: 10.1%  TSH: 2.40  Vit: B12: 812  Folate: 13.7     Mental Status Exam:     Oriented to:  Grossly oriented.  General: Awake.  Appearance:  Appears younger than stated age. Grooming is inadequate due to matted hair and not showering. Wears glasses and clothing provided by unit. Sitting in conference room chair.   Behavior/Attitude:  Cooperative, calm., engaged, open. Became slightly more agitated when discussing commitment process.   Eye Contact: Eyes were closed, minimal eye contact.  Psychomotor: No evidence of tics, dystonia, or tardive dyskinesia. No catatonia  "present.  Speech: Appropriate rate/volume/tone.  Language: Fluent in English with appropriate syntax and vocabulary.  Mood:  \"Fine\"  Affect:  Euthymic. Flat affect. Mildly sluggish.   Thought Process:  Linear and goal directed.  Thought Content:  Denies SI/SIB/AVH.  Associations:  intact  Insight:  fair   Judgment:  limited   Impulse control: fair  Attention Span:  adequate for conversation  Concentration:  grossly intact  Recent and Remote Memory:  Not formally assessed.  Fund of Knowledge: Average.  Muscle Strength and Tone: Normal.  Gait and Station: Walks slowly.      Psychiatric Assessment     Anastasiya Simon is a 59 year old female previously diagnosed with schizophrenia and polysubstance use disorder who presented with suicidal ideation, disorganization, and inability to care for self. Most recent psychiatric hospitalization was 03/20/2019 for disorganization and suicidal ideation. Significant symptoms on admission include increasing suicidal ideation, disorganization, erratic behavior such as filling buckets with ammonia and mixing with bleach, substance use, auditory hallucinations, and paranoia. The MSE on admission was pertinent for AVH, paranoia, limited insight into MH. Today on interview Anastasiya denies SI and reports she had only made suicidal statements to her sisters previously as she did not want to move into a group home.  Psychological contributions to mental health presentation include maladaptive coping through substance use and limited insight into MH. Social factors contributing to mental health presentation include interpersonal conflicts and unstable living environment. Protective factors include support from two sisters and absence of previous suicide attempts. On 4/16 we resumed Zyprexa 20 mg and increased Depakote to 1000 mg to address the ongoing psychosis, Tolterodine and PRN loperamide were started to address incontinence and diarrhea. On 4/18, we added PRN gabapentin 300 mg for " peripheral neuropathy.     In summary, the patient's reported symptoms of SI, erractic behavior, AVH, and paranoia in the context of psychosocial stressers are consistent with decompensated schizophrenia and polysubstance use disorder.  She will likely benefit from medication optimization and outpatient MH referrals this admission.     Psychiatric Plan by Diagnosis      Today's changes:  - PRN gabapentin 300 mg 3x/day for peripheral neuropathy    # Schizophrenia  Medications:  - olanzapine 5 mg in the morning, 15 mg HS  - depakote 1000 mg HS    #Polysubstance Use Disorder  - CD consult once psychotic sx more stable, if pt amenable    2. Pertinent Labs/Monitoring:  - Labs done 04/13 at OSH. CBC wnl, glucose elevated to 332, BMP wnl.  - UDS negative on 04/13  - 4/16 HbA1c: 10.1  - Check depakote levels on Tuesday (4/22) morning    3. Additional Plans:  - Patient will be treated in therapeutic milieu with appropriate individual and group therapies as described.   - Consult with Internal Medicine for diabetes management.      Psychiatric Hospital Course:      Anastasiya Simon was admitted to [facility] on a 72 hour hold (started 4/13/25 21:35) from [facility] on 04/15/25.   Medications:  Continued Medications:  Pt seen by psychiatry consult at [facility] who started zyprexa 20 mg at bedtime and depakote 500 mg at bedtime. Both of these medications were continued on admission to [facility].   New Medications:  Started loperamide for diarrhea  Started tolterodine for urinary incontinence  Started gabapentin for peripheral neuropathy    4/16: Increased depakote from 500 mg to 1000 mg HS for mood regulation  4/16: Increased metformin from 500 mg to 1000 mg BID per medicine consult  4/16: Zyprexa: 5 mg in the morning and 15 mg HS  4/16: Started PRN Loperamide 2 mg 4x/day for diarrhea  4/16: Started Tolterodine 2 mg daily for urinary incontinence  4/18: Started Gabapentin 300 mg PRN 3x/day for peripheral  "neuropathy    The risks, benefits, alternatives, and side effects were discussed and understood by the patient and other caregivers.     Medical Assessment and Plan     Medical diagnoses to be addressed this admission:      #Type 2 Diabetes Mellitis with Complications  - PTA metformin 1000 mg BID with meals  - BID glucose checks; hypoglycemia protocol  - PRN gabapentin 300 mg 3x/day for peripheral neuropathy  - Insulin aspart (Novolog) injection 1-5 units HS  - Insulin aspart (Novolog) injection 3x/day 1-7 units before meals  - Internal Medicine consult  - Given patient is starting insulin, please notify medicine team of any impending discharge at least 2-3 days prior in order to have a safe management plan for her diabetes.    #Diarrhea  - Loperamide 2 mg PRN 4x/day    #Urinary Incontinence  - Tolterodine 2 mg daily    Medical course:  Patient was physically examined by the ED prior to being transferred to the unit and was found to be medically stable and appropriate for admission.    Consults:   4/16: Internal Medicine consulted for management of diabetes and blood pressure.   4/16: Increased metformin to 1000 mg BID.   4/18: Started PRN gabapentin 300 mg 3x/day for peripheral neuropathy. Internal Medicine started Novolog injection 1-5 units HS and Novolog injection 3x/day 1-7 units before meals for better glycemic control. Per Medicine note, \"Given patient is starting insulin, please notify medicine team of any impending discharge at least 2-3 days prior in order to have a safe management plan for her diabetes.\"     Checklist     Legal Status: Court Hold    Safety Assessment:   Behavioral Orders   Procedures    Code 1 - Restrict to Unit    Routine Programming     As clinically indicated    Status 15     Every 15 minutes.    Suicide precautions: Suicide Risk: MODERATE; Clinical rationale to override score: modification to the care environment, response to medication, lack of access to a plan for self-harm, " Exhibiting Suicidal/self-harm behaviors or thoughts     Patients on Suicide Precautions should have a Combination Diet ordered that includes a Diet selection(s) AND a Behavioral Tray selection for Safe Tray - with utensils, or Safe Tray - NO utensils       Order Specific Question:   Suicide Risk     Answer:   MODERATE     Order Specific Question:   Clinical rationale to override score:     Answer:   modification to the care environment     Order Specific Question:   Clinical rationale to override score:     Answer:   response to medication     Order Specific Question:   Clinical rationale to override score:     Answer:   lack of access to a plan for self-harm     Order Specific Question:   Clinical rationale to override score:     Answer:   Exhibiting Suicidal/self-harm behaviors or thoughts     Risk Assessment:  Risk for harm is moderate.  Risk factors: maladaptive coping, substance use, impulsive, and past behaviors.  Protective factors: family.    SIO: None    Disposition: TBD. Disposition pending clinical stabilization, medication optimization and development of an appropriate discharge plan.     Attestations     Tatyana Mcclure, MS3, [facility] Medical Student     I was present with the medical student who participated in the service and in the documentation of the note.  I have verified the history and personally performed the physical exam and medical decision making. I agree with the assessment and plan of care as documented in the note.    This patient was seen and discussed with my attending physician.  Tanja Gonzalez MD   Psychiatry Resident Physician     Attestation:  This patient has been seen and evaluated by me, Joceline Dela Cruz MD.  I have discussed this patient with the house staff team including the resident and/or medical student and I agree with the findings and plan in this note.    I have reviewed today's vital signs, medications, labs and imaging. Joceline Dela Cruz MD , PhD.

## 2025-04-18 NOTE — PLAN OF CARE
Problem: Adult Behavioral Health Plan of Care  Goal: Plan of Care Review  Outcome: Progressing  Flowsheets (Taken 4/18/2025 1605)  Patient Agreement with Plan of Care: agrees   Goal Outcome Evaluation:    Plan of Care Reviewed With: patient       Pt forgetful and disorganized with thoughts, lacks insight of her mental health issues, she was visible in the milieu socializing and watching television together with peers. Pt wanted to talk to her nephew but doesn't have his number, she denied all psych symptoms including SI, SIB, HI, A/V/Hallucination and contracted for safety in the unit. BS before dinner 324 coverage given per order @ , also sliding scale given per order. Pt scared of needles doesn't want to be poked regularly, she's also having concerns to be on diabetic diet that she was doing before she was admitted per the pt. Pt was medication compliant, hygiene unkempt    Pt has order for urine specimen, hat on the toilet but pt has been forgetting to give even after couple attempts, she later gave gave small amount was not enough.

## 2025-04-18 NOTE — PLAN OF CARE
No PRNs given or requested this shift. Pt was in his room the entire shift. No c/o pain or discomfort noted/reported. Safety checks completed every 15 minutes ; no concerns noted. Pt appears to have slept for  7 hours.    Problem: Sleep Disturbance  Goal: Adequate Sleep/Rest  Outcome: Progressing   Goal Outcome Evaluation:

## 2025-04-19 LAB
GLUCOSE BLDC GLUCOMTR-MCNC: 204 MG/DL (ref 70–99)
GLUCOSE BLDC GLUCOMTR-MCNC: 250 MG/DL (ref 70–99)
GLUCOSE BLDC GLUCOMTR-MCNC: 348 MG/DL (ref 70–99)
GLUCOSE BLDC GLUCOMTR-MCNC: 529 MG/DL (ref 70–99)

## 2025-04-19 PROCEDURE — 250N000013 HC RX MED GY IP 250 OP 250 PS 637

## 2025-04-19 PROCEDURE — 124N000002 HC R&B MH UMMC

## 2025-04-19 RX ORDER — GLIPIZIDE 2.5 MG/1
5 TABLET, EXTENDED RELEASE ORAL
Status: DISPENSED | OUTPATIENT
Start: 2025-04-20

## 2025-04-19 RX ORDER — DEXTROSE MONOHYDRATE 25 G/50ML
25-50 INJECTION, SOLUTION INTRAVENOUS
Status: DISCONTINUED | OUTPATIENT
Start: 2025-04-19 | End: 2025-04-19

## 2025-04-19 RX ORDER — NICOTINE POLACRILEX 4 MG
15-30 LOZENGE BUCCAL
Status: DISCONTINUED | OUTPATIENT
Start: 2025-04-19 | End: 2025-04-19

## 2025-04-19 RX ADMIN — Medication 1 TABLET: at 08:17

## 2025-04-19 RX ADMIN — PANTOPRAZOLE SODIUM 40 MG: 40 TABLET, DELAYED RELEASE ORAL at 08:17

## 2025-04-19 RX ADMIN — OLANZAPINE 15 MG: 15 TABLET, ORALLY DISINTEGRATING ORAL at 21:07

## 2025-04-19 RX ADMIN — DIVALPROEX SODIUM 1000 MG: 500 TABLET, FILM COATED, EXTENDED RELEASE ORAL at 21:07

## 2025-04-19 RX ADMIN — METFORMIN HYDROCHLORIDE 1000 MG: 500 TABLET, FILM COATED ORAL at 18:11

## 2025-04-19 RX ADMIN — HYDROXYZINE HYDROCHLORIDE 25 MG: 25 TABLET, FILM COATED ORAL at 21:21

## 2025-04-19 RX ADMIN — TOLTERODINE TARTRATE 2 MG: 2 CAPSULE, EXTENDED RELEASE ORAL at 08:17

## 2025-04-19 RX ADMIN — METFORMIN HYDROCHLORIDE 1000 MG: 500 TABLET, FILM COATED ORAL at 08:17

## 2025-04-19 RX ADMIN — OLANZAPINE 5 MG: 5 TABLET, ORALLY DISINTEGRATING ORAL at 08:17

## 2025-04-19 ASSESSMENT — ACTIVITIES OF DAILY LIVING (ADL)
ADLS_ACUITY_SCORE: 47
LAUNDRY: UNABLE TO COMPLETE
ADLS_ACUITY_SCORE: 47
ORAL_HYGIENE: INDEPENDENT;PROMPTS
ADLS_ACUITY_SCORE: 47
DRESS: STREET CLOTHES;INDEPENDENT
HYGIENE/GROOMING: HANDWASHING;SHOWER;INDEPENDENT
ADLS_ACUITY_SCORE: 47

## 2025-04-19 NOTE — PLAN OF CARE
"  Problem: Sleep Disturbance  Goal: Adequate Sleep/Rest  Outcome: Progressing   Goal Outcome Evaluation:       Pt has been sleeping for about  7 hours this shift. No c/o pain or discomfort reported. No behavioral or safety concern at this time.     Blood pressure (!) 148/84, pulse 110, temperature 98.9  F (37.2  C), temperature source Oral, resp. rate (P) 16, height 1.651 m (5' 5\"), weight 71.4 kg (157 lb 8 oz), last menstrual period 10/22/2013, SpO2 96%, not currently breastfeeding.   "

## 2025-04-19 NOTE — PLAN OF CARE
Goal Outcome Evaluation:    Plan of Care Reviewed With: patient Plan of Care Reviewed With: patient    Overall Patient Progress: decliningOverall Patient Progress: declining         Problem: Diabetes  Goal: Optimal Coping  Intervention: Support Wellbeing and Self-Management Success  Recent Flowsheet Documentation  Taken 4/19/2025 1200 by Chanel Xavier RN  Supportive Measures:   active listening utilized   self-care encouraged   self-reflection promoted   self-responsibility promoted   verbalization of feelings encouraged  Family/Support System Care:   involvement promoted   presence promoted   self-care encouraged   support provided     Pt slept 7 hours overnight; eating and hydrating adequately. Compliant with medications. BG before breakfast 204 and before lunch was 416 rechecked 529. Pt received coverage per order. Provider was notified. Pt has a new order for Glipizide XL 5 mg. There is changes in sliding scale. Please refer to the order. Attending to ADL's independently but with prompt; Pt is incontinent with bladder. Please provide hygiene and incontinence pads. No behavioral escalation or safety concerns noted this shift. Pt is dismissive with assessment. Pt has lack on insight to her mental illness. Pt  is isolated and withdrawn most part of this shift. Pt came out to the milieu for meals and watch a some TV but keep to herself. Pt hygiene is unkempt. Pt took a shower yesterday and changed to her home clothing. Pt has restricted affect and mood is labile. Pt has pre milinary hearing on 4/21.

## 2025-04-19 NOTE — PROGRESS NOTES
Brief Medicine Note  Medicine following for blood sugars. Remains above goal so increased to high intensity sliding scale. Will consider endocrinology consult in upcoming days if still requiring high amounts of insulin.     Medicine will continue to follow up on blood sugars peripherally. Thank you for allowing us to be a part of this patient's care. Please notify on call [facility] if any intercurrent medical issues arise.     Charlotte Jasmine PA-C  Hospitalist Service

## 2025-04-20 LAB
GLUCOSE BLDC GLUCOMTR-MCNC: 159 MG/DL (ref 70–99)
GLUCOSE BLDC GLUCOMTR-MCNC: 171 MG/DL (ref 70–99)
GLUCOSE BLDC GLUCOMTR-MCNC: 173 MG/DL (ref 70–99)
GLUCOSE BLDC GLUCOMTR-MCNC: 182 MG/DL (ref 70–99)
GLUCOSE BLDC GLUCOMTR-MCNC: 282 MG/DL (ref 70–99)

## 2025-04-20 PROCEDURE — 250N000013 HC RX MED GY IP 250 OP 250 PS 637

## 2025-04-20 PROCEDURE — 124N000002 HC R&B MH UMMC

## 2025-04-20 RX ADMIN — METFORMIN HYDROCHLORIDE 1000 MG: 500 TABLET, FILM COATED ORAL at 08:37

## 2025-04-20 RX ADMIN — TOLTERODINE TARTRATE 2 MG: 2 CAPSULE, EXTENDED RELEASE ORAL at 08:37

## 2025-04-20 RX ADMIN — Medication 1 TABLET: at 08:37

## 2025-04-20 RX ADMIN — DIVALPROEX SODIUM 1000 MG: 500 TABLET, FILM COATED, EXTENDED RELEASE ORAL at 21:35

## 2025-04-20 RX ADMIN — GLIPIZIDE 5 MG: 2.5 TABLET, FILM COATED, EXTENDED RELEASE ORAL at 08:37

## 2025-04-20 RX ADMIN — OLANZAPINE 15 MG: 15 TABLET, ORALLY DISINTEGRATING ORAL at 21:35

## 2025-04-20 RX ADMIN — METFORMIN HYDROCHLORIDE 1000 MG: 500 TABLET, FILM COATED ORAL at 18:14

## 2025-04-20 RX ADMIN — GABAPENTIN 300 MG: 300 CAPSULE ORAL at 17:03

## 2025-04-20 RX ADMIN — OLANZAPINE 5 MG: 5 TABLET, ORALLY DISINTEGRATING ORAL at 08:37

## 2025-04-20 RX ADMIN — PANTOPRAZOLE SODIUM 40 MG: 40 TABLET, DELAYED RELEASE ORAL at 08:37

## 2025-04-20 ASSESSMENT — ACTIVITIES OF DAILY LIVING (ADL)
HYGIENE/GROOMING: HANDWASHING;INDEPENDENT
ORAL_HYGIENE: INDEPENDENT;PROMPTS
ADLS_ACUITY_SCORE: 47
ADLS_ACUITY_SCORE: 47
ADLS_ACUITY_SCORE: 57
LAUNDRY: UNABLE TO COMPLETE
ADLS_ACUITY_SCORE: 57
ADLS_ACUITY_SCORE: 47
ADLS_ACUITY_SCORE: 57
LAUNDRY: UNABLE TO COMPLETE
ADLS_ACUITY_SCORE: 47
ADLS_ACUITY_SCORE: 47
ADLS_ACUITY_SCORE: 57
ADLS_ACUITY_SCORE: 57
ORAL_HYGIENE: PROMPTS;INDEPENDENT
ADLS_ACUITY_SCORE: 47
ADLS_ACUITY_SCORE: 57
ADLS_ACUITY_SCORE: 47
ADLS_ACUITY_SCORE: 57
ADLS_ACUITY_SCORE: 47
DRESS: STREET CLOTHES;SCRUBS (BEHAVIORAL HEALTH);INDEPENDENT
ADLS_ACUITY_SCORE: 47
HYGIENE/GROOMING: INDEPENDENT

## 2025-04-20 NOTE — PLAN OF CARE
Problem: Adult Behavioral Health Plan of Care  Goal: Plan of Care Review  Outcome: Progressing  Flowsheets (Taken 4/20/2025 1620)  Patient Agreement with Plan of Care: agrees     Problem: Diabetes  Goal: Optimal Coping  Outcome: Progressing   Goal Outcome Evaluation:    Plan of Care Reviewed With: patient      Pt visible in the milieu, flat and blunted affect, social with one select peer, watching television and interacting well together. C/O bilateral leg pain, PRN Gabapentin was given, pt disorganized with thoughts, forgetful at times, she couldn't  remember what she ordered on the menu and was requesting something totally different. Pt took a shower the previous shift however looks unkempt, denied all psych symptoms, intake was adequate. BS before dinner 182 and at  respectively, insulin given per sliding scale, compliant with medication.

## 2025-04-20 NOTE — PLAN OF CARE
"Goal Outcome Evaluation:    Plan of Care Reviewed With: patient Plan of Care Reviewed With: patient    Overall Patient Progress: improvingOverall Patient Progress: improving         Problem: Adult Behavioral Health Plan of Care  Goal: Plan of Care Review  Outcome: Progressing  Flowsheets  Taken 4/20/2025 1313  Plan of Care Reviewed With: patient  Overall Patient Progress: improving  Patient Agreement with Plan of Care: agrees  Taken 4/20/2025 1000  Patient Agreement with Plan of Care: agrees     Pt slept 7 hours overnight; eating and hydrating adequately. Compliant with medications. BG before breakfast 171 and before lunch was 272. Pt received coverage per order. Attending to ADL's independently but with prompt; No behavioral escalation or safety concerns noted this shift. Pt is dismissive with assessment. Pt has lack on insight to her mental illness. Pt  is isolated and withdrawn most part of this shift. Pt came out to the milieu for meals and watch a some TV but keep to herself. Pt hygiene is unkempt. Pt was encouraged to shower but she declined.  Pt has restricted affect and mood is labile. Pt has pre milinary hearing on 4/21 Blood pressure (!) 142/79, pulse 101, temperature 97.2  F (36.2  C), resp. rate 16, height 1.651 m (5' 5\"), weight 71.4 kg (157 lb 8 oz), last menstrual period 10/22/2013, SpO2 99%, not currently breastfeeding.     "

## 2025-04-20 NOTE — PLAN OF CARE
Problem: Diabetes  Goal: Optimal Coping  4/19/2025 2051 by Luis Armenta, RN  Outcome: Progressing  4/19/2025 2050 by Luis Armenta RN  Outcome: Progressing  4/19/2025 2049 by Luis Armenta RN  Outcome: Progressing  Intervention: Support Wellbeing and Self-Management Success  Recent Flowsheet Documentation  Taken 4/19/2025 1842 by Luis Armenta RN  Supportive Measures:   self-care encouraged   self-reflection promoted  Family/Support System Care:   involvement promoted   presence promoted   self-care encouraged     Problem: Psychotic Signs/Symptoms  Goal: Improved Behavioral Control (Psychotic Signs/Symptoms)  Outcome: Progressing   Goal Outcome Evaluation:    Plan of Care Reviewed With: patient      Patient showered this shift and multiple attempts from writer persuading her to do so.  Patient still appeared disorganized and confused with lacks insight to mental state. She spent most of the evening sitting in the lounge area and watching TV with selected peers.She denied all mental health symptoms and was complaint with medications. Patient ate and hydrated well and BS was 348 @ dinner 250 coverage @ HS with insulin administered per sliding scale.Initial vitals were elevated, but stabilized as shift progressed( see flow sheet).Staff will continue to monitor patient's disorganized and confused behavior as well as high blood sugar levels for proper intervention.

## 2025-04-20 NOTE — PLAN OF CARE
No PRNs given or requested this shift.  Pt remained in his room the entire shift. Blood glucose at 0200 = 159, asymptomatic. No c/o pain or discomfort noted/reported. Safety checks completed every 15 minutes ; no concerns noted. Pt appears to have slept for  7 hours; will continue to monitor and offer support.     Problem: Sleep Disturbance  Goal: Adequate Sleep/Rest  Outcome: Progressing   Goal Outcome Evaluation:

## 2025-04-21 LAB
GLUCOSE BLDC GLUCOMTR-MCNC: 155 MG/DL (ref 70–99)
GLUCOSE BLDC GLUCOMTR-MCNC: 161 MG/DL (ref 70–99)
GLUCOSE BLDC GLUCOMTR-MCNC: 173 MG/DL (ref 70–99)
GLUCOSE BLDC GLUCOMTR-MCNC: 238 MG/DL (ref 70–99)
GLUCOSE BLDC GLUCOMTR-MCNC: 292 MG/DL (ref 70–99)

## 2025-04-21 PROCEDURE — 250N000013 HC RX MED GY IP 250 OP 250 PS 637

## 2025-04-21 PROCEDURE — 97150 GROUP THERAPEUTIC PROCEDURES: CPT | Mod: GO

## 2025-04-21 PROCEDURE — 124N000002 HC R&B MH UMMC

## 2025-04-21 PROCEDURE — 99232 SBSQ HOSP IP/OBS MODERATE 35: CPT | Mod: GC | Performed by: PSYCHIATRY & NEUROLOGY

## 2025-04-21 RX ADMIN — DIVALPROEX SODIUM 1000 MG: 500 TABLET, FILM COATED, EXTENDED RELEASE ORAL at 21:34

## 2025-04-21 RX ADMIN — PANTOPRAZOLE SODIUM 40 MG: 40 TABLET, DELAYED RELEASE ORAL at 07:54

## 2025-04-21 RX ADMIN — OLANZAPINE 15 MG: 15 TABLET, ORALLY DISINTEGRATING ORAL at 21:34

## 2025-04-21 RX ADMIN — GLIPIZIDE 5 MG: 2.5 TABLET, FILM COATED, EXTENDED RELEASE ORAL at 07:54

## 2025-04-21 RX ADMIN — HYDROXYZINE HYDROCHLORIDE 25 MG: 25 TABLET, FILM COATED ORAL at 22:18

## 2025-04-21 RX ADMIN — OLANZAPINE 5 MG: 5 TABLET, ORALLY DISINTEGRATING ORAL at 07:54

## 2025-04-21 RX ADMIN — METFORMIN HYDROCHLORIDE 1000 MG: 500 TABLET, FILM COATED ORAL at 07:54

## 2025-04-21 RX ADMIN — Medication 1 TABLET: at 07:54

## 2025-04-21 RX ADMIN — METFORMIN HYDROCHLORIDE 1000 MG: 500 TABLET, FILM COATED ORAL at 18:29

## 2025-04-21 RX ADMIN — TOLTERODINE TARTRATE 2 MG: 2 CAPSULE, EXTENDED RELEASE ORAL at 07:54

## 2025-04-21 ASSESSMENT — ACTIVITIES OF DAILY LIVING (ADL)
ADLS_ACUITY_SCORE: 57
ADLS_ACUITY_SCORE: 47
ADLS_ACUITY_SCORE: 57
ADLS_ACUITY_SCORE: 57
ADLS_ACUITY_SCORE: 47
ADLS_ACUITY_SCORE: 57
ADLS_ACUITY_SCORE: 57
ADLS_ACUITY_SCORE: 47
DRESS: STREET CLOTHES
ADLS_ACUITY_SCORE: 47
ADLS_ACUITY_SCORE: 47
ORAL_HYGIENE: PROMPTS
HYGIENE/GROOMING: HANDWASHING;INDEPENDENT
ADLS_ACUITY_SCORE: 57
ADLS_ACUITY_SCORE: 47
LAUNDRY: UNABLE TO COMPLETE
ADLS_ACUITY_SCORE: 57
ADLS_ACUITY_SCORE: 57
ADLS_ACUITY_SCORE: 47
ADLS_ACUITY_SCORE: 57
ADLS_ACUITY_SCORE: 57

## 2025-04-21 NOTE — PROGRESS NOTES
Brief Medicine Note  Medicine following up on blood sugars. Her blood sugar was above goal so glipizide was initiated yesterday, 4/20. It appeared to be improving and needing less sliding scale, but had peak to 292 this AM. Will start her on consistent carb diet.    Medicine will continue to follow up on blood sugar peripherally. Thank you for allowing us to be a part of this patient's care. Please notify on call [facility] if any intercurrent medical issues arise.     Charlotte Jasmine PA-C  Hospitalist Service

## 2025-04-21 NOTE — PLAN OF CARE
Please look for [employer]ani[employer] products.     Doxycycline     Take one tablet twice daily with a full glass of water.  This medication should be take WITH food. Doxycycline can cause sun sensitivity so be cautious with sun exposure and use SPF 30 and greater.  This medication can also cause some people to develop a rash in the sun.  Notify the clinic if this happens.        Thank you for choosing Yani Perdue, Nurse Practitioner as your dermatology provider!      At [facility], one important tool we use to improve our patient servies is our Patient Survey.  Following your visit you may receive our survey in the mail.      · Please take time to complete the survey.  · If your visit with us was great, we want to hear about it.  · If we can improve, please let us know how.      Get your Prescription filled at [facility]!    · [facility] uses the same medical record your doctor uses. More accurate and timely information for your doctor and your pharmacy means more effective and safer health care for you and your family.  ·  accepts all of the major insurance plans which means you pay the same copay wherever you go.  ·  has a prescription savings club with over 200 medications available for $3.99 for a 30 day supply. *$5.00 yearly fee to join the club  · [facility]ists take the time to explain your medication regimen to you.  · [facility] will mail your medications to you free of postage and handling charges. We love jan.        [facility]  [address] [place] [address]  Phone [phone]  Fax [phone]    [address] [address]  Phone [phone]  Fax [phone]     No PRNs given or requested this shift.  Pt remained in his room the entire shift. Blood glucose at 0200 = 161, asymptomatic. No c/o pain or discomfort noted/reported. Safety checks completed every 15 minutes ; no concerns noted. Pt appears to have slept for  6.75 hours; will continue to monitor and offer support.      Problem: Sleep Disturbance  Goal: Adequate Sleep/Rest  Outcome: Progressing   Goal Outcome Evaluation:

## 2025-04-21 NOTE — PLAN OF CARE
"Team Note Due:  Wednesday    Assessment/Intervention/Current Symtoms and Care Coordination:  Chart review and met with team, discussed pt progress, symptomology, and response to treatment.  Discussed the discharge plan and any potential impediments to discharge.    Received call back from Tamika. She feels strongly about pt seeking CD treatment then IRTS upon discharge from the hospital. Discussed pt needs to agree to CD tx and if she does, an  can complete a CD assessment/make referrals for pt however due to pt's insurance plan (includes Medicare), finding inpatient CD treatment may be a challenge but will need to gauge pt's willingness first. Tamika shared pt does have a trust, so paying out of pocket  for tx can be an option if insurance will not cover programming. Discussed commitment proceedings, which Tamika plans on attending. Agreed to keep in touch regarding pt's progress and discharge planning.    Per MD, pt is not willing to consider CD treatment at this time.    Met with pt to discuss court hearings this afternoon. Pt stated her sister is \"always talking behind my back\" and stated she doesn't believe she did anything wrong to be here, doesn't like being \"treated like a criminal.\" Pt reiterated she was cleaning her home with bleach or ammonia and per the directions, she thought she could leave it in her oven but she forgot about it there. Acknowledges her memory has been a problem. Assured pt she'll have the opportunity to consult with her  this afternoon and they will advocate for pt.    Pt initially agreed to attend exam hearing but once examiner started their assessment, pt declined to participate. Writer set up preliminary hearing at 3pm and upon notifying pt, she stated she did not want to participate. She was encouraged to attend but pt said she does not want to \"because they're going to see my hair all crazy, I can't talk, I don't want to go.\" Writer notified the court pt is " declining to participate.     Discharge Plan or Goal:  GH/care home vs IRTS     Barriers to Discharge:  Patient requires further psychiatric stabilization due to current symptomology, medication management with changes subject to provider, coordination with outside supports, and aftercare planning. Pt is also on a court hold and involved in the commitment process.     Referral Status:  None at this time     Legal Status:  Court hold     [place] Commitment and Castellanos filed  [facility]: [place]  File Number: [id number]  Start and expiration date of commitment: TBD    Castellanos meds requested: Zyprexa, Haldol, Abilify, Risperdal    PPS:  Za Warren: [phone]   [email or web address]    Future Hearing Information:  Commitment Hearing on 04/24/2025 at 1:15 PM    Contacts:   Haydee Mares (CADI CM): [phone]   Tamika Johnson- (sister): [phone]  Lucille Reddy (Intervention ): [phone]     Upcoming Meetings and Dates/Important Information and next steps:  Follow commitment process  DA to be completed and returned to [facility]  Discharge planning when appropriate  Notify medicine team of any impending discharge at least 2-3 days prior in order to have a safe management plan for her diabetes    PD and COS needed at discharge - TBD

## 2025-04-21 NOTE — PROGRESS NOTES
"  Rehab Group    Start time: 1015  End time: 1200  Patient time total: 25 minutes    attended partial group    #6 attended   Group Type: OT Clinic   Group Topic Covered: balanced lifestyle, coping skills, healthy leisure time, and social skills     Group Session Detail:  Pt actively participated in occupational therapy clinic to facilitate coping skill exploration, creative expression within personally meaningful activities, and clinical observation of social, cognitive, and kinesthetic performance skills.       Patient Response/Contribution:  worked intermittently, disorganized, and nonsensical verbalizations     Patient Detail:  Pt arrived to group late. Pt entered group room and immediatly engaged in conversation with writer discussing upcoming court and her sister. Pt reported her sister is a \"traitor\" and \"that I know she talks about me behind my back.\" Pt perseverated on putting ammonia in the oven stating \"I read the directions, it said I could do that, that doesn't make me crazy.\" Pt was not perceptive to redirections. Pt switched discussion topics with extended time, engaging with group discussion about music and selecting preferred songs. Pt observed to have limited attention span to task in this group. Will continue to encourage attendance and participation.         [id number] OT Group (2 or more in attendance)      Patient Active Problem List   Diagnosis    Suicidal ideation    Type 2 diabetes mellitus (H)    Chronic hepatitis C (H)    Schizophrenia (H)    Acid reflux disease    Hoarding behavior       "

## 2025-04-21 NOTE — PROGRESS NOTES
"  Rehab Group    Start time: 1415  End time: 1500  Patient time total: 15 minutes    attended partial group    #5 attended   Group Type: occupational therapy   Group Topic Covered: activity therapy and cognitive activities     Group Session Detail:  The focus of today's group was leisure exploration and engagement. Patient engaged in a therapeutic game to encourage new learning, problem solving, focus, socialization, and cognitive wellness. This game encouraged cognitive skill building, such as: initiation, planning, organization, sequencing, and attention.       Patient Response/Contribution:  cooperative with task, required prompts or assistance to participate, unable to sequence the task, verbalizations were off topic, and appeared frustrated     Patient Detail:  Pt arrived to group late, appearing frustrated. Pt was oriented to group and verbalized understanding, pt required mod verbal prompting to participate in simple group activity. Pt reported \"I have court again at three,\" becoming upset when discussing court. Pt was redirectable. Will continue to encourage attendance and participation.       [id number] OT Group (2 or more in attendance)      Patient Active Problem List   Diagnosis    Suicidal ideation    Type 2 diabetes mellitus (H)    Chronic hepatitis C (H)    Schizophrenia (H)    Acid reflux disease    Hoarding behavior       "

## 2025-04-21 NOTE — PLAN OF CARE
"  Problem: Adult Behavioral Health Plan of Care  Goal: Plan of Care Review  Outcome: Progressing  Flowsheets  Taken 4/21/2025 1410  Plan of Care Reviewed With: patient  Patient Agreement with Plan of Care: agrees  Taken 4/21/2025 1000  Patient Agreement with Plan of Care: agrees   Goal Outcome Evaluation:    Plan of Care Reviewed With: patient   Patient has been visible in the milieu.Sociable with select peers.Patient watched TV/movies & attended OT groups. before breakfast and 292 before lunch meal.Patient has no insight of her MH.Denies SI/SIB/AH/VH.Mood has been calm.Affect flat and blunted.Patient is independent with ADL's.No escalation of behaviors noted.Patient contracted for safety.  Vital signs:  Temp: 98.1  F (36.7  C) Temp src: Oral BP: 123/77 Pulse: 96     SpO2: 96 % O2 Device: None (Room air)   Height: 165.1 cm (5' 5\") Weight: 71.4 kg (157 lb 8 oz)  Estimated body mass index is 26.21 kg/m  as calculated from the following:    Height as of this encounter: 1.651 m (5' 5\").    Weight as of this encounter: 71.4 kg (157 lb 8 oz).                      "

## 2025-04-21 NOTE — PROGRESS NOTES
----------------------------------------------------------------------------------------------------------  [facility]  Psychiatry Progress Note  Hospital Day #6     Interim History:     The patient's care was discussed with the treatment team and chart notes were reviewed.    Patient ID: Anastasiya Simon is a 59 year old female with a previous psychiatric diagnosis of schizophrenia and polysubstance use admitted from [facility] on 04/15/2025 due to concern for SI and psychosis in the context of psychosocial stressors including living situation and argument with family member.     Vitals: Temp: 97.2  F (36.2  C) Temp  Min: 97.2  F (36.2  C)  Max: 97.2  F (36.2  C)  SpO2: 96 % SpO2  Min: 96 %  Max: 99 %  Pulse: 104 Pulse  Min: 101  Max: 104  BP: (!) 141/76 Systolic (24hrs), Av , Min:141 , Max:142   Diastolic (24hrs), Av, Min:76, Max:79  Sleep: 6.75 hours (25 0628)  Scheduled medications: Took all scheduled medications as prescribed  Psychiatric PRN medications:   Last 24H PRN:     gabapentin (NEURONTIN) capsule 300 mg, 300 mg at 25 1703    Staff Report:   Pt was compliant with medications. Adequate eating and hydrating. Attending to ADLs independently but with prompt. Pt is incontinent with bladder. Order for UA, placed collection hat and cup in pt bathroom. Pt forgets to give a sample even after a couple attempts. Pt gave a sample but it was not enough. Uses hygiene and incontinence pads. No behavioral escalation or safety concerns.   Pt is dismissive with assessment. Pt has lack of insight to her mental illness. Isolated and withdrawn. Pt comes to the milieu for meals and watch some TV, but keeps to herself. Pt showered and changed into personal clothing. Attended OT groups. Restricted affect, mood is labile. Pt is forgetful and disorganized with thoughts. Pt couldn't remember what she ordered on the menu and was requesting something  "totally different. Denies all psych symptoms including SI, SIB, HI, AVH.   Requested PRN Gabapentin. Pt scared of needles doesn't want to be poked regularly. Also concerns about being on diabetic diet that she was doing before she was admitted. Pt has a new order for Glipizide XL 5 mg. There is changes in sliding scale. Please refer to the order. Pt has pre milinary hearing on 4/21. Pt slept 7 hours each night over the weekend.   Blood Glucose:  4/18: 0800 211. 1200: 241. 1800: 324. 2100: 320.  4/19: 0800: 204. 1200: 529 - Pt received coverage per order. Provider notified. 1800: 348. 2100: 250  4/20: 0200: 159. 0800: 171. 1200: 282. 1800: 182. 2100: 173.  4/21: 0200: 161. 0800: 155     Subjective:     Patient Interview:  Pt was interviewed in her room in bed. Pt was laying down with her eyes closed. She was speaking softly in short sentences.     Pt reports feeling sleepy. She had a good weekend and slept well. She asked what time her court hearing is today. No other questions about court. Pt reports her incontinence is better. Informed pt that urology wants to do a bladder scan today. Pt reports wanting a foot bath. She reports having dry skin, and the nails are hard and thick. Her feet appear dry, nails are hard and thickened. Soles are feet have multiple calluses and slightly cracked. Pt hasn't been in contact with her sister since she is on a trip. Sister comes back on Saturday. Discussed rule of no physical contact with other peers. Pt has no problem with the other peers. Discussed possible CD treatment but pt does not want to go. No other concerns at this time.      Objective:     Vitals:  BP (!) 141/76 (BP Location: Right arm, Patient Position: Sitting, Cuff Size: Adult Regular)   Pulse 104   Temp 97.2  F (36.2  C)   Resp 16   Ht 1.651 m (5' 5\")   Wt 71.4 kg (157 lb 8 oz)   LMP 10/22/2013   SpO2 96%   BMI 26.21 kg/m      Allergies:  Allergies   Allergen Reactions    Tetracycline Unknown     It " happened when pt was a baby       Current Medications:  Scheduled:  Current Facility-Administered Medications   Medication Dose Route Frequency Provider Last Rate Last Admin    glucose gel 15-30 g  15-30 g Oral Q15 Min PRN Katina Armendariz MD        Or    dextrose 50 % injection 25-50 mL  25-50 mL Intravenous Q15 Min PRN Katina Armendariz MD        Or    glucagon injection 1 mg  1 mg Subcutaneous Q15 Min PRN Katina Armendariz MD        divalproex sodium extended-release (DEPAKOTE ER) 24 hr tablet 1,000 mg  1,000 mg Oral At Bedtime Tanja Gonzalez MD   1,000 mg at 04/20/25 2135    gabapentin (NEURONTIN) capsule 300 mg  300 mg Oral TID PRN Tanja Gonzalez MD   300 mg at 04/20/25 1703    glipiZIDE (GLUCOTROL XL) 24 hr tablet 5 mg  5 mg Oral Daily with breakfast Charlotte Jasmine PA-C   5 mg at 04/21/25 0754    hydrOXYzine HCl (ATARAX) tablet 25 mg  25 mg Oral Q4H PRN Lucy Chandler MD   25 mg at 04/19/25 2121    insulin aspart (NovoLOG) injection (RAPID ACTING)  1-10 Units Subcutaneous TID AC Charlotte Jasmine PA-C   2 Units at 04/20/25 1812    insulin aspart (NovoLOG) injection (RAPID ACTING)  1-7 Units Subcutaneous At Bedtime Charlotte Jasmine PA-C   3 Units at 04/19/25 2115    loperamide (IMODIUM) capsule 2 mg  2 mg Oral 4x Daily PRN Tanja Gonzalez MD   2 mg at 04/16/25 1244    metFORMIN (GLUCOPHAGE) tablet 1,000 mg  1,000 mg Oral BID w/meals Charlotte Jasmine PA-C   1,000 mg at 04/21/25 0754    multivitamin w/minerals (THERA-VIT-M) tablet 1 tablet  1 tablet Oral Daily hCarlotte Jasmine PA-C   1 tablet at 04/21/25 0754    OLANZapine (zyPREXA) tablet 10 mg  10 mg Oral TID PRN Lucy Chandler MD        Or    OLANZapine (zyPREXA) injection 10 mg  10 mg Intramuscular TID PRN Lucy Chandler MD        OLANZapine zydis (zyPREXA) ODT tab 15 mg  15 mg Oral At Bedtime Tanja Gonzalez MD   15 mg at 04/20/25 2134    OLANZapine zydis  (zyPREXA) ODT tab 5 mg  5 mg Oral Daily Tanja Gonzalez MD   5 mg at 04/21/25 0754    pantoprazole (PROTONIX) EC tablet 40 mg  40 mg Oral QAM AC Lucy Chandler MD   40 mg at 04/21/25 0754    tolterodine ER (DETROL LA) 24 hr capsule 2 mg  2 mg Oral Daily Tanja Gonzalez MD   2 mg at 04/21/25 0754     PRN:  Current Facility-Administered Medications   Medication Dose Route Frequency Provider Last Rate Last Admin    glucose gel 15-30 g  15-30 g Oral Q15 Min PRN Katina Armendariz MD        Or    dextrose 50 % injection 25-50 mL  25-50 mL Intravenous Q15 Min PRN Katina Armendariz MD        Or    glucagon injection 1 mg  1 mg Subcutaneous Q15 Min PRN Katina Armendariz MD        divalproex sodium extended-release (DEPAKOTE ER) 24 hr tablet 1,000 mg  1,000 mg Oral At Bedtime Tanja Gonzalez MD   1,000 mg at 04/20/25 2135    gabapentin (NEURONTIN) capsule 300 mg  300 mg Oral TID PRN Tanja Gonzalez MD   300 mg at 04/20/25 1703    glipiZIDE (GLUCOTROL XL) 24 hr tablet 5 mg  5 mg Oral Daily with breakfast Charlotte Jasmine PA-C   5 mg at 04/21/25 0754    hydrOXYzine HCl (ATARAX) tablet 25 mg  25 mg Oral Q4H PRN Lucy Chandler MD   25 mg at 04/19/25 2121    insulin aspart (NovoLOG) injection (RAPID ACTING)  1-10 Units Subcutaneous TID AC Charlotte Jasmine PA-C   2 Units at 04/20/25 1812    insulin aspart (NovoLOG) injection (RAPID ACTING)  1-7 Units Subcutaneous At Bedtime Charlotte Jasmine PA-C   3 Units at 04/19/25 2115    loperamide (IMODIUM) capsule 2 mg  2 mg Oral 4x Daily PRN Tanja Gonzalez MD   2 mg at 04/16/25 1244    metFORMIN (GLUCOPHAGE) tablet 1,000 mg  1,000 mg Oral BID w/meals Charlotte Jasmine PA-C   1,000 mg at 04/21/25 0754    multivitamin w/minerals (THERA-VIT-M) tablet 1 tablet  1 tablet Oral Daily Charlotte Jasmine PA-C   1 tablet at 04/21/25 0754    OLANZapine (zyPREXA) tablet 10 mg  10 mg Oral TID WESTN Lucy Chandler MD         Or    OLANZapine (zyPREXA) injection 10 mg  10 mg Intramuscular TID PRN Lucy Chandler MD        OLANZapine zydis (zyPREXA) ODT tab 15 mg  15 mg Oral At Bedtime Tanja Gonzalez MD   15 mg at 04/20/25 2135    OLANZapine zydis (zyPREXA) ODT tab 5 mg  5 mg Oral Daily Tanja Gonzalez MD   5 mg at 04/21/25 0754    pantoprazole (PROTONIX) EC tablet 40 mg  40 mg Oral QAM AC Lucy Chandler MD   40 mg at 04/21/25 0754    tolterodine ER (DETROL LA) 24 hr capsule 2 mg  2 mg Oral Daily Tanja Gonzalez MD   2 mg at 04/21/25 0754     Labs and Imaging:  New results:   Recent Results (from the past 24 hours)   Glucose by meter    Collection Time: 04/20/25 11:59 AM   Result Value Ref Range    GLUCOSE BY METER POCT 282 (H) 70 - 99 mg/dL   Glucose by meter    Collection Time: 04/20/25  6:04 PM   Result Value Ref Range    GLUCOSE BY METER POCT 182 (H) 70 - 99 mg/dL   Glucose by meter    Collection Time: 04/20/25  9:33 PM   Result Value Ref Range    GLUCOSE BY METER POCT 173 (H) 70 - 99 mg/dL   Glucose by meter    Collection Time: 04/21/25  2:24 AM   Result Value Ref Range    GLUCOSE BY METER POCT 161 (H) 70 - 99 mg/dL     Data this admission:  CBC: WNL. WBC: 7.6, RBC: 4.37, Hb: 13.0, Platelets: 316.  CMP: Unremarkable. AST: 11, ALT: 15, Creatinine: 0.55.  UDS: Negative  RASHIDA: 0.00 (4/13)  HbA1c: 10.1%  TSH: 2.40  Vit: B12: 812  Folate: 13.7    Imaging:  - Bladder Scan 4/21     Mental Status Exam:     Oriented to:  Grossly oriented.  General: Somnolent.  Appearance:  Appears younger than stated age. Grooming is mildly unkempt. Wears glasses and her own personal clothing. Laying down in her bed.   Behavior/Attitude:  Cooperative, calm, engaged, open. Very tired. Speaks in short sentences.   Eye Contact: Eyes were closed for the duration of the interview.  Psychomotor: No evidence of tics, dystonia, or tardive dyskinesia. No catatonia present.  Speech: Appropriate rate/tone. Soft volume.  Language:  "Fluent in English with appropriate syntax and vocabulary.  Mood:  \"sleepy\"  Affect:  Euthymic. Mildly sluggish. Somnolent.   Thought Process:  Linear and goal directed.  Thought Content:  Denies SI/SIB/AVH.  Associations:  intact  Insight:  fair   Judgment:  limited   Impulse control: fair  Attention Span:  adequate for conversation  Concentration:  grossly intact  Recent and Remote Memory:  Not formally assessed.  Fund of Knowledge: Average.  Muscle Strength and Tone: Normal.  Gait and Station: Not formally assessed. Pt was lying down in bed.      Psychiatric Assessment     Anastasiya Simon is a 59 year old female previously diagnosed with schizophrenia and polysubstance use disorder who presented with suicidal ideation, disorganization, and inability to care for self. Most recent psychiatric hospitalization was 03/20/2019 for disorganization and suicidal ideation. Significant symptoms on admission include increasing suicidal ideation, disorganization, erratic behavior such as filling buckets with ammonia and mixing with bleach, substance use, auditory hallucinations, and paranoia. The MSE on admission was pertinent for AVH, paranoia, limited insight into MH. Today on interview Anastasiya denies SI and reports she had only made suicidal statements to her sisters previously as she did not want to move into a group home.  Psychological contributions to mental health presentation include maladaptive coping through substance use and limited insight into MH. Social factors contributing to mental health presentation include interpersonal conflicts and unstable living environment. Protective factors include support from two sisters and absence of previous suicide attempts. On 4/16 we resumed Zyprexa 20 mg and increased Depakote to 1000 mg to address the ongoing psychosis, Tolterodine and PRN loperamide were started to address incontinence and diarrhea. On 4/18, we added PRN gabapentin 300 mg for peripheral neuropathy.     In " summary, the patient's reported symptoms of SI, erractic behavior, AVH, and paranoia in the context of psychosocial stressers are consistent with decompensated schizophrenia and polysubstance use disorder.  She will likely benefit from medication optimization and outpatient MH referrals this admission.     Psychiatric Plan by Diagnosis      Today's changes:  - None    # Schizophrenia  Medications:  - olanzapine 5 mg in the morning, 15 mg HS  - depakote 1000 mg HS    #Polysubstance Use Disorder  - CD consult once psychotic sx more stable, if pt amenable    2. Pertinent Labs/Monitoring:  - Labs done 04/13 at OSH. CBC wnl, glucose elevated to 332, BMP wnl.  - UDS negative on 04/13  - 4/16 HbA1c: 10.1  - Check depakote levels on Tuesday (4/22) morning    3. Additional Plans:  - Patient will be treated in therapeutic milieu with appropriate individual and group therapies as described.   - Consult with Internal Medicine for diabetes management.      Psychiatric Hospital Course:      Anastasiya Simon was admitted to [facility] on a 72 hour hold (started 4/13/25 21:35) from [facility] on 04/15/25.   Medications:  Continued Medications:  Pt seen by psychiatry consult at [facility] who started zyprexa 20 mg at bedtime and depakote 500 mg at bedtime. Both of these medications were continued on admission to [facility].   New Medications:  Started loperamide for diarrhea  Started tolterodine for urinary incontinence  Started gabapentin for peripheral neuropathy    4/16: Increased depakote from 500 mg to 1000 mg HS for mood regulation  4/16: Increased metformin from 500 mg to 1000 mg BID per medicine consult  4/16: Zyprexa: 5 mg in the morning and 15 mg HS  4/16: Started PRN Loperamide 2 mg 4x/day for diarrhea  4/16: Started Tolterodine 2 mg daily for urinary incontinence  4/18: Started Gabapentin 300 mg PRN 3x/day for peripheral neuropathy  4/20: Started glipizide 24 hr tablet 5 mg daily for better glycemic  control per medicine consult    The risks, benefits, alternatives, and side effects were discussed and understood by the patient and other caregivers.     Medical Assessment and Plan     Medical diagnoses to be addressed this admission:      #Type 2 Diabetes Mellitis with Complications  - PTA metformin 1000 mg BID with meals  - BID glucose checks; hypoglycemia protocol  - PRN gabapentin 300 mg 3x/day for peripheral neuropathy  - Insulin aspart (Novolog) injection 1-7 units HS  - Insulin aspart (Novolog) injection 3x/day 1-10 units before meals  - Glipizide 24 hr tablet 5 mg daily with breakfast for better glycemic control  - Internal Medicine Consult  - Nutrition Consult  - Given patient is starting insulin, please notify medicine team of any impending discharge at least 2-3 days prior in order to have a safe management plan for her diabetes.    #Diarrhea  - Loperamide 2 mg PRN 4x/day    #Urinary Incontinence  - Tolterodine 2 mg daily    Medical course:  Patient was physically examined by the ED prior to being transferred to the unit and was found to be medically stable and appropriate for admission.    Consults:   4/16: Internal Medicine consulted for management of diabetes and blood pressure.   4/16: Increased metformin to 1000 mg BID.   4/18: Started PRN gabapentin 300 mg 3x/day for peripheral neuropathy.   4/18: Internal Medicine started Novolog injection 1-5 units HS and Novolog injection 3x/day 1-7 units before meals for better glycemic control. Given patient is starting insulin, please notify medicine team of any impending discharge at least 2-3 days prior in order to have a safe management plan for her diabetes.  4/19: Internal Medicine increased to high intensity sliding scale. Novolog injection 1-7 units HS. Novolog injection 3x/day 1-10 units before meals for better glycemic control.   4/20: Started PO glipizide 24 hr tablet 5 mg daily with breakfast.      Checklist     Legal Status: Court Hold    Safety  Assessment:   Behavioral Orders   Procedures    Code 1 - Restrict to Unit    Routine Programming     As clinically indicated    Status 15     Every 15 minutes.    Suicide precautions: Suicide Risk: MODERATE; Clinical rationale to override score: modification to the care environment, response to medication, lack of access to a plan for self-harm, Exhibiting Suicidal/self-harm behaviors or thoughts     Patients on Suicide Precautions should have a Combination Diet ordered that includes a Diet selection(s) AND a Behavioral Tray selection for Safe Tray - with utensils, or Safe Tray - NO utensils       Order Specific Question:   Suicide Risk     Answer:   MODERATE     Order Specific Question:   Clinical rationale to override score:     Answer:   modification to the care environment     Order Specific Question:   Clinical rationale to override score:     Answer:   response to medication     Order Specific Question:   Clinical rationale to override score:     Answer:   lack of access to a plan for self-harm     Order Specific Question:   Clinical rationale to override score:     Answer:   Exhibiting Suicidal/self-harm behaviors or thoughts     Risk Assessment:  Risk for harm is moderate.  Risk factors: maladaptive coping, substance use, impulsive, and past behaviors.  Protective factors: family.    SIO: None    Disposition: TBD. Disposition pending clinical stabilization, medication optimization and development of an appropriate discharge plan.     Attestations     Tatyana Mcclure, MS3, [facility] Medical Student     I was present with the medical student who participated in the service and in the documentation of the note.  I have verified the history and personally performed the physical exam and medical decision making. I agree with the assessment and plan of care as documented in the note.    This patient was seen and discussed with my attending physician.  Tanja Gonzalez MD   Psychiatry Resident Physician     Attestation:  This  patient has been seen and evaluated by me, Joceline Dela Cruz MD.  I have discussed this patient with the house staff team including the resident and/or medical student and I agree with the findings and plan in this note.    I have reviewed today's vital signs, medications, labs and imaging. Joceline Dela Cruz MD , PhD.

## 2025-04-21 NOTE — PLAN OF CARE
"  Problem: Psychotic Signs/Symptoms  Goal: Improved Behavioral Control (Psychotic Signs/Symptoms)  Outcome: Not Progressing   Goal Outcome Evaluation:    Plan of Care Reviewed With: patient      Pt is visible in the milieu watching TV. Pt stated \"I'm bored. I'll like to go out for a walk, get fresh air, go to a restaurant downtown. Pt denies all psych symptoms. C/O bilateral foot pain. Denies PRN.     At some point during check in, Pt became frustrated and yelled at writer and said she's tired of answering these same questions.     BG prior to dinner 173 and . Insulin coverage given. Ate all her dinner. Carb count for dinner 56 gm.   PRN Hydroxyzine 25 mg given.  BP (!) 154/87 (BP Location: Right arm, Patient Position: Sitting, Cuff Size: Adult Regular)   Pulse 97   Temp 97.5  F (36.4  C) (Oral)   Resp 17   Ht 1.651 m (5' 5\")   Wt 71.4 kg (157 lb 8 oz)   LMP 10/22/2013   SpO2 96%   BMI 26.21 kg/m        "

## 2025-04-22 LAB
ALBUMIN SERPL BCG-MCNC: 3.9 G/DL (ref 3.5–5.2)
ALP SERPL-CCNC: 82 U/L (ref 40–150)
ALT SERPL W P-5'-P-CCNC: 10 U/L (ref 0–50)
ANION GAP SERPL CALCULATED.3IONS-SCNC: 14 MMOL/L (ref 7–15)
AST SERPL W P-5'-P-CCNC: 13 U/L (ref 0–45)
BILIRUB SERPL-MCNC: <0.2 MG/DL
BUN SERPL-MCNC: 19.2 MG/DL (ref 8–23)
CALCIUM SERPL-MCNC: 9.7 MG/DL (ref 8.8–10.4)
CHLORIDE SERPL-SCNC: 102 MMOL/L (ref 98–107)
CREAT SERPL-MCNC: 0.65 MG/DL (ref 0.51–0.95)
EGFRCR SERPLBLD CKD-EPI 2021: >90 ML/MIN/1.73M2
GLUCOSE BLDC GLUCOMTR-MCNC: 139 MG/DL (ref 70–99)
GLUCOSE BLDC GLUCOMTR-MCNC: 159 MG/DL (ref 70–99)
GLUCOSE BLDC GLUCOMTR-MCNC: 174 MG/DL (ref 70–99)
GLUCOSE BLDC GLUCOMTR-MCNC: 180 MG/DL (ref 70–99)
GLUCOSE BLDC GLUCOMTR-MCNC: 216 MG/DL (ref 70–99)
GLUCOSE SERPL-MCNC: 160 MG/DL (ref 70–99)
HCO3 SERPL-SCNC: 24 MMOL/L (ref 22–29)
HOLD SPECIMEN: NORMAL
POTASSIUM SERPL-SCNC: 4.2 MMOL/L (ref 3.4–5.3)
PROT SERPL-MCNC: 7.4 G/DL (ref 6.4–8.3)
SODIUM SERPL-SCNC: 140 MMOL/L (ref 135–145)

## 2025-04-22 PROCEDURE — 82247 BILIRUBIN TOTAL: CPT

## 2025-04-22 PROCEDURE — 250N000013 HC RX MED GY IP 250 OP 250 PS 637

## 2025-04-22 PROCEDURE — 124N000002 HC R&B MH UMMC

## 2025-04-22 PROCEDURE — 99232 SBSQ HOSP IP/OBS MODERATE 35: CPT | Mod: GC | Performed by: PSYCHIATRY & NEUROLOGY

## 2025-04-22 PROCEDURE — 80165 DIPROPYLACETIC ACID FREE: CPT

## 2025-04-22 PROCEDURE — 97150 GROUP THERAPEUTIC PROCEDURES: CPT | Mod: GO

## 2025-04-22 PROCEDURE — 36415 COLL VENOUS BLD VENIPUNCTURE: CPT

## 2025-04-22 RX ADMIN — OLANZAPINE 5 MG: 5 TABLET, ORALLY DISINTEGRATING ORAL at 08:18

## 2025-04-22 RX ADMIN — DIVALPROEX SODIUM 1000 MG: 500 TABLET, FILM COATED, EXTENDED RELEASE ORAL at 21:01

## 2025-04-22 RX ADMIN — Medication 1 TABLET: at 08:17

## 2025-04-22 RX ADMIN — OLANZAPINE 15 MG: 15 TABLET, ORALLY DISINTEGRATING ORAL at 21:01

## 2025-04-22 RX ADMIN — METFORMIN HYDROCHLORIDE 1000 MG: 500 TABLET, FILM COATED ORAL at 18:01

## 2025-04-22 RX ADMIN — TOLTERODINE TARTRATE 2 MG: 2 CAPSULE, EXTENDED RELEASE ORAL at 08:17

## 2025-04-22 RX ADMIN — GABAPENTIN 300 MG: 300 CAPSULE ORAL at 20:17

## 2025-04-22 RX ADMIN — PANTOPRAZOLE SODIUM 40 MG: 40 TABLET, DELAYED RELEASE ORAL at 08:17

## 2025-04-22 RX ADMIN — METFORMIN HYDROCHLORIDE 1000 MG: 500 TABLET, FILM COATED ORAL at 08:17

## 2025-04-22 RX ADMIN — GLIPIZIDE 5 MG: 2.5 TABLET, FILM COATED, EXTENDED RELEASE ORAL at 08:17

## 2025-04-22 ASSESSMENT — ACTIVITIES OF DAILY LIVING (ADL)
ADLS_ACUITY_SCORE: 47
DRESS: STREET CLOTHES;INDEPENDENT
ADLS_ACUITY_SCORE: 47
HYGIENE/GROOMING: HANDWASHING;INDEPENDENT
ADLS_ACUITY_SCORE: 47

## 2025-04-22 NOTE — DISCHARGE INSTRUCTIONS
Behavioral Discharge Planning and Instructions    Summary: You were admitted on 4/15/2025  due to {Mental Health 1:686015}.  You were treated by Joceline Dela Cruz MD and discharged on *** from [facility] to {[facility]:477805}    Main Diagnosis: ***    [facility]    Health Care Follow-up:   Appointment Date/Time: ***   Psychiatrist/Primary Care Giver: ***   Address: ***   Phone Number: ***    Appointment Date/Time: ***   Therapist: ***   Address: ***   Phone Number: ***    Appointment Date/Time: ***   Support Group: ***   Address: ***   Phone Number: ***    Appointment Date/Time: ***   Other: ***   Address: ***   Phone Number: ***    If no appointments scheduled, explain ***      Patient Navigation Hub:   [facility] Nav[facility]ators work to be your point-of-contact for trustworthy and compassionate care from Inpatient services to [facility] Programmatic Care. We will provide resources and communication to help guide you into programmatic care. Ultimately, our goal is to be the one-stop-shop of communication, coordination, and support for your journey to programmatic care.    Phone: [phone]    Information will be faxed to your outpatient providers to ensure a healthy continuity of care for you.     Attend all scheduled appointments with your outpatient providers. Call at least 24 hours in advance if you need to reschedule an appointment to ensure continued access to your outpatient providers.     Major Treatments, Procedures and Findings:  You were provided with: {Major Treatments:036821}    Symptoms to Report: {symptoms:077397}    Early warning signs can include: {BH Warning Signs:754179}    Safety and Wellness:  {Age Group Safety Wellness:279798}    Resources:   Mental Health Crisis Resources  Throughout [place]: call **CRISIS (**684777)  Crisis Text Line: is available for free, 24/7 by texting [place] to 989797  Suicide Awareness Voices of Education (SAVE)  (www.save.org): 888-511-SAVE (7283)  The National Suicide Prevention Lifeline is now: 988 Suicide and Crisis Lifeline. Call 988 anytime.  National [place] on Mental Illness (www.[place].marla.org): 491.346.3806 or 655-546-3540.  Vgvy0fukn: text the word LIFE to 89126 for immediate support and crisis intervention  Mental Health Consumer/Survivor Network of [place] (www.mhcsn.net): 285.810.9703 or 044-606-4223  Mental Health Association of [place] (www.mentalhealth.org): 756.625.2760 or 672-716-2659  Peer Support Connection [place] Warmline (PSC) 1-406.604.4563 Available from 5pm - 9am (7 days a week/365 days a year)  {OP Beh CRISIS PHONE NUMBERS:582852}  {Click SUDAVDisease Diagnostic GroupOURDatagres Technologies to add resources related to addiction or EHRBLANK to skip:632946}    General Medication Instructions:   See your medication sheet(s) for instructions.   Take all medicines as directed.  Make no changes unless your doctor suggests them.   Go to all your doctor visits.  Be sure to have all your required lab tests. This way, your medicines can be refilled on time.  Do not use any drugs not prescribed by your doctor.  Avoid alcohol.    Advance Directives:   Scanned document on file with [facility]? No scanned doc  Is document scanned? No. Copy Requested.  Honoring Choices Your Rights Handout: Informed and given  Was more information offered? Pt declined    The Treatment team has appreciated the opportunity to work with you. If you have any questions or concerns about your recent admission, you can contact the unit which can receive your call 24 hours a day, 7 days a week. They will be able to get in touch with a Provider if needed. The unit number is [phone] .     now: 988 Suicide and Crisis Lifeline. Call 988 anytime.  National [place] on Mental Illness (www.[place].marla.org): 218.377.5245 or 084-237-7844.  Bgzb2cyqs: text the word LIFE to 85297 for immediate support and crisis intervention  Mental Health Consumer/Survivor Network of [place] (www.mhcsn.net): 291.201.1304 or 503-406-2579  Mental Health Association of [place] (www.mentalhealth.org): 265.292.4134 or 716-678-1849  Peer Support Connection [place] Warmline (PSC) 1-162.987.9026 Available from 5pm - 9am (7 days a week/365 days a year)  [facility] 1-922.832.7530 Community Outreach for Psych Emergencies      General Medication Instructions:   See your medication sheet(s) for instructions.   Take all medicines as directed.  Make no changes unless your doctor suggests them.   Go to all your doctor visits.  Be sure to have all your required lab tests. This way, your medicines can be refilled on time.  Do not use any drugs not prescribed by your doctor.  Avoid alcohol.    Advance Directives:   Scanned document on file with [employer]? No scanned doc  Is document scanned? No. Copy Requested.  Honoring Choices Your Rights Handout: Informed and given  Was more information offered? Pt declined    The Treatment team has appreciated the opportunity to work with you. If you have any questions or concerns about your recent admission, you can contact the unit which can receive your call 24 hours a day, 7 days a week. They will be able to get in touch with a Provider if needed. The unit number is [phone].

## 2025-04-22 NOTE — PLAN OF CARE
Team Note Due:  Wednesday    Assessment/Intervention/Current Symtoms and Care Coordination:  Chart review and met with team, discussed pt progress, symptomology, and response to treatment.  Discussed the discharge plan and any potential impediments to discharge.    DA completed and returned to [facility].    Received call from Lucille (intervention ) to discuss discharge planning. Discussed pt's current lack of willingness to consider CD treatment, so the team brought up possible IRTS placement. Writer brought up some barriers for IRTS placement due to current sliding scale insulin but will discuss with MD plan for diabetes management. If CD treatment or community IRTS are not viable options, pt will likely discharge to [facility] or group home. Agreed to keep in touch regarding discharge planning as things progress.     Discharge Plan or Goal:  [facility] vs IRTS vs [facility]     Barriers to Discharge:  Patient requires further psychiatric stabilization due to current symptomology, medication management with changes subject to provider, coordination with outside supports, and aftercare planning. Pt is also on a court hold and involved in the commitment process.     Referral Status:  None at this time     Legal Status:  Court hold     [place] Commitment and Castellanos filed  County: [place]  File Number: [id number]  Start and expiration date of commitment: TBD    Castellanos meds requested: Zyprexa, Haldol, Abilify, Risperdal    Future Hearing Information:  Commitment Hearing on 04/24/2025 at 1:15 PM    Contacts:   Haydee Mares (CADI CM): [phone]   Tamika Johnson- (sister): [phone]  Lucille Reddy (Intervention ): [phone]     Upcoming Meetings and Dates/Important Information and next steps:  Follow commitment process  Add pt to [facility] wait list when commitment order is signed  Discharge planning when appropriate  Notify medicine team of any impending discharge at least 2-3 days prior in order to have a safe management  plan for her diabetes    PD and COS needed at discharge - TBD

## 2025-04-22 NOTE — PROGRESS NOTES
"CLINICAL NUTRITION SERVICES    Reason for Assessment:  Nutrition education regarding carb counting/diabetes management.     Diet History:  Pt reports receiving education on carb counting and diabetes \"a long time ago.\" Pt receives diabetes-friendly meals from [employer] at home and plans to resume this meal plan upon discharge. Pt was observed eating an apple during the assessment and reports eating and drinking well since admission.     Nutrition Diagnosis:  Food- and nutrition-related knowledge deficit r/t no previous knowledge of carb counting AEB pt report of not having received carb counting nutrition education in the recent past.      Interventions:  Nutrition Education:Survival Information  1. Provided verbal and written nutrition on diabetes meal planning and importance of consistent carbohydrate intake to optimize glycemic control.  2. Provided verbal and written nutrition education on carb counting.   3. Handouts provided: Diabetes Label Reading Tips, Plate Method for Diabetes, and Carbohydrate Counting for People with Diabetes.     Goals:   Patient will verbalize at least one food choice (along with the appropriate portion size) in each of the food groups that contain 1 carb unit.      Follow-up:    Patient to ask any further nutrition-related questions before discharge. In addition, pt may request outpatient RD appointment.     Ayesha Funes MPH, RDN, LD  Behavioral Health Adult & Pediatric Dietitian  BEH Clinical Dietitian Vocsu, Teams, or Desk: [phone]  Weekend/Holiday Vocsu: Weekend Holiday Clinical Dietitian [Multi Site Groups]   "

## 2025-04-22 NOTE — PROGRESS NOTES
Rehab Group    Start time: 1015  End time: 1155  Patient time total: 60 minutes    attended partial group    #5 attended   Group Type: OT Clinic   Group Topic Covered: balanced lifestyle, coping skills, healthy leisure time, and social skills     Group Session Detail:  Pt actively participated in occupational therapy clinic to facilitate coping skill exploration, creative expression within personally meaningful activities, and clinical observation of social, cognitive, and kinesthetic performance skills.        Patient Response/Contribution:  cooperative with task, worked intermittently, engaged socially when prompted, and left group on several occasions     Patient Detail:  Pt arrived to group with blunted affect, requiring min prompting to initiate, gather materials, sequence, and adjust to workspace demands as needed. Demonstrated limited focus (about 5-10min) taking many breaks, although had good planning, and problem solving for selected creative expression task. Able to ask for assistance as needed, and engaged in discussion occasionally with peers and staff. Pt observed to brighten with select music. Will continue to encourage attendance and participation.       [id number] OT Group (2 or more in attendance)      Patient Active Problem List   Diagnosis    Suicidal ideation    Type 2 diabetes mellitus (H)    Chronic hepatitis C (H)    Schizophrenia (H)    Acid reflux disease    Hoarding behavior

## 2025-04-22 NOTE — PLAN OF CARE
BEH IP Unit Acuity Rating Score (UARS)  Patient is given one point for every criteria they meet.    CRITERIA SCORING   On a 72 hour hold, court hold, committed, stay of commitment, or revocation. 1    Patient LOS on BEH unit exceeds 20 days. 0  LOS: 7   Patient under guardianship, 55+, otherwise medically complex, or under age 11. 1   Suicide ideation without relief of precipitating factors. 0   Current plan for suicide. 0   Current plan for homicide. 0   Imminent risk or actual attempt to seriously harm another without relief of factors precipitating the attempt. 0   Severe dysfunction in daily living (ex: complete neglect for self care, extreme disruption in vegetative function, extreme deterioration in social interactions). 1   Recent (last 7 days) or current physical aggression in the ED or on unit. 0   Restraints or seclusion episode in past 72 hours. 0   Recent (last 7 days) or current verbal aggression, agitation, yelling, etc., while in the ED or unit. 0   Active psychosis. 1   Need for constant or near constant redirection (from leaving, from others, etc).  0   Intrusive or disruptive behaviors. 0   Patient requires 3 or more hours of individualized nursing care per 8-hour shift (i.e. for ADLs, meds, therapeutic interventions). 0   TOTAL 4

## 2025-04-22 NOTE — PLAN OF CARE
No PRNs given or requested this shift.  Pt remained in his room the entire shift. Blood glucose at 0200 = 139, asymptomatic. No c/o pain or discomfort noted/reported. Safety checks completed every 15 minutes ; no concerns noted. Pt appears to have slept for 6.50  hours; will continue to monitor and offer support.       Problem: Sleep Disturbance  Goal: Adequate Sleep/Rest  Outcome: Progressing   Goal Outcome Evaluation:

## 2025-04-22 NOTE — PLAN OF CARE
"  Problem: Adult Behavioral Health Plan of Care  Goal: Plan of Care Review  Outcome: Progressing  Flowsheets  Taken 4/22/2025 1446  Plan of Care Reviewed With: patient  Overall Patient Progress: improving  Patient Agreement with Plan of Care: agrees  Taken 4/22/2025 0900  Patient Agreement with Plan of Care: agrees   Goal Outcome Evaluation:    Plan of Care Reviewed With: patient     Overall Patient Progress: improving  Anastasiya has been visible in the milieu.Sociable with select peers.Patient watched TV and attended OT groups.Blood sugar checks before meals.Patient voided 150 ML and bladder was scanned for post residual of 50 ML per orders.Resident updated and no need for more bladder scans,patient voids adequately.Patient is cooperative with cares.No escalation of behaviors.Patient denies all MH symptoms.  Vital signs:  Temp: 97.7  F (36.5  C) Temp src: Oral BP: (!) 154/87 Pulse: 97   Resp: 17 SpO2: 95 % O2 Device: None (Room air)   Height: 165.1 cm (5' 5\") Weight: 70.4 kg (155 lb 3.2 oz)  Estimated body mass index is 25.83 kg/m  as calculated from the following:    Height as of this encounter: 1.651 m (5' 5\").    Weight as of this encounter: 70.4 kg (155 lb 3.2 oz).                "

## 2025-04-22 NOTE — PLAN OF CARE
Problem: Adult Behavioral Health Plan of Care  Goal: Adheres to Safety Considerations for Self and Others  Outcome: Progressing  Flowsheets (Taken 4/22/2025 1838)  Adheres to Safety Considerations for Self and Others: making progress toward outcome     Problem: Suicide Risk  Goal: Absence of Self-Harm  Outcome: Progressing     Problem: Psychotic Signs/Symptoms  Goal: Improved Mood Symptoms (Psychotic Signs/Symptoms)  Intervention: Optimize Emotion and Mood  Recent Flowsheet Documentation  Taken 4/22/2025 1807 by Harish Rivas RN  Diversional Activity: television   Goal Outcome Evaluation:    Plan of Care Reviewed With: patient Plan of Care Reviewed With: patient    Overall Patient Progress: improvingOverall Patient Progress: improving     Alert and oriented, patient was able to communicate needs. Mostly visible in milieu watching, she was social and engaged with peers and staff members. Presented as labile, she was cooperative and medication compliant. Bilateral lower extremities pain, rated at 2, pain managed with PRN Gabapentin, no noted distress, ADLs WNL. Adequate food and fluids intake. Patient denied anxiety or depression, denied SI/HI, contracted for safety.

## 2025-04-22 NOTE — PROGRESS NOTES
"  ----------------------------------------------------------------------------------------------------------  [facility]  Psychiatry Progress Note  Hospital Day #7     Interim History:     The patient's care was discussed with the treatment team and chart notes were reviewed.    Patient ID: Anastasiya Simon is a 59 year old female with a previous psychiatric diagnosis of schizophrenia and polysubstance use admitted from [facility] on 04/15/2025 due to concern for SI and psychosis in the context of psychosocial stressors including living situation and argument with family member.     Vitals: Temp: 97.5  F (36.4  C) Temp  Min: 97.5  F (36.4  C)  Max: 98.1  F (36.7  C)  SpO2: 96 % SpO2  Min: 96 %  Max: 96 %  Pulse: 97 Pulse  Min: 96  Max: 97  BP: (!) 154/87 Systolic (24hrs), Av , Min:123 , Max:154   Diastolic (24hrs), Av, Min:77, Max:87  Sleep: 6.5 hours (25 0611)  Scheduled medications: Took all scheduled medications as prescribed  Psychiatric PRN medications:   Last 24H PRN:     hydrOXYzine HCl (ATARAX) tablet 25 mg, 25 mg at 25 3984    Staff Report:   AM: Pt has been visible in the milieu and sociable with select peers. Pt watched TV/movies & attended OT groups.  before breakfast and 292 before lunch meal. Pt has no insight of her MH. Denies SI/SIB/AH/VH. Mood has been calm. Affect flat and blunted. Patient is independent with ADL's. No escalation of behaviors noted. Contracted for safety.   PM: Pt is visible in the milieu watching TV. Pt stated \"I'm bored. I'll like to go out for a walk, get fresh air, go to a restaurant downtown. Pt denies all psych symptoms. C/O bilateral foot pain. Pt became frustrated and yelled at writer and said she's tired of answering these same questions. BG prior to dinner 173 and . Insulin coverage given. Ate all her dinner. Carb count for dinner 56 gm. PRN Hydroxyzine 25 mg given. Blood glucose at " "0200 = 139, asymptomatic. No c/o pain or discomfort noted/reported. Pt appears to have slept for 6.50 hours.   Subjective:     Patient Interview:  Pt was interviewed in [facility]. Pt reports doing alright. Pt has had urinary incontinence for the last 4-5 years and has not done anything for it previously. Happy about her bladder scan. She reports that diarrhea is improving since being admitted and taking loperamide. Pt requested a foot bath and magazines. Reports that gabapentin is helping her foot pain and she may not need a foot bath after all. Pt reports that OT group session are going okay. She likes the people here. Discussed discharge. Pt requested her cell phone to call people to see if she can stay with. We discussed possible facilities such as IRTS for a maximum of 90 days to find more permanent housing. She has looked around at group homes and didn't like anything she found. Pt doesn't want to be close to the city. Pt wants to live with people who are \"more like her\". Pt mentioned multiple other races that she does not want to live with as she would be uncomfortable. She doesn't want to live with sick people because she is not that sick. Attending informed pt that the place we aim to get her into has 24 hour support. Pt doesn't want that. Pt wants to be in a senior living center.     Objective:     Vitals:  BP (!) 154/87 (BP Location: Right arm, Patient Position: Sitting, Cuff Size: Adult Regular)   Pulse 97   Temp 97.5  F (36.4  C) (Oral)   Resp 17   Ht 1.651 m (5' 5\")   Wt 71.4 kg (157 lb 8 oz)   LMP 10/22/2013   SpO2 96%   BMI 26.21 kg/m      Allergies:  Allergies   Allergen Reactions    Tetracycline Unknown     It happened when pt was a baby       Current Medications:  Scheduled:  Current Facility-Administered Medications   Medication Dose Route Frequency Provider Last Rate Last Admin    glucose gel 15-30 g  15-30 g Oral Q15 Min PRN Katina Armendariz MD        Or    dextrose 50 % injection " 25-50 mL  25-50 mL Intravenous Q15 Min PRN Katina Armendariz MD        Or    glucagon injection 1 mg  1 mg Subcutaneous Q15 Min PRN Katina Armendariz MD        divalproex sodium extended-release (DEPAKOTE ER) 24 hr tablet 1,000 mg  1,000 mg Oral At Bedtime Tanja Gonzalez MD   1,000 mg at 04/21/25 2134    gabapentin (NEURONTIN) capsule 300 mg  300 mg Oral TID PRN Tanja Gonzalez MD   300 mg at 04/20/25 1703    glipiZIDE (GLUCOTROL XL) 24 hr tablet 5 mg  5 mg Oral Daily with breakfast Charlotte Jasmine PA-C   5 mg at 04/21/25 0754    hydrOXYzine HCl (ATARAX) tablet 25 mg  25 mg Oral Q4H PRN Lcuy Chandler MD   25 mg at 04/21/25 2218    insulin aspart (NovoLOG) injection (RAPID ACTING)  1-10 Units Subcutaneous TID AC Charlotte Jasmine PA-C   2 Units at 04/21/25 1827    insulin aspart (NovoLOG) injection (RAPID ACTING)  1-7 Units Subcutaneous At Bedtime hCarlotte Jasmine PA-C   2 Units at 04/21/25 2128    loperamide (IMODIUM) capsule 2 mg  2 mg Oral 4x Daily PRN Tanja Gonzalez MD   2 mg at 04/16/25 1244    metFORMIN (GLUCOPHAGE) tablet 1,000 mg  1,000 mg Oral BID w/meals Charlotte Jasmine PA-C   1,000 mg at 04/21/25 1829    multivitamin w/minerals (THERA-VIT-M) tablet 1 tablet  1 tablet Oral Daily Charlotte Jasmine PA-C   1 tablet at 04/21/25 0754    OLANZapine (zyPREXA) tablet 10 mg  10 mg Oral TID PRN Lucy Chandler MD        Or    OLANZapine (zyPREXA) injection 10 mg  10 mg Intramuscular TID PRN Lucy Chandler MD        OLANZapine zydis (zyPREXA) ODT tab 15 mg  15 mg Oral At Bedtime Tanja Gonzalez MD   15 mg at 04/21/25 2134    OLANZapine zydis (zyPREXA) ODT tab 5 mg  5 mg Oral Daily Tanja Gonzalez MD   5 mg at 04/21/25 0754    pantoprazole (PROTONIX) EC tablet 40 mg  40 mg Oral [facility] Lucy Chandler MD   40 mg at 04/21/25 0754    tolterodine ER (DETROL LA) 24 hr capsule 2 mg  2 mg Oral Daily Tanja Gonzalez MD   2 mg  at 04/21/25 0754     PRN:  Current Facility-Administered Medications   Medication Dose Route Frequency Provider Last Rate Last Admin    glucose gel 15-30 g  15-30 g Oral Q15 Min PRN Katina Armendariz MD        Or    dextrose 50 % injection 25-50 mL  25-50 mL Intravenous Q15 Min PRN Katina Armendariz MD        Or    glucagon injection 1 mg  1 mg Subcutaneous Q15 Min PRN Katina Armendariz MD        divalproex sodium extended-release (DEPAKOTE ER) 24 hr tablet 1,000 mg  1,000 mg Oral At Bedtime Tanja Gonzalez MD   1,000 mg at 04/21/25 2134    gabapentin (NEURONTIN) capsule 300 mg  300 mg Oral TID PRN Tanja Gonzalez MD   300 mg at 04/20/25 1703    glipiZIDE (GLUCOTROL XL) 24 hr tablet 5 mg  5 mg Oral Daily with breakfast Charlotte Jasmine PA-C   5 mg at 04/21/25 0754    hydrOXYzine HCl (ATARAX) tablet 25 mg  25 mg Oral Q4H PRN Lucy Chandler MD   25 mg at 04/21/25 2218    insulin aspart (NovoLOG) injection (RAPID ACTING)  1-10 Units Subcutaneous TID AC Charlotte Jasmine PA-C   2 Units at 04/21/25 1827    insulin aspart (NovoLOG) injection (RAPID ACTING)  1-7 Units Subcutaneous At Bedtime Charlotte Jasmine PA-C   2 Units at 04/21/25 2128    loperamide (IMODIUM) capsule 2 mg  2 mg Oral 4x Daily PRN Tanja Gonzalez MD   2 mg at 04/16/25 1244    metFORMIN (GLUCOPHAGE) tablet 1,000 mg  1,000 mg Oral BID w/meals Charlotte Jasmine PA-C   1,000 mg at 04/21/25 1829    multivitamin w/minerals (THERA-VIT-M) tablet 1 tablet  1 tablet Oral Daily Charlotte Jasmine PA-C   1 tablet at 04/21/25 0754    OLANZapine (zyPREXA) tablet 10 mg  10 mg Oral TID PRN Lucy Chandler MD        Or    OLANZapine (zyPREXA) injection 10 mg  10 mg Intramuscular TID PRN Lucy Chandler MD        OLANZapine zydis (zyPREXA) ODT tab 15 mg  15 mg Oral At Bedtime Tanja Gonzalez MD   15 mg at 04/21/25 2134    OLANZapine zydis (zyPREXA) ODT tab 5 mg  5 mg Oral Daily Lisa  "MD Tanja   5 mg at 04/21/25 0754    pantoprazole (PROTONIX) EC tablet 40 mg  40 mg Oral QAM AC Lucy Chandler MD   40 mg at 04/21/25 0754    tolterodine ER (DETROL LA) 24 hr capsule 2 mg  2 mg Oral Daily Tanja Gonzalez MD   2 mg at 04/21/25 0754     Labs and Imaging:  New results:   Recent Results (from the past 24 hours)   Glucose by meter    Collection Time: 04/21/25 11:59 AM   Result Value Ref Range    GLUCOSE BY METER POCT 292 (H) 70 - 99 mg/dL   Glucose by meter    Collection Time: 04/21/25  5:42 PM   Result Value Ref Range    GLUCOSE BY METER POCT 173 (H) 70 - 99 mg/dL   Glucose by meter    Collection Time: 04/21/25  9:20 PM   Result Value Ref Range    GLUCOSE BY METER POCT 238 (H) 70 - 99 mg/dL   Glucose by meter    Collection Time: 04/22/25  2:41 AM   Result Value Ref Range    GLUCOSE BY METER POCT 139 (H) 70 - 99 mg/dL     New Results  Valproic Acid Free & Total: Processing    Data this admission:  CBC: WNL. WBC: 7.6, RBC: 4.37, Hb: 13.0, Platelets: 316.  CMP: Unremarkable (4/22) AST: 13, ALT: 10, Creatinine: 0.65.  UDS: Negative  RASHIDA: 0.00 (4/13)  HbA1c: 10.1%  TSH: 2.40  Vit: B12: 812  Folate: 13.7    Imaging:  Bladder Scan: Completed on 4/22     Mental Status Exam:     Oriented to:  Grossly oriented.  General: Alert and awake.  Appearance:  Appears younger than stated age. Grooming is mildly unkempt. Wears glasses and her own personal clothing.   Behavior/Attitude:  Cooperative, calm, engaged, open. Speaking in longer sentences than yesterday and asking more questions.   Eye Contact: Appropriate.   Psychomotor: No evidence of tics, dystonia, or tardive dyskinesia. No catatonia present.  Speech: Appropriate rate/tone/volume.   Language: Fluent in English with appropriate syntax and vocabulary.  Mood:  \"alright\"  Affect:  Euthymic. Appropriate. Congruent with mood.   Thought Process:  Linear and goal directed.  Thought Content:  Denies SI/SIB/AVH.  Associations:  intact  Insight:  fair "   Judgment:  limited   Impulse control: fair  Attention Span:  adequate for conversation  Concentration:  grossly intact  Recent and Remote Memory:  Not formally assessed. Pt reports memory issues.  Fund of Knowledge: Average.  Muscle Strength and Tone: Normal.  Gait and Station: Normal.     Psychiatric Assessment     Anastasiya Simon is a 59 year old female previously diagnosed with schizophrenia and polysubstance use disorder who presented with suicidal ideation, disorganization, and inability to care for self. Most recent psychiatric hospitalization was 03/20/2019 for disorganization and suicidal ideation. Significant symptoms on admission include increasing suicidal ideation, disorganization, erratic behavior such as filling buckets with ammonia and mixing with bleach, substance use, auditory hallucinations, and paranoia. The MSE on admission was pertinent for AVH, paranoia, limited insight into MH. Today on interview Anastasiya denies SI and reports she had only made suicidal statements to her sisters previously as she did not want to move into a group home.  Psychological contributions to mental health presentation include maladaptive coping through substance use and limited insight into MH. Social factors contributing to mental health presentation include interpersonal conflicts and unstable living environment. Protective factors include support from two sisters and absence of previous suicide attempts. On 4/16 we resumed Zyprexa 20 mg and increased Depakote to 1000 mg to address the ongoing psychosis, Tolterodine and PRN loperamide were started to address incontinence and diarrhea. On 4/18, we added PRN gabapentin 300 mg for peripheral neuropathy.     In summary, the patient's reported symptoms of SI, erractic behavior, AVH, and paranoia in the context of psychosocial stressers are consistent with decompensated schizophrenia and polysubstance use disorder.  She will likely benefit from medication optimization  and outpatient  referrals this admission.     Psychiatric Plan by Diagnosis      Today's changes:  - None    # Schizophrenia  Medications:  - olanzapine 5 mg in the morning, 15 mg HS  - depakote 1000 mg HS    #Polysubstance Use Disorder  - CD consult once psychotic sx more stable, if pt amenable    2. Pertinent Labs/Monitoring:  - Labs done 04/13 at OSH. CBC wnl, glucose elevated to 332, BMP wnl.  - UDS negative on 04/13  - 4/16 HbA1c: 10.1  - Depakote Levels: Processing    3. Additional Plans:  - Patient will be treated in therapeutic milieu with appropriate individual and group therapies as described.   - Consult with Internal Medicine for diabetes management.      Psychiatric Hospital Course:      Anastasiya Simon was admitted to [facility] on a 72 hour hold (started 4/13/25 21:35) from [facility] on 04/15/25.   Medications:  Continued Medications:  Pt seen by psychiatry consult at [facility] who started zyprexa 20 mg at bedtime and depakote 500 mg at bedtime. Both of these medications were continued on admission to [facility].   New Medications:  Started loperamide for diarrhea  Started tolterodine for urinary incontinence  Started gabapentin for peripheral neuropathy    4/16: Increased depakote from 500 mg to 1000 mg HS for mood regulation  4/16: Increased metformin from 500 mg to 1000 mg BID per medicine consult  4/16: Zyprexa: 5 mg in the morning and 15 mg HS  4/16: Started PRN Loperamide 2 mg 4x/day for diarrhea  4/16: Started Tolterodine 2 mg daily for urinary incontinence  4/18: Started Gabapentin 300 mg PRN 3x/day for peripheral neuropathy  4/20: Started glipizide 24 hr tablet 5 mg daily for better glycemic control per medicine consult    The risks, benefits, alternatives, and side effects were discussed and understood by the patient and other caregivers.     Medical Assessment and Plan     Medical diagnoses to be addressed this admission:      #Type 2 Diabetes Mellitis with  Complications  - PTA metformin 1000 mg BID with meals  - BID glucose checks; hypoglycemia protocol  - PRN gabapentin 300 mg 3x/day for peripheral neuropathy  - Insulin aspart (Novolog) injection 1-7 units HS  - Insulin aspart (Novolog) injection 3x/day 1-10 units before meals  - Glipizide 24 hr tablet 5 mg daily with breakfast for better glycemic control  - Internal Medicine Consult  - Nutrition Consult  - Consistent Carb Diet  - Given patient is starting insulin, please notify medicine team of any impending discharge at least 2-3 days prior in order to have a safe management plan for her diabetes.    #Diarrhea  - Loperamide 2 mg PRN 4x/day    #Urinary Incontinence  - Tolterodine 2 mg daily    Medical course:  Patient was physically examined by the ED prior to being transferred to the unit and was found to be medically stable and appropriate for admission.    Consults:   4/16: Internal Medicine consulted for management of diabetes and blood pressure.   4/16: Increased metformin to 1000 mg BID.   4/18: Started PRN gabapentin 300 mg 3x/day for peripheral neuropathy.   4/18: Internal Medicine started Novolog injection 1-5 units HS and Novolog injection 3x/day 1-7 units before meals for better glycemic control. Given patient is starting insulin, please notify medicine team of any impending discharge at least 2-3 days prior in order to have a safe management plan for her diabetes.  4/19: Internal Medicine increased to high intensity sliding scale. Novolog injection 1-7 units HS. Novolog injection 3x/day 1-10 units before meals for better glycemic control.   4/20: Started PO glipizide 24 hr tablet 5 mg daily with breakfast.   4/21: Started pt on a consistent carb diet.      Checklist     Legal Status: Court Hold    Safety Assessment:   Behavioral Orders   Procedures    Code 1 - Restrict to Unit    Routine Programming     As clinically indicated    Status 15     Every 15 minutes.    Suicide precautions: Suicide Risk:  MODERATE; Clinical rationale to override score: modification to the care environment, response to medication, lack of access to a plan for self-harm, Exhibiting Suicidal/self-harm behaviors or thoughts     Patients on Suicide Precautions should have a Combination Diet ordered that includes a Diet selection(s) AND a Behavioral Tray selection for Safe Tray - with utensils, or Safe Tray - NO utensils       Order Specific Question:   Suicide Risk     Answer:   MODERATE     Order Specific Question:   Clinical rationale to override score:     Answer:   modification to the care environment     Order Specific Question:   Clinical rationale to override score:     Answer:   response to medication     Order Specific Question:   Clinical rationale to override score:     Answer:   lack of access to a plan for self-harm     Order Specific Question:   Clinical rationale to override score:     Answer:   Exhibiting Suicidal/self-harm behaviors or thoughts     Risk Assessment:  Risk for harm is moderate.  Risk factors: maladaptive coping, substance use, impulsive, and past behaviors.  Protective factors: family.    SIO: None    Disposition: TBD. Disposition pending clinical stabilization, medication optimization and development of an appropriate discharge plan.     Attestations     Tatyana Mcclure, MS3, [facility] Medical Student     I was present with the medical student who participated in the service and in the documentation of the note.  I have verified the history and personally performed the physical exam and medical decision making. I agree with the assessment and plan of care as documented in the note.    This patient was seen and discussed with my attending physician.  Tanja Gonzalez MD   Psychiatry Resident Physician     Attestation:  This patient has been seen and evaluated by me, Joceline DelaC ruz MD.  I have discussed this patient with the house staff team including the resident and/or medical student and I agree with the findings and  plan in this note.    I have reviewed today's vital signs, medications, labs and imaging. Joceline Dela Cruz MD , PhD.

## 2025-04-23 LAB
GLUCOSE BLDC GLUCOMTR-MCNC: 135 MG/DL (ref 70–99)
GLUCOSE BLDC GLUCOMTR-MCNC: 140 MG/DL (ref 70–99)
GLUCOSE BLDC GLUCOMTR-MCNC: 156 MG/DL (ref 70–99)
GLUCOSE BLDC GLUCOMTR-MCNC: 189 MG/DL (ref 70–99)
GLUCOSE BLDC GLUCOMTR-MCNC: 263 MG/DL (ref 70–99)
VALPROATE FREE MFR SERPL: 17 %
VALPROATE FREE SERPL-MCNC: 11 UG/ML
VALPROATE SERPL-MCNC: 67 UG/ML
VALPROATE SERPL-MCNC: 75.6 UG/ML

## 2025-04-23 PROCEDURE — 250N000013 HC RX MED GY IP 250 OP 250 PS 637

## 2025-04-23 PROCEDURE — 80164 ASSAY DIPROPYLACETIC ACD TOT: CPT

## 2025-04-23 PROCEDURE — 99232 SBSQ HOSP IP/OBS MODERATE 35: CPT | Mod: GC | Performed by: PSYCHIATRY & NEUROLOGY

## 2025-04-23 PROCEDURE — 36415 COLL VENOUS BLD VENIPUNCTURE: CPT

## 2025-04-23 PROCEDURE — 124N000002 HC R&B MH UMMC

## 2025-04-23 RX ORDER — CALCIUM CARBONATE 500 MG/1
500 TABLET, CHEWABLE ORAL DAILY PRN
Status: DISPENSED | OUTPATIENT
Start: 2025-04-23

## 2025-04-23 RX ADMIN — Medication 3 MG: at 22:50

## 2025-04-23 RX ADMIN — METFORMIN HYDROCHLORIDE 1000 MG: 500 TABLET, FILM COATED ORAL at 08:39

## 2025-04-23 RX ADMIN — OLANZAPINE 5 MG: 5 TABLET, ORALLY DISINTEGRATING ORAL at 08:39

## 2025-04-23 RX ADMIN — GLIPIZIDE 5 MG: 2.5 TABLET, FILM COATED, EXTENDED RELEASE ORAL at 08:39

## 2025-04-23 RX ADMIN — METFORMIN HYDROCHLORIDE 1000 MG: 500 TABLET, FILM COATED ORAL at 18:16

## 2025-04-23 RX ADMIN — PANTOPRAZOLE SODIUM 40 MG: 40 TABLET, DELAYED RELEASE ORAL at 08:39

## 2025-04-23 RX ADMIN — TOLTERODINE TARTRATE 2 MG: 2 CAPSULE, EXTENDED RELEASE ORAL at 08:39

## 2025-04-23 RX ADMIN — DIVALPROEX SODIUM 1000 MG: 500 TABLET, FILM COATED, EXTENDED RELEASE ORAL at 21:01

## 2025-04-23 RX ADMIN — Medication 1 TABLET: at 08:41

## 2025-04-23 RX ADMIN — OLANZAPINE 15 MG: 15 TABLET, ORALLY DISINTEGRATING ORAL at 21:01

## 2025-04-23 ASSESSMENT — ACTIVITIES OF DAILY LIVING (ADL)
ADLS_ACUITY_SCORE: 47
ADLS_ACUITY_SCORE: 47
HYGIENE/GROOMING: HANDWASHING;INDEPENDENT
ADLS_ACUITY_SCORE: 47
HYGIENE/GROOMING: HANDWASHING;INDEPENDENT
ADLS_ACUITY_SCORE: 57
ADLS_ACUITY_SCORE: 47
ADLS_ACUITY_SCORE: 57
ADLS_ACUITY_SCORE: 47
ORAL_HYGIENE: INDEPENDENT;PROMPTS
ADLS_ACUITY_SCORE: 57
ADLS_ACUITY_SCORE: 47
ADLS_ACUITY_SCORE: 47
ADLS_ACUITY_SCORE: 57
ADLS_ACUITY_SCORE: 47
ORAL_HYGIENE: INDEPENDENT
ADLS_ACUITY_SCORE: 57
LAUNDRY: UNABLE TO COMPLETE

## 2025-04-23 NOTE — PLAN OF CARE
No PRNs given or requested this shift.  Blood glucose at 0200 = 140, asymptomatic. No c/o pain or discomfort noted/reported. Safety checks completed every 15 minutes ; no concerns noted. Pt appears to have slept for  6.50 hours; will continue to monitor and offer support.    Problem: Sleep Disturbance  Goal: Adequate Sleep/Rest  Outcome: Progressing   Goal Outcome Evaluation:

## 2025-04-23 NOTE — PROVIDER NOTIFICATION
04/23/25 1011   Individualization/Patient Specific Goals   Patient Personal Strengths resilient;resourceful;family/social support   Patient Vulnerabilities family/relationship conflict;housing insecurity;substance abuse/addiction   Interprofessional Rounds   Summary Discussed pt progress, ongoing symptoms and medication changes. Pt is still on a court hold with final commitment hearing tomorrow.   Participants nursing;CTC;psychiatrist;other (see comments)   Behavioral Team Discussion   Participants Dr. Dela Cruz; Dr. Gonzalez; Chanel Xavier RN; Rhoda Casarez [facility]; medical students   Progress Pt is progressing   Anticipated length of stay 10-20 days   Continued Stay Criteria/Rationale Symptom stabilization, medication management, care coordination   Medical/Physical See H&P   Precautions See below   Plan Psychiatric assessment/Medication management. Therapeutic Milieu. Individual care planning and after care planning. Patient to participate in unit groups and activities. Individual and group support on unit.   Safety Plan Completed by unit therapist   Anticipated Discharge Disposition homeless shelter;[facility]TS     PRECAUTIONS AND SAFETY    Behavioral Orders   Procedures    Code 1 - Restrict to Unit    Routine Programming     As clinically indicated    Status 15     Every 15 minutes.    Suicide precautions: Suicide Risk: MODERATE; Clinical rationale to override score: modification to the care environment, response to medication, lack of access to a plan for self-harm, Exhibiting Suicidal/self-harm behaviors or thoughts     Patients on Suicide Precautions should have a Combination Diet ordered that includes a Diet selection(s) AND a Behavioral Tray selection for Safe Tray - with utensils, or Safe Tray - NO utensils       Order Specific Question:   Suicide Risk     Answer:   MODERATE     Order Specific Question:   Clinical rationale to override score:     Answer:   modification to the care environment     Order Specific  Question:   Clinical rationale to override score:     Answer:   response to medication     Order Specific Question:   Clinical rationale to override score:     Answer:   lack of access to a plan for self-harm     Order Specific Question:   Clinical rationale to override score:     Answer:   Exhibiting Suicidal/self-harm behaviors or thoughts       Safety  Safety WDL: WDL  [place] [facility] [place]ed Behavior: calm  Observed Behavior (Comment): watching TV  Safety Measures: environmental rounds completed, safety rounds completed  Diversional Activity: television  Suicidality: Status 15

## 2025-04-23 NOTE — PLAN OF CARE
"Goal Outcome Evaluation:    Plan of Care Reviewed With: patient Plan of Care Reviewed With: patient    Overall Patient Progress: improvingOverall Patient Progress: improving         Problem: Adult Behavioral Health Plan of Care  Goal: Plan of Care Review  Outcome: Progressing  Flowsheets  Taken 4/23/2025 1327  Plan of Care Reviewed With: patient  Overall Patient Progress: improving  Patient Agreement with Plan of Care: agrees  Taken 4/23/2025 1000  Patient Agreement with Plan of Care: agrees     Pt was seen in the lounge in the beginning of the shift. Pt had breakfast and complaint with medications. Pt denied all mental health symptoms. Pt is dismissive and guarded. Affect is flat and blunted. Mood is labile. Intermittently interact with select peers.  before breakfast and before lunch 156. Pt received coverage for lunch. Hygiene is unkempt. Pt had Valproic acid lab drawn today. Pt has an order for TUMS 500 mg. Blood pressure (!) 150/78, pulse 91, temperature 97.7  F (36.5  C), temperature source Oral, resp. rate 18, height 1.651 m (5' 5\"), weight 70.4 kg (155 lb 3.2 oz), last menstrual period 10/22/2013, SpO2 96%, not currently breastfeeding.    "

## 2025-04-23 NOTE — PLAN OF CARE
BEH IP Unit Acuity Rating Score (UARS)  Patient is given one point for every criteria they meet.    CRITERIA SCORING   On a 72 hour hold, court hold, committed, stay of commitment, or revocation. 1    Patient LOS on BEH unit exceeds 20 days. 0  LOS: 8   Patient under guardianship, 55+, otherwise medically complex, or under age 11. 1   Suicide ideation without relief of precipitating factors. 0   Current plan for suicide. 0   Current plan for homicide. 0   Imminent risk or actual attempt to seriously harm another without relief of factors precipitating the attempt. 0   Severe dysfunction in daily living (ex: complete neglect for self care, extreme disruption in vegetative function, extreme deterioration in social interactions). 1   Recent (last 7 days) or current physical aggression in the ED or on unit. 0   Restraints or seclusion episode in past 72 hours. 0   Recent (last 7 days) or current verbal aggression, agitation, yelling, etc., while in the ED or unit. 0   Active psychosis. 1   Need for constant or near constant redirection (from leaving, from others, etc).  0   Intrusive or disruptive behaviors. 0   Patient requires 3 or more hours of individualized nursing care per 8-hour shift (i.e. for ADLs, meds, therapeutic interventions). 0   TOTAL 4

## 2025-04-23 NOTE — PLAN OF CARE
Team Note Due:  Wednesday    Assessment/Intervention/Current Symtoms and Care Coordination:  Chart review and met with team, discussed pt progress, symptomology, and response to treatment.  Discussed the discharge plan and any potential impediments to discharge.    Received fax with court order continuing hold. Copy faxed to HIM, copy given to pt.    Discussed with MD plan to consult with medicine team to consider having pt come off sliding scale insulin so she can be considered for IRTS placement.     Discharge Plan or Goal:  [facility]     Barriers to Discharge:  Patient requires further psychiatric stabilization due to current symptomology, medication management with changes subject to provider, coordination with outside supports, and aftercare planning. Pt is also on a court hold and involved in the commitment process.     Referral Status:  None at this time     Legal Status:  Court hold     [place] Commitment and Cast[place] filed  County: [place]  File Number: [id number]  Start and expiration date of commitment: TBD    Castellanos meds requested: Zyprexa, Haldol, Abilify, Risperdal    Future Hearing Information:  Commitment Hearing on 04/24/2025 at 1:15 PM    Contacts:   Haydee Mares (CADI CM): [phone]   Tamika Johnson- (sister): [phone]  Lucille Reddy (Intervention ): [phone]     Upcoming Meetings and Dates/Important Information and next steps:  Follow commitment process  Add pt to [facility] wait list when commitment order is signed  Discharge planning when appropriate  Notify medicine team of any impending discharge at least 2-3 days prior in order to have a safe management plan for her diabetes    PD and COS needed at discharge - TBD

## 2025-04-23 NOTE — PLAN OF CARE
"  Problem: Adult Behavioral Health Plan of Care  Goal: Adheres to Safety Considerations for Self and Others  Outcome: Progressing  Flowsheets (Taken 4/23/2025 1832)  Adheres to Safety Considerations for Self and Others: making progress toward outcome     Problem: Suicide Risk  Goal: Absence of Self-Harm  Outcome: Progressing     Problem: Psychotic Signs/Symptoms  Goal: Improved Psychomotor Symptoms (Psychotic Signs/Symptoms)  Intervention: Manage Psychomotor Movement  Recent Flowsheet Documentation  Taken 4/23/2025 1640 by Harish Rivas RN  Diversional Activity: television  Activity (Behavioral Health): up ad belen   Goal Outcome Evaluation:    Plan of Care Reviewed With: patient Plan of Care Reviewed With: patient    Overall Patient Progress: improvingOverall Patient Progress: improving     Alert and oriented, forgetful at times. Patient was able to communicate needs. Visible in milieu, mostly watched tv, social and engaged with peers. Pleasant, cooperative and medication compliant. ADLs WNL, shower done. She denied any anxiety or depression, somewhat dismissive with MH assessment \"I'm fine right now\" Patient denied any pain/discomfort. Food and fluid intake WNL.            "

## 2025-04-23 NOTE — PROGRESS NOTES
----------------------------------------------------------------------------------------------------------  [facility]  Psychiatry Progress Note  Hospital Day #8     Interim History:     The patient's care was discussed with the treatment team and chart notes were reviewed.    Patient ID: Anastasiya Simon is a 59 year old female with a previous psychiatric diagnosis of schizophrenia and polysubstance use admitted from [facility] on 04/15/2025 due to concern for SI and psychosis in the context of psychosocial stressors including living situation and argument with family member.     Vitals: Temp: 98  F (36.7  C) Temp  Min: 97.7  F (36.5  C)  Max: 98  F (36.7  C)  SpO2: 97 % SpO2  Min: 95 %  Max: 97 %  Pulse: 96 Pulse  Min: 96  Max: 96  BP: (!) 153/72 Systolic (24hrs), Av , Min:153 , Max:153   Diastolic (24hrs), Av, Min:72, Max:72  Sleep: 6.5 hours (25 0623)  Scheduled medications: Took all scheduled medications as prescribed.  Psychiatric PRN medications:   Last 24H PRN:     gabapentin (NEURONTIN) capsule 300 mg, 300 mg at 25 2017    Staff Report:   Pt has been visible in the milieu, sociable with select peers and staff. Pt watched TV and attended OT groups. Alert and oriented. Pt was able to communicate needs. Labile. Cooperative and medication compliant. Blood sugar checks before meals. Patient voided 150 ML and bladder was scanned for post residual of 50 ML per orders. Resident updated and no need for more bladder scans, patient voids adequately. Pt is cooperative with cares. No escalation of behaviors. Denies all MH symptoms. Bilateral lower extremity pain rated 2/10, managed with PRN gabapentin. ADLs WNL. Adequate food and fluids intake. Appears to have slept for 6.50 hours.  Blood Glucose Levels   0800: 174   1200: 159   1800: 216   2100: 180   0200: 140   0800: 135   Subjective:     Patient Interview:  Pt was  "interviewed in her room. She is laying in bed.    Pt asked what time it was. Pt requested to take a shower when she gets out of bed. Pt slept okay but requested some omeprazole for reflux. Offered Tums PRN and pt was agreeable. Pt reports feeling more sleeply lately. Attending discussed her diabetes. Prior to admission, pt had taken pills for it but did not use insulin or try carb counting. Discussed consulting internal medicine to discuss a different option for insulin management other than sliding scale insulin since many housing options do not accept pts on sliding scale, so we need to find a simpler medication. Pt was agreeable. Reports that everything else is going okay. Informed pt that we placed an order for epsom salt for a foot bath and she will need to request it from the nurses. Informed pt that her final hearing is tomorrow at 1:15 pm. Pt was aggreable with all plans.     Objective:     Vitals:  BP (!) 153/72 (BP Location: Right arm, Patient Position: Sitting, Cuff Size: Adult Regular)   Pulse 96   Temp 98  F (36.7  C) (Oral)   Resp 17   Ht 1.651 m (5' 5\")   Wt 70.4 kg (155 lb 3.2 oz)   LMP 10/22/2013   SpO2 97%   BMI 25.83 kg/m      Allergies:  Allergies   Allergen Reactions    Tetracycline Unknown     It happened when pt was a baby       Current Medications:  Scheduled:  Current Facility-Administered Medications   Medication Dose Route Frequency Provider Last Rate Last Admin    glucose gel 15-30 g  15-30 g Oral Q15 Min PRKatina Knox MD        Or    dextrose 50 % injection 25-50 mL  25-50 mL Intravenous Q15 Min PRKatina Knox MD        Or    glucagon injection 1 mg  1 mg Subcutaneous Q15 Min PRKatina Knox MD        divalproex sodium extended-release (DEPAKOTE ER) 24 hr tablet 1,000 mg  1,000 mg Oral At Bedtime Tanja Gonzalez MD   1,000 mg at 04/22/25 2101    gabapentin (NEURONTIN) capsule 300 mg  300 mg Oral TID PRN Tanja Gonzalez MD   300 mg at " 04/22/25 2017    glipiZIDE (GLUCOTROL XL) 24 hr tablet 5 mg  5 mg Oral Daily with breakfast Charlotte Jasmine PA-C   5 mg at 04/22/25 0817    hydrOXYzine HCl (ATARAX) tablet 25 mg  25 mg Oral Q4H PRN Lucy Chandler MD   25 mg at 04/21/25 2218    insulin aspart (NovoLOG) injection (RAPID ACTING)  1-10 Units Subcutaneous TID AC Charlotte Jasmine PA-C   4 Units at 04/22/25 1801    insulin aspart (NovoLOG) injection (RAPID ACTING)  1-7 Units Subcutaneous At Bedtime Charlotte Jasmine PA-C   2 Units at 04/21/25 2128    loperamide (IMODIUM) capsule 2 mg  2 mg Oral 4x Daily PRN Tanja Gonzalez MD   2 mg at 04/16/25 1244    magnesium sulfate (EPSOM SALT) granules 1 Tablespoonful  1 Tablespoonful Topical Daily PRN Tanja Gonzalez MD        metFORMIN (GLUCOPHAGE) tablet 1,000 mg  1,000 mg Oral BID w/meals Charlotte Jasmine PA-C   1,000 mg at 04/22/25 1801    multivitamin w/minerals (THERA-VIT-M) tablet 1 tablet  1 tablet Oral Daily Charlotte Jasmine PA-C   1 tablet at 04/22/25 0817    OLANZapine (zyPREXA) tablet 10 mg  10 mg Oral TID PRN Lucy Chandler MD        Or    OLANZapine (zyPREXA) injection 10 mg  10 mg Intramuscular TID PRN Lucy Chandler MD        OLANZapine zydis (zyPREXA) ODT tab 15 mg  15 mg Oral At Bedtime Tanja Gonzalez MD   15 mg at 04/22/25 2101    OLANZapine zydis (zyPREXA) ODT tab 5 mg  5 mg Oral Daily Tanja Gonzalez MD   5 mg at 04/22/25 0818    pantoprazole (PROTONIX) EC tablet 40 mg  40 mg Oral QAM AC Lucy Chandler MD   40 mg at 04/22/25 0817    tolterodine ER (DETROL LA) 24 hr capsule 2 mg  2 mg Oral Daily Tanja Gonzalez MD   2 mg at 04/22/25 0817     PRN:  Current Facility-Administered Medications   Medication Dose Route Frequency Provider Last Rate Last Admin    glucose gel 15-30 g  15-30 g Oral Q15 Min PRN Katina Armendariz MD        Or    dextrose 50 % injection 25-50 mL  25-50 mL Intravenous Q15 Min PRN  Katina Armendariz MD        Or    glucagon injection 1 mg  1 mg Subcutaneous Q15 Min PRN Katina Armendariz MD        divalproex sodium extended-release (DEPAKOTE ER) 24 hr tablet 1,000 mg  1,000 mg Oral At Bedtime Tanja Gonzalez MD   1,000 mg at 04/22/25 2101    gabapentin (NEURONTIN) capsule 300 mg  300 mg Oral TID PRN Tanja Gonzalez MD   300 mg at 04/22/25 2017    glipiZIDE (GLUCOTROL XL) 24 hr tablet 5 mg  5 mg Oral Daily with breakfast Charlotte Jasmine PA-C   5 mg at 04/22/25 0817    hydrOXYzine HCl (ATARAX) tablet 25 mg  25 mg Oral Q4H PRN Lucy Chandler MD   25 mg at 04/21/25 2218    insulin aspart (NovoLOG) injection (RAPID ACTING)  1-10 Units Subcutaneous TID AC Charlotte Jasmine PA-C   4 Units at 04/22/25 1801    insulin aspart (NovoLOG) injection (RAPID ACTING)  1-7 Units Subcutaneous At Bedtime Charlotte Jasmine PA-C   2 Units at 04/21/25 2128    loperamide (IMODIUM) capsule 2 mg  2 mg Oral 4x Daily PRN Tanja Gonzalez MD   2 mg at 04/16/25 1244    magnesium sulfate (EPSOM SALT) granules 1 Tablespoonful  1 Tablespoonful Topical Daily PRN Tanja Gonzalez MD        metFORMIN (GLUCOPHAGE) tablet 1,000 mg  1,000 mg Oral BID w/meals Charlotte Jasmine PA-C   1,000 mg at 04/22/25 1801    multivitamin w/minerals (THERA-VIT-M) tablet 1 tablet  1 tablet Oral Daily Charlotte Jasmine PA-C   1 tablet at 04/22/25 0817    OLANZapine (zyPREXA) tablet 10 mg  10 mg Oral TID PRN Lucy Chandler MD        Or    OLANZapine (zyPREXA) injection 10 mg  10 mg Intramuscular TID PRN Lucy Chandler MD        OLANZapine zydis (zyPREXA) ODT tab 15 mg  15 mg Oral At Bedtime Tanja Gonzalez MD   15 mg at 04/22/25 2101    OLANZapine zydis (zyPREXA) ODT tab 5 mg  5 mg Oral Daily Tanja Gonzalez MD   5 mg at 04/22/25 0818    pantoprazole (PROTONIX) EC tablet 40 mg  40 mg Oral [facility] Lucy Chandler MD   40 mg at 04/22/25 0817    tolterodine ER  "(DETROL LA) 24 hr capsule 2 mg  2 mg Oral Daily Tanja Gonzalez MD   2 mg at 04/22/25 0817     Labs and Imaging:  New results:   Recent Results (from the past 24 hours)   Glucose by meter    Collection Time: 04/22/25  8:07 AM   Result Value Ref Range    GLUCOSE BY METER POCT 174 (H) 70 - 99 mg/dL   Glucose by meter    Collection Time: 04/22/25 11:50 AM   Result Value Ref Range    GLUCOSE BY METER POCT 159 (H) 70 - 99 mg/dL   Glucose by meter    Collection Time: 04/22/25  5:41 PM   Result Value Ref Range    GLUCOSE BY METER POCT 216 (H) 70 - 99 mg/dL   Glucose by meter    Collection Time: 04/22/25  8:55 PM   Result Value Ref Range    GLUCOSE BY METER POCT 180 (H) 70 - 99 mg/dL   Glucose by meter    Collection Time: 04/23/25  2:09 AM   Result Value Ref Range    GLUCOSE BY METER POCT 140 (H) 70 - 99 mg/dL     New Results  Valproic Acid Free & Total: Processing    Data this admission:  CBC: WNL. WBC: 7.6, RBC: 4.37, Hb: 13.0, Platelets: 316.  CMP: Unremarkable (4/22) AST: 13, ALT: 10, Creatinine: 0.65.  UDS: Negative  RASHIDA: 0.00 (4/13)  HbA1c: 10.1%  TSH: 2.40  Vit: B12: 812  Folate: 13.7    Imaging:  Bladder Scan: Completed on 4/22     Mental Status Exam:     Oriented to:  Grossly oriented.  General: Alert. Somnolent at first and became more awake throughout the interview.   Appearance:  Appears younger than stated age. Grooming is unkempt. Wears glasses and her own personal clothing.   Behavior/Attitude:  Cooperative, calm, engaged, open. Drowsy, seemed a little confused but that dissipated throughout the interview.  Eye Contact: Appropriate.   Psychomotor: No evidence of tics, dystonia, or tardive dyskinesia. No catatonia present.  Speech: Appropriate rate/tone/volume.   Language: Fluent in English with appropriate syntax and vocabulary.  Mood:  \"alright\"  Affect:  Euthymic. Appropriate. Congruent with mood.   Thought Process:  Linear and goal directed.  Thought Content:  Denies SI/SIB/AVH.  Associations:  " intact  Insight:  fair   Judgment:  limited   Impulse control: fair  Attention Span:  adequate for conversation  Concentration:  grossly intact  Recent and Remote Memory:  Not formally assessed. Pt reports memory issues.  Fund of Knowledge: Average.  Muscle Strength and Tone: Normal.  Gait and Station: Normal.     Psychiatric Assessment     Anastasiya Simon is a 59 year old female previously diagnosed with schizophrenia and polysubstance use disorder who presented with suicidal ideation, disorganization, and inability to care for self. Most recent psychiatric hospitalization was 03/20/2019 for disorganization and suicidal ideation. Significant symptoms on admission include increasing suicidal ideation, disorganization, erratic behavior such as filling buckets with ammonia and mixing with bleach, substance use, auditory hallucinations, and paranoia. The MSE on admission was pertinent for AVH, paranoia, limited insight into MH. Today on interview Anastasiya denies SI and reports she had only made suicidal statements to her sisters previously as she did not want to move into a group home.  Psychological contributions to mental health presentation include maladaptive coping through substance use and limited insight into MH. Social factors contributing to mental health presentation include interpersonal conflicts and unstable living environment. Protective factors include support from two sisters and absence of previous suicide attempts. On 4/16 we resumed Zyprexa 20 mg and increased Depakote to 1000 mg to address the ongoing psychosis, Tolterodine and PRN loperamide were started to address incontinence and diarrhea. On 4/18, we added PRN gabapentin 300 mg for peripheral neuropathy.     In summary, the patient's reported symptoms of SI, erractic behavior, AVH, and paranoia in the context of psychosocial stressers are consistent with decompensated schizophrenia and polysubstance use disorder.  She will likely benefit from  medication optimization and outpatient  referrals this admission.     Psychiatric Plan by Diagnosis      Today's changes:  - None    # Schizophrenia  Medications:  - olanzapine 5 mg in the morning, 15 mg HS  - depakote 1000 mg HS    #Polysubstance Use Disorder  - CD consult once psychotic sx more stable, if pt amenable    2. Pertinent Labs/Monitoring:  - Labs done 04/13 at OSH. CBC wnl, glucose elevated to 332, BMP wnl.  - UDS negative on 04/13  - 4/16 HbA1c: 10.1  - Depakote Levels: Processing    3. Additional Plans:  - Patient will be treated in therapeutic milieu with appropriate individual and group therapies as described.   - Consult with Internal Medicine for diabetes management.      Psychiatric Hospital Course:      Anastasiya Simon was admitted to [facility] on a 72 hour hold (started 4/13/25 21:35) from [facility] on 04/15/25.   Medications:  Continued Medications:  Pt seen by psychiatry consult at [facility] who started zyprexa 20 mg at bedtime and depakote 500 mg at bedtime. Both of these medications were continued on admission to [facility].   New Medications:  Started loperamide for diarrhea  Started tolterodine for urinary incontinence  Started gabapentin for peripheral neuropathy    4/16: Increased depakote from 500 mg to 1000 mg HS for mood regulation  4/16: Increased metformin from 500 mg to 1000 mg BID per medicine consult  4/16: Zyprexa: 5 mg in the morning and 15 mg HS  4/16: Started PRN Loperamide 2 mg 4x/day for diarrhea  4/16: Started Tolterodine 2 mg daily for urinary incontinence  4/18: Started Gabapentin 300 mg PRN 3x/day for peripheral neuropathy  4/20: Started glipizide 24 hr tablet 5 mg daily for better glycemic control per medicine consult    The risks, benefits, alternatives, and side effects were discussed and understood by the patient and other caregivers.     Medical Assessment and Plan     Medical diagnoses to be addressed this admission:      #Type 2 Diabetes  Mellitis with Complications  - PTA metformin 1000 mg BID with meals  - BID glucose checks; hypoglycemia protocol  - PRN gabapentin 300 mg 3x/day for peripheral neuropathy  - Insulin aspart (Novolog) injection 1-7 units HS  - Insulin aspart (Novolog) injection 3x/day 1-10 units before meals  - Glipizide 24 hr tablet 5 mg daily with breakfast for better glycemic control  - Internal Medicine Consult  - Nutrition Consult  - Consistent Carb Diet  - Given patient is starting insulin, please notify medicine team of any impending discharge at least 2-3 days prior in order to have a safe management plan for her diabetes.    #Diarrhea  - Loperamide 2 mg PRN 4x/day    #Urinary Incontinence  - Tolterodine 2 mg daily    Medical course:  Patient was physically examined by the ED prior to being transferred to the unit and was found to be medically stable and appropriate for admission.    Consults:   4/16: Internal Medicine consulted for management of diabetes and blood pressure.   4/16: Increased metformin to 1000 mg BID.   4/18: Started PRN gabapentin 300 mg 3x/day for peripheral neuropathy.   4/18: Internal Medicine started Novolog injection 1-5 units HS and Novolog injection 3x/day 1-7 units before meals for better glycemic control. Given patient is starting insulin, please notify medicine team of any impending discharge at least 2-3 days prior in order to have a safe management plan for her diabetes.  4/19: Internal Medicine increased to high intensity sliding scale. Novolog injection 1-7 units HS. Novolog injection 3x/day 1-10 units before meals for better glycemic control.   4/20: Started PO glipizide 24 hr tablet 5 mg daily with breakfast.   4/21: Started pt on a consistent carb diet.   4/22: Nutrition Consult for diabetes management.      Checklist     Legal Status: Court Hold    Safety Assessment:   Behavioral Orders   Procedures    Code 1 - Restrict to Unit    Routine Programming     As clinically indicated    Status 15      Every 15 minutes.    Suicide precautions: Suicide Risk: MODERATE; Clinical rationale to override score: modification to the care environment, response to medication, lack of access to a plan for self-harm, Exhibiting Suicidal/self-harm behaviors or thoughts     Patients on Suicide Precautions should have a Combination Diet ordered that includes a Diet selection(s) AND a Behavioral Tray selection for Safe Tray - with utensils, or Safe Tray - NO utensils       Order Specific Question:   Suicide Risk     Answer:   MODERATE     Order Specific Question:   Clinical rationale to override score:     Answer:   modification to the care environment     Order Specific Question:   Clinical rationale to override score:     Answer:   response to medication     Order Specific Question:   Clinical rationale to override score:     Answer:   lack of access to a plan for self-harm     Order Specific Question:   Clinical rationale to override score:     Answer:   Exhibiting Suicidal/self-harm behaviors or thoughts     Risk Assessment:  Risk for harm is moderate.  Risk factors: maladaptive coping, substance use, impulsive, and past behaviors.  Protective factors: family.    SIO: None    Disposition: TBD. Disposition pending clinical stabilization, medication optimization and development of an appropriate discharge plan.     Attestations     Tatyana Mcclure, MS3, [facility] Medical Student     I was present with the medical student who participated in the service and in the documentation of the note.  I have verified the history and personally performed the physical exam and medical decision making. I agree with the assessment and plan of care as documented in the note.    This patient was seen and discussed with my attending physician.  Tanja Gonzalez MD   Psychiatry Resident Physician    resident and/or medical student and I agree with the findings and plan in this note.    I have reviewed today's vital signs, medications, labs and imaging. Joceline Dela Cruz MD , PhD.

## 2025-04-24 VITALS
TEMPERATURE: 98.2 F | BODY MASS INDEX: 25.83 KG/M2 | WEIGHT: 155 LBS | DIASTOLIC BLOOD PRESSURE: 75 MMHG | HEIGHT: 65 IN | RESPIRATION RATE: 16 BRPM | OXYGEN SATURATION: 96 % | HEART RATE: 106 BPM | SYSTOLIC BLOOD PRESSURE: 151 MMHG

## 2025-04-24 LAB
GLUCOSE BLDC GLUCOMTR-MCNC: 104 MG/DL (ref 70–99)
GLUCOSE BLDC GLUCOMTR-MCNC: 108 MG/DL (ref 70–99)
GLUCOSE BLDC GLUCOMTR-MCNC: 157 MG/DL (ref 70–99)
GLUCOSE BLDC GLUCOMTR-MCNC: 204 MG/DL (ref 70–99)
GLUCOSE BLDC GLUCOMTR-MCNC: 216 MG/DL (ref 70–99)

## 2025-04-24 PROCEDURE — 250N000013 HC RX MED GY IP 250 OP 250 PS 637

## 2025-04-24 PROCEDURE — 97150 GROUP THERAPEUTIC PROCEDURES: CPT | Mod: GO

## 2025-04-24 PROCEDURE — 124N000002 HC R&B MH UMMC

## 2025-04-24 PROCEDURE — 99232 SBSQ HOSP IP/OBS MODERATE 35: CPT | Mod: GC | Performed by: PSYCHIATRY & NEUROLOGY

## 2025-04-24 RX ADMIN — TOLTERODINE TARTRATE 2 MG: 2 CAPSULE, EXTENDED RELEASE ORAL at 08:25

## 2025-04-24 RX ADMIN — PANTOPRAZOLE SODIUM 40 MG: 40 TABLET, DELAYED RELEASE ORAL at 08:25

## 2025-04-24 RX ADMIN — METFORMIN HYDROCHLORIDE 1000 MG: 500 TABLET, FILM COATED ORAL at 08:25

## 2025-04-24 RX ADMIN — DIVALPROEX SODIUM 1000 MG: 500 TABLET, FILM COATED, EXTENDED RELEASE ORAL at 21:02

## 2025-04-24 RX ADMIN — GLIPIZIDE 5 MG: 2.5 TABLET, FILM COATED, EXTENDED RELEASE ORAL at 08:24

## 2025-04-24 RX ADMIN — METFORMIN HYDROCHLORIDE 1000 MG: 500 TABLET, FILM COATED ORAL at 18:11

## 2025-04-24 RX ADMIN — OLANZAPINE 15 MG: 15 TABLET, ORALLY DISINTEGRATING ORAL at 21:02

## 2025-04-24 RX ADMIN — CALCIUM CARBONATE (ANTACID) CHEW TAB 500 MG 500 MG: 500 CHEW TAB at 17:50

## 2025-04-24 RX ADMIN — OLANZAPINE 5 MG: 5 TABLET, ORALLY DISINTEGRATING ORAL at 08:25

## 2025-04-24 RX ADMIN — Medication 1 TABLET: at 08:25

## 2025-04-24 ASSESSMENT — ACTIVITIES OF DAILY LIVING (ADL)
ADLS_ACUITY_SCORE: 57
ADLS_ACUITY_SCORE: 47
ADLS_ACUITY_SCORE: 57
HYGIENE/GROOMING: HANDWASHING;SHOWER;PROMPTS;INDEPENDENT
ADLS_ACUITY_SCORE: 57
LAUNDRY: UNABLE TO COMPLETE
ADLS_ACUITY_SCORE: 57
ADLS_ACUITY_SCORE: 47
ADLS_ACUITY_SCORE: 57
ADLS_ACUITY_SCORE: 57
ADLS_ACUITY_SCORE: 47
ADLS_ACUITY_SCORE: 57
DRESS: SCRUBS (BEHAVIORAL HEALTH);STREET CLOTHES;INDEPENDENT
ORAL_HYGIENE: INDEPENDENT;PROMPTS
ADLS_ACUITY_SCORE: 57
ORAL_HYGIENE: INDEPENDENT
ADLS_ACUITY_SCORE: 57
HYGIENE/GROOMING: HANDWASHING;INDEPENDENT
ADLS_ACUITY_SCORE: 47
ADLS_ACUITY_SCORE: 57
ADLS_ACUITY_SCORE: 57
ADLS_ACUITY_SCORE: 47
ADLS_ACUITY_SCORE: 57

## 2025-04-24 NOTE — PLAN OF CARE
BEH IP Unit Acuity Rating Score (UARS)  Patient is given one point for every criteria they meet.    CRITERIA SCORING   On a 72 hour hold, court hold, committed, stay of commitment, or revocation. 1    Patient LOS on BEH unit exceeds 20 days. 0  LOS: 9   Patient under guardianship, 55+, otherwise medically complex, or under age 11. 1   Suicide ideation without relief of precipitating factors. 0   Current plan for suicide. 0   Current plan for homicide. 0   Imminent risk or actual attempt to seriously harm another without relief of factors precipitating the attempt. 0   Severe dysfunction in daily living (ex: complete neglect for self care, extreme disruption in vegetative function, extreme deterioration in social interactions). 1   Recent (last 7 days) or current physical aggression in the ED or on unit. 0   Restraints or seclusion episode in past 72 hours. 0   Recent (last 7 days) or current verbal aggression, agitation, yelling, etc., while in the ED or unit. 0   Active psychosis. 1   Need for constant or near constant redirection (from leaving, from others, etc).  0   Intrusive or disruptive behaviors. 0   Patient requires 3 or more hours of individualized nursing care per 8-hour shift (i.e. for ADLs, meds, therapeutic interventions). 0   TOTAL 4

## 2025-04-24 NOTE — PLAN OF CARE
"Goal Outcome Evaluation:    Plan of Care Reviewed With: patient Plan of Care Reviewed With: patient    Overall Patient Progress: improvingOverall Patient Progress: improving         Problem: Adult Behavioral Health Plan of Care  Goal: Plan of Care Review  Outcome: Progressing  Flowsheets  Taken 4/24/2025 1228  Plan of Care Reviewed With: patient  Overall Patient Progress: improving  Patient Agreement with Plan of Care: agrees  Taken 4/24/2025 1154  Patient Agreement with Plan of Care: agrees     Pt was seen in the lounge intermittently and spend most of the shift sitting in the lounge. Pt does not engages much but she interact with select peer. Pt had breakfast and complaint with medications. Pt denied all mental health symptoms. Pt is dismissive and guarded. Affect is flat and blunted. Mood is labile. BG before breakfast 108 and before lunch 157. Pt received coverage for lunch. Hygiene is unkempt but pt had a shower last evening. Pt had Final Court Hearing today at 1 pm. Blood pressure 131/81, pulse 91, temperature 97.8  F (36.6  C), temperature source Oral, resp. rate 16, height 1.651 m (5' 5\"), weight 70.3 kg (155 lb), last menstrual period 10/22/2013, SpO2 96%, not currently breastfeeding.      "

## 2025-04-24 NOTE — PLAN OF CARE
Team Note Due:  Wednesday    Assessment/Intervention/Current Symtoms and Care Coordination:  Chart review and met with team, discussed pt progress, symptomology, and response to treatment.  Discussed the discharge plan and any potential impediments to discharge.    Received call from pt's sister Tamika and discussed final hearing this afternoon. Tamika asked how quickly an Order for Commitment gets signed and was informed it's variable depending on the /Referee. I assured Tamika I would reach out when there is an update on pt's legal status. Also discussed hopefully being able to make IRTS referrals once pt is off sliding scale insulin.    Met with pt prior to final hearing, which pt agreed to attend. We discussed possible IRTS placement (pending coming off sliding scale insulin). Pt was resistant to this but ultimately stated she would be willing to consider this as an option. Offered to provide pt with more general information on IRTS, which she stated would be helpful. Pt attended final hearing. Writer gave printed information on IRTS.     Discharge Plan or Goal:  [facility]/nursing home vs IRTS vs [facility]     Barriers to Discharge:  Patient requires further psychiatric stabilization due to current symptomology, medication management with changes subject to provider, coordination with outside supports, and aftercare planning. Pt is also on a court hold and involved in the commitment process.     Referral Status:  None at this time     Legal Status:  Court hold     MI Commitment and Castellanos filed  [facility]: [place]  File Number: [id number]  Start and expiration date of commitment: TBD    Castellanos meds requested: Zyprexa, Haldol, Abilify, Risperdal      Contacts:   Haydee Mares (CADI CM): [phone]   Tamika Johnson- (sister): [phone]  Lucille Reddy (Intervention ): [phone]     Upcoming Meetings and Dates/Important Information and next steps:  Follow commitment process  [facility] when commitment  order is signed  Discharge planning when appropriate  Notify medicine team of any impending discharge at least 2-3 days prior in order to have a safe management plan for her diabetes    PD and COS needed at discharge - TBD

## 2025-04-24 NOTE — PLAN OF CARE
Rehab Group    Start time: 1630  End time: 1700  Patient time total: 20 minutes    attended partial group     #3 attended   Group Type: occupational therapy   Group Topic Covered: balanced lifestyle, cognitive activities, coping skills, healthy leisure time, and social skills     Group Session Detail:    Patient Response: Pt partcipated in group focused on leisure participation and exploration, socialization and cognitive challenge. Discussed how participation in leisure activities can be used as a healthy coping skill in symptom management and a strategy to reduce stress    Mood/Affect: Pleasant      Plan: Patient encouraged to maintain attendance for continued ongoing support in working towards occupational therapy goals to support overall treatment/care.        Patient Detail:    Joined at start of group, initially sitting on the periphery and hesitant to join the group Hao activity. With encouragement from writer and another peer that they could teach patient how to play, patient agreed to join. Required cues on each turn for the first 10-12 minutes of the activity, able to participate ind after this. Minimally social during this time other than brief comments regarding the activity, was soft spoken in these responses. After about 20 minutes stated wanting to be done, feeling tired and excused self from group. Writer offered encouragement and positive feedback for trying a new activity.       [id number] OT Group (2 or more in attendance)    Patient Active Problem List   Diagnosis    Suicidal ideation    Type 2 diabetes mellitus (H)    Chronic hepatitis C (H)    Schizophrenia (H)    Acid reflux disease    Hoarding behavior

## 2025-04-24 NOTE — PROGRESS NOTES
Brief Internal Medicine Note:     Blood sugar continues to improve on glipizide and metformin increased doses. Glipizide was initiated 4/20 - at this time she was requiring 10-12 units insulin daily. 4/22 only received 7 units novolog throughout the day and 4/23 received 6. Will continue to follow trend in order to offer discharge medication recommendations. Overall blood glucose appears at goal, but would prefer to see less insulin dosing as patient has not been using insulin outpatient.     - Please send patient with glucometer on discharge  - Diabetes education should be ordered prior to discharge     Recent Labs   Lab 04/24/25  0807 04/24/25  0319 04/23/25  2051 04/23/25  1808 04/23/25  1211 04/23/25  0747   * 104* 189* 263* 156* 135*     ANUSHA Flanagan [facility]  Securely message with the Vocera Web Console (learn more here)  Text page via [employer] Paging/Directory

## 2025-04-24 NOTE — PROGRESS NOTES
"  Rehab Group    Start time: 1015  End time: 1200  Patient time total: 40 minutes    came in and out of group session    #4 attended   Group Type: OT Clinic   Group Topic Covered: balanced lifestyle, coping skills, healthy leisure time, and social skills     Group Session Detail:  Pt actively participated in occupational therapy clinic to facilitate coping skill exploration, creative expression within personally meaningful activities, and clinical observation of social, cognitive, and kinesthetic performance skills.        Patient Response/Contribution:  worked intermittently and disorganized     Patient Detail:  Pt arrived to group with flat affect,IND to initiate, gather materials, sequence, and adjust to workspace demands as needed for selected simple cognitive activity. Pt remained engaged for 15min before leaving group room, pt returned some time later, easily engaging in group task. Pt observed to be tangential upon return, discussing cleaning \"rust, bugs, and mold\" reporting \"I'm in here just because I was trying to clean.\" Pt explained in detail attempting to clean \"orange bubbling mold, there were hundreds of little white bugs crawling out of it.\" Pt reported that she has never dealt with mold before. Pt observed to have pressured speech and be very insistent that she had multiple types of mold in her home. Pt not receptive to reality orientation. Seemingly having poor insight into proper cleaning techniques. Will continue to encourage attendance and participation.       03083 OT Group (2 or more in attendance)      Patient Active Problem List   Diagnosis    Suicidal ideation    Type 2 diabetes mellitus (H)    Chronic hepatitis C (H)    Schizophrenia (H)    Acid reflux disease    Hoarding behavior       "

## 2025-04-24 NOTE — PLAN OF CARE
Problem: Adult Behavioral Health Plan of Care  Goal: Adheres to Safety Considerations for Self and Others  Outcome: Progressing  Flowsheets (Taken 4/24/2025 8800)  Adheres to Safety Considerations for Self and Others: making progress toward outcome     Problem: Adult Behavioral Health Plan of Care  Goal: Absence of New-Onset Illness or Injury  Outcome: Progressing   Goal Outcome Evaluation:    Plan of Care Reviewed With: patient Plan of Care Reviewed With: patient    Overall Patient Progress: improvingOverall Patient Progress: improving      Visible in milieu, mostly watched tv, social and engaged with peers. Pleasant, cooperative and medication compliant, TUMS PRN given to manage heart burn. ADLs WNL. She denied any anxiety or depression, dismissive with MH assessment. Denied any pain/discomfort. Food and fluid intake WNL. Alert and oriented, forgetful at times, appeared somewhat drowsy. Patient was able to communicate needs.

## 2025-04-24 NOTE — PROGRESS NOTES
"  ----------------------------------------------------------------------------------------------------------  [facility]  Psychiatry Progress Note  Hospital Day #9     Interim History:     The patient's care was discussed with the treatment team and chart notes were reviewed.    Patient ID: Anastasiya Simon is a 59 year old female with a previous psychiatric diagnosis of schizophrenia and polysubstance use admitted from [facility] ED on 04/15/2025 due to concern for SI and psychosis in the context of psychosocial stressors including living situation and argument with family member.     Vitals: Temp: 97.8  F (36.6  C) Temp  Min: 97.8  F (36.6  C)  Max: 97.8  F (36.6  C)  SpO2: 96 % SpO2  Min: 96 %  Max: 97 %  Pulse: 96 Pulse  Min: 96  Max: 96  BP: (!) 145/66 Systolic (24hrs), Av , Min:145 , Max:145   Diastolic (24hrs), Av, Min:66, Max:66  Sleep: 5.75 hours (25 0600)  Scheduled medications: Took all scheduled medications as prescribed.  Psychiatric PRN medications:   Last 24H PRN:     melatonin tablet 3 mg, 3 mg at 25 2250    Staff Report:   AM: Pt was seen in the lounge. Pt had breakfast and complaint with medications. Denied all MH symptoms. Pt is dismissive and guarded. Affect is flat and blunted. Mood is labile. Intermittently interact with select peers.  before breakfast and before lunch 156. Pt received coverage for lunch. Hygiene is unkempt. Pt had Valproic acid lab drawn today.  PM: Alert and oriented, forgetful at times. Pt was able to communicate needs. Visible in milieu, mostly watched tv, social and engaged with peers. Pleasant, cooperative and medication compliant. ADLs WNL, shower done. Denied anxiety or depression, somewhat dismissive with MH assessment \"I'm fine right now\" Denied any pain/discomfort. Food and fluid intake WNL. BG 1800: 263. BG 2100: 189. Pt slept for 5.75 hours.    B 104  0800 108   Subjective: " "    Patient Interview:  Pt was interviewed in Gold conference room.    Pt is drowsy. Pt reports that she ate half of her breakfast. Reports that carb lowered diet is going okay. She had four oranges and her blood sugars went up. She thought oranges were good for her blood sugars. Pt asked what she should be eating for her diabetes. Discussed high protein, low carbs, including vegetables and proteins rather than pasta and bread. Pt does not remember discussing food choices with the Nutrition Team. Pt wants to use her cellphone to research places to live after discharge. Discussed with pt how the  on the unit is doing the research for her. Informed pt that she can use the unit phone to talk with friends or family about her living situation. Pt needs the nurses to find numbers from her phone so she can make a phone call. Would like her care team to update her frequently on her living situation. Pt wants ENSURE drink order. Informed pt that she will need carb coverage after drinking it. Pt agreeable to plan.      Objective:     Vitals:  BP (!) 145/66 (BP Location: Left arm, Patient Position: Sitting, Cuff Size: Adult Regular)   Pulse 96   Temp 97.8  F (36.6  C)   Resp 17   Ht 1.651 m (5' 5\")   Wt 70.3 kg (155 lb)   LMP 10/22/2013   SpO2 96%   BMI 25.79 kg/m      Allergies:  Allergies   Allergen Reactions    Tetracycline Unknown     It happened when pt was a baby       Current Medications:  Scheduled:  Current Facility-Administered Medications   Medication Dose Route Frequency Provider Last Rate Last Admin    calcium carbonate (TUMS) chewable tablet 500 mg  500 mg Oral Daily PRN Tanja Gonzalez MD        glucose gel 15-30 g  15-30 g Oral Q15 Min PRKatina Knox MD        Or    dextrose 50 % injection 25-50 mL  25-50 mL Intravenous Q15 Min PRKatina Knox MD        Or    glucagon injection 1 mg  1 mg Subcutaneous Q15 Min PRKatina Knox MD        divalproex " sodium extended-release (DEPAKOTE ER) 24 hr tablet 1,000 mg  1,000 mg Oral At Bedtime Tanja Gonzalez MD   1,000 mg at 04/23/25 2101    gabapentin (NEURONTIN) capsule 300 mg  300 mg Oral TID PRN Tanja Gonzalez MD   300 mg at 04/22/25 2017    glipiZIDE (GLUCOTROL XL) 24 hr tablet 5 mg  5 mg Oral Daily with breakfast Charlotte Jasmine PA-C   5 mg at 04/24/25 0824    hydrOXYzine HCl (ATARAX) tablet 25 mg  25 mg Oral Q4H PRN Lucy Chandler MD   25 mg at 04/21/25 2218    insulin aspart (NovoLOG) injection (RAPID ACTING)  1-10 Units Subcutaneous TID AC Charlotte Jasmine PA-C   5 Units at 04/23/25 1816    insulin aspart (NovoLOG) injection (RAPID ACTING)  1-7 Units Subcutaneous At Bedtime Charlotte Jasmine PA-C   2 Units at 04/21/25 2128    loperamide (IMODIUM) capsule 2 mg  2 mg Oral 4x Daily PRN Tanja Gonzalez MD   2 mg at 04/16/25 1244    magnesium sulfate (EPSOM SALT) granules 1 Tablespoonful  1 Tablespoonful Topical Daily PRN Tanja Gonzalez MD        melatonin tablet 3 mg  3 mg Oral At Bedtime PRN Katina Armendariz MD   3 mg at 04/23/25 2250    metFORMIN (GLUCOPHAGE) tablet 1,000 mg  1,000 mg Oral BID w/meals Charlotte Jasmine PA-C   1,000 mg at 04/24/25 0825    multivitamin w/minerals (THERA-VIT-M) tablet 1 tablet  1 tablet Oral Daily Charlotte Jasmine PA-C   1 tablet at 04/24/25 0825    OLANZapine (zyPREXA) tablet 10 mg  10 mg Oral TID PRN Lucy Chandler MD        Or    OLANZapine (zyPREXA) injection 10 mg  10 mg Intramuscular TID PRN Lucy Chandler MD        OLANZapine zydis (zyPREXA) ODT tab 15 mg  15 mg Oral At Bedtime Tanja Gonzalez MD   15 mg at 04/23/25 2101    OLANZapine zydis (zyPREXA) ODT tab 5 mg  5 mg Oral Daily Tanja Gonzalez MD   5 mg at 04/24/25 0825    pantoprazole (PROTONIX) EC tablet 40 mg  40 mg Oral QAM AC Lucy Chandler MD   40 mg at 04/24/25 0825    tolterodine ER (DETROL LA) 24 hr capsule 2 mg  2 mg Oral  Daily Tanja Gonzalez MD   2 mg at 04/24/25 0825     PRN:  Current Facility-Administered Medications   Medication Dose Route Frequency Provider Last Rate Last Admin    calcium carbonate (TUMS) chewable tablet 500 mg  500 mg Oral Daily PRN Tajna Gonzalez MD        glucose gel 15-30 g  15-30 g Oral Q15 Min PRN Katina Armendariz MD        Or    dextrose 50 % injection 25-50 mL  25-50 mL Intravenous Q15 Min PRN Katina Armendariz MD        Or    glucagon injection 1 mg  1 mg Subcutaneous Q15 Min PRN Katina Armendariz MD        divalproex sodium extended-release (DEPAKOTE ER) 24 hr tablet 1,000 mg  1,000 mg Oral At Bedtime Tanja Gonzalez MD   1,000 mg at 04/23/25 2101    gabapentin (NEURONTIN) capsule 300 mg  300 mg Oral TID PRN Tanja Gonzalez MD   300 mg at 04/22/25 2017    glipiZIDE (GLUCOTROL XL) 24 hr tablet 5 mg  5 mg Oral Daily with breakfast Charlotte Jasmine PA-C   5 mg at 04/24/25 0824    hydrOXYzine HCl (ATARAX) tablet 25 mg  25 mg Oral Q4H PRN Lucy Chandler MD   25 mg at 04/21/25 2218    insulin aspart (NovoLOG) injection (RAPID ACTING)  1-10 Units Subcutaneous TID AC Charlotte Jasmine PA-C   5 Units at 04/23/25 1816    insulin aspart (NovoLOG) injection (RAPID ACTING)  1-7 Units Subcutaneous At Bedtime Charlotte Jasmine PA-C   2 Units at 04/21/25 2128    loperamide (IMODIUM) capsule 2 mg  2 mg Oral 4x Daily PRN Tanja Gonzalez MD   2 mg at 04/16/25 1244    magnesium sulfate (EPSOM SALT) granules 1 Tablespoonful  1 Tablespoonful Topical Daily PRN Tanja Gonzalez MD        melatonin tablet 3 mg  3 mg Oral At Bedtime PRN Katina Armendariz MD   3 mg at 04/23/25 2250    metFORMIN (GLUCOPHAGE) tablet 1,000 mg  1,000 mg Oral BID w/meals Charlotte Jasmine PA-C   1,000 mg at 04/24/25 0825    multivitamin w/minerals (THERA-VIT-M) tablet 1 tablet  1 tablet Oral Daily Charlotte Jasmine PA-C   1 tablet at 04/24/25 0825    OLANZapine (zyPREXA) tablet 10 mg  10 mg Oral  TID PRN Lucy Chandler MD        Or    OLANZapine (zyPREXA) injection 10 mg  10 mg Intramuscular TID PRN Lucy Chandler MD        OLANZapine zydis (zyPREXA) ODT tab 15 mg  15 mg Oral At Bedtime Tanja Gonzalez MD   15 mg at 04/23/25 2101    OLANZapine zydis (zyPREXA) ODT tab 5 mg  5 mg Oral Daily Tanja Gonzalez MD   5 mg at 04/24/25 0825    pantoprazole (PROTONIX) EC tablet 40 mg  40 mg Oral QAM AC Lucy Chandler MD   40 mg at 04/24/25 0825    tolterodine ER (DETROL LA) 24 hr capsule 2 mg  2 mg Oral Daily Tanja Gonzalez MD   2 mg at 04/24/25 0825     Labs and Imaging:  New results:   Recent Results (from the past 24 hours)   Glucose by meter    Collection Time: 04/23/25 12:11 PM   Result Value Ref Range    GLUCOSE BY METER POCT 156 (H) 70 - 99 mg/dL   Valproic acid    Collection Time: 04/23/25  1:44 PM   Result Value Ref Range    Valproic acid 75.6   ug/mL   Glucose by meter    Collection Time: 04/23/25  6:08 PM   Result Value Ref Range    GLUCOSE BY METER POCT 263 (H) 70 - 99 mg/dL   Glucose by meter    Collection Time: 04/23/25  8:51 PM   Result Value Ref Range    GLUCOSE BY METER POCT 189 (H) 70 - 99 mg/dL   Glucose by meter    Collection Time: 04/24/25  3:19 AM   Result Value Ref Range    GLUCOSE BY METER POCT 104 (H) 70 - 99 mg/dL   Glucose by meter    Collection Time: 04/24/25  8:07 AM   Result Value Ref Range    GLUCOSE BY METER POCT 108 (H) 70 - 99 mg/dL     New Results  Valproic Acid Free & Total: 67  Valproic Acid Free & Total STAT: 75.6    Data this admission:  CBC: WNL. WBC: 7.6, RBC: 4.37, Hb: 13.0, Platelets: 316.  CMP: Unremarkable (4/22) AST: 13, ALT: 10, Creatinine: 0.65.  UDS: Negative  RASHIDA: 0.00 (4/13)  HbA1c: 10.1%  TSH: 2.40  Vit: B12: 812  Folate: 13.7    Imaging:  Bladder Scan: Completed on 4/22     Mental Status Exam:     Oriented to:  Grossly oriented.  General: Drowsy, mildly sluggish.   Appearance:  Appears younger than stated age.  "Grooming is unkempt. Wears glasses and her own personal clothing. Wearing headphones.   Behavior/Attitude:  Cooperative and calm. Drowsy, seemed a little confused.  Eye Contact: Appropriate.   Psychomotor: No evidence of tics, dystonia, or tardive dyskinesia. No catatonia present.  Speech: Appropriate rate/tone/volume.   Language: Fluent in English with appropriate syntax and vocabulary.  Mood:  \"tired\"  Affect:  Euthymic. Congruent with mood.   Thought Process:  Linear and goal directed.  Thought Content:  Denies SI/SIB/AVH.  Associations:  intact  Insight:  limited   Judgment:  limited   Impulse control: fair  Attention Span:  adequate for conversation  Concentration:  grossly intact  Recent and Remote Memory:  Not formally assessed. Pt reports memory issues. Staff notes forgetfulness.   Fund of Knowledge: Average.  Muscle Strength and Tone: Normal.  Gait and Station: Normal.     Psychiatric Assessment     Anastasiya Simon is a 59 year old female previously diagnosed with schizophrenia and polysubstance use disorder who presented with suicidal ideation, disorganization, and inability to care for self. Most recent psychiatric hospitalization was 03/20/2019 for disorganization and suicidal ideation. Significant symptoms on admission include increasing suicidal ideation, disorganization, erratic behavior such as filling buckets with ammonia and mixing with bleach, substance use, auditory hallucinations, and paranoia. The MSE on admission was pertinent for AVH, paranoia, limited insight into MH. Today on interview Anastasiya denies SI and reports she had only made suicidal statements to her sisters previously as she did not want to move into a group home.  Psychological contributions to mental health presentation include maladaptive coping through substance use and limited insight into MH. Social factors contributing to mental health presentation include interpersonal conflicts and unstable living environment. Protective " factors include support from two sisters and absence of previous suicide attempts. On 4/16 we resumed Zyprexa 20 mg and increased Depakote to 1000 mg to address the ongoing psychosis, Tolterodine and PRN loperamide were started to address incontinence and diarrhea. On 4/18, we added PRN gabapentin 300 mg for peripheral neuropathy.     In summary, the patient's reported symptoms of SI, erractic behavior, AVH, and paranoia in the context of psychosocial stressers are consistent with decompensated schizophrenia and polysubstance use disorder.  She will likely benefit from medication optimization and outpatient MH referrals this admission.     Psychiatric Plan by Diagnosis      Today's changes:  - None    # Schizophrenia  Medications:  - olanzapine 5 mg in the morning, 15 mg HS  - depakote 1000 mg HS    #Polysubstance Use Disorder  - CD consult once psychotic sx more stable, if pt amenable    2. Pertinent Labs/Monitoring:  - Labs done 04/13 at OSH. CBC wnl, glucose elevated to 332, BMP wnl.  - UDS negative on 04/13  - 4/16 HbA1c: 10.1  - Depakote Levels: Processing    3. Additional Plans:  - Patient will be treated in therapeutic milieu with appropriate individual and group therapies as described.   - Consult with Internal Medicine for diabetes management.      Psychiatric Hospital Course:      Anastasiya Simon was admitted to [facility] on a 72 hour hold (started 4/13/25 21:35) from [facility] on 04/15/25.   Medications:  Continued Medications:  Pt seen by psychiatry consult at [facility] who started zyprexa 20 mg at bedtime and depakote 500 mg at bedtime. Both of these medications were continued on admission to [facility].   New Medications:  Started loperamide for diarrhea  Started tolterodine for urinary incontinence  Started gabapentin for peripheral neuropathy    4/16: Increased depakote from 500 mg to 1000 mg HS for mood regulation  4/16: Increased metformin from 500 mg to 1000 mg BID per  medicine consult  4/16: Zyprexa: 5 mg in the morning and 15 mg HS  4/16: Started PRN Loperamide 2 mg 4x/day for diarrhea  4/16: Started Tolterodine 2 mg daily for urinary incontinence  4/18: Started Gabapentin 300 mg PRN 3x/day for peripheral neuropathy  4/20: Started glipizide 24 hr tablet 5 mg daily for better glycemic control per medicine consult    The risks, benefits, alternatives, and side effects were discussed and understood by the patient and other caregivers.     Medical Assessment and Plan     Medical diagnoses to be addressed this admission:      #Type 2 Diabetes Mellitis with Complications  - PTA metformin 1000 mg BID with meals  - BID glucose checks; hypoglycemia protocol  - PRN gabapentin 300 mg 3x/day for peripheral neuropathy  - Insulin aspart (Novolog) injection 1-7 units HS  - Insulin aspart (Novolog) injection 3x/day 1-10 units before meals  - Glipizide 24 hr tablet 5 mg daily with breakfast for better glycemic control  - Internal Medicine Consult  - Nutrition Consult  - Consistent Carb Diet  - Given patient is starting insulin, please notify medicine team of any impending discharge at least 2-3 days prior in order to have a safe management plan for her diabetes.    #Diarrhea  - Loperamide 2 mg PRN 4x/day    #Urinary Incontinence  - Tolterodine 2 mg daily    Medical course:  Patient was physically examined by the ED prior to being transferred to the unit and was found to be medically stable and appropriate for admission.    Consults:   4/16: Internal Medicine consulted for management of diabetes and blood pressure.   4/16: Increased metformin to 1000 mg BID.   4/18: Started PRN gabapentin 300 mg 3x/day for peripheral neuropathy.   4/18: Internal Medicine started Novolog injection 1-5 units HS and Novolog injection 3x/day 1-7 units before meals for better glycemic control. Given patient is starting insulin, please notify medicine team of any impending discharge at least 2-3 days prior in order to  have a safe management plan for her diabetes.  4/19: Internal Medicine increased to high intensity sliding scale. Novolog injection 1-7 units HS. Novolog injection 3x/day 1-10 units before meals for better glycemic control.   4/20: Started PO glipizide 24 hr tablet 5 mg daily with breakfast.   4/21: Started pt on a consistent carb diet.   4/22: Nutrition Consult for diabetes management.   4/24: Note from Medicine: Please send patient with glucometer on discharge. Diabetes education should be ordered prior to discharge.     Checklist     Legal Status: Court Hold    Safety Assessment:   Behavioral Orders   Procedures    Code 1 - Restrict to Unit    Routine Programming     As clinically indicated    Status 15     Every 15 minutes.    Suicide precautions: Suicide Risk: MODERATE; Clinical rationale to override score: modification to the care environment, response to medication, lack of access to a plan for self-harm, Exhibiting Suicidal/self-harm behaviors or thoughts     Patients on Suicide Precautions should have a Combination Diet ordered that includes a Diet selection(s) AND a Behavioral Tray selection for Safe Tray - with utensils, or Safe Tray - NO utensils       Order Specific Question:   Suicide Risk     Answer:   MODERATE     Order Specific Question:   Clinical rationale to override score:     Answer:   modification to the care environment     Order Specific Question:   Clinical rationale to override score:     Answer:   response to medication     Order Specific Question:   Clinical rationale to override score:     Answer:   lack of access to a plan for self-harm     Order Specific Question:   Clinical rationale to override score:     Answer:   Exhibiting Suicidal/self-harm behaviors or thoughts     Risk Assessment:  Risk for harm is moderate.  Risk factors: maladaptive coping, substance use, impulsive, and past behaviors.  Protective factors: family.    SIO: None    Disposition: TBD. Disposition pending  clinical stabilization, medication optimization and development of an appropriate discharge plan.     Attestations     Tatyana Mcclure, MS3, N Medical Student     I was present with the medical student who participated in the service and in the documentation of the note.  I have verified the history and personally performed the physical exam and medical decision making. I agree with the assessment and plan of care as documented in the note.    This patient was seen and discussed with my attending physician.  Tanja Gonzalez MD   Psychiatry Resident Physician

## 2025-04-24 NOTE — PLAN OF CARE
Goal Outcome Evaluation:    Problem: Sleep Disturbance  Goal: Adequate Sleep/Rest  Outcome: Progressing     Pt was observed a sleep for 5.75 hours. No safety concerns noted. BG was 104 mg/dl.

## 2025-04-25 LAB
GLUCOSE BLDC GLUCOMTR-MCNC: 113 MG/DL (ref 70–99)
GLUCOSE BLDC GLUCOMTR-MCNC: 173 MG/DL (ref 70–99)
GLUCOSE BLDC GLUCOMTR-MCNC: 248 MG/DL (ref 70–99)
GLUCOSE BLDC GLUCOMTR-MCNC: 261 MG/DL (ref 70–99)

## 2025-04-25 PROCEDURE — 124N000002 HC R&B MH UMMC

## 2025-04-25 PROCEDURE — 90853 GROUP PSYCHOTHERAPY: CPT

## 2025-04-25 PROCEDURE — 97150 GROUP THERAPEUTIC PROCEDURES: CPT | Mod: GO

## 2025-04-25 PROCEDURE — 250N000013 HC RX MED GY IP 250 OP 250 PS 637

## 2025-04-25 PROCEDURE — 99232 SBSQ HOSP IP/OBS MODERATE 35: CPT | Mod: GC | Performed by: PSYCHIATRY & NEUROLOGY

## 2025-04-25 RX ORDER — GLIPIZIDE 2.5 MG/1
10 TABLET, EXTENDED RELEASE ORAL
Status: DISCONTINUED | OUTPATIENT
Start: 2025-04-26 | End: 2025-05-23 | Stop reason: HOSPADM

## 2025-04-25 RX ADMIN — METFORMIN HYDROCHLORIDE 1000 MG: 500 TABLET, FILM COATED ORAL at 08:44

## 2025-04-25 RX ADMIN — OLANZAPINE 5 MG: 5 TABLET, ORALLY DISINTEGRATING ORAL at 08:44

## 2025-04-25 RX ADMIN — PANTOPRAZOLE SODIUM 40 MG: 40 TABLET, DELAYED RELEASE ORAL at 08:44

## 2025-04-25 RX ADMIN — Medication 3 MG: at 21:48

## 2025-04-25 RX ADMIN — DIVALPROEX SODIUM 1000 MG: 500 TABLET, FILM COATED, EXTENDED RELEASE ORAL at 21:03

## 2025-04-25 RX ADMIN — Medication 1 TABLET: at 08:44

## 2025-04-25 RX ADMIN — TOLTERODINE TARTRATE 2 MG: 2 CAPSULE, EXTENDED RELEASE ORAL at 08:44

## 2025-04-25 RX ADMIN — METFORMIN HYDROCHLORIDE 1000 MG: 500 TABLET, FILM COATED ORAL at 18:15

## 2025-04-25 RX ADMIN — LOPERAMIDE HYDROCHLORIDE 2 MG: 2 CAPSULE ORAL at 21:29

## 2025-04-25 RX ADMIN — OLANZAPINE 15 MG: 15 TABLET, ORALLY DISINTEGRATING ORAL at 21:03

## 2025-04-25 RX ADMIN — GLIPIZIDE 5 MG: 2.5 TABLET, FILM COATED, EXTENDED RELEASE ORAL at 08:44

## 2025-04-25 ASSESSMENT — ACTIVITIES OF DAILY LIVING (ADL)
ADLS_ACUITY_SCORE: 47
DRESS: INDEPENDENT
ADLS_ACUITY_SCORE: 47
ORAL_HYGIENE: INDEPENDENT
ADLS_ACUITY_SCORE: 47
ADLS_ACUITY_SCORE: 47
ORAL_HYGIENE: INDEPENDENT
ADLS_ACUITY_SCORE: 47
ADLS_ACUITY_SCORE: 47
HYGIENE/GROOMING: INDEPENDENT
ADLS_ACUITY_SCORE: 47
HYGIENE/GROOMING: INDEPENDENT
ADLS_ACUITY_SCORE: 47
ADLS_ACUITY_SCORE: 47
LAUNDRY: WITH SUPERVISION
ADLS_ACUITY_SCORE: 47
DRESS: INDEPENDENT
ADLS_ACUITY_SCORE: 47

## 2025-04-25 NOTE — PROGRESS NOTES
Brief Medicine Follow Up Note    Medicine following for uncontrolled DM2 with hyperglycemia. So far this admission: Metformin increased, sliding scale insulin started & increased to high intensity, Glipizide started, and consistent carbohydrate diet initiated with improvement in BG control.     Contacted by Psychiatry team requesting taper and discontinuation of sliding scale as her new group home won't accept her while on sliding scale insulin.    Recent Labs   Lab 04/25/25  1204 04/25/25  0730 04/24/25  2058 04/24/25  1742 04/24/25  1202 04/24/25  0807   * 113* 216* 204* 157* 108*     Plan:  - Increase Glipizide XL to 10 mg daily beginning 4/26  - Continue Metformin 1000 mg BID  - Decrease to medium sliding scale insulin. Hopefully can discontinue in coming days.  - Moderate Consistent CHO diet    Medicine will continue to follow.    QI NinoC  Hospitalist Service

## 2025-04-25 NOTE — PLAN OF CARE
Team Note Due:  Wednesday    Assessment/Intervention/Current Symtoms and Care Coordination:  Chart review and met with team, discussed pt progress, symptomology, and response to treatment.  Discussed the discharge plan and any potential impediments to discharge.    No updates. Awaiting outcome from final court hearing yesterday. Will make IRTS referrals when pt is no longer on sliding scale insulin. Will also consider [facility] referral.     Discharge Plan or Goal:  [facility]     Barriers to Discharge:  Patient requires further psychiatric stabilization due to current symptomology, medication management with changes subject to provider, coordination with outside supports, and aftercare planning. Pt is also on a court hold and involved in the commitment process.     Referral Status:  None at this time     Legal Status:  Court hold     [place] Commitment and [place] filed  [facility]: [place]  File Number: [id number]  Start and expiration date of commitment: TBD    [place] meds requested: Zyprexa, Haldol, Abilify, Risperdal      Contacts:   Haydee Mares (CADI CM): [phone]   Tamika Johnson- (sister): [phone]  Lucille Reddy (Intervention ): [phone]     Upcoming Meetings and Dates/Important Information and next steps:  Follow commitment process  Add pt to [facility] wait list when commitment order is signed  Discharge planning when appropriate  Notify medicine team of any impending discharge at least 2-3 days prior in order to have a safe management plan for her diabetes    PD and COS needed at discharge - TBD

## 2025-04-25 NOTE — PLAN OF CARE
BEH IP Unit Acuity Rating Score (UARS)  Patient is given one point for every criteria they meet.    CRITERIA SCORING   On a 72 hour hold, court hold, committed, stay of commitment, or revocation. 1    Patient LOS on BEH unit exceeds 20 days. 0  LOS: 10   Patient under guardianship, 55+, otherwise medically complex, or under age 11. 1   Suicide ideation without relief of precipitating factors. 0   Current plan for suicide. 0   Current plan for homicide. 0   Imminent risk or actual attempt to seriously harm another without relief of factors precipitating the attempt. 0   Severe dysfunction in daily living (ex: complete neglect for self care, extreme disruption in vegetative function, extreme deterioration in social interactions). 1   Recent (last 7 days) or current physical aggression in the ED or on unit. 0   Restraints or seclusion episode in past 72 hours. 0   Recent (last 7 days) or current verbal aggression, agitation, yelling, etc., while in the ED or unit. 0   Active psychosis. 1   Need for constant or near constant redirection (from leaving, from others, etc).  0   Intrusive or disruptive behaviors. 0   Patient requires 3 or more hours of individualized nursing care per 8-hour shift (i.e. for ADLs, meds, therapeutic interventions). 0   TOTAL 4

## 2025-04-25 NOTE — PLAN OF CARE
Group Attendance:  attended partial group    Time session began: 1020  Time session ended: 1100  Patient's total time in group: 25    Total # Attendees   7   Group Type psychotherapeutic and CBT     Group Topic Covered incorporating skill sets, healthy coping skills, CBT skills , and relationships and boundaries     Group Session Detail Cognitive model: Explored the link between thoughts, emotions, and behaviors using diagrams and applied the cognitive model to real-life situations. Used CBT tools and instruction to improve insight, awareness, identifying unhealthy patterns and self-talk and replacing with healthier patterns and self-talk      Patient's response to the group topic/interactions:  positive affect, cooperative with task, organized, listened actively, and attentive     Patient Details: Pt joined group partway through activity but quickly became a somewhat engaged participant. Pt was at times tangential, off-topic but when asked closed ended questions was able to respond appropriately.              10556 - Group psychotherapy - 1 Session  Patient Active Problem List   Diagnosis    Suicidal ideation    Type 2 diabetes mellitus (H)    Chronic hepatitis C (H)    Schizophrenia (H)    Acid reflux disease    Hoarding behavior

## 2025-04-25 NOTE — PLAN OF CARE
Problem: Sleep Disturbance  Goal: Adequate Sleep/Rest  4/25/2025 0629 by Cathy Grant RN  Outcome: Progressing   Goal Outcome Evaluation:         Pt appeared sleeping most of the night. No signs or symptoms of pain or discomfort noted during 15 minutes safety checks. Pt slept for 6.75  hours. No prn given this shift. No safety concern noted this shift. No outburst behavior noted this shift. Will continue to monitor and will assist if need arise.

## 2025-04-25 NOTE — PROGRESS NOTES
Rehab Group    Start time: 1115  End time: 1200  Patient time total: 15 minutes    attended partial group    #7 attended   Group Type: occupational therapy   Group Topic Covered: activity therapy and cognitive activities     Group Session Detail:  The focus of today's group was leisure exploration and engagement (the noise game). Patient engaged in a therapeutic game to encourage new learning, problem solving, focus, socialization, and cognitive wellness. This game encouraged cognitive skill building, such as: initiation, planning, organization, sequencing, and attention.         Patient Response/Contribution:  distracted , inattentive, and stared out windows and did not engage     Patient Detail:  Pt arrived to group late today, presenting with a blunted and guarded affect. Pt sat on periphery of room, refusing opportunities to participate in group task. Pt observed to look out window, and occasionally watch group activity, will continue to encourage attendance and participation.        [id number] OT Group (2 or more in attendance)      Patient Active Problem List   Diagnosis    Suicidal ideation    Type 2 diabetes mellitus (H)    Chronic hepatitis C (H)    Schizophrenia (H)    Acid reflux disease    Hoarding behavior

## 2025-04-25 NOTE — PROGRESS NOTES
----------------------------------------------------------------------------------------------------------  [facility]  Psychiatry Progress Note  Hospital Day #10     Interim History:     The patient's care was discussed with the treatment team and chart notes were reviewed.    Patient ID: Anastasiya Simon is a 59 year old female with a previous psychiatric diagnosis of schizophrenia and polysubstance use admitted from [facility] on 04/15/2025 due to concern for SI and psychosis in the context of psychosocial stressors including living situation and argument with family member.     Vitals: Temp: 97.4  F (36.3  C) Temp  Min: 97.4  F (36.3  C)  Max: 98.2  F (36.8  C)  SpO2: 96 % SpO2  Min: 96 %  Max: 96 %  Pulse: 97 Pulse  Min: 97  Max: 106  BP: (!) 155/86 Systolic (24hrs), Av , Min:151 , Max:155   Diastolic (24hrs), Av, Min:75, Max:86  Sleep: 6.75 hours (25 0700)  Scheduled medications: Took all scheduled medications as prescribed.  Psychiatric PRN medications:   Last 24H PRN:     calcium carbonate (TUMS) chewable tablet 500 mg, 500 mg at 25 1750    Staff Report:   AM: Pt was seen in the lounge intermittently and spent most of the shift sitting in the lounge. Pt does not engages much but she interacts with select peer. Pt had breakfast. Complaint with medications. Denied all mental health symptoms. Pt is dismissive and guarded. Affect is flat and blunted. Mood is labile. BG before breakfast 108 and before lunch 157. Pt received coverage for lunch. Hygiene is unkempt but pt had a shower last evening.  PM: Visible in milieu, mostly watched tv, social and engaged with peers. Pleasant, cooperative and medication compliant, TUMS PRN given to manage heart burn. ADLs WNL. Denied any anxiety or depression, dismissive with MH assessment. Denied any pain/discomfort. Food and fluid intake WNL. Alert and oriented, forgetful at times, appeared  "somewhat drowsy. Patient was able to communicate needs. Pt slept for 6.75  hours. BG 1800: 204. BG 2100: 216.    0730 B  OT Staff Report: Pt observed to be tangential upon return, discussing cleaning \"rust, bugs, and mold\" reporting \"I'm in here just because I was trying to clean.\" Pt explained in detail attempting to clean \"orange bubbling mold, there were hundreds of little white bugs crawling out of it.\"    Subjective:     Patient Interview:  Pt was interviewed in her room. Pt was lying down at first and sat up to have a conversation with her care team.     Pt says that she is \"fine\". Pt requested her phone again. Pt wants to research where she wants to live and look at pictures. She would like the nurses to give her phones numbers to use the unit phone to call. She would like to talk to her sister, but not right now. Discussed getting off of sliding scale insulin for IRTS placement and that we are working with Internal Medicine to make this change. Pt asked if she can leave if she finds a place. Discussed waiting for the court decision to come back, which should hopefully be next week. Pt was agreeable. No concerns at this time.      Objective:     Vitals:  BP (!) 155/86 (Patient Position: Sitting, Cuff Size: Adult Regular)   Pulse 97   Temp 97.4  F (36.3  C) (Oral)   Resp 16   Ht 1.651 m (5' 5\")   Wt 70.3 kg (155 lb)   LMP 10/22/2013   SpO2 96%   BMI 25.79 kg/m      Allergies:  Allergies   Allergen Reactions    Tetracycline Unknown     It happened when pt was a baby       Current Medications:  Scheduled:  Current Facility-Administered Medications   Medication Dose Route Frequency Provider Last Rate Last Admin    calcium carbonate (TUMS) chewable tablet 500 mg  500 mg Oral Daily PRN Tanja Gonzalez MD   500 mg at 25 1750    glucose gel 15-30 g  15-30 g Oral Q15 Min PRN Katina Armendariz MD        Or    dextrose 50 % injection 25-50 mL  25-50 mL Intravenous Q15 Min PRN Marcello, " Katina Calloway MD        Or    glucagon injection 1 mg  1 mg Subcutaneous Q15 Min PRN Katina Armendariz MD        divalproex sodium extended-release (DEPAKOTE ER) 24 hr tablet 1,000 mg  1,000 mg Oral At Bedtime Tanja Gonzalez MD   1,000 mg at 04/24/25 2102    gabapentin (NEURONTIN) capsule 300 mg  300 mg Oral TID PRN Tanja Gonzalez MD   300 mg at 04/22/25 2017    glipiZIDE (GLUCOTROL XL) 24 hr tablet 5 mg  5 mg Oral Daily with breakfast Charlotte Jasmine PA-C   5 mg at 04/25/25 0844    hydrOXYzine HCl (ATARAX) tablet 25 mg  25 mg Oral Q4H PRN Lucy Chandler MD   25 mg at 04/21/25 2218    insulin aspart (NovoLOG) injection (RAPID ACTING)  1-10 Units Subcutaneous TID AC Charlotte Jasmine PA-C   3 Units at 04/24/25 1811    insulin aspart (NovoLOG) injection (RAPID ACTING)  1-7 Units Subcutaneous At Bedtime Charlotte Jasmine PA-C   1 Units at 04/24/25 2102    loperamide (IMODIUM) capsule 2 mg  2 mg Oral 4x Daily PRN Tanja Gonzalez MD   2 mg at 04/16/25 1244    magnesium sulfate (EPSOM SALT) granules 1 Tablespoonful  1 Tablespoonful Topical Daily PRN Tanja Gonzalez MD        melatonin tablet 3 mg  3 mg Oral At Bedtime PRN Katina Armendariz MD   3 mg at 04/23/25 2250    metFORMIN (GLUCOPHAGE) tablet 1,000 mg  1,000 mg Oral BID w/meals Charlotte Jasmine PA-C   1,000 mg at 04/25/25 0844    multivitamin w/minerals (THERA-VIT-M) tablet 1 tablet  1 tablet Oral Daily Charlotte Jasmine PA-C   1 tablet at 04/25/25 0844    OLANZapine (zyPREXA) tablet 10 mg  10 mg Oral TID PRN Lucy Chandler MD        Or    OLANZapine (zyPREXA) injection 10 mg  10 mg Intramuscular TID PRN Lucy Chandler MD        OLANZapine zydis (zyPREXA) ODT tab 15 mg  15 mg Oral At Bedtime Tanja Gonzalez MD   15 mg at 04/24/25 2102    OLANZapine zydis (zyPREXA) ODT tab 5 mg  5 mg Oral Daily Tanja Gonzalez MD   5 mg at 04/25/25 0844    pantoprazole (PROTONIX) EC tablet 40 mg  40 mg  Oral QAM AC Lucy Chandler MD   40 mg at 04/25/25 0844    tolterodine ER (DETROL LA) 24 hr capsule 2 mg  2 mg Oral Daily Tanja Gonzalez MD   2 mg at 04/25/25 0844     PRN:  Current Facility-Administered Medications   Medication Dose Route Frequency Provider Last Rate Last Admin    calcium carbonate (TUMS) chewable tablet 500 mg  500 mg Oral Daily PRN Tanja Gonzalez MD   500 mg at 04/24/25 1750    glucose gel 15-30 g  15-30 g Oral Q15 Min PRN Katina Armendariz MD        Or    dextrose 50 % injection 25-50 mL  25-50 mL Intravenous Q15 Min PRN Katina Armendariz MD        Or    glucagon injection 1 mg  1 mg Subcutaneous Q15 Min PRN Katina Armendariz MD        divalproex sodium extended-release (DEPAKOTE ER) 24 hr tablet 1,000 mg  1,000 mg Oral At Bedtime Tanja Gonzalez MD   1,000 mg at 04/24/25 2102    gabapentin (NEURONTIN) capsule 300 mg  300 mg Oral TID PRN Tanja Gonzalez MD   300 mg at 04/22/25 2017    glipiZIDE (GLUCOTROL XL) 24 hr tablet 5 mg  5 mg Oral Daily with breakfast Charlotte Jasmine PA-C   5 mg at 04/25/25 0844    hydrOXYzine HCl (ATARAX) tablet 25 mg  25 mg Oral Q4H PRN Lucy Chandler MD   25 mg at 04/21/25 2218    insulin aspart (NovoLOG) injection (RAPID ACTING)  1-10 Units Subcutaneous TID AC Charlotte Jasmine PA-C   3 Units at 04/24/25 1811    insulin aspart (NovoLOG) injection (RAPID ACTING)  1-7 Units Subcutaneous At Bedtime Charlotte Jasmine PA-C   1 Units at 04/24/25 2102    loperamide (IMODIUM) capsule 2 mg  2 mg Oral 4x Daily PRN Tanja Gonzalez MD   2 mg at 04/16/25 1244    magnesium sulfate (EPSOM SALT) granules 1 Tablespoonful  1 Tablespoonful Topical Daily PRN Tanja Gonzalez MD        melatonin tablet 3 mg  3 mg Oral At Bedtime Katina Hanley MD   3 mg at 04/23/25 2250    metFORMIN (GLUCOPHAGE) tablet 1,000 mg  1,000 mg Oral BID w/meals Charlotte Jasmine PA-C   1,000 mg at 04/25/25 0825    multivitamin  w/minerals (THERA-VIT-M) tablet 1 tablet  1 tablet Oral Daily Charlotte Jasmine PA-C   1 tablet at 04/25/25 0844    OLANZapine (zyPREXA) tablet 10 mg  10 mg Oral TID PRN Lucy Chandler MD        Or    OLANZapine (zyPREXA) injection 10 mg  10 mg Intramuscular TID PRN Lucy Chandler MD        OLANZapine zydis (zyPREXA) ODT tab 15 mg  15 mg Oral At Bedtime Tanja Gonzalez MD   15 mg at 04/24/25 2102    OLANZapine zydis (zyPREXA) ODT tab 5 mg  5 mg Oral Daily Tanja Gonzalez MD   5 mg at 04/25/25 0844    pantoprazole (PROTONIX) EC tablet 40 mg  40 mg Oral QAM AC Lucy Chandler MD   40 mg at 04/25/25 0844    tolterodine ER (DETROL LA) 24 hr capsule 2 mg  2 mg Oral Daily Tanja Gonzalez MD   2 mg at 04/25/25 0844     Labs and Imaging:  New results:   Recent Results (from the past 24 hours)   Glucose by meter    Collection Time: 04/24/25 12:02 PM   Result Value Ref Range    GLUCOSE BY METER POCT 157 (H) 70 - 99 mg/dL   Glucose by meter    Collection Time: 04/24/25  5:42 PM   Result Value Ref Range    GLUCOSE BY METER POCT 204 (H) 70 - 99 mg/dL   Glucose by meter    Collection Time: 04/24/25  8:58 PM   Result Value Ref Range    GLUCOSE BY METER POCT 216 (H) 70 - 99 mg/dL   Glucose by meter    Collection Time: 04/25/25  7:30 AM   Result Value Ref Range    GLUCOSE BY METER POCT 113 (H) 70 - 99 mg/dL     New Results  Valproic Acid Free & Total: 67  Valproic Acid Free & Total STAT: 75.6    Data this admission:  CBC: WNL. WBC: 7.6, RBC: 4.37, Hb: 13.0, Platelets: 316.  CMP: Unremarkable (4/22) AST: 13, ALT: 10, Creatinine: 0.65.  UDS: Negative  RASHIDA: 0.00 (4/13)  HbA1c: 10.1%  TSH: 2.40  Vit: B12: 812  Folate: 13.7    Imaging:  Bladder Scan: Completed on 4/22     Mental Status Exam:     Oriented to:  Grossly oriented.  General: Drowsy at first, became more alert as the interview went on. Lying in bed and sat up as we started asking questions.   Appearance:  Appears younger  "than stated age. Grooming is unkempt. Wears glasses.  Behavior/Attitude:  Cooperative and calm. Sleepy.   Eye Contact: Appropriate.   Psychomotor: No evidence of tics, dystonia, or tardive dyskinesia. No catatonia present.  Speech: Appropriate rate/tone/volume.   Language: Fluent in English with appropriate syntax and vocabulary.  Mood:  \"fine\"  Affect:  Euthymic. Congruent with mood.   Thought Process:  Linear and goal directed.  Thought Content:  Denies SI/SIB/AVH.  Associations:  intact  Insight:  limited   Judgment:  limited   Impulse control: fair  Attention Span:  adequate for conversation  Concentration:  grossly intact  Recent and Remote Memory:  Not formally assessed. Pt reports memory issues. Staff notes forgetfulness.   Fund of Knowledge: Average.  Muscle Strength and Tone: Normal.  Gait and Station: Normal.     Psychiatric Assessment     Anastasiya Simon is a 59 year old female previously diagnosed with schizophrenia and polysubstance use disorder who presented with suicidal ideation, disorganization, and inability to care for self. Most recent psychiatric hospitalization was 03/20/2019 for disorganization and suicidal ideation. Significant symptoms on admission include increasing suicidal ideation, disorganization, erratic behavior such as filling buckets with ammonia and mixing with bleach, substance use, auditory hallucinations, and paranoia. The MSE on admission was pertinent for AVH, paranoia, limited insight into MH. Today on interview Anastasiya denies SI and reports she had only made suicidal statements to her sisters previously as she did not want to move into a group home.  Psychological contributions to mental health presentation include maladaptive coping through substance use and limited insight into MH. Social factors contributing to mental health presentation include interpersonal conflicts and unstable living environment. Protective factors include support from two sisters and absence of " previous suicide attempts. On 4/16 we resumed Zyprexa 20 mg and increased Depakote to 1000 mg to address the ongoing psychosis, Tolterodine and PRN loperamide were started to address incontinence and diarrhea. On 4/18, we added PRN gabapentin 300 mg for peripheral neuropathy.     In summary, the patient's reported symptoms of SI, erractic behavior, AVH, and paranoia in the context of psychosocial stressers are consistent with decompensated schizophrenia and polysubstance use disorder.  She will likely benefit from medication optimization and outpatient MH referrals this admission.     Psychiatric Plan by Diagnosis      Today's changes:  - None    # Schizophrenia  Medications:  - olanzapine 5 mg in the morning, 15 mg HS  - depakote 1000 mg HS    #Polysubstance Use Disorder  - CD consult once psychotic sx more stable, if pt amenable    2. Pertinent Labs/Monitoring:  - Labs done 04/13 at OSH. CBC wnl, glucose elevated to 332, BMP wnl.  - UDS negative on 04/13  - 4/16 HbA1c: 10.1  - Depakote Levels: Processing    3. Additional Plans:  - Patient will be treated in therapeutic milieu with appropriate individual and group therapies as described.   - Consult with Internal Medicine for diabetes management.      Psychiatric Hospital Course:      Anastasiya Simon was admitted to [facility] on a 72 hour hold (started 4/13/25 21:35) from [facility] on 04/15/25.   Medications:  Continued Medications:  Pt seen by psychiatry consult at [facility] who started zyprexa 20 mg at bedtime and depakote 500 mg at bedtime. Both of these medications were continued on admission to [facility].   New Medications:  Started loperamide for diarrhea  Started tolterodine for urinary incontinence  Started gabapentin for peripheral neuropathy    4/16: Increased depakote from 500 mg to 1000 mg HS for mood regulation  4/16: Increased metformin from 500 mg to 1000 mg BID per medicine consult  4/16: Zyprexa: 5 mg in the morning and 15  mg HS  4/16: Started PRN Loperamide 2 mg 4x/day for diarrhea  4/16: Started Tolterodine 2 mg daily for urinary incontinence  4/18: Started Gabapentin 300 mg PRN 3x/day for peripheral neuropathy  4/20: Started glipizide 24 hr tablet 5 mg daily for better glycemic control per medicine consult    The risks, benefits, alternatives, and side effects were discussed and understood by the patient and other caregivers.     Medical Assessment and Plan     Medical diagnoses to be addressed this admission:      #Type 2 Diabetes Mellitis with Complications  - PTA metformin 1000 mg BID with meals  - BID glucose checks; hypoglycemia protocol  - PRN gabapentin 300 mg 3x/day for peripheral neuropathy  - Insulin aspart (Novolog) injection 1-7 units HS  - Insulin aspart (Novolog) injection 3x/day 1-10 units before meals  - Glipizide 24 hr tablet 5 mg daily with breakfast for better glycemic control  - Internal Medicine Consult  - Nutrition Consult  - Consistent Carb Diet  - Given patient is starting insulin, please notify medicine team of any impending discharge at least 2-3 days prior in order to have a safe management plan for her diabetes.    #Diarrhea  - Loperamide 2 mg PRN 4x/day    #Urinary Incontinence  - Tolterodine 2 mg daily    Medical course:  Patient was physically examined by the ED prior to being transferred to the unit and was found to be medically stable and appropriate for admission.    Consults:   4/16: Internal Medicine consulted for management of diabetes and blood pressure.   4/16: Increased metformin to 1000 mg BID.   4/18: Started PRN gabapentin 300 mg 3x/day for peripheral neuropathy.   4/18: Internal Medicine started Novolog injection 1-5 units HS and Novolog injection 3x/day 1-7 units before meals for better glycemic control. Given patient is starting insulin, please notify medicine team of any impending discharge at least 2-3 days prior in order to have a safe management plan for her diabetes.  4/19:  Internal Medicine increased to high intensity sliding scale. Novolog injection 1-7 units HS. Novolog injection 3x/day 1-10 units before meals for better glycemic control.   4/20: Started PO glipizide 24 hr tablet 5 mg daily with breakfast.   4/21: Started pt on a consistent carb diet.   4/22: Nutrition Consult for diabetes management.   4/24: Note from Medicine: Please send patient with glucometer on discharge. Diabetes education should be ordered prior to discharge.     Checklist     Legal Status: Court Hold    Safety Assessment:   Behavioral Orders   Procedures    Code 1 - Restrict to Unit    Routine Programming     As clinically indicated    Status 15     Every 15 minutes.    Suicide precautions: Suicide Risk: MODERATE; Clinical rationale to override score: modification to the care environment, response to medication, lack of access to a plan for self-harm, Exhibiting Suicidal/self-harm behaviors or thoughts     Patients on Suicide Precautions should have a Combination Diet ordered that includes a Diet selection(s) AND a Behavioral Tray selection for Safe Tray - with utensils, or Safe Tray - NO utensils       Order Specific Question:   Suicide Risk     Answer:   MODERATE     Order Specific Question:   Clinical rationale to override score:     Answer:   modification to the care environment     Order Specific Question:   Clinical rationale to override score:     Answer:   response to medication     Order Specific Question:   Clinical rationale to override score:     Answer:   lack of access to a plan for self-harm     Order Specific Question:   Clinical rationale to override score:     Answer:   Exhibiting Suicidal/self-harm behaviors or thoughts     Risk Assessment:  Risk for harm is moderate.  Risk factors: maladaptive coping, substance use, impulsive, and past behaviors.  Protective factors: family.    SIO: None    Disposition: TBD. Disposition pending clinical stabilization, medication optimization and  development of an appropriate discharge plan.     Attestations     Tatyana Mcclure, MS3, [facility] Medical Student     I was present with the medical student who participated in the service and in the documentation of the note.  I have verified the history and personally performed the physical exam and medical decision making. I agree with the assessment and plan of care as documented in the note.    This patient was seen and discussed with my attending physician.  Tanja Gonzalez MD   Psychiatry Resident Physician     Attestation:  This patient has been seen and evaluated by me, Joceline Dela Cruz MD.  I have discussed this patient with the house staff team including the resident and/or medical student and I agree with the findings and plan in this note.    I have reviewed today's vital signs, medications, labs and imaging. Joceline Dela Cruz MD , PhD.

## 2025-04-25 NOTE — PROGRESS NOTES
"  Rehab Group    Start time: 1315  End time: 1500  Patient time total: 95 minutes    came in and out of group session    #8 attended   Group Type: OT Clinic   Group Topic Covered: balanced lifestyle, coping skills, healthy leisure time, and social skills     Group Session Detail:  Pt actively participated in occupational therapy clinic to facilitate coping skill exploration, creative expression within personally meaningful activities, and clinical observation of social, cognitive, and kinesthetic performance skills.        Patient Response/Contribution:  cooperative with task, safe use of materials/supplies, disorganized, and nonsensical verbalizations     Patient Detail:  Pt arrived to group with blunted affect, pt presented as disorganized, seeming to have moments of clarity. Pt required min verbal prompts to initiate, gather materials, sequence, and adjust to workspace demands as needed. Demonstrated good focus, planning, and problem solving for selected structured creative expression tasks. Able to ask for assistance as needed, and engaged in discussion with peers and staff throughout. Pt became tangential at times about different topics, often difficult to understand due to quiet slurred speech. Pt reported increased appetite while hospitalized. Pt discussed being \"at a mall, and then getting arrested... that happens to me every time I go to the mall.. avoid the third floor.\" Pt exhibited good focus on task throughout. Will continue to encourage attendance and participation.       [id number] OT Group (2 or more in attendance)      Patient Active Problem List   Diagnosis    Suicidal ideation    Type 2 diabetes mellitus (H)    Chronic hepatitis C (H)    Schizophrenia (H)    Acid reflux disease    Hoarding behavior       "

## 2025-04-25 NOTE — PLAN OF CARE
Pt visible in the milieu, social with select peers. Attends a majority of groups offered. Pt denies all mental health symptoms, states she is bored here and wants to go shopping. She can be slightly difficult to understand sometimes as she tends to mumble and ramble during conversation. Pt is med compliant, no PRNs given. BG readings of 113 and 173 this shift, lunchtime BG covered with sliding scale insulin.     Problem: Psychotic Signs/Symptoms  Goal: Improved Behavioral Control (Psychotic Signs/Symptoms)  Outcome: Progressing   Goal Outcome Evaluation:    Plan of Care Reviewed With: patient

## 2025-04-26 LAB
GLUCOSE BLDC GLUCOMTR-MCNC: 153 MG/DL (ref 70–99)
GLUCOSE BLDC GLUCOMTR-MCNC: 200 MG/DL (ref 70–99)
GLUCOSE BLDC GLUCOMTR-MCNC: 202 MG/DL (ref 70–99)
GLUCOSE BLDC GLUCOMTR-MCNC: 215 MG/DL (ref 70–99)

## 2025-04-26 PROCEDURE — 97150 GROUP THERAPEUTIC PROCEDURES: CPT | Mod: GO

## 2025-04-26 PROCEDURE — 250N000013 HC RX MED GY IP 250 OP 250 PS 637

## 2025-04-26 PROCEDURE — 250N000013 HC RX MED GY IP 250 OP 250 PS 637: Performed by: PHYSICIAN ASSISTANT

## 2025-04-26 PROCEDURE — 124N000002 HC R&B MH UMMC

## 2025-04-26 RX ADMIN — OLANZAPINE 5 MG: 5 TABLET, ORALLY DISINTEGRATING ORAL at 08:26

## 2025-04-26 RX ADMIN — GLIPIZIDE 10 MG: 2.5 TABLET, FILM COATED, EXTENDED RELEASE ORAL at 08:26

## 2025-04-26 RX ADMIN — PANTOPRAZOLE SODIUM 40 MG: 40 TABLET, DELAYED RELEASE ORAL at 08:26

## 2025-04-26 RX ADMIN — Medication 1 TABLET: at 08:26

## 2025-04-26 RX ADMIN — METFORMIN HYDROCHLORIDE 1000 MG: 500 TABLET, FILM COATED ORAL at 08:26

## 2025-04-26 RX ADMIN — TOLTERODINE TARTRATE 2 MG: 2 CAPSULE, EXTENDED RELEASE ORAL at 08:26

## 2025-04-26 RX ADMIN — LOPERAMIDE HYDROCHLORIDE 2 MG: 2 CAPSULE ORAL at 19:35

## 2025-04-26 RX ADMIN — DIVALPROEX SODIUM 1000 MG: 500 TABLET, FILM COATED, EXTENDED RELEASE ORAL at 21:19

## 2025-04-26 RX ADMIN — OLANZAPINE 15 MG: 15 TABLET, ORALLY DISINTEGRATING ORAL at 21:19

## 2025-04-26 RX ADMIN — METFORMIN HYDROCHLORIDE 1000 MG: 500 TABLET, FILM COATED ORAL at 18:03

## 2025-04-26 ASSESSMENT — ACTIVITIES OF DAILY LIVING (ADL)
ADLS_ACUITY_SCORE: 47
HYGIENE/GROOMING: INDEPENDENT
ORAL_HYGIENE: INDEPENDENT
ADLS_ACUITY_SCORE: 47
LAUNDRY: WITH SUPERVISION
ADLS_ACUITY_SCORE: 47
ADLS_ACUITY_SCORE: 47
ORAL_HYGIENE: INDEPENDENT
DRESS: INDEPENDENT
HYGIENE/GROOMING: INDEPENDENT
ADLS_ACUITY_SCORE: 47
ADLS_ACUITY_SCORE: 47
LAUNDRY: WITH SUPERVISION
ADLS_ACUITY_SCORE: 47
DRESS: INDEPENDENT
ADLS_ACUITY_SCORE: 47

## 2025-04-26 NOTE — PLAN OF CARE
Problem: Sleep Disturbance  Goal: Adequate Sleep/Rest  Outcome: Progressing   Goal Outcome Evaluation:  Patient appears to have slept for 7 hours. No behavioral concerns during the shift. No noted signs of pain or discomfort during routine safety checks. NO requests for prn's. Safety checks in place q 15 minutes.

## 2025-04-26 NOTE — PLAN OF CARE
"Pt was visible in the milieu most of the time sitting in the lounge watching tv with peers. Pt took a nap during reflection time. Pt presents with blunted affect. Pt reports being sleepy most of the time while in the lounge. Pt requesting for (caffeine pills\" saying that she uses that at home or drinks coffee to stay awake. Denied for suicidal ideations, anxiety or hallucinations. Pt did not engage with others but was sitting quietly watching tv. Pt reported having diarrhea x2 and prn Imodium was administered and was effective. Pt took a shower this evening.      Goal Outcome Evaluation:    Plan of Care Reviewed With: patient      Problem: Adult Behavioral Health Plan of Care  Goal: Adheres to Safety Considerations for Self and Others  Outcome: Progressing  Intervention: Develop and Maintain Individualized Safety Plan  Recent Flowsheet Documentation  Taken 4/26/2025 1736 by Petar Jarrett RN  Safety Measures: environmental rounds completed     Problem: Suicide Risk  Goal: Absence of Self-Harm  Outcome: Progressing                  "

## 2025-04-26 NOTE — PLAN OF CARE
Pt intermittently visible in the milieu, though spent a majority of the day resting in bed today. Pt denies all mental health symptoms upon assessment. She is slightly irritable, but no significant behavioral concerns. She is med compliant, no PRNs received. BG readings of 153 and 200 this shift. Pt continues to appear unkempt, did not shower today.    Problem: Psychotic Signs/Symptoms  Goal: Improved Mood Symptoms (Psychotic Signs/Symptoms)  Outcome: Progressing   Goal Outcome Evaluation:    Plan of Care Reviewed With: patient

## 2025-04-26 NOTE — PLAN OF CARE
Rehab Group    Start time: 1045  End time: 1145  Patient time total: 12 minutes    attended partial group    #4 attended   Group Type: occupational therapy and general health and coping   Group Topic Covered: cognitive activities, coping skills, and healthy leisure time   Group Session Detail:OT Wellness group: Patient engaged in a leisure activity with a visuospatial and cognitive component in order to promote: problem solving skills, improve attention, recall, emphasize new learning, exercise cognitive skills, and foster leisure and healthy distraction techniques.   Patient Response/Contribution:  worked intermittently, Limited eye contact, and disorganized   Patient Detail:pt arrived later during group activity. Pt flat, blunted. Pt appeared disorganized and disheveled. Pt was provided orientation to group task. Pt agreeable to participate. Pt provided with set up of supplies. Pt minimally engaged in task. Pt did not write any responses for first round of activity. Pt was able to focus for approx 3 minutes for next round of activity. Pt did write a few responses and they were in context. Pt quietly got up from table and left group space, pt did not return      [id number] OT Group (2 or more in attendance)      Patient Active Problem List   Diagnosis    Suicidal ideation    Type 2 diabetes mellitus (H)    Chronic hepatitis C (H)    Schizophrenia (H)    Acid reflux disease    Hoarding behavior

## 2025-04-26 NOTE — PLAN OF CARE
Problem: Diabetes  Goal: Optimal Coping  Outcome: Progressing     Problem: Psychotic Signs/Symptoms  Goal: Improved Behavioral Control (Psychotic Signs/Symptoms)  Outcome: Progressing   Goal Outcome Evaluation:    Plan of Care Reviewed With: patient      No abnormal behavior noted.Patient present in milieu majority of shift, socializing and watching tv with peers.On approach, patient was pleasant, cooperative and and humorous. She denied all mental health symptoms, but reported having diarrhea to which she requested and received PRN imodium pending outcome.Food and fluid intake remains good and patient though forgetful at times was alert and oriented during assessment. BG level was 248 @ dinner and  @ HS with NovoLog  administered per sliding scale( See MAR for details).Staff will continue to monitor and redirect patient when she forgets as well as  ensures that patient's blood sugar levels are within therapeutic range.

## 2025-04-27 LAB
GLUCOSE BLDC GLUCOMTR-MCNC: 128 MG/DL (ref 70–99)
GLUCOSE BLDC GLUCOMTR-MCNC: 128 MG/DL (ref 70–99)
GLUCOSE BLDC GLUCOMTR-MCNC: 149 MG/DL (ref 70–99)
GLUCOSE BLDC GLUCOMTR-MCNC: 160 MG/DL (ref 70–99)
GLUCOSE BLDC GLUCOMTR-MCNC: 229 MG/DL (ref 70–99)

## 2025-04-27 PROCEDURE — 99207 PR NO BILLABLE SERVICE THIS VISIT: CPT | Performed by: PHYSICIAN ASSISTANT

## 2025-04-27 PROCEDURE — 250N000013 HC RX MED GY IP 250 OP 250 PS 637

## 2025-04-27 PROCEDURE — 250N000013 HC RX MED GY IP 250 OP 250 PS 637: Performed by: PHYSICIAN ASSISTANT

## 2025-04-27 PROCEDURE — 124N000002 HC R&B MH UMMC

## 2025-04-27 RX ADMIN — Medication 3 MG: at 21:55

## 2025-04-27 RX ADMIN — PANTOPRAZOLE SODIUM 40 MG: 40 TABLET, DELAYED RELEASE ORAL at 08:14

## 2025-04-27 RX ADMIN — OLANZAPINE 5 MG: 5 TABLET, ORALLY DISINTEGRATING ORAL at 08:14

## 2025-04-27 RX ADMIN — TOLTERODINE TARTRATE 2 MG: 2 CAPSULE, EXTENDED RELEASE ORAL at 08:13

## 2025-04-27 RX ADMIN — Medication 1 TABLET: at 08:14

## 2025-04-27 RX ADMIN — OLANZAPINE 15 MG: 15 TABLET, ORALLY DISINTEGRATING ORAL at 20:27

## 2025-04-27 RX ADMIN — METFORMIN HYDROCHLORIDE 1000 MG: 500 TABLET, FILM COATED ORAL at 08:14

## 2025-04-27 RX ADMIN — METFORMIN HYDROCHLORIDE 1000 MG: 500 TABLET, FILM COATED ORAL at 18:12

## 2025-04-27 RX ADMIN — DIVALPROEX SODIUM 1000 MG: 500 TABLET, FILM COATED, EXTENDED RELEASE ORAL at 20:28

## 2025-04-27 RX ADMIN — GLIPIZIDE 10 MG: 2.5 TABLET, FILM COATED, EXTENDED RELEASE ORAL at 08:14

## 2025-04-27 ASSESSMENT — ACTIVITIES OF DAILY LIVING (ADL)
LAUNDRY: WITH SUPERVISION
ADLS_ACUITY_SCORE: 47
ADLS_ACUITY_SCORE: 47
ORAL_HYGIENE: INDEPENDENT
ADLS_ACUITY_SCORE: 47
HYGIENE/GROOMING: INDEPENDENT
ADLS_ACUITY_SCORE: 47
HYGIENE/GROOMING: HANDWASHING;INDEPENDENT
ADLS_ACUITY_SCORE: 47
DRESS: SCRUBS (BEHAVIORAL HEALTH);INDEPENDENT
ADLS_ACUITY_SCORE: 47
LAUNDRY: WITH SUPERVISION
ORAL_HYGIENE: INDEPENDENT
ADLS_ACUITY_SCORE: 47
ADLS_ACUITY_SCORE: 47
DRESS: INDEPENDENT
ADLS_ACUITY_SCORE: 47

## 2025-04-27 NOTE — PROGRESS NOTES
Internal Medicine is following Ms. Simon for her hyperglycemia and have made many changes to her diabetic medications with blood sugar improvement. However, she continues to require about 7U of insulin per 24hr of sliding scale and is not quite ready to wean off of her sliding scale insulin yet. Will continue to monitor and adjust medications with goal of having her wean off of sliding scale insulin prior to discharge.     LORENZO Romero

## 2025-04-27 NOTE — PLAN OF CARE
Problem: Sleep Disturbance  Goal: Adequate Sleep/Rest  Outcome: Progressing   Goal Outcome Evaluation:  Patient had an uneventful night. No complaints of pain or requests for prn's during the shift. No behavioral issues. Briefly awake for bathroom use then went back to bed. Remained in own room throughout the night. Appears to have slept for 6.75 hours. Overnight BG was 149.

## 2025-04-27 NOTE — PLAN OF CARE
Problem: Diabetes  Goal: Optimal Coping  Outcome: Progressing  Intervention: Support Wellbeing and Self-Management Success  Recent Flowsheet Documentation  Taken 4/27/2025 1646 by Luis Armenta, RN  Family/Support System Care:   presence promoted   involvement promoted     Problem: Psychotic Signs/Symptoms  Goal: Improved Behavioral Control (Psychotic Signs/Symptoms)  Outcome: Progressing   Goal Outcome Evaluation:    Plan of Care Reviewed With: patient      Patient present in the milieu majority of the shift.Does engage and interact appropriately with peers and staff.Remains compliant with medication regimen and ADLs.Denies all mental health symptoms even though appears somewhat disoriented and disorganized.Remains dismissive and guarded during assessment and presents . a flat/blunted affect.Speech remains cloudy and patient tend to mumble words,which makes understanding difficult..BG @ dinner was 229 mg/dl with 2 units insulin (Novolog) administered per sliding and at HS BG level was 160 with no intervention. Writer encourage patient to report psych symptom for proper treatment, if she has symptoms and staff will continue to monitor patient's psych symptoms and blood sugar levels for proper treatment.

## 2025-04-27 NOTE — PLAN OF CARE
"  Problem: Adult Behavioral Health Plan of Care  Goal: Plan of Care Review  Outcome: Progressing  Flowsheets  Taken 4/27/2025 1417  Plan of Care Reviewed With: patient  Overall Patient Progress: improving  Patient Agreement with Plan of Care: agrees  Taken 4/27/2025 0900  Patient Agreement with Plan of Care: agrees     Problem: Adult Behavioral Health Plan of Care  Goal: Adheres to Safety Considerations for Self and Others  Outcome: Progressing  Intervention: Develop and Maintain Individualized Safety Plan  Recent Flowsheet Documentation  Taken 4/27/2025 0900 by Lisbeth Gibson RN  Safety Measures: environmental rounds completed   Goal Outcome Evaluation:    Plan of Care Reviewed With: patient     Overall Patient Progress: improving  Anastasiya has been visible in the milieu.Patient watched TV/movies with peers.Patient avoids social contact,Hygiene unkempt,no shower today.Adequate food and fluid intake.Blood sugar 128 @ breakfast and 128 @ lunch.No insulin given this shift.Compliant with medication,no prn given this shift.Denies all MH issues.No escalation of behaviors.Contracted for safety.  Vital signs:  Temp: 98.1  F (36.7  C) Temp src: Oral BP: 134/81 Pulse: 97     SpO2: 97 % O2 Device: None (Room air)   Height: 165.1 cm (5' 5\") Weight: 71.7 kg (158 lb)  Estimated body mass index is 26.29 kg/m  as calculated from the following:    Height as of this encounter: 1.651 m (5' 5\").    Weight as of this encounter: 71.7 kg (158 lb).                "

## 2025-04-28 LAB
ANION GAP SERPL CALCULATED.3IONS-SCNC: 12 MMOL/L (ref 7–15)
BUN SERPL-MCNC: 19.4 MG/DL (ref 8–23)
CALCIUM SERPL-MCNC: 9.5 MG/DL (ref 8.8–10.4)
CHLORIDE SERPL-SCNC: 101 MMOL/L (ref 98–107)
CREAT SERPL-MCNC: 0.64 MG/DL (ref 0.51–0.95)
EGFRCR SERPLBLD CKD-EPI 2021: >90 ML/MIN/1.73M2
GLUCOSE BLDC GLUCOMTR-MCNC: 116 MG/DL (ref 70–99)
GLUCOSE BLDC GLUCOMTR-MCNC: 118 MG/DL (ref 70–99)
GLUCOSE BLDC GLUCOMTR-MCNC: 142 MG/DL (ref 70–99)
GLUCOSE BLDC GLUCOMTR-MCNC: 95 MG/DL (ref 70–99)
GLUCOSE BLDC GLUCOMTR-MCNC: 99 MG/DL (ref 70–99)
GLUCOSE SERPL-MCNC: 94 MG/DL (ref 70–99)
HCO3 SERPL-SCNC: 28 MMOL/L (ref 22–29)
HOLD SPECIMEN: NORMAL
MAGNESIUM SERPL-MCNC: 1.6 MG/DL (ref 1.7–2.3)
PHOSPHATE SERPL-MCNC: 4.6 MG/DL (ref 2.5–4.5)
POTASSIUM SERPL-SCNC: 4 MMOL/L (ref 3.4–5.3)
SODIUM SERPL-SCNC: 141 MMOL/L (ref 135–145)

## 2025-04-28 PROCEDURE — 82310 ASSAY OF CALCIUM: CPT | Performed by: PHYSICIAN ASSISTANT

## 2025-04-28 PROCEDURE — 250N000013 HC RX MED GY IP 250 OP 250 PS 637

## 2025-04-28 PROCEDURE — 36415 COLL VENOUS BLD VENIPUNCTURE: CPT | Performed by: PHYSICIAN ASSISTANT

## 2025-04-28 PROCEDURE — 250N000013 HC RX MED GY IP 250 OP 250 PS 637: Performed by: PHYSICIAN ASSISTANT

## 2025-04-28 PROCEDURE — 84100 ASSAY OF PHOSPHORUS: CPT | Performed by: PHYSICIAN ASSISTANT

## 2025-04-28 PROCEDURE — 99232 SBSQ HOSP IP/OBS MODERATE 35: CPT | Performed by: PSYCHIATRY & NEUROLOGY

## 2025-04-28 PROCEDURE — 97150 GROUP THERAPEUTIC PROCEDURES: CPT | Mod: GO

## 2025-04-28 PROCEDURE — 83735 ASSAY OF MAGNESIUM: CPT | Performed by: PHYSICIAN ASSISTANT

## 2025-04-28 PROCEDURE — 124N000002 HC R&B MH UMMC

## 2025-04-28 RX ORDER — ARIPIPRAZOLE 5 MG/1
5 TABLET ORAL DAILY
Status: DISCONTINUED | OUTPATIENT
Start: 2025-04-29 | End: 2025-04-30

## 2025-04-28 RX ADMIN — DIVALPROEX SODIUM 1000 MG: 500 TABLET, FILM COATED, EXTENDED RELEASE ORAL at 21:01

## 2025-04-28 RX ADMIN — OLANZAPINE 5 MG: 5 TABLET, ORALLY DISINTEGRATING ORAL at 08:41

## 2025-04-28 RX ADMIN — GLIPIZIDE 10 MG: 2.5 TABLET, FILM COATED, EXTENDED RELEASE ORAL at 08:41

## 2025-04-28 RX ADMIN — Medication 1 TABLET: at 08:42

## 2025-04-28 RX ADMIN — METFORMIN HYDROCHLORIDE 1000 MG: 500 TABLET, FILM COATED ORAL at 08:41

## 2025-04-28 RX ADMIN — TOLTERODINE TARTRATE 2 MG: 2 CAPSULE, EXTENDED RELEASE ORAL at 08:41

## 2025-04-28 RX ADMIN — OLANZAPINE 15 MG: 15 TABLET, ORALLY DISINTEGRATING ORAL at 21:01

## 2025-04-28 RX ADMIN — LOPERAMIDE HYDROCHLORIDE 2 MG: 2 CAPSULE ORAL at 21:00

## 2025-04-28 RX ADMIN — METFORMIN HYDROCHLORIDE 1000 MG: 500 TABLET, FILM COATED ORAL at 18:16

## 2025-04-28 RX ADMIN — PANTOPRAZOLE SODIUM 40 MG: 40 TABLET, DELAYED RELEASE ORAL at 08:42

## 2025-04-28 ASSESSMENT — ACTIVITIES OF DAILY LIVING (ADL)
ORAL_HYGIENE: INDEPENDENT
HYGIENE/GROOMING: INDEPENDENT
ADLS_ACUITY_SCORE: 47
DRESS: INDEPENDENT
ADLS_ACUITY_SCORE: 47
ORAL_HYGIENE: INDEPENDENT
ADLS_ACUITY_SCORE: 47
DRESS: INDEPENDENT
ADLS_ACUITY_SCORE: 47
HYGIENE/GROOMING: HANDWASHING;INDEPENDENT
ADLS_ACUITY_SCORE: 47
ADLS_ACUITY_SCORE: 47

## 2025-04-28 NOTE — PLAN OF CARE
Team Note Due:  Wednesday    Assessment/Intervention/Current Symtoms and Care Coordination:  Chart review and met with team, discussed pt progress, symptomology, and response to treatment.  Discussed the discharge plan and any potential impediments to discharge.    Received Order for Commitment and Castellanos via fax. Copy sent to HIM and UR. MD updated on legal status.     Met with pt to provide copy of court paperwork and explained what the Order for Commitment means. Pt accepted paperwork but stated she didn't feel commitment was necessary. We discussed referrals for placement and pt was open to writer making referrals to [facility] as well as [facility]. Would. need to wait on other IRTS referrals until pt is off sliding scale insulin. Pt signed ROIs for referrals.    Referrals placed to [facility] and [facility].    Spoke with Tamika to discuss update on Order for Commitment/Castellanos as well as referrals placed to [facility] and [facility].     Discharge Plan or Goal:  [facility] vs I[facility]S vs [facility]     Barriers to Discharge:  Patient requires further psychiatric stabilization due to current symptomology, medication management with changes subject to provider, coordination with outside supports, and aftercare planning. Pt is also under civil commitment.      Referral Status:  [facility] IRTS - sent 4/28  [facility] - sent 4/28     Legal Status:  [place] Commitment and Castellanos   [facility]  File Number: [id number]  Start and expiration date of commitment: 04/24/2025 - 10/24/2025    Castellanos meds: Zyprexa, Haldol, Abilify, Risperdal    : TBD    Contacts:   Haydee Mares (CADI CM): [phone]   Tamika Johnson- (sister): [phone]  Lucille Reddy (Intervention ): [phone]     Upcoming Meetings and Dates/Important Information and next steps:  Follow up on referrals  Schedule OP follow up  Notify medicine team of any impending discharge at least 2-3 days prior in  order to have a safe management plan for her diabetes    PD and COS needed at discharge

## 2025-04-28 NOTE — PLAN OF CARE
BEH IP Unit Acuity Rating Score (UARS)  Patient is given one point for every criteria they meet.    CRITERIA SCORING   On a 72 hour hold, court hold, committed, stay of commitment, or revocation. 1    Patient LOS on BEH unit exceeds 20 days. 0  LOS: 13   Patient under guardianship, 55+, otherwise medically complex, or under age 11. 1   Suicide ideation without relief of precipitating factors. 0   Current plan for suicide. 0   Current plan for homicide. 0   Imminent risk or actual attempt to seriously harm another without relief of factors precipitating the attempt. 0   Severe dysfunction in daily living (ex: complete neglect for self care, extreme disruption in vegetative function, extreme deterioration in social interactions). 1   Recent (last 7 days) or current physical aggression in the ED or on unit. 0   Restraints or seclusion episode in past 72 hours. 0   Recent (last 7 days) or current verbal aggression, agitation, yelling, etc., while in the ED or unit. 0   Active psychosis. 1   Need for constant or near constant redirection (from leaving, from others, etc).  0   Intrusive or disruptive behaviors. 0   Patient requires 3 or more hours of individualized nursing care per 8-hour shift (i.e. for ADLs, meds, therapeutic interventions). 0   TOTAL 4

## 2025-04-28 NOTE — PLAN OF CARE
Brief Psychotherapy Note     Therapist checked in with Pt to remind Pt about psychotherapy service available and review safety plan.     Pt response: Pt not interested in meeting 1:1 but did confirm no changes to safety plan.     Plan: Writer will remain available to Pt and continue to check in for 1:1 sessions.

## 2025-04-28 NOTE — PLAN OF CARE
Problem: Suicide Risk  Goal: Absence of Self-Harm  Outcome: Progressing     Problem: Adult Behavioral Health Plan of Care  Goal: Absence of New-Onset Illness or Injury  Outcome: Progressing     Problem: Adult Behavioral Health Plan of Care  Goal: Adheres to Safety Considerations for Self and Others  Outcome: Progressing  Flowsheets (Taken 4/28/2025 8236)  Adheres to Safety Considerations for Self and Others: making progress toward outcome  Intervention: Develop and Maintain Individualized Safety Plan  Recent Flowsheet Documentation  Taken 4/28/2025 2345 by Harish Rivas RN  Safety Measures:   environmental rounds completed   safety rounds completed   Goal Outcome Evaluation:    Plan of Care Reviewed With: patient Plan of Care Reviewed With: patient    Overall Patient Progress: improvingOverall Patient Progress: improving     Alert and oriented, able to communicate needs. Visible in milieu, most time spent watching tv. Social and interactive with peers and staff members. Appeared flat but pleasant on approach, she was cooperative and medication compliant. She denied any anxiety or depression, denied SI/HI, contracted for safety. ADLs WNL, no pain/discomfort.Adequate food and fluid intake. Imodium given at HS per request for reported diarrhea.

## 2025-04-28 NOTE — PLAN OF CARE
Problem: Sleep Disturbance  Goal: Adequate Sleep/Rest  Outcome: Progressing   Goal Outcome Evaluation:  Patient appears to have slept for 6.5  hours. No behavioral concerns during the shift. No noted signs of pain or discomfort during routine safety checks. No requests for prn's. Safety checks in place q 15 minutes. Overnight BG was 99.

## 2025-04-28 NOTE — PLAN OF CARE
"Pt blood sugar before breakfast was 95  and before lunch 118.  When trying to get blood sample for BG pt kept pulling her finger away.  Pt denies SI and hallucinations. Pt states she hopes she's not in the hospital long.  Pt mummbling and getting off topic at times during conversation.  Pt discussed getting kicked out of places and some people not letting them stay with them at times. Pt selectively social with select peers. Pt ate meals in dining area.  Pt denies any diarrhea or incontinence this shift.    Problem: Adult Behavioral Health Plan of Care  Goal: Plan of Care Review  Outcome: Not Progressing  Goal: Patient-Specific Goal (Individualization)  Description: You can add care plan individualizations to a care plan. Examples of Individualization might be:  \"Parent requests to be called daily at 9am for status\", \"I have a hard time hearing out of my right ear\", or \"Do not touch me to wake me up as it startlesme\".  Outcome: Not Progressing  Goal: Adheres to Safety Considerations for Self and Others  Outcome: Not Progressing  Intervention: Develop and Maintain Individualized Safety Plan  Recent Flowsheet Documentation  Taken 4/28/2025 1100 by Eda Appiah RN  Safety Measures:   environmental rounds completed   safety rounds completed  Goal: Absence of New-Onset Illness or Injury  Outcome: Not Progressing  Goal: Optimized Coping Skills in Response to Life Stressors  Outcome: Not Progressing  Goal: Develops/Participates in Therapeutic [place] to Support Successful Transition  Outcome: Not Progressing     Problem: Suicide Risk  Goal: Absence of Self-Harm  Outcome: Not Progressing     Problem: Sleep Disturbance  Goal: Adequate Sleep/Rest  Outcome: Not Progressing     Problem: Diabetes  Goal: Optimal Coping  Outcome: Not Progressing  Goal: Optimal Functional Ability  Outcome: Not Progressing  Goal: Blood Glucose Level Within Target Range  Outcome: Not Progressing  Goal: Minimize Risk of Hypoglycemia  Outcome: Not " Progressing     Problem: Psychotic Signs/Symptoms  Goal: Improved Behavioral Control (Psychotic Signs/Symptoms)  Outcome: Not Progressing  Goal: Optimal Cognitive Function (Psychotic Signs/Symptoms)  Outcome: Not Progressing  Goal: Increased Participation and Engagement (Psychotic Signs/Symptoms)  Outcome: Not Progressing  Goal: Improved Mood Symptoms (Psychotic Signs/Symptoms)  Outcome: Not Progressing  Goal: Improved Psychomotor Symptoms (Psychotic Signs/Symptoms)  Outcome: Not Progressing  Goal: Decreased Sensory Symptoms (Psychotic Signs/Symptoms)  Outcome: Not Progressing  Goal: Improved Sleep (Psychotic Signs/Symptoms)  Outcome: Not Progressing  Goal: Enhanced Social, Occupational or Functional Skills (Psychotic Signs/Symptoms)  Outcome: Not Progressing   Goal Outcome Evaluation:

## 2025-04-28 NOTE — PROGRESS NOTES
"  Rehab Group    Start time: 1415  End time: 1500  Patient time total: 35 minutes    attended partial group    #7 attended   Group Type: occupational therapy   Group Topic Covered: coping skills and mindfulness     Group Session Detail:  OT facilitated a therapeutic journaling activity that involved responding to a variety of prompts to promote reflection and gaining perspective of internal experiences, thoughts, and emotions. This kind of self-reflection can help sort through problems, maintain gratitude, and have a deeper understanding of the self with the grander purpose of supportive physical and emotional well-being. Patients were encouraged to write, think, and finally share their responses to prompts related to gratitude, positive self qualities, future hopes/dreams, reflecting on the past, and happiness.      Patient Response/Contribution:  distracted , disorganized, and appeared confused      Patient Detail:  Pt arrived to group late, presenting as blunted. Pt was oriented to group and verbalized understanding, although pt refused to participate in presented activity. Pt observed to create a \"book aniceto\" with task items, redirection was attempted, pt was agreeable to using task items only for presented task. Pt then began reading in group room, intermittently engaging in discussion with peers. Pt was unable to complete provided task today. Will continue to encourage attendance and participation.       [id number] OT Group (2 or more in attendance)      Patient Active Problem List   Diagnosis    Suicidal ideation    Type 2 diabetes mellitus (H)    Chronic hepatitis C (H)    Schizophrenia (H)    Acid reflux disease    Hoarding behavior       "

## 2025-04-28 NOTE — PROGRESS NOTES
----------------------------------------------------------------------------------------------------------  [facility]  Psychiatry Progress Note  Hospital Day #13     Interim History:     The patient's care was discussed with the treatment team and chart notes were reviewed.    Patient ID: Anastasiya Simon is a 59 year old female with a previous psychiatric diagnosis of schizophrenia and polysubstance use admitted from [facility] on 04/15/2025 due to concern for SI and psychosis in the context of psychosocial stressors including living situation and argument with family member.    Vitals: Temp: 97.5  F (36.4  C) Temp  Min: 97.5  F (36.4  C)  Max: 98.1  F (36.7  C)  SpO2: 97 % SpO2  Min: 97 %  Max: 97 %  Pulse: 100 Pulse  Min: 97  Max: 100  BP: (!) 158/93 Systolic (24hrs), Av , Min:134 , Max:158   Diastolic (24hrs), Av, Min:81, Max:93  Sleep: 6.5 hours (25 0615)  Scheduled medications: Took all scheduled medications as prescribed.  Psychiatric PRN medications:   Last 24H PRN:     melatonin tablet 3 mg, 3 mg at 25 2155    Staff Report:   : Pt visible in the milieu, social with select peers. Attends a majority of groups offered. Pt denies all MH symptoms, states she is bored here and wants to go shopping. She can be slightly difficult to understand sometimes as she tends to mumble and ramble during conversation. Pt is med compliant, no PRNs given. BG readings of 113 and 173 this shift, lunchtime BG covered with sliding scale insulin. On approach, pt was pleasant, cooperative and and humorous. Reported having diarrhea to which she requested and received PRN imodium. Food and fluid intake remains good and patient though forgetful at times was alert and oriented during assessment. BG level was 248 @ dinner and  @ HS with NovoLog administered per sliding scale (See MAR for details). Appears to have slept for 7 hours.     : Pt  "intermittently visible in the milieu, though spent a majority of the day resting in bed. Denies all MH symptoms. Slightly irritable, but no behavioral concerns. Medication compliant. BG readings of 153 and 200 this shift. Pt continues to appear unkempt. Pt took a nap during reflection time. Pt presents with blunted affect. Pt reports being sleepy most of the time while in the lounge. Pt requesting for \"caffeine pills\" saying that she uses that at home or drinks coffee to stay awake. Pt did not engage with others but was sitting quietly watching tv. Pt reported having diarrhea x2 and prn Imodium was administered and was effective. Pt took a shower this evening.  Appears to have slept for 6.75 hours. Overnight BG was 149.     4/27: Pt has been visible in the milieu for the majority of the day. Pt watched TV/movies with peers. Pt avoids social contact at first, then reported to interact appropriately with peers and staff later. Hygiene unkempt. No shower today. Adequate food and fluid intake.  at breakfast and 128 at lunch. No insulin given. Compliant with medication and ADLs. Denies all MH issues. No escalation of behaviors. Appears somewhat disoriented and disorganized. Remains dismissive and guarded during assessment. Presents with flat/blunted affect. Speech remains cloudy and pt tend to mumble words, which makes understanding difficult. BG at dinner 229 with 2 units of insulin administered. HS BG was 160. Appears to have slept for 6.5 hours.      Subjective:     Patient Interview:  Pt was interviewed milieu. Pt said she is doing fine and nods her head. Reports that diarrhea has been occurring lately but imodium has been helping. She plans on showering this afternoon. Pt asked about leaving. Care team informed her that internal medicine is still working on getting her off sliding scale insulin, but it is moving in the right direction. Informed pt that there are some places that do not require her to get off " "sliding scale insulin, pt was agreeable with that idea. Discussed pt's appetite increase, pt said she's fine. Informed pt that we can cross titrate a different medication with less metabolic side effects. Pt was amendable to that change and understood the cross titration plan.     Objective:     Vitals:  BP (!) 158/93 (BP Location: Right arm, Patient Position: Sitting, Cuff Size: Adult Regular)   Pulse 100   Temp 97.5  F (36.4  C) (Temporal)   Resp 20   Ht 1.651 m (5' 5\")   Wt 71.7 kg (158 lb)   LMP 10/22/2013   SpO2 97%   BMI 26.29 kg/m      Allergies:  Allergies   Allergen Reactions    Tetracycline Unknown     It happened when pt was a baby       Current Medications:  Scheduled:  Current Facility-Administered Medications   Medication Dose Route Frequency Provider Last Rate Last Admin    calcium carbonate (TUMS) chewable tablet 500 mg  500 mg Oral Daily PRN Tanja Gonzalez MD   500 mg at 04/24/25 1750    glucose gel 15-30 g  15-30 g Oral Q15 Min PRN Katina Armendariz MD        Or    dextrose 50 % injection 25-50 mL  25-50 mL Intravenous Q15 Min PRN Katnia Armendariz MD        Or    glucagon injection 1 mg  1 mg Subcutaneous Q15 Min PRN Katina Armendariz MD        divalproex sodium extended-release (DEPAKOTE ER) 24 hr tablet 1,000 mg  1,000 mg Oral At Bedtime Tanja Gonzalez MD   1,000 mg at 04/27/25 2028    gabapentin (NEURONTIN) capsule 300 mg  300 mg Oral TID PRN Tanja Gonzalez MD   300 mg at 04/22/25 2017    glipiZIDE (GLUCOTROL XL) 24 hr tablet 10 mg  10 mg Oral Daily with breakfast Katina Meza PA   10 mg at 04/27/25 0814    hydrOXYzine HCl (ATARAX) tablet 25 mg  25 mg Oral Q4H PRN Lucy Chandler MD   25 mg at 04/21/25 2218    insulin aspart (NovoLOG) injection (RAPID ACTING)  1-7 Units Subcutaneous TID AC Katina Meza PA   2 Units at 04/27/25 1806    insulin aspart (NovoLOG) injection (RAPID ACTING)  1-5 Units Subcutaneous At Bedtime Susana, " LORENZO Robles   2 Units at 04/26/25 2129    loperamide (IMODIUM) capsule 2 mg  2 mg Oral 4x Daily PRN Tanja Gonzalez MD   2 mg at 04/26/25 1935    magnesium sulfate (EPSOM SALT) granules 1 Tablespoonful  1 Tablespoonful Topical Daily PRN Tanja Gonzalez MD        melatonin tablet 3 mg  3 mg Oral At Bedtime PRN Katina Armendariz MD   3 mg at 04/27/25 2155    metFORMIN (GLUCOPHAGE) tablet 1,000 mg  1,000 mg Oral BID w/meals Charlotte Jasmine PA-C   1,000 mg at 04/27/25 1812    multivitamin w/minerals (THERA-VIT-M) tablet 1 tablet  1 tablet Oral Daily Charlotte Jasmine PA-C   1 tablet at 04/27/25 0814    OLANZapine (zyPREXA) tablet 10 mg  10 mg Oral TID PRN Lucy Chandler MD        Or    OLANZapine (zyPREXA) injection 10 mg  10 mg Intramuscular TID PRN Lucy Chandler MD        OLANZapine zydis (zyPREXA) ODT tab 15 mg  15 mg Oral At Bedtime Tanja Gonzalez MD   15 mg at 04/27/25 2027    OLANZapine zydis (zyPREXA) ODT tab 5 mg  5 mg Oral Daily Tanja Gonzalez MD   5 mg at 04/27/25 0814    pantoprazole (PROTONIX) EC tablet 40 mg  40 mg Oral QAM AC Lucy Chandler MD   40 mg at 04/27/25 0814    tolterodine ER (DETROL LA) 24 hr capsule 2 mg  2 mg Oral Daily Tanja Gonzalez MD   2 mg at 04/27/25 0813     PRN:  Current Facility-Administered Medications   Medication Dose Route Frequency Provider Last Rate Last Admin    calcium carbonate (TUMS) chewable tablet 500 mg  500 mg Oral Daily PRN Tanja Gonzalez MD   500 mg at 04/24/25 1750    glucose gel 15-30 g  15-30 g Oral Q15 Min PRN Katina Armendariz MD        Or    dextrose 50 % injection 25-50 mL  25-50 mL Intravenous Q15 Min PRN Qian Armendarizfer Elli, MD        Or    glucagon injection 1 mg  1 mg Subcutaneous Q15 Min Katina Hanley MD        divalproex sodium extended-release (DEPAKOTE ER) 24 hr tablet 1,000 mg  1,000 mg Oral At Bedtime Tanja Gonzalez MD   1,000 mg at 04/27/25 2028     gabapentin (NEURONTIN) capsule 300 mg  300 mg Oral TID PRN Tanja Gonzalez MD   300 mg at 04/22/25 2017    glipiZIDE (GLUCOTROL XL) 24 hr tablet 10 mg  10 mg Oral Daily with breakfast Katina Meza PA   10 mg at 04/27/25 0814    hydrOXYzine HCl (ATARAX) tablet 25 mg  25 mg Oral Q4H PRN Lucy Chandler MD   25 mg at 04/21/25 2218    insulin aspart (NovoLOG) injection (RAPID ACTING)  1-7 Units Subcutaneous TID AC Katina Meza PA   2 Units at 04/27/25 1806    insulin aspart (NovoLOG) injection (RAPID ACTING)  1-5 Units Subcutaneous At Bedtime Katina Meza PA   2 Units at 04/26/25 2129    loperamide (IMODIUM) capsule 2 mg  2 mg Oral 4x Daily PRN Tanja Gonzalez MD   2 mg at 04/26/25 1935    magnesium sulfate (EPSOM SALT) granules 1 Tablespoonful  1 Tablespoonful Topical Daily PRN Tanja Gonzalez MD        melatonin tablet 3 mg  3 mg Oral At Bedtime PRN Katina Armendariz MD   3 mg at 04/27/25 2155    metFORMIN (GLUCOPHAGE) tablet 1,000 mg  1,000 mg Oral BID w/meals Charlotte Jasmine PA-C   1,000 mg at 04/27/25 1812    multivitamin w/minerals (THERA-VIT-M) tablet 1 tablet  1 tablet Oral Daily Charlotte Jasmine PA-C   1 tablet at 04/27/25 0814    OLANZapine (zyPREXA) tablet 10 mg  10 mg Oral TID PRN Lucy Chandler MD        Or    OLANZapine (zyPREXA) injection 10 mg  10 mg Intramuscular TID PRN Lucy Chandler MD        OLANZapine zydis (zyPREXA) ODT tab 15 mg  15 mg Oral At Bedtime Tanja Gonzalez MD   15 mg at 04/27/25 2027    OLANZapine zydis (zyPREXA) ODT tab 5 mg  5 mg Oral Daily Tanja Gonzalez MD   5 mg at 04/27/25 0814    pantoprazole (PROTONIX) EC tablet 40 mg  40 mg Oral QAM AC Lucy Chandler MD   40 mg at 04/27/25 0814    tolterodine ER (DETROL LA) 24 hr capsule 2 mg  2 mg Oral Daily Tanja Gonzalez MD   2 mg at 04/27/25 0813     Labs and Imaging:  New results:   Recent Results (from the past 24 hours)  "  Glucose by meter    Collection Time: 04/27/25 11:15 AM   Result Value Ref Range    GLUCOSE BY METER POCT 128 (H) 70 - 99 mg/dL   Glucose by meter    Collection Time: 04/27/25  5:57 PM   Result Value Ref Range    GLUCOSE BY METER POCT 229 (H) 70 - 99 mg/dL   Glucose by meter    Collection Time: 04/27/25  8:59 PM   Result Value Ref Range    GLUCOSE BY METER POCT 160 (H) 70 - 99 mg/dL   Glucose by meter    Collection Time: 04/28/25  3:55 AM   Result Value Ref Range    GLUCOSE BY METER POCT 99 70 - 99 mg/dL   Glucose by meter    Collection Time: 04/28/25  7:43 AM   Result Value Ref Range    GLUCOSE BY METER POCT 95 70 - 99 mg/dL     New Results  Valproic Acid Free & Total: 67  Valproic Acid Free & Total STAT: 75.6  Phosphorus: 4.6 (H)  Magnesium: 1.6 (L)  BMP: unremarkable (4/28)    Data this admission:  CBC: WNL. WBC: 7.6, RBC: 4.37, Hb: 13.0, Platelets: 316.  CMP: Unremarkable (4/22) AST: 13, ALT: 10, Creatinine: 0.65.  UDS: Negative  RASHIDA: 0.00 (4/13)  HbA1c: 10.1%  TSH: 2.40  Vit: B12: 812  Folate: 13.7    Imaging:  Bladder Scan: Completed on 4/22     Mental Status Exam:     Oriented to:  Grossly oriented.  General: Alert and awake. Pt was standing in the milieu.   Appearance:  Appears younger than stated age. Grooming is unkempt. Wears glasses.  Behavior/Attitude:  Cooperative and engaged with questioning.   Eye Contact: Appropriate.   Psychomotor: No evidence of tics, dystonia, or tardive dyskinesia. No catatonia present.  Speech: Appropriate rate/tone/volume.   Language: Fluent in English with appropriate syntax and vocabulary.  Mood:  \"fine\"  Affect:  Euthymic. Congruent with mood. Appropriate.   Thought Process:  Linear and goal directed.  Thought Content:  Denies SI/SIB/AVH.  Associations:  intact  Insight:  limited   Judgment:  limited   Impulse control: fair  Attention Span:  adequate for conversation  Concentration:  grossly intact  Recent and Remote Memory:  Not formally assessed. Pt reports memory " issues. Staff notes forgetfulness.   Fund of Knowledge: Average.  Muscle Strength and Tone: Normal.  Gait and Station: Normal.     Psychiatric Assessment     Anastasiya Simon is a 59 year old female previously diagnosed with schizophrenia and polysubstance use disorder who presented with suicidal ideation, disorganization, and inability to care for self. Most recent psychiatric hospitalization was 03/20/2019 for disorganization and suicidal ideation. Significant symptoms on admission include increasing suicidal ideation, disorganization, erratic behavior such as filling buckets with ammonia and mixing with bleach, substance use, auditory hallucinations, and paranoia. The MSE on admission was pertinent for AVH, paranoia, limited insight into MH. Today on interview Anastasiya denies SI and reports she had only made suicidal statements to her sisters previously as she did not want to move into a group home.  Psychological contributions to mental health presentation include maladaptive coping through substance use and limited insight into MH. Social factors contributing to mental health presentation include interpersonal conflicts and unstable living environment. Protective factors include support from two sisters and absence of previous suicide attempts. On 4/16 we resumed Zyprexa 20 mg and increased Depakote to 1000 mg to address the ongoing psychosis, Tolterodine and PRN loperamide were started to address incontinence and diarrhea. On 4/18, we added PRN gabapentin 300 mg for peripheral neuropathy. On 4/28, we will begin cross-titration to Abilify from Zyprexa due to increased appetite noted while on Zyprexa. Started Abilify 5 mg PO daily in the AM.      In summary, the patient's reported symptoms of SI, erractic behavior, AVH, and paranoia in the context of psychosocial stressers are consistent with decompensated schizophrenia and polysubstance use disorder.  She will likely benefit from medication optimization and  outpatient  referrals this admission.     Psychiatric Plan by Diagnosis      Today's changes:  - Start Abilify 5 mg PO daily in the morning    # Schizophrenia  Medications:  - olanzapine 5 mg in the morning, 15 mg HS  - depakote 1000 mg HS  - Abilify 5 mg PO daily in the morning    #Polysubstance Use Disorder  - CD consult once psychotic sx more stable, if pt amenable    2. Pertinent Labs/Monitoring:  - Labs done 04/13 at OSH. CBC wnl, glucose elevated to 332, BMP wnl.  - UDS negative on 04/13  - 4/16 HbA1c: 10.1  - Depakote Levels: 75.6    3. Additional Plans:  - Patient will be treated in therapeutic milieu with appropriate individual and group therapies as described.   - Consult with Internal Medicine for diabetes management.      Psychiatric Hospital Course:      Anastasiya Simon was admitted to [facility] on a 72 hour hold (started 4/13/25 21:35) from [facility] on 04/15/25.   Medications:  Continued Medications:  Pt seen by psychiatry consult at [facility] who started zyprexa 20 mg at bedtime and depakote 500 mg at bedtime. Both of these medications were continued on admission to [facility].   New Medications:  Started loperamide for diarrhea  Started tolterodine for urinary incontinence  Started gabapentin for peripheral neuropathy    4/16: Increased depakote from 500 mg to 1000 mg HS for mood regulation  4/16: Increased metformin from 500 mg to 1000 mg BID per medicine consult  4/16: Zyprexa: 5 mg in the morning and 15 mg HS  4/16: Started PRN Loperamide 2 mg 4x/day for diarrhea  4/16: Started Tolterodine 2 mg daily for urinary incontinence  4/18: Started Gabapentin 300 mg PRN 3x/day for peripheral neuropathy  4/20: Started glipizide 24 hr tablet 5 mg daily for better glycemic control per medicine consult  4/25: Increased glipizide XL from 5mg to 10mg per medicine consult.  4/28: Started Abilify 5 mg PO daily in the morning    The risks, benefits, alternatives, and side effects were  discussed and understood by the patient and other caregivers.     Medical Assessment and Plan     Medical diagnoses to be addressed this admission:      #Type 2 Diabetes Mellitis with Complications  - PTA metformin 1000 mg BID with meals  - BID glucose checks; hypoglycemia protocol  - PRN gabapentin 300 mg 3x/day for peripheral neuropathy  - Insulin aspart (Novolog) injection 1-5 units HS  - Insulin aspart (Novolog) injection 3x/day 1-7 units before meals  - Glipizide 24 hr tablet 10 mg daily with breakfast for better glycemic control  - Internal Medicine Consult  - Nutrition Consult  - Moderate Consistent Carb Diet  - Given patient is starting insulin, please notify medicine team of any impending discharge at least 2-3 days prior in order to have a safe management plan for her diabetes.    #Diarrhea  - Loperamide 2 mg PRN 4x/day    #Urinary Incontinence  - Tolterodine 2 mg daily    Medical course:  Patient was physically examined by the ED prior to being transferred to the unit and was found to be medically stable and appropriate for admission.    Consults:   4/16: Internal Medicine consulted for management of diabetes and blood pressure.   4/16: Increased metformin to 1000 mg BID.   4/18: Started PRN gabapentin 300 mg 3x/day for peripheral neuropathy.   4/18: Internal Medicine started Novolog injection 1-5 units HS and Novolog injection 3x/day 1-7 units before meals for better glycemic control. Given patient is starting insulin, please notify medicine team of any impending discharge at least 2-3 days prior in order to have a safe management plan for her diabetes.  4/19: Internal Medicine increased to high intensity sliding scale. Novolog injection 1-7 units HS. Novolog injection 3x/day 1-10 units before meals for better glycemic control.   4/20: Started PO glipizide 24 hr tablet 5 mg daily with breakfast.   4/21: Started pt on a consistent carb diet.   4/22: Nutrition Consult for diabetes management.   4/24: Note  from Medicine: Please send patient with glucometer on discharge. Diabetes education should be ordered prior to discharge.  4/25: Increase Glipizide XL to 10 mg daily starting on 4/26. Continue Metformin 1000 mg BID. Decreased to moderate intensity sliding scale. Novolog injection 1-5 units HS. Novolog injection 3x/day 1-7 units before meals.. Hopefully can discontinue in coming days. Moderate consistent CHO diet.   4/27: Continues to require 7U of insulin and is not quite ready to wean off of her sliding scale insulin yet. Will continue to monitor and adjust medications with goal of having her wean off sliding scale insulin prior to discharge.     Checklist     Legal Status: Court Hold    Safety Assessment:   Behavioral Orders   Procedures    Code 1 - Restrict to Unit    Routine Programming     As clinically indicated    Status 15     Every 15 minutes.    Suicide precautions: Suicide Risk: MODERATE; Clinical rationale to override score: modification to the care environment, response to medication, lack of access to a plan for self-harm, Exhibiting Suicidal/self-harm behaviors or thoughts     Patients on Suicide Precautions should have a Combination Diet ordered that includes a Diet selection(s) AND a Behavioral Tray selection for Safe Tray - with utensils, or Safe Tray - NO utensils       Order Specific Question:   Suicide Risk     Answer:   MODERATE     Order Specific Question:   Clinical rationale to override score:     Answer:   modification to the care environment     Order Specific Question:   Clinical rationale to override score:     Answer:   response to medication     Order Specific Question:   Clinical rationale to override score:     Answer:   lack of access to a plan for self-harm     Order Specific Question:   Clinical rationale to override score:     Answer:   Exhibiting Suicidal/self-harm behaviors or thoughts     Risk Assessment:  Risk for harm is moderate.  Risk factors: maladaptive coping, substance  use, impulsive, and past behaviors.  Protective factors: family.    SIO: None    Disposition: TBD. Disposition pending clinical stabilization, medication optimization and development of an appropriate discharge plan.     Attestations     Tatyana Mcclure, MS3, [facility] Medical Student     I was present with the medical student who participated in the service and in the documentation of the note. I have verified the history and personally performed the exam and medical decision making. I agree with the assessment and plan of care as documented in the note. Joceline Dela Cruz M.D., Ph.D.

## 2025-04-29 LAB
GLUCOSE BLDC GLUCOMTR-MCNC: 135 MG/DL (ref 70–99)
GLUCOSE BLDC GLUCOMTR-MCNC: 152 MG/DL (ref 70–99)
GLUCOSE BLDC GLUCOMTR-MCNC: 154 MG/DL (ref 70–99)
GLUCOSE BLDC GLUCOMTR-MCNC: 156 MG/DL (ref 70–99)
GLUCOSE BLDC GLUCOMTR-MCNC: 213 MG/DL (ref 70–99)

## 2025-04-29 PROCEDURE — 250N000013 HC RX MED GY IP 250 OP 250 PS 637

## 2025-04-29 PROCEDURE — 250N000013 HC RX MED GY IP 250 OP 250 PS 637: Performed by: PSYCHIATRY & NEUROLOGY

## 2025-04-29 PROCEDURE — 99232 SBSQ HOSP IP/OBS MODERATE 35: CPT | Performed by: PSYCHIATRY & NEUROLOGY

## 2025-04-29 PROCEDURE — 124N000002 HC R&B MH UMMC

## 2025-04-29 PROCEDURE — 250N000013 HC RX MED GY IP 250 OP 250 PS 637: Performed by: PHYSICIAN ASSISTANT

## 2025-04-29 RX ADMIN — METFORMIN HYDROCHLORIDE 1000 MG: 500 TABLET, FILM COATED ORAL at 07:48

## 2025-04-29 RX ADMIN — Medication 3 MG: at 20:40

## 2025-04-29 RX ADMIN — METFORMIN HYDROCHLORIDE 1000 MG: 500 TABLET, FILM COATED ORAL at 18:07

## 2025-04-29 RX ADMIN — HYDROXYZINE HYDROCHLORIDE 25 MG: 25 TABLET, FILM COATED ORAL at 04:35

## 2025-04-29 RX ADMIN — DIVALPROEX SODIUM 1000 MG: 500 TABLET, FILM COATED, EXTENDED RELEASE ORAL at 20:40

## 2025-04-29 RX ADMIN — TOLTERODINE TARTRATE 2 MG: 2 CAPSULE, EXTENDED RELEASE ORAL at 07:48

## 2025-04-29 RX ADMIN — OLANZAPINE 5 MG: 5 TABLET, ORALLY DISINTEGRATING ORAL at 07:48

## 2025-04-29 RX ADMIN — Medication 1 TABLET: at 07:48

## 2025-04-29 RX ADMIN — GLIPIZIDE 10 MG: 2.5 TABLET, FILM COATED, EXTENDED RELEASE ORAL at 07:48

## 2025-04-29 RX ADMIN — ARIPIPRAZOLE 5 MG: 5 TABLET ORAL at 07:48

## 2025-04-29 RX ADMIN — Medication 3 MG: at 04:35

## 2025-04-29 RX ADMIN — OLANZAPINE 15 MG: 15 TABLET, ORALLY DISINTEGRATING ORAL at 20:40

## 2025-04-29 RX ADMIN — PANTOPRAZOLE SODIUM 40 MG: 40 TABLET, DELAYED RELEASE ORAL at 07:48

## 2025-04-29 ASSESSMENT — ACTIVITIES OF DAILY LIVING (ADL)
DRESS: INDEPENDENT
ADLS_ACUITY_SCORE: 47
DRESS: STREET CLOTHES;INDEPENDENT
ADLS_ACUITY_SCORE: 47
ORAL_HYGIENE: INDEPENDENT
HYGIENE/GROOMING: HANDWASHING;INDEPENDENT
ADLS_ACUITY_SCORE: 47
HYGIENE/GROOMING: INDEPENDENT
ADLS_ACUITY_SCORE: 47
ORAL_HYGIENE: INDEPENDENT
ADLS_ACUITY_SCORE: 47

## 2025-04-29 NOTE — PLAN OF CARE
Team Note Due:  Wednesday    Assessment/Intervention/Current Symtoms and Care Coordination:  Chart review and met with team, discussed pt progress, symptomology, and response to treatment.  Discussed the discharge plan and any potential impediments to discharge.    Received confirmation from [facility] that the referral was received.    Referral placed to [facility].    Jesika with [facility] reached out with questions about pt's substance use history. She confirmed she is continuing to review the referral and hopes to have an update tomorrow.    Discharge Plan or Goal:  [facility] vs [facility] vs [facility]     Barriers to Discharge:  Patient requires further psychiatric stabilization due to current symptomology, medication management with changes subject to provider, coordination with outside supports, and aftercare planning. Pt is also under civil commitment.      Referral Status:  [facility] - sent 4/28  [facility] - sent 4/28  [facility] - sent 4/29     Legal Status:  MI Commitment and [facility]   [facility]: [place]  File Number: [id number]  Start and expiration date of commitment: 04/24/2025 - 10/24/2025    [facility] meds: Zyprexa, Haldol, Abilify, Risperdal    : JUSTEN    Contacts:   Haydee Mares (CADI CM): [phone]   Tamika Johnson- (sister): [phone]  Lucille Reddy (Intervention ): [phone]     Upcoming Meetings and Dates/Important Information and next steps:  Follow up on referrals  Schedule OP follow up  Notify medicine team of any impending discharge at least 2-3 days prior in order to have a safe management plan for her diabetes    PD and COS needed at discharge

## 2025-04-29 NOTE — PLAN OF CARE
BEH IP Unit Acuity Rating Score (UARS)  Patient is given one point for every criteria they meet.    CRITERIA SCORING   On a 72 hour hold, court hold, committed, stay of commitment, or revocation. 1    Patient LOS on BEH unit exceeds 20 days. 0  LOS: 14   Patient under guardianship, 55+, otherwise medically complex, or under age 11. 1   Suicide ideation without relief of precipitating factors. 0   Current plan for suicide. 0   Current plan for homicide. 0   Imminent risk or actual attempt to seriously harm another without relief of factors precipitating the attempt. 0   Severe dysfunction in daily living (ex: complete neglect for self care, extreme disruption in vegetative function, extreme deterioration in social interactions). 1   Recent (last 7 days) or current physical aggression in the ED or on unit. 0   Restraints or seclusion episode in past 72 hours. 0   Recent (last 7 days) or current verbal aggression, agitation, yelling, etc., while in the ED or unit. 0   Active psychosis. 1   Need for constant or near constant redirection (from leaving, from others, etc).  0   Intrusive or disruptive behaviors. 0   Patient requires 3 or more hours of individualized nursing care per 8-hour shift (i.e. for ADLs, meds, therapeutic interventions). 0   TOTAL 4

## 2025-04-29 NOTE — PLAN OF CARE
Problem: Adult Behavioral Health Plan of Care  Goal: Adheres to Safety Considerations for Self and Others  Outcome: Progressing  Flowsheets (Taken 4/29/2025 1714)  Adheres to Safety Considerations for Self and Others: making progress toward outcome     Problem: Adult Behavioral Health Plan of Care  Goal: Absence of New-Onset Illness or Injury  Outcome: Progressing     Problem: Diabetes  Goal: Optimal Functional Ability  Intervention: Optimize Functional Ability  Recent Flowsheet Documentation  Taken 4/29/2025 1641 by Harish Rivas, RN  Activity Management:   ambulated outside room   ambulated in room   ambulated to bathroom   up ad belen  Activity Assistance Provided: independent   Goal Outcome Evaluation:    Plan of Care Reviewed With: patient Plan of Care Reviewed With: patient    Overall Patient Progress: improvingOverall Patient Progress: improving     Patient was pleasant, she was cooperative and medication compliant. She was visible in milieu, spent most of her time watching tv. Engaged and social with peers in milieu. Alert and able to communicate needs. ADLs WNL, denied pain or discomfort. Patient denied anxiety or depression, she denied SI/HI, contracted for safety. Adequate food and fluids intake. PRN Melatonin given at HS.

## 2025-04-29 NOTE — PLAN OF CARE
"Pt denies SI.  She stated she felt like she's in a dream world.  Asked pt if she could talk more about this.  Pt stated she felt tired and wanted to call her sister.  Pt stated her sister going to visit her tomorrow and bring her some things.  Pt jumps topic to topic and is difficult to follow and understand what she's saying at times. Pt blood sugar before breakfast was 154 and before lunch 156. Pt given sliding scale insulin as per order.  Pt appears to mostly socialize with one particular patients.  Pt has had no bx issues this shift.    Problem: Adult Behavioral Health Plan of Care  Goal: Plan of Care Review  4/29/2025 1317 by Eda Appiah RN  Outcome: Not Progressing  4/29/2025 1317 by Eda Appiah RN  Outcome: Not Progressing  Goal: Patient-Specific Goal (Individualization)  Description: You can add care plan individualizations to a care plan. Examples of Individualization might be:  \"Parent requests to be called daily at 9am for status\", \"I have a hard time hearing out of my right ear\", or \"Do not touch me to wake me up as it startlesme\".  4/29/2025 1317 by Eda Appiah RN  Outcome: Not Progressing  4/29/2025 1317 by Eda Appiah RN  Outcome: Not Progressing  Goal: Adheres to Safety Considerations for Self and Others  4/29/2025 1317 by Eda Appiah RN  Outcome: Not Progressing  4/29/2025 1317 by Eda Appiah RN  Outcome: Not Progressing  Intervention: Develop and Maintain Individualized Safety Plan  Recent Flowsheet Documentation  Taken 4/29/2025 1100 by Eda Appiah RN  Safety Measures: safety rounds completed  Goal: Absence of New-Onset Illness or Injury  4/29/2025 1317 by Eda Appiah, RN  Outcome: Not Progressing  4/29/2025 1317 by Eda Appiah RN  Outcome: Not Progressing  Goal: Optimized Coping Skills in Response to Life Stressors  4/29/2025 1317 by Eda Appiah, RN  Outcome: Not Progressing  4/29/2025 1317 by Eda Appiah, RN  Outcome: Not Progressing  Goal: Develops/Participates in " Therapeutic [place] to Support Successful Transition  4/29/2025 1317 by Eda Appiah RN  Outcome: Not Progressing  4/29/2025 1317 by Eda Appiah RN  Outcome: Not Progressing     Problem: Suicide Risk  Goal: Absence of Self-Harm  4/29/2025 1317 by Eda Appiah RN  Outcome: Not Progressing  4/29/2025 1317 by Eda Appiah RN  Outcome: Not Progressing     Problem: Sleep Disturbance  Goal: Adequate Sleep/Rest  4/29/2025 1317 by Eda Appiah RN  Outcome: Not Progressing  4/29/2025 1317 by Eda Appiah RN  Outcome: Not Progressing     Problem: Diabetes  Goal: Optimal Coping  Outcome: Not Progressing  Goal: Optimal Functional Ability  Outcome: Not Progressing  Goal: Blood Glucose Level Within Target Range  Outcome: Not Progressing  Goal: Minimize Risk of Hypoglycemia  Outcome: Not Progressing     Problem: Psychotic Signs/Symptoms  Goal: Improved Behavioral Control (Psychotic Signs/Symptoms)  Outcome: Not Progressing  Goal: Optimal Cognitive Function (Psychotic Signs/Symptoms)  Outcome: Not Progressing  Goal: Increased Participation and Engagement (Psychotic Signs/Symptoms)  Outcome: Not Progressing  Goal: Improved Mood Symptoms (Psychotic Signs/Symptoms)  Outcome: Not Progressing  Goal: Improved Psychomotor Symptoms (Psychotic Signs/Symptoms)  Outcome: Not Progressing  Goal: Decreased Sensory Symptoms (Psychotic Signs/Symptoms)  Outcome: Not Progressing  Goal: Improved Sleep (Psychotic Signs/Symptoms)  Outcome: Not Progressing  Goal: Enhanced Social, Occupational or Functional Skills (Psychotic Signs/Symptoms)  Outcome: Not Progressing   Goal Outcome Evaluation:

## 2025-04-29 NOTE — PLAN OF CARE
Problem: Sleep Disturbance  Goal: Adequate Sleep/Rest  Outcome: Not Progressing   Patient complained feeling anxious, restless and unable to sleep. Hydroxyzine and Melatonin given-effective, slept after. Patient appears disheveled. No complaints of pain and discomfort. Patient continues on q15 minutes safety checks, no behavioral issues noted. Will continue to monitor the patient and provide therapeutic intervention as needed. Will continue with current plan of care. Notify MD with any concerns. The patient had 4.5 total hours of sleep this shift.

## 2025-04-30 LAB
GLUCOSE BLDC GLUCOMTR-MCNC: 102 MG/DL (ref 70–99)
GLUCOSE BLDC GLUCOMTR-MCNC: 130 MG/DL (ref 70–99)
GLUCOSE BLDC GLUCOMTR-MCNC: 188 MG/DL (ref 70–99)
GLUCOSE BLDC GLUCOMTR-MCNC: 79 MG/DL (ref 70–99)
GLUCOSE BLDC GLUCOMTR-MCNC: 89 MG/DL (ref 70–99)

## 2025-04-30 PROCEDURE — 250N000013 HC RX MED GY IP 250 OP 250 PS 637

## 2025-04-30 PROCEDURE — 250N000013 HC RX MED GY IP 250 OP 250 PS 637: Performed by: PHYSICIAN ASSISTANT

## 2025-04-30 PROCEDURE — 97150 GROUP THERAPEUTIC PROCEDURES: CPT | Mod: GO

## 2025-04-30 PROCEDURE — 250N000013 HC RX MED GY IP 250 OP 250 PS 637: Performed by: PSYCHIATRY & NEUROLOGY

## 2025-04-30 PROCEDURE — 99232 SBSQ HOSP IP/OBS MODERATE 35: CPT | Mod: GC | Performed by: PSYCHIATRY & NEUROLOGY

## 2025-04-30 PROCEDURE — 124N000002 HC R&B MH UMMC

## 2025-04-30 RX ORDER — ARIPIPRAZOLE 10 MG/1
10 TABLET ORAL DAILY
Status: DISCONTINUED | OUTPATIENT
Start: 2025-05-01 | End: 2025-05-02

## 2025-04-30 RX ORDER — VIT B COMP NO.3/FOLIC/C/BIOTIN 1 MG-60 MG
1 TABLET ORAL DAILY
Status: DISCONTINUED | OUTPATIENT
Start: 2025-04-30 | End: 2025-05-23 | Stop reason: HOSPADM

## 2025-04-30 RX ADMIN — PANTOPRAZOLE SODIUM 40 MG: 40 TABLET, DELAYED RELEASE ORAL at 08:35

## 2025-04-30 RX ADMIN — OLANZAPINE 15 MG: 15 TABLET, ORALLY DISINTEGRATING ORAL at 21:08

## 2025-04-30 RX ADMIN — METFORMIN HYDROCHLORIDE 1000 MG: 500 TABLET, FILM COATED ORAL at 17:55

## 2025-04-30 RX ADMIN — DIVALPROEX SODIUM 1000 MG: 500 TABLET, FILM COATED, EXTENDED RELEASE ORAL at 21:08

## 2025-04-30 RX ADMIN — TOLTERODINE TARTRATE 2 MG: 2 CAPSULE, EXTENDED RELEASE ORAL at 08:35

## 2025-04-30 RX ADMIN — OLANZAPINE 5 MG: 5 TABLET, ORALLY DISINTEGRATING ORAL at 08:35

## 2025-04-30 RX ADMIN — ARIPIPRAZOLE 5 MG: 5 TABLET ORAL at 08:35

## 2025-04-30 RX ADMIN — Medication 3 MG: at 21:08

## 2025-04-30 RX ADMIN — Medication 1 TABLET: at 08:35

## 2025-04-30 RX ADMIN — GLIPIZIDE 10 MG: 2.5 TABLET, FILM COATED, EXTENDED RELEASE ORAL at 08:34

## 2025-04-30 RX ADMIN — METFORMIN HYDROCHLORIDE 1000 MG: 500 TABLET, FILM COATED ORAL at 08:35

## 2025-04-30 ASSESSMENT — ACTIVITIES OF DAILY LIVING (ADL)
ADLS_ACUITY_SCORE: 47
ORAL_HYGIENE: INDEPENDENT
ADLS_ACUITY_SCORE: 47
ADLS_ACUITY_SCORE: 47
HYGIENE/GROOMING: INDEPENDENT
ADLS_ACUITY_SCORE: 47
LAUNDRY: WITH SUPERVISION
DRESS: INDEPENDENT
ADLS_ACUITY_SCORE: 47

## 2025-04-30 NOTE — PROGRESS NOTES
----------------------------------------------------------------------------------------------------------  [facility]  Psychiatry Progress Note  Hospital Day #15     Interim History:     The patient's care was discussed with the treatment team and chart notes were reviewed.    Identifier: Anastasiya Simon is a 59 year old female with a previous psychiatric diagnosis of schizophrenia and polysubstance use admitted from [facility] on 04/15/2025 due to concern for SI and psychosis in the context of psychosocial stressors including living situation and argument with family member.    Vitals: Temp: 98  F (36.7  C) Temp  Min: 97.7  F (36.5  C)  Max: 98  F (36.7  C)  SpO2: 95 % SpO2  Min: 95 %  Max: 96 %  Pulse: 95 Pulse  Min: 95  Max: 103  BP: (!) 149/76 Systolic (24hrs), Av , Min:136 , Max:149   Diastolic (24hrs), Av, Min:76, Max:84  Sleep: 6.5 hours (25 0600)  Scheduled medications: Took all scheduled medications as prescribed.  Psychiatric PRN medications:   Last 24H PRN:     melatonin tablet 3 mg, 3 mg at 25    Staff Report:   Anastasiya stated she felt like she's in a dream world. Asked pt if she could talk more about this. She stated she felt tired and wanted to call her sister. Pt said her sister going to visit her tomorrow and bring her some things. She jumps from topic to topic and is difficult to follow and understand what she's saying at times. Blood sugar before breakfast was 154 and before lunch 156. Given sliding scale insulin as per order. She appears to mostly socialize with one particular patient. She has had no bx issues this shift. Anastasiya was pleasant, she was cooperative and medication compliant. She was visible in milieu, spent most of her time watching tv. Engaged and social with peers in milieu. Alert and able to communicate needs. ADLs WNL. Denied pain or discomfort. Denied anxiety or depression. Denied SI/HI,  "contracted for safety. Adequate food and fluids intake. PRN Melatonin given at HS. Appears to have slept for 6.5 hours.  Blood Glucose:  4/29 1800: 213  4/29 2100: 152  4/30 0300: 89     Subjective:     Patient Interview:  Anastasiya was interviewed in her room. She was sitting in her bed during the interview.     She is feeling very drowsy this morning, but she says this is the normal amount of tired. She did eat breakfast this morning. Anastasiya wants to get phone numbers out of her phone to contact a spiritual friend to stay with. Told her that nursing staff can help retrieve phone numbers. Tamika is coming to visit today. Anastasiya asked about the progress in finding a place. Informed her that we sent a few referrals and waiting to hear back from them. Informed her that our  will come chat with her later about the specifics including locations. Talked about a healer man, wants to call him to see if she can stay with him for a few weeks. Discussed commitment and how the court will need to sign off on discharge plans, we want other housing options lined up for stability purposes. Anastasiya is frustrated that it is taking so long for us to find housing. Goals for today include showering. She would like to be started on Vitamin C and Calcium supplements, stating that medical problems such as peripheral neuropathy and hair loss is attributed to vitamin deficiencies.      Objective:     Vitals:  BP (!) 149/76   Pulse 95   Temp 98  F (36.7  C) (Oral)   Resp 18   Ht 1.651 m (5' 5\")   Wt 71.7 kg (158 lb)   LMP 10/22/2013   SpO2 95%   BMI 26.29 kg/m      Allergies:  Allergies   Allergen Reactions    Tetracycline Unknown     It happened when pt was a baby       Current Medications:  Scheduled:  Current Facility-Administered Medications   Medication Dose Route Frequency Provider Last Rate Last Admin    ARIPiprazole (ABILIFY) tablet 5 mg  5 mg Oral Daily Joceline Dela Cruz MD   5 mg at 04/29/25 0748    calcium " carbonate (TUMS) chewable tablet 500 mg  500 mg Oral Daily PRN Tanja Gonzalez MD   500 mg at 04/24/25 1750    glucose gel 15-30 g  15-30 g Oral Q15 Min PRN Katina Armendariz MD        Or    dextrose 50 % injection 25-50 mL  25-50 mL Intravenous Q15 Min PRN Katina Armendariz MD        Or    glucagon injection 1 mg  1 mg Subcutaneous Q15 Min PRN Katina Armendariz MD        divalproex sodium extended-release (DEPAKOTE ER) 24 hr tablet 1,000 mg  1,000 mg Oral At Bedtime Tanja Gonzalez MD   1,000 mg at 04/29/25 2040    gabapentin (NEURONTIN) capsule 300 mg  300 mg Oral TID PRN Tanja Gonzalez MD   300 mg at 04/22/25 2017    glipiZIDE (GLUCOTROL XL) 24 hr tablet 10 mg  10 mg Oral Daily with breakfast Katina Meza PA   10 mg at 04/29/25 0748    hydrOXYzine HCl (ATARAX) tablet 25 mg  25 mg Oral Q4H PRN Lucy Chandler MD   25 mg at 04/29/25 0435    insulin aspart (NovoLOG) injection (RAPID ACTING)  1-7 Units Subcutaneous TID AC Katina Meza PA   2 Units at 04/29/25 1806    insulin aspart (NovoLOG) injection (RAPID ACTING)  1-5 Units Subcutaneous At Bedtime Katina Meza PA   2 Units at 04/26/25 2129    loperamide (IMODIUM) capsule 2 mg  2 mg Oral 4x Daily PRN Tanja Gonzalez MD   2 mg at 04/28/25 2100    magnesium sulfate (EPSOM SALT) granules 1 Tablespoonful  1 Tablespoonful Topical Daily PRN Tanja Gonzalez MD        melatonin tablet 3 mg  3 mg Oral At Bedtime PRN Katina Armendariz MD   3 mg at 04/29/25 2040    metFORMIN (GLUCOPHAGE) tablet 1,000 mg  1,000 mg Oral BID w/meals Charlotte Jasmine PA-C   1,000 mg at 04/29/25 1807    multivitamin w/minerals (THERA-VIT-M) tablet 1 tablet  1 tablet Oral Daily Charlotte Jasmine PA-C   1 tablet at 04/29/25 0748    OLANZapine (zyPREXA) tablet 10 mg  10 mg Oral TID PRN Lucy Chandler MD        Or    OLANZapine (zyPREXA) injection 10 mg  10 mg Intramuscular TID PRN Lucy Chandler  MD Juliana        OLANZapine zydis (zyPREXA) ODT tab 15 mg  15 mg Oral At Bedtime Tanja Gonzalez MD   15 mg at 04/29/25 2040    OLANZapine zydis (zyPREXA) ODT tab 5 mg  5 mg Oral Daily Tanja Gonzalez MD   5 mg at 04/29/25 0748    pantoprazole (PROTONIX) EC tablet 40 mg  40 mg Oral QAM AC Lucy Chandler MD   40 mg at 04/29/25 0748    tolterodine ER (DETROL LA) 24 hr capsule 2 mg  2 mg Oral Daily Tanja Gonzalez MD   2 mg at 04/29/25 0748     PRN:  Current Facility-Administered Medications   Medication Dose Route Frequency Provider Last Rate Last Admin    ARIPiprazole (ABILIFY) tablet 5 mg  5 mg Oral Daily Joceline Dela Cruz MD   5 mg at 04/29/25 0748    calcium carbonate (TUMS) chewable tablet 500 mg  500 mg Oral Daily PRN Tanja Gonzalez MD   500 mg at 04/24/25 1750    glucose gel 15-30 g  15-30 g Oral Q15 Min PRN Katina Armendariz MD        Or    dextrose 50 % injection 25-50 mL  25-50 mL Intravenous Q15 Min PRN Katina Armendariz MD        Or    glucagon injection 1 mg  1 mg Subcutaneous Q15 Min PRN Katina Armendariz MD        divalproex sodium extended-release (DEPAKOTE ER) 24 hr tablet 1,000 mg  1,000 mg Oral At Bedtime Tanja Gonzalez MD   1,000 mg at 04/29/25 2040    gabapentin (NEURONTIN) capsule 300 mg  300 mg Oral TID PRN Tanja Gonzalez MD   300 mg at 04/22/25 2017    glipiZIDE (GLUCOTROL XL) 24 hr tablet 10 mg  10 mg Oral Daily with breakfast Katina Meza PA   10 mg at 04/29/25 0748    hydrOXYzine HCl (ATARAX) tablet 25 mg  25 mg Oral Q4H PRN Lucy Chandler MD   25 mg at 04/29/25 0435    insulin aspart (NovoLOG) injection (RAPID ACTING)  1-7 Units Subcutaneous TID AC Katina Meza PA   2 Units at 04/29/25 1806    insulin aspart (NovoLOG) injection (RAPID ACTING)  1-5 Units Subcutaneous At Bedtime Katina Meza PA   2 Units at 04/26/25 2129    loperamide (IMODIUM) capsule 2 mg  2 mg Oral 4x Daily PRN Tanja Gonzalez MD   2 mg at  04/28/25 2100    magnesium sulfate (EPSOM SALT) granules 1 Tablespoonful  1 Tablespoonful Topical Daily PRN Tanja Gonzalez MD        melatonin tablet 3 mg  3 mg Oral At Bedtime PRN Katina Armendariz MD   3 mg at 04/29/25 2040    metFORMIN (GLUCOPHAGE) tablet 1,000 mg  1,000 mg Oral BID w/meals Charlotte Jasmine PA-C   1,000 mg at 04/29/25 1807    multivitamin w/minerals (THERA-VIT-M) tablet 1 tablet  1 tablet Oral Daily Charlotte Jasmine PA-C   1 tablet at 04/29/25 0748    OLANZapine (zyPREXA) tablet 10 mg  10 mg Oral TID PRN Lucy Chandler MD        Or    OLANZapine (zyPREXA) injection 10 mg  10 mg Intramuscular TID PRN Lucy Chandler MD        OLANZapine zydis (zyPREXA) ODT tab 15 mg  15 mg Oral At Bedtime Tanja Gonzalez MD   15 mg at 04/29/25 2040    OLANZapine zydis (zyPREXA) ODT tab 5 mg  5 mg Oral Daily Tanja Gonzalez MD   5 mg at 04/29/25 0748    pantoprazole (PROTONIX) EC tablet 40 mg  40 mg Oral QAM AC Lucy Chandler MD   40 mg at 04/29/25 0748    tolterodine ER (DETROL LA) 24 hr capsule 2 mg  2 mg Oral Daily Tanja Gonzalez MD   2 mg at 04/29/25 0748     Labs and Imaging:  New results:   Recent Results (from the past 24 hours)   Glucose by meter    Collection Time: 04/29/25 11:40 AM   Result Value Ref Range    GLUCOSE BY METER POCT 156 (H) 70 - 99 mg/dL   Glucose by meter    Collection Time: 04/29/25  5:37 PM   Result Value Ref Range    GLUCOSE BY METER POCT 213 (H) 70 - 99 mg/dL   Glucose by meter    Collection Time: 04/29/25  9:01 PM   Result Value Ref Range    GLUCOSE BY METER POCT 152 (H) 70 - 99 mg/dL   Glucose by meter    Collection Time: 04/30/25  2:43 AM   Result Value Ref Range    GLUCOSE BY METER POCT 89 70 - 99 mg/dL     New Results  Valproic Acid Free & Total: 67  Valproic Acid Free & Total STAT: 75.6  Phosphorus: 4.6 (H)  Magnesium: 1.6 (L)  BMP: unremarkable (4/28)    Data this admission:  CBC: WNL. WBC: 7.6, RBC: 4.37, Hb:  "13.0, Platelets: 316.  CMP: Unremarkable (4/22) AST: 13, ALT: 10, Creatinine: 0.65.  UDS: Negative  RASHIDA: 0.00 (4/13)  HbA1c: 10.1%  TSH: 2.40  Vit: B12: 812  Folate: 13.7    Imaging:  Bladder Scan: Completed on 4/22     Mental Status Exam:     Oriented to:  Grossly oriented.  General: Alert and but somnolent. Pt was initially laying down in bed and sat up for the interview.   Appearance:  Appears older than stated age. Grooming is unkempt. Wears glasses and own personal clothing.   Behavior/Attitude:  engaged with questioning. Asking questions about discharge.   Eye Contact: eyes closed most of the interview.   Psychomotor: No evidence of tics, dystonia, or tardive dyskinesia. No catatonia present.  Speech: Appropriate rate/tone/volume.   Language: Fluent in English with appropriate syntax and vocabulary.  Mood:  \"drowsy, but it is the same as always\"  Affect:  Congruent with mood. Appropriate. Mildly anxious  Thought Process:  Linear and goal directed. Perseverative, ruminative   Thought Content:  Denies SI/SIB/AVH.  Associations:  intact  Insight:  limited   Judgment:  limited   Impulse control: fair  Attention Span:  adequate for conversation  Concentration:  grossly intact  Recent and Remote Memory:  Not formally assessed. Pt reports memory issues. Staff notes forgetfulness.   Fund of Knowledge: Average.  Muscle Strength and Tone: Normal.  Gait and Station: Normal.     Psychiatric Assessment     Anastasiya Simon is a 59 year old female previously diagnosed with schizophrenia and polysubstance use disorder who presented with suicidal ideation, disorganization, and inability to care for self. Most recent psychiatric hospitalization was 03/20/2019 for disorganization and suicidal ideation. Significant symptoms on admission include increasing suicidal ideation, disorganization, erratic behavior such as filling buckets with ammonia and mixing with bleach, substance use, auditory hallucinations, and paranoia. The MSE " on admission was pertinent for AVH, paranoia, limited insight into MH. Today on interview Anastasiya denies SI and reports she had only made suicidal statements to her sisters previously as she did not want to move into a group home.  Psychological contributions to mental health presentation include maladaptive coping through substance use and limited insight into MH. Social factors contributing to mental health presentation include interpersonal conflicts and unstable living environment. Protective factors include support from two sisters and absence of previous suicide attempts.     Anastasiya was re-started on Zyprexa 20 mg and her Depakote dose was increased to 1000 mg to address the ongoing psychosis and mood symptoms. Tolterodine and PRN loperamide were started to address incontinence and diarrhea. Due to T2DM neuropathy has been a concern for which we started gabapentin 300 mg as well. Due to concerns of metabolic side effects regarding underline diabetes it was decided to cross-titrate to Abilify from Zyprexa. Currently still adjusting doses considering Anastasiya's tolerability.     In summary, the patient's reported symptoms of SI, erractic behavior, AVH, and paranoia in the context of psychosocial stressers are consistent with decompensated schizophrenia and polysubstance use disorder.  She will likely benefit from medication optimization and outpatient MH, as well as referrals for placement that can give some structure considering her requirements, and working with her CM for future discharge plans.     Psychiatric Plan by Diagnosis      Today's changes:  - Increase Abilify to 10 mg daily in the morning  - Stop olanzapine 5 mg in the morning. Continue olanzapine 15 mg HS.     # Schizophrenia  Medications:  - olanzapine 15 mg HS  - depakote 1000 mg HS  - Abilify 10 mg daily in the morning    #Polysubstance Use Disorder  - CD consult once psychotic sx more stable, if pt amenable    2. Pertinent Labs/Monitoring:  -  Labs done 04/13 at OSH. CBC wnl, glucose elevated to 332, BMP wnl.  - UDS negative on 04/13  - 4/16 HbA1c: 10.1  - Depakote Levels: 75.6    3. Additional Plans:  - Patient will be treated in therapeutic milieu with appropriate individual and group therapies as described.   - Consulted Internal Medicine for diabetes management.   - Referrals  [facility] - sent 4/28  [facility] - sent 4/28  [facility] - sent 4/29     Psychiatric Hospital Course:      Anastasiya Simon was admitted to [facility] on a 72 hour hold (started 4/13/25 21:35) from [facility] on 04/15/25.   Medications:  Continued Medications:  Pt seen by psychiatry consult at [facility] who started zyprexa 20 mg at bedtime and depakote 500 mg at bedtime. Both of these medications were continued on admission to [facility].   New Medications:  Started loperamide for diarrhea  Started tolterodine for urinary incontinence  Started gabapentin for peripheral neuropathy    4/16: Increased depakote from 500 mg to 1000 mg HS for mood regulation  4/16: Increased metformin from 500 mg to 1000 mg BID per medicine consult  4/16: Zyprexa: 5 mg in the morning and 15 mg HS  4/16: Started PRN Loperamide 2 mg 4x/day for diarrhea  4/16: Started Tolterodine 2 mg daily for urinary incontinence  4/18: Started Gabapentin 300 mg PRN 3x/day for peripheral neuropathy  4/20: Started glipizide 24 hr tablet 5 mg daily for better glycemic control per medicine consult  4/25: Increased glipizide XL from 5mg to 10mg per medicine consult.  4/28: Started Abilify 5 mg PO daily in the morning  4/30: Increased Abilify to 10 mg daily in the morning  4/30: Decreased olanzapine to only 15 mg HS    The risks, benefits, alternatives, and side effects were discussed and understood by the patient and other caregivers.     Medical Assessment and Plan     Medical diagnoses to be addressed this admission:      #Type 2 Diabetes Mellitis with Complications  - PTA metformin 1000 mg BID  with meals  - BID glucose checks; hypoglycemia protocol  - PRN gabapentin 300 mg 3x/day for peripheral neuropathy  - Insulin aspart (Novolog) injection 1-5 units HS  - Insulin aspart (Novolog) injection 3x/day 1-7 units before meals  - Glipizide 24 hr tablet 10 mg daily with breakfast for better glycemic control  - Internal Medicine Consult  - Nutrition Consult  - Moderate Consistent Carb Diet  - Given patient is starting insulin, please notify medicine team of any impending discharge at least 2-3 days prior in order to have a safe management plan for her diabetes.    #Diarrhea  - Loperamide 2 mg PRN 4x/day    #Urinary Incontinence  - Tolterodine 2 mg daily    Medical course:  Patient was physically examined by the ED prior to being transferred to the unit and was found to be medically stable and appropriate for admission.    Consults:   4/16: Internal Medicine consulted for management of diabetes and blood pressure.   4/16: Increased metformin to 1000 mg BID.   4/18: Started PRN gabapentin 300 mg 3x/day for peripheral neuropathy.   4/18: Internal Medicine started Novolog injection 1-5 units HS and Novolog injection 3x/day 1-7 units before meals for better glycemic control. Given patient is starting insulin, please notify medicine team of any impending discharge at least 2-3 days prior in order to have a safe management plan for her diabetes.  4/19: Internal Medicine increased to high intensity sliding scale. Novolog injection 1-7 units HS. Novolog injection 3x/day 1-10 units before meals for better glycemic control.   4/20: Started PO glipizide 24 hr tablet 5 mg daily with breakfast.   4/21: Started pt on a consistent carb diet.   4/22: Nutrition Consult for diabetes management.   4/24: Note from Medicine: Please send patient with glucometer on discharge. Diabetes education should be ordered prior to discharge.  4/25: Increase Glipizide XL to 10 mg daily starting on 4/26. Continue Metformin 1000 mg BID. Decreased  to moderate intensity sliding scale. Novolog injection 1-5 units HS. Novolog injection 3x/day 1-7 units before meals.. Hopefully can discontinue in coming days. Moderate consistent CHO diet.   4/27: Continues to require 7U of insulin and is not quite ready to wean off of her sliding scale insulin yet. Will continue to monitor and adjust medications with goal of having her wean off sliding scale insulin prior to discharge.     Checklist     [facility] Status[facility]  File Number: [id number]  Start and expiration date of commitment: 04/24/2025 - 10/24/2025  [facility]ell[facility] meds: Zyprexa, Haldol, Abilify, Risperdal    Safety Assessment:   Behavioral Orders   Procedures    Code 1 - Restrict to Unit    Routine Programming     As clinically indicated    Status 15     Every 15 minutes.    Suicide precautions: Suicide Risk: MODERATE; Clinical rationale to override score: modification to the care environment, response to medication, lack of access to a plan for self-harm, Exhibiting Suicidal/self-harm behaviors or thoughts     Patients on Suicide Precautions should have a Combination Diet ordered that includes a Diet selection(s) AND a Behavioral Tray selection for Safe Tray - with utensils, or Safe Tray - NO utensils       Order Specific Question:   Suicide Risk     Answer:   MODERATE     Order Specific Question:   Clinical rationale to override score:     Answer:   modification to the care environment     Order Specific Question:   Clinical rationale to override score:     Answer:   response to medication     Order Specific Question:   Clinical rationale to override score:     Answer:   lack of access to a plan for self-harm     Order Specific Question:   Clinical rationale to override score:     Answer:   Exhibiting Suicidal/self-harm behaviors or thoughts     Risk Assessment:  Risk for harm is low to moderate.  Risk factors: maladaptive coping, substance use, impulsive, and past  behaviors.  Protective factors: family.    SIO: None    Disposition: TBD. Disposition pending clinical stabilization, medication optimization and development of an appropriate discharge plan.     Attestations     Tatyana Mcclure, MS3,   [facility] Medical Student     Patient was staffed with the attending physician in the morning.     Resident/Fellow Attestation   I, Lucy Rosa MD, was present with the medical/VINCEN[employer] student who participated in the service and in the documentation of the note.  I have verified the history and personally performed the physical exam and medical decision making.  I agree with the assessment and plan of care as documented in the note.      Lucy Plata MD  [facility] Psychiatry Resident  04/30/2025    Attestation:  This patient has been seen and evaluated by me, Joceline Dela Cruz MD.  I have discussed this patient with the house staff team including the resident and/or medical student and I agree with the findings and plan in this note.    I have reviewed today's vital signs, medications, labs and imaging. Joceline Dela Cruz MD , PhD.

## 2025-04-30 NOTE — PLAN OF CARE
BEH IP Unit Acuity Rating Score (UARS)  Patient is given one point for every criteria they meet.    CRITERIA SCORING   On a 72 hour hold, court hold, committed, stay of commitment, or revocation. 1    Patient LOS on BEH unit exceeds 20 days. 0  LOS: 15   Patient under guardianship, 55+, otherwise medically complex, or under age 11. 1   Suicide ideation without relief of precipitating factors. 0   Current plan for suicide. 0   Current plan for homicide. 0   Imminent risk or actual attempt to seriously harm another without relief of factors precipitating the attempt. 0   Severe dysfunction in daily living (ex: complete neglect for self care, extreme disruption in vegetative function, extreme deterioration in social interactions). 1   Recent (last 7 days) or current physical aggression in the ED or on unit. 0   Restraints or seclusion episode in past 72 hours. 0   Recent (last 7 days) or current verbal aggression, agitation, yelling, etc., while in the ED or unit. 0   Active psychosis. 1   Need for constant or near constant redirection (from leaving, from others, etc).  0   Intrusive or disruptive behaviors. 0   Patient requires 3 or more hours of individualized nursing care per 8-hour shift (i.e. for ADLs, meds, therapeutic interventions). 0   TOTAL 4

## 2025-04-30 NOTE — PROVIDER NOTIFICATION
04/30/25 1019   Individualization/Patient Specific Goals   Patient Personal Strengths resilient;resourceful;family/social support   Patient Vulnerabilities family/relationship conflict;housing insecurity;substance abuse/addiction   Interprofessional Rounds   Summary Discussed patient progress and referrals placed this week.   Participants nursing;CTC;psychiatrist;other (see comments);OT   Behavioral Team Discussion   Participants Dr. Dela Cruz; Dr. Dacosta; Lisbeth Gibson RN; Rhoda Casarez [facility]; Gayle Kong OT; medical students   Progress Pt is progressing   Anticipated length of stay 20+ days   Continued Stay Criteria/Rationale Symptom stabilization, medication management, care coordination   Medical/Physical See H&P   Precautions See below   Plan Psychiatric assessment/Medication management. Therapeutic Milieu. Individual care planning and after care planning. Patient to participate in unit groups and activities. Individual and group support on unit.   Safety Plan Completed by unit therapist   Anticipated Discharge Disposition IRTS;another healthcare facility     PRECAUTIONS AND SAFETY    Behavioral Orders   Procedures    Code 1 - Restrict to Unit    Routine Programming     As clinically indicated    Status 15     Every 15 minutes.    Suicide precautions: Suicide Risk: MODERATE; Clinical rationale to override score: modification to the care environment, response to medication, lack of access to a plan for self-harm, Exhibiting Suicidal/self-harm behaviors or thoughts     Patients on Suicide Precautions should have a Combination Diet ordered that includes a Diet selection(s) AND a Behavioral Tray selection for Safe Tray - with utensils, or Safe Tray - NO utensils       Order Specific Question:   Suicide Risk     Answer:   MODERATE     Order Specific Question:   Clinical rationale to override score:     Answer:   modification to the care environment     Order Specific Question:   Clinical rationale to  override score:     Answer:   response to medication     Order Specific Question:   Clinical rationale to override score:     Answer:   lack of access to a plan for self-harm     Order Specific Question:   Clinical rationale to override score:     Answer:   Exhibiting Suicidal/self-harm behaviors or thoughts       Safety  Safety WDL: WDL  Patient Location: lounge, dining room  Observed Behavior: calm  Observed Behavior (Comment): patient observed  sleeping in the lounge  Safety Measures: environmental rounds completed, safety rounds completed  Diversional Activity: television  Suicidality: Status 15

## 2025-04-30 NOTE — PROGRESS NOTES
"  Rehab Group    Start time: 1115  End time: 1200  Patient time total: 20 minutes    came in and out of group session    #5 attended   Group Type: OT Clinic   Group Topic Covered: balanced lifestyle, coping skills, healthy leisure time, and social skills     Group Session Detail:  Pt actively participated in occupational therapy clinic to facilitate coping skill exploration, creative expression within personally meaningful activities, and clinical observation of social, cognitive, and kinesthetic performance skills.        Patient Response/Contribution:  safe use of materials/supplies, worked intermittently, distracted , disorganized, needed prompts to redirect, and appeared frustrated     Patient Detail:   Pt arrived to group late with tense and agitated affect. Pts appearance was disheveled and unkempt. Pt walked into room and began loudly talking about potential discharge in a week, expressing frustration with this plan. Pt reported \"I dont have any mental health issues\" then went on to report \"I just told by sister I was going to kill myself if I had to move.\" Pt was tangential and difficult to redirect, refusing all opportunities to discuss coping skills, alternative thought patterns, ect. With extended time pt was able to engage in creative expression task for some time, requiring set up A. Pt came in and out of group room. Pt left group room unannounced, left all task items on table, and did not return. Will continue to encourage attendance and participation.         [id number] OT Group (2 or more in attendance)      Patient Active Problem List   Diagnosis    Suicidal ideation    Type 2 diabetes mellitus (H)    Chronic hepatitis C (H)    Schizophrenia (H)    Acid reflux disease    Hoarding behavior       "

## 2025-04-30 NOTE — PLAN OF CARE
Pt was out in the lounge watching tv with others most of the time this evening. Pt minimally engaged with select peers in short conversations. Presents with flat and blunted affect and depressed mood. Pt expressed her frustration for having wait for long time in the hospital. Denies SI/SIB/AH/VH and anxiety. Prn Melatonin given at HS.     Goal Outcome Evaluation:    Plan of Care Reviewed With: patient      Problem: Suicide Risk  Goal: Absence of Self-Harm  Outcome: Progressing     Problem: Diabetes  Goal: Optimal Functional Ability  Intervention: Optimize Functional Ability  Recent Flowsheet Documentation  Taken 4/30/2025 1657 by Petar Jarrett, RN  Activity Assistance Provided: independent  Goal: Blood Glucose Level Within Target Range  Outcome: Progressing     Problem: Psychotic Signs/Symptoms  Goal: Improved Mood Symptoms (Psychotic Signs/Symptoms)  Intervention: Optimize Emotion and Mood  Recent Flowsheet Documentation  Taken 4/30/2025 1657 by Petar Jarrett, RN  Diversional Activity: television

## 2025-04-30 NOTE — PLAN OF CARE
Problem: Sleep Disturbance  Goal: Adequate Sleep/Rest  Outcome: Progressing   Patient appears sleeping most of the shift. BG-89, patient asymptomatic. No complaints of pain and discomfort. Patient continues on q15 minutes safety checks, no behavioral issues noted. Will continue to monitor the patient and provide therapeutic intervention as needed. Will continue with current plan of care. Notify MD with any concerns. The patient had 6.5 total hours of sleep this shift.

## 2025-04-30 NOTE — PLAN OF CARE
Problem: Adult Behavioral Health Plan of Care  Goal: Plan of Care Review  Outcome: Progressing  Flowsheets (Taken 4/30/2025 1430)  Plan of Care Reviewed With: patient  Overall Patient Progress: improving  Patient Agreement with Plan of Care: agrees     Problem: Adult Behavioral Health Plan of Care  Goal: Absence of New-Onset Illness or Injury  Outcome: Progressing   Goal Outcome Evaluation:      Plan of Care Reviewed With: patient    Overall Patient Progress: improving  Patient intermittently visible in the milieu.Patient pours milk and coffee and sugar in the lounge area that makes peers unhappy.Patient encouraged to clean after herself.Patient is unkempt,offered shower but declined.Compliant with medication.No prn given.No escalation of behaviors.Blood sugars stable no insulin given this shift.Patient denies all psych symptoms.Patient likes naps and she attended OT groups.  Temp: 97.5  F (36.4  C) Temp src: Temporal BP: 125/78 Pulse: 92     SpO2: 95 % O2 Device: None (Room air)

## 2025-04-30 NOTE — PLAN OF CARE
"Team Note Due:  Wednesday    Assessment/Intervention/Current Symtoms and Care Coordination:  Chart review and met with team, discussed pt progress, symptomology, and response to treatment.  Discussed the discharge plan and any potential impediments to discharge.    Pt requested addresses to [facility] and [facility]. Writer reprinted info sheets which include addresses and provided to pts.    Received the following update from Jesika at [facility]: \"After further review of the referral and the information you provided, I do think it would be best for Anastasiya to attend [facility] treatment and/or an IRTS program for more intensive treatment of the substance use concern, prior to potential admission at [facility]. We do generally ask for 30 days without use, and though she's close to that, I am concerned about what her most recent use was, being amphetamines.\"      Discharge Plan or Goal:  [facility] vs IRTS vs [facility]     Barriers to Discharge:  Patient requires further psychiatric stabilization due to current symptomology, medication management with changes subject to provider, coordination with outside supports, and aftercare planning. Pt is also under civil commitment.      Referral Status:  [facility] IRTS - sent 4/28  [facility] - sent 4/29    Denied:  [facility] - referral sent 4/28. Denied 4/30, recommended pt attend CD tx/IRTS before being considered for a referral due to concern about recent amphetamine use.     Legal Status:  MI Commitment and [facility]: [place]  File Number: [id number]  Start and expiration date of commitment: 04/24/2025 - 10/24/2025    [facility] meds: Zyprexa, Haldol, Abilify, Risperdal    : TBRAFA    Contacts:   Haydee Mares (CADI CM): [phone]   Tamika Johnson- (sister): [phone]  Lucille Reddy (Intervention ): [phone]     Upcoming Meetings and Dates/Important Information and next steps:  Follow up on referrals  Schedule OP follow up  Notify medicine " team of any impending discharge at least 2-3 days prior in order to have a safe management plan for her diabetes    PD and COS needed at discharge

## 2025-05-01 LAB
GLUCOSE BLDC GLUCOMTR-MCNC: 126 MG/DL (ref 70–99)
GLUCOSE BLDC GLUCOMTR-MCNC: 209 MG/DL (ref 70–99)
GLUCOSE BLDC GLUCOMTR-MCNC: 247 MG/DL (ref 70–99)
GLUCOSE BLDC GLUCOMTR-MCNC: 88 MG/DL (ref 70–99)
GLUCOSE BLDC GLUCOMTR-MCNC: 94 MG/DL (ref 70–99)

## 2025-05-01 PROCEDURE — 250N000013 HC RX MED GY IP 250 OP 250 PS 637

## 2025-05-01 PROCEDURE — 124N000002 HC R&B MH UMMC

## 2025-05-01 PROCEDURE — 99207 PR APP CREDIT; MD BILLING SHARED VISIT: CPT

## 2025-05-01 PROCEDURE — 99232 SBSQ HOSP IP/OBS MODERATE 35: CPT | Mod: GC | Performed by: PSYCHIATRY & NEUROLOGY

## 2025-05-01 PROCEDURE — 250N000013 HC RX MED GY IP 250 OP 250 PS 637: Performed by: PHYSICIAN ASSISTANT

## 2025-05-01 RX ADMIN — Medication 1 TABLET: at 08:41

## 2025-05-01 RX ADMIN — METFORMIN HYDROCHLORIDE 1000 MG: 500 TABLET, FILM COATED ORAL at 18:25

## 2025-05-01 RX ADMIN — TOLTERODINE TARTRATE 2 MG: 2 CAPSULE, EXTENDED RELEASE ORAL at 08:41

## 2025-05-01 RX ADMIN — METFORMIN HYDROCHLORIDE 1000 MG: 500 TABLET, FILM COATED ORAL at 08:41

## 2025-05-01 RX ADMIN — ARIPIPRAZOLE 10 MG: 10 TABLET ORAL at 08:41

## 2025-05-01 RX ADMIN — DIVALPROEX SODIUM 1000 MG: 500 TABLET, FILM COATED, EXTENDED RELEASE ORAL at 20:05

## 2025-05-01 RX ADMIN — PANTOPRAZOLE SODIUM 40 MG: 40 TABLET, DELAYED RELEASE ORAL at 08:41

## 2025-05-01 RX ADMIN — OLANZAPINE 15 MG: 15 TABLET, ORALLY DISINTEGRATING ORAL at 20:04

## 2025-05-01 RX ADMIN — GLIPIZIDE 10 MG: 2.5 TABLET, FILM COATED, EXTENDED RELEASE ORAL at 08:41

## 2025-05-01 ASSESSMENT — ACTIVITIES OF DAILY LIVING (ADL)
ADLS_ACUITY_SCORE: 47
ORAL_HYGIENE: INDEPENDENT
LAUNDRY: WITH SUPERVISION
HYGIENE/GROOMING: HANDWASHING;INDEPENDENT
ADLS_ACUITY_SCORE: 47
ADLS_ACUITY_SCORE: 47
HYGIENE/GROOMING: INDEPENDENT
ADLS_ACUITY_SCORE: 47
DRESS: INDEPENDENT
ADLS_ACUITY_SCORE: 47
DRESS: SCRUBS (BEHAVIORAL HEALTH);INDEPENDENT
ADLS_ACUITY_SCORE: 47
ORAL_HYGIENE: INDEPENDENT
ADLS_ACUITY_SCORE: 47
LAUNDRY: WITH SUPERVISION

## 2025-05-01 NOTE — PLAN OF CARE
Problem: Adult Behavioral Health Plan of Care  Goal: Plan of Care Review  Outcome: Progressing  Flowsheets  Taken 5/1/2025 1235  Plan of Care Reviewed With: patient  Overall Patient Progress: improving  Patient Agreement with Plan of Care: agrees  Taken 5/1/2025 1000  Patient Agreement with Plan of Care: agrees     Problem: Adult Behavioral Health Plan of Care  Goal: Develops/Participates in Therapeutic [place] to Support Successful Transition  Outcome: Progressing   Goal Outcome Evaluation:    Plan of Care Reviewed With: patient     Overall Patient Progress: improving  Patient has been intermittently visible in the milieu.Adequate food and fluid intake.Hygiene unkempt,offered shower but she refused.Patient went to group for only a short time.Sociable with one peer.Patient able to verbalize her needs.Denies pain.Patient denies SI/SIB/AV/VH and HI.Compliant with medication.No prn given this shift.Affect flat and blunted.Mood is labile.Insight inappropriate.No escalation of behaviors.  Temp: 97.6  F (36.4  C) Temp src: Tympanic BP: (!) 153/83 Pulse: 100     SpO2: 96 % O2 Device: None (Room air)

## 2025-05-01 NOTE — PLAN OF CARE
Problem: Sleep Disturbance  Goal: Adequate Sleep/Rest  Outcome: Progressing     Patient displayed improved sleep hygiene, resting for approximately 7 hours. No signs of psychomotor agitation, hallucinations, or affective instability during the shift. Safety maintained with Q15 monitoring. No acute psychiatric symptoms noted. BG-126. Will continue with therapeutic milieu and alert MD to any changes.

## 2025-05-01 NOTE — PLAN OF CARE
"  Problem: Diabetes  Goal: Optimal Coping  Outcome: Progressing  Intervention: Support Wellbeing and Self-Management Success  Recent Flowsheet Documentation  Taken 5/1/2025 1700 by Luis Armenta RN  Family/Support System Care:   presence promoted   involvement promoted   self-care encouraged     Problem: Psychotic Signs/Symptoms  Goal: Improved Behavioral Control (Psychotic Signs/Symptoms)  Outcome: Progressing   Goal Outcome Evaluation:    Plan of Care Reviewed With: patient      Patient interacted and socialized well with selected peers.She was out in the [facility] area watching tv/movies with selected peers,and making his needs known when necessary. Affect was restricted and mood  appeared depressed, with patient expressing feelings of sadness and hopelessness being here.\"This place is a piece of shit. I have wasted my time being here.I am helpless and hopeless and nothing is going well for me,\"she said. When asked about psych symptoms, patient stated \"I don't have any. I am okay,\"Blood glucose levels were 247mg/dl @ dinner and 209 @ bedtime with no coverages per the orders.Staff however assured patient that this is the right place to seek help, and will continue to encourage and boost patient's confident as well as educate and check her blood glucose levels for a better therapeutic outcome. Refused BP checks this shift.          "

## 2025-05-01 NOTE — PROGRESS NOTES
----------------------------------------------------------------------------------------------------------  [facility]  Psychiatry Progress Note  Hospital Day #16     Interim History:     The patient's care was discussed with the treatment team and chart notes were reviewed.    Identifier: Anastasiya Simon is a 59 year old female with a previous psychiatric diagnosis of schizophrenia and polysubstance use admitted from [facility] on 04/15/2025 due to concern for SI and psychosis in the context of psychosocial stressors including living situation and argument with family member.    Vitals: Temp: 97.6  F (36.4  C) Temp  Min: 97.6  F (36.4  C)  Max: 97.6  F (36.4  C)  SpO2: 96 % SpO2  Min: 96 %  Max: 96 %  Pulse: 100 Pulse  Min: 100  Max: 100  BP: (!) 153/83 Systolic (24hrs), Av , Min:153 , Max:153   Diastolic (24hrs), Av, Min:83, Max:83  Sleep: 7 hours (25 0600)  Scheduled medications: Took all scheduled medications as prescribed.  Psychiatric PRN medications:   Last 24H PRN:     melatonin tablet 3 mg, 3 mg at 25 8745    Staff Report:   Anastasiya was intermittently visible in the milieu, watching TV with peers. She pours milk, coffee, and sugar in the lounge area which makes peers unhappy. Encouraged her to clean after herself. She appears unkempt. Offered shower but declined. Compliant with medication. Blood sugars stable, no insulin given this shift. Patient likes naps. Attended OT groups. Anastasiya was minimally engaged with select peers in short conversations. Presents with flat and blunted affect and depressed mood. Pt expressed her frustration for having wait for long time in the hospital. Denies SI/SIB/AH/VH and anxiety. No escalation of behaviors. PRN Melatonin given HS. Slept for 7 hours.   Blood Glucose:   1200: 102   1800: 188   2100: 130   0300: 126   0800: 88   Subjective:     Patient Interview:  Anastasiya mayen  "interviewed in her room. She mentions feeling a little drowsy, but she slept well last night. She did not have any diarrhea episodes. We talked about keeping the milieu clean so if she needs help with that she can talk with staff. We also encouraged her to shower, but she does not seem keen at this time. Anastasiya mentioned that the visit with sister went fine. We addressed discharge placement and how accepting OP CDT will be helpful for her. She is open to that. CTC will talk further with Anastasiya for it.    Currently she reports muscle spasms and are bothersome. We recommend ask for hot packs or PRN pain medication. She is agreeable with it. No psychiatric symptoms reported.      Objective:     Vitals:  BP (!) 153/83 (BP Location: Right arm, Patient Position: Sitting, Cuff Size: Adult Regular)   Pulse 100   Temp 97.6  F (36.4  C) (Tympanic)   Resp 18   Ht 1.651 m (5' 5\")   Wt 71.7 kg (158 lb)   LMP 10/22/2013   SpO2 96%   BMI 26.29 kg/m      Allergies:  Allergies   Allergen Reactions    Tetracycline Unknown     It happened when pt was a baby       Current Medications:  Scheduled:  Current Facility-Administered Medications   Medication Dose Route Frequency Provider Last Rate Last Admin    ARIPiprazole (ABILIFY) tablet 10 mg  10 mg Oral Daily Lucy Chandler MD   10 mg at 05/01/25 0841    calcium carbonate (TUMS) chewable tablet 500 mg  500 mg Oral Daily PRN Tanja Gonzalez MD   500 mg at 04/24/25 1750    glucose gel 15-30 g  15-30 g Oral Q15 Min PRN Katina Armendariz MD        Or    dextrose 50 % injection 25-50 mL  25-50 mL Intravenous Q15 Min PRN Katina Armendariz MD        Or    glucagon injection 1 mg  1 mg Subcutaneous Q15 Min PRN Katina Armendariz MD        divalproex sodium extended-release (DEPAKOTE ER) 24 hr tablet 1,000 mg  1,000 mg Oral At Bedtime Tanja Gonzalez MD   1,000 mg at 04/30/25 2106    gabapentin (NEURONTIN) capsule 300 mg  300 mg Oral TID PRN " Tanja Gonzalez MD   300 mg at 04/22/25 2017    glipiZIDE (GLUCOTROL XL) 24 hr tablet 10 mg  10 mg Oral Daily with breakfast Katina Meza PA   10 mg at 05/01/25 0841    hydrOXYzine HCl (ATARAX) tablet 25 mg  25 mg Oral Q4H PRN Lucy Chandler MD   25 mg at 04/29/25 0435    loperamide (IMODIUM) capsule 2 mg  2 mg Oral 4x Daily PRN Tanja Gonzalez MD   2 mg at 04/28/25 2100    magnesium sulfate (EPSOM SALT) granules 1 Tablespoonful  1 Tablespoonful Topical Daily PRN Tanja Gonzalez MD        melatonin tablet 3 mg  3 mg Oral At Bedtime PRN Katina Armendariz MD   3 mg at 04/30/25 2108    metFORMIN (GLUCOPHAGE) tablet 1,000 mg  1,000 mg Oral BID w/meals Charlotte Jasmine PA-C   1,000 mg at 05/01/25 0841    multivitamin RENAL (RENAVITE RX/NEPHROVITE) tablet 1 tablet  1 tablet Oral Daily Lucy Chandler MD   1 tablet at 05/01/25 0841    multivitamin w/minerals (THERA-VIT-M) tablet 1 tablet  1 tablet Oral Daily Charlotte Jasmine PA-C   1 tablet at 05/01/25 0841    OLANZapine (zyPREXA) tablet 10 mg  10 mg Oral TID PRN Lucy Chandler MD        Or    OLANZapine (zyPREXA) injection 10 mg  10 mg Intramuscular TID PRN Lucy Chandler MD        OLANZapine zydis (zyPREXA) ODT tab 15 mg  15 mg Oral At Bedtime Tanja Gonzalez MD   15 mg at 04/30/25 2108    pantoprazole (PROTONIX) EC tablet 40 mg  40 mg Oral QAM AC Lucy Chandler MD   40 mg at 05/01/25 0841    tolterodine ER (DETROL LA) 24 hr capsule 2 mg  2 mg Oral Daily Tanja Gonzalez MD   2 mg at 05/01/25 0841     PRN:  Current Facility-Administered Medications   Medication Dose Route Frequency Provider Last Rate Last Admin    ARIPiprazole (ABILIFY) tablet 10 mg  10 mg Oral Daily Lucy Chandler MD   10 mg at 05/01/25 0841    calcium carbonate (TUMS) chewable tablet 500 mg  500 mg Oral Daily PRN Tanja Gonzalez MD   500 mg at 04/24/25 1750    glucose gel  15-30 g  15-30 g Oral Q15 Min PRN Katina Armendariz MD        Or    dextrose 50 % injection 25-50 mL  25-50 mL Intravenous Q15 Min PRN Katina Armendariz MD        Or    glucagon injection 1 mg  1 mg Subcutaneous Q15 Min PRN Katina Armendairz MD        divalproex sodium extended-release (DEPAKOTE ER) 24 hr tablet 1,000 mg  1,000 mg Oral At Bedtime Tanja Gonzalez MD   1,000 mg at 04/30/25 2108    gabapentin (NEURONTIN) capsule 300 mg  300 mg Oral TID PRN Tanja Gonzalez MD   300 mg at 04/22/25 2017    glipiZIDE (GLUCOTROL XL) 24 hr tablet 10 mg  10 mg Oral Daily with breakfast Katina Meza PA   10 mg at 05/01/25 0841    hydrOXYzine HCl (ATARAX) tablet 25 mg  25 mg Oral Q4H PRN Lucy Chandler MD   25 mg at 04/29/25 0435    loperamide (IMODIUM) capsule 2 mg  2 mg Oral 4x Daily PRN Tanja Gonzalez MD   2 mg at 04/28/25 2100    magnesium sulfate (EPSOM SALT) granules 1 Tablespoonful  1 Tablespoonful Topical Daily PRN Tanja Gonzalez MD        melatonin tablet 3 mg  3 mg Oral At Bedtime PRN Katina Armendariz MD   3 mg at 04/30/25 2108    metFORMIN (GLUCOPHAGE) tablet 1,000 mg  1,000 mg Oral BID w/meals Charlotte Jasmine PA-C   1,000 mg at 05/01/25 0841    multivitamin RENAL (RENAVITE RX/NEPHROVITE) tablet 1 tablet  1 tablet Oral Daily Lucy Chandler MD   1 tablet at 05/01/25 0841    multivitamin w/minerals (THERA-VIT-M) tablet 1 tablet  1 tablet Oral Daily Charlotte Jasmine PA-C   1 tablet at 05/01/25 0841    OLANZapine (zyPREXA) tablet 10 mg  10 mg Oral TID PRN Lucy Chandler MD        Or    OLANZapine (zyPREXA) injection 10 mg  10 mg Intramuscular TID PRN Lucy Chandler MD        OLANZapine zydis (zyPREXA) ODT tab 15 mg  15 mg Oral At Bedtime Tanja Gonzalez MD   15 mg at 04/30/25 2108    pantoprazole (PROTONIX) EC tablet 40 mg  40 mg Oral QAM AC Lucy Chandler MD   40 mg at  "05/01/25 0841    tolterodine ER (DETROL LA) 24 hr capsule 2 mg  2 mg Oral Daily Tanja Gonzalez MD   2 mg at 05/01/25 0841     Labs and Imaging:  New results:   Recent Results (from the past 24 hours)   Glucose by meter    Collection Time: 04/30/25  5:46 PM   Result Value Ref Range    GLUCOSE BY METER POCT 188 (H) 70 - 99 mg/dL   Glucose by meter    Collection Time: 04/30/25  9:03 PM   Result Value Ref Range    GLUCOSE BY METER POCT 130 (H) 70 - 99 mg/dL   Glucose by meter    Collection Time: 05/01/25  3:19 AM   Result Value Ref Range    GLUCOSE BY METER POCT 126 (H) 70 - 99 mg/dL   Glucose by meter    Collection Time: 05/01/25  7:51 AM   Result Value Ref Range    GLUCOSE BY METER POCT 88 70 - 99 mg/dL   Glucose by meter    Collection Time: 05/01/25 12:00 PM   Result Value Ref Range    GLUCOSE BY METER POCT 94 70 - 99 mg/dL     New Results  Valproic Acid Free & Total: 67  Valproic Acid Free & Total STAT: 75.6  Phosphorus: 4.6 (H)  Magnesium: 1.6 (L)  BMP: unremarkable (4/28)    Data this admission:  CBC: WNL. WBC: 7.6, RBC: 4.37, Hb: 13.0, Platelets: 316.  CMP: Unremarkable (4/22) AST: 13, ALT: 10, Creatinine: 0.65.  UDS: Negative  RASHIDA: 0.00 (4/13)  HbA1c: 10.1%  TSH: 2.40  Vit: B12: 812  Folate: 13.7    Imaging:  Bladder Scan: Completed on 4/22     Mental Status Exam:     Oriented to:  Grossly oriented.  General: Alert and awake. Pt was laying down in bed and sat up to talk for the interview. Mildly swaying side to side.   Appearance:  Appears older than stated age. Grooming is unkempt. Wears glasses and own personal clothing.   Behavior/Attitude:  Engaged with questioning. Calm and cooperative.   Eye Contact: Eyes closed for most of the interview.   Psychomotor: No evidence of tics, dystonia, or tardive dyskinesia. No catatonia present.  Speech: Appropriate rate/tone/volume.   Language: Fluent in English with appropriate syntax and vocabulary.  Mood:  \"still drowsy\"  Affect:  Congruent with mood. Appropriate. Mildly " anxious.  Thought Process:  Linear and goal directed. Perseverative, ruminative.  Thought Content:  Denies SI/SIB/AVH.  Associations:  questionable  Insight:  limited   Judgment:  limited   Impulse control: fair  Attention Span:  adequate for conversation  Concentration:  grossly intact  Recent and Remote Memory:  Not formally assessed. Pt reports memory issues. Staff notes forgetfulness.   Fund of Knowledge: Average.  Muscle Strength and Tone: Normal.  Gait and Station: Normal.     Psychiatric Assessment     Anastasiya Simon is a 59 year old female previously diagnosed with schizophrenia and polysubstance use disorder who presented with suicidal ideation, disorganization, and inability to care for self. Most recent psychiatric hospitalization was 03/20/2019 for disorganization and suicidal ideation. Significant symptoms on admission include increasing suicidal ideation, disorganization, erratic behavior such as filling buckets with ammonia and mixing with bleach, substance use, auditory hallucinations, and paranoia. The MSE on admission was pertinent for AVH, paranoia, limited insight into MH. Today on interview Anastasiya denies SI and reports she had only made suicidal statements to her sisters previously as she did not want to move into a group home.  Psychological contributions to mental health presentation include maladaptive coping through substance use and limited insight into MH. Social factors contributing to mental health presentation include interpersonal conflicts and unstable living environment. Protective factors include support from two sisters and absence of previous suicide attempts.     Anastasiya was re-started on Zyprexa 20 mg and her Depakote dose was increased to 1000 mg to address the ongoing psychosis and mood symptoms. Tolterodine and PRN loperamide were started to address incontinence and diarrhea. Due to T2DM neuropathy has been a concern for which we started gabapentin 300 mg as well. Due to  concerns of metabolic side effects regarding underline diabetes it was decided to cross-titrate to Abilify from Zyprexa. Currently still adjusting doses considering Anastasiya's tolerability.     In summary, the patient's reported symptoms of SI, erractic behavior, AVH, and paranoia in the context of psychosocial stressers are consistent with decompensated schizophrenia and polysubstance use disorder.  She will likely benefit from medication optimization and outpatient MH, as well as referrals for placement that can give some structure considering her requirements, and working with her CM for future discharge plans. To further assist with this it was addressed the need to follow through with outpatient CDT which is something Anastasiya is in agreement.     Psychiatric Plan by Diagnosis      Today's changes:  - None    # Schizophrenia  Medications:  - olanzapine 15 mg HS  - depakote 1000 mg HS  - Abilify 10 mg daily in the morning    #Polysubstance Use Disorder  - CD consult once psychotic sx more stable, if pt amenable    2. Pertinent Labs/Monitoring:  - Labs done 04/13 at OSH. CBC wnl, glucose elevated to 332, BMP wnl.  - UDS negative on 04/13  - 4/16 HbA1c: 10.1  - Depakote Levels: 75.6    3. Additional Plans:  - Patient will be treated in therapeutic milieu with appropriate individual and group therapies as described.   - Consulted Internal Medicine for diabetes management.   - OP CDT referral  - Referrals  [facility] - sent 4/28  [facility] - sent 4/28  [facility] - sent 4/29     Psychiatric Hospital Course:      Anastasiya Simon was admitted to [facility] on a 72 hour hold (started 4/13/25 21:35) from [facility] on 04/15/25.   Medications:  Continued Medications:  Pt seen by psychiatry consult at [facility] who started zyprexa 20 mg at bedtime and depakote 500 mg at bedtime. Both of these medications were continued on admission to [facility].   New Medications:  Started loperamide for  diarrhea  Started tolterodine for urinary incontinence  Started gabapentin for peripheral neuropathy    4/16: Increased depakote from 500 mg to 1000 mg HS for mood regulation  4/16: Increased metformin from 500 mg to 1000 mg BID per medicine consult  4/16: Zyprexa: 5 mg in the morning and 15 mg HS  4/16: Started PRN Loperamide 2 mg 4x/day for diarrhea  4/16: Started Tolterodine 2 mg daily for urinary incontinence  4/18: Started Gabapentin 300 mg PRN 3x/day for peripheral neuropathy  4/20: Started glipizide 24 hr tablet 5 mg daily for better glycemic control per medicine consult  4/25: Increased glipizide XL from 5mg to 10mg per medicine consult.  4/28: Started Abilify 5 mg PO daily in the morning  4/30: Increased Abilify to 10 mg daily in the morning  4/30: Decreased olanzapine to only 15 mg HS    The risks, benefits, alternatives, and side effects were discussed and understood by the patient and other caregivers.     Medical Assessment and Plan     Medical diagnoses to be addressed this admission:      #Type 2 Diabetes Mellitis with Complications  - PTA metformin 1000 mg BID with meals  - BID glucose checks; hypoglycemia protocol  - PRN gabapentin 300 mg 3x/day for peripheral neuropathy  - Insulin aspart (Novolog) injection 1-5 units HS  - Insulin aspart (Novolog) injection 3x/day 1-7 units before meals  - Glipizide 24 hr tablet 10 mg daily with breakfast for better glycemic control  - Internal Medicine Consult  - Nutrition Consult  - Moderate Consistent Carb Diet  - Given patient is starting insulin, please notify medicine team of any impending discharge at least 2-3 days prior in order to have a safe management plan for her diabetes.  - As of 5/1, Medicine will sign off at this time. OK to stop sliding scale insulin.    #Diarrhea  - Loperamide 2 mg PRN 4x/day    #Urinary Incontinence  - Tolterodine 2 mg daily    Medical course:  Patient was physically examined by the ED prior to being transferred to the unit and  was found to be medically stable and appropriate for admission.    Consults:   4/16: Internal Medicine consulted for management of diabetes and blood pressure.   4/16: Increased metformin to 1000 mg BID.   4/18: Started PRN gabapentin 300 mg 3x/day for peripheral neuropathy.   4/18: Internal Medicine started Novolog injection 1-5 units HS and Novolog injection 3x/day 1-7 units before meals for better glycemic control. Given patient is starting insulin, please notify medicine team of any impending discharge at least 2-3 days prior in order to have a safe management plan for her diabetes.  4/19: Internal Medicine increased to high intensity sliding scale. Novolog injection 1-7 units HS. Novolog injection 3x/day 1-10 units before meals for better glycemic control.   4/20: Started PO glipizide 24 hr tablet 5 mg daily with breakfast.   4/21: Started pt on a consistent carb diet.   4/22: Nutrition Consult for diabetes management.   4/24: Note from Medicine: Please send patient with glucometer on discharge. Diabetes education should be ordered prior to discharge.  4/25: Increase Glipizide XL to 10 mg daily starting on 4/26. Continue Metformin 1000 mg BID. Decreased to moderate intensity sliding scale. Novolog injection 1-5 units HS. Novolog injection 3x/day 1-7 units before meals.. Hopefully can discontinue in coming days. Moderate consistent CHO diet.   4/27: Continues to require 7U of insulin and is not quite ready to wean off of her sliding scale insulin yet. Will continue to monitor and adjust medications with goal of having her wean off sliding scale insulin prior to discharge.  5/1: Currently the patient is medically stable and Medicine will sign off at this time. Recommendations relayed to primary team via this progress note. Please notify on-call [facility] if new questions or concerns arise. OK to stop sliding scale insulin. Continue BG checks BID. Continue glipizide 10 mg daily. Continue Metformin 1000 mg BID. Follow up  with PCP for continued BG monitoring within 1-2 weeks of discharge.      Checklist     Legal Status: [place] Commitment and [place]   County: [place]  File Number: [id number]  Start and expiration date of commitment: 04/24/2025 - 10/24/2025  [facility] meds: Zyprexa, Haldol, Abilify, Risperdal    Safety Assessment:   Behavioral Orders   Procedures    Code 1 - Restrict to Unit    Routine Programming     As clinically indicated    Status 15     Every 15 minutes.    Suicide precautions: Suicide Risk: MODERATE; Clinical rationale to override score: modification to the care environment, response to medication, lack of access to a plan for self-harm, Exhibiting Suicidal/self-harm behaviors or thoughts     Patients on Suicide Precautions should have a Combination Diet ordered that includes a Diet selection(s) AND a Behavioral Tray selection for Safe Tray - with utensils, or Safe Tray - NO utensils       Order Specific Question:   Suicide Risk     Answer:   MODERATE     Order Specific Question:   Clinical rationale to override score:     Answer:   modification to the care environment     Order Specific Question:   Clinical rationale to override score:     Answer:   response to medication     Order Specific Question:   Clinical rationale to override score:     Answer:   lack of access to a plan for self-harm     Order Specific Question:   Clinical rationale to override score:     Answer:   Exhibiting Suicidal/self-harm behaviors or thoughts     Risk Assessment:  Risk for harm is low to moderate.  Risk factors: maladaptive coping, substance use, impulsive, and past behaviors.  Protective factors: family.    SIO: None    Disposition: TBD. Disposition pending clinical stabilization, medication optimization and development of an appropriate discharge plan.     Attestations     Tatyana Mcclure, MS3,   MATTHEW Medical Student     Patient was staffed with the attending physician in the morning.     Resident/Fellow Attestation   Lucy ANTUNEZ  Juliana Dacosta MD, was present with the medical/[facility] student who participated in the service and in the documentation of the note.  I have verified the history and personally performed the physical exam and medical decision making.  I agree with the assessment and plan of care as documented in the note.      Lucy Plata MD  [facility] Psychiatry Resident  05/01/2025      Attestation:  This patient has been seen and evaluated by me, Joceline Dela Cruz MD.  I have discussed this patient with the house staff team including the resident and/or medical student and I agree with the findings and plan in this note.    I have reviewed today's vital signs, medications, labs and imaging. Joceline Dela Cruz MD , PhD.

## 2025-05-01 NOTE — PLAN OF CARE
Team Note Due:  Wednesday    Assessment/Intervention/Current Symtoms and Care Coordination:  Chart review and met with team, discussed pt progress, symptomology, and response to treatment.  Discussed the discharge plan and any potential impediments to discharge.    [facility] willing to reconsider initial denial as pt is now agreeable to OP CD tx. MD placed order for CD consult.    Discharge Plan or Goal:  [facility] vs [facility] vs [facility]     Barriers to Discharge:  Patient requires further psychiatric stabilization due to current symptomology, medication management with changes subject to provider, coordination with outside supports, and aftercare planning. Pt is also under civil commitment.      Referral Status:  [facility] - sent 4/28  [facility] - sent 4/29  [facility] - referral sent 4/28. Denied 4/30, recommended pt attend CD tx/IRTS before being considered for a referral due to concern about recent amphetamine use. Willing to reconsider denial if OP CD tx is in place.     Legal Status:  MI Commitment and [facility]: [place]  File Number: [id number]  Start and expiration date of commitment: 04/24/2025 - 10/24/2025    [facility] meds: Zyprexa, Haldol, Abilify, Risperdal    : TBD    Contacts:   Haydee Mares (CADI CM): [phone]   Tamika Johnson- (sister): [phone]  Lucille Reddy (Intervention ): [phone]     Upcoming Meetings and Dates/Important Information and next steps:  Follow up on referrals  Schedule OP follow up  Notify medicine team of any impending discharge at least 2-3 days prior in order to have a safe management plan for her diabetes    PD and COS needed at discharge

## 2025-05-01 NOTE — PROGRESS NOTES
"Brief Medicine Note:  Internal medicine consulted on 04/16 for DM. Please see consult note for full details.    Internal Medicine has been following Ms. Simon for her hyperglycemia and have made many changes to her diabetic medications with blood sugar improvement. She has been getting 0-4 units sliding scale insulin daily with improvement in her blood sugars especially in the past couple of days.   - Ok to stop sliding scale insulin (ordered for you)  - Continue BG checks BID  - Continue glipizide 10 mg daily  - Continue Metformin 1000 mg BID  - Follow up with PCP for continued BG monitoring within 1-2 weeks of discharge    Temp: 97.6  F (36.4  C) Temp src: Tympanic BP: (!) 153/83 Pulse: 100     SpO2: 96 % Height: 165.1 cm (5' 5\") Weight: 71.7 kg (158 lb)    Currently the patient is medically stable and Medicine will sign off at this time. Recommendations relayed to primary team via this progress note. Please notify on-call [facility] if new questions or concerns arise.      Nayeli Mariscal PA-C  Hospital Medicine [facility]   [facility]  Securely message with [employer] (more info)  Text page via [facility] Paging/Directory    "

## 2025-05-01 NOTE — PLAN OF CARE
BEH IP Unit Acuity Rating Score (UARS)  Patient is given one point for every criteria they meet.    CRITERIA SCORING   On a 72 hour hold, court hold, committed, stay of commitment, or revocation. 1    Patient LOS on BEH unit exceeds 20 days. 0  LOS: 16   Patient under guardianship, 55+, otherwise medically complex, or under age 11. 1   Suicide ideation without relief of precipitating factors. 0   Current plan for suicide. 0   Current plan for homicide. 0   Imminent risk or actual attempt to seriously harm another without relief of factors precipitating the attempt. 0   Severe dysfunction in daily living (ex: complete neglect for self care, extreme disruption in vegetative function, extreme deterioration in social interactions). 1   Recent (last 7 days) or current physical aggression in the ED or on unit. 0   Restraints or seclusion episode in past 72 hours. 0   Recent (last 7 days) or current verbal aggression, agitation, yelling, etc., while in the ED or unit. 0   Active psychosis. 1   Need for constant or near constant redirection (from leaving, from others, etc).  0   Intrusive or disruptive behaviors. 0   Patient requires 3 or more hours of individualized nursing care per 8-hour shift (i.e. for ADLs, meds, therapeutic interventions). 0   TOTAL 3

## 2025-05-01 NOTE — PLAN OF CARE
"  Problem: Diabetes  Goal: Optimal Coping  Outcome: Progressing  Intervention: Support Wellbeing and Self-Management Success  Recent Flowsheet Documentation  Taken 5/1/2025 1700 by Luis Armenta RN  Family/Support System Care:   presence promoted   involvement promoted   self-care encouraged     Problem: Psychotic Signs/Symptoms  Goal: Improved Behavioral Control (Psychotic Signs/Symptoms)  Outcome: Progressing   Goal Outcome Evaluation:    Plan of Care Reviewed With: patient      Patient interacted and socialized well with selected peers.She was out in the [facility] area watching tv/movies  with selected peers,and making his needs known when necessary. Affect was restricted and mood  appeared depressed, with patient expressing feelings of sadness and hopelessness being here.\"This place is a piece of shit. I have wasted my time being here.I am helpless and hopeless and nothing is going well for me,\"she said. When asked about psych symptoms, patient stated \"I don't have any now. I am okay,\"Blood glucose levels were 247mg/dl @ dinner and 209 @ bedtime with no coverages per the orders.Staff however, will continue to encourage and boost patient's confident as well as educate and check her blood glucose levels for a better therapeutic outcome.           "

## 2025-05-01 NOTE — PROGRESS NOTES
CD Consult acknowledged.  Staff will attempt to see pt for CD Consult Friday 5/2.  TULIO Hoffmann [facility] on 5/1/2025 at 3:48 PM

## 2025-05-02 LAB
GLUCOSE BLDC GLUCOMTR-MCNC: 120 MG/DL (ref 70–99)
GLUCOSE BLDC GLUCOMTR-MCNC: 138 MG/DL (ref 70–99)
GLUCOSE BLDC GLUCOMTR-MCNC: 210 MG/DL (ref 70–99)

## 2025-05-02 PROCEDURE — 250N000013 HC RX MED GY IP 250 OP 250 PS 637

## 2025-05-02 PROCEDURE — 250N000013 HC RX MED GY IP 250 OP 250 PS 637: Performed by: PHYSICIAN ASSISTANT

## 2025-05-02 PROCEDURE — 97150 GROUP THERAPEUTIC PROCEDURES: CPT | Mod: GO

## 2025-05-02 PROCEDURE — 124N000002 HC R&B MH UMMC

## 2025-05-02 RX ORDER — OLANZAPINE 10 MG/1
10 TABLET, ORALLY DISINTEGRATING ORAL AT BEDTIME
Status: DISCONTINUED | OUTPATIENT
Start: 2025-05-02 | End: 2025-05-05

## 2025-05-02 RX ORDER — ARIPIPRAZOLE 15 MG/1
15 TABLET ORAL DAILY
Status: DISCONTINUED | OUTPATIENT
Start: 2025-05-03 | End: 2025-05-23 | Stop reason: HOSPADM

## 2025-05-02 RX ADMIN — ARIPIPRAZOLE 10 MG: 10 TABLET ORAL at 08:26

## 2025-05-02 RX ADMIN — Medication 1 TABLET: at 08:28

## 2025-05-02 RX ADMIN — LOPERAMIDE HYDROCHLORIDE 2 MG: 2 CAPSULE ORAL at 08:44

## 2025-05-02 RX ADMIN — METFORMIN HYDROCHLORIDE 1000 MG: 500 TABLET, FILM COATED ORAL at 18:14

## 2025-05-02 RX ADMIN — METFORMIN HYDROCHLORIDE 1000 MG: 500 TABLET, FILM COATED ORAL at 08:27

## 2025-05-02 RX ADMIN — DIVALPROEX SODIUM 1000 MG: 500 TABLET, FILM COATED, EXTENDED RELEASE ORAL at 20:09

## 2025-05-02 RX ADMIN — Medication 1 TABLET: at 08:27

## 2025-05-02 RX ADMIN — TOLTERODINE TARTRATE 2 MG: 2 CAPSULE, EXTENDED RELEASE ORAL at 08:28

## 2025-05-02 RX ADMIN — OLANZAPINE 10 MG: 10 TABLET, ORALLY DISINTEGRATING ORAL at 20:09

## 2025-05-02 RX ADMIN — Medication 3 MG: at 20:09

## 2025-05-02 RX ADMIN — PANTOPRAZOLE SODIUM 40 MG: 40 TABLET, DELAYED RELEASE ORAL at 08:28

## 2025-05-02 RX ADMIN — LOPERAMIDE HYDROCHLORIDE 2 MG: 2 CAPSULE ORAL at 20:04

## 2025-05-02 RX ADMIN — LOPERAMIDE HYDROCHLORIDE 2 MG: 2 CAPSULE ORAL at 12:56

## 2025-05-02 RX ADMIN — LOPERAMIDE HYDROCHLORIDE 2 MG: 2 CAPSULE ORAL at 04:34

## 2025-05-02 RX ADMIN — GLIPIZIDE 10 MG: 2.5 TABLET, FILM COATED, EXTENDED RELEASE ORAL at 08:26

## 2025-05-02 ASSESSMENT — ACTIVITIES OF DAILY LIVING (ADL)
ADLS_ACUITY_SCORE: 47
LAUNDRY: WITH SUPERVISION
HYGIENE/GROOMING: INDEPENDENT
ADLS_ACUITY_SCORE: 47
DRESS: INDEPENDENT
ADLS_ACUITY_SCORE: 47
ORAL_HYGIENE: INDEPENDENT
ADLS_ACUITY_SCORE: 47

## 2025-05-02 NOTE — PLAN OF CARE
Problem: Diabetes  Goal: Blood Glucose Level Within Target Range  Outcome: Progressing     Problem: Psychotic Signs/Symptoms  Goal: Improved Mood Symptoms (Psychotic Signs/Symptoms)  Outcome: Progressing   Goal Outcome Evaluation:      Patient complained of diarrhea, and was given loperamide 2X this shift, 0844 and 1256. Provider aware of patient's complaint of diarrhea today.  Writer went to ask patient more questions about her diarrhea, (onset, duration, frequency, consistency, triggers), but patient became agitated and stated she was tired of being asked so many questions. Writer then asked patient if she had any urinary incontinence, and she stated yes, but again when writer asked more questions about it patient was agitated and stated she wanted to be left alone. Writer was unable to complete mental health assessment due to refusal to participate.  Patient has disheveled appearance. Flat affect.  Patient reports she finds the chairs in the dining room uncomfortable and that she would be better off using a recliner.

## 2025-05-02 NOTE — PLAN OF CARE
Problem: Sleep Disturbance  Goal: Adequate Sleep/Rest  Outcome: Progressing    Pt appears to have slept for 6.5 hours. PRN  Imodium was given for loose stool, and was effective. Pt denies pain, anxiety, depression and si/sib. 15 minutes safety checks were in place. Staff will continue to offer support to pt.

## 2025-05-02 NOTE — CONSULTS
Met with patient to discuss possible [facility] treatment options and to possibly complete an assessment. Assessment has been completed at this time and patient is being recommended:         Recommendations:   2.1 Intensive Outpatient Services  Patient may benefit from an IRTS or [facility].        Legal Status:  [place] Commitment and [place]   County: [place]  File Number: [id number]  Start and expiration date of commitment: 04/24/2025 - 10/24/2025     Referrals/ Alternatives:  [facility]  [address]  [address]  [place] [address]  [phone]     [facility]  [address] [place] [address]  [phone]  Fax: [phone]     [facility]  Womens  [address] [place] [address]  [phone]                   [facility]  [facility]  [address]  [address]  [place] [address]  [phone]    Referrals are being made at this time.  Patient is aware of the referrals and is in agreement. Patient gave verbal JAYE's for the above referrals.     FABIAN Capone on 5/2/2025 at 11:33 AM

## 2025-05-02 NOTE — PROGRESS NOTES
"  ----------------------------------------------------------------------------------------------------------  [facility]  Psychiatry Progress Note  Hospital Day #17     Interim History:     The patient's care was discussed with the treatment team and chart notes were reviewed.    Identifier: Anastasiya Simon is a 59 year old female with a previous psychiatric diagnosis of schizophrenia and polysubstance use admitted from [facility] on 04/15/2025 due to concern for SI and psychosis in the context of psychosocial stressors including living situation and argument with family member.    Vitals:Temp: 98.1  F (36.7  C) Temp src: Oral BP: 125/81 Pulse: 96     SpO2: 95 % O2 Device: None (Room air)   Height: 165.1 cm (5' 5\") Weight: 71.7 kg (158 lb)  Estimated body mass index is 26.29 kg/m  as calculated from the following:    Height as of this encounter: 1.651 m (5' 5\").    Weight as of this encounter: 71.7 kg (158 lb).  Sleep: 6.5 hours (05/02/25 0630)  Scheduled medications: Took all scheduled medications as prescribed.  Psychiatric PRN medications:   Last 24H PRN:     loperamide (IMODIUM) capsule 2 mg, 2 mg at 05/02/25 0844    Staff Report:   Anastasiya has been intermittently visible in the milieu watching TV/movies. She interacted and socialized well with select peers. Her hygiene is unkempt, she refused a shower. Affect was restricted. Mood appeared depressed. Anastasiya expressed feelings of sadness and hopelessness, \"This place is a piece of shit. I have wasted my time being here. I am helpless and hopeless. Nothing is going well for me\". Denies SI/SIB/AV/HI. Denies pain. Compliant with medications. She is able to make needs known. Anastasiya went to group for a short time, sociable with one peer. Flat affect. Mood is labile. Insight is inappropriate. No behavorial escalations. Blood glucose levels were 247mg/dl at dinner and 209 at bedtime with no coverages per the " "orders. Anastasiya appears to have slept for 6.5 hours. PRN Imodium was given for loose stool, and was effective.      Subjective:     Patient Interview:  Anastasiya was interviewed in her room.    She reports being tired. Mood is \"tired\". She has no new health concerns or updates for us. We informed Anastasiya that she has a CD consult coming up today. Reminded her that it will help get placed into [facility]. Informed pt that [facility] wants to do a phone interview soon as well. Anastasiya understood all updates and was appreciative. Informed her that our  will come and tell her more specific details as needed.     Objective:     Vitals:  /81   Pulse 96   Temp 98.1  F (36.7  C) (Oral)   Resp 18   Ht 1.651 m (5' 5\")   Wt 71.7 kg (158 lb)   LMP 10/22/2013   SpO2 95%   BMI 26.29 kg/m      Allergies:  Allergies   Allergen Reactions    Tetracycline Unknown     It happened when pt was a baby       Current Medications:  Scheduled:  Current Facility-Administered Medications   Medication Dose Route Frequency Provider Last Rate Last Admin    [START ON 5/3/2025] ARIPiprazole (ABILIFY) tablet 15 mg  15 mg Oral Daily Lucy Chandler MD        calcium carbonate (TUMS) chewable tablet 500 mg  500 mg Oral Daily PRN Tanja Gonzalez MD   500 mg at 04/24/25 1750    glucose gel 15-30 g  15-30 g Oral Q15 Min PRN Katina Armendariz MD        Or    dextrose 50 % injection 25-50 mL  25-50 mL Intravenous Q15 Min PRN Katina Armendariz MD        Or    glucagon injection 1 mg  1 mg Subcutaneous Q15 Min PRN Katina Armendariz MD        divalproex sodium extended-release (DEPAKOTE ER) 24 hr tablet 1,000 mg  1,000 mg Oral At Bedtime Tanja Gonzalez MD   1,000 mg at 05/01/25 2005    gabapentin (NEURONTIN) capsule 300 mg  300 mg Oral TID PRN Tanja Gonzalez MD   300 mg at 04/22/25 2017    glipiZIDE (GLUCOTROL XL) 24 hr tablet 10 mg  10 mg Oral Daily with breakfast Katina Meza" PA   10 mg at 05/02/25 0826    hydrOXYzine HCl (ATARAX) tablet 25 mg  25 mg Oral Q4H PRN Lucy Chandler MD   25 mg at 04/29/25 0435    loperamide (IMODIUM) capsule 2 mg  2 mg Oral 4x Daily PRN Tanja Gonzalez MD   2 mg at 05/02/25 0844    magnesium sulfate (EPSOM SALT) granules 1 Tablespoonful  1 Tablespoonful Topical Daily PRN Tanja Gonzalez MD        melatonin tablet 3 mg  3 mg Oral At Bedtime PRN Katina Armendariz MD   3 mg at 04/30/25 2108    metFORMIN (GLUCOPHAGE) tablet 1,000 mg  1,000 mg Oral BID w/meals Charlotte Jasmine PA-C   1,000 mg at 05/02/25 0827    multivitamin RENAL (RENAVITE RX/NEPHROVITE) tablet 1 tablet  1 tablet Oral Daily Lucy Chandler MD   1 tablet at 05/02/25 0827    multivitamin w/minerals (THERA-VIT-M) tablet 1 tablet  1 tablet Oral Daily Charlotte Jasmine PA-C   1 tablet at 05/02/25 0828    OLANZapine (zyPREXA) tablet 10 mg  10 mg Oral TID PRN Lucy Chandler MD        Or    OLANZapine (zyPREXA) injection 10 mg  10 mg Intramuscular TID PRN Lucy Chandler MD        OLANZapine zydis (zyPREXA) ODT tab 10 mg  10 mg Oral At Bedtime Lucy Chandler MD        pantoprazole (PROTONIX) EC tablet 40 mg  40 mg Oral QAM AC Lucy Chandler MD   40 mg at 05/02/25 0828    tolterodine ER (DETROL LA) 24 hr capsule 2 mg  2 mg Oral Daily Tanja Gonzalez MD   2 mg at 05/02/25 0828     PRN:  Current Facility-Administered Medications   Medication Dose Route Frequency Provider Last Rate Last Admin    [START ON 5/3/2025] ARIPiprazole (ABILIFY) tablet 15 mg  15 mg Oral Daily Lucy Chandler MD        calcium carbonate (TUMS) chewable tablet 500 mg  500 mg Oral Daily PRN Tanja Gonzalez MD   500 mg at 04/24/25 1750    glucose gel 15-30 g  15-30 g Oral Q15 Min PRN Katina Armendariz MD        Or    dextrose 50 % injection 25-50 mL  25-50 mL Intravenous Q15 Min PRN  Katina Armendariz MD        Or    glucagon injection 1 mg  1 mg Subcutaneous Q15 Min PRN Katina Armendariz MD        divalproex sodium extended-release (DEPAKOTE ER) 24 hr tablet 1,000 mg  1,000 mg Oral At Bedtime Tanja Gonzalez MD   1,000 mg at 05/01/25 2005    gabapentin (NEURONTIN) capsule 300 mg  300 mg Oral TID PRN Tanja Gonzalez MD   300 mg at 04/22/25 2017    glipiZIDE (GLUCOTROL XL) 24 hr tablet 10 mg  10 mg Oral Daily with breakfast Katina Meza PA   10 mg at 05/02/25 0826    hydrOXYzine HCl (ATARAX) tablet 25 mg  25 mg Oral Q4H PRN Lucy Chandler MD   25 mg at 04/29/25 0435    loperamide (IMODIUM) capsule 2 mg  2 mg Oral 4x Daily PRN Tanja Gonzalez MD   2 mg at 05/02/25 0844    magnesium sulfate (EPSOM SALT) granules 1 Tablespoonful  1 Tablespoonful Topical Daily PRN Tanja Gonzalez MD        melatonin tablet 3 mg  3 mg Oral At Bedtime PRN Katina Armendariz MD   3 mg at 04/30/25 2108    metFORMIN (GLUCOPHAGE) tablet 1,000 mg  1,000 mg Oral BID w/meals Charlotte Jasmine PA-C   1,000 mg at 05/02/25 0827    multivitamin RENAL (RENAVITE RX/NEPHROVITE) tablet 1 tablet  1 tablet Oral Daily Lucy Chandler MD   1 tablet at 05/02/25 0827    multivitamin w/minerals (THERA-VIT-M) tablet 1 tablet  1 tablet Oral Daily Charlotte Jasmine PA-C   1 tablet at 05/02/25 0828    OLANZapine (zyPREXA) tablet 10 mg  10 mg Oral TID PRN uLcy Chandler MD        Or    OLANZapine (zyPREXA) injection 10 mg  10 mg Intramuscular TID PRN Lucy Chandler MD        OLANZapine zydis (zyPREXA) ODT tab 10 mg  10 mg Oral At Bedtime Lucy Chandler MD        pantoprazole (PROTONIX) EC tablet 40 mg  40 mg Oral QAM AC Lucy Chandler MD   40 mg at 05/02/25 0828    tolterodine ER (DETROL LA) 24 hr capsule 2 mg  2 mg Oral Daily Tanja Gonzalez MD   2 mg at 05/02/25 0828     Labs and Imaging:  New  "results:   Recent Results (from the past 24 hours)   Glucose by meter    Collection Time: 05/01/25 12:00 PM   Result Value Ref Range    GLUCOSE BY METER POCT 94 70 - 99 mg/dL   Glucose by meter    Collection Time: 05/01/25  5:56 PM   Result Value Ref Range    GLUCOSE BY METER POCT 247 (H) 70 - 99 mg/dL   Glucose by meter    Collection Time: 05/01/25 10:07 PM   Result Value Ref Range    GLUCOSE BY METER POCT 209 (H) 70 - 99 mg/dL   Glucose by meter    Collection Time: 05/02/25  8:09 AM   Result Value Ref Range    GLUCOSE BY METER POCT 120 (H) 70 - 99 mg/dL     Data this admission:  CBC: WNL. WBC: 7.6, RBC: 4.37, Hb: 13.0, Platelets: 316.  CMP: Unremarkable (4/22) AST: 13, ALT: 10, Creatinine: 0.65.  UDS: Negative  RASHIDA: 0.00 (4/13)  HbA1c: 10.1%  TSH: 2.40  Vit: B12: 812  Folate: 13.7  Valproic Acid Free & Total: 67  Valproic Acid Free & Total STAT: 75.6  Phosphorus: 4.6 (H)  Magnesium: 1.6 (L)  BMP: unremarkable (4/28)    Imaging:  Bladder Scan: Completed on 4/22     Mental Status Exam:     Oriented to:  Grossly oriented.  General: Alert and awake. Pt was laying down for the interview.  Appearance:  Appears older than stated age. Grooming is unkempt, but improved since yesterday since showering. Wears glasses and own personal clothing.   Behavior/Attitude:  Engaged with questioning. Calm and cooperative.   Eye Contact: Eyes closed for most of the interview.   Psychomotor: No evidence of tics, dystonia, or tardive dyskinesia. No catatonia present.  Speech: Appropriate rate/tone/volume.   Language: Fluent in English with appropriate syntax and vocabulary.  Mood:  \"tired\"  Affect:  Congruent with mood. Appropriate  Thought Process:  Linear and goal directed.  Thought Content:  Denies SI/SIB/AVH.  Associations:  questionable  Insight:  limited   Judgment:  limited   Impulse control: fair  Attention Span:  adequate for conversation  Concentration:  grossly intact  Recent and Remote Memory:  Not formally assessed. Pt " reports memory issues. Staff notes forgetfulness.   Fund of Knowledge: Average.  Muscle Strength and Tone: Normal.  Gait and Station: Normal.     Psychiatric Assessment     Anastasiya Simon is a 59 year old female previously diagnosed with schizophrenia and polysubstance use disorder who presented with suicidal ideation, disorganization, and inability to care for self. Most recent psychiatric hospitalization was 03/20/2019 for disorganization and suicidal ideation. Significant symptoms on admission include increasing suicidal ideation, disorganization, erratic behavior such as filling buckets with ammonia and mixing with bleach, substance use, auditory hallucinations, and paranoia. The MSE on admission was pertinent for AVH, paranoia, limited insight into MH. Today on interview Anastasiya denies SI and reports she had only made suicidal statements to her sisters previously as she did not want to move into a group home.  Psychological contributions to mental health presentation include maladaptive coping through substance use and limited insight into MH. Social factors contributing to mental health presentation include interpersonal conflicts and unstable living environment. Protective factors include support from two sisters and absence of previous suicide attempts.     Anastasiya was re-started on Zyprexa 20 mg and her Depakote dose was increased to 1000 mg to address the ongoing psychosis and mood symptoms. Tolterodine and PRN loperamide were started to address incontinence and diarrhea. Due to T2DM neuropathy has been a concern for which we started gabapentin 300 mg as well. Due to concerns of metabolic side effects regarding underline diabetes it was decided to cross-titrate to Abilify from Zyprexa. Currently still adjusting doses considering Anastasiya's tolerability.     In summary, the patient's reported symptoms of SI, erractic behavior, AVH, and paranoia in the context of psychosocial stressers are consistent with  decompensated schizophrenia and polysubstance use disorder.  She will likely benefit from medication optimization and outpatient MH, as well as referrals for placement that can give some structure considering her requirements, and working with her CM for future discharge plans. To further assist with this it was addressed the need to follow through with outpatient CDT which is something Anastasiya is in agreement.     Psychiatric Plan by Diagnosis      Today's changes:  - Increase Abilify to 15 mg daily in the morning  - Decrease Zyprexa to 10 mg HS    # Schizophrenia  Medications:  - olanzapine 10 mg HS  - depakote 1000 mg HS  - Abilify 15 mg daily in the morning    #Polysubstance Use Disorder  - CD consult once psychotic sx more stable, if pt amenable  - CD consulted on 5/1. CD acknowledged and will visit on 5/2.    2. Pertinent Labs/Monitoring:  - Labs done 04/13 at OSH. CBC wnl, glucose elevated to 332, BMP wnl.  - UDS negative on 04/13  - 4/16 HbA1c: 10.1  - Depakote Levels: 75.6    3. Additional Plans:  - Patient will be treated in therapeutic milieu with appropriate individual and group therapies as described.   - Consulted Internal Medicine for diabetes management.   - CD assessment today  - Referrals  [facility] - sent 4/28  Phone interview pending as of 5/2  [facility] - sent 4/29  [facility] - referral sent 4/28. Denied 4/30, recommended pt attend CD tx/IRTS before being considered for a referral due to concern about recent amphetamine use. Willing to reconsider denial if OP CD tx is in place.     Psychiatric Hospital Course:      Anastasiya Simon was admitted to [facility] on a 72 hour hold (started 4/13/25 21:35) from [facility] on 04/15/25.   Medications:  Continued Medications:  Pt seen by psychiatry consult at [facility] who started zyprexa 20 mg at bedtime and depakote 500 mg at bedtime. Both of these medications were continued on admission to [facility].   New  Medications:  Started loperamide for diarrhea  Started tolterodine for urinary incontinence  Started gabapentin for peripheral neuropathy    4/16: Increased depakote from 500 mg to 1000 mg HS for mood regulation  4/16: Increased metformin from 500 mg to 1000 mg BID per medicine consult  4/16: Zyprexa: 5 mg in the morning and 15 mg HS  4/16: Started PRN Loperamide 2 mg 4x/day for diarrhea  4/16: Started Tolterodine 2 mg daily for urinary incontinence  4/18: Started Gabapentin 300 mg PRN 3x/day for peripheral neuropathy  4/20: Started glipizide 24 hr tablet 5 mg daily for better glycemic control per medicine consult  4/25: Increased glipizide XL from 5mg to 10mg per medicine consult.  4/28: Started Abilify 5 mg PO daily in the morning  4/30: Increased Abilify to 10 mg daily in the morning  4/30: Decreased olanzapine to only 15 mg HS  5/2: Increased Abilify to 15 mg daily in the morning  5/2: Decreased Zyprexa to 10 mg HS    The risks, benefits, alternatives, and side effects were discussed and understood by the patient and other caregivers.     Medical Assessment and Plan     Medical diagnoses to be addressed this admission:      #Type 2 Diabetes Mellitis with Complications  - PTA metformin 1000 mg BID with meals  - BID glucose checks; hypoglycemia protocol  - PRN gabapentin 300 mg 3x/day for peripheral neuropathy  - Insulin aspart (Novolog) injection 1-5 units HS  - Insulin aspart (Novolog) injection 3x/day 1-7 units before meals  - Glipizide 24 hr tablet 10 mg daily with breakfast for better glycemic control  - Internal Medicine Consult  - Nutrition Consult  - Moderate Consistent Carb Diet  - Given patient is starting insulin, please notify medicine team of any impending discharge at least 2-3 days prior in order to have a safe management plan for her diabetes.  - As of 5/1, Medicine will sign off at this time. OK to stop sliding scale insulin.    #Diarrhea  - Loperamide 2 mg PRN 4x/day    #Urinary Incontinence  -  Tolterodine 2 mg daily    Medical course:  Patient was physically examined by the ED prior to being transferred to the unit and was found to be medically stable and appropriate for admission.    Consults:   4/16: Internal Medicine consulted for management of diabetes and blood pressure.   4/16: Increased metformin to 1000 mg BID.   4/18: Started PRN gabapentin 300 mg 3x/day for peripheral neuropathy.   4/18: Internal Medicine started Novolog injection 1-5 units HS and Novolog injection 3x/day 1-7 units before meals for better glycemic control. Given patient is starting insulin, please notify medicine team of any impending discharge at least 2-3 days prior in order to have a safe management plan for her diabetes.  4/19: Internal Medicine increased to high intensity sliding scale. Novolog injection 1-7 units HS. Novolog injection 3x/day 1-10 units before meals for better glycemic control.   4/20: Started PO glipizide 24 hr tablet 5 mg daily with breakfast.   4/21: Started pt on a consistent carb diet.   4/22: Nutrition Consult for diabetes management.   4/24: Note from Medicine: Please send patient with glucometer on discharge. Diabetes education should be ordered prior to discharge.  4/25: Increase Glipizide XL to 10 mg daily starting on 4/26. Continue Metformin 1000 mg BID. Decreased to moderate intensity sliding scale. Novolog injection 1-5 units HS. Novolog injection 3x/day 1-7 units before meals.. Hopefully can discontinue in coming days. Moderate consistent CHO diet.   4/27: Continues to require 7U of insulin and is not quite ready to wean off of her sliding scale insulin yet. Will continue to monitor and adjust medications with goal of having her wean off sliding scale insulin prior to discharge.  5/1: Currently the patient is medically stable and Medicine will sign off at this time. Recommendations relayed to primary team via this progress note. Please notify on-call [facility] if new questions or concerns arise. OK  to stop sliding scale insulin. Continue BG checks BID. Continue glipizide 10 mg daily. Continue Metformin 1000 mg BID. Follow up with PCP for continued BG monitoring within 1-2 weeks of discharge.   5/1: Chemical Dependency consulted. Consult acknowledge and will see patient on 5/2.     Checklist     Legal Status: MI Commitment and [place]   County: [place]  File Number: [id number]  Start and expiration date of commitment: 04/24/2025 - 10/24/2025  [place] meds: Zyprexa, Haldol, Abilify, Risperdal    Safety Assessment:   Behavioral Orders   Procedures    Code 1 - Restrict to Unit    Routine Programming     As clinically indicated    Status 15     Every 15 minutes.    Suicide precautions: Suicide Risk: MODERATE; Clinical rationale to override score: modification to the care environment, response to medication, lack of access to a plan for self-harm, Exhibiting Suicidal/self-harm behaviors or thoughts     Patients on Suicide Precautions should have a Combination Diet ordered that includes a Diet selection(s) AND a Behavioral Tray selection for Safe Tray - with utensils, or Safe Tray - NO utensils       Order Specific Question:   Suicide Risk     Answer:   MODERATE     Order Specific Question:   Clinical rationale to override score:     Answer:   modification to the care environment     Order Specific Question:   Clinical rationale to override score:     Answer:   response to medication     Order Specific Question:   Clinical rationale to override score:     Answer:   lack of access to a plan for self-harm     Order Specific Question:   Clinical rationale to override score:     Answer:   Exhibiting Suicidal/self-harm behaviors or thoughts     Risk Assessment:  Risk for harm is low to moderate.  Risk factors: maladaptive coping, substance use, impulsive, and past behaviors.  Protective factors: family.    SIO: None    Disposition: TBD. Disposition pending clinical stabilization, medication optimization and  development of an appropriate discharge plan.     Attestations     Tatyana Mcclure, MS3,   [employer] [facility] Medical Student     Patient was staffed with the attending physician in the morning.     Resident/Fellow Attestation   I, Lucy Rosa MD, was present with the medical/[facility] student who participated in the service and in the documentation of the note.  I have verified the history and personally performed the physical exam and medical decision making.  I agree with the assessment and plan of care as documented in the note.      Lucy Plata MD  [facility] Psychiatry Resident  05/02/2025    Attestation:  This patient has been seen and evaluated by me, Joceline Dela Cruz MD.  I have discussed this patient with the house staff team including the resident and/or medical student and I agree with the findings and plan in this note.    I have reviewed today's vital signs, medications, labs and imaging. Joceline Dela Cruz MD , PhD.

## 2025-05-02 NOTE — PROGRESS NOTES
"  Rehab Group    Start time: 1015  End time: 1200  Patient time total: 70 minutes    came in and out of group session    #6 attended   Group Type: OT Clinic   Group Topic Covered: balanced lifestyle, coping skills, healthy leisure time, and social skills     Group Session Detail:  Pt actively participated in occupational therapy clinic to facilitate coping skill exploration, creative expression within personally meaningful activities, and clinical observation of social, cognitive, and kinesthetic performance skills.        Patient Response/Contribution:  cooperative with task, worked intermittently, and disorganized     Patient Detail:  Pt arrived to group with blunted affect and disheveled appearance. Pt required set up A to initiate, gather materials, sequence, and adjust to workspace demands as needed. Demonstrated good focus, planning, and problem solving for selected creative expression task. Pt presented as disorganized throughout, perseverating on \"the mall, whenever I go there I end up getting arrested, I always get and aura there.\" Pt reported liking to engage in art work at home. Pt discussed difficulty with becoming a \"senior\", she was somewhat receptive to suggestions, reporting wanting to start volunteering. Will continue to encourage attendance and participation.       [id number] OT Group (2 or more in attendance)      Patient Active Problem List   Diagnosis    Suicidal ideation    Type 2 diabetes mellitus (H)    Chronic hepatitis C (H)    Schizophrenia (H)    Acid reflux disease    Hoarding behavior       "

## 2025-05-02 NOTE — PLAN OF CARE
BEH IP Unit Acuity Rating Score (UARS)  Patient is given one point for every criteria they meet.    CRITERIA SCORING   On a 72 hour hold, court hold, committed, stay of commitment, or revocation. 1    Patient LOS on BEH unit exceeds 20 days. 0  LOS: 17   Patient under guardianship, 55+, otherwise medically complex, or under age 11. 1   Suicide ideation without relief of precipitating factors. 0   Current plan for suicide. 0   Current plan for homicide. 0   Imminent risk or actual attempt to seriously harm another without relief of factors precipitating the attempt. 0   Severe dysfunction in daily living (ex: complete neglect for self care, extreme disruption in vegetative function, extreme deterioration in social interactions). 1   Recent (last 7 days) or current physical aggression in the ED or on unit. 0   Restraints or seclusion episode in past 72 hours. 0   Recent (last 7 days) or current verbal aggression, agitation, yelling, etc., while in the ED or unit. 0   Active psychosis. 1   Need for constant or near constant redirection (from leaving, from others, etc).  0   Intrusive or disruptive behaviors. 0   Patient requires 3 or more hours of individualized nursing care per 8-hour shift (i.e. for ADLs, meds, therapeutic interventions). 0   TOTAL 3

## 2025-05-02 NOTE — PLAN OF CARE
Team Note Due:  Wednesday    Assessment/Intervention/Current Symtoms and Care Coordination:  Chart review and met with team, discussed pt progress, symptomology, and response to treatment.  Discussed the discharge plan and any potential impediments to discharge.    Received update from [facility] requesting to schedule phone interview next week. Confirmed pt is still interested in IRTS placement, requested phone interview ASAP. Awaiting update on date/time of phone interview.    CD  met with pt to complete assessment. Referrals sent to [facility], [facility], [facility], and [facility]. Updated Jesika with [facility]. She stated she would have an update on Monday.    Discharge Plan or Goal:  [facility] vs [facility] vs [facility] w/ OP CD tx     Barriers to Discharge:  Patient requires further psychiatric stabilization due to current symptomology, medication management with changes subject to provider, coordination with outside supports, and aftercare planning. Pt is also under civil commitment.      Referral Status:  [facility] - sent 4/28  Phone interview pending as of 5/2  [facility] - sent 4/29  [facility] - referral sent 4/28. Denied 4/30, recommended pt attend CD tx/IRTS before being considered for a referral due to concern about recent amphetamine use. Willing to reconsider denial if OP CD tx is in place.    IOP CD referrals:  [facility] - made 5/2 by CD   [facility] - made 5/2 by CD   [facility] - made 5/2 by CD   [facility] - made 5/2 by CD      Legal Status:  [facility]: [place]  File Number: [id number]  Start and expiration date of commitment: 04/24/2025 - 10/24/2025    [facility] meds: Zyprexa, Haldol, Abilify, Risperdal    : TBD    Contacts:   Haydee Mares (CADI CM): [phone]   Tamika Johnson- (sister): [phone]  Lucille Reddy (Intervention ): [phone]     Upcoming Meetings and Dates/Important Information and next steps:  Follow up on  referrals  Schedule OP follow up  Notify medicine team of any impending discharge at least 2-3 days prior in order to have a safe management plan for her diabetes    PD and COS needed at discharge

## 2025-05-03 LAB
GLUCOSE BLDC GLUCOMTR-MCNC: 164 MG/DL (ref 70–99)
GLUCOSE BLDC GLUCOMTR-MCNC: 192 MG/DL (ref 70–99)
GLUCOSE BLDC GLUCOMTR-MCNC: 93 MG/DL (ref 70–99)

## 2025-05-03 PROCEDURE — 250N000013 HC RX MED GY IP 250 OP 250 PS 637

## 2025-05-03 PROCEDURE — 250N000013 HC RX MED GY IP 250 OP 250 PS 637: Performed by: PHYSICIAN ASSISTANT

## 2025-05-03 PROCEDURE — 124N000002 HC R&B MH UMMC

## 2025-05-03 RX ADMIN — METFORMIN HYDROCHLORIDE 1000 MG: 500 TABLET, FILM COATED ORAL at 08:57

## 2025-05-03 RX ADMIN — Medication 1 TABLET: at 08:57

## 2025-05-03 RX ADMIN — GLIPIZIDE 10 MG: 2.5 TABLET, FILM COATED, EXTENDED RELEASE ORAL at 08:57

## 2025-05-03 RX ADMIN — PANTOPRAZOLE SODIUM 40 MG: 40 TABLET, DELAYED RELEASE ORAL at 08:12

## 2025-05-03 RX ADMIN — TOLTERODINE TARTRATE 2 MG: 2 CAPSULE, EXTENDED RELEASE ORAL at 08:57

## 2025-05-03 RX ADMIN — LOPERAMIDE HYDROCHLORIDE 2 MG: 2 CAPSULE ORAL at 00:37

## 2025-05-03 RX ADMIN — ARIPIPRAZOLE 15 MG: 15 TABLET ORAL at 08:57

## 2025-05-03 RX ADMIN — METFORMIN HYDROCHLORIDE 1000 MG: 500 TABLET, FILM COATED ORAL at 18:08

## 2025-05-03 RX ADMIN — DIVALPROEX SODIUM 1000 MG: 500 TABLET, FILM COATED, EXTENDED RELEASE ORAL at 21:04

## 2025-05-03 RX ADMIN — OLANZAPINE 10 MG: 10 TABLET, ORALLY DISINTEGRATING ORAL at 21:04

## 2025-05-03 ASSESSMENT — ACTIVITIES OF DAILY LIVING (ADL)
ADLS_ACUITY_SCORE: 47
ORAL_HYGIENE: INDEPENDENT
ADLS_ACUITY_SCORE: 47
ADLS_ACUITY_SCORE: 47
LAUNDRY: WITH SUPERVISION
ADLS_ACUITY_SCORE: 47
DRESS: INDEPENDENT
ADLS_ACUITY_SCORE: 47
DRESS: INDEPENDENT
ORAL_HYGIENE: INDEPENDENT
HYGIENE/GROOMING: INDEPENDENT
ADLS_ACUITY_SCORE: 47
HYGIENE/GROOMING: INDEPENDENT
ADLS_ACUITY_SCORE: 47

## 2025-05-03 NOTE — PLAN OF CARE
Problem: Sleep Disturbance  Goal: Adequate Sleep/Rest  Outcome: Progressing   Goal Outcome Evaluation:    Patient slept 6.45 Hours during the night. Safety checked was done every 15 minutes. MSSA score was 4 Patient did not meet the parameter for medication. At 0226 patient was awake and requested for snack two yogurt, crackers and tea was given and she went back to her room. No PRN was given. No concerned noted.

## 2025-05-03 NOTE — PLAN OF CARE
"  Problem: Adult Behavioral Health Plan of Care  Goal: Patient-Specific Goal (Individualization)  Description: You can add care plan individualizations to a care plan. Examples of Individualization might be:  \"Parent requests to be called daily at 9am for status\", \"I have a hard time hearing out of my right ear\", or \"Do not touch me to wake me up as it startlesme\".  Outcome: Progressing  Goal: Adheres to Safety Considerations for Self and Others  Outcome: Progressing  Intervention: Develop and Maintain Individualized Safety Plan  Recent Flowsheet Documentation  Taken 5/3/2025 1400 by Lucy Galindo RN  Safety Measures:   environmental rounds completed   safety rounds completed  Goal: Absence of New-Onset Illness or Injury  Outcome: Progressing     Problem: Suicide Risk  Goal: Absence of Self-Harm  Outcome: Progressing  Intervention: Assess Risk to Self and Maintain Safety  Recent Flowsheet Documentation  Taken 5/3/2025 0850 by Lucy Galindo RN  Behavior Management: boundaries reinforced  Self-Harm Prevention: environmental self-harm risks assessed  Enhanced Safety Measures: pain management  Intervention: Promote Psychosocial Wellbeing  Recent Flowsheet Documentation  Taken 5/3/2025 0850 by Lucy Galindo RN  Family/Support System Care:   presence promoted   self-care encouraged  Intervention: Establish Safety Plan and Continuity of Care  Recent Flowsheet Documentation  Taken 5/3/2025 0850 by Lucy Galindo RN  Safe Transition Promotion: protective factors promoted     Problem: Sleep Disturbance  Goal: Adequate Sleep/Rest  Outcome: Progressing   Goal Outcome Evaluation:    Plan of Care Reviewed With: patient      BG check was 93 mg/dl in the morning. Pt gets up for meals. She is eating and drinking adequately. She is messy with her food and staff cleans after her. Pt is minimally social with peers. Dismissive with staff during assessment. She denies anxiety, depression and " "SI. Pt denies hallucinations, but asked what would she do if she would feel like theres something crawling on her skin. Encouraged her to speak with the team about this. Pt slept in the morning saying that she was \"tired\". In the afternoon , she picked out a movie but did not finish it and went back to bed to sleep. No complains of pain or diarrhea this shift.     "

## 2025-05-03 NOTE — PLAN OF CARE
Pt was visible in the milieu. Pt was out in the lounge watching tv with others. Pt was minimally social with select peer. Presents with blunted and flat affect. Mood is depressed. Denies all mental health symptoms. Pt says she is just tired. Pt is unkempt. Reported diarrhea x2 and pt was given prn Imodium. Pt also requested and was given toast. Vital signs: Temp: 98.1  F (36.7  C) Temp src: Oral BP: (!) 150/84 Pulse: 94   Resp: 17 SpO2: 95 % O2 Device: None (Room air)       Goal Outcome Evaluation:    Plan of Care Reviewed With: patient      Problem: Suicide Risk  Goal: Absence of Self-Harm  Outcome: Progressing     Problem: Diabetes  Goal: Optimal Functional Ability  Intervention: Optimize Functional Ability  Recent Flowsheet Documentation  Taken 5/2/2025 1700 by Petar Jarrett, RN  Activity Assistance Provided: independent  Goal: Blood Glucose Level Within Target Range  Outcome: Progressing     Problem: Psychotic Signs/Symptoms  Goal: Improved Mood Symptoms (Psychotic Signs/Symptoms)  Outcome: Progressing  Intervention: Optimize Emotion and Mood  Recent Flowsheet Documentation  Taken 5/2/2025 1700 by Petar Jarrett, RN  Diversional Activity: television

## 2025-05-04 LAB
GLUCOSE BLDC GLUCOMTR-MCNC: 124 MG/DL (ref 70–99)
GLUCOSE BLDC GLUCOMTR-MCNC: 195 MG/DL (ref 70–99)
GLUCOSE BLDC GLUCOMTR-MCNC: 199 MG/DL (ref 70–99)
GLUCOSE BLDC GLUCOMTR-MCNC: 202 MG/DL (ref 70–99)

## 2025-05-04 PROCEDURE — 124N000002 HC R&B MH UMMC

## 2025-05-04 PROCEDURE — 250N000013 HC RX MED GY IP 250 OP 250 PS 637

## 2025-05-04 PROCEDURE — 250N000013 HC RX MED GY IP 250 OP 250 PS 637: Performed by: PHYSICIAN ASSISTANT

## 2025-05-04 PROCEDURE — 97150 GROUP THERAPEUTIC PROCEDURES: CPT | Mod: GO

## 2025-05-04 RX ADMIN — DIVALPROEX SODIUM 1000 MG: 500 TABLET, FILM COATED, EXTENDED RELEASE ORAL at 20:04

## 2025-05-04 RX ADMIN — METFORMIN HYDROCHLORIDE 1000 MG: 500 TABLET, FILM COATED ORAL at 07:54

## 2025-05-04 RX ADMIN — TOLTERODINE TARTRATE 2 MG: 2 CAPSULE, EXTENDED RELEASE ORAL at 07:54

## 2025-05-04 RX ADMIN — GLIPIZIDE 10 MG: 2.5 TABLET, FILM COATED, EXTENDED RELEASE ORAL at 07:54

## 2025-05-04 RX ADMIN — ARIPIPRAZOLE 15 MG: 15 TABLET ORAL at 07:54

## 2025-05-04 RX ADMIN — Medication 1 TABLET: at 07:54

## 2025-05-04 RX ADMIN — METFORMIN HYDROCHLORIDE 1000 MG: 500 TABLET, FILM COATED ORAL at 18:06

## 2025-05-04 RX ADMIN — PANTOPRAZOLE SODIUM 40 MG: 40 TABLET, DELAYED RELEASE ORAL at 07:54

## 2025-05-04 RX ADMIN — Medication 3 MG: at 20:09

## 2025-05-04 RX ADMIN — OLANZAPINE 10 MG: 10 TABLET, ORALLY DISINTEGRATING ORAL at 20:03

## 2025-05-04 ASSESSMENT — ACTIVITIES OF DAILY LIVING (ADL)
ADLS_ACUITY_SCORE: 47
ORAL_HYGIENE: INDEPENDENT
ADLS_ACUITY_SCORE: 47
DRESS: INDEPENDENT
ADLS_ACUITY_SCORE: 47
HYGIENE/GROOMING: INDEPENDENT
ADLS_ACUITY_SCORE: 47
ORAL_HYGIENE: INDEPENDENT
ADLS_ACUITY_SCORE: 47
HYGIENE/GROOMING: INDEPENDENT
ADLS_ACUITY_SCORE: 47
DRESS: INDEPENDENT
LAUNDRY: WITH SUPERVISION
ADLS_ACUITY_SCORE: 47
ADLS_ACUITY_SCORE: 47

## 2025-05-04 NOTE — PLAN OF CARE
Problem: Psychotic Signs/Symptoms  Goal: Improved Behavioral Control (Psychotic Signs/Symptoms)  Outcome: Progressing   Goal Outcome Evaluation:      The patient presents as calm and cooperative, though she remains somewhat isolated in the milieu, observing television. She is compliant with her prescribed medications. However, she exhibits signs of disorientation and disorganization, with a tangential thought process. The patient declined participation in the mental health assessment but maintained a calm demeanor throughout her time in the milieu. She was cooperative during blood sugar checks and reported no concerns regarding pain during this shift. A full mental health assessment could not be conducted due to the patient's lack of cooperation.      B in the morning and 199 before lunch.

## 2025-05-04 NOTE — PLAN OF CARE
Problem: Adult Behavioral Health Plan of Care  Goal: Adheres to Safety Considerations for Self and Others  Outcome: Progressing     Problem: Suicide Risk  Goal: Absence of Self-Harm  Outcome: Progressing     Problem: Sleep Disturbance  Goal: Adequate Sleep/Rest  Outcome: Progressing   Goal Outcome Evaluation:  Patient appeared calm, and slept quietly in her room throughout the shift. No pain or behavioral concerns observed or reported this shift. Patient slept for 6.75 hours. No other concerns at this time. Team will continue to monitor for needs.

## 2025-05-04 NOTE — PLAN OF CARE
Pt was out in the lounge watching tv with others most of the time this evening. Pt was withdrawn to self with minimal interaction with others. Presents with flat affect. Pt is thoughts are disorganized. Denies for all mental health symptoms. Pt took a shower this evening and staff helped pt to cleanup her room and linen was changed. Vital signs:Temp: 97.5  F (36.4  C) Temp src: Oral BP: (!) 146/85 Pulse: 89   Resp: 17 SpO2: 96 % O2 Device: None (Room air)       Goal Outcome Evaluation:    Plan of Care Reviewed With: patient      Problem: Suicide Risk  Goal: Absence of Self-Harm  Outcome: Progressing     Problem: Diabetes  Goal: Optimal Functional Ability  Intervention: Optimize Functional Ability  Recent Flowsheet Documentation  Taken 5/3/2025 1730 by Petar Jarrett, RN  Activity Assistance Provided: independent

## 2025-05-05 LAB
GLUCOSE BLDC GLUCOMTR-MCNC: 146 MG/DL (ref 70–99)
GLUCOSE BLDC GLUCOMTR-MCNC: 177 MG/DL (ref 70–99)
GLUCOSE BLDC GLUCOMTR-MCNC: 264 MG/DL (ref 70–99)
GLUCOSE BLDC GLUCOMTR-MCNC: 85 MG/DL (ref 70–99)

## 2025-05-05 PROCEDURE — 250N000013 HC RX MED GY IP 250 OP 250 PS 637

## 2025-05-05 PROCEDURE — 124N000002 HC R&B MH UMMC

## 2025-05-05 PROCEDURE — 250N000013 HC RX MED GY IP 250 OP 250 PS 637: Performed by: PHYSICIAN ASSISTANT

## 2025-05-05 PROCEDURE — 97150 GROUP THERAPEUTIC PROCEDURES: CPT | Mod: GO

## 2025-05-05 RX ORDER — OLANZAPINE 5 MG/1
5 TABLET, ORALLY DISINTEGRATING ORAL AT BEDTIME
Status: DISCONTINUED | OUTPATIENT
Start: 2025-05-05 | End: 2025-05-07

## 2025-05-05 RX ADMIN — PANTOPRAZOLE SODIUM 40 MG: 40 TABLET, DELAYED RELEASE ORAL at 07:56

## 2025-05-05 RX ADMIN — Medication 1 TABLET: at 07:56

## 2025-05-05 RX ADMIN — DIVALPROEX SODIUM 1000 MG: 500 TABLET, FILM COATED, EXTENDED RELEASE ORAL at 21:02

## 2025-05-05 RX ADMIN — CALCIUM CARBONATE (ANTACID) CHEW TAB 500 MG 500 MG: 500 CHEW TAB at 21:59

## 2025-05-05 RX ADMIN — METFORMIN HYDROCHLORIDE 1000 MG: 500 TABLET, FILM COATED ORAL at 19:07

## 2025-05-05 RX ADMIN — METFORMIN HYDROCHLORIDE 1000 MG: 500 TABLET, FILM COATED ORAL at 07:56

## 2025-05-05 RX ADMIN — ARIPIPRAZOLE 15 MG: 15 TABLET ORAL at 07:56

## 2025-05-05 RX ADMIN — OLANZAPINE 5 MG: 5 TABLET, ORALLY DISINTEGRATING ORAL at 21:02

## 2025-05-05 RX ADMIN — GLIPIZIDE 10 MG: 2.5 TABLET, FILM COATED, EXTENDED RELEASE ORAL at 07:56

## 2025-05-05 RX ADMIN — TOLTERODINE TARTRATE 2 MG: 2 CAPSULE, EXTENDED RELEASE ORAL at 07:56

## 2025-05-05 ASSESSMENT — ACTIVITIES OF DAILY LIVING (ADL)
ADLS_ACUITY_SCORE: 57
ADLS_ACUITY_SCORE: 57
HYGIENE/GROOMING: HANDWASHING;INDEPENDENT
DRESS: SCRUBS (BEHAVIORAL HEALTH)
ADLS_ACUITY_SCORE: 47
ADLS_ACUITY_SCORE: 57
ADLS_ACUITY_SCORE: 57
HYGIENE/GROOMING: INDEPENDENT;PROMPTS
ADLS_ACUITY_SCORE: 47
ORAL_HYGIENE: INDEPENDENT
ADLS_ACUITY_SCORE: 47
ADLS_ACUITY_SCORE: 57
LAUNDRY: WITH SUPERVISION
LAUNDRY: WITH SUPERVISION
DRESS: SCRUBS (BEHAVIORAL HEALTH);INDEPENDENT
ADLS_ACUITY_SCORE: 47
ADLS_ACUITY_SCORE: 57
ADLS_ACUITY_SCORE: 57
ORAL_HYGIENE: INDEPENDENT
ADLS_ACUITY_SCORE: 47

## 2025-05-05 NOTE — PLAN OF CARE
Problem: Adult Behavioral Health Plan of Care  Goal: Plan of Care Review  Outcome: Progressing  Flowsheets (Taken 5/5/2025 1610)  Patient Agreement with Plan of Care: agrees     Problem: Diabetes  Goal: Optimal Coping  Outcome: Progressing   Goal Outcome Evaluation:    Plan of Care Reviewed With: patient          Pt was intermittently visible in the milieu, she was calm and cooperative upon approach, a little disorganized in speech, social with select peers, took a shower the previous shift, was watching television and movies with peers. Pt denied any physical pain, denied SI, SIB, HI, denied anxiety/depression, denied A/V/Hallucination, BS before dinner. 177 at  respectively. Pt was compliant with medication, adequate food/fluid intake

## 2025-05-05 NOTE — PLAN OF CARE
Problem: Sleep Disturbance  Goal: Adequate Sleep/Rest  Outcome: Progressing     Pt appears to have slept for 7 hours. Pt did not complain of pain or discomfort, and no PRN medications were given. 15 minutes safety checks were in place. Staff will continue to offer support to pt.

## 2025-05-05 NOTE — PROGRESS NOTES
"  ----------------------------------------------------------------------------------------------------------  [facility]  Psychiatry Progress Note  Hospital Day #20     Interim History:     The patient's care was discussed with the treatment team and chart notes were reviewed.    Identifier: Anastasiya Simon is a 59 year old female with a previous psychiatric diagnosis of schizophrenia and polysubstance use admitted from [facility] on 04/15/2025 due to concern for SI and psychosis in the context of psychosocial stressors including living situation and argument with family member.    Vitals:Temp: 97.8  F (36.6  C) Temp src: Oral BP: 118/78 Pulse: 82   Resp: 17 SpO2: 96 % O2 Device: None (Room air)   Height: 165.1 cm (5' 5\") Weight: 72.1 kg (158 lb 14.4 oz)  Estimated body mass index is 26.44 kg/m  as calculated from the following:    Height as of this encounter: 1.651 m (5' 5\").    Weight as of this encounter: 72.1 kg (158 lb 14.4 oz).  Sleep: 7 hours (05/05/25 0600)  Scheduled medications: Took all scheduled medications as prescribed.  Psychiatric PRN medications:   Last 24H PRN:     melatonin tablet 3 mg, 3 mg at 05/04/25 2009    Staff Report:   Anastasiya has been intermittently visible in the milieu watching TV/movies over the weekend. She interacted and socialized well with select peers. Her hygiene is unkempt, but was able to shower on Thursday. No changes from baseline. Please see Staff notes for more information.     Subjective:     Patient Interview:  Anastasiya was interviewed in her room. She mentions feeling drowsy but okay. She is informed of next steps regarding future placement and she is agreeable with current plan moving forward. She denies psychiatric symptoms. She mentions to not have diarrhea but thinks one of the medications made her feel \"pow!\" But she does not know which one but will let us know if that happens again.       Objective: " "    Vitals:  /78 (BP Location: Right arm, Patient Position: Sitting)   Pulse 82   Temp 97.8  F (36.6  C) (Oral)   Resp 17   Ht 1.651 m (5' 5\")   Wt 72.1 kg (158 lb 14.4 oz)   LMP 10/22/2013   SpO2 96%   BMI 26.44 kg/m      Allergies:  Allergies   Allergen Reactions    Tetracycline Unknown     It happened when pt was a baby       Current Medications:  Scheduled:  Current Facility-Administered Medications   Medication Dose Route Frequency Provider Last Rate Last Admin    ARIPiprazole (ABILIFY) tablet 15 mg  15 mg Oral Daily Lucy Chandler MD   15 mg at 05/05/25 0756    calcium carbonate (TUMS) chewable tablet 500 mg  500 mg Oral Daily PRN Tanja Gonzalez MD   500 mg at 04/24/25 1750    glucose gel 15-30 g  15-30 g Oral Q15 Min PRN Katina Armendariz MD        Or    dextrose 50 % injection 25-50 mL  25-50 mL Intravenous Q15 Min PRN Katina Armendariz MD        Or    glucagon injection 1 mg  1 mg Subcutaneous Q15 Min PRN Katina Armendariz MD        divalproex sodium extended-release (DEPAKOTE ER) 24 hr tablet 1,000 mg  1,000 mg Oral At Bedtime Tanja Gonzalez MD   1,000 mg at 05/04/25 2004    gabapentin (NEURONTIN) capsule 300 mg  300 mg Oral TID PRN Tanja Gonzalez MD   300 mg at 04/22/25 2017    glipiZIDE (GLUCOTROL XL) 24 hr tablet 10 mg  10 mg Oral Daily with breakfast Katina Meza PA   10 mg at 05/05/25 0756    hydrOXYzine HCl (ATARAX) tablet 25 mg  25 mg Oral Q4H PRN Lucy Chandler MD   25 mg at 04/29/25 0435    loperamide (IMODIUM) capsule 2 mg  2 mg Oral 4x Daily PRN Tanja Gonzalez MD   2 mg at 05/03/25 0037    magnesium sulfate (EPSOM SALT) granules 1 Tablespoonful  1 Tablespoonful Topical Daily PRN Tanja Gonzalez MD        melatonin tablet 3 mg  3 mg Oral At Bedtime PRN Marcello Katina Elli, MD   3 mg at 05/04/25 2009    metFORMIN (GLUCOPHAGE) tablet 1,000 mg  1,000 mg Oral BID w/meals Charlotte Jasmine PA-C   1,000 mg at " 05/05/25 0756    multivitamin RENAL (RENAVITE RX/NEPHROVITE) tablet 1 tablet  1 tablet Oral Daily Lucy Chandler MD   1 tablet at 05/05/25 0756    multivitamin w/minerals (THERA-VIT-M) tablet 1 tablet  1 tablet Oral Daily Charlotte Jasmine PA-C   1 tablet at 05/05/25 0756    OLANZapine (zyPREXA) tablet 10 mg  10 mg Oral TID PRN Lucy Chandler MD        Or    OLANZapine (zyPREXA) injection 10 mg  10 mg Intramuscular TID PRN Lucy Chandler MD        OLANZapine zydis (zyPREXA) ODT tab 5 mg  5 mg Oral At Bedtime Lucy Chandler MD        pantoprazole (PROTONIX) EC tablet 40 mg  40 mg Oral QAM AC Lucy Chandler MD   40 mg at 05/05/25 0756    tolterodine ER (DETROL LA) 24 hr capsule 2 mg  2 mg Oral Daily Tanja Gonzalez MD   2 mg at 05/05/25 0756     PRN:  Current Facility-Administered Medications   Medication Dose Route Frequency Provider Last Rate Last Admin    ARIPiprazole (ABILIFY) tablet 15 mg  15 mg Oral Daily Lucy Chandler MD   15 mg at 05/05/25 0756    calcium carbonate (TUMS) chewable tablet 500 mg  500 mg Oral Daily PRN Tanja Gonzalez MD   500 mg at 04/24/25 1750    glucose gel 15-30 g  15-30 g Oral Q15 Min PRN Katina Armendariz MD        Or    dextrose 50 % injection 25-50 mL  25-50 mL Intravenous Q15 Min PRN Katina Armendariz MD        Or    glucagon injection 1 mg  1 mg Subcutaneous Q15 Min PRN Katina Armendariz MD        divalproex sodium extended-release (DEPAKOTE ER) 24 hr tablet 1,000 mg  1,000 mg Oral At Bedtime Tanja Gonzalez MD   1,000 mg at 05/04/25 2004    gabapentin (NEURONTIN) capsule 300 mg  300 mg Oral TID PRN Tanja Gonzalez MD   300 mg at 04/22/25 2017    glipiZIDE (GLUCOTROL XL) 24 hr tablet 10 mg  10 mg Oral Daily with breakfast Katina Meza PA   10 mg at 05/05/25 0756    hydrOXYzine HCl (ATARAX) tablet 25 mg  25 mg Oral Q4H PRMATTHEW Isidro  Lucy Dacosta MD   25 mg at 04/29/25 0435    loperamide (IMODIUM) capsule 2 mg  2 mg Oral 4x Daily PRN Tanja Gonzalez MD   2 mg at 05/03/25 0037    magnesium sulfate (EPSOM SALT) granules 1 Tablespoonful  1 Tablespoonful Topical Daily PRN Tanja Gonzalez MD        melatonin tablet 3 mg  3 mg Oral At Bedtime PRN Katina Armendariz MD   3 mg at 05/04/25 2009    metFORMIN (GLUCOPHAGE) tablet 1,000 mg  1,000 mg Oral BID w/meals Charlotte Jasmine PA-C   1,000 mg at 05/05/25 0756    multivitamin RENAL (RENAVITE RX/NEPHROVITE) tablet 1 tablet  1 tablet Oral Daily Lucy Chandler MD   1 tablet at 05/05/25 0756    multivitamin w/minerals (THERA-VIT-M) tablet 1 tablet  1 tablet Oral Daily Charlotte Jasmine PA-C   1 tablet at 05/05/25 0756    OLANZapine (zyPREXA) tablet 10 mg  10 mg Oral TID PRN Lucy Chandler MD        Or    OLANZapine (zyPREXA) injection 10 mg  10 mg Intramuscular TID PRN Lucy Chandler MD        OLANZapine zydis (zyPREXA) ODT tab 5 mg  5 mg Oral At Bedtime Lucy Chandler MD        pantoprazole (PROTONIX) EC tablet 40 mg  40 mg Oral QAM AC Lucy Chandler MD   40 mg at 05/05/25 0756    tolterodine ER (DETROL LA) 24 hr capsule 2 mg  2 mg Oral Daily Tanja Gonzalez MD   2 mg at 05/05/25 0756     Labs and Imaging:  New results:   Recent Results (from the past 24 hours)   Glucose by meter    Collection Time: 05/04/25  5:53 PM   Result Value Ref Range    GLUCOSE BY METER POCT 195 (H) 70 - 99 mg/dL   Glucose by meter    Collection Time: 05/04/25  8:12 PM   Result Value Ref Range    GLUCOSE BY METER POCT 202 (H) 70 - 99 mg/dL   Glucose by meter    Collection Time: 05/05/25  7:32 AM   Result Value Ref Range    GLUCOSE BY METER POCT 85 70 - 99 mg/dL   Glucose by meter    Collection Time: 05/05/25 11:41 AM   Result Value Ref Range    GLUCOSE BY METER POCT 264 (H) 70 - 99 mg/dL     Data this  "admission:  CBC: WNL. WBC: 7.6, RBC: 4.37, Hb: 13.0, Platelets: 316.  CMP: Unremarkable (4/22) AST: 13, ALT: 10, Creatinine: 0.65.  UDS: Negative  RASHIDA: 0.00 (4/13)  HbA1c: 10.1%  TSH: 2.40  Vit: B12: 812  Folate: 13.7  Valproic Acid Free & Total: 67  Valproic Acid Free & Total STAT: 75.6  Phosphorus: 4.6 (H)  Magnesium: 1.6 (L)  BMP: unremarkable (4/28)    Imaging:  Bladder Scan: Completed on 4/22     Mental Status Exam:     Oriented to:  Grossly oriented.  General: Alert and awake. Pt was laying down for the interview.  Appearance:  Appears older than stated age. Grooming is unkempt, but improved since yesterday since showering. Wears glasses and own personal clothing.   Behavior/Attitude:  Engaged with questioning.   Eye Contact: Eyes closed for most of the interview.   Psychomotor: No evidence of tics, dystonia, or tardive dyskinesia. No catatonia present.  Speech: Appropriate rate/tone/volume.   Language: Fluent in English with appropriate syntax and vocabulary.  Mood:  \"tired\"  Affect:  Congruent with mood. Appropriate  Thought Process:  Linear and goal directed.  Thought Content:  Denies SI/SIB/AVH.  Associations:  questionable  Insight:  limited   Judgment:  limited   Impulse control: fair  Attention Span:  adequate for conversation  Concentration:  grossly intact  Recent and Remote Memory:  Not formally assessed. Pt reports memory issues. Staff notes forgetfulness.   Fund of Knowledge: Average.  Muscle Strength and Tone: Normal.  Gait and Station: Normal.     Psychiatric Assessment     Anastasiya Simon is a 59 year old female previously diagnosed with schizophrenia and polysubstance use disorder who presented with suicidal ideation, disorganization, and inability to care for self. Most recent psychiatric hospitalization was 03/20/2019 for disorganization and suicidal ideation. Significant symptoms on admission include increasing suicidal ideation, disorganization, erratic behavior such as filling buckets " with ammonia and mixing with bleach, substance use, auditory hallucinations, and paranoia. The MSE on admission was pertinent for AVH, paranoia, limited insight into MH. Today on interview Anastasiya denies SI and reports she had only made suicidal statements to her sisters previously as she did not want to move into a group home.  Psychological contributions to mental health presentation include maladaptive coping through substance use and limited insight into MH. Social factors contributing to mental health presentation include interpersonal conflicts and unstable living environment. Protective factors include support from two sisters and absence of previous suicide attempts.     Anastasiya was re-started on Zyprexa 20 mg and her Depakote dose was increased to 1000 mg to address the ongoing psychosis and mood symptoms. Tolterodine and PRN loperamide were started to address incontinence and diarrhea. Due to T2DM neuropathy has been a concern for which we started gabapentin 300 mg as well. Due to concerns of metabolic side effects regarding underline diabetes it was decided to cross-titrate to Abilify from Zyprexa. Currently still adjusting doses considering Anastasiya's tolerability.     In summary, the patient's reported symptoms of SI, erractic behavior, AVH, and paranoia in the context of psychosocial stressers are consistent with decompensated schizophrenia and polysubstance use disorder.  She will likely benefit from medication optimization and outpatient MH, as well as referrals for placement that can give some structure considering her requirements, and working with her CM for future discharge plans. To further assist with this it was addressed the need to follow through with outpatient CDT which is something Anastasiya is in agreement. Still monitoring cross-titration of Abilify to Olanzapine.     Psychiatric Plan by Diagnosis      Today's changes:  - Decrease Zyprexa to 5 mg HS    # Schizophrenia  Medications:  -  olanzapine 5 mg HS  - depakote 1000 mg HS  - Abilify 15 mg daily in the morning    #Polysubstance Use Disorder  - CD consult once psychotic sx more stable, if pt amenable  - CD consulted on 5/1. CD acknowledged and will visit on 5/2.    2. Pertinent Labs/Monitoring:  - Labs done 04/13 at OSH. CBC wnl, glucose elevated to 332, BMP wnl.  - UDS negative on 04/13  - 4/16 HbA1c: 10.1  - Depakote Levels: 75.6    3. Additional Plans:  - Patient will be treated in therapeutic milieu with appropriate individual and group therapies as described.   - Consulted Internal Medicine for diabetes management.   - Referrals  [facility] IRTS - sent 4/28  Phone interview completed 5/5  [facility] - sent 4/29  [facility] - referral sent 4/28. Denied 4/30, recommended pt attend CD tx/IRTS before being considered for a referral due to concern about recent amphetamine use. Willing to reconsider denial if OP CD tx is in place.     IOP CD referrals:  [facility] - made 5/2 by CD .  Denied on 5/5  [facility] - made 5/2 by CD   [facility] - made 5/2 by CD   [facility] - made 5/2 by CD      Psychiatric Hospital Course:      Anastasiya Simon was admitted to [facility] on a 72 hour hold (started 4/13/25 21:35) from [facility] ED on 04/15/25.   Medications:  Continued Medications:  Pt seen by psychiatry consult at [facility] who started zyprexa 20 mg at bedtime and depakote 500 mg at bedtime. Both of these medications were continued on admission to [facility].   New Medications:  Started loperamide for diarrhea  Started tolterodine for urinary incontinence  Started gabapentin for peripheral neuropathy    4/16: Increased depakote from 500 mg to 1000 mg HS for mood regulation  4/16: Increased metformin from 500 mg to 1000 mg BID per medicine consult  4/16: Zyprexa: 5 mg in the morning and 15 mg HS  4/16: Started PRN Loperamide 2 mg 4x/day for diarrhea  4/16: Started Tolterodine 2 mg daily for urinary  incontinence  4/18: Started Gabapentin 300 mg PRN 3x/day for peripheral neuropathy  4/20: Started glipizide 24 hr tablet 5 mg daily for better glycemic control per medicine consult  4/25: Increased glipizide XL from 5mg to 10mg per medicine consult.  4/28: Started Abilify 5 mg PO daily in the morning  4/30: Increased Abilify to 10 mg daily in the morning  4/30: Decreased olanzapine to only 15 mg HS  5/2: Increased Abilify to 15 mg daily in the morning  5/2: Decreased Zyprexa to 10 mg HS  5/5: Decrease Zyprexa to 5 mg HS    The risks, benefits, alternatives, and side effects were discussed and understood by the patient and other caregivers.     Medical Assessment and Plan     Medical diagnoses to be addressed this admission:      #Type 2 Diabetes Mellitis with Complications  - PTA metformin 1000 mg BID with meals  - BID glucose checks; hypoglycemia protocol  - PRN gabapentin 300 mg 3x/day for peripheral neuropathy  - Insulin aspart (Novolog) injection 1-5 units HS  - Insulin aspart (Novolog) injection 3x/day 1-7 units before meals  - Glipizide 24 hr tablet 10 mg daily with breakfast for better glycemic control  - Internal Medicine Consult  - Nutrition Consult  - Moderate Consistent Carb Diet  - Given patient is starting insulin, please notify medicine team of any impending discharge at least 2-3 days prior in order to have a safe management plan for her diabetes.  - As of 5/1, Medicine will sign off at this time. OK to stop sliding scale insulin.    #Diarrhea  - Loperamide 2 mg PRN 4x/day    #Urinary Incontinence  - Tolterodine 2 mg daily    Medical course:  Patient was physically examined by the ED prior to being transferred to the unit and was found to be medically stable and appropriate for admission.    Consults:   4/16: Internal Medicine consulted for management of diabetes and blood pressure.   4/16: Increased metformin to 1000 mg BID.   4/18: Started PRN gabapentin 300 mg 3x/day for peripheral neuropathy.    4/18: Internal Medicine started Novolog injection 1-5 units HS and Novolog injection 3x/day 1-7 units before meals for better glycemic control. Given patient is starting insulin, please notify medicine team of any impending discharge at least 2-3 days prior in order to have a safe management plan for her diabetes.  4/19: Internal Medicine increased to high intensity sliding scale. Novolog injection 1-7 units HS. Novolog injection 3x/day 1-10 units before meals for better glycemic control.   4/20: Started PO glipizide 24 hr tablet 5 mg daily with breakfast.   4/21: Started pt on a consistent carb diet.   4/22: Nutrition Consult for diabetes management.   4/24: Note from Medicine: Please send patient with glucometer on discharge. Diabetes education should be ordered prior to discharge.  4/25: Increase Glipizide XL to 10 mg daily starting on 4/26. Continue Metformin 1000 mg BID. Decreased to moderate intensity sliding scale. Novolog injection 1-5 units HS. Novolog injection 3x/day 1-7 units before meals.. Hopefully can discontinue in coming days. Moderate consistent CHO diet.   4/27: Continues to require 7U of insulin and is not quite ready to wean off of her sliding scale insulin yet. Will continue to monitor and adjust medications with goal of having her wean off sliding scale insulin prior to discharge.  5/1: Currently the patient is medically stable and Medicine will sign off at this time. Recommendations relayed to primary team via this progress note. Please notify on-call [facility]INCEN[facility] if new questions or concerns arise. OK to stop sliding scale insulin. Continue BG checks BID. Continue glipizide 10 mg daily. Continue Metformin 1000 mg BID. Follow up with PCP for continued BG monitoring within 1-2 weeks of discharge.   5/1: Chemical Dependency consulted. Consult acknowledge and will see patient on 5/2.     Checklist     Legal Status: [facility]: [place]  File Number: [id number]  Start and  expiration date of commitment: 04/24/2025 - 10/24/2025  [facility] meds: Zyprexa, Haldol, Abilify, Risperdal    Safety Assessment:   Behavioral Orders   Procedures    Code 1 - Restrict to Unit    Routine Programming     As clinically indicated    Status 15     Every 15 minutes.    Suicide precautions: Suicide Risk: MODERATE; Clinical rationale to override score: modification to the care environment, response to medication, lack of access to a plan for self-harm, Exhibiting Suicidal/self-harm behaviors or thoughts     Patients on Suicide Precautions should have a Combination Diet ordered that includes a Diet selection(s) AND a Behavioral Tray selection for Safe Tray - with utensils, or Safe Tray - NO utensils       Order Specific Question:   Suicide Risk     Answer:   MODERATE     Order Specific Question:   Clinical rationale to override score:     Answer:   modification to the care environment     Order Specific Question:   Clinical rationale to override score:     Answer:   response to medication     Order Specific Question:   Clinical rationale to override score:     Answer:   lack of access to a plan for self-harm     Order Specific Question:   Clinical rationale to override score:     Answer:   Exhibiting Suicidal/self-harm behaviors or thoughts     Risk Assessment:  Risk for harm is low to moderate.  Risk factors: maladaptive coping, substance use, impulsive, and past behaviors.  Protective factors: family.    SIO: None    Disposition: TBD. Disposition pending clinical stabilization, medication optimization and development of an appropriate discharge plan.     Attestations     Patient was staffed with the attending physician in the morning.     Lucy Plata MD  [facility] Psychiatry Resident  05/05/2025    Attestation:  This patient has been seen and evaluated by me, Joceline Dela Cruz MD.  I have discussed this patient with the house staff team including the resident and/or medical student and I agree with  the findings and plan in this note.    I have reviewed today's vital signs, medications, labs and imaging. Joceline Dela Cruz MD , PhD.

## 2025-05-05 NOTE — PLAN OF CARE
Rehab Group    Start time: 2010  End time: 2055  Patient time total: 10 minutes    attended partial group    #4 attended   Group Type: occupational therapy   Group Topic Covered: cognitive activities, coping skills, healthy leisure time, and social skills     Group Session Detail:  Patient actively engaged in a occupational therapy group with leisure exploration and engagement being the topic and focus. Leisure exploration offered for increased intrinsic motivation to engage in social situations with peers and exercise cognitive skills.      Patient Response/Contribution:  worked intermittently and required prompts or assistance to participate     Patient Detail:  Patient entered group late. Patient presented with a distracted and disorganized thought process. Patient required consistent cueing to promote understanding and success with a simple task. No social interaction with peers. Patient abruptly left group without reason and without return. Encourage continued attendance and participation in therapeutic groups (as appropriate) for further assessment.      [id number] OT Group (2 or more in attendance)      Patient Active Problem List   Diagnosis    Suicidal ideation    Type 2 diabetes mellitus (H)    Chronic hepatitis C (H)    Schizophrenia (H)    Acid reflux disease    Hoarding behavior

## 2025-05-05 NOTE — PLAN OF CARE
Team Note Due:  Wednesday    Assessment/Intervention/Current Symtoms and Care Coordination:  Chart review and met with team, discussed pt progress, symptomology, and response to treatment.  Discussed the discharge plan and any potential impediments to discharge.    Received update from Aishwarya that she is being denied due to acuity of MH.    [facility] interview scheduled today at 2pm. Met with pt to provide update on this.    Received vm from CM supervisor at [facility] wanting to come out for CM intake tomorrow at 1pm. Returned Trudi's vm confirming tomorrow will work for intake and provided brief update on discharge planning.    Discharge Plan or Goal:  IRTS vs [facility] vs [facility] w/ OP CD tx     Barriers to Discharge:  Patient requires further psychiatric stabilization due to current symptomology, medication management with changes subject to provider, coordination with outside supports, and aftercare planning. Pt is also under civil commitment.      Referral Status:  [facility] IRTS - sent 4/28  Phone interview completed 5/5  [facility] - sent 4/29  [facility] - referral sent 4/28. Denied 4/30, recommended pt attend CD tx/IRTS before being considered for a referral due to concern about recent amphetamine use. Willing to reconsider denial if OP CD tx is in place.    IOP CD referrals:  [facility] - made 5/2 by CD . Denied 5/5 due to acuity of MH.  [facility] - made 5/2 by CD   [facility] - made 5/2 by CD   [facility] - made 5/2 by CD      Legal Status:  [place] Commitment and [facility]: [place]  File Number: [id number]  Start and expiration date of commitment: 04/24/2025 - 10/24/2025    [facility] meds: Zyprexa, Haldol, Abilify, Risperdal    : TBD    Contacts:   Haydee Mares (CADI CM): [phone]   Tamika Johnson- (sister): [phone]  Lucille Reddy (Intervention ): [phone]     Upcoming Meetings and Dates/Important Information and next steps:  Follow up on  referrals  Schedule OP follow up  Notify medicine team of any impending discharge at least 2-3 days prior in order to have a safe management plan for her diabetes    PD and COS needed at discharge

## 2025-05-05 NOTE — PROGRESS NOTES
Rehab Group    Start time: 1050  End time: 1200  Patient time total: 20 minutes    came in and out of group session    #5 attended   Group Type: occupational therapy   Group Topic Covered: activity therapy     Group Session Detail:  The focus of today's group was leisure exploration and engagement. Patient engaged in a therapeutic game to encourage new learning, problem solving, focus, socialization, and cognitive wellness. This game encouraged cognitive skill building, such as: initiation, planning, organization, sequencing, and attention.       Patient Response/Contribution:  distracted , disorganized, appeared confused , nonsensical verbalizations, unable to sequence the task, and unable to comprehend information     Patient Detail:  Pt arrived to group presenting as disheveled and disorganized. Pt was oriented to group and showed interest but was unable to verbalize understanding. Pt reported wanting to watch peers participate. With extended time and encouragement pt agreed to actively participate, pt was observed to have low frustration tolerance, terminating participation quickly. Pt then passively participated, pt was observed to be labile, becoming tense quickly at times, then happy at others. Pt came in and out of room with no notice. Will continue to encourage attendance and participation.       [id number] OT Group (2 or more in attendance)      Patient Active Problem List   Diagnosis    Suicidal ideation    Type 2 diabetes mellitus (H)    Chronic hepatitis C (H)    Schizophrenia (H)    Acid reflux disease    Hoarding behavior

## 2025-05-05 NOTE — PLAN OF CARE
Anastasiya was out in the lounge throughout this shift watching tv with peers. Pt was interactive with select peers. Pt was pacing for awhile with a peer. Presents with flat affect. Pt thoughts are somewhat disorganized. Denies all mental health symptoms. Ate well for supper. Compliant with medications. Prn melatonin given at HS for sleep. Blood glucose was 195 and 202 this shift. Vital signs:Temp: 98.4  F (36.9  C) Temp src: Oral BP: (!) 148/85 Pulse: 88   Resp: 17 SpO2: 95 % O2 Device: None (Room air)       Goal Outcome Evaluation:    Plan of Care Reviewed With: patient          Problem: Suicide Risk  Goal: Absence of Self-Harm  Outcome: Progressing     Problem: Diabetes  Goal: Optimal Functional Ability  Intervention: Optimize Functional Ability  Recent Flowsheet Documentation  Taken 5/4/2025 1628 by Petar Jarrett, RN  Activity Assistance Provided: independent  Goal: Blood Glucose Level Within Target Range  Outcome: Progressing     Problem: Psychotic Signs/Symptoms  Goal: Improved Psychomotor Symptoms (Psychotic Signs/Symptoms)  Intervention: Manage Psychomotor Movement  Recent Flowsheet Documentation  Taken 5/4/2025 1628 by Petar Jarrett, RN  Diversional Activity: television  Activity (Behavioral Health): up ad belen

## 2025-05-05 NOTE — PLAN OF CARE
Problem: Adult Behavioral Health Plan of Care  Goal: Plan of Care Review  Outcome: Progressing  Flowsheets  Taken 5/5/2025 1355  Plan of Care Reviewed With: patient  Overall Patient Progress: improving  Patient Agreement with Plan of Care: agrees  Taken 5/5/2025 0800  Patient Agreement with Plan of Care: agrees     Problem: Adult Behavioral Health Plan of Care  Goal: Develops/Participates in Therapeutic [place] to Support Successful Transition  Outcome: Progressing   Goal Outcome Evaluation:    Plan of Care Reviewed With: patient     Overall Patient Progress: improving  Patient has been visible in the milieu especially lounge area.Patient compliant with medication,no prn given this shift..Patient attended groups and took a shower.Patient sociable with select peers.No escalation of behaviors.Patient denies anxiety and depression.Patient denies SI/SIB/AH/VH and HI.Patient eating and drinking adequately.BG was 85 and 264 this shift.Patient puts too much of creamer and sugar in her coffee.Mood is labile.Affect flat and blunted.  Temp: 97.8  F (36.6  C) Temp src: Oral BP: 118/78 Pulse: 82   Resp: 17 SpO2: 96 % O2 Device: None (Room air)

## 2025-05-05 NOTE — PLAN OF CARE
BEH IP Unit Acuity Rating Score (UARS)  Patient is given one point for every criteria they meet.    CRITERIA SCORING   On a 72 hour hold, court hold, committed, stay of commitment, or revocation. 1    Patient LOS on BEH unit exceeds 20 days. 0  LOS: 20   Patient under guardianship, 55+, otherwise medically complex, or under age 11. 1   Suicide ideation without relief of precipitating factors. 0   Current plan for suicide. 0   Current plan for homicide. 0   Imminent risk or actual attempt to seriously harm another without relief of factors precipitating the attempt. 0   Severe dysfunction in daily living (ex: complete neglect for self care, extreme disruption in vegetative function, extreme deterioration in social interactions). 1   Recent (last 7 days) or current physical aggression in the ED or on unit. 0   Restraints or seclusion episode in past 72 hours. 0   Recent (last 7 days) or current verbal aggression, agitation, yelling, etc., while in the ED or unit. 0   Active psychosis. 1   Need for constant or near constant redirection (from leaving, from others, etc).  0   Intrusive or disruptive behaviors. 0   Patient requires 3 or more hours of individualized nursing care per 8-hour shift (i.e. for ADLs, meds, therapeutic interventions). 0   TOTAL 3

## 2025-05-06 LAB
GLUCOSE BLDC GLUCOMTR-MCNC: 139 MG/DL (ref 70–99)
GLUCOSE BLDC GLUCOMTR-MCNC: 189 MG/DL (ref 70–99)
GLUCOSE BLDC GLUCOMTR-MCNC: 239 MG/DL (ref 70–99)

## 2025-05-06 PROCEDURE — 250N000013 HC RX MED GY IP 250 OP 250 PS 637

## 2025-05-06 PROCEDURE — 124N000002 HC R&B MH UMMC

## 2025-05-06 PROCEDURE — 97150 GROUP THERAPEUTIC PROCEDURES: CPT | Mod: GO

## 2025-05-06 PROCEDURE — 250N000013 HC RX MED GY IP 250 OP 250 PS 637: Performed by: PHYSICIAN ASSISTANT

## 2025-05-06 RX ADMIN — DIVALPROEX SODIUM 1000 MG: 500 TABLET, FILM COATED, EXTENDED RELEASE ORAL at 20:14

## 2025-05-06 RX ADMIN — PANTOPRAZOLE SODIUM 40 MG: 40 TABLET, DELAYED RELEASE ORAL at 08:19

## 2025-05-06 RX ADMIN — Medication 1 TABLET: at 08:20

## 2025-05-06 RX ADMIN — OLANZAPINE 5 MG: 5 TABLET, ORALLY DISINTEGRATING ORAL at 20:14

## 2025-05-06 RX ADMIN — METFORMIN HYDROCHLORIDE 1000 MG: 500 TABLET, FILM COATED ORAL at 08:19

## 2025-05-06 RX ADMIN — GLIPIZIDE 10 MG: 2.5 TABLET, FILM COATED, EXTENDED RELEASE ORAL at 08:19

## 2025-05-06 RX ADMIN — METFORMIN HYDROCHLORIDE 1000 MG: 500 TABLET, FILM COATED ORAL at 18:20

## 2025-05-06 RX ADMIN — ARIPIPRAZOLE 15 MG: 15 TABLET ORAL at 08:19

## 2025-05-06 RX ADMIN — Medication 1 TABLET: at 08:19

## 2025-05-06 RX ADMIN — Medication 3 MG: at 21:24

## 2025-05-06 RX ADMIN — TOLTERODINE TARTRATE 2 MG: 2 CAPSULE, EXTENDED RELEASE ORAL at 08:19

## 2025-05-06 ASSESSMENT — ACTIVITIES OF DAILY LIVING (ADL)
ADLS_ACUITY_SCORE: 57
DRESS: SCRUBS (BEHAVIORAL HEALTH);INDEPENDENT
ADLS_ACUITY_SCORE: 47
ADLS_ACUITY_SCORE: 47
HYGIENE/GROOMING: HANDWASHING;INDEPENDENT
LAUNDRY: WITH SUPERVISION
ADLS_ACUITY_SCORE: 57
ADLS_ACUITY_SCORE: 47
ADLS_ACUITY_SCORE: 47
ADLS_ACUITY_SCORE: 57
ADLS_ACUITY_SCORE: 47
ORAL_HYGIENE: INDEPENDENT
ADLS_ACUITY_SCORE: 57
ADLS_ACUITY_SCORE: 47
ADLS_ACUITY_SCORE: 57
ADLS_ACUITY_SCORE: 47
ADLS_ACUITY_SCORE: 57
ADLS_ACUITY_SCORE: 47
ADLS_ACUITY_SCORE: 47
ADLS_ACUITY_SCORE: 57
ADLS_ACUITY_SCORE: 57
ADLS_ACUITY_SCORE: 47

## 2025-05-06 NOTE — PLAN OF CARE
P  Problem: Sleep Disturbance  Goal: Adequate Sleep/Rest  5/6/2025 0407 by Mariia Tavarez, RN  Outcome: Progressing  5/6/2025 0352 by Mariia Tavarez, RN  Outcome: Progressing    Pt appears to have slept for 6.75 hours. Pt did not complain of pain or discomfort, and no PRN medications were given. 15 minutes safety checks were in place. Staff will continue to offer support to pt.

## 2025-05-06 NOTE — PLAN OF CARE
BEH IP Unit Acuity Rating Score (UARS)  Patient is given one point for every criteria they meet.    CRITERIA SCORING   On a 72 hour hold, court hold, committed, stay of commitment, or revocation. 1    Patient LOS on BEH unit exceeds 20 days. 0  LOS: 21   Patient under guardianship, 55+, otherwise medically complex, or under age 11. 1   Suicide ideation without relief of precipitating factors. 0   Current plan for suicide. 0   Current plan for homicide. 0   Imminent risk or actual attempt to seriously harm another without relief of factors precipitating the attempt. 0   Severe dysfunction in daily living (ex: complete neglect for self care, extreme disruption in vegetative function, extreme deterioration in social interactions). 1   Recent (last 7 days) or current physical aggression in the ED or on unit. 0   Restraints or seclusion episode in past 72 hours. 0   Recent (last 7 days) or current verbal aggression, agitation, yelling, etc., while in the ED or unit. 0   Active psychosis. 1   Need for constant or near constant redirection (from leaving, from others, etc).  0   Intrusive or disruptive behaviors. 0   Patient requires 3 or more hours of individualized nursing care per 8-hour shift (i.e. for ADLs, meds, therapeutic interventions). 0   TOTAL 3

## 2025-05-06 NOTE — PROGRESS NOTES
"  ----------------------------------------------------------------------------------------------------------  [facility]  Psychiatry Progress Note  Hospital Day #21     Interim History:     The patient's care was discussed with the treatment team and chart notes were reviewed.    Identifier: Anastasiya Simon is a 59 year old female with a previous psychiatric diagnosis of schizophrenia and polysubstance use admitted from [facility] on 04/15/2025 due to concern for SI and psychosis in the context of psychosocial stressors including living situation and argument with family member.    Vitals:Temp: 98  F (36.7  C) Temp src: Oral BP: (!) 145/77 Pulse: 94     SpO2: 96 % O2 Device: None (Room air)   Height: 165.1 cm (5' 5\") Weight: 72.1 kg (159 lb)  Estimated body mass index is 26.46 kg/m  as calculated from the following:    Height as of this encounter: 1.651 m (5' 5\").    Weight as of this encounter: 72.1 kg (159 lb).  Sleep: 6.75 hours (05/06/25 0600)  Scheduled medications: Took all scheduled medications as prescribed.  Psychiatric PRN medications:   Last 24H PRN:     calcium carbonate (TUMS) chewable tablet 500 mg, 500 mg at 05/05/25 7383    Staff Report:   Pt appears to have slept for 6.75 hours. Pt did not complain of pain or discomfort, and no PRN medications were given.       Subjective:     Patient Interview:  Anastasiya was interviewed in the conference room with the medical student team and Dr. Dela Cruz. Anastasiya reports feeling \"okay\" and that she slept well last night. This morning she had a nice breakfast and reports feeling good with improved pain in her feet. She reports she may be having some anxiety due to one of her medications. She feels a bit twitchy and \"not natural\" which has happened to the last time she was in the hospital. She reports feeling restless and wishing she could go outside. Team is checking with security to determine if she is safe to " "go outside with OT. She reports no thoughts of harming self or others.     Yesterday she had an interview with Carri Alvarenga and reported she felt good about the interview and would be comfortable going there. She would like to look at pictures of the accommodations with [facility]. We discussed that we discontinued the sliding scale insulin and switching to a daily bolus to open up more options for transition of care. We discussed trying to keep eating sweets in moderation and limiting creams in coffee. Patient agreed to switching to eating sugar free candies and limiting coffee creamer.     Objective:     Vitals:  BP (!) 145/77 (BP Location: Right arm, Patient Position: Sitting, Cuff Size: Adult Regular)   Pulse 94   Temp 98  F (36.7  C) (Oral)   Resp 17   Ht 1.651 m (5' 5\")   Wt 72.1 kg (159 lb)   LMP 10/22/2013   SpO2 96%   BMI 26.46 kg/m      Allergies:  Allergies   Allergen Reactions    Tetracycline Unknown     It happened when pt was a baby       Current Medications:  Scheduled:  Current Facility-Administered Medications   Medication Dose Route Frequency Provider Last Rate Last Admin    ARIPiprazole (ABILIFY) tablet 15 mg  15 mg Oral Daily Lucy Chandler MD   15 mg at 05/06/25 0819    calcium carbonate (TUMS) chewable tablet 500 mg  500 mg Oral Daily PRN Tanja Gonzalez MD   500 mg at 05/05/25 2159    glucose gel 15-30 g  15-30 g Oral Q15 Min PRN Katina Armendariz MD        Or    dextrose 50 % injection 25-50 mL  25-50 mL Intravenous Q15 Min PRN Katina Armendariz MD        Or    glucagon injection 1 mg  1 mg Subcutaneous Q15 Min PRN Katina Armendariz MD        divalproex sodium extended-release (DEPAKOTE ER) 24 hr tablet 1,000 mg  1,000 mg Oral At Bedtime Tanja Gonzalez MD   1,000 mg at 05/05/25 2102    gabapentin (NEURONTIN) capsule 300 mg  300 mg Oral TID PRN Tanja Gonzalez MD   300 mg at 04/22/25 2017    glipiZIDE (GLUCOTROL XL) 24 hr tablet 10 mg  10 mg Oral " Daily with breakfast Katina Meza PA   10 mg at 05/06/25 0819    hydrOXYzine HCl (ATARAX) tablet 25 mg  25 mg Oral Q4H PRN Lucy Chandler MD   25 mg at 04/29/25 0435    loperamide (IMODIUM) capsule 2 mg  2 mg Oral 4x Daily PRN Tanja Gonzalez MD   2 mg at 05/03/25 0037    magnesium sulfate (EPSOM SALT) granules 1 Tablespoonful  1 Tablespoonful Topical Daily PRN Tanja Gonzalez MD        melatonin tablet 3 mg  3 mg Oral At Bedtime PRN Katina Armendariz MD   3 mg at 05/04/25 2009    metFORMIN (GLUCOPHAGE) tablet 1,000 mg  1,000 mg Oral BID w/meals Charlotte Jasmine PA-C   1,000 mg at 05/06/25 0819    multivitamin RENAL (RENAVITE RX/NEPHROVITE) tablet 1 tablet  1 tablet Oral Daily Lucy Chandler MD   1 tablet at 05/06/25 0819    multivitamin w/minerals (THERA-VIT-M) tablet 1 tablet  1 tablet Oral Daily Charlotte Jasmine PA-C   1 tablet at 05/06/25 0820    OLANZapine (zyPREXA) tablet 10 mg  10 mg Oral TID PRN Lucy Chandler MD        Or    OLANZapine (zyPREXA) injection 10 mg  10 mg Intramuscular TID PRN Lucy Chandler MD        OLANZapine zydis (zyPREXA) ODT tab 5 mg  5 mg Oral At Bedtime Lucy Chandler MD   5 mg at 05/05/25 2102    pantoprazole (PROTONIX) EC tablet 40 mg  40 mg Oral QAM AC Lucy Chandler MD   40 mg at 05/06/25 0819    tolterodine ER (DETROL LA) 24 hr capsule 2 mg  2 mg Oral Daily Tanja Gonzalez MD   2 mg at 05/06/25 0819     PRN:  Current Facility-Administered Medications   Medication Dose Route Frequency Provider Last Rate Last Admin    ARIPiprazole (ABILIFY) tablet 15 mg  15 mg Oral Daily uLcy Chandler MD   15 mg at 05/06/25 0819    calcium carbonate (TUMS) chewable tablet 500 mg  500 mg Oral Daily PRN Tanja Gonzalez MD   500 mg at 05/05/25 2159    glucose gel 15-30 g  15-30 g Oral Q15 Min PRN Katina Armendariz MD        Or     dextrose 50 % injection 25-50 mL  25-50 mL Intravenous Q15 Min PRN Katina Armendariz MD        Or    glucagon injection 1 mg  1 mg Subcutaneous Q15 Min PRN Katina Armendariz MD        divalproex sodium extended-release (DEPAKOTE ER) 24 hr tablet 1,000 mg  1,000 mg Oral At Bedtime Tanja Gonzalez MD   1,000 mg at 05/05/25 2102    gabapentin (NEURONTIN) capsule 300 mg  300 mg Oral TID PRN Tanja Gonzalez MD   300 mg at 04/22/25 2017    glipiZIDE (GLUCOTROL XL) 24 hr tablet 10 mg  10 mg Oral Daily with breakfast Katina Meza PA   10 mg at 05/06/25 0819    hydrOXYzine HCl (ATARAX) tablet 25 mg  25 mg Oral Q4H PRN Lucy Chandler MD   25 mg at 04/29/25 0435    loperamide (IMODIUM) capsule 2 mg  2 mg Oral 4x Daily PRN Tanja Gonzalez MD   2 mg at 05/03/25 0037    magnesium sulfate (EPSOM SALT) granules 1 Tablespoonful  1 Tablespoonful Topical Daily PRN Tanja Gonzalez MD        melatonin tablet 3 mg  3 mg Oral At Bedtime PRN Katina Armendariz MD   3 mg at 05/04/25 2009    metFORMIN (GLUCOPHAGE) tablet 1,000 mg  1,000 mg Oral BID w/meals Charlotte Jasmine PA-C   1,000 mg at 05/06/25 0819    multivitamin RENAL (RENAVITE RX/NEPHROVITE) tablet 1 tablet  1 tablet Oral Daily Lucy Chandler MD   1 tablet at 05/06/25 0819    multivitamin w/minerals (THERA-VIT-M) tablet 1 tablet  1 tablet Oral Daily Charlotte Jasmine PA-C   1 tablet at 05/06/25 0820    OLANZapine (zyPREXA) tablet 10 mg  10 mg Oral TID PRN Lucy Chandler MD        Or    OLANZapine (zyPREXA) injection 10 mg  10 mg Intramuscular TID PRN Lucy Chandler MD        OLANZapine zydis (zyPREXA) ODT tab 5 mg  5 mg Oral At Bedtime Lucy Chandler MD   5 mg at 05/05/25 2102    pantoprazole (PROTONIX) EC tablet 40 mg  40 mg Oral QAM AC Lucy Chandler MD   40 mg at 05/06/25 0819    tolterodine ER (DETROL LA) 24 hr capsule 2 mg   "2 mg Oral Daily Tanja Gonzalez MD   2 mg at 05/06/25 0819     Labs and Imaging:  New results:   Recent Results (from the past 24 hours)   Glucose by meter    Collection Time: 05/05/25 11:41 AM   Result Value Ref Range    GLUCOSE BY METER POCT 264 (H) 70 - 99 mg/dL   Glucose by meter    Collection Time: 05/05/25  5:57 PM   Result Value Ref Range    GLUCOSE BY METER POCT 177 (H) 70 - 99 mg/dL   Glucose by meter    Collection Time: 05/05/25 10:16 PM   Result Value Ref Range    GLUCOSE BY METER POCT 146 (H) 70 - 99 mg/dL   Glucose by meter    Collection Time: 05/06/25  7:50 AM   Result Value Ref Range    GLUCOSE BY METER POCT 139 (H) 70 - 99 mg/dL     Data this admission:  CBC: WNL. WBC: 7.6, RBC: 4.37, Hb: 13.0, Platelets: 316.  CMP: Unremarkable (4/22) AST: 13, ALT: 10, Creatinine: 0.65.  UDS: Negative  RASHIDA: 0.00 (4/13)  HbA1c: 10.1%  TSH: 2.40  Vit: B12: 812  Folate: 13.7  Valproic Acid Free & Total: 67  Valproic Acid Free & Total STAT: 75.6  Phosphorus: 4.6 (H)  Magnesium: 1.6 (L)  BMP: unremarkable (4/28)    Imaging:  Bladder Scan: Completed on 4/22     Mental Status Exam:     Oriented to:  Grossly oriented.  General: Alert and awake. Pt was laying down for the interview.  Appearance:  Appears older than stated age. Grooming is unkempt, but improved since yesterday since showering. Wears glasses and own personal clothing.   Behavior/Attitude:  Engaged with questioning.   Eye Contact: Eyes closed for most of the interview.   Psychomotor: No evidence of tics, dystonia, or tardive dyskinesia. No catatonia present.  Speech: Appropriate rate/tone/volume.   Language: Fluent in English with appropriate syntax and vocabulary.  Mood:  \"tired\"  Affect:  Congruent with mood. Appropriate  Thought Process:  Linear and goal directed.  Thought Content:  Denies SI/SIB/AVH.  Associations:  questionable  Insight:  limited   Judgment:  limited   Impulse control: fair  Attention Span:  adequate for conversation  Concentration:  grossly " intact  Recent and Remote Memory:  Not formally assessed. Pt reports memory issues. Staff notes forgetfulness.   Fund of Knowledge: Average.  Muscle Strength and Tone: Normal.  Gait and Station: Normal.     Psychiatric Assessment     Anastasiya Simon is a 59 year old female previously diagnosed with schizophrenia and polysubstance use disorder who presented with suicidal ideation, disorganization, and inability to care for self. Most recent psychiatric hospitalization was 03/20/2019 for disorganization and suicidal ideation. Significant symptoms on admission include increasing suicidal ideation, disorganization, erratic behavior such as filling buckets with ammonia and mixing with bleach, substance use, auditory hallucinations, and paranoia. The MSE on admission was pertinent for AVH, paranoia, limited insight into MH. Today on interview Anastasiya denies SI and reports she had only made suicidal statements to her sisters previously as she did not want to move into a group home.  Psychological contributions to mental health presentation include maladaptive coping through substance use and limited insight into MH. Social factors contributing to mental health presentation include interpersonal conflicts and unstable living environment. Protective factors include support from two sisters and absence of previous suicide attempts.     Anastasiya was re-started on Zyprexa 20 mg and her Depakote dose was increased to 1000 mg to address the ongoing psychosis and mood symptoms. Tolterodine and PRN loperamide were started to address incontinence and diarrhea. Due to T2DM neuropathy has been a concern for which we started gabapentin 300 mg as well. Due to concerns of metabolic side effects regarding underline diabetes it was decided to cross-titrate to Abilify from Zyprexa. Currently still adjusting doses considering Anastasiya's tolerability.     In summary, the patient's reported symptoms of SI, erractic behavior, AVH, and paranoia  in the context of psychosocial stressers are consistent with decompensated schizophrenia and polysubstance use disorder.  She will likely benefit from medication optimization and outpatient MH, as well as referrals for placement that can give some structure considering her requirements, and working with her CM for future discharge plans. To further assist with this it was addressed the need to follow through with outpatient CDT which is something Anastasiya is in agreement. Still monitoring cross-titration of Abilify to Olanzapine.     Psychiatric Plan by Diagnosis      Today's changes:  - none    # Schizophrenia  Medications:  - olanzapine 5 mg HS  - depakote 1000 mg HS  - Abilify 15 mg daily in the morning    #Polysubstance Use Disorder  - CD consult once psychotic sx more stable, if pt amenable  - CD consulted on 5/1. CD acknowledged and will visit on 5/2.    2. Pertinent Labs/Monitoring:  - Labs done 04/13 at OSH. CBC wnl, glucose elevated to 332, BMP wnl.  - UDS negative on 04/13  - 4/16 HbA1c: 10.1  - Depakote Levels: 75.6    3. Additional Plans:  - Patient will be treated in therapeutic milieu with appropriate individual and group therapies as described.   - Consulted Internal Medicine for diabetes management.   - Referrals  [facility] IRTS - sent 4/28  Phone interview completed 5/5  [facility] - sent 4/29  [facility] - referral sent 4/28. Denied 4/30, recommended pt attend CD tx/IRTS before being considered for a referral due to concern about recent amphetamine use. Willing to reconsider denial if OP CD tx is in place.     IOP CD referrals:  [facility] - made 5/2 by CD .  Denied on 5/5  [facility] - made 5/2 by CD   [facility] - made 5/2 by CD   [facility] - made 5/2 by CD      Psychiatric Hospital Course:      Anastasiya Simon was admitted to [facility] on a 72 hour hold (started 4/13/25 21:35) from [facility] on 04/15/25.   Medications:  Continued Medications:  Pt seen by  psychiatry consult at [facility] who started zyprexa 20 mg at bedtime and depakote 500 mg at bedtime. Both of these medications were continued on admission to [facility].   New Medications:  Started loperamide for diarrhea  Started tolterodine for urinary incontinence  Started gabapentin for peripheral neuropathy    4/16: Increased depakote from 500 mg to 1000 mg HS for mood regulation  4/16: Increased metformin from 500 mg to 1000 mg BID per medicine consult  4/16: Zyprexa: 5 mg in the morning and 15 mg HS  4/16: Started PRN Loperamide 2 mg 4x/day for diarrhea  4/16: Started Tolterodine 2 mg daily for urinary incontinence  4/18: Started Gabapentin 300 mg PRN 3x/day for peripheral neuropathy  4/20: Started glipizide 24 hr tablet 5 mg daily for better glycemic control per medicine consult  4/25: Increased glipizide XL from 5mg to 10mg per medicine consult.  4/28: Started Abilify 5 mg PO daily in the morning  4/30: Increased Abilify to 10 mg daily in the morning  4/30: Decreased olanzapine to only 15 mg HS  5/2: Increased Abilify to 15 mg daily in the morning  5/2: Decreased Zyprexa to 10 mg HS  5/5: Decrease Zyprexa to 5 mg HS    The risks, benefits, alternatives, and side effects were discussed and understood by the patient and other caregivers.     Medical Assessment and Plan     Medical diagnoses to be addressed this admission:      #Type 2 Diabetes Mellitis with Complications  - PTA metformin 1000 mg BID with meals  - BID glucose checks; hypoglycemia protocol  - PRN gabapentin 300 mg 3x/day for peripheral neuropathy  - Insulin aspart (Novolog) injection 1-5 units HS  - Insulin aspart (Novolog) injection 3x/day 1-7 units before meals  - Glipizide 24 hr tablet 10 mg daily with breakfast for better glycemic control  - Internal Medicine Consult  - Nutrition Consult  - Moderate Consistent Carb Diet  - Given patient is starting insulin, please notify medicine team of any impending discharge at least 2-3 days  prior in order to have a safe management plan for her diabetes.  - As of 5/1, Medicine will sign off at this time. OK to stop sliding scale insulin.    #Diarrhea  - Loperamide 2 mg PRN 4x/day    #Urinary Incontinence  - Tolterodine 2 mg daily    Medical course:  Patient was physically examined by the ED prior to being transferred to the unit and was found to be medically stable and appropriate for admission.    Consults:   4/16: Internal Medicine consulted for management of diabetes and blood pressure.   4/16: Increased metformin to 1000 mg BID.   4/18: Started PRN gabapentin 300 mg 3x/day for peripheral neuropathy.   4/18: Internal Medicine started Novolog injection 1-5 units HS and Novolog injection 3x/day 1-7 units before meals for better glycemic control. Given patient is starting insulin, please notify medicine team of any impending discharge at least 2-3 days prior in order to have a safe management plan for her diabetes.  4/19: Internal Medicine increased to high intensity sliding scale. Novolog injection 1-7 units HS. Novolog injection 3x/day 1-10 units before meals for better glycemic control.   4/20: Started PO glipizide 24 hr tablet 5 mg daily with breakfast.   4/21: Started pt on a consistent carb diet.   4/22: Nutrition Consult for diabetes management.   4/24: Note from Medicine: Please send patient with glucometer on discharge. Diabetes education should be ordered prior to discharge.  4/25: Increase Glipizide XL to 10 mg daily starting on 4/26. Continue Metformin 1000 mg BID. Decreased to moderate intensity sliding scale. Novolog injection 1-5 units HS. Novolog injection 3x/day 1-7 units before meals.. Hopefully can discontinue in coming days. Moderate consistent CHO diet.   4/27: Continues to require 7U of insulin and is not quite ready to wean off of her sliding scale insulin yet. Will continue to monitor and adjust medications with goal of having her wean off sliding scale insulin prior to  discharge.  5/1: Currently the patient is medically stable and Medicine will sign off at this time. Recommendations relayed to primary team via this progress note. Please notify on-call [facility] if new questions or concerns arise. OK to stop sliding scale insulin. Continue BG checks BID. Continue glipizide 10 mg daily. Continue Metformin 1000 mg BID. Follow up with PCP for continued BG monitoring within 1-2 weeks of discharge.   5/1: Chemical Dependency consulted. Consult acknowledge and will see patient on 5/2.     Checklist     Legal Status[facility] Commitment [facility]  File Number: [id number]  Start and expiration date of commitment: 04/24/2025 - 10/24/2025  Castellanos meds: Zyprexa, Haldol, Abilify, Risperdal    Safety Assessment:   Behavioral Orders   Procedures    Code 1 - Restrict to Unit    Code 3    Routine Programming     As clinically indicated    Status 15     Every 15 minutes.    Suicide precautions: Suicide Risk: MODERATE; Clinical rationale to override score: modification to the care environment, response to medication, lack of access to a plan for self-harm, Exhibiting Suicidal/self-harm behaviors or thoughts     Patients on Suicide Precautions should have a Combination Diet ordered that includes a Diet selection(s) AND a Behavioral Tray selection for Safe Tray - with utensils, or Safe Tray - NO utensils       Order Specific Question:   Suicide Risk     Answer:   MODERATE     Order Specific Question:   Clinical rationale to override score:     Answer:   modification to the care environment     Order Specific Question:   Clinical rationale to override score:     Answer:   response to medication     Order Specific Question:   Clinical rationale to override score:     Answer:   lack of access to a plan for self-harm     Order Specific Question:   Clinical rationale to override score:     Answer:   Exhibiting Suicidal/self-harm behaviors or thoughts     Risk Assessment:  Risk for harm is low  to moderate.  Risk factors: maladaptive coping, substance use, impulsive, and past behaviors.  Protective factors: family.    SIO: None    Disposition: TBD. Disposition pending clinical stabilization, medication optimization and development of an appropriate discharge plan.     Attestations     Laurel Ledbetter, MS3    Attestation:  This patient has been seen and evaluated by Joceline herrera MD.  I have discussed this patient with the house staff team including the resident and/or medical student and I agree with the findings and plan in this note.    I have reviewed today's vital signs, medications, labs and imaging. Joceline Dela Cruz MD , PhD.      Attestation:  This patient has been seen and evaluated by me, Joceline Dela Cruz MD.  I have discussed this patient with the house staff team including the resident and/or medical student and I agree with the findings and plan in this note.    I have reviewed today's vital signs, medications, labs and imaging. Joceline Dela Cruz MD , PhD.

## 2025-05-06 NOTE — PROGRESS NOTES
"  Rehab Group    Start time: 1315  End time: 1400  Patient time total: 45 minutes    attended full group     #3 attended   Group Type: occupational therapy   Group Topic Covered: balanced lifestyle, mindfulness, and relaxation      Group Session Detail:  [place]      Patient Response/Contribution:  positive affect, disorganized, nonsensical verbalizations, and verbalizations were off topic     Patient Detail:  Pt traveled to and from [place], with security present, with no issues today. Pt observed to require extra time to walk to [place] and have unsteady balance. Pt presented as disorganized at times in this group, becoming tangential throughout. Pt was preoccupied with rust at times, rust was visible on bench bolts, after some time discussing rust pt reported \"oh this probably isnt safe\" and moved seats. When asked if this group affected her mood she responded \"I dont know, I can't tell until I get back inside.\" Pt requested to use art materials in this group provided by writer, although pt observed to not initiate creative task and hold items instead. Will continue to encourage attendance and participation throughout.       [id number] OT Group (2 or more in attendance)      Patient Active Problem List   Diagnosis    Suicidal ideation    Type 2 diabetes mellitus (H)    Chronic hepatitis C (H)    Schizophrenia (H)    Acid reflux disease    Hoarding behavior       "

## 2025-05-06 NOTE — PLAN OF CARE
Problem: Sleep Disturbance  Goal: Adequate Sleep/Rest  Outcome: Progressing    Pt appears to have slept for 6.75 hours. Pt did not complain of pain or discomfort, and no PRN medications were given. 15 minutes safety checks were in place. Staff will continue to offer support to pt.

## 2025-05-06 NOTE — PLAN OF CARE
Problem: Adult Behavioral Health Plan of Care  Goal: Plan of Care Review  Outcome: Progressing  Flowsheets  Taken 5/6/2025 0935  Plan of Care Reviewed With: patient  Overall Patient Progress: improving  Patient Agreement with Plan of Care: agrees  Taken 5/6/2025 0849  Patient Agreement with Plan of Care: agrees     Problem: Adult Behavioral Health Plan of Care  Goal: Adheres to Safety Considerations for Self and Others  Outcome: Progressing  Intervention: Develop and Maintain Individualized Safety Plan  Recent Flowsheet Documentation  Taken 5/6/2025 0849 by Lisbeth Gibson RN  Safety Measures:   environmental rounds completed   safety rounds completed   Goal Outcome Evaluation:    Plan of Care Reviewed With: patient     Overall Patient Progress: improving  Anastasiya has been visible in the milieu.Sociable with select peers.Patient went outside with OT staff and peers and she liked the hot weather outside.Patient has been in the lounge watching TV/movies.Patient mood labile.Affect flat and blunted.Hygiene fair.Adequate food and fluid intake. and 189 this shift.No insulin orders orals given with breakfast.Patient is compliant with medication & cooperative with cares.Patient denies all MH symptoms.No behavior concerns noted.Patient contracted for safety.  Temp: 98  F (36.7  C) Temp src: Oral BP: (!) 145/77 Pulse: 94     SpO2: 96 % O2 Device: None (Room air)

## 2025-05-06 NOTE — PLAN OF CARE
"Team Note Due:  Wednesday    Assessment/Intervention/Current Symtoms and Care Coordination:  Chart review and met with team, discussed pt progress, symptomology, and response to treatment.  Discussed the discharge plan and any potential impediments to discharge.    Pt is off sliding scale insulin as of 5/1. Additional IRTS referrals made.    Pt approved to go outside for group this afternoon and has been very motivated to participate. Writer connected with CM Trudi at [facility] to reschedule CM intake. This is now tomorrow at 9am.    Jesika with [facility] provided the following update: \"I am concerned about her lack of insight and would really like to see that she complete an IOP w/ lodging and/or complete specifically an IRTS program is possible, prior to potential admission to [facility]. I think one of or both of those settings could be beneficial to her prior to admission here, and would likely help her be more successful in a setting such as ours.\"    Followed up with [facility] requesting update.    Discharge Plan or Goal:  IRTS vs [facility] vs [facility] w/ OP CD tx     Barriers to Discharge:  Patient requires further psychiatric stabilization due to current symptomology, medication management with changes subject to provider, coordination with outside supports, and aftercare planning. Pt is also under civil commitment.      Referral Status:  [facility] IRTS - sent 4/28  Phone interview completed 5/5  [facility] IRTS - sent 5/6  [facility] IRTS - sent 5/6  [place] [facility] - sent 5/6  [facility] - sent 4/29  [facility] - declined. Recommended pt attend CD tx/IRTS before being considered for a referral.    IOP CD referrals:  [facility] - made 5/2 by CD . Denied 5/5 due to acuity of MH.  [facility] - made 5/2 by CD   [facility] - made 5/2 by CD   [facility] - made 5/2 by CD      Legal Status:  [facility] [place]  File Number: [id number]  Start and expiration " date of commitment: 04/24/2025 - 10/24/2025    [facility] meds: Zyprexa, Haldol, Abilify, Risperdal    : JUSTEN    Contacts:   Haydee Mares (CADI CM): [phone]   Tamika Johnson- (sister): [phone]  Lucille Maureen (Intervention ): [phone]     Upcoming Meetings and Dates/Important Information and next steps:  Follow up on referrals  Schedule OP follow up  Notify medicine team of any impending discharge at least 2-3 days prior in order to have a safe management plan for her diabetes    PD and COS needed at discharge

## 2025-05-06 NOTE — PROGRESS NOTES
Rehab Group    Start time: 1015  End time: 1050  Patient time total: 25 minutes    attended partial group    #5 attended   Group Type: occupational therapy   Group Topic Covered: coping skills and self-esteem     Group Session Detail:  OT facilitated a discussion about individual and unique strengths and how they can be used to create affirmations. Pt's were provided with a worksheet that asked them to identify talents/strengths (gifts of the head, hands, and heart). OT facilitated a discussion on how strengths can be used as affirmations, motivation, and can be integrated into self care to improve overall self-esteem/outlook.      Patient Response/Contribution:  required prompts or assistance to participate, disorganized, and needed prompts to redirect     Patient Detail:  Pt arrived to group with blunted affect, presenting as disorganized and disheveled. Pt was oriented to group and verbalized understanding.  Pt had difficulty maintaining attention to task, occasionally contributing to group discussion with limited insight. Pt became tangential at times benefiting from gentle redirection. Pt reported gifts as: beading, needlework, and oil painting. Pt observed to not fill out provided worksheet, seemingly dismissive to topic on hand at times. Will continue to encourage attendance and participation as appropriate.       [id number] OT Group (2 or more in attendance)      Patient Active Problem List   Diagnosis    Suicidal ideation    Type 2 diabetes mellitus (H)    Chronic hepatitis C (H)    Schizophrenia (H)    Acid reflux disease    Hoarding behavior

## 2025-05-07 LAB — GLUCOSE BLDC GLUCOMTR-MCNC: 115 MG/DL (ref 70–99)

## 2025-05-07 PROCEDURE — H2032 ACTIVITY THERAPY, PER 15 MIN: HCPCS

## 2025-05-07 PROCEDURE — 250N000013 HC RX MED GY IP 250 OP 250 PS 637

## 2025-05-07 PROCEDURE — 250N000013 HC RX MED GY IP 250 OP 250 PS 637: Performed by: PHYSICIAN ASSISTANT

## 2025-05-07 PROCEDURE — 97150 GROUP THERAPEUTIC PROCEDURES: CPT | Mod: GO

## 2025-05-07 PROCEDURE — 124N000002 HC R&B MH UMMC

## 2025-05-07 RX ADMIN — Medication 1 TABLET: at 08:32

## 2025-05-07 RX ADMIN — DIVALPROEX SODIUM 1000 MG: 500 TABLET, FILM COATED, EXTENDED RELEASE ORAL at 21:23

## 2025-05-07 RX ADMIN — LOPERAMIDE HYDROCHLORIDE 2 MG: 2 CAPSULE ORAL at 21:31

## 2025-05-07 RX ADMIN — METFORMIN HYDROCHLORIDE 1000 MG: 500 TABLET, FILM COATED ORAL at 08:32

## 2025-05-07 RX ADMIN — TOLTERODINE TARTRATE 2 MG: 2 CAPSULE, EXTENDED RELEASE ORAL at 08:32

## 2025-05-07 RX ADMIN — Medication 3 MG: at 21:23

## 2025-05-07 RX ADMIN — PANTOPRAZOLE SODIUM 40 MG: 40 TABLET, DELAYED RELEASE ORAL at 08:33

## 2025-05-07 RX ADMIN — METFORMIN HYDROCHLORIDE 1000 MG: 500 TABLET, FILM COATED ORAL at 18:02

## 2025-05-07 RX ADMIN — ARIPIPRAZOLE 15 MG: 15 TABLET ORAL at 08:32

## 2025-05-07 RX ADMIN — GLIPIZIDE 10 MG: 2.5 TABLET, FILM COATED, EXTENDED RELEASE ORAL at 08:33

## 2025-05-07 ASSESSMENT — ACTIVITIES OF DAILY LIVING (ADL)
ADLS_ACUITY_SCORE: 47
HYGIENE/GROOMING: INDEPENDENT
ADLS_ACUITY_SCORE: 47

## 2025-05-07 NOTE — PLAN OF CARE
Team Note Due:  Wednesday    Assessment/Intervention/Current Symtoms and Care Coordination:  Chart review and met with team, discussed pt progress, symptomology, and response to treatment.  Discussed the discharge plan and any potential impediments to discharge.    Met with pt to remind her of CM intake around 9am. Explained reasoning for this per her commitment. Also provided update on discharge planning and IRTS being likely placement.    Received update from [facility]. They are declining pt due to concern for substance use.    CMs from [facility] came to complete intake.    [facility] responded stating they were able to get through the phone screen on Monday and should have an update soon.    Carolyn with [place] [facility] requested to complete Zoom interview tomorrow. Awaiting confirmation on time for interview.    Discharge Plan or Goal:  [facility] w/ OP CD tx     Barriers to Discharge:  Patient requires further psychiatric stabilization due to current symptomology, medication management with changes subject to provider, coordination with outside supports, and aftercare planning. Pt is also under civil commitment.      Referral Status:  [facility] I[facility] - sent 4/28  Phone interview completed 5/5  [facility] IRTS - sent 5/6. Declined due to substance use.  [facility] IRTS - sent 5/6  [place] [facility] - sent 5/6  Interview scheduled on 5/8  [facility] - sent 4/29  [facility] - declined. Recommended pt attend CD tx/IRTS before being considered for a referral.    IOP CD referrals:  [facility] - made 5/2 by CD . Denied 5/5 due to acuity of MH.  [facility] - made 5/2 by CD   [facility] - made 5/2 by CD   [facility] - made 5/2 by CD      Legal Status:  [facility] [place]  File Number: [id number]  Start and expiration date of commitment: 04/24/2025 - 10/24/2025    [facility] meds: Zyprexa, Haldol, Abilify, Risperdal    : [facility] (assigned  CM pending)    Contacts:   Haydee Mares (CADI CM): [phone]   Tamika Johnson- (sister): [phone]  Lucille Reddy (Intervention ): [phone]     Upcoming Meetings and Dates/Important Information and next steps:  Follow up on referrals  Schedule OP follow up  Notify medicine team of any impending discharge at least 2-3 days prior in order to have a safe management plan for her diabetes    PD and COS needed at discharge

## 2025-05-07 NOTE — PLAN OF CARE
Problem: Sleep Disturbance  Goal: Adequate Sleep/Rest  Outcome: Progressing   Goal Outcome Evaluation:  Patient had an uneventful night. No complaints of pain or requests for prn's during the shift. No behavioral issues. Briefly awake for bathroom use then went back to bed. Remained in own room throughout the night. Appears to have slept for 6.5 hours.

## 2025-05-07 NOTE — PLAN OF CARE
BEH IP Unit Acuity Rating Score (UARS)  Patient is given one point for every criteria they meet.    CRITERIA SCORING   On a 72 hour hold, court hold, committed, stay of commitment, or revocation. 1    Patient LOS on BEH unit exceeds 20 days. 1  LOS: 22   Patient under guardianship, 55+, otherwise medically complex, or under age 11. 1   Suicide ideation without relief of precipitating factors. 0   Current plan for suicide. 0   Current plan for homicide. 0   Imminent risk or actual attempt to seriously harm another without relief of factors precipitating the attempt. 0   Severe dysfunction in daily living (ex: complete neglect for self care, extreme disruption in vegetative function, extreme deterioration in social interactions). 1   Recent (last 7 days) or current physical aggression in the ED or on unit. 0   Restraints or seclusion episode in past 72 hours. 0   Recent (last 7 days) or current verbal aggression, agitation, yelling, etc., while in the ED or unit. 0   Active psychosis. 1   Need for constant or near constant redirection (from leaving, from others, etc).  0   Intrusive or disruptive behaviors. 0   Patient requires 3 or more hours of individualized nursing care per 8-hour shift (i.e. for ADLs, meds, therapeutic interventions). 0   TOTAL 4

## 2025-05-07 NOTE — CARE PLAN
"Care of patient from [id number]  At approximately 1645 writer had planned to do patient's second blood sugar check of the day, but patient reported she had just eaten a cookie. Patient stated \"I know I'm not supposed to, I just get so hungry\". Writer and patient made a plan that she would check her blood sugar before bed. Patient has been present in the milieu, watching TV. Full assessment delegated to oncoming nurse.  "

## 2025-05-07 NOTE — PROGRESS NOTES
"  ----------------------------------------------------------------------------------------------------------  [facility]  Psychiatry Progress Note  Hospital Day #22     Interim History:     The patient's care was discussed with the treatment team and chart notes were reviewed.    Identifier: Anastasiya Simon is a 59 year old female with a previous psychiatric diagnosis of schizophrenia and polysubstance use admitted from [facility] on 04/15/2025 due to concern for SI and psychosis in the context of psychosocial stressors including living situation and argument with family member.    Vitals:Temp: 98.3  F (36.8  C) Temp src: Tympanic BP: (!) 159/88 Pulse: 96     SpO2: 96 % O2 Device: None (Room air)   Height: 165.1 cm (5' 5\") Weight: 72.1 kg (159 lb)  Estimated body mass index is 26.46 kg/m  as calculated from the following:    Height as of this encounter: 1.651 m (5' 5\").    Weight as of this encounter: 72.1 kg (159 lb).  Sleep: 6.5 hours (05/07/25 0615)  Scheduled medications: Took all scheduled medications as prescribed.  Psychiatric PRN medications:   Last 24H PRN:     melatonin tablet 3 mg, 3 mg at 05/06/25 6944    Staff Report:   Pt appears to have slept for 6.75 hours. Pt did not complain of pain or discomfort, and no PRN medications were given.       Subjective:     Patient Interview:  Patient was interviewed in the milieu by medical student Laurel with supervision by Dr. Isidro. She states she is feeling tired. She states she has no pain when walking. Said she is tired from taking sleeping pills. Said her hunger is getting out of control. Would like sugar free chocolate and expressed interest in eating vegetables that would help with her diabetes. She also states she feels better without zyprexa. Feels very anxious to leave. She is happy about her art projects.     Objective:     Vitals:  BP (!) 159/88 (BP Location: Left arm, Patient Position: Sitting, " "Cuff Size: Adult Regular)   Pulse 96   Temp 98.3  F (36.8  C)   Resp 17   Ht 1.651 m (5' 5\")   Wt 72.1 kg (159 lb)   LMP 10/22/2013   SpO2 96%   BMI 26.46 kg/m      Allergies:  Allergies   Allergen Reactions    Tetracycline Unknown     It happened when pt was a baby       Current Medications:  Scheduled:  Current Facility-Administered Medications   Medication Dose Route Frequency Provider Last Rate Last Admin    ARIPiprazole (ABILIFY) tablet 15 mg  15 mg Oral Daily Lucy Chandler MD   15 mg at 05/07/25 0832    calcium carbonate (TUMS) chewable tablet 500 mg  500 mg Oral Daily PRN Tanja Gonzalez MD   500 mg at 05/05/25 2159    glucose gel 15-30 g  15-30 g Oral Q15 Min PRN Katina Armendariz MD        Or    dextrose 50 % injection 25-50 mL  25-50 mL Intravenous Q15 Min PRN Katina Armendariz MD        Or    glucagon injection 1 mg  1 mg Subcutaneous Q15 Min PRN Katina Armendariz MD        divalproex sodium extended-release (DEPAKOTE ER) 24 hr tablet 1,000 mg  1,000 mg Oral At Bedtime Tanja Gonzalez MD   1,000 mg at 05/06/25 2014    gabapentin (NEURONTIN) capsule 300 mg  300 mg Oral TID PRN Tanja Gonzalez MD   300 mg at 04/22/25 2017    glipiZIDE (GLUCOTROL XL) 24 hr tablet 10 mg  10 mg Oral Daily with breakfast Katina Meza PA   10 mg at 05/07/25 0833    hydrOXYzine HCl (ATARAX) tablet 25 mg  25 mg Oral Q4H PRN Lucy Chandler MD   25 mg at 04/29/25 0435    loperamide (IMODIUM) capsule 2 mg  2 mg Oral 4x Daily PRN Tanja Gonzalez MD   2 mg at 05/03/25 0037    magnesium sulfate (EPSOM SALT) granules 1 Tablespoonful  1 Tablespoonful Topical Daily PRN Tanja Gonzalez MD        melatonin tablet 3 mg  3 mg Oral At Bedtime PRN Katina Armendariz MD   3 mg at 05/06/25 2124    metFORMIN (GLUCOPHAGE) tablet 1,000 mg  1,000 mg Oral BID w/meals Charlotte Jasmine PA-C   1,000 mg at 05/07/25 0832    multivitamin RENAL (RENAVITE RX/NEPHROVITE) " tablet 1 tablet  1 tablet Oral Daily Lucy Chandler MD   1 tablet at 05/07/25 0832    OLANZapine (zyPREXA) tablet 10 mg  10 mg Oral TID PRN Lucy Chandler MD        Or    OLANZapine (zyPREXA) injection 10 mg  10 mg Intramuscular TID PRN Lucy Chandler MD        pantoprazole (PROTONIX) EC tablet 40 mg  40 mg Oral QAM AC Lucy Chandler MD   40 mg at 05/07/25 0833    tolterodine ER (DETROL LA) 24 hr capsule 2 mg  2 mg Oral Daily Tanja Gonzalez MD   2 mg at 05/07/25 0832     PRN:  Current Facility-Administered Medications   Medication Dose Route Frequency Provider Last Rate Last Admin    ARIPiprazole (ABILIFY) tablet 15 mg  15 mg Oral Daily Lucy Chandler MD   15 mg at 05/07/25 0832    calcium carbonate (TUMS) chewable tablet 500 mg  500 mg Oral Daily PRN Tanja Gonzalez MD   500 mg at 05/05/25 2159    glucose gel 15-30 g  15-30 g Oral Q15 Min PRN Katina Armendariz MD        Or    dextrose 50 % injection 25-50 mL  25-50 mL Intravenous Q15 Min PRN Katina Armendariz MD        Or    glucagon injection 1 mg  1 mg Subcutaneous Q15 Min PRN Ktaina Armendariz MD        divalproex sodium extended-release (DEPAKOTE ER) 24 hr tablet 1,000 mg  1,000 mg Oral At Bedtime Tanja Gonzalez MD   1,000 mg at 05/06/25 2014    gabapentin (NEURONTIN) capsule 300 mg  300 mg Oral TID PRN Tanja Gonzalez MD   300 mg at 04/22/25 2017    glipiZIDE (GLUCOTROL XL) 24 hr tablet 10 mg  10 mg Oral Daily with breakfast Katina Meza PA   10 mg at 05/07/25 0833    hydrOXYzine HCl (ATARAX) tablet 25 mg  25 mg Oral Q4H PRN Lucy Chandler MD   25 mg at 04/29/25 0435    loperamide (IMODIUM) capsule 2 mg  2 mg Oral 4x Daily PRN Tanja Gonzalez MD   2 mg at 05/03/25 0037    magnesium sulfate (EPSOM SALT) granules 1 Tablespoonful  1 Tablespoonful Topical Daily PRN Tanja Gonzalez MD        melatonin tablet 3 mg  3  mg Oral At Bedtime PRN Katina Armendariz MD   3 mg at 05/06/25 2124    metFORMIN (GLUCOPHAGE) tablet 1,000 mg  1,000 mg Oral BID w/meals Charlotte Jasmine PA-C   1,000 mg at 05/07/25 0832    multivitamin RENAL (RENAVITE RX/NEPHROVITE) tablet 1 tablet  1 tablet Oral Daily Lucy Chandler MD   1 tablet at 05/07/25 0832    OLANZapine (zyPREXA) tablet 10 mg  10 mg Oral TID PRN Lucy Chandler MD        Or    OLANZapine (zyPREXA) injection 10 mg  10 mg Intramuscular TID PRN Lucy Chandler MD        pantoprazole (PROTONIX) EC tablet 40 mg  40 mg Oral QAM AC Lucy Chandler MD   40 mg at 05/07/25 0833    tolterodine ER (DETROL LA) 24 hr capsule 2 mg  2 mg Oral Daily Tanja Gonzalez MD   2 mg at 05/07/25 0832     Labs and Imaging:  New results:   Recent Results (from the past 24 hours)   Glucose by meter    Collection Time: 05/06/25  5:15 PM   Result Value Ref Range    GLUCOSE BY METER POCT 239 (H) 70 - 99 mg/dL   Glucose by meter    Collection Time: 05/07/25  8:12 AM   Result Value Ref Range    GLUCOSE BY METER POCT 115 (H) 70 - 99 mg/dL     Data this admission:  CBC: WNL. WBC: 7.6, RBC: 4.37, Hb: 13.0, Platelets: 316.  CMP: Unremarkable (4/22) AST: 13, ALT: 10, Creatinine: 0.65.  UDS: Negative  RASHIDA: 0.00 (4/13)  HbA1c: 10.1%  TSH: 2.40  Vit: B12: 812  Folate: 13.7  Valproic Acid Free & Total: 67  Valproic Acid Free & Total STAT: 75.6  Phosphorus: 4.6 (H)  Magnesium: 1.6 (L)  BMP: unremarkable (4/28)    Imaging:  Bladder Scan: Completed on 4/22     Mental Status Exam:   Oriented to:  Grossly oriented.  General: Alert and awake. Pt was sitting down on chair in milieu.   Appearance:  Appears older than stated age. Grooming is unkempt. Wears glasses.   Behavior/Attitude:  Engaged with questioning.   Eye Contact: Brief periods of eye contact, but was also focused on stain on shirt.   Psychomotor: No evidence of tics, dystonia, or tardive  "dyskinesia. No catatonia present.  Speech: Appropriate rate/tone/volume.   Language: Fluent in English with appropriate syntax and vocabulary.  Mood:  \"tired\"  Affect:  Congruent with mood. Appropriate  Thought Process:  Circumstantial.   Thought Content:  Denies SI/SIB/AVH.  Associations:  questionable  Insight:  limited   Judgment:  limited   Impulse control: fair  Attention Span:  adequate for conversation  Concentration:  grossly intact  Recent and Remote Memory:  Not formally assessed. Pt reports memory issues. Staff notes forgetfulness.   Fund of Knowledge: Average.  Muscle Strength and Tone: Normal.  Gait and Station: narrow gait base, slow tandem walking     Psychiatric Assessment     Anastasiya Simon is a 59 year old female previously diagnosed with schizophrenia and polysubstance use disorder who presented with suicidal ideation, disorganization, and inability to care for self. Most recent psychiatric hospitalization was 03/20/2019 for disorganization and suicidal ideation. Significant symptoms on admission include increasing suicidal ideation, disorganization, erratic behavior such as filling buckets with ammonia and mixing with bleach, substance use, auditory hallucinations, and paranoia. The MSE on admission was pertinent for AVH, paranoia, limited insight into MH. Today on interview Anastasiya states she is feeling tired and expressed interest in eating vegetables to help control her diabetes. Psychological contributions to mental health presentation include maladaptive coping through substance use and limited insight into MH. Social factors contributing to mental health presentation include interpersonal conflicts and unstable living environment. Protective factors include support from two sisters and absence of previous suicide attempts.     Anastasiya was re-started on Zyprexa 20 mg and her Depakote dose was increased to 1000 mg to address the ongoing psychosis and mood symptoms. Tolterodine and PRN " loperamide were started to address incontinence and diarrhea. Due to T2DM neuropathy has been a concern for which we started gabapentin 300 mg as well. Due to concerns of metabolic side effects regarding underline diabetes it was decided to cross-titrate to Abilify from Zyprexa. Currently still adjusting doses considering Anastasiya's tolerability and Zyprexa was discontinued.     In summary, the patient's reported symptoms of SI, erractic behavior, AVH, and paranoia in the context of psychosocial stressers are consistent with decompensated schizophrenia and polysubstance use disorder.  She will likely benefit from medication optimization and outpatient MH, as well as referrals for placement that can give some structure considering her requirements, and working with her CM for future discharge plans. To further assist with this it was addressed the need to follow through with outpatient CDT which is something Anastasiya is in agreement.      Psychiatric Plan by Diagnosis      Today's changes:  - Discontinue Zyprexa 5mg    # Schizophrenia  Medications:  - depakote 1000 mg HS  - Abilify 15 mg daily in the morning    #Polysubstance Use Disorder  - CD consult once psychotic sx more stable, if pt amenable  - CD consulted on 5/1. CD acknowledged and will visit on 5/2.    2. Pertinent Labs/Monitoring:  - Labs done 04/13 at OSH. CBC wnl, glucose elevated to 332, BMP wnl.  - UDS negative on 04/13  - 4/16 HbA1c: 10.1  - Depakote Levels: 75.6    3. Additional Plans:  - Patient will be treated in therapeutic milieu with appropriate individual and group therapies as described.   - Consulted Internal Medicine for diabetes management.   - Referrals  [facility] - sent 4/28  Phone interview completed 5/5  [facility] - sent 5/6. Declined due to substance use.  [facility] - sent 5/6  [facility] - sent 5/6  [facility] - sent 4/29  [facility] - declined. Recommended pt attend CD tx/IRTS before being considered for a  referral.     IOP CD referrals:  [facility] - made 5/2 by CD . Denied 5/5 due to acuity of MH.  [facility] - made 5/2 by CD   [facility] - made 5/2 by CD   [facility] - made 5/2 by CD      Psychiatric Hospital Course:      Anastasiya Simon was admitted to [facility] on a 72 hour hold (started 4/13/25 21:35) from [facility] on 04/15/25.   Medications:  Continued Medications:  Pt seen by psychiatry consult at [facility] who started zyprexa 20 mg at bedtime and depakote 500 mg at bedtime. Both of these medications were continued on admission to [facility].   New Medications:  Started loperamide for diarrhea  Started tolterodine for urinary incontinence  Started gabapentin for peripheral neuropathy    4/16: Increased depakote from 500 mg to 1000 mg HS for mood regulation  4/16: Increased metformin from 500 mg to 1000 mg BID per medicine consult  4/16: Zyprexa: 5 mg in the morning and 15 mg HS  4/16: Started PRN Loperamide 2 mg 4x/day for diarrhea  4/16: Started Tolterodine 2 mg daily for urinary incontinence  4/18: Started Gabapentin 300 mg PRN 3x/day for peripheral neuropathy  4/20: Started glipizide 24 hr tablet 5 mg daily for better glycemic control per medicine consult  4/25: Increased glipizide XL from 5mg to 10mg per medicine consult.  4/28: Started Abilify 5 mg PO daily in the morning  4/30: Increased Abilify to 10 mg daily in the morning  4/30: Decreased olanzapine to only 15 mg HS  5/2: Increased Abilify to 15 mg daily in the morning  5/2: Decreased Zyprexa to 10 mg HS  5/5: Decrease Zyprexa to 5 mg HS  5/7: Discontinue Zyprexa 5mg    The risks, benefits, alternatives, and side effects were discussed and understood by the patient and other caregivers.     Medical Assessment and Plan     Medical diagnoses to be addressed this admission:      #Type 2 Diabetes Mellitis with Complications  - PTA metformin 1000 mg BID with meals  - BID glucose checks; hypoglycemia protocol  -  PRN gabapentin 300 mg 3x/day for peripheral neuropathy  - Insulin aspart (Novolog) injection 1-5 units HS  - Insulin aspart (Novolog) injection 3x/day 1-7 units before meals  - Glipizide 24 hr tablet 10 mg daily with breakfast for better glycemic control  - Internal Medicine Consult  - Nutrition Consult  - Moderate Consistent Carb Diet  - Given patient is starting insulin, please notify medicine team of any impending discharge at least 2-3 days prior in order to have a safe management plan for her diabetes.  - As of 5/1, Medicine will sign off at this time. OK to stop sliding scale insulin.  - Nutrition consult was placed to addressed current dietary needs at Anastasiya's request    #Diarrhea  - Loperamide 2 mg PRN 4x/day    #Urinary Incontinence  - Tolterodine 2 mg daily    Medical course:  Patient was physically examined by the ED prior to being transferred to the unit and was found to be medically stable and appropriate for admission.    Consults:   4/16: Internal Medicine consulted for management of diabetes and blood pressure.   4/16: Increased metformin to 1000 mg BID.   4/18: Started PRN gabapentin 300 mg 3x/day for peripheral neuropathy.   4/18: Internal Medicine started Novolog injection 1-5 units HS and Novolog injection 3x/day 1-7 units before meals for better glycemic control. Given patient is starting insulin, please notify medicine team of any impending discharge at least 2-3 days prior in order to have a safe management plan for her diabetes.  4/19: Internal Medicine increased to high intensity sliding scale. Novolog injection 1-7 units HS. Novolog injection 3x/day 1-10 units before meals for better glycemic control.   4/20: Started PO glipizide 24 hr tablet 5 mg daily with breakfast.   4/21: Started pt on a consistent carb diet.   4/22: Nutrition Consult for diabetes management.   4/24: Note from Medicine: Please send patient with glucometer on discharge. Diabetes education should be ordered prior to  discharge.  4/25: Increase Glipizide XL to 10 mg daily starting on 4/26. Continue Metformin 1000 mg BID. Decreased to moderate intensity sliding scale. Novolog injection 1-5 units HS. Novolog injection 3x/day 1-7 units before meals.. Hopefully can discontinue in coming days. Moderate consistent CHO diet.   4/27: Continues to require 7U of insulin and is not quite ready to wean off of her sliding scale insulin yet. Will continue to monitor and adjust medications with goal of having her wean off sliding scale insulin prior to discharge.  5/1: Currently the patient is medically stable and Medicine will sign off at this time. Recommendations relayed to primary team via this progress note. Please notify on-call VINCENZO if new questions or concerns arise. OK to stop sliding scale insulin. Continue BG checks BID. Continue glipizide 10 mg daily. Continue Metformin 1000 mg BID. Follow up with PCP for continued BG monitoring within 1-2 weeks of discharge.   5/1: Chemical Dependency consulted. Consult acknowledge and will see patient on 5/2.     Checklist     Legal Status: [facility] [place]  File Number: [id number]  Start and expiration date of commitment: 04/24/2025 - 10/24/2025  [facility] meds: Zyprexa, Haldol, Abilify, Risperdal    Safety Assessment:   Behavioral Orders   Procedures    Code 1 - Restrict to Unit    Code 3    Routine Programming     As clinically indicated    Status 15     Every 15 minutes.    Suicide precautions: Suicide Risk: MODERATE; Clinical rationale to override score: modification to the care environment, response to medication, lack of access to a plan for self-harm, Exhibiting Suicidal/self-harm behaviors or thoughts     Patients on Suicide Precautions should have a Combination Diet ordered that includes a Diet selection(s) AND a Behavioral Tray selection for Safe Tray - with utensils, or Safe Tray - NO utensils       Suicide Risk:   MODERATE     Clinical rationale to override  score::   modification to the care environment     Clinical rationale to override score::   response to medication     Clinical rationale to override score::   lack of access to a plan for self-harm     Clinical rationale to override score::   Exhibiting Suicidal/self-harm behaviors or thoughts     Risk Assessment:  Risk for harm is low to moderate.  Risk factors: maladaptive coping, substance use, impulsive, and past behaviors.  Protective factors: family.    SIO: None    Disposition: TBD. Disposition pending clinical stabilization, medication optimization and development of an appropriate discharge plan.     Attestations     Laurel Ledbetter, MS3  Medical Student    Resident/Fellow Attestation   I, Lucy Rosa MD, was present with the medical/VINCENZO student who participated in the service and in the documentation of the note.  I have verified the history and personally performed the physical exam and medical decision making.  I agree with the assessment and plan of care as documented in the note.      This patient was seen and discussed with my attending physician.  Lucy Plata MD  [facility] Psychiatry Resident  05/07/2025    Attestation:  This patient has been seen and evaluated by me, Jocelien Dela Cruz MD.  I have discussed this patient with the house staff team including the resident and/or medical student and I agree with the findings and plan in this note.    I have reviewed today's vital signs, medications, labs and imaging. Joceline Dela Cruz MD , PhD.

## 2025-05-07 NOTE — PROVIDER NOTIFICATION
05/07/25 1128   Individualization/Patient Specific Goals   Patient Personal Strengths resilient;resourceful;family/social support   Patient Vulnerabilities family/relationship conflict;housing insecurity;substance abuse/addiction   Interprofessional Rounds   Summary Discussed pt housing including referrals and barriers and overall progress   Participants nursing;CTC;psychiatrist;other (see comments);OT;pharmacy   Behavioral Team Discussion   Participants Dr. Dela Cruz; Dr. Dacosta; Rosa Mendoza RN; Rhoda Casarez [facility]; Gayle Kong OT; medical students; Pharmacy   Progress Pt is progressing   Anticipated length of stay 20+ days   Continued Stay Criteria/Rationale Symptom stabilization, medication management, care coordination   Medical/Physical See H&P   Precautions See below   Plan Psychiatric assessment/Medication management. Therapeutic Milieu. Individual care planning and after care planning. Patient to participate in unit groups and activities. Individual and group support on unit.   Safety Plan Completed by unit therapist   Anticipated Discharge Disposition IRTS;another healthcare facility     PRECAUTIONS AND SAFETY    Behavioral Orders   Procedures    Code 1 - Restrict to Unit    Code 3    Routine Programming     As clinically indicated    Status 15     Every 15 minutes.    Suicide precautions: Suicide Risk: MODERATE; Clinical rationale to override score: modification to the care environment, response to medication, lack of access to a plan for self-harm, Exhibiting Suicidal/self-harm behaviors or thoughts     Patients on Suicide Precautions should have a Combination Diet ordered that includes a Diet selection(s) AND a Behavioral Tray selection for Safe Tray - with utensils, or Safe Tray - NO utensils       Suicide Risk:   MODERATE     Clinical rationale to override score::   modification to the care environment     Clinical rationale to override score::   response to medication     Clinical  rationale to override score::   lack of access to a plan for self-harm     Clinical rationale to override score::   Exhibiting Suicidal/self-harm behaviors or thoughts       Safety  Safety WDL: WDL  Patient [place] [facility]  Observed Behavior: calm  Observed Behavior (Comment): Watching tv  Safety Measures: suicide assessment completed, safety rounds completed, environmental rounds completed, clinical history reviewed  Diversional Activity: television  Suicidality: Status 15

## 2025-05-07 NOTE — PLAN OF CARE
Problem: Psychotic Signs/Symptoms  Goal: Decreased Sensory Symptoms (Psychotic Signs/Symptoms)  Outcome: Progressing   Goal Outcome Evaluation:    Plan of Care Reviewed With: patient      Pt presents as calm. She is present in the milieu and is frequently seen in the lounge watching movies. She denies SI/SIB/HI/hallucinations. Pt is anxious at times. Pt was observed yelling into the phone at one point this evening. No other safety or behavioral concerns at this time. Pt takes all scheduled medications with no issues. Pt given prn melatonin at bedtime.     Pt blood glucose early evening was 239. Pt had an egg salad sandwich as midday snack an hour before blood sugar checked. Pt ate 100% of dinner and has good appetite. Pt requests diet pop and sugar free candy from locker.

## 2025-05-07 NOTE — PROGRESS NOTES
Rehab Group    Start time: 1015  End time: 1150  Patient time total: 55 minutes    came in and out of group session    #6 attended   Group Type: OT Clinic   Group Topic Covered: balanced lifestyle, coping skills, healthy leisure time, and social skills     Group Session Detail:  Pt actively participated in occupational therapy clinic to facilitate coping skill exploration, creative expression within personally meaningful activities, and clinical observation of social, cognitive, and kinesthetic performance skills.      Patient Response/Contribution:  cooperative with task, worked intermittently, disorganized, and nonsensical verbalizations     Patient Detail:  Pt arrived to group with disheveled appearance, presenting as disorganized intermittently. Pt required set up A to initiate, gather materials, sequence, and adjust to workspace demands as needed. Demonstrated limited focus for selected creative expression task. Pt observed to be more perseverative today on multiple topics (use of ammonia, parasites, the [place]), pt required redirection multiple times. Will continue to encourage attendance and participation.       [id number] OT Group (2 or more in attendance)      Patient Active Problem List   Diagnosis    Suicidal ideation    Type 2 diabetes mellitus (H)    Chronic hepatitis C (H)    Schizophrenia (H)    Acid reflux disease    Hoarding behavior

## 2025-05-07 NOTE — PROGRESS NOTES
Rehab Group    Start time: 1315  End time: 1405  Patient time total: 20 minutes    came in and out of group session    #4 attended   Group Type: occupational therapy   Group Topic Covered: coping skills and educational support.     Group Session Detail:  Mental health management group with a topic of goal setting in relation to discharge with focus on self care.      Patient Response/Contribution:  distracted , inattentive, and verbalizations were off topic     Patient Detail:  Pt arrived to group with blunted affect. Pt was oriented to group and verbalized understanding, although pt refused opportunities to engage in discussion of task at hand. Pt was dismissive of prompts to engage in group, setting goals for self. Pt remained in group room for some time, was observed to verbalize less than previous observations, passively participating in group. Pt then left group room, retuning once, asking to work on unrelated activity, refusing opportunity to engage in presented group. Will continue to encourage attendance and participation.       [id number] OT Group (2 or more in attendance)      Patient Active Problem List   Diagnosis    Suicidal ideation    Type 2 diabetes mellitus (H)    Chronic hepatitis C (H)    Schizophrenia (H)    Acid reflux disease    Hoarding behavior

## 2025-05-07 NOTE — PLAN OF CARE
Problem: Adult Behavioral Health Plan of Care  Goal: Adheres to Safety Considerations for Self and Others  Outcome: Progressing     Problem: Suicide Risk  Goal: Absence of Self-Harm  Outcome: Progressing   Goal Outcome Evaluation:       Patient present in the milieu, Patient very unkempt, irritable, disorganized, some nonsensical speech. Impatient. Patient denies SI, HI, AVH. Disoriented to time by 2 days.

## 2025-05-08 LAB
GLUCOSE BLDC GLUCOMTR-MCNC: 108 MG/DL (ref 70–99)
GLUCOSE BLDC GLUCOMTR-MCNC: 139 MG/DL (ref 70–99)
GLUCOSE BLDC GLUCOMTR-MCNC: 177 MG/DL (ref 70–99)

## 2025-05-08 PROCEDURE — 250N000013 HC RX MED GY IP 250 OP 250 PS 637

## 2025-05-08 PROCEDURE — 97150 GROUP THERAPEUTIC PROCEDURES: CPT | Mod: GO

## 2025-05-08 PROCEDURE — 250N000013 HC RX MED GY IP 250 OP 250 PS 637: Performed by: PHYSICIAN ASSISTANT

## 2025-05-08 PROCEDURE — 124N000002 HC R&B MH UMMC

## 2025-05-08 RX ADMIN — METFORMIN HYDROCHLORIDE 1000 MG: 500 TABLET, FILM COATED ORAL at 08:10

## 2025-05-08 RX ADMIN — Medication 1 TABLET: at 08:11

## 2025-05-08 RX ADMIN — TOLTERODINE TARTRATE 2 MG: 2 CAPSULE, EXTENDED RELEASE ORAL at 08:11

## 2025-05-08 RX ADMIN — PANTOPRAZOLE SODIUM 40 MG: 40 TABLET, DELAYED RELEASE ORAL at 08:10

## 2025-05-08 RX ADMIN — LOPERAMIDE HYDROCHLORIDE 2 MG: 2 CAPSULE ORAL at 02:29

## 2025-05-08 RX ADMIN — GLIPIZIDE 10 MG: 2.5 TABLET, FILM COATED, EXTENDED RELEASE ORAL at 08:10

## 2025-05-08 RX ADMIN — METFORMIN HYDROCHLORIDE 1000 MG: 500 TABLET, FILM COATED ORAL at 18:23

## 2025-05-08 RX ADMIN — Medication 3 MG: at 22:23

## 2025-05-08 RX ADMIN — ARIPIPRAZOLE 15 MG: 15 TABLET ORAL at 08:11

## 2025-05-08 RX ADMIN — DIVALPROEX SODIUM 1000 MG: 500 TABLET, FILM COATED, EXTENDED RELEASE ORAL at 20:48

## 2025-05-08 ASSESSMENT — ACTIVITIES OF DAILY LIVING (ADL)
ADLS_ACUITY_SCORE: 47
DRESS: INDEPENDENT
ADLS_ACUITY_SCORE: 47
ORAL_HYGIENE: INDEPENDENT
ADLS_ACUITY_SCORE: 47
ADLS_ACUITY_SCORE: 47
HYGIENE/GROOMING: INDEPENDENT
ORAL_HYGIENE: INDEPENDENT
ADLS_ACUITY_SCORE: 47
ADLS_ACUITY_SCORE: 47
HYGIENE/GROOMING: INDEPENDENT
DRESS: STREET CLOTHES
ADLS_ACUITY_SCORE: 47
LAUNDRY: WITH SUPERVISION
ADLS_ACUITY_SCORE: 47
LAUNDRY: WITH SUPERVISION
ADLS_ACUITY_SCORE: 47

## 2025-05-08 NOTE — PROGRESS NOTES
"  Rehab Group    Start time: 1315  End time: 1400  Patient time total: 15 minutes    came in and out of group session    #4 attended   Group Type: occupational therapy   Group Topic Covered: activity therapy, problem solving, and social skills     Group Session Detail:  The focus of today's group was leisure exploration and engagement. Patient engaged in a therapeutic game to encourage new learning, problem solving, focus, socialization, and cognitive wellness. This game encouraged cognitive skill building, such as: initiation, planning, organization, sequencing, and attention.       Patient Response/Contribution:  engaged socially when prompted, required prompts or assistance to participate, inattentive, and stared out windows and did not engage     Patient Detail:  Pt arrived to group with blunted affect. Pt was oriented to group and verbalized understanding reporting she wanted to watch others do presented activity. Pt then sat on periphery of room and closed her eyes for a few minutes. With some gentle prompting pt agreed to sit at table with group, actively watching activity for a bit before getting up and getting a news paper. Pt requested a pen and paper to \"give him (peer) my info [place].\" Pt was informed that this is not allowed and became slightly agitated, calming quickly. Pt appeared disoriented this group, asking \"is today really Thursday\" after seeing visual cues in group room. Will continue to encourage attendance and participation.       [id number] OT Group (2 or more in attendance)      Patient Active Problem List   Diagnosis    Suicidal ideation    Type 2 diabetes mellitus (H)    Chronic hepatitis C (H)    Schizophrenia (H)    Acid reflux disease    Hoarding behavior       "

## 2025-05-08 NOTE — PLAN OF CARE
BEH IP Unit Acuity Rating Score (UARS)  Patient is given one point for every criteria they meet.    CRITERIA SCORING   On a 72 hour hold, court hold, committed, stay of commitment, or revocation. 1    Patient LOS on BEH unit exceeds 20 days. 1  LOS: 23   Patient under guardianship, 55+, otherwise medically complex, or under age 11. 1   Suicide ideation without relief of precipitating factors. 0   Current plan for suicide. 0   Current plan for homicide. 0   Imminent risk or actual attempt to seriously harm another without relief of factors precipitating the attempt. 0   Severe dysfunction in daily living (ex: complete neglect for self care, extreme disruption in vegetative function, extreme deterioration in social interactions). 1   Recent (last 7 days) or current physical aggression in the ED or on unit. 0   Restraints or seclusion episode in past 72 hours. 0   Recent (last 7 days) or current verbal aggression, agitation, yelling, etc., while in the ED or unit. 0   Active psychosis. 1   Need for constant or near constant redirection (from leaving, from others, etc).  0   Intrusive or disruptive behaviors. 0   Patient requires 3 or more hours of individualized nursing care per 8-hour shift (i.e. for ADLs, meds, therapeutic interventions). 0   TOTAL 4

## 2025-05-08 NOTE — PLAN OF CARE
"Team Note Due:  Wednesday    Assessment/Intervention/Current Symtoms and Care Coordination:  Chart review and met with team, discussed pt progress, symptomology, and response to treatment.  Discussed the discharge plan and any potential impediments to discharge.    [facility]ane confirmed 11am Zoom interview. Met with pt to provide update. Pt appeared excited for the interview and potential placement.     Pt and writer logged into Zoom meeting at 11am and waited 30 minutes for Carolyn to join. Writer reached out via email and phone asking if Carolyn intended on joining. Received a response that their internet was down and they needed to reschedule. Agreed on 2pm today.    Received call from pt's sister Tamika. Discussed overall progress and hopefully pending IRTS placement. Tamika shared she is worried about pt as she's been \"agitated during the last 2 visits.\" Tamika reported pt has been wanting to be given phone numbers to \"find out if she can sleep on people's couches\" and wants to call her old manager for the [facility] to see if they can take her back.    Discharge Plan or Goal:  IRTS vs [facility] vs [facility] w/ OP CD tx     Barriers to Discharge:  Patient requires further psychiatric stabilization due to current symptomology, medication management with changes subject to provider, coordination with outside supports, and aftercare planning. Pt is also under civil commitment.      Referral Status:  [facility] - sent 4/28  Phone interview completed 5/5  [facility] - sent 5/6. Declined due to substance use.  [facility] - sent 5/6  [facility] - sent 5/6  Interview completed on 5/8  [facility] - sent 4/29  [facility] - declined. Recommended pt attend CD tx/IRTS before being considered for a referral.    IOP CD referrals:  [facility] - made 5/2 by CD . Denied 5/5 due to acuity of MH.  [facility] - made 5/2 by CD   [facility] - made 5/2 by CD   [facility] - made 5/2 by CD    "   Legal Status:  MI Commitment and [facility]ell[facility]   [facility]: [place]  File Number: [id number]  Start and expiration date of commitment: 04/24/2025 - 10/24/2025    Castellanos meds: Zyprexa, Haldol, Abilify, Risperdal    : [facility] (assigned CM pending)    Contacts:   Haydee Mares (CADI CM): [phone]   Tamika Johnson- (sister): [phone]  Lucille Reddy (Intervention ): [phone]     Upcoming Meetings and Dates/Important Information and next steps:  Follow up on referrals  Schedule OP follow up  Notify medicine team of any impending discharge at least 2-3 days prior in order to have a safe management plan for her diabetes    PD and COS needed at discharge

## 2025-05-08 NOTE — PLAN OF CARE
Problem: Sleep Disturbance  Goal: Adequate Sleep/Rest  Outcome: Progressing    Pt appears to have slept for 6.5 hours. PRN Imodium was given for diarrhea, and was effective. Pt denies pain, anxiety, depression and SI/SIB. 15 minutes safety checks were in place. Staff will continue to offer support to pt.

## 2025-05-08 NOTE — PROGRESS NOTES
"  ----------------------------------------------------------------------------------------------------------  [facility]  Psychiatry Progress Note  Hospital Day #23     Interim History:     The patient's care was discussed with the treatment team and chart notes were reviewed.    Identifier: Anastasiya Simon is a 59 year old female with a previous psychiatric diagnosis of schizophrenia and polysubstance use admitted from [facility] on 04/15/2025 due to concern for SI and psychosis in the context of psychosocial stressors including living situation and argument with family member.    Vitals:Temp: 98.4  F (36.9  C) Temp src: Oral BP: (!) 150/88 Pulse: 90     SpO2: 97 % O2 Device: None (Room air)   Height: 165.1 cm (5' 5\") Weight: 72.1 kg (159 lb)  Estimated body mass index is 26.46 kg/m  as calculated from the following:    Height as of this encounter: 1.651 m (5' 5\").    Weight as of this encounter: 72.1 kg (159 lb).  Sleep: 6.5 hours (05/08/25 0638)  Scheduled medications: Took all scheduled medications as prescribed.  Psychiatric PRN medications:   Last 24H PRN:     loperamide (IMODIUM) capsule 2 mg, 2 mg at 05/08/25 0229    melatonin tablet 3 mg, 3 mg at 05/07/25 8553    Staff Report:   Pt appears to have slept for 6.5 hours. PRN Imodium was given for diarrhea and it was helpful.      Subjective:     Patient Interview:  Anastasiya was interviewed in the milieu and she was sitting in a chair. She reports that she feels like the pill she has been taking since being here has been \"making her feel weird\". She states that she did not feel that before. She states that it happened this morning and felt like \"extreme paranoia\". She feels like she is going to put a box in the wrong spot, but did not want to elaborate on this. When asked about A/V/H, she mentions she can see colors and her paranoia is associated with back pain. She mentions that she woke up about 4 times " "last night, but was able to sleep well either way. She also mentions that she drinks a lot of Pop and knows that caffeine keeps her awake too. She denies any other concerns.     Objective:     Vitals:  BP (!) 150/88 (BP Location: Left arm, Patient Position: Sitting, Cuff Size: Adult Regular)   Pulse 90   Temp 98.4  F (36.9  C) (Oral)   Resp 17   Ht 1.651 m (5' 5\")   Wt 72.1 kg (159 lb)   LMP 10/22/2013   SpO2 97%   BMI 26.46 kg/m      Allergies:  Allergies   Allergen Reactions    Tetracycline Unknown     It happened when pt was a baby       Current Medications:  Scheduled:  Current Facility-Administered Medications   Medication Dose Route Frequency Provider Last Rate Last Admin    ARIPiprazole (ABILIFY) tablet 15 mg  15 mg Oral Daily Lucy Chandler MD   15 mg at 05/08/25 0811    calcium carbonate (TUMS) chewable tablet 500 mg  500 mg Oral Daily PRN Tanja Gonzalez MD   500 mg at 05/05/25 2159    glucose gel 15-30 g  15-30 g Oral Q15 Min PRN Katina Armendariz MD        Or    dextrose 50 % injection 25-50 mL  25-50 mL Intravenous Q15 Min PRN Katina Armendariz MD        Or    glucagon injection 1 mg  1 mg Subcutaneous Q15 Min PRN Katina Armendariz MD        divalproex sodium extended-release (DEPAKOTE ER) 24 hr tablet 1,000 mg  1,000 mg Oral At Bedtime Tanja Gonzalez MD   1,000 mg at 05/07/25 2123    gabapentin (NEURONTIN) capsule 300 mg  300 mg Oral TID PRN Tanja Gonzalez MD   300 mg at 04/22/25 2017    glipiZIDE (GLUCOTROL XL) 24 hr tablet 10 mg  10 mg Oral Daily with breakfast Katina Meza PA   10 mg at 05/08/25 0810    hydrOXYzine HCl (ATARAX) tablet 25 mg  25 mg Oral Q4H PRN Lucy Chandler MD   25 mg at 04/29/25 0435    loperamide (IMODIUM) capsule 2 mg  2 mg Oral 4x Daily PRN Tanja Gonzalez MD   2 mg at 05/08/25 0229    magnesium sulfate (EPSOM SALT) granules 1 Tablespoonful  1 Tablespoonful Topical Daily PRN Tanja Gonzalez MD        " melatonin tablet 3 mg  3 mg Oral At Bedtime PRN Katina Armendariz MD   3 mg at 05/07/25 2123    metFORMIN (GLUCOPHAGE) tablet 1,000 mg  1,000 mg Oral BID w/meals Charlotte Jasmine PA-C   1,000 mg at 05/08/25 0810    multivitamin RENAL (RENAVITE RX/NEPHROVITE) tablet 1 tablet  1 tablet Oral Daily Lucy Chandler MD   1 tablet at 05/08/25 0811    OLANZapine (zyPREXA) tablet 10 mg  10 mg Oral TID PRN Lucy Chandler MD        Or    OLANZapine (zyPREXA) injection 10 mg  10 mg Intramuscular TID PRN Lucy Chandler MD        pantoprazole (PROTONIX) EC tablet 40 mg  40 mg Oral QAM AC Lucy Chandler MD   40 mg at 05/08/25 0810    tolterodine ER (DETROL LA) 24 hr capsule 2 mg  2 mg Oral Daily Tanja Gonzalez MD   2 mg at 05/08/25 0811     PRN:  Current Facility-Administered Medications   Medication Dose Route Frequency Provider Last Rate Last Admin    ARIPiprazole (ABILIFY) tablet 15 mg  15 mg Oral Daily Lucy Chandler MD   15 mg at 05/08/25 0811    calcium carbonate (TUMS) chewable tablet 500 mg  500 mg Oral Daily PRN Tanja Gonzalez MD   500 mg at 05/05/25 2159    glucose gel 15-30 g  15-30 g Oral Q15 Min PRN Katina Armendariz MD        Or    dextrose 50 % injection 25-50 mL  25-50 mL Intravenous Q15 Min PRN Katina Armendariz MD        Or    glucagon injection 1 mg  1 mg Subcutaneous Q15 Min PRN Katina Armendariz MD        divalproex sodium extended-release (DEPAKOTE ER) 24 hr tablet 1,000 mg  1,000 mg Oral At Bedtime Tanja Gonzalez MD   1,000 mg at 05/07/25 2123    gabapentin (NEURONTIN) capsule 300 mg  300 mg Oral TID PRN Tanja Gonzalez MD   300 mg at 04/22/25 2017    glipiZIDE (GLUCOTROL XL) 24 hr tablet 10 mg  10 mg Oral Daily with breakfast Katina Meza PA   10 mg at 05/08/25 0810    hydrOXYzine HCl (ATARAX) tablet 25 mg  25 mg Oral Q4H PRN Lucy Chandler MD    25 mg at 04/29/25 0435    loperamide (IMODIUM) capsule 2 mg  2 mg Oral 4x Daily PRN Tanja Gonzalez MD   2 mg at 05/08/25 0229    magnesium sulfate (EPSOM SALT) granules 1 Tablespoonful  1 Tablespoonful Topical Daily PRN Tanja Gonzalez MD        melatonin tablet 3 mg  3 mg Oral At Bedtime PRN Katina Armendariz MD   3 mg at 05/07/25 2123    metFORMIN (GLUCOPHAGE) tablet 1,000 mg  1,000 mg Oral BID w/meals Charlotte Jasmine PA-C   1,000 mg at 05/08/25 0810    multivitamin RENAL (RENAVITE RX/NEPHROVITE) tablet 1 tablet  1 tablet Oral Daily Lucy Chandler MD   1 tablet at 05/08/25 0811    OLANZapine (zyPREXA) tablet 10 mg  10 mg Oral TID PRN Lucy Chandler MD        Or    OLANZapine (zyPREXA) injection 10 mg  10 mg Intramuscular TID PRN Lucy Chandler MD        pantoprazole (PROTONIX) EC tablet 40 mg  40 mg Oral QAM AC Lucy Chandler MD   40 mg at 05/08/25 0810    tolterodine ER (DETROL LA) 24 hr capsule 2 mg  2 mg Oral Daily Tanja Gonzalez MD   2 mg at 05/08/25 0811     Labs and Imaging:  New results:   Recent Results (from the past 24 hours)   Glucose by meter    Collection Time: 05/08/25  8:04 AM   Result Value Ref Range    GLUCOSE BY METER POCT 108 (H) 70 - 99 mg/dL   Glucose by meter    Collection Time: 05/08/25 12:09 PM   Result Value Ref Range    GLUCOSE BY METER POCT 139 (H) 70 - 99 mg/dL     Data this admission:  CBC: WNL. WBC: 7.6, RBC: 4.37, Hb: 13.0, Platelets: 316.  CMP: Unremarkable (4/22) AST: 13, ALT: 10, Creatinine: 0.65.  UDS: Negative  RASHIDA: 0.00 (4/13)  HbA1c: 10.1%  TSH: 2.40  Vit: B12: 812  Folate: 13.7  Valproic Acid Free & Total: 67  Valproic Acid Free & Total STAT: 75.6  Phosphorus: 4.6 (H)  Magnesium: 1.6 (L)  BMP: unremarkable (4/28)    Imaging:  Bladder Scan: Completed on 4/22     Mental Status Exam:   Oriented to:  Grossly oriented.  General: Alert and awake. Pt was sitting down on chair in milieu.  "  Appearance:  Appears older than stated age. Grooming is unkempt. Wears glasses.   Behavior/Attitude:  Engaged with questioning.   Eye Contact: Brief periods of eye contact.   Psychomotor: No evidence of tics, dystonia, or tardive dyskinesia. No catatonia present.  Speech: Appropriate rate/tone/volume.   Language: Fluent in English with appropriate syntax and vocabulary.  Mood:  \"I feel weird\"  Affect:  Anxious.  Thought Process:  Circumstantial.   Thought Content: Delusions present.  Associations:  questionable  Insight:  limited   Judgment:  limited   Impulse control: fair  Attention Span:  adequate for conversation  Concentration:  grossly intact  Recent and Remote Memory:  Not formally assessed. Pt reports memory issues. Staff notes forgetfulness.   Fund of Knowledge: Average.  Muscle Strength and Tone: Normal.  Gait and Station: Normal.     Psychiatric Assessment     Anastasiya Simon is a 59 year old female previously diagnosed with schizophrenia and polysubstance use disorder who presented with suicidal ideation, disorganization, and inability to care for self. Most recent psychiatric hospitalization was 03/20/2019 for disorganization and suicidal ideation. Significant symptoms on admission include increasing suicidal ideation, disorganization, erratic behavior such as filling buckets with ammonia and mixing with bleach, substance use, auditory hallucinations, and paranoia. The MSE on admission was pertinent for AVH, paranoia, limited insight into MH. Today on interview Anastasiya states she is feeling tired and expressed interest in eating vegetables to help control her diabetes. Psychological contributions to mental health presentation include maladaptive coping through substance use and limited insight into MH. Social factors contributing to mental health presentation include interpersonal conflicts and unstable living environment. Protective factors include support from two sisters and absence of previous " suicide attempts.     Anastasiya was re-started on Zyprexa 20 mg and her Depakote dose was increased to 1000 mg to address the ongoing psychosis and mood symptoms. Tolterodine and PRN loperamide were started to address incontinence and diarrhea. Due to T2DM neuropathy has been a concern for which we started gabapentin 300 mg as well. Due to concerns of metabolic side effects regarding underline diabetes it was decided to cross-titrate to Abilify from Zyprexa. Currently still adjusting doses considering Anastasiya's tolerability and Zyprexa was discontinued yesterday on 5/7/25.     In summary, the patient's reported symptoms of SI, erractic behavior, AVH, and paranoia in the context of psychosocial stressers are consistent with decompensated schizophrenia and polysubstance use disorder.  She will likely benefit from outpatient MH, as well as referrals for placement that can give some structure considering her requirements, and working with her CM for future discharge plans. To further assist with this it was addressed the need to follow through with outpatient CDT which is something Anastasiya is in agreement.      Psychiatric Plan by Diagnosis      Today's changes:  - None     # Schizophrenia  Medications:  - depakote 1000 mg HS  - Abilify 15 mg daily in the morning    #Polysubstance Use Disorder  - CD consult once psychotic sx more stable, if pt amenable  - CD consulted on 5/1. CD acknowledged and will visit on 5/2.    2. Pertinent Labs/Monitoring:  - Labs done 04/13 at OSH. CBC wnl, glucose elevated to 332, BMP wnl.  - UDS negative on 04/13  - 4/16 HbA1c: 10.1  - Depakote Levels: 75.6    3. Additional Plans:  - Patient will be treated in therapeutic milieu with appropriate individual and group therapies as described.   - Consulted Internal Medicine for diabetes management.   - Referrals  [facility] - sent 4/28  Phone interview completed 5/5  [facility] - sent 5/6. Declined due to substance use.  [facility] -  sent 5/6  [facility] - sent 5/6  Interview scheduled on 5/8  [facility] - sent 4/29  [facility] - declined. Recommended pt attend CD tx/IRTS before being considered for a referral.     IOP CD referrals:  [facility] - made 5/2 by CD . Denied 5/5 due to acuity of MH.  [facility] - made 5/2 by CD   [facility] - made 5/2 by CD   [facility] - made 5/2 by CD      Psychiatric Hospital Course:      Anastasiya Simon was admitted to [facility] on a 72 hour hold (started 4/13/25 21:35) from [facility] on 04/15/25.   Medications:  Continued Medications:  Pt seen by psychiatry consult at [facility] who started zyprexa 20 mg at bedtime and depakote 500 mg at bedtime. Both of these medications were continued on admission to [facility].   New Medications:  Started loperamide for diarrhea  Started tolterodine for urinary incontinence  Started gabapentin for peripheral neuropathy    4/16: Increased depakote from 500 mg to 1000 mg HS for mood regulation  4/16: Increased metformin from 500 mg to 1000 mg BID per medicine consult  4/16: Zyprexa: 5 mg in the morning and 15 mg HS  4/16: Started PRN Loperamide 2 mg 4x/day for diarrhea  4/16: Started Tolterodine 2 mg daily for urinary incontinence  4/18: Started Gabapentin 300 mg PRN 3x/day for peripheral neuropathy  4/20: Started glipizide 24 hr tablet 5 mg daily for better glycemic control per medicine consult  4/25: Increased glipizide XL from 5mg to 10mg per medicine consult.  4/28: Started Abilify 5 mg PO daily in the morning  4/30: Increased Abilify to 10 mg daily in the morning  4/30: Decreased olanzapine to only 15 mg HS  5/2: Increased Abilify to 15 mg daily in the morning  5/2: Decreased Zyprexa to 10 mg HS  5/5: Decrease Zyprexa to 5 mg HS  5/7: Discontinue Zyprexa 5mg    The risks, benefits, alternatives, and side effects were discussed and understood by the patient and other caregivers.     Medical Assessment and Plan     Medical  diagnoses to be addressed this admission:      #Type 2 Diabetes Mellitis with Complications  - PTA metformin 1000 mg BID with meals  - BID glucose checks; hypoglycemia protocol  - PRN gabapentin 300 mg 3x/day for peripheral neuropathy  - Insulin aspart (Novolog) injection 1-5 units HS  - Insulin aspart (Novolog) injection 3x/day 1-7 units before meals  - Glipizide 24 hr tablet 10 mg daily with breakfast for better glycemic control  - Internal Medicine Consult  - Nutrition Consult  - Moderate Consistent Carb Diet  - Given patient is starting insulin, please notify medicine team of any impending discharge at least 2-3 days prior in order to have a safe management plan for her diabetes.  - As of 5/1, Medicine will sign off at this time. OK to stop sliding scale insulin.  - Nutrition consult was placed to addressed current dietary needs at Anastasiya's request    #Diarrhea  - Loperamide 2 mg PRN 4x/day    #Urinary Incontinence  - Tolterodine 2 mg daily    Medical course:  Patient was physically examined by the ED prior to being transferred to the unit and was found to be medically stable and appropriate for admission.    Consults:   4/16: Internal Medicine consulted for management of diabetes and blood pressure.   4/16: Increased metformin to 1000 mg BID.   4/18: Started PRN gabapentin 300 mg 3x/day for peripheral neuropathy.   4/18: Internal Medicine started Novolog injection 1-5 units HS and Novolog injection 3x/day 1-7 units before meals for better glycemic control. Given patient is starting insulin, please notify medicine team of any impending discharge at least 2-3 days prior in order to have a safe management plan for her diabetes.  4/19: Internal Medicine increased to high intensity sliding scale. Novolog injection 1-7 units HS. Novolog injection 3x/day 1-10 units before meals for better glycemic control.   4/20: Started PO glipizide 24 hr tablet 5 mg daily with breakfast.   4/21: Started pt on a consistent carb  diet.   4/22: Nutrition Consult for diabetes management.   4/24: Note from Medicine: Please send patient with glucometer on discharge. Diabetes education should be ordered prior to discharge.  4/25: Increase Glipizide XL to 10 mg daily starting on 4/26. Continue Metformin 1000 mg BID. Decreased to moderate intensity sliding scale. Novolog injection 1-5 units HS. Novolog injection 3x/day 1-7 units before meals.. Hopefully can discontinue in coming days. Moderate consistent CHO diet.   4/27: Continues to require 7U of insulin and is not quite ready to wean off of her sliding scale insulin yet. Will continue to monitor and adjust medications with goal of having her wean off sliding scale insulin prior to discharge.  5/1: Currently the patient is medically stable and Medicine will sign off at this time. Recommendations relayed to primary team via this progress note. Please notify on-call VINCENZO if new questions or concerns arise. OK to stop sliding scale insulin. Continue BG checks BID. Continue glipizide 10 mg daily. Continue Metformin 1000 mg BID. Follow up with PCP for continued BG monitoring within 1-2 weeks of discharge.   5/1: Chemical Dependency consulted. Consult acknowledge and will see patient on 5/2.     Checklist     Legal Status: MI Commitment and [facility]: [place]  File Number: [id number]  Start and expiration date of commitment: 04/24/2025 - 10/24/2025  [facility] meds: Zyprexa, Haldol, Abilify, Risperdal    Safety Assessment:   Behavioral Orders   Procedures    Code 1 - Restrict to Unit    Code 3    Routine Programming     As clinically indicated    Status 15     Every 15 minutes.    Suicide precautions: Suicide Risk: MODERATE; Clinical rationale to override score: modification to the care environment, response to medication, lack of access to a plan for self-harm, Exhibiting Suicidal/self-harm behaviors or thoughts     Patients on Suicide Precautions should have a Combination Diet ordered that  includes a Diet selection(s) AND a Behavioral Tray selection for Safe Tray - with utensils, or Safe Tray - NO utensils       Suicide Risk:   MODERATE     Clinical rationale to override score::   modification to the care environment     Clinical rationale to override score::   response to medication     Clinical rationale to override score::   lack of access to a plan for self-harm     Clinical rationale to override score::   Exhibiting Suicidal/self-harm behaviors or thoughts     Risk Assessment:  Risk for harm is low to moderate.  Risk factors: maladaptive coping, substance use, impulsive, and past behaviors.  Protective factors: family.    SIO: None    Disposition: TBD. Disposition pending clinical stabilization, medication optimization and development of an appropriate discharge plan.     Attestations     Laurel Ledbetter, MS3  Medical Student    Resident/Fellow Attestation   I, Lucy Rosa MD, was present with the medical/[facility] student who participated in the service and in the documentation of the note.  I have verified the history and personally performed the physical exam and medical decision making.  I agree with the assessment and plan of care as documented in the note.      This patient was seen and discussed with my attending physician.  Lucy Plata MD  [facility] Psychiatry Resident  05/08/2025    Attestation:  This patient has been seen and evaluated by me, Joceline Dela Cruz MD.  I have discussed this patient with the house staff team including the resident and/or medical student and I agree with the findings and plan in this note.    I have reviewed today's vital signs, medications, labs and imaging. Joceline Dela Cruz MD , PhD.

## 2025-05-08 NOTE — PLAN OF CARE
Goal Outcome Evaluation:    Problem: Psychotic Signs/Symptoms  Goal: Improved Behavioral Control (Psychotic Signs/Symptoms)  Outcome: Progressing  Pt was out in the milieu, minimally social with peers and staff. She was brief on approach, thought process disorganized. She denied pain or discomfort. Pt declined blood sugar saying that she already ate and prefers to skip it. She was medication compliant and she denied side effects. After 2100, pt reported of having diarrhea and requested was given imodium. She offered no other complaints. Will continue to monitor.

## 2025-05-08 NOTE — PROGRESS NOTES
Rehab Group    Start time: 2000  End time: 2045  Patient time total: 15 minutes    attended partial group    #4 attended   Group Type: recreation   Group Topic Covered: activity therapy, healthy leisure time, Physical activity, relaxation , and self-care       Group Session Detail:  Exercise and relaxation for self-care     Patient Response/Contribution:  cooperative with task, attentive, and actively engaged       Patient Detail:    Pt attended a structured Therapeutic Recreation group this evening. Pt actively participated in a guided chair exercise routine as a strategy to facilitate therapeutic exercise, calming, and stress management, as a healthy coping skill. Pt entered group late, but followed all the movements throughout the remainder of the routine. Pt verbalized feeling good at the end of group.       Activity Therapy Per 15 min ()      Patient Active Problem List   Diagnosis    Suicidal ideation    Type 2 diabetes mellitus (H)    Chronic hepatitis C (H)    Schizophrenia (H)    Acid reflux disease    Hoarding behavior

## 2025-05-08 NOTE — PLAN OF CARE
The pt appeared anxious, sad and tense, with a flat, blunted and restricted affect. Speech was rambling, and the pt remained preoccupied with discharge, expressing frustration over their prolonged hospitalization. However, receptive to discussion with the care team when promptly redirected. The pt denied any MH concerns, reiterated improvement, and expressed a desire to leave, radha for safety. The pt spent majority of the shift in the milieu, sitting in the lounge, watching TV and engaging with peers. No behavioral escalations or safety concerns were observed or reported. Pre-supper BS was 177, the pt maintained a good appetite and adequate fluid intake. The pt complied with med and care. Upon request, PRN Melatonin was administered to assist with sleep.      Problem: Adult Behavioral Health Plan of Care  Goal: Plan of Care Review  Outcome: Progressing  Flowsheets (Taken 5/8/2025 3835)  Plan of Care Reviewed With: patient  Overall Patient Progress: improving  Patient Agreement with Plan of Care: agrees   Goal Outcome Evaluation:    Plan of Care Reviewed With: patient Plan of Care Reviewed With: patient    Overall Patient Progress: improvingOverall Patient Progress: improving

## 2025-05-08 NOTE — PLAN OF CARE
"  Problem: Adult Behavioral Health Plan of Care  Goal: Plan of Care Review  Outcome: Progressing  Flowsheets  Taken 5/8/2025 1431  Plan of Care Reviewed With: patient  Taken 5/8/2025 0800  Patient Agreement with Plan of Care: agrees  Goal: Patient-Specific Goal (Individualization)  Description: You can add care plan individualizations to a care plan. Examples of Individualization might be:  \"Parent requests to be called daily at 9am for status\", \"I have a hard time hearing out of my right ear\", or \"Do not touch me to wake me up as it startlesme\".  Outcome: Progressing  Goal: Adheres to Safety Considerations for Self and Others  Outcome: Progressing  Intervention: Develop and Maintain Individualized Safety Plan  Recent Flowsheet Documentation  Taken 5/8/2025 0800 by Nadja Perez RN  Safety Measures:   suicide assessment completed   safety rounds completed   environmental rounds completed   clinical history reviewed  Goal: Absence of New-Onset Illness or Injury  Outcome: Progressing  Goal: Optimized Coping Skills in Response to Life Stressors  Outcome: Progressing  Intervention: Promote Effective Coping Strategies  Recent Flowsheet Documentation  Taken 5/8/2025 0800 by Nadja Perez RN  Supportive Measures: active listening utilized  Goal: Develops/Participates in Therapeutic [place] to Support Successful Transition  Outcome: Progressing  Intervention: Foster Therapeutic [place]  Recent Flowsheet Documentation  Taken 5/8/2025 0800 by Nadja Perez RN  Trust Relationship/Rapport:   care explained   choices provided   thoughts/feelings acknowledged       Goal Outcome Evaluation:    Plan of Care Reviewed With: patient     Patient's affect was flat and blunted. Her mood was calm. She denied pain. Denied SI/HI/AVH and contracted for safety. Patient was eating and hydrating adequately. BG before breakfast was 108. Patient requested BG check before lunch. It was 132. Patient had two separate " interviews with [facility] at 11 am and 2 pm respectively. Her  said she will get responses either in one or two days. Patient attended group x1 this shift. It went well. She was visible in the milieu sometimes. Siting and watching TV and interacting with select peers. There was no behavioral escalations or safety concerns noted or reported this shift. Will continue the current plan of care and notify the provider of any acute concerns.

## 2025-05-09 LAB
GLUCOSE BLDC GLUCOMTR-MCNC: 113 MG/DL (ref 70–99)
GLUCOSE BLDC GLUCOMTR-MCNC: 198 MG/DL (ref 70–99)
GLUCOSE BLDC GLUCOMTR-MCNC: 83 MG/DL (ref 70–99)
GLUCOSE BLDC GLUCOMTR-MCNC: 96 MG/DL (ref 70–99)

## 2025-05-09 PROCEDURE — 124N000002 HC R&B MH UMMC

## 2025-05-09 PROCEDURE — 250N000013 HC RX MED GY IP 250 OP 250 PS 637: Performed by: PHYSICIAN ASSISTANT

## 2025-05-09 PROCEDURE — 97150 GROUP THERAPEUTIC PROCEDURES: CPT | Mod: GO

## 2025-05-09 PROCEDURE — 250N000013 HC RX MED GY IP 250 OP 250 PS 637

## 2025-05-09 PROCEDURE — 99232 SBSQ HOSP IP/OBS MODERATE 35: CPT | Mod: GC | Performed by: STUDENT IN AN ORGANIZED HEALTH CARE EDUCATION/TRAINING PROGRAM

## 2025-05-09 RX ADMIN — DIVALPROEX SODIUM 1000 MG: 500 TABLET, FILM COATED, EXTENDED RELEASE ORAL at 20:46

## 2025-05-09 RX ADMIN — ARIPIPRAZOLE 15 MG: 15 TABLET ORAL at 08:14

## 2025-05-09 RX ADMIN — METFORMIN HYDROCHLORIDE 1000 MG: 500 TABLET, FILM COATED ORAL at 08:14

## 2025-05-09 RX ADMIN — GLIPIZIDE 10 MG: 2.5 TABLET, FILM COATED, EXTENDED RELEASE ORAL at 08:13

## 2025-05-09 RX ADMIN — Medication 1 TABLET: at 08:14

## 2025-05-09 RX ADMIN — PANTOPRAZOLE SODIUM 40 MG: 40 TABLET, DELAYED RELEASE ORAL at 08:14

## 2025-05-09 RX ADMIN — TOLTERODINE TARTRATE 2 MG: 2 CAPSULE, EXTENDED RELEASE ORAL at 08:14

## 2025-05-09 RX ADMIN — METFORMIN HYDROCHLORIDE 1000 MG: 500 TABLET, FILM COATED ORAL at 18:32

## 2025-05-09 ASSESSMENT — ACTIVITIES OF DAILY LIVING (ADL)
ADLS_ACUITY_SCORE: 47
HYGIENE/GROOMING: HANDWASHING;INDEPENDENT
ORAL_HYGIENE: INDEPENDENT
DRESS: INDEPENDENT
LAUNDRY: WITH SUPERVISION
ADLS_ACUITY_SCORE: 47
LAUNDRY: WITH SUPERVISION
ADLS_ACUITY_SCORE: 47
ADLS_ACUITY_SCORE: 47
DRESS: INDEPENDENT
ADLS_ACUITY_SCORE: 47
HYGIENE/GROOMING: INDEPENDENT
ORAL_HYGIENE: INDEPENDENT
ADLS_ACUITY_SCORE: 47

## 2025-05-09 NOTE — PROGRESS NOTES
Rehab Group    Start time: 1115  End time: 1200  Patient time total: 15 minutes    attended partial group    #7 attended   Group Type: occupational therapy   Group Topic Covered: coping skills and sensory intervention     Group Session Detail:  Discussion sensory group defining alerting vs calming senses, different coping skills that can be used in each, and highlighting importance of building a sensory toolbox.      Patient Response/Contribution:  distracted , withdrawn, and did not share thoughts verbally     Patient Detail:  Pt arrived to group with flat affect, sitting with group at table. Pt was not actively engaged in discussion, denied opportunities to contribute. Pt appeared tired this group, sitting in chair and not verbalizing, passively participating throughout. Will continue to encourage attendance and participation.          [id number] OT Group (2 or more in attendance)      Patient Active Problem List   Diagnosis    Suicidal ideation    Type 2 diabetes mellitus (H)    Chronic hepatitis C (H)    Schizophrenia (H)    Acid reflux disease    Hoarding behavior     1115

## 2025-05-09 NOTE — PROGRESS NOTES
Rehab Group    Start time: 1315  End time: 1455  Patient time total: 20 minutes    came in and out of group session    #5 attended   Group Type: OT Clinic   Group Topic Covered: balanced lifestyle, coping skills, healthy leisure time, and social skills     Group Session Detail:  Occupational therapy clinic to facilitate coping skill exploration, creative expression within personally meaningful activities, and clinical observation of social, cognitive, and kinesthetic performance skills     Patient Response/Contribution:  distracted , nonsensical verbalizations, and needed prompts to redirect     Patient Detail:  Pt arrived to group with flat affect, appearing distracted/disorganized throughout. Pt was unable to initiate, gather materials, sequence, and adjust to workspace demands as needed. Pt observed to look through provided creative expression options but was unable to select an activity, denying opportunities for assistance. Pt observed to come in and out of room without notice throughout. Pt was very talkative while present, perseverating on birds and lady gaga, often speaking over others, pt was receptive to gentle redirection. Will continue to encourage attendance and participation.         [id number] OT Group (2 or more in attendance)      Patient Active Problem List   Diagnosis    Suicidal ideation    Type 2 diabetes mellitus (H)    Chronic hepatitis C (H)    Schizophrenia (H)    Acid reflux disease    Hoarding behavior

## 2025-05-09 NOTE — PROGRESS NOTES
CLINICAL NUTRITION SERVICES - BRIEF NOTE    RECOMMENDATIONS FOR MDs/PROVIDERS TO ORDER:  None at this time    Registered Dietitian Interventions:  -Glucerna chocolate between meals  -Discontinue EE  -HB eggs with sliced bread + grapes/pineapple @ 2pm    Future/Additional Recommendations:  RD to sign off at this time, but will remain available by consult if new nutrition problem arises.       REASON FOR ASSESSMENT  Anastasiya Simon is a/an 59 year old female assessed by the dietitian for Provider Order - diet modifications consistent with her T2DM status. Patient request increased i tomatoes, spinach and corn and if she could get peanut butter and jam sandwiches.     Findings  RD met with pt in the hallway who was perseverating on concerns about another pt, writer reiterated multiple times we are unable to discuss other pt's care with her. Pt is agreeable to switching from Ensure to Glucerna, prefers chocolate flavor, and requested fruit and HB eggs for a snack. Pt has no other requests or concerns at this time. BG WDL per chart review.     INTERVENTIONS  Implementation  Medical food supplement therapy  Modify composition of meals/snacks     Follow up/Monitoring  RD to sign off at this time, but will remain available by consult if new nutrition problem arises.      Ayesha Funes MPH, RDN, LD  Behavioral Health Adult & Pediatric Dietitian  [facility] Clinical Dietitian [place], Teams, or Desk: [phone]  Weekend/Holiday Vocsu: Weekend Holiday Clinical Dietitian [Multi Site Groups]

## 2025-05-09 NOTE — PLAN OF CARE
"Team Note Due:  Wednesday    Assessment/Intervention/Current Symtoms and Care Coordination:  Chart review and met with team, discussed pt progress, symptomology, and response to treatment.  Discussed the discharge plan and any potential impediments to discharge.    Received update from Carolyn with [facility] expressing concern about pt being a good fit for the program. During the interview yesterday, pt expressed she is only seeking housing and is not agreeable to staying for a 90-day program. Carolyn also expressed concern with pt's lack of insight into her MH and reasoning for commitment. Writer expressed some understanding for concern but also indicated the importance of IRTS placement for continued support and services related to her MH given her current lack of insight. Also mentioned the possibility of [facility] at [facility] if she completes IRTS programming. Waiting on update if pt is accepted or declined.    Pt and writer checked in. Pt expressed frustration with being recommended to IRTS as she \"is here because of diabetes, I'm not retarded. My brother was retarded and spent a lot of time in places like these. I don't understand why I'm being incarcerated, I didn't scratch myself, I don't need to be here. I'm going to have to victor hugo you, I need a . I've been calling people asking if I can stay with them, I just need to leave here.\" Writer validated pt's frustrations with being here for a longer period of time but reminded her per her commitment, she needs a safe discharge plan and IRTS is recommended for continued services and support. Pt expressed disagreement and was becoming more agitated and upset. Writer disengaged from the conversation and encouraged her to reconsider her lack of willingness to move forward with IRTS.    Discharge Plan or Goal:  [facility] vs [facility]     Barriers to Discharge:  Patient requires further psychiatric stabilization due to current symptomology, medication management with " changes subject to provider, coordination with outside supports, and aftercare planning. Pt is also under civil commitment.      Referral Status:  [facility] - sent 4/28  Phone interview completed 5/5. Awaiting update  [facility] - sent 5/6. Declined due to substance use.  [facility] - sent 5/6  [facility] - sent 5/6  Interview completed on 5/8. Awaiting update  [facility] - sent 4/29  [facility] - declined. Recommended pt attend CD tx/IRTS before being considered for a referral.    IOP CD referrals:  [facility] - made 5/2 by CD . Denied 5/5 due to acuity of MH.  [facility] - made 5/2 by CD   [facility] - made 5/2 by CD   [facility] - made 5/2 by CD      Legal Status:  MI Commitment and [facility]   [facility]: [place]  File Number: [id number]  Start and expiration date of commitment: 04/24/2025 - 10/24/2025    [facility] meds: Zyprexa, Haldol, Abilify, Risperdal    : Mental Health Resources (assigned CM pending)    Contacts:   Haydee Mares (CADI CM): [phone]   Tamika Johnson- (sister): [phone]  Lucille Reddy (Intervention ): [phone]     Upcoming Meetings and Dates/Important Information and next steps:  Follow up on referrals  Schedule OP follow up  Notify medicine team of any impending discharge at least 2-3 days prior in order to have a safe management plan for her diabetes    PD and COS needed at discharge

## 2025-05-09 NOTE — PROGRESS NOTES
"  ----------------------------------------------------------------------------------------------------------  [facility]  Psychiatry Progress Note  Hospital Day #24     Interim History:     The patient's care was discussed with the treatment team and chart notes were reviewed.    Identifier: Anastasiya Simon is a 59 year old female with a previous psychiatric diagnosis of schizophrenia and polysubstance use admitted from [facility] on 04/15/2025 due to concern for SI and psychosis in the context of psychosocial stressors including living situation and argument with family member.    Vitals:Temp: 98  F (36.7  C) Temp src: Oral BP: 129/74 Pulse: 86   Resp: 16 SpO2: 96 % O2 Device: None (Room air)   Height: 165.1 cm (5' 5\") Weight: 72.1 kg (159 lb)  Estimated body mass index is 26.46 kg/m  as calculated from the following:    Height as of this encounter: 1.651 m (5' 5\").    Weight as of this encounter: 72.1 kg (159 lb).  Sleep: 6 hours (05/09/25 0600)  Scheduled medications: Took all scheduled medications as prescribed.  Psychiatric PRN medications:   Last 24H PRN:     melatonin tablet 3 mg, 3 mg at 05/08/25 2376    Staff Report:   Pt appears to have slept for 6.5 hours, but waking up frequently. No signs of discomfort or agitation. Upon request, PRN Melatonin was given for sleep.     Subjective:     Patient Interview:  Anastasiya was interviewed in the milieu. She mentions that she just waking up and wants to take a shower. She mentions to sleep not pretty good but she feels okay. She denies feeling paranoid also A/V/H. She reports to feel thirsty and does not nknow what to do about it. She mentions she is mad about how the IRTS lie to her about that she would not be able to get out before 3 months. She felt that people lie to her and se deserves to go out and not stay here wasting time. she feels she can go with anyone she knows. We addressed her concerns " "regarding placement and safety and she was understanding. Besides that we also talk about plans for changing Abilify to n MACKEY form but at this moment she mentions it is weird, she does not like injections and she had skin infections due to injectables. We decided to talk about this at another time.     Objective:     Vitals:  /74 (BP Location: Left arm, Patient Position: Sitting, Cuff Size: Adult Regular)   Pulse 86   Temp 98  F (36.7  C) (Oral)   Resp 16   Ht 1.651 m (5' 5\")   Wt 72.1 kg (159 lb)   LMP 10/22/2013   SpO2 96%   BMI 26.46 kg/m      Allergies:  Allergies   Allergen Reactions    Tetracycline Unknown     It happened when pt was a baby       Current Medications:  Scheduled:  Current Facility-Administered Medications   Medication Dose Route Frequency Provider Last Rate Last Admin    ARIPiprazole (ABILIFY) tablet 15 mg  15 mg Oral Daily Lucy Chandler MD   15 mg at 05/09/25 0814    calcium carbonate (TUMS) chewable tablet 500 mg  500 mg Oral Daily PRN Tanja Gonzalez MD   500 mg at 05/05/25 2159    glucose gel 15-30 g  15-30 g Oral Q15 Min PRN Katina Armendariz MD        Or    dextrose 50 % injection 25-50 mL  25-50 mL Intravenous Q15 Min PRN Katina Armendariz MD        Or    glucagon injection 1 mg  1 mg Subcutaneous Q15 Min PRN Katina Armendariz MD        divalproex sodium extended-release (DEPAKOTE ER) 24 hr tablet 1,000 mg  1,000 mg Oral At Bedtime Tanja Gonzalez MD   1,000 mg at 05/08/25 2048    gabapentin (NEURONTIN) capsule 300 mg  300 mg Oral TID PRN Tanja Gonzalez MD   300 mg at 04/22/25 2017    glipiZIDE (GLUCOTROL XL) 24 hr tablet 10 mg  10 mg Oral Daily with breakfast Katina Meza PA   10 mg at 05/09/25 0813    hydrOXYzine HCl (ATARAX) tablet 25 mg  25 mg Oral Q4H PRN Lucy Chandler MD   25 mg at 04/29/25 0435    loperamide (IMODIUM) capsule 2 mg  2 mg Oral 4x Daily PRN Tanja Gonzalez MD   2 mg at 05/08/25 0229 "    magnesium sulfate (EPSOM SALT) granules 1 Tablespoonful  1 Tablespoonful Topical Daily PRN Tanja Gonzalez MD        melatonin tablet 3 mg  3 mg Oral At Bedtime PRN Katina Armendariz MD   3 mg at 05/08/25 2223    metFORMIN (GLUCOPHAGE) tablet 1,000 mg  1,000 mg Oral BID w/meals Charlotte Jasmine PA-C   1,000 mg at 05/09/25 0814    multivitamin RENAL (RENAVITE RX/NEPHROVITE) tablet 1 tablet  1 tablet Oral Daily Lucy Chandler MD   1 tablet at 05/09/25 0814    OLANZapine (zyPREXA) tablet 10 mg  10 mg Oral TID PRN Lucy Chandler MD        Or    OLANZapine (zyPREXA) injection 10 mg  10 mg Intramuscular TID PRN Lucy Chandler MD        pantoprazole (PROTONIX) EC tablet 40 mg  40 mg Oral QAM AC Lucy Chandler MD   40 mg at 05/09/25 0814    tolterodine ER (DETROL LA) 24 hr capsule 2 mg  2 mg Oral Daily Tanja Gonzalez MD   2 mg at 05/09/25 0814     PRN:  Current Facility-Administered Medications   Medication Dose Route Frequency Provider Last Rate Last Admin    ARIPiprazole (ABILIFY) tablet 15 mg  15 mg Oral Daily Lucy Chandler MD   15 mg at 05/09/25 0814    calcium carbonate (TUMS) chewable tablet 500 mg  500 mg Oral Daily PRN Tanja Gonzalez MD   500 mg at 05/05/25 2159    glucose gel 15-30 g  15-30 g Oral Q15 Min PRN Katina Armendariz MD        Or    dextrose 50 % injection 25-50 mL  25-50 mL Intravenous Q15 Min PRN Katina Armendariz MD        Or    glucagon injection 1 mg  1 mg Subcutaneous Q15 Min PRN Katina Armendariz MD        divalproex sodium extended-release (DEPAKOTE ER) 24 hr tablet 1,000 mg  1,000 mg Oral At Bedtime Tanja Gonzalez MD   1,000 mg at 05/08/25 2048    gabapentin (NEURONTIN) capsule 300 mg  300 mg Oral TID PRN Tanja Gonzalez MD   300 mg at 04/22/25 2017    glipiZIDE (GLUCOTROL XL) 24 hr tablet 10 mg  10 mg Oral Daily with breakfast Katina Meza PA   10 mg  at 05/09/25 0813    hydrOXYzine HCl (ATARAX) tablet 25 mg  25 mg Oral Q4H PRN Lucy Chandler MD   25 mg at 04/29/25 0435    loperamide (IMODIUM) capsule 2 mg  2 mg Oral 4x Daily PRN Tanja Gonzalez MD   2 mg at 05/08/25 0229    magnesium sulfate (EPSOM SALT) granules 1 Tablespoonful  1 Tablespoonful Topical Daily PRN Tanja Gonzalez MD        melatonin tablet 3 mg  3 mg Oral At Bedtime PRN Katina Armendariz MD   3 mg at 05/08/25 2223    metFORMIN (GLUCOPHAGE) tablet 1,000 mg  1,000 mg Oral BID w/meals Charlotte Jasmine PA-C   1,000 mg at 05/09/25 0814    multivitamin RENAL (RENAVITE RX/NEPHROVITE) tablet 1 tablet  1 tablet Oral Daily Lucy Chandler MD   1 tablet at 05/09/25 0814    OLANZapine (zyPREXA) tablet 10 mg  10 mg Oral TID PRN Lucy Chandler MD        Or    OLANZapine (zyPREXA) injection 10 mg  10 mg Intramuscular TID PRN Lucy Chandler MD        pantoprazole (PROTONIX) EC tablet 40 mg  40 mg Oral QAM AC Lucy Chandler MD   40 mg at 05/09/25 0814    tolterodine ER (DETROL LA) 24 hr capsule 2 mg  2 mg Oral Daily Tanja Gonzalez MD   2 mg at 05/09/25 0814     Labs and Imaging:  New results:   Recent Results (from the past 24 hours)   Glucose by meter    Collection Time: 05/08/25  6:03 PM   Result Value Ref Range    GLUCOSE BY METER POCT 177 (H) 70 - 99 mg/dL   Glucose by meter    Collection Time: 05/09/25  4:32 AM   Result Value Ref Range    GLUCOSE BY METER POCT 96 70 - 99 mg/dL   Glucose by meter    Collection Time: 05/09/25  7:48 AM   Result Value Ref Range    GLUCOSE BY METER POCT 83 70 - 99 mg/dL   Glucose by meter    Collection Time: 05/09/25 12:04 PM   Result Value Ref Range    GLUCOSE BY METER POCT 113 (H) 70 - 99 mg/dL     Data this admission:  CBC: WNL. WBC: 7.6, RBC: 4.37, Hb: 13.0, Platelets: 316.  CMP: Unremarkable (4/22) AST: 13, ALT: 10, Creatinine: 0.65.  UDS: Negative  RASHIDA: 0.00  "(4/13)  HbA1c: 10.1%  TSH: 2.40  Vit: B12: 812  Folate: 13.7  Valproic Acid Free & Total: 67  Valproic Acid Free & Total STAT: 75.6  Phosphorus: 4.6 (H)  Magnesium: 1.6 (L)  BMP: unremarkable (4/28)    Imaging:  Bladder Scan: Completed on 4/22     Mental Status Exam:   Oriented to:  Grossly oriented.  General: Alert and awake. Pt was sitting down on chair in milieu.   Appearance:  Appears older than stated age. Grooming is unkempt. Wears glasses.   Behavior/Attitude:  Engaged with questioning.   Eye Contact: Brief periods of eye contact.   Psychomotor: No evidence of tics, dystonia, or tardive dyskinesia. No catatonia present.  Speech: Appropriate rate/tone/volume.   Language: Fluent in English with appropriate syntax and vocabulary.  Mood:  \"I feel frustrated\"  Affect:  Anxious.  Thought Process:  Circumstantial.   Thought Content: Delusions present.  Associations:  questionable  Insight:  limited   Judgment:  limited   Impulse control: fair  Attention Span:  adequate for conversation  Concentration:  grossly intact  Recent and Remote Memory:  Not formally assessed. Pt reports memory issues. Staff notes forgetfulness.   Fund of Knowledge: Average.  Muscle Strength and Tone: Normal.  Gait and Station: Normal.     Psychiatric Assessment     Anastasiya Simon is a 59 year old female previously diagnosed with schizophrenia and polysubstance use disorder who presented with suicidal ideation, disorganization, and inability to care for self. Most recent psychiatric hospitalization was 03/20/2019 for disorganization and suicidal ideation. Significant symptoms on admission include increasing suicidal ideation, disorganization, erratic behavior such as filling buckets with ammonia and mixing with bleach, substance use, auditory hallucinations, and paranoia. The MSE on admission was pertinent for AVH, paranoia, limited insight into MH. Today on interview Anastasiya states she is feeling tired and expressed interest in eating " vegetables to help control her diabetes. Psychological contributions to mental health presentation include maladaptive coping through substance use and limited insight into MH. Social factors contributing to mental health presentation include interpersonal conflicts and unstable living environment. Protective factors include support from two sisters and absence of previous suicide attempts.     Anastasiya was re-started on Zyprexa 20 mg and her Depakote dose was increased to 1000 mg to address the ongoing psychosis and mood symptoms. Tolterodine and PRN loperamide were started to address incontinence and diarrhea. Due to T2DM neuropathy has been a concern for which we started gabapentin 300 mg as well. Due to concerns of metabolic side effects regarding underline diabetes it was decided to cross-titrate to Abilify from Zyprexa. Currently still adjusting doses considering Anastasiya's tolerability and Zyprexa was discontinued yesterday on 5/7/25.     In summary, the patient's reported symptoms of SI, erractic behavior, AVH, and paranoia in the context of psychosocial stressers are consistent with decompensated schizophrenia and polysubstance use disorder.  She will likely benefit from outpatient MH, as well as referrals for placement that can give some structure considering her requirements, and working with her CM for future discharge plans. To further assist with this it was addressed the need to follow through with outpatient CDT which is something Anastasiya is in agreement.     Anastasiya would need further evaluation regarding use of Depakote and evaluate use as an adjuvant for schizophrenia considering metabolic side effect profile impacting his difficult to treat DM. Although currently Anastasiya is not open to change Abilify formulation to MACKEY we consider further approach later on.     Psychiatric Plan by Diagnosis      Today's changes:  - None     # Schizophrenia  Medications:  - depakote 1000 mg HS  - Abilify 15 mg daily  in the morning    #Polysubstance Use Disorder  - CD consult once psychotic sx more stable, if pt amenable  - CD consulted on 5/1. CD acknowledged and will visit on 5/2.    2. Pertinent Labs/Monitoring:  - Labs done 04/13 at OSH. CBC wnl, glucose elevated to 332, BMP wnl.  - UDS negative on 04/13  - 4/16 HbA1c: 10.1  - Depakote Levels: 75.6    3. Additional Plans:  - Patient will be treated in therapeutic milieu with appropriate individual and group therapies as described.   - Consulted Internal Medicine for diabetes management.   - Referrals  [facility] - sent 4/28  Phone interview completed 5/5  [facility] - sent 5/6. Declined due to substance use.  [facility] - sent 5/6  [facility] - sent 5/6  Interview scheduled on 5/8  [facility] - sent 4/29  [facility] - declined. Recommended pt attend CD tx/IRTS before being considered for a referral.     IOP CD referrals:  [facility] - made 5/2 by CD . Denied 5/5 due to acuity of MH.  [facility] - made 5/2 by CD   [facility] - made 5/2 by CD   [facility] - made 5/2 by CD      Psychiatric Hospital Course:      Anastasiya Simon was admitted to [facility] on a 72 hour hold (started 4/13/25 21:35) from [facility] ED on 04/15/25.   Medications:  Continued Medications:  Pt seen by psychiatry consult at [facility] who started zyprexa 20 mg at bedtime and depakote 500 mg at bedtime. Both of these medications were continued on admission to [facility].   New Medications:  Started loperamide for diarrhea  Started tolterodine for urinary incontinence  Started gabapentin for peripheral neuropathy    4/16: Increased depakote from 500 mg to 1000 mg HS for mood regulation  4/16: Increased metformin from 500 mg to 1000 mg BID per medicine consult  4/16: Zyprexa: 5 mg in the morning and 15 mg HS  4/16: Started PRN Loperamide 2 mg 4x/day for diarrhea  4/16: Started Tolterodine 2 mg daily for urinary incontinence  4/18: Started Gabapentin 300  mg PRN 3x/day for peripheral neuropathy  4/20: Started glipizide 24 hr tablet 5 mg daily for better glycemic control per medicine consult  4/25: Increased glipizide XL from 5mg to 10mg per medicine consult.  4/28: Started Abilify 5 mg PO daily in the morning  4/30: Increased Abilify to 10 mg daily in the morning  4/30: Decreased olanzapine to only 15 mg HS  5/2: Increased Abilify to 15 mg daily in the morning  5/2: Decreased Zyprexa to 10 mg HS  5/5: Decrease Zyprexa to 5 mg HS  5/7: Discontinue Zyprexa 5mg    The risks, benefits, alternatives, and side effects were discussed and understood by the patient and other caregivers.     Medical Assessment and Plan     Medical diagnoses to be addressed this admission:      #Type 2 Diabetes Mellitis with Complications  - PTA metformin 1000 mg BID with meals  - BID glucose checks; hypoglycemia protocol  - PRN gabapentin 300 mg 3x/day for peripheral neuropathy  - Insulin aspart (Novolog) injection 1-5 units HS  - Insulin aspart (Novolog) injection 3x/day 1-7 units before meals  - Glipizide 24 hr tablet 10 mg daily with breakfast for better glycemic control  - Internal Medicine Consult  - Nutrition Consult  - Moderate Consistent Carb Diet  - Given patient is starting insulin, please notify medicine team of any impending discharge at least 2-3 days prior in order to have a safe management plan for her diabetes.  - As of 5/1, Medicine will sign off at this time. OK to stop sliding scale insulin.  - Nutrition consult was placed to addressed current dietary needs at Anastasiya's request    #Diarrhea  - Loperamide 2 mg PRN 4x/day    #Urinary Incontinence  - Tolterodine 2 mg daily    Medical course:  Patient was physically examined by the ED prior to being transferred to the unit and was found to be medically stable and appropriate for admission.    Consults:   4/16: Internal Medicine consulted for management of diabetes and blood pressure.   4/16: Increased metformin to 1000 mg BID.    4/18: Started PRN gabapentin 300 mg 3x/day for peripheral neuropathy.   4/18: Internal Medicine started Novolog injection 1-5 units HS and Novolog injection 3x/day 1-7 units before meals for better glycemic control. Given patient is starting insulin, please notify medicine team of any impending discharge at least 2-3 days prior in order to have a safe management plan for her diabetes.  4/19: Internal Medicine increased to high intensity sliding scale. Novolog injection 1-7 units HS. Novolog injection 3x/day 1-10 units before meals for better glycemic control.   4/20: Started PO glipizide 24 hr tablet 5 mg daily with breakfast.   4/21: Started pt on a consistent carb diet.   4/22: Nutrition Consult for diabetes management.   4/24: Note from Medicine: Please send patient with glucometer on discharge. Diabetes education should be ordered prior to discharge.  4/25: Increase Glipizide XL to 10 mg daily starting on 4/26. Continue Metformin 1000 mg BID. Decreased to moderate intensity sliding scale. Novolog injection 1-5 units HS. Novolog injection 3x/day 1-7 units before meals.. Hopefully can discontinue in coming days. Moderate consistent CHO diet.   4/27: Continues to require 7U of insulin and is not quite ready to wean off of her sliding scale insulin yet. Will continue to monitor and adjust medications with goal of having her wean off sliding scale insulin prior to discharge.  5/1: Currently the patient is medically stable and Medicine will sign off at this time. Recommendations relayed to primary team via this progress note. Please notify on-call VINCENZO if new questions or concerns arise. OK to stop sliding scale insulin. Continue BG checks BID. Continue glipizide 10 mg daily. Continue Metformin 1000 mg BID. Follow up with PCP for continued BG monitoring within 1-2 weeks of discharge.   5/1: Chemical Dependency consulted. Consult acknowledge and will see patient on 5/2.     Checklist     Legal Status: [place] Commitment  and [facility]: [place]  File Number: [id number]  Start and expiration date of commitment: 04/24/2025 - 10/24/2025  [facility] meds: Zyprexa, Haldol, Abilify, Risperdal    Safety Assessment:   Behavioral Orders   Procedures    Code 1 - Restrict to Unit    Code 3    Routine Programming     As clinically indicated    Status 15     Every 15 minutes.    Suicide precautions: Suicide Risk: MODERATE; Clinical rationale to override score: modification to the care environment, response to medication, lack of access to a plan for self-harm, Exhibiting Suicidal/self-harm behaviors or thoughts     Patients on Suicide Precautions should have a Combination Diet ordered that includes a Diet selection(s) AND a Behavioral Tray selection for Safe Tray - with utensils, or Safe Tray - NO utensils       Suicide Risk:   MODERATE     Clinical rationale to override score::   modification to the care environment     Clinical rationale to override score::   response to medication     Clinical rationale to override score::   lack of access to a plan for self-harm     Clinical rationale to override score::   Exhibiting Suicidal/self-harm behaviors or thoughts     Risk Assessment:  Risk for harm is low to moderate.  Risk factors: maladaptive coping, substance use, impulsive, and past behaviors.  Protective factors: family.    SIO: None    Disposition: TBD. Disposition pending clinical stabilization, medication optimization and development of an appropriate discharge plan.     Attestations     Laurel Ledbetter, MS3  Medical Student    Resident/Fellow Attestation   I, Lucy Rosa MD, was present with the medical/VINCENZO student who participated in the service and in the documentation of the note.  I have verified the history and personally performed the physical exam and medical decision making.  I agree with the assessment and plan of care as documented in the note.      This patient was seen and discussed with my  attending physician.  Lucy Plata MD  N Psychiatry Resident  05/09/2025

## 2025-05-09 NOTE — PLAN OF CARE
BEH IP Unit Acuity Rating Score (UARS)  Patient is given one point for every criteria they meet.    CRITERIA SCORING   On a 72 hour hold, court hold, committed, stay of commitment, or revocation. 1    Patient LOS on BEH unit exceeds 20 days. 1  LOS: 24   Patient under guardianship, 55+, otherwise medically complex, or under age 11. 1   Suicide ideation without relief of precipitating factors. 0   Current plan for suicide. 0   Current plan for homicide. 0   Imminent risk or actual attempt to seriously harm another without relief of factors precipitating the attempt. 0   Severe dysfunction in daily living (ex: complete neglect for self care, extreme disruption in vegetative function, extreme deterioration in social interactions). 1   Recent (last 7 days) or current physical aggression in the ED or on unit. 0   Restraints or seclusion episode in past 72 hours. 0   Recent (last 7 days) or current verbal aggression, agitation, yelling, etc., while in the ED or unit. 0   Active psychosis. 1   Need for constant or near constant redirection (from leaving, from others, etc).  0   Intrusive or disruptive behaviors. 0   Patient requires 3 or more hours of individualized nursing care per 8-hour shift (i.e. for ADLs, meds, therapeutic interventions). 0   TOTAL 4

## 2025-05-09 NOTE — PLAN OF CARE
Problem: Sleep Disturbance  Goal: Adequate Sleep/Rest  5/9/2025 0702 by Patrick Munoz, RN  Outcome: Progressing        Patient slept approximately 6.5 hours during the shift with no reported or observed distress. Remained calm and behaviorally stable. Q15 safety checks completed with no concerns. No PRN medications required. Environment maintained as therapeutic and low-stimulus. Will continue to monitor and notify psychiatry team of any changes.

## 2025-05-09 NOTE — PLAN OF CARE
Problem: Sleep Disturbance  Goal: Adequate Sleep/Rest  Outcome: Progressing     Patient sleeps on and off, sleeping approximately 6 hours with no signs of discomfort or agitation. BG- 96 this shift. No behavioral concerns noted. Q15 safety checks completed without incident. Environment remained conducive to rest. Plan of care remains effective. Will continue to monitor and notify MD of any concerns.

## 2025-05-09 NOTE — CONSULTS
Consulted to run a test claim for Abilify Maintena.    Patient has pharmacy benefits through [employer]. Per insurance, the following are covered and preferred under the pharmacy benefits:     Abilify Maintena - $0       If patient is receiving this injection in-clinic, medication will need to be billed under medical benefits. Discharge pharmacy liaison is unable to verify coverage under the medical benefit. To verify coverage under medical benefits, patient, SW, RNCC, CM, or other member of care team may:    Contact Member Services department of patient's insurance, OR   Complete a cost of care estimate request by calling [phone] or via [email or web address]  Only valid to check benefits if receiving injection at an [facility] or Infusion Site  Turnaround time is about 1 to 3 business days for this estimate    When an outpatient order for this injection is placed in chart along with an appointment for administration at an [facility]/infusion center, coverage will be secured by the CAM and Infusion Finance teams.      Please feel free to contact me with any other test claims, prior authorizations, or insurance questions regarding outpatient medications.     Thanks!      Caitie Bautista CPhT  Discharge Pharmacy Liaison  [facility]charge [facility]  Pronouns: She/Her/Hers    Securely message with [employer], Epic Secure Chat, or [place] [employer]  Phone: [phone]  Fax: [phone]  [email or web address]

## 2025-05-09 NOTE — PLAN OF CARE
"  Problem: Adult Behavioral Health Plan of Care  Goal: Plan of Care Review  Outcome: Progressing  Flowsheets  Taken 5/9/2025 1432  Plan of Care Reviewed With: patient  Taken 5/9/2025 0800  Patient Agreement with Plan of Care: agrees  Goal: Patient-Specific Goal (Individualization)  Description: You can add care plan individualizations to a care plan. Examples of Individualization might be:  \"Parent requests to be called daily at 9am for status\", \"I have a hard time hearing out of my right ear\", or \"Do not touch me to wake me up as it startlesme\".  Outcome: Progressing  Goal: Adheres to Safety Considerations for Self and Others  Outcome: Progressing  Intervention: Develop and Maintain Individualized Safety Plan  Recent Flowsheet Documentation  Taken 5/9/2025 0800 by Nadja Perez RN  Safety Measures:   safety rounds completed   environmental rounds completed  Goal: Absence of New-Onset Illness or Injury  Outcome: Progressing  Goal: Optimized Coping Skills in Response to Life Stressors  Outcome: Progressing  Intervention: Promote Effective Coping Strategies  Recent Flowsheet Documentation  Taken 5/9/2025 0931 by Nadja Perez RN  Supportive Measures: active listening utilized  Taken 5/9/2025 0800 by Nadja Perez RN  Supportive Measures: active listening utilized  Goal: Develops/Participates in Therapeutic [place] to Support Successful Transition  Outcome: Progressing  Intervention: Foster Therapeutic [place]  Recent Flowsheet Documentation  Taken 5/9/2025 0800 by Nadja Perez RN  Trust Relationship/Rapport:   care explained   choices provided   emotional support provided   empathic listening provided   questions answered   reassurance provided   thoughts/feelings acknowledged         Goal Outcome Evaluation:    Plan of Care Reviewed With: patient     Patient's affect was flat and blunted. Her mood was calm. She denied pain. Denied SI/HI/AVH and contracted for safety. Patient was eating " and hydrating adequately. BG before breakfast was 83 and  BG check before lunch 113. She was visible in the milieu sometimes. Siting and watching TV and interacting with selected peers. There were no PRNs requested this shift There was no behavioral escalations or safety concerns noted or reported this shift. Will continue the current plan of care and notify the provider of any acute concerns.                       bed rails

## 2025-05-10 LAB
GLUCOSE BLDC GLUCOMTR-MCNC: 127 MG/DL (ref 70–99)
GLUCOSE BLDC GLUCOMTR-MCNC: 127 MG/DL (ref 70–99)
GLUCOSE BLDC GLUCOMTR-MCNC: 138 MG/DL (ref 70–99)
GLUCOSE BLDC GLUCOMTR-MCNC: 193 MG/DL (ref 70–99)
GLUCOSE BLDC GLUCOMTR-MCNC: 219 MG/DL (ref 70–99)

## 2025-05-10 PROCEDURE — 250N000013 HC RX MED GY IP 250 OP 250 PS 637

## 2025-05-10 PROCEDURE — 250N000013 HC RX MED GY IP 250 OP 250 PS 637: Performed by: PHYSICIAN ASSISTANT

## 2025-05-10 PROCEDURE — 124N000002 HC R&B MH UMMC

## 2025-05-10 RX ADMIN — GLIPIZIDE 10 MG: 2.5 TABLET, FILM COATED, EXTENDED RELEASE ORAL at 07:37

## 2025-05-10 RX ADMIN — PANTOPRAZOLE SODIUM 40 MG: 40 TABLET, DELAYED RELEASE ORAL at 07:37

## 2025-05-10 RX ADMIN — DIVALPROEX SODIUM 1000 MG: 500 TABLET, FILM COATED, EXTENDED RELEASE ORAL at 21:04

## 2025-05-10 RX ADMIN — METFORMIN HYDROCHLORIDE 1000 MG: 500 TABLET, FILM COATED ORAL at 07:37

## 2025-05-10 RX ADMIN — TOLTERODINE TARTRATE 2 MG: 2 CAPSULE, EXTENDED RELEASE ORAL at 07:37

## 2025-05-10 RX ADMIN — ARIPIPRAZOLE 15 MG: 15 TABLET ORAL at 07:37

## 2025-05-10 RX ADMIN — METFORMIN HYDROCHLORIDE 1000 MG: 500 TABLET, FILM COATED ORAL at 18:05

## 2025-05-10 RX ADMIN — Medication 1 TABLET: at 07:37

## 2025-05-10 RX ADMIN — LOPERAMIDE HYDROCHLORIDE 2 MG: 2 CAPSULE ORAL at 09:25

## 2025-05-10 ASSESSMENT — ACTIVITIES OF DAILY LIVING (ADL)
ADLS_ACUITY_SCORE: 47
HYGIENE/GROOMING: HANDWASHING;INDEPENDENT
ADLS_ACUITY_SCORE: 47
DRESS: SCRUBS (BEHAVIORAL HEALTH);INDEPENDENT
LAUNDRY: WITH SUPERVISION
ADLS_ACUITY_SCORE: 47
HYGIENE/GROOMING: HANDWASHING;INDEPENDENT
ADLS_ACUITY_SCORE: 47
DRESS: INDEPENDENT
ADLS_ACUITY_SCORE: 47
ORAL_HYGIENE: INDEPENDENT
ORAL_HYGIENE: INDEPENDENT
ADLS_ACUITY_SCORE: 47

## 2025-05-10 NOTE — PLAN OF CARE
Problem: Sleep Disturbance  Goal: Adequate Sleep/Rest  Outcome: Progressing   Patient sleeps on and off, sleeping approximately 6 hours with no signs of discomfort or agitation. BG- 138 this shift. Patient had a tea. No behavioral concerns noted. Q15 safety checks completed without incident. Environment remained conducive to rest. Plan of care remains effective. Will continue to monitor and notify MD of any concerns.

## 2025-05-10 NOTE — PLAN OF CARE
Problem: Adult Behavioral Health Plan of Care  Goal: Plan of Care Review  Outcome: Progressing  Flowsheets (Taken 5/9/2025 1620)  Patient Agreement with Plan of Care: agrees   Goal Outcome Evaluation:    Plan of Care Reviewed With: patient          Pt was intermittently visible in the milieu. She was observed socializing and interacting with selected peers. Affect was flat but pleasant upon approach. She was able to make needs known. Hygiene was poor. Intake was adequate. She was cooperative with assessment. She denied pain and all mental health psych symptoms. She contracted for safety. She was medication compliant. No prn given this shift. No other concern noted at this time. Will continue to monitor and will assist if need arise.

## 2025-05-10 NOTE — PLAN OF CARE
"Goal Outcome Evaluation:    Plan of Care Reviewed With: patient      Patient was visible in the milieu, observed sitting in the lounge, watching television, and minimally socializing with selective peers.Patient presented with a flat affect and maintained spontaneous eye opening.At 0729, patient s blood glucose was 127 mg/dL; at 1124, it was 219 mg/dL. Patient reported experiencing diarrhea and requested medication. At 0925, Imodium 2 mg oral capsule was administered as requested.Patient was cooperative with assessment, vital sign monitoring, and blood glucose checks.When asked, patient denied all mental health symptoms, including anxiety, depression, and auditory or visual hallucinations. She also denied suicidal ideation, self-injurious behavior, and homicidal ideation. She consumed 100% of both breakfast and lunch and maintained adequate hydration.            BP (!) 148/83 (BP Location: Right arm)   Pulse 82   Temp 97.2  F (36.2  C) (Oral)   Resp 18   Ht 1.651 m (5' 5\")   Wt 72.1 kg (159 lb)   LMP 10/22/2013   SpO2 95%   BMI 26.46 kg/m      "

## 2025-05-11 LAB
GLUCOSE BLDC GLUCOMTR-MCNC: 102 MG/DL (ref 70–99)
GLUCOSE BLDC GLUCOMTR-MCNC: 113 MG/DL (ref 70–99)
GLUCOSE BLDC GLUCOMTR-MCNC: 144 MG/DL (ref 70–99)
GLUCOSE BLDC GLUCOMTR-MCNC: 169 MG/DL (ref 70–99)

## 2025-05-11 PROCEDURE — 250N000013 HC RX MED GY IP 250 OP 250 PS 637

## 2025-05-11 PROCEDURE — 250N000013 HC RX MED GY IP 250 OP 250 PS 637: Performed by: PHYSICIAN ASSISTANT

## 2025-05-11 PROCEDURE — 124N000002 HC R&B MH UMMC

## 2025-05-11 RX ADMIN — GLIPIZIDE 10 MG: 2.5 TABLET, FILM COATED, EXTENDED RELEASE ORAL at 08:20

## 2025-05-11 RX ADMIN — HYDROXYZINE HYDROCHLORIDE 25 MG: 25 TABLET, FILM COATED ORAL at 21:16

## 2025-05-11 RX ADMIN — METFORMIN HYDROCHLORIDE 1000 MG: 500 TABLET, FILM COATED ORAL at 18:11

## 2025-05-11 RX ADMIN — Medication 1 TABLET: at 08:20

## 2025-05-11 RX ADMIN — Medication 3 MG: at 00:14

## 2025-05-11 RX ADMIN — PANTOPRAZOLE SODIUM 40 MG: 40 TABLET, DELAYED RELEASE ORAL at 08:20

## 2025-05-11 RX ADMIN — ARIPIPRAZOLE 15 MG: 15 TABLET ORAL at 08:20

## 2025-05-11 RX ADMIN — LOPERAMIDE HYDROCHLORIDE 2 MG: 2 CAPSULE ORAL at 00:11

## 2025-05-11 RX ADMIN — DIVALPROEX SODIUM 1000 MG: 500 TABLET, FILM COATED, EXTENDED RELEASE ORAL at 21:12

## 2025-05-11 RX ADMIN — HYDROXYZINE HYDROCHLORIDE 25 MG: 25 TABLET, FILM COATED ORAL at 00:14

## 2025-05-11 RX ADMIN — Medication 3 MG: at 21:16

## 2025-05-11 RX ADMIN — LOPERAMIDE HYDROCHLORIDE 2 MG: 2 CAPSULE ORAL at 21:43

## 2025-05-11 RX ADMIN — METFORMIN HYDROCHLORIDE 1000 MG: 500 TABLET, FILM COATED ORAL at 08:19

## 2025-05-11 RX ADMIN — TOLTERODINE TARTRATE 2 MG: 2 CAPSULE, EXTENDED RELEASE ORAL at 08:20

## 2025-05-11 ASSESSMENT — ACTIVITIES OF DAILY LIVING (ADL)
ADLS_ACUITY_SCORE: 47

## 2025-05-11 NOTE — PLAN OF CARE
Problem: Sleep Disturbance  Goal: Adequate Sleep/Rest  Outcome: Progressing    Patient sleeps on and off, sleeping approximately 3.5 hours with no signs of discomfort or agitation. Patient had diarrhea Imodium and banana given, requested sleeping pill and antianxiety medication- Hydroxyzine and Melatonin given. BG- 113 this shift. No behavioral concerns noted. Q15 safety checks completed without incident. Environment remained conducive to rest. Plan of care remains effective. Will continue to monitor and notify MD of any concerns.

## 2025-05-11 NOTE — PLAN OF CARE
Problem: Adult Behavioral Health Plan of Care  Goal: Adheres to Safety Considerations for Self and Others  Outcome: Progressing  Flowsheets (Taken 5/11/2025 1705)  Adheres to Safety Considerations for Self and Others: making progress toward outcome     Problem: Diabetes  Goal: Optimal Coping  Outcome: Progressing   Goal Outcome Evaluation:  RN Assessment:  SI/Self harm:  Denied  Aggression/agitation/HI:  Denied  AVH:  Denied  Sleep:  Melatonin given  PRN Med: Compliant  Medication : Compliant  Physical Complaints/Issues:  I & O: eating and drinking well  LBM:   ADLs: independent  BG: bid per day.  Patient's most recent vital signs are:     Vital signs:  BP: 133/81  Temp: 97.5  HR: 79  RR: 16  SpO2: 97 %     Patient does not have new respiratory symptoms.  Patient does not have new sore throat.  Patient does not have a fever greater than 99.5.        COVID 19 Assessment:    Milieu Participation: Visible in the milieu watching TV  Behavior: Calm and cooperative  Pt presents with a flat and blunted affect.Denied MH symptoms. Contracts for safety.Pt denies having any safety concerns including thoughts of harming self or others. Pt denies auditory and visual hallucinations and takes his medications as prescribed. No medication side effects reported or observed. Denies any physical discomfort. Pt denies having any problem with eating or sleeping. PRN  Melatonin and Atarax given before going to bed .No acute concerns . Plan of care ongoing.

## 2025-05-11 NOTE — PLAN OF CARE
Problem: Adult Behavioral Health Plan of Care  Goal: Plan of Care Review  Outcome: Progressing  Flowsheets (Taken 5/10/2025 1929)  Patient Agreement with Plan of Care: agrees   Goal Outcome Evaluation:    Plan of Care Reviewed With: patient          Pt had a good day. She spent most of the shift in the lounge watching TV with selected peers. She was observed socializing and interacting with selected peers. Affect was flat but pleasant upon approach. She was able to make needs known. Hygiene was poor. Intake was adequate. She was cooperative with assessment. She denied pain and all mental health psych symptoms. She contracted for safety. She was medication compliant. No prn given this shift. Blood sugar was 127 at dinner. No other concern noted at this time. Will continue to monitor and will assist if need arise.

## 2025-05-11 NOTE — PLAN OF CARE
"  Problem: Adult Behavioral Health Plan of Care  Goal: Plan of Care Review  Outcome: Progressing  Flowsheets  Taken 5/11/2025 1245  Plan of Care Reviewed With: patient  Taken 5/11/2025 0900  Patient Agreement with Plan of Care: agrees  Goal: Patient-Specific Goal (Individualization)  Description: You can add care plan individualizations to a care plan. Examples of Individualization might be:  \"Parent requests to be called daily at 9am for status\", \"I have a hard time hearing out of my right ear\", or \"Do not touch me to wake me up as it startlesme\".  Outcome: Progressing  Goal: Adheres to Safety Considerations for Self and Others  Outcome: Progressing  Intervention: Develop and Maintain Individualized Safety Plan  Recent Flowsheet Documentation  Taken 5/11/2025 0900 by Nadja Perez RN  Safety Measures:   environmental rounds completed   safety rounds completed  Goal: Absence of New-Onset Illness or Injury  Outcome: Progressing  Goal: Optimized Coping Skills in Response to Life Stressors  Outcome: Progressing  Intervention: Promote Effective Coping Strategies  Recent Flowsheet Documentation  Taken 5/11/2025 0900 by Nadja Perez RN  Supportive Measures: active listening utilized  Taken 5/11/2025 0858 by Nadja Perez RN  Supportive Measures: active listening utilized  Goal: Develops/Participates in Therapeutic [place] to Support Successful Transition  Outcome: Progressing  Intervention: Foster Therapeutic [place]  Recent Flowsheet Documentation  Taken 5/11/2025 0900 by Nadja Perez RN  Trust Relationship/Rapport:   care explained   choices provided   emotional support provided   empathic listening provided   questions answered   questions encouraged   reassurance provided   thoughts/feelings acknowledged     Problem: Adult Behavioral Health Plan of Care  Goal: Plan of Care Review  Outcome: Progressing  Flowsheets  Taken 5/11/2025 1245  Plan of Care Reviewed With: patient  Taken " "5/11/2025 0900  Patient Agreement with Plan of Care: agrees  Goal: Patient-Specific Goal (Individualization)  Description: You can add care plan individualizations to a care plan. Examples of Individualization might be:  \"Parent requests to be called daily at 9am for status\", \"I have a hard time hearing out of my right ear\", or \"Do not touch me to wake me up as it startlesme\".  Outcome: Progressing  Goal: Adheres to Safety Considerations for Self and Others  Outcome: Progressing  Intervention: Develop and Maintain Individualized Safety Plan  Recent Flowsheet Documentation  Taken 5/11/2025 0900 by Nadja Perez RN  Safety Measures:   environmental rounds completed   safety rounds completed  Goal: Absence of New-Onset Illness or Injury  Outcome: Progressing  Goal: Optimized Coping Skills in Response to Life Stressors  Outcome: Progressing  Intervention: Promote Effective Coping Strategies  Recent Flowsheet Documentation  Taken 5/11/2025 0900 by Nadja Perez RN  Supportive Measures: active listening utilized  Taken 5/11/2025 0858 by Nadja Perez RN  Supportive Measures: active listening utilized  Goal: Develops/Participates in Therapeutic [place] to Support Successful Transition  Outcome: Progressing  Intervention: Foster Therapeutic [place]  Recent Flowsheet Documentation  Taken 5/11/2025 0900 by Nadja Perez RN  Trust Relationship/Rapport:   care explained   choices provided   emotional support provided   empathic listening provided   questions answered   questions encouraged   reassurance provided   thoughts/feelings acknowledged     Problem: Adult Behavioral Health Plan of Care  Goal: Plan of Care Review  Outcome: Progressing  Flowsheets  Taken 5/11/2025 1245  Plan of Care Reviewed With: patient  Taken 5/11/2025 0900  Patient Agreement with Plan of Care: agrees  Goal: Patient-Specific Goal (Individualization)  Description: You can add care plan individualizations to a care plan. " "Examples of Individualization might be:  \"Parent requests to be called daily at 9am for status\", \"I have a hard time hearing out of my right ear\", or \"Do not touch me to wake me up as it startlesme\".  Outcome: Progressing  Goal: Adheres to Safety Considerations for Self and Others  Outcome: Progressing  Intervention: Develop and Maintain Individualized Safety Plan  Recent Flowsheet Documentation  Taken 5/11/2025 0900 by Nadja Perez RN  Safety Measures:   environmental rounds completed   safety rounds completed  Goal: Absence of New-Onset Illness or Injury  Outcome: Progressing  Goal: Optimized Coping Skills in Response to Life Stressors  Outcome: Progressing  Intervention: Promote Effective Coping Strategies  Recent Flowsheet Documentation  Taken 5/11/2025 0900 by Nadja Perez RN  Supportive Measures: active listening utilized  Taken 5/11/2025 0858 by Nadja Perez RN  Supportive Measures: active listening utilized  Goal: Develops/Participates in Therapeutic [place] to Support Successful Transition  Outcome: Progressing  Intervention: Foster Therapeutic [place]  Recent Flowsheet Documentation  Taken 5/11/2025 0900 by Nadja Perez RN  Trust Relationship/Rapport:   care explained   choices provided   emotional support provided   empathic listening provided   questions answered   questions encouraged   reassurance provided   thoughts/feelings acknowledged       Goal Outcome Evaluation:    Plan of Care Reviewed With: patient     Patient's affect was flat and blunted. Her mood was calm. She denied pain. Denied SI/HI/AVH and contracted for safety. Patient was eating and hydrating adequately. BG before breakfast was 102. She was informed that her BG check is only 2 times per day. In the afternoon, patient was complaining to another staff that keeping her here in the hospital is a waste of her time. She said she would like to be discharged. Patient was re-directed by the staff.  She was " intermittently visible in the milieu sitting, watching TV and interacting with selected peers. Patient had visitors and the visitation went well.  There were no PRNs requested this shift There was no behavioral escalations or safety concerns noted or reported this shift. Will continue the current plan of care and notify the provider of any acute concerns.

## 2025-05-12 LAB
GLUCOSE BLDC GLUCOMTR-MCNC: 139 MG/DL (ref 70–99)
GLUCOSE BLDC GLUCOMTR-MCNC: 141 MG/DL (ref 70–99)
GLUCOSE BLDC GLUCOMTR-MCNC: 72 MG/DL (ref 70–99)

## 2025-05-12 PROCEDURE — 250N000013 HC RX MED GY IP 250 OP 250 PS 637

## 2025-05-12 PROCEDURE — 99233 SBSQ HOSP IP/OBS HIGH 50: CPT | Mod: GC | Performed by: PSYCHIATRY & NEUROLOGY

## 2025-05-12 PROCEDURE — 250N000013 HC RX MED GY IP 250 OP 250 PS 637: Performed by: PHYSICIAN ASSISTANT

## 2025-05-12 PROCEDURE — 97150 GROUP THERAPEUTIC PROCEDURES: CPT | Mod: GO

## 2025-05-12 PROCEDURE — 124N000002 HC R&B MH UMMC

## 2025-05-12 RX ADMIN — Medication 1 TABLET: at 08:13

## 2025-05-12 RX ADMIN — METFORMIN HYDROCHLORIDE 1000 MG: 500 TABLET, FILM COATED ORAL at 18:29

## 2025-05-12 RX ADMIN — ARIPIPRAZOLE 15 MG: 15 TABLET ORAL at 08:13

## 2025-05-12 RX ADMIN — LOPERAMIDE HYDROCHLORIDE 2 MG: 2 CAPSULE ORAL at 02:52

## 2025-05-12 RX ADMIN — GLIPIZIDE 10 MG: 2.5 TABLET, FILM COATED, EXTENDED RELEASE ORAL at 08:13

## 2025-05-12 RX ADMIN — DIVALPROEX SODIUM 1000 MG: 500 TABLET, FILM COATED, EXTENDED RELEASE ORAL at 20:38

## 2025-05-12 RX ADMIN — TOLTERODINE TARTRATE 2 MG: 2 CAPSULE, EXTENDED RELEASE ORAL at 08:13

## 2025-05-12 RX ADMIN — METFORMIN HYDROCHLORIDE 1000 MG: 500 TABLET, FILM COATED ORAL at 08:13

## 2025-05-12 RX ADMIN — PANTOPRAZOLE SODIUM 40 MG: 40 TABLET, DELAYED RELEASE ORAL at 08:13

## 2025-05-12 ASSESSMENT — ACTIVITIES OF DAILY LIVING (ADL)
ADLS_ACUITY_SCORE: 47
HYGIENE/GROOMING: HANDWASHING;SHOWER;INDEPENDENT
ADLS_ACUITY_SCORE: 47
DRESS: STREET CLOTHES;INDEPENDENT
ADLS_ACUITY_SCORE: 47
LAUNDRY: WITH SUPERVISION
ADLS_ACUITY_SCORE: 47
ADLS_ACUITY_SCORE: 47
HYGIENE/GROOMING: HANDWASHING;INDEPENDENT
ADLS_ACUITY_SCORE: 47
ORAL_HYGIENE: INDEPENDENT
ADLS_ACUITY_SCORE: 47
ORAL_HYGIENE: INDEPENDENT;PROMPTS

## 2025-05-12 NOTE — PROGRESS NOTES
"  Rehab Group    Start time: 1015  End time: 1200  Patient time total: 65 minutes    came in and out of group session    #5 attended   Group Type: OT Clinic   Group Topic Covered: balanced lifestyle, coping skills, healthy leisure time, and social skills     Group Session Detail:  Occupational therapy clinic to facilitate coping skill exploration, creative expression within personally meaningful activities, and clinical observation of social, cognitive, and kinesthetic performance skills     Patient Response/Contribution:  safe use of materials/supplies, left group on several occasions, required prompts or assistance to participate, distracted , and disorganized     Patient Detail:  Pt arrived to group with energetic affect, presenting with pressured speech, noted to be very talkative today, becoming perseverative at multiple points this group requiring redirection. Pt requested multiple creative expression tasks this group, although was unable to initiate participation. 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 Pt was redirected, observed having difficulty following boundaries set by writer. Pt observed to have poor insight into her situation throughout. Will continue to encourage attendance and participation.         [id number] OT Group (2 or more in attendance)      Patient Active Problem List   Diagnosis    Suicidal ideation    Type 2 diabetes mellitus (H)    Chronic hepatitis C (H)    Schizophrenia (H)    Acid reflux disease    Hoarding behavior       "

## 2025-05-12 NOTE — PROGRESS NOTES
"  ----------------------------------------------------------------------------------------------------------  [facility]  Psychiatry Progress Note  Hospital Day #27     Interim History:     The patient's care was discussed with the treatment team and chart notes were reviewed.    Identifier: Anastasiya Simon is a 59 year old female with a previous psychiatric diagnosis of schizophrenia and polysubstance use admitted from [facility] on 04/15/2025 due to concern for SI and psychosis in the context of psychosocial stressors including living situation and argument with family member.    Vitals:Temp: 98.2  F (36.8  C) Temp src: Temporal BP: 133/81 Pulse: 79   Resp: 16 SpO2: 96 % O2 Device: None (Room air)   Height: 165.1 cm (5' 5\") Weight: 72.3 kg (159 lb 4.8 oz)  Estimated body mass index is 26.51 kg/m  as calculated from the following:    Height as of this encounter: 1.651 m (5' 5\").    Weight as of this encounter: 72.3 kg (159 lb 4.8 oz).  Sleep: 7 hours (05/12/25 0700)  Scheduled medications: Took all scheduled medications as prescribed.  Psychiatric PRN medications:   Last 24H PRN:     hydrOXYzine HCl (ATARAX) tablet 25 mg, 25 mg at 05/11/25 2116    loperamide (IMODIUM) capsule 2 mg, 2 mg at 05/12/25 0252    melatonin tablet 3 mg, 3 mg at 05/11/25 2116    Staff Report:   Per nurse note, on Friday, (5/9/25), BG before breakfast was 83 and BG check before lunch 113.     On Saturday, (5/10/25):  At 0729, patient s blood glucose was 127 mg/dL; at 1124, it was 219 mg/dL. Patient reported experiencing diarrhea and requested medication. At 0925, Imodium 2 mg oral capsule was administered as requested. Blood sugar was 127 at dinner.    On Sunday,  (5/11/25),  Patient had diarrhea Imodium and banana given, requested sleeping pill and antianxiety medication- Hydroxyzine and Melatonin given.     BG before breakfast was 102. She was informed that her BG check is only 2 " "times per day. In the afternoon, patient was complaining to another staff that keeping her here in the hospital is a waste of her time. She said she would like to be discharged. Patient had visitors and the visitation went well.     PRN Melatonin and hydroxyzine given before going to bed.     5/12/25 - BG at 753AM was 139  Last night, Patient slept approximately 7 hours. PRN Imodium given.       Subjective:     Patient Interview:  Anastasiya was interviewed in the milieu. She immediately expressed frustration with still being in the hospital. 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 She does not understand why she is still here and states that being here is a waste of her time. She would like to talk to social work about when her next interview is with [facility]. She denies any other concerns. Decline to talk further with team.     Objective:     Vitals:  /81 (BP Location: Right arm, Patient Position: Sitting, Cuff Size: Adult Regular)   Pulse 79   Temp 98.2  F (36.8  C)   Resp 16   Ht 1.651 m (5' 5\")   Wt 72.3 kg (159 lb 4.8 oz)   LMP 10/22/2013   SpO2 96%   BMI 26.51 kg/m      Allergies:  Allergies   Allergen Reactions    Tetracycline Unknown     It happened when pt was a baby       Current Medications:  Scheduled:  Current Facility-Administered Medications   Medication Dose Route Frequency Provider Last Rate Last Admin    ARIPiprazole (ABILIFY) tablet 15 mg  15 mg Oral Daily Lucy Chandler MD   15 mg at 05/12/25 0813    calcium carbonate (TUMS) chewable tablet 500 mg  500 mg Oral Daily PRN Tanja Gonzalez MD   500 mg at 05/05/25 2159    glucose gel 15-30 g  15-30 g Oral Q15 Min PRN Katina Armendariz MD        Or    dextrose 50 % injection 25-50 mL  25-50 mL Intravenous Q15 Min PRKatina Knox MD        Or    glucagon injection 1 mg  1 mg Subcutaneous Q15 Min PRN Kaitna Armendariz MD        divalproex sodium extended-release (DEPAKOTE " ER) 24 hr tablet 1,000 mg  1,000 mg Oral At Bedtime Tanja Gonzalez MD   1,000 mg at 05/11/25 2112    gabapentin (NEURONTIN) capsule 300 mg  300 mg Oral TID PRN Tanja Gonzalez MD   300 mg at 04/22/25 2017    glipiZIDE (GLUCOTROL XL) 24 hr tablet 10 mg  10 mg Oral Daily with breakfast Katina Meza PA   10 mg at 05/12/25 0813    hydrOXYzine HCl (ATARAX) tablet 25 mg  25 mg Oral Q4H PRN Lucy Chandler MD   25 mg at 05/11/25 2116    loperamide (IMODIUM) capsule 2 mg  2 mg Oral 4x Daily PRN Tanja Gonzalez MD   2 mg at 05/12/25 0252    magnesium sulfate (EPSOM SALT) granules 1 Tablespoonful  1 Tablespoonful Topical Daily PRN Tanja Gonzalez MD        melatonin tablet 3 mg  3 mg Oral At Bedtime PRN Katina Armendariz MD   3 mg at 05/11/25 2116    metFORMIN (GLUCOPHAGE) tablet 1,000 mg  1,000 mg Oral BID w/meals Charlotte Jasmine PA-C   1,000 mg at 05/12/25 0813    multivitamin RENAL (RENAVITE RX/NEPHROVITE) tablet 1 tablet  1 tablet Oral Daily Lucy Chandler MD   1 tablet at 05/12/25 0813    OLANZapine (zyPREXA) tablet 10 mg  10 mg Oral TID PRN Lucy Chandler MD        Or    OLANZapine (zyPREXA) injection 10 mg  10 mg Intramuscular TID PRN Lucy Chandler MD        pantoprazole (PROTONIX) EC tablet 40 mg  40 mg Oral QAM AC Lucy Chandler MD   40 mg at 05/12/25 0813    tolterodine ER (DETROL LA) 24 hr capsule 2 mg  2 mg Oral Daily Tanja Gonzalez MD   2 mg at 05/12/25 0813     PRN:  Current Facility-Administered Medications   Medication Dose Route Frequency Provider Last Rate Last Admin    ARIPiprazole (ABILIFY) tablet 15 mg  15 mg Oral Daily Lucy Chandler MD   15 mg at 05/12/25 0813    calcium carbonate (TUMS) chewable tablet 500 mg  500 mg Oral Daily PRN Tanja Gonzalez MD   500 mg at 05/05/25 2159    glucose gel 15-30 g  15-30 g Oral Q15 Min PRN Katina Armendariz MD        Or     dextrose 50 % injection 25-50 mL  25-50 mL Intravenous Q15 Min PRN Katina Armendariz MD        Or    glucagon injection 1 mg  1 mg Subcutaneous Q15 Min PRN Katina Armendariz MD        divalproex sodium extended-release (DEPAKOTE ER) 24 hr tablet 1,000 mg  1,000 mg Oral At Bedtime Tanja Gonzalez MD   1,000 mg at 05/11/25 2112    gabapentin (NEURONTIN) capsule 300 mg  300 mg Oral TID PRN Tanja Gonzalez MD   300 mg at 04/22/25 2017    glipiZIDE (GLUCOTROL XL) 24 hr tablet 10 mg  10 mg Oral Daily with breakfast Katina Meza PA   10 mg at 05/12/25 0813    hydrOXYzine HCl (ATARAX) tablet 25 mg  25 mg Oral Q4H PRN Lucy Chandler MD   25 mg at 05/11/25 2116    loperamide (IMODIUM) capsule 2 mg  2 mg Oral 4x Daily PRN Tanja Gonzalez MD   2 mg at 05/12/25 0252    magnesium sulfate (EPSOM SALT) granules 1 Tablespoonful  1 Tablespoonful Topical Daily PRN Tanja Gonzalez MD        melatonin tablet 3 mg  3 mg Oral At Bedtime PRN Katina Armendariz MD   3 mg at 05/11/25 2116    metFORMIN (GLUCOPHAGE) tablet 1,000 mg  1,000 mg Oral BID w/meals Charlotte Jasmine PA-C   1,000 mg at 05/12/25 0813    multivitamin RENAL (RENAVITE RX/NEPHROVITE) tablet 1 tablet  1 tablet Oral Daily Lucy Chanlder MD   1 tablet at 05/12/25 0813    OLANZapine (zyPREXA) tablet 10 mg  10 mg Oral TID PRN Lucy Chandler MD        Or    OLANZapine (zyPREXA) injection 10 mg  10 mg Intramuscular TID PRN Lucy Chandler MD        pantoprazole (PROTONIX) EC tablet 40 mg  40 mg Oral QAM AC Lucy Chandler MD   40 mg at 05/12/25 0813    tolterodine ER (DETROL LA) 24 hr capsule 2 mg  2 mg Oral Daily Tanja Gonzalez MD   2 mg at 05/12/25 0813     Labs and Imaging:  New results:   Recent Results (from the past 24 hours)   Glucose by meter    Collection Time: 05/11/25  5:59 PM   Result Value Ref Range    GLUCOSE BY METER POCT 169 (H) 70 - 99  "mg/dL   Glucose by meter    Collection Time: 05/11/25  9:22 PM   Result Value Ref Range    GLUCOSE BY METER POCT 144 (H) 70 - 99 mg/dL   Glucose by meter    Collection Time: 05/12/25  2:28 AM   Result Value Ref Range    GLUCOSE BY METER POCT 72 70 - 99 mg/dL   Glucose by meter    Collection Time: 05/12/25  7:53 AM   Result Value Ref Range    GLUCOSE BY METER POCT 139 (H) 70 - 99 mg/dL     Data this admission:  CBC: WNL. WBC: 7.6, RBC: 4.37, Hb: 13.0, Platelets: 316.  CMP: Unremarkable (4/22) AST: 13, ALT: 10, Creatinine: 0.65.  UDS: Negative  RASHIDA: 0.00 (4/13)  HbA1c: 10.1%  TSH: 2.40  Vit: B12: 812  Folate: 13.7  Valproic Acid Free & Total: 67  Valproic Acid Free & Total STAT: 75.6  Phosphorus: 4.6 (H)  Magnesium: 1.6 (L)  BMP: unremarkable (4/28)    Imaging:  Bladder Scan: Completed on 4/22     Mental Status Exam:   Oriented to:  Grossly oriented.  General: Alert and awake. Pt was sitting down on chair in milieu.   Appearance:  Appears older than stated age. Grooming is unkempt. Wears glasses.    Behavior/Attitude:  Engaged with questioning.   Eye Contact: Brief periods of eye contact.   Psychomotor: No evidence of tics, dystonia, or tardive dyskinesia. No catatonia present.  Speech: Appropriate rate/tone/volume.   Language: Fluent in English with appropriate syntax and vocabulary.  Mood:  \"Frustrated\"  Affect:  Anxious and frustrated.   Thought Process:  Perseverant, ruminative.  Thought Content: No SI or HI present.  Associations:  questionable  Insight:  impaired unable to understand current condition  Judgment:  impaired, unable to take care for herself in ADLs  Impulse control: fair  Attention Span:  adequate for conversation  Concentration:  grossly intact  Recent and Remote Memory:  Not formally assessed. Pt reports memory issues. Staff notes forgetfulness.   Fund of Knowledge: Average.  Muscle Strength and Tone: Normal.  Gait and Station: Normal.     Psychiatric Assessment     Anastasiya Simon is a 59 year " old female previously diagnosed with schizophrenia and polysubstance use disorder who presented with suicidal ideation, disorganization, and inability to care for self. Most recent psychiatric hospitalization was 03/20/2019 for disorganization and suicidal ideation. Significant symptoms on admission include increasing suicidal ideation, disorganization, erratic behavior such as filling buckets with ammonia and mixing with bleach, substance use, auditory hallucinations, and paranoia. The MSE on admission was pertinent for AVH, paranoia, limited insight into MH. Psychological contributions to mental health presentation include maladaptive coping through substance use and limited insight into MH. Social factors contributing to mental health presentation include interpersonal conflicts and unstable living environment. Protective factors include support from two sisters and absence of previous suicide attempts.     Anastasiya was re-started on Zyprexa 20 mg and her Depakote dose was increased to 1000 mg to address the ongoing psychosis and mood symptoms. Tolterodine and PRN loperamide were started to address incontinence and diarrhea. Due to T2DM neuropathy has been a concern for which we started gabapentin 300 mg as well. Due to concerns of metabolic side effects regarding underline diabetes it was decided to cross-titrate to Abilify from Zyprexa. Zyprexa was discontinued on 5/7/25.      In summary, the patient's reported symptoms of SI, erractic behavior, AVH, and paranoia in the context of psychosocial stressers are consistent with decompensated schizophrenia and polysubstance use disorder.  She will likely benefit from outpatient MH, as well as referrals for placement that can give some structure considering her requirements, and working with her CM for future discharge plans. To further assist with this it was addressed the need to follow through with outpatient CDT which is something Anastasiya is in agreement.      Anastasiya would need further evaluation regarding use of Depakote and evaluate use as an adjuvant for schizophrenia considering metabolic side effect profile impacting her difficultly to treat DM. Abilify MACKEY was also considered to make medication management easier for Anastasiya, however, Anastasiya is not open to change Abilify formulation to MACKEY as she is concerned about risk of skin infection from injection. We will consider this approach later on.    Anastasiya's last interview with [place] [facility] was on 5/8/25 and per social work note, she might not be a good fit for the program as Anastasiya is only seeking housing and is not agreeable to stay for a 90-day program. She needs constant redirection as impaired insight does not make her aware of current discharge plans.     Psychiatric Plan by Diagnosis      Today's changes:  - None     # Schizophrenia  Medications:  - depakote 1000 mg HS  - Abilify 15 mg daily in the morning    #Polysubstance Use Disorder  - CD consult once psychotic sx more stable, if pt amenable  - CD consulted on 5/1. CD acknowledged and will visit on 5/2.    2. Pertinent Labs/Monitoring:  - Labs done 04/13 at OSH. CBC wnl, glucose elevated to 332, BMP wnl.  - UDS negative on 04/13  - 4/16 HbA1c: 10.1  - Depakote Levels: 75.6    3. Additional Plans:  - Patient will be treated in therapeutic milieu with appropriate individual and group therapies as described.   - Consulted Internal Medicine for diabetes management.   - Referrals  [facility] ([facility]) - left  5/12  [facility]: [phone]   [facility] - sent 4/28  Phone interview completed 5/5. Not accepted to [facility]. Reviewing for other sites as of 5/12.  [facility] - sent 5/6. Declined due to substance use.  [facility] - sent 5/6. Left  5/12  [facility] - sent 5/6  Interview completed on 5/8. Awaiting update  [facility] - sent 4/29  [facility] - declined. Recommended pt attend CD tx/IRTS before being considered for a  referral.     IOP CD referrals:  [facility] - made 5/2 by CD . Denied 5/5 due to acuity of MH.  [facility] - made 5/2 by CD   [facility] - made 5/2 by CD   [facility] - made 5/2 by CD      Psychiatric Hospital Course:      Anastasiya Simon was admitted to [facility] on a 72 hour hold (started 4/13/25 21:35) from [facility] on 04/15/25.   Medications:  Continued Medications:  Pt seen by psychiatry consult at [facility] who started zyprexa 20 mg at bedtime and depakote 500 mg at bedtime. Both of these medications were continued on admission to [facility].   New Medications:  Started loperamide for diarrhea  Started tolterodine for urinary incontinence  Started gabapentin for peripheral neuropathy    4/16: Increased depakote from 500 mg to 1000 mg HS for mood regulation  4/16: Increased metformin from 500 mg to 1000 mg BID per medicine consult  4/16: Zyprexa: 5 mg in the morning and 15 mg HS  4/16: Started PRN Loperamide 2 mg 4x/day for diarrhea  4/16: Started Tolterodine 2 mg daily for urinary incontinence  4/18: Started Gabapentin 300 mg PRN 3x/day for peripheral neuropathy  4/20: Started glipizide 24 hr tablet 5 mg daily for better glycemic control per medicine consult  4/25: Increased glipizide XL from 5mg to 10mg per medicine consult.  4/28: Started Abilify 5 mg PO daily in the morning  4/30: Increased Abilify to 10 mg daily in the morning  4/30: Decreased olanzapine to only 15 mg HS  5/2: Increased Abilify to 15 mg daily in the morning  5/2: Decreased Zyprexa to 10 mg HS  5/5: Decrease Zyprexa to 5 mg HS  5/7: Discontinue Zyprexa 5mg    The risks, benefits, alternatives, and side effects were discussed and understood by the patient and other caregivers.     Medical Assessment and Plan     Medical diagnoses to be addressed this admission:      #Type 2 Diabetes Mellitis with Complications  - PTA metformin 1000 mg BID with meals  - BID glucose checks; hypoglycemia protocol  -  PRN gabapentin 300 mg 3x/day for peripheral neuropathy  - Insulin aspart (Novolog) injection 1-5 units HS  - Insulin aspart (Novolog) injection 3x/day 1-7 units before meals  - Glipizide 24 hr tablet 10 mg daily with breakfast for better glycemic control  - Internal Medicine Consult  - Nutrition Consult  - Moderate Consistent Carb Diet  - Given patient is starting insulin, please notify medicine team of any impending discharge at least 2-3 days prior in order to have a safe management plan for her diabetes.  - As of 5/1, Medicine will sign off at this time. OK to stop sliding scale insulin.  - Nutrition consult was placed to addressed current dietary needs at Anastasiya's request    #Diarrhea  - Loperamide 2 mg PRN 4x/day    #Urinary Incontinence  - Tolterodine 2 mg daily    Medical course:  Patient was physically examined by the ED prior to being transferred to the unit and was found to be medically stable and appropriate for admission.    Consults:   4/16: Internal Medicine consulted for management of diabetes and blood pressure.   4/16: Increased metformin to 1000 mg BID.   4/18: Started PRN gabapentin 300 mg 3x/day for peripheral neuropathy.   4/18: Internal Medicine started Novolog injection 1-5 units HS and Novolog injection 3x/day 1-7 units before meals for better glycemic control. Given patient is starting insulin, please notify medicine team of any impending discharge at least 2-3 days prior in order to have a safe management plan for her diabetes.  4/19: Internal Medicine increased to high intensity sliding scale. Novolog injection 1-7 units HS. Novolog injection 3x/day 1-10 units before meals for better glycemic control.   4/20: Started PO glipizide 24 hr tablet 5 mg daily with breakfast.   4/21: Started pt on a consistent carb diet.   4/22: Nutrition Consult for diabetes management.   4/24: Note from Medicine: Please send patient with glucometer on discharge. Diabetes education should be ordered prior to  discharge.  4/25: Increase Glipizide XL to 10 mg daily starting on 4/26. Continue Metformin 1000 mg BID. Decreased to moderate intensity sliding scale. Novolog injection 1-5 units HS. Novolog injection 3x/day 1-7 units before meals.. Hopefully can discontinue in coming days. Moderate consistent CHO diet.   4/27: Continues to require 7U of insulin and is not quite ready to wean off of her sliding scale insulin yet. Will continue to monitor and adjust medications with goal of having her wean off sliding scale insulin prior to discharge.  5/1: Currently the patient is medically stable and Medicine will sign off at this time. Recommendations relayed to primary team via this progress note. Please notify on-call VINCENZO if new questions or concerns arise. OK to stop sliding scale insulin. Continue BG checks BID. Continue glipizide 10 mg daily. Continue Metformin 1000 mg BID. Follow up with PCP for continued BG monitoring within 1-2 weeks of discharge.   5/1: Chemical Dependency consulted. Consult acknowledge and will see patient on 5/2.     Checklist     Legal Status: Count includes the [facility] and [facility]: [place]  File Number: [id number]  Start and expiration date of commitment: 04/24/2025 - 10/24/2025  Castellanos meds: Zyprexa, Haldol, Abilify, Risperdal    Safety Assessment:   Behavioral Orders   Procedures    Code 1 - Restrict to Unit    Code 3    Routine Programming     As clinically indicated    Status 15     Every 15 minutes.    Suicide precautions: Suicide Risk: MODERATE; Clinical rationale to override score: modification to the care environment, response to medication, lack of access to a plan for self-harm, Exhibiting Suicidal/self-harm behaviors or thoughts     Patients on Suicide Precautions should have a Combination Diet ordered that includes a Diet selection(s) AND a Behavioral Tray selection for Safe Tray - with utensils, or Safe Tray - NO utensils       Suicide Risk:   MODERATE     Clinical rationale to override  score::   modification to the care environment     Clinical rationale to override score::   response to medication     Clinical rationale to override score::   lack of access to a plan for self-harm     Clinical rationale to override score::   Exhibiting Suicidal/self-harm behaviors or thoughts     Risk Assessment:  Risk for harm is low to moderate.  Risk factors: maladaptive coping, substance use, impulsive, and past behaviors.  Protective factors: family.    SIO: None    Disposition: TBD. Disposition pending clinical stabilization, medication optimization and development of an appropriate discharge plan.     Attestations     Laurel Ledbetter, MS3  Medical Student    Resident/Fellow Attestation   I, Lucy Rosa MD, was present with the medical/VINCENZO student who participated in the service and in the documentation of the note.  I have verified the history and personally performed the physical exam and medical decision making.  I agree with the assessment and plan of care as documented in the note.      This patient was seen and discussed with my attending physician.  Lucy Plata MD  [facility] Psychiatry Resident  05/12/2025

## 2025-05-12 NOTE — PLAN OF CARE
Team Note Due:  Wednesday    Assessment/Intervention/Current Symtoms and Care Coordination:  Chart review and met with team, discussed pt progress, symptomology, and response to treatment.  Discussed the discharge plan and any potential impediments to discharge.    Received update from [facility] pt was not accepted to [facility] but they will look at other sites for appropriateness.    Received vm from [facility] regarding referral. Returned vm.    Met with pt to check in. Pt updated on IRTS referrals. She requested we look into a home in [place] that she toured prior to being hospitalized. Pt was unsure the name of the home/facility, agreed to call her sister Tamika together. Pt and writer spoke with Tamika who clarified this was an assisted living but did not recall the name of it. Tamika plans to speak with SAMI LEVIN and will reach out with an update on the ability to consider this placement. Tamika followed up and confirmed the SHAYNE is with [facility] ([address] [place] [facility] [address]) and provided contact information for Cheryle ([phone]).    Writer reached out to Cheryle and left .    Discharge Plan or Goal:  IRTS vs [facility]    Discharge Date/ time: TBD  Transportation: TBD  Provisional Discharge: Yes[x]  No[]  AVS Completed: []   IP Tracker Form Completed: []     Barriers to Discharge:  Patient requires further psychiatric stabilization due to current symptomology, medication management with changes subject to provider, coordination with outside supports, and aftercare planning. Pt is also under civil commitment.      Referral Status:  [facility] ([facility]) - left  5/12  [place]: [phone]   [facility] - sent 4/28  Phone interview completed 5/5. Not accepted to [facility]. Reviewing for other sites as of 5/12.  [facility] - sent 5/6. Declined due to substance use.  [facility] - sent 5/6. Left  5/12  [facility] - sent 5/6  Interview completed on 5/8. Awaiting  update  [facility] - sent 4/29  [facility] - declined. Recommended pt attend CD tx/IRTS before being considered for a referral.    IOP CD referrals:  [facility] - made 5/2 by CD . Denied 5/5 due to acuity of MH.  [facility] - made 5/2 by CD   [facility] - made 5/2 by CD   [facility] - made 5/2 by CD      Legal Status:  MI Commitment and Castellanos   [facility]: [place]  File Number: [id number]  Start and expiration date of commitment: 04/24/2025 - 10/24/2025    Castellanos meds: Zyprexa, Haldol, Abilify, Risperdal    : Mental Health Resources (assigned CM pending)    Contacts:   Haydee Mares (CADI CM): [phone]   Tamkia Johnson- (sister): [phone]  Lucille Reddy (Intervention ): [phone]     Upcoming Meetings and Dates/Important Information and next steps:  Follow up on referrals  Schedule OP follow up  Notify medicine team of any impending discharge at least 2-3 days prior in order to have a safe management plan for her diabetes  Draft PD/COS  Send PD and discharge summary to CM  Send PD and COS to [facility]

## 2025-05-12 NOTE — PLAN OF CARE
BEH IP Unit Acuity Rating Score (UARS)  Patient is given one point for every criteria they meet.    CRITERIA SCORING   On a 72 hour hold, court hold, committed, stay of commitment, or revocation. 1    Patient LOS on BEH unit exceeds 20 days. 1  LOS: 27   Patient under guardianship, 55+, otherwise medically complex, or under age 11. 1   Suicide ideation without relief of precipitating factors. 0   Current plan for suicide. 0   Current plan for homicide. 0   Imminent risk or actual attempt to seriously harm another without relief of factors precipitating the attempt. 0   Severe dysfunction in daily living (ex: complete neglect for self care, extreme disruption in vegetative function, extreme deterioration in social interactions). 1   Recent (last 7 days) or current physical aggression in the ED or on unit. 0   Restraints or seclusion episode in past 72 hours. 0   Recent (last 7 days) or current verbal aggression, agitation, yelling, etc., while in the ED or unit. 0   Active psychosis. 1   Need for constant or near constant redirection (from leaving, from others, etc).  0   Intrusive or disruptive behaviors. 0   Patient requires 3 or more hours of individualized nursing care per 8-hour shift (i.e. for ADLs, meds, therapeutic interventions). 0   TOTAL 4

## 2025-05-12 NOTE — PLAN OF CARE
Problem: Sleep Disturbance  Goal: Adequate Sleep/Rest  Outcome: Progressing   Patient slept approximately 7 hours during the shift with no reported or observed distress. Remained calm and behaviorally stable. Q15 safety checks completed with no concerns. No PRN medications required. Environment maintained as therapeutic and low-stimulus. Patient had 1 loose BM- medicated with Imodium, effective. Patient denies abdominal pain when this writer asked. Will continue to monitor and notify psychiatry/medical team of any changes.

## 2025-05-12 NOTE — PLAN OF CARE
Goal Outcome Evaluation:    Plan of Care Reviewed With: patient Plan of Care Reviewed With: patient    Overall Patient Progress: improvingOverall Patient Progress: improving         Problem: Adult Behavioral Health Plan of Care  Goal: Plan of Care Review  Outcome: Progressing  Flowsheets  Taken 5/12/2025 1044  Plan of Care Reviewed With: patient  Overall Patient Progress: improving  Patient Agreement with Plan of Care: agrees  Taken 5/12/2025 1000  Patient Agreement with Plan of Care: agrees       Behavioral  Pt slept 7 hours overnight; eating and hydrating adequately. Compliant with medications. Attending to ADL's independently. Hygiene appears unkempt but pt took a shower on Saturday evening. No behavioral escalation or safety concerns noted this shift. Spend majority of the shift in the lounge, interact with select peers. Pt attended groups; denied SI, SIB, HI, and hallucinations; affect is flat and blunted; mood is labile; BG this morning was 137.     Medical  No complaints of physical pain/discomfort this shift. No further loose stools reported. Vital signs stable.       Discharge Plan or Goal:  [facility] vs [facility]

## 2025-05-13 LAB
GLUCOSE BLDC GLUCOMTR-MCNC: 185 MG/DL (ref 70–99)
GLUCOSE BLDC GLUCOMTR-MCNC: 83 MG/DL (ref 70–99)
GLUCOSE BLDC GLUCOMTR-MCNC: 83 MG/DL (ref 70–99)

## 2025-05-13 PROCEDURE — 250N000013 HC RX MED GY IP 250 OP 250 PS 637

## 2025-05-13 PROCEDURE — 124N000002 HC R&B MH UMMC

## 2025-05-13 PROCEDURE — 250N000013 HC RX MED GY IP 250 OP 250 PS 637: Performed by: PHYSICIAN ASSISTANT

## 2025-05-13 PROCEDURE — 99232 SBSQ HOSP IP/OBS MODERATE 35: CPT | Mod: GC | Performed by: PSYCHIATRY & NEUROLOGY

## 2025-05-13 PROCEDURE — 97150 GROUP THERAPEUTIC PROCEDURES: CPT | Mod: GO

## 2025-05-13 RX ADMIN — ARIPIPRAZOLE 15 MG: 15 TABLET ORAL at 08:17

## 2025-05-13 RX ADMIN — Medication 1 TABLET: at 08:17

## 2025-05-13 RX ADMIN — GLIPIZIDE 10 MG: 2.5 TABLET, FILM COATED, EXTENDED RELEASE ORAL at 08:18

## 2025-05-13 RX ADMIN — DIVALPROEX SODIUM 1000 MG: 500 TABLET, FILM COATED, EXTENDED RELEASE ORAL at 20:07

## 2025-05-13 RX ADMIN — PANTOPRAZOLE SODIUM 40 MG: 40 TABLET, DELAYED RELEASE ORAL at 08:17

## 2025-05-13 RX ADMIN — METFORMIN HYDROCHLORIDE 1000 MG: 500 TABLET, FILM COATED ORAL at 18:16

## 2025-05-13 RX ADMIN — TOLTERODINE TARTRATE 2 MG: 2 CAPSULE, EXTENDED RELEASE ORAL at 08:17

## 2025-05-13 RX ADMIN — Medication 3 MG: at 02:19

## 2025-05-13 RX ADMIN — METFORMIN HYDROCHLORIDE 1000 MG: 500 TABLET, FILM COATED ORAL at 08:17

## 2025-05-13 ASSESSMENT — ACTIVITIES OF DAILY LIVING (ADL)
ADLS_ACUITY_SCORE: 47
ORAL_HYGIENE: INDEPENDENT
ADLS_ACUITY_SCORE: 47
HYGIENE/GROOMING: HANDWASHING;INDEPENDENT

## 2025-05-13 NOTE — PLAN OF CARE
Problem: Sleep Disturbance  Goal: Adequate Sleep/Rest  Outcome: Progressing   Goal Outcome Evaluation:    Patient appears to have slept a total of 6.75 hours.     0200 AM B  Given PRN melatonin for sleep aid as requested. Slept afterwards.    Safety/environment checks conducted every 15 minutes with no further concerns noted. No complaints of pain/discomfort.

## 2025-05-13 NOTE — PLAN OF CARE
Team Note Due:  Wednesday    Assessment/Intervention/Current Symtoms and Care Coordination:  Chart review and met with team, discussed pt progress, symptomology, and response to treatment.  Discussed the discharge plan and any potential impediments to discharge.    Requested update from [facility].    Cheryle with [facility] requested H&P, progress notes, and med list. Sent via secure email.    Spoke with Sebas at [employer] who states a nurse to nurse consult is needed regarding medical concerns. She requested updated information, which was provided. She will submit request for consult.    Met with pt to provide update on [facility] requesting recent records for the referral.    Received updates from [facility] and [facility], both declining pt for IRTS placement.    Discharge Plan or Goal:  [facility] vs [facility] vs     Discharge Date/ time: TBD  Transportation: TBD  Provisional Discharge: Yes[x]  No[]  AVS Completed: []   IP Tracker Form Completed: []     Barriers to Discharge:  Patient requires further psychiatric stabilization due to current symptomology, medication management with changes subject to provider, coordination with outside supports, and aftercare planning. Pt is also under civil commitment.      Referral Status:  [facility] ([facility]) - records sent 5/13  Cheryle: [phone]   [facility] - sent 4/28  Phone interview completed 5/5. Not accepted to [facility]. Reviewing for other sites as of 5/12.  [facility] - sent 4/29  [facility] - sent 5/6. Declined due to substance use.  [facility] - sent 5/6. Declined due to medical concerns.  [facility] - sent 5/6. Declined due to pt's resistance to participate in programming.  [facility] - declined. Recommended pt attend CD tx/IRTS before being considered for a referral.    IOP CD referrals:  [facility] - made 5/2 by CD . Denied 5/5 due to acuity of MH.  [facility] - made 5/2 by CD   [facility] - made 5/2 by CD    [facility] - made 5/2 by CD      Legal Status:  MI Commitment and Castellanos   [facility]: [place]  File Number: [id number]  Start and expiration date of commitment: 04/24/2025 - 10/24/2025    Castellanos meds: Zyprexa, Haldol, Abilify, Risperdal    : Mental Health Resources (assigned CM pending)    Contacts:   Haydee Mares (CADI CM): [phone]   Tamika Johnson- (sister): [phone]  Lucille Reddy (Intervention ): [phone]     Upcoming Meetings and Dates/Important Information and next steps:  Follow up on referrals  Schedule OP follow up  Notify medicine team of any impending discharge at least 2-3 days prior in order to have a safe management plan for her diabetes  Draft PD/COS  Send PD and discharge summary to CM  Send PD and COS to [facility]

## 2025-05-13 NOTE — PLAN OF CARE
Problem: Adult Behavioral Health Plan of Care  Goal: Adheres to Safety Considerations for Self and Others  Outcome: Progressing     Problem: Adult Behavioral Health Plan of Care  Goal: Absence of New-Onset Illness or Injury  Outcome: Progressing     Problem: Suicide Risk  Goal: Absence of Self-Harm  Outcome: Progressing   Goal Outcome Evaluation:    Plan of Care Reviewed With: patient Plan of Care Reviewed With: patient    Overall Patient Progress: improvingOverall Patient Progress: improving     Alert and oriented, able to communicate needs. Visible in milieu, patient was social and interactive with peers and staff member. Pleasant, cooperative, and medication compliant. She denied any anxiety or depression, denied suicidal ideation. ADLs WNL. No pain/discomfort. No reported diarrhea. Food and fluid intake WNL.

## 2025-05-13 NOTE — PROGRESS NOTES
Rehab Group    Start time: 1055  End time: 1200  Patient time total: 40 minutes    came in and out of group session    #5 attended   Group Type: OT Clinic   Group Topic Covered: balanced lifestyle, coping skills, healthy leisure time, and social skills     Group Session Detail:  Occupational therapy clinic to facilitate coping skill exploration, creative expression within personally meaningful activities, and clinical observation of social, cognitive, and kinesthetic performance skills     Patient Response/Contribution:  worked intermittently, required prompts or assistance to participate, and disorganized     Patient Detail:  Pt required verbal prompts and set up A to initiate, gather materials, sequence, and adjust to workspace demands as needed. Demonstrated limited focus, planning, and problem solving for selected cognitive activity, reading a cookbook and discussing cooking with peers and writer. Pt appeared preoccupied with getting soda/candy out of locker this group, expressing desire for sweets. Pt appeared disorganized throughout with tangential speech often, leaving personal items in group room when leaving. Will continue to encourage attendance and participation.           [id number] OT Group (2 or more in attendance)      Patient Active Problem List   Diagnosis    Suicidal ideation    Type 2 diabetes mellitus (H)    Chronic hepatitis C (H)    Schizophrenia (H)    Acid reflux disease    Hoarding behavior

## 2025-05-13 NOTE — PROGRESS NOTES
"  ----------------------------------------------------------------------------------------------------------  [facility]  Psychiatry Progress Note  Hospital Day #28     Interim History:     The patient's care was discussed with the treatment team and chart notes were reviewed.    Identifier: Anastasiya Simon is a 59 year old female with a previous psychiatric diagnosis of schizophrenia and polysubstance use as well as type II diabetes admitted from [facility] on 04/15/2025 due to concern for SI and psychosis in the context of psychosocial stressors including living situation and argument with family member.    Vitals:Temp: 97.6  F (36.4  C) Temp src: Oral BP: 136/78 Pulse: 73   Resp: 16 SpO2: 98 % O2 Device: None (Room air)   Height: 165.1 cm (5' 5\") Weight: 72.5 kg (159 lb 14.4 oz)  Estimated body mass index is 26.61 kg/m  as calculated from the following:    Height as of this encounter: 1.651 m (5' 5\").    Weight as of this encounter: 72.5 kg (159 lb 14.4 oz).  Sleep: 7 hours (05/12/25 0700)  Scheduled medications: Took all scheduled medications as prescribed.  Psychiatric PRN medications:   Last 24H PRN:     melatonin tablet 3 mg, 3 mg at 05/13/25 0219    Staff Report:   Patient appeared to have slept for 6.75 hours. At 0200AM BG was 83. Given PRN melatonin for sleep as requested.     Last BG check was at 800AM and was 83.      Subjective:     Patient Interview:  Anastasiya was interviewed in her room. She reported feeling very cold and requested to have another blanket. We told her that we would let staff know about her request. She continued to lay down on her bed with eyes closed and we told her that we would discuss this at a later time with her. She denied any SI.      Objective:     Vitals:  /78 (BP Location: Left arm, Patient Position: Sitting, Cuff Size: Adult Regular)   Pulse 73   Temp 97.6  F (36.4  C) (Oral)   Resp 16   Ht 1.651 m (5' 5\")  "  Wt 72.5 kg (159 lb 14.4 oz)   LMP 10/22/2013   SpO2 98%   BMI 26.61 kg/m      Allergies:  Allergies   Allergen Reactions    Tetracycline Unknown     It happened when pt was a baby       Current Medications:  Scheduled:  Current Facility-Administered Medications   Medication Dose Route Frequency Provider Last Rate Last Admin    ARIPiprazole (ABILIFY) tablet 15 mg  15 mg Oral Daily Lucy Chandler MD   15 mg at 05/13/25 0817    calcium carbonate (TUMS) chewable tablet 500 mg  500 mg Oral Daily PRN Tanja Gonzalez MD   500 mg at 05/05/25 2159    glucose gel 15-30 g  15-30 g Oral Q15 Min PRN Katina Armendariz MD        Or    dextrose 50 % injection 25-50 mL  25-50 mL Intravenous Q15 Min PRN Katina Armendariz MD        Or    glucagon injection 1 mg  1 mg Subcutaneous Q15 Min PRN Katina Armendariz MD        divalproex sodium extended-release (DEPAKOTE ER) 24 hr tablet 1,000 mg  1,000 mg Oral At Bedtime Tanja Gonzalez MD   1,000 mg at 05/12/25 2038    gabapentin (NEURONTIN) capsule 300 mg  300 mg Oral TID PRN Tanja Gonzalez MD   300 mg at 04/22/25 2017    glipiZIDE (GLUCOTROL XL) 24 hr tablet 10 mg  10 mg Oral Daily with breakfast Katina Meza PA   10 mg at 05/13/25 0818    hydrOXYzine HCl (ATARAX) tablet 25 mg  25 mg Oral Q4H PRN Lucy Chandler MD   25 mg at 05/11/25 2116    loperamide (IMODIUM) capsule 2 mg  2 mg Oral 4x Daily PRN Tanja Gonzalez MD   2 mg at 05/12/25 0252    magnesium sulfate (EPSOM SALT) granules 1 Tablespoonful  1 Tablespoonful Topical Daily PRN Tanja Gonzalez MD        melatonin tablet 3 mg  3 mg Oral At Bedtime PRN Katina Armendariz MD   3 mg at 05/13/25 0219    metFORMIN (GLUCOPHAGE) tablet 1,000 mg  1,000 mg Oral BID w/meals Charlotte Jasmine PA-C   1,000 mg at 05/13/25 0817    multivitamin RENAL (RENAVITE RX/NEPHROVITE) tablet 1 tablet  1 tablet Oral Daily Lucy Chandler MD   1 tablet at  05/13/25 0817    OLANZapine (zyPREXA) tablet 10 mg  10 mg Oral TID PRN Lucy Chandler MD        Or    OLANZapine (zyPREXA) injection 10 mg  10 mg Intramuscular TID PRN Lucy Chandler MD        pantoprazole (PROTONIX) EC tablet 40 mg  40 mg Oral QAM AC Lucy Chandler MD   40 mg at 05/13/25 0817    tolterodine ER (DETROL LA) 24 hr capsule 2 mg  2 mg Oral Daily Tanja Gonzalez MD   2 mg at 05/13/25 0817     PRN:  Current Facility-Administered Medications   Medication Dose Route Frequency Provider Last Rate Last Admin    ARIPiprazole (ABILIFY) tablet 15 mg  15 mg Oral Daily Lucy Chandler MD   15 mg at 05/13/25 0817    calcium carbonate (TUMS) chewable tablet 500 mg  500 mg Oral Daily PRN Tanja Gonzalez MD   500 mg at 05/05/25 2159    glucose gel 15-30 g  15-30 g Oral Q15 Min PRN Katina Armendariz MD        Or    dextrose 50 % injection 25-50 mL  25-50 mL Intravenous Q15 Min PRN Katina Armendariz MD        Or    glucagon injection 1 mg  1 mg Subcutaneous Q15 Min PRN Katina Armendariz MD        divalproex sodium extended-release (DEPAKOTE ER) 24 hr tablet 1,000 mg  1,000 mg Oral At Bedtime Tanja Gonzalez MD   1,000 mg at 05/12/25 2038    gabapentin (NEURONTIN) capsule 300 mg  300 mg Oral TID PRN Tanja Gonzalez MD   300 mg at 04/22/25 2017    glipiZIDE (GLUCOTROL XL) 24 hr tablet 10 mg  10 mg Oral Daily with breakfast Katina Meza PA   10 mg at 05/13/25 0818    hydrOXYzine HCl (ATARAX) tablet 25 mg  25 mg Oral Q4H PRN Lucy Chandler MD   25 mg at 05/11/25 2116    loperamide (IMODIUM) capsule 2 mg  2 mg Oral 4x Daily PRN Tanja Gonzalez MD   2 mg at 05/12/25 0252    magnesium sulfate (EPSOM SALT) granules 1 Tablespoonful  1 Tablespoonful Topical Daily PRTanja Godfrey MD        melatonin tablet 3 mg  3 mg Oral At Bedtime Katina Hanley MD   3 mg at 05/13/25 0219    metFORMIN  (GLUCOPHAGE) tablet 1,000 mg  1,000 mg Oral BID w/meals Charlotte Jasmine PA-C   1,000 mg at 05/13/25 0817    multivitamin RENAL (RENAVITE RX/NEPHROVITE) tablet 1 tablet  1 tablet Oral Daily Lucy Chandler MD   1 tablet at 05/13/25 0817    OLANZapine (zyPREXA) tablet 10 mg  10 mg Oral TID PRN Lucy Chandler MD        Or    OLANZapine (zyPREXA) injection 10 mg  10 mg Intramuscular TID PRN Lucy Chandler MD        pantoprazole (PROTONIX) EC tablet 40 mg  40 mg Oral QAM AC Lucy Chandler MD   40 mg at 05/13/25 0817    tolterodine ER (DETROL LA) 24 hr capsule 2 mg  2 mg Oral Daily Tanja Gonzalez MD   2 mg at 05/13/25 0817     Labs and Imaging:  New results:   Recent Results (from the past 24 hours)   Glucose by meter    Collection Time: 05/12/25  5:33 PM   Result Value Ref Range    GLUCOSE BY METER POCT 141 (H) 70 - 99 mg/dL   Glucose by meter    Collection Time: 05/13/25  2:21 AM   Result Value Ref Range    GLUCOSE BY METER POCT 83 70 - 99 mg/dL   Glucose by meter    Collection Time: 05/13/25  8:01 AM   Result Value Ref Range    GLUCOSE BY METER POCT 83 70 - 99 mg/dL     Data this admission:  CBC: WNL. WBC: 7.6, RBC: 4.37, Hb: 13.0, Platelets: 316.  CMP: Unremarkable (4/22) AST: 13, ALT: 10, Creatinine: 0.65.  UDS: Negative  RASHIDA: 0.00 (4/13)  HbA1c: 10.1%  TSH: 2.40  Vit: B12: 812  Folate: 13.7  Valproic Acid Free & Total: 67  Valproic Acid Free & Total STAT: 75.6  Phosphorus: 4.6 (H)  Magnesium: 1.6 (L)  BMP: unremarkable (4/28)    Imaging:  Bladder Scan: Completed on 4/22     Mental Status Exam:   Oriented to:  Grossly oriented.  General: Alert and awake. Pt was in her room.   Appearance:  Appears older than stated age. Grooming is unkempt.   Behavior/Attitude:  Tired appearing.    Eye Contact: Eyes stayed closed.   Psychomotor: No evidence of tics, dystonia, or tardive dyskinesia. No catatonia present.  Speech: Appropriate  "rate/tone/volume.   Language: Fluent in English with appropriate syntax and vocabulary.  Mood:  \"Feeling very cold\"  Affect:  Tired appearing.    Thought Process:  Limited by Anastasiya's fatigue and limited verbal output.  Thought Content: No SI.  Associations:  questionable  Insight:  impaired unable to understand current condition  Judgment:  impaired, unable to take care for herself in ADLs  Impulse control: fair  Attention Span:  adequate for conversation  Concentration:  grossly intact  Recent and Remote Memory:  Not formally assessed. Pt reports memory issues. Staff notes forgetfulness.   Fund of Knowledge: Average.  Muscle Strength and Tone: Normal.  Gait and Station: Normal.     Psychiatric Assessment     Anastasiya Simon is a 59 year old female previously diagnosed with schizophrenia and polysubstance use disorder who presented with suicidal ideation, disorganization, and inability to care for self. Most recent psychiatric hospitalization was 03/20/2019 for disorganization and suicidal ideation. Significant symptoms on admission include increasing suicidal ideation, disorganization, erratic behavior such as filling buckets with ammonia and mixing with bleach, substance use, auditory hallucinations, and paranoia. The MSE on admission was pertinent for AVH, paranoia, limited insight into MH. Psychological contributions to mental health presentation include maladaptive coping through substance use and limited insight into MH. Social factors contributing to mental health presentation include interpersonal conflicts and unstable living environment. Protective factors include support from two sisters and absence of previous suicide attempts.     Anastasiya was re-started on Zyprexa 20 mg and her Depakote dose was increased to 1000 mg to address the ongoing psychosis and mood symptoms. Tolterodine and PRN loperamide were started to address incontinence and diarrhea. Due to T2DM neuropathy has been a concern for which " we started gabapentin 300 mg as well. Due to concerns of metabolic side effects regarding underline diabetes it was decided to cross-titrate to Abilify from Zyprexa. Zyprexa was discontinued on 5/7/25.      In summary, the patient's reported symptoms of SI, erractic behavior, AVH, and paranoia in the context of psychosocial stressers are consistent with decompensated schizophrenia and polysubstance use disorder.  She will likely benefit from outpatient MH, as well as referrals for placement that can give some structure considering her requirements, and working with her CM for future discharge plans. To further assist with this it was addressed the need to follow through with outpatient CDT which is something Anastasiya is in agreement. We decided to continue current medication management with Depakote and Abilify to avoid any decompensation that could occur with monotherapy Abilify.     Abilify MACKEY was also considered to make medication management easier for Anastasiya, however, Anastasiya is not open to change Abilify formulation to MACKEY as she is concerned about risk of skin infection from injection. We will consider this approach later on.    Per social work, Anastasiya was interested in Assisted Living Facility in [place] and  was left. Currently awaiting updates.        Psychiatric Plan by Diagnosis      Today's changes:  - None     # Schizophrenia  Medications:  - depakote 1000 mg HS  - Abilify 15 mg daily in the morning    #Polysubstance Use Disorder  - CD consult once psychotic sx more stable, if pt amenable  - CD consulted on 5/1. CD acknowledged and will visit on 5/2.    2. Pertinent Labs/Monitoring:  - Labs done 04/13 at OSH. CBC wnl, glucose elevated to 332, BMP wnl.  - UDS negative on 04/13  - 4/16 HbA1c: 10.1  - Depakote Levels: 75.6    3. Additional Plans:  - Patient will be treated in therapeutic milieu with appropriate individual and group therapies as described.   - Consulted Internal Medicine for diabetes  management.   - Referrals  [facility] ([facility]) - left  5/12  [facility]: [phone]   [facility] - sent 4/28  Phone interview completed 5/5. Not accepted to [facility]. Reviewing for other sites as of 5/12.  [facility] - sent 5/6. Declined due to substance use.  [facility] - sent 5/6. Left vm 5/12  [facility] - sent 5/6  Interview completed on 5/8. Awaiting update  [facility] - sent 4/29  Critical access hospital - declined. Recommended pt attend CD tx/IRTS before being considered for a referral.     IOP CD referrals:  [facility] - made 5/2 by CD . Denied 5/5 due to acuity of MH.  [facility] - made 5/2 by CD   [facility] - made 5/2 by CD   [place] [facility] - made 5/2 by CD      Psychiatric Hospital Course:      Anastasiya Simon was admitted to [facility] on a 72 hour hold (started 4/13/25 21:35) from [facility] on 04/15/25.   Medications:  Continued Medications:  Pt seen by psychiatry consult at [facility] who started zyprexa 20 mg at bedtime and depakote 500 mg at bedtime. Both of these medications were continued on admission to [facility].   New Medications:  Started loperamide for diarrhea  Started tolterodine for urinary incontinence  Started gabapentin for peripheral neuropathy    4/16: Increased depakote from 500 mg to 1000 mg HS for mood regulation  4/16: Increased metformin from 500 mg to 1000 mg BID per medicine consult  4/16: Zyprexa: 5 mg in the morning and 15 mg HS  4/16: Started PRN Loperamide 2 mg 4x/day for diarrhea  4/16: Started Tolterodine 2 mg daily for urinary incontinence  4/18: Started Gabapentin 300 mg PRN 3x/day for peripheral neuropathy  4/20: Started glipizide 24 hr tablet 5 mg daily for better glycemic control per medicine consult  4/25: Increased glipizide XL from 5mg to 10mg per medicine consult.  4/28: Started Abilify 5 mg PO daily in the morning  4/30: Increased Abilify to 10 mg daily in the morning  4/30: Decreased olanzapine to only 15  mg HS  5/2: Increased Abilify to 15 mg daily in the morning  5/2: Decreased Zyprexa to 10 mg HS  5/5: Decrease Zyprexa to 5 mg HS  5/7: Discontinue Zyprexa 5mg    The risks, benefits, alternatives, and side effects were discussed and understood by the patient and other caregivers.     Medical Assessment and Plan     Medical diagnoses to be addressed this admission:      #Type 2 Diabetes Mellitis with Complications  - PTA metformin 1000 mg BID with meals  - BID glucose checks; hypoglycemia protocol  - PRN gabapentin 300 mg 3x/day for peripheral neuropathy  - Insulin aspart (Novolog) injection 1-5 units HS  - Insulin aspart (Novolog) injection 3x/day 1-7 units before meals  - Glipizide 24 hr tablet 10 mg daily with breakfast for better glycemic control  - Internal Medicine Consult  - Nutrition Consult  - Moderate Consistent Carb Diet  - Given patient is starting insulin, please notify medicine team of any impending discharge at least 2-3 days prior in order to have a safe management plan for her diabetes.  - As of 5/1, Medicine will sign off at this time. OK to stop sliding scale insulin.  - Nutrition consult was placed to addressed current dietary needs at Anastasiya's request    #Diarrhea  - Loperamide 2 mg PRN 4x/day    #Urinary Incontinence  - Tolterodine 2 mg daily    Medical course:  Patient was physically examined by the ED prior to being transferred to the unit and was found to be medically stable and appropriate for admission.    Consults:   4/16: Internal Medicine consulted for management of diabetes and blood pressure.   4/16: Increased metformin to 1000 mg BID.   4/18: Started PRN gabapentin 300 mg 3x/day for peripheral neuropathy.   4/18: Internal Medicine started Novolog injection 1-5 units HS and Novolog injection 3x/day 1-7 units before meals for better glycemic control. Given patient is starting insulin, please notify medicine team of any impending discharge at least 2-3 days prior in order to have a  safe management plan for her diabetes.  4/19: Internal Medicine increased to high intensity sliding scale. Novolog injection 1-7 units HS. Novolog injection 3x/day 1-10 units before meals for better glycemic control.   4/20: Started PO glipizide 24 hr tablet 5 mg daily with breakfast.   4/21: Started pt on a consistent carb diet.   4/22: Nutrition Consult for diabetes management.   4/24: Note from Medicine: Please send patient with glucometer on discharge. Diabetes education should be ordered prior to discharge.  4/25: Increase Glipizide XL to 10 mg daily starting on 4/26. Continue Metformin 1000 mg BID. Decreased to moderate intensity sliding scale. Novolog injection 1-5 units HS. Novolog injection 3x/day 1-7 units before meals.. Hopefully can discontinue in coming days. Moderate consistent CHO diet.   4/27: Continues to require 7U of insulin and is not quite ready to wean off of her sliding scale insulin yet. Will continue to monitor and adjust medications with goal of having her wean off sliding scale insulin prior to discharge.  5/1: Currently the patient is medically stable and Medicine will sign off at this time. Recommendations relayed to primary team via this progress note. Please notify on-call [facility] if new questions or concerns arise. OK to stop sliding scale insulin. Continue BG checks BID. Continue glipizide 10 mg daily. Continue Metformin 1000 mg BID. Follow up with PCP for continued BG monitoring within 1-2 weeks of discharge.   5/1: Chemical Dependency consulted. Consult acknowledge and will see patient on 5/2.     Checklist     Legal Status: MI Commitment and [facility]: [place]  File Number: [id number]  Start and expiration date of commitment: 04/24/2025 - 10/24/2025  [employer] meds: Zyprexa, Haldol, Abilify, Risperdal    Safety Assessment:   Behavioral Orders   Procedures    Code 1 - Restrict to Unit    Code 3    Routine Programming     As clinically indicated    Status 15     Every 15  minutes.    Suicide precautions: Suicide Risk: MODERATE; Clinical rationale to override score: modification to the care environment, response to medication, lack of access to a plan for self-harm, Exhibiting Suicidal/self-harm behaviors or thoughts     Patients on Suicide Precautions should have a Combination Diet ordered that includes a Diet selection(s) AND a Behavioral Tray selection for Safe Tray - with utensils, or Safe Tray - NO utensils       Suicide Risk:   MODERATE     Clinical rationale to override score::   modification to the care environment     Clinical rationale to override score::   response to medication     Clinical rationale to override score::   lack of access to a plan for self-harm     Clinical rationale to override score::   Exhibiting Suicidal/self-harm behaviors or thoughts     Risk Assessment:  Risk for harm is low to moderate.  Risk factors: maladaptive coping, substance use, impulsive, and past behaviors.  Protective factors: family.    SIO: None    Disposition: TBD. Disposition pending clinical stabilization, medication optimization and development of an appropriate discharge plan.     Attestations     Laurel Ledbetter, MS3  Medical Student    Resident/Fellow Attestation   I, Lucy Rosa MD, was present with the medical/[facility] student who participated in the service and in the documentation of the note.  I have verified the history and personally performed the physical exam and medical decision making.  I agree with the assessment and plan of care as documented in the note.      This patient was seen and discussed with my attending physician.  Lucy Plata MD  [facility] Psychiatry Resident  05/13/2025    Attending Attestation:  I saw and evaluated the patient.  I agree with the history, findings, assessment and plan documented by Lucy Chandler MD. I personally spent a total of 45 minutes on care for this patient on the date of the  encounter. This includes face-to-face as well as non-face-to-face time reviewing the chart, lab review, and coordination of care.     CLAUDIA Gant MD  [facility]  Department of Psychiatry & Behavioral Sciences  Date of Service (date I saw the patient): 05/13/25

## 2025-05-13 NOTE — PROGRESS NOTES
"  Rehab Group    Start time: 1015  End time: 1055  Patient time total: 35 minutes    attended partial group    #6 attended   Group Type: occupational therapy   Group Topic Covered: coping skills     Group Session Detail:  Topic group centered on realms on control. Facilitated discussion surrounding what is vs isnt in our control and how focusing on what is in our control can influence mental health. Worksheet and education provided.      Patient Response/Contribution:  listened actively, distracted , nonsensical verbalizations, and needed prompts to redirect     Patient Detail:  Pt arrived to group with labile affect, presenting as bright at times and blunted/flat at others. Pt spilled coffee on table and began wiping it into the floor with her hand, pt required prompting and redirection to properly clean up spill. Pt engaged in group discussion occasionally, pt at one pont became tangential about \"reading peoples energies, and buildings\" requiring redirection. Pt observed to talk over others, benefiting from redirection. Pt had limited insight into presented topic, seemingly unable to process information provided.  Pt was somewhat receptive to writer suggestions for what individuals can control and how it can affect overall wellbeing. Will continue to encourage attendance and participation.       [id number] OT Group (2 or more in attendance)      Patient Active Problem List   Diagnosis    Suicidal ideation    Type 2 diabetes mellitus (H)    Chronic hepatitis C (H)    Schizophrenia (H)    Acid reflux disease    Hoarding behavior       "

## 2025-05-13 NOTE — PLAN OF CARE
BEH IP Unit Acuity Rating Score (UARS)  Patient is given one point for every criteria they meet.    CRITERIA SCORING   On a 72 hour hold, court hold, committed, stay of commitment, or revocation. 1    Patient LOS on BEH unit exceeds 20 days. 1  LOS: 28   Patient under guardianship, 55+, otherwise medically complex, or under age 11. 1   Suicide ideation without relief of precipitating factors. 0   Current plan for suicide. 0   Current plan for homicide. 0   Imminent risk or actual attempt to seriously harm another without relief of factors precipitating the attempt. 0   Severe dysfunction in daily living (ex: complete neglect for self care, extreme disruption in vegetative function, extreme deterioration in social interactions). 1   Recent (last 7 days) or current physical aggression in the ED or on unit. 0   Restraints or seclusion episode in past 72 hours. 0   Recent (last 7 days) or current verbal aggression, agitation, yelling, etc., while in the ED or unit. 0   Active psychosis. 1   Need for constant or near constant redirection (from leaving, from others, etc).  0   Intrusive or disruptive behaviors. 0   Patient requires 3 or more hours of individualized nursing care per 8-hour shift (i.e. for ADLs, meds, therapeutic interventions). 0   TOTAL 4

## 2025-05-13 NOTE — PLAN OF CARE
Brief Psychotherapy Note    Therapist checked in with Pt to remind Pt about psychotherapy service available.    Pt response: Pt asked about discharge, writer reminded pt that is team and CTC's role, then pt reported she does not have anything to talk about. Writer offered to discuss coping skills. Pt listed things she would like to do post discharge and foods she would like to eat. Pt became distracted by other patients and disinterested in conversation when writer was not able to get soda from pt's locker. Added coping skills to safety plan.    Plan: Writer will remain available to Pt and continue to check in for 1:1 sessions.

## 2025-05-13 NOTE — PROGRESS NOTES
"  Rehab Group    Start time: 1315  End time: 1400  Patient time total: 40 minutes    attended partial group    #6 attended   Group Type: occupational therapy   Group Topic Covered: activity therapy and problem solving     Group Session Detail:  The focus of today's group was leisure exploration and engagement. Patient engaged in a therapeutic game ([employer]) to encourage new learning, problem solving, focus, socialization, and cognitive wellness. This game encouraged cognitive skill building, such as: initiation, planning, organization, sequencing, and attention.         Patient Response/Contribution:  cooperative with task, actively engaged, required prompts or assistance to participate, and appeared confused      Patient Detail:  Pt arrived to group with affect similar to previous observations, pt was noted to be very tangential this group requiring redirection on multiple occasions. Pt required mod A to follow rules as instructed, pt performance did not improve with repetition or time. Pt reported \"I dont care about winning I just want it to look pretty\" when suggested to use strategy when playing game. Pt appeared to have difficulty with problem solving throughout. Will continue to encourage attendance and participation.         [id number] OT Group (2 or more in attendance)      Patient Active Problem List   Diagnosis    Suicidal ideation    Type 2 diabetes mellitus (H)    Chronic hepatitis C (H)    Schizophrenia (H)    Acid reflux disease    Hoarding behavior       "

## 2025-05-14 LAB
GLUCOSE BLDC GLUCOMTR-MCNC: 201 MG/DL (ref 70–99)
GLUCOSE BLDC GLUCOMTR-MCNC: 93 MG/DL (ref 70–99)
GLUCOSE BLDC GLUCOMTR-MCNC: 94 MG/DL (ref 70–99)

## 2025-05-14 PROCEDURE — 250N000013 HC RX MED GY IP 250 OP 250 PS 637

## 2025-05-14 PROCEDURE — 124N000002 HC R&B MH UMMC

## 2025-05-14 PROCEDURE — 97150 GROUP THERAPEUTIC PROCEDURES: CPT | Mod: GO

## 2025-05-14 PROCEDURE — 250N000013 HC RX MED GY IP 250 OP 250 PS 637: Performed by: PHYSICIAN ASSISTANT

## 2025-05-14 PROCEDURE — 99233 SBSQ HOSP IP/OBS HIGH 50: CPT | Mod: GC | Performed by: PSYCHIATRY & NEUROLOGY

## 2025-05-14 RX ADMIN — PANTOPRAZOLE SODIUM 40 MG: 40 TABLET, DELAYED RELEASE ORAL at 08:19

## 2025-05-14 RX ADMIN — METFORMIN HYDROCHLORIDE 1000 MG: 500 TABLET, FILM COATED ORAL at 08:19

## 2025-05-14 RX ADMIN — GLIPIZIDE 10 MG: 2.5 TABLET, FILM COATED, EXTENDED RELEASE ORAL at 08:19

## 2025-05-14 RX ADMIN — TOLTERODINE TARTRATE 2 MG: 2 CAPSULE, EXTENDED RELEASE ORAL at 08:19

## 2025-05-14 RX ADMIN — Medication 1 TABLET: at 08:18

## 2025-05-14 RX ADMIN — METFORMIN HYDROCHLORIDE 1000 MG: 500 TABLET, FILM COATED ORAL at 18:39

## 2025-05-14 RX ADMIN — ARIPIPRAZOLE 15 MG: 15 TABLET ORAL at 08:19

## 2025-05-14 RX ADMIN — DIVALPROEX SODIUM 1000 MG: 500 TABLET, FILM COATED, EXTENDED RELEASE ORAL at 21:23

## 2025-05-14 ASSESSMENT — ACTIVITIES OF DAILY LIVING (ADL)
ADLS_ACUITY_SCORE: 47
LAUNDRY: WITH SUPERVISION
ADLS_ACUITY_SCORE: 47
DRESS: INDEPENDENT
ADLS_ACUITY_SCORE: 47
ADLS_ACUITY_SCORE: 47
HYGIENE/GROOMING: HANDWASHING;INDEPENDENT
ADLS_ACUITY_SCORE: 47
ORAL_HYGIENE: INDEPENDENT
ADLS_ACUITY_SCORE: 47

## 2025-05-14 NOTE — PLAN OF CARE
Problem: Sleep Disturbance  Goal: Adequate Sleep/Rest  Outcome: Progressing   Goal Outcome Evaluation:    Patient appears to have slept a total of 6.75 hours.     0200 B    Safety/environment checks conducted every 15 minutes with no concerns noted. No complaints of pain/discomfort.

## 2025-05-14 NOTE — PLAN OF CARE
Team Note Due:  Wednesday    Assessment/Intervention/Current Symtoms and Care Coordination:  Chart review and met with team, discussed pt progress, symptomology, and response to treatment.  Discussed the discharge plan and any potential impediments to discharge.    Referrals placed to [facility] and [facility] for new IRTS referrals.    Discharge Plan or Goal:  IRTS vs [facility] vs     Discharge Date/ time: TBD  Transportation: TBD  Provisional Discharge: Yes[x]  No[]  AVS Completed: []   IP Tracker Form Completed: []     Barriers to Discharge:  Patient requires further psychiatric stabilization due to current symptomology, medication management with changes subject to provider, coordination with outside supports, and aftercare planning. Pt is also under civil commitment.      Referral Status:  [facility] ([facility]) - records sent 5/13  [facility]: [phone]   [facility] IRTS - sent 4/28  Phone interview completed 5/5. Not accepted to [facility]. Reviewing for other sites as of 5/12.  [facility]RTS - sent 5/14  [facility] - sent 5/14  [facility] - sent 4/29  [facility] - sent 5/6. Declined due to substance use.  [facility] - sent 5/6. Declined due to medical concerns.  [facility] - sent 5/6. Declined due to pt's resistance to participate in programming.  [facility] - declined. Recommended pt attend CD tx/IRTS before being considered for a referral.    IOP CD referrals:  [facility] - made 5/2 by CD . Denied 5/5 due to acuity of MH.  [facility] - made 5/2 by CD   [facility] - made 5/2 by CD   [facility] - made 5/2 by CD      Legal Status:  [place] [facility]  File Number: [id number]  Start and expiration date of commitment: 04/24/2025 - 10/24/2025    [facility] meds: Zyprexa, Haldol, Abilify, Risperdal    : Mental Health Resources (assigned CM pending)    Contacts:   Haydee Mares (CADI CM): [phone]   Tamika Johnson- (sister):  [phone]  Lucille Reddy (Intervention ): [phone]     Upcoming Meetings and Dates/Important Information and next steps:  Follow up on referrals  Schedule OP follow up  Notify medicine team of any impending discharge at least 2-3 days prior in order to have a safe management plan for her diabetes  Draft PD/COS  Send PD and discharge summary to OLLIE  Send PD and COS to Fariha Ruiz

## 2025-05-14 NOTE — PROGRESS NOTES
"  Rehab Group    Start time: 1115  End time: 1200  Patient time total: 30 minutes    came in and out of group session    #7 attended   Group Type: occupational therapy   Group Topic Covered: coping skills and emotional regulation     Group Session Detail:  OT facilitated group discussion exploring anxiety: definitions, symptoms, coping skills       Patient Response/Contribution:  disorganized, needed prompts to redirect, verbalizations were off topic, and appeared frustrated     Patient Detail:  Pt arrived to group with animated affect, appearing frustrated and preoccupied regarding discharge planning. Pt was observed to be very tangential this group, requiring redirection. When discussing physical symptoms of anxiety pt perseverated on \"a green energy in my room, I get this weird feeling for like two hours a day and feel like someone  in there.\"  Pt was oriented to group and was unable to verbalize understanding. Pt was intermittently engaged in group discussion while present, although participating with low insight, often attempting to change topic. Pt left group room to meet with treatment team and did not return. Will continue to encourage attendance and participation.         [id number] OT Group (2 or more in attendance)      Patient Active Problem List   Diagnosis    Suicidal ideation    Type 2 diabetes mellitus (H)    Chronic hepatitis C (H)    Schizophrenia (H)    Acid reflux disease    Hoarding behavior       "

## 2025-05-14 NOTE — PLAN OF CARE
BEH IP Unit Acuity Rating Score (UARS)  Patient is given one point for every criteria they meet.    CRITERIA SCORING   On a 72 hour hold, court hold, committed, stay of commitment, or revocation. 1    Patient LOS on BEH unit exceeds 20 days. 1  LOS: 29   Patient under guardianship, 55+, otherwise medically complex, or under age 11. 1   Suicide ideation without relief of precipitating factors. 0   Current plan for suicide. 0   Current plan for homicide. 0   Imminent risk or actual attempt to seriously harm another without relief of factors precipitating the attempt. 0   Severe dysfunction in daily living (ex: complete neglect for self care, extreme disruption in vegetative function, extreme deterioration in social interactions). 1   Recent (last 7 days) or current physical aggression in the ED or on unit. 0   Restraints or seclusion episode in past 72 hours. 0   Recent (last 7 days) or current verbal aggression, agitation, yelling, etc., while in the ED or unit. 0   Active psychosis. 1   Need for constant or near constant redirection (from leaving, from others, etc).  0   Intrusive or disruptive behaviors. 0   Patient requires 3 or more hours of individualized nursing care per 8-hour shift (i.e. for ADLs, meds, therapeutic interventions). 0   TOTAL 4

## 2025-05-14 NOTE — PROGRESS NOTES
"  ----------------------------------------------------------------------------------------------------------  [facility]  Psychiatry Progress Note  Hospital Day #29     Interim History:     The patient's care was discussed with the treatment team and chart notes were reviewed.    Identifier: Anastasiya Simon is a 59 year old female with a previous psychiatric diagnosis of schizophrenia and polysubstance use as well as type II diabetes admitted from [facility] on 04/15/2025 due to concern for SI and psychosis in the context of psychosocial stressors including living situation and argument with family member.    Vitals:Temp: 97.1  F (36.2  C) Temp src: Temporal BP: 136/80 Pulse: 83   Resp: 18 SpO2: 98 % O2 Device: None (Room air)   Height: 165.1 cm (5' 5\") Weight: 72.5 kg (159 lb 14.4 oz)  Estimated body mass index is 26.61 kg/m  as calculated from the following:    Height as of this encounter: 1.651 m (5' 5\").    Weight as of this encounter: 72.5 kg (159 lb 14.4 oz).  Sleep: 6.75 hours (25 0618)  Scheduled medications: Took all scheduled medications as prescribed.  Psychiatric PRN medications:     Staff Report:   Anastasiya was cooperative and complaint with medication. Her hygiene was appropriate. Her intake was adequate. Patient attended group OT.     Small emesis at , denied stomach discomfort, denied nausea. No PRN medications given.    Patient appears to have slept a total of 6.75 hours.     () 0200 B       Subjective:     Patient Interview:  Anastasiya was interviewed in the milieu. She reported feeling difficulty with sleeping last night as she felt like \"there was something dead on the bed.\" She mentioned how her mattress is uncomfortable to sleep in. She also asked about the plans with IRTS and we explained to her that she must agree to complete the IRTS program before she receives housing.      Objective:     Vitals:  /80 (BP " "Location: Right arm)   Pulse 83   Temp 97.1  F (36.2  C) (Temporal)   Resp 18   Ht 1.651 m (5' 5\")   Wt 72.5 kg (159 lb 14.4 oz)   LMP 10/22/2013   SpO2 98%   BMI 26.61 kg/m      Allergies:  Allergies   Allergen Reactions    Tetracycline Unknown     It happened when pt was a baby       Current Medications:  Scheduled:  Current Facility-Administered Medications   Medication Dose Route Frequency Provider Last Rate Last Admin    ARIPiprazole (ABILIFY) tablet 15 mg  15 mg Oral Daily Lucy Chandler MD   15 mg at 05/14/25 0819    calcium carbonate (TUMS) chewable tablet 500 mg  500 mg Oral Daily PRN Tanja Gonzalez MD   500 mg at 05/05/25 2159    glucose gel 15-30 g  15-30 g Oral Q15 Min PRN Katina Armendariz MD        Or    dextrose 50 % injection 25-50 mL  25-50 mL Intravenous Q15 Min PRN Katina Armendariz MD        Or    glucagon injection 1 mg  1 mg Subcutaneous Q15 Min PRN Katina Armendariz MD        divalproex sodium extended-release (DEPAKOTE ER) 24 hr tablet 1,000 mg  1,000 mg Oral At Bedtime Tanja Gonzalez MD   1,000 mg at 05/13/25 2007    gabapentin (NEURONTIN) capsule 300 mg  300 mg Oral TID PRN Tanja Gonzalez MD   300 mg at 04/22/25 2017    glipiZIDE (GLUCOTROL XL) 24 hr tablet 10 mg  10 mg Oral Daily with breakfast Katina Meza PA   10 mg at 05/14/25 0819    hydrOXYzine HCl (ATARAX) tablet 25 mg  25 mg Oral Q4H PRN Lucy Chandler MD   25 mg at 05/11/25 2116    loperamide (IMODIUM) capsule 2 mg  2 mg Oral 4x Daily PRN Tanja Gonzalez MD   2 mg at 05/12/25 0252    magnesium sulfate (EPSOM SALT) granules 1 Tablespoonful  1 Tablespoonful Topical Daily PRN Tanja Gonzalez MD        melatonin tablet 3 mg  3 mg Oral At Bedtime PRN Katina Armendariz MD   3 mg at 05/13/25 0219    metFORMIN (GLUCOPHAGE) tablet 1,000 mg  1,000 mg Oral BID w/meals Charlotte Jasmine PA-C   1,000 mg at 05/14/25 0819    multivitamin RENAL (RENAVITE " RX/NEPHROVITE) tablet 1 tablet  1 tablet Oral Daily Lucy Chandler MD   1 tablet at 05/14/25 0818    OLANZapine (zyPREXA) tablet 10 mg  10 mg Oral TID PRN Lucy Chandler MD        Or    OLANZapine (zyPREXA) injection 10 mg  10 mg Intramuscular TID PRN Lucy Chandler MD        pantoprazole (PROTONIX) EC tablet 40 mg  40 mg Oral QAM AC Lucy Chandler MD   40 mg at 05/14/25 0819    tolterodine ER (DETROL LA) 24 hr capsule 2 mg  2 mg Oral Daily Tanja Gonzalez MD   2 mg at 05/14/25 0819     PRN:  Current Facility-Administered Medications   Medication Dose Route Frequency Provider Last Rate Last Admin    ARIPiprazole (ABILIFY) tablet 15 mg  15 mg Oral Daily Lucy Chandler MD   15 mg at 05/14/25 0819    calcium carbonate (TUMS) chewable tablet 500 mg  500 mg Oral Daily PRN Tanja Gonzalez MD   500 mg at 05/05/25 2159    glucose gel 15-30 g  15-30 g Oral Q15 Min PRN Katina Armendariz MD        Or    dextrose 50 % injection 25-50 mL  25-50 mL Intravenous Q15 Min PRN Katina Armendariz MD        Or    glucagon injection 1 mg  1 mg Subcutaneous Q15 Min PRN Katina Armendariz MD        divalproex sodium extended-release (DEPAKOTE ER) 24 hr tablet 1,000 mg  1,000 mg Oral At Bedtime Tanja Gonzalez MD   1,000 mg at 05/13/25 2007    gabapentin (NEURONTIN) capsule 300 mg  300 mg Oral TID PRN Tanja Gonzalez MD   300 mg at 04/22/25 2017    glipiZIDE (GLUCOTROL XL) 24 hr tablet 10 mg  10 mg Oral Daily with breakfast Katina Meza PA   10 mg at 05/14/25 0819    hydrOXYzine HCl (ATARAX) tablet 25 mg  25 mg Oral Q4H PRN Lucy Chandler MD   25 mg at 05/11/25 2116    loperamide (IMODIUM) capsule 2 mg  2 mg Oral 4x Daily PRN Tanja Gonzalez MD   2 mg at 05/12/25 0252    magnesium sulfate (EPSOM SALT) granules 1 Tablespoonful  1 Tablespoonful Topical Daily PRN Tanja Gonzalez MD        melatonin  tablet 3 mg  3 mg Oral At Bedtime PRN Katina Armendariz MD   3 mg at 05/13/25 0219    metFORMIN (GLUCOPHAGE) tablet 1,000 mg  1,000 mg Oral BID w/meals Charlotte Jasmine PA-C   1,000 mg at 05/14/25 0819    multivitamin RENAL (RENAVITE RX/NEPHROVITE) tablet 1 tablet  1 tablet Oral Daily Lucy Chandler MD   1 tablet at 05/14/25 0818    OLANZapine (zyPREXA) tablet 10 mg  10 mg Oral TID PRN Lucy Chandler MD        Or    OLANZapine (zyPREXA) injection 10 mg  10 mg Intramuscular TID PRN Lucy Chandler MD        pantoprazole (PROTONIX) EC tablet 40 mg  40 mg Oral QAM AC Lucy Chandler MD   40 mg at 05/14/25 0819    tolterodine ER (DETROL LA) 24 hr capsule 2 mg  2 mg Oral Daily Tanja Gonzalez MD   2 mg at 05/14/25 0819     Labs and Imaging:  New results:   Recent Results (from the past 24 hours)   Glucose by meter    Collection Time: 05/13/25  5:50 PM   Result Value Ref Range    GLUCOSE BY METER POCT 185 (H) 70 - 99 mg/dL   Glucose by meter    Collection Time: 05/14/25  2:16 AM   Result Value Ref Range    GLUCOSE BY METER POCT 93 70 - 99 mg/dL   Glucose by meter    Collection Time: 05/14/25  8:07 AM   Result Value Ref Range    GLUCOSE BY METER POCT 94 70 - 99 mg/dL     Data this admission:  CBC: WNL. WBC: 7.6, RBC: 4.37, Hb: 13.0, Platelets: 316.  CMP: Unremarkable (4/22) AST: 13, ALT: 10, Creatinine: 0.65.  UDS: Negative  RASHIDA: 0.00 (4/13)  HbA1c: 10.1%  TSH: 2.40  Vit: B12: 812  Folate: 13.7  Valproic Acid Free & Total: 67  Valproic Acid Free & Total STAT: 75.6  Phosphorus: 4.6 (H)  Magnesium: 1.6 (L)  BMP: unremarkable (4/28)    Imaging:  Bladder Scan: Completed on 4/22     Mental Status Exam:   Oriented to:  Grossly oriented.  General: Alert and awake.   Appearance:  Appears older than stated age. Grooming is unkempt.   Behavior/Attitude:  Tired appearing.    Eye Contact: Eyes stayed closed for most of interview.   Psychomotor:  "No evidence of tics, dystonia, or tardive dyskinesia. No catatonia present.  Speech: Appropriate rate/tone/volume.   Language: Fluent in English with appropriate syntax and vocabulary.  Mood:  \"sleepy\"  Affect:  Tired appearing. Mildly disorganized.   Thought Process: Perseverative, ruminative, tangential  Thought Content: No apparent SI, A/V/H  Associations:  questionable  Insight:  impaired unable to understand current condition  Judgment:  impaired, unable to take care for herself in ADLs  Impulse control: fair  Attention Span:  adequate for conversation  Concentration:  grossly intact  Recent and Remote Memory:  Not formally assessed. Pt reports memory issues. Staff notes forgetfulness.   Fund of Knowledge: Average.  Muscle Strength and Tone: Normal.  Gait and Station: Normal.     Psychiatric Assessment     Anastasiya Simon is a 59 year old female previously diagnosed with schizophrenia and polysubstance use disorder who presented with suicidal ideation, disorganization, and inability to care for self. Most recent psychiatric hospitalization was 03/20/2019 for disorganization and suicidal ideation. Significant symptoms on admission include increasing suicidal ideation, disorganization, erratic behavior such as filling buckets with ammonia and mixing with bleach, substance use, auditory hallucinations, and paranoia. The MSE on admission was pertinent for AVH, paranoia, limited insight into MH. Psychological contributions to mental health presentation include maladaptive coping through substance use and limited insight into MH. Social factors contributing to mental health presentation include interpersonal conflicts and unstable living environment. Protective factors include support from two sisters and absence of previous suicide attempts.     Anastasiya was re-started on Zyprexa 20 mg and her Depakote dose was increased to 1000 mg to address the ongoing psychosis and mood symptoms. Tolterodine and PRN loperamide " were started to address incontinence and diarrhea. Due to T2DM neuropathy has been a concern for which we started gabapentin 300 mg as well. Due to concerns of metabolic side effects regarding underline diabetes it was decided to cross-titrate to Abilify from Zyprexa. Zyprexa was discontinued on 5/7/25.      In summary, the patient's reported symptoms of SI, erractic behavior, AVH, and paranoia in the context of psychosocial stressers are consistent with decompensated schizophrenia and polysubstance use disorder.  She will likely benefit from outpatient MH, as well as referrals for placement that can give some structure considering her requirements, and working with her CM for future discharge plans. To further assist with this it was addressed the need to follow through with outpatient CDT which is something Anastasiya is in agreement. We decided to continue current medication management with Depakote and Abilify to avoid any decompensation that could occur with monotherapy Abilify.     Abilify MACKEY was also considered to make medication management easier for Anastasiya, however, Anastasiya is not open to change Abilify formulation to MACKEY as she is concerned about risk of skin infection from injection. We will consider this approach later on.    Per social work, Anastasiya was interested in Assisted Living Facility in [place] and  was left. Currently awaiting updates.        Psychiatric Plan by Diagnosis      Today's changes:  - None     # Schizophrenia  Medications:  - depakote 1000 mg HS  - Abilify 15 mg daily in the morning    #Polysubstance Use Disorder  - CD consult once psychotic sx more stable, if pt amenable  - CD consulted on 5/1. CD acknowledged and will visit on 5/2.    2. Pertinent Labs/Monitoring:  - Labs done 04/13 at OSH. CBC wnl, glucose elevated to 332, BMP wnl.  - UDS negative on 04/13  - 4/16 HbA1c: 10.1  - Depakote Levels: 75.6    3. Additional Plans:  - Patient will be treated in therapeutic milieu with  appropriate individual and group therapies as described.   - Consulted Internal Medicine for diabetes management.   - Referrals  [facility] ([facility]) - records sent 5/13  [facility]: [phone]   [facility] - sent 4/28  Phone interview completed 5/5. Not accepted to [facility]. Reviewing for other sites as of 5/12.  [facility] - sent 5/14  [facility] - sent 5/14  [facility] - sent 4/29  [facility] - sent 5/6. Declined due to substance use.  [facility] - sent 5/6. Declined due to medical concerns.  [facility] - sent 5/6. Declined due to pt's resistance to participate in programming.  [facility] - declined. Recommended pt attend CD tx/IRTS before being considered for a referral.     IOP CD referrals:  [facility] - made 5/2 by CD . Denied 5/5 due to acuity of MH.  [facility] - made 5/2 by CD   [facility] - made 5/2 by CD      Psychiatric Hospital Course:      Anastasiya Simon was admitted to [facility] on a 72 hour hold (started 4/13/25 21:35) from [facility] on 04/15/25.   Medications:  Continued Medications:  Pt seen by psychiatry consult at [facility] who started zyprexa 20 mg at bedtime and depakote 500 mg at bedtime. Both of these medications were continued on admission to [facility].   New Medications:  Started loperamide for diarrhea  Started tolterodine for urinary incontinence  Started gabapentin for peripheral neuropathy    4/16: Increased depakote from 500 mg to 1000 mg HS for mood regulation  4/16: Increased metformin from 500 mg to 1000 mg BID per medicine consult  4/16: Zyprexa: 5 mg in the morning and 15 mg HS  4/16: Started PRN Loperamide 2 mg 4x/day for diarrhea  4/16: Started Tolterodine 2 mg daily for urinary incontinence  4/18: Started Gabapentin 300 mg PRN 3x/day for peripheral neuropathy  4/20: Started glipizide 24 hr tablet 5 mg daily for better glycemic control per medicine consult  4/25: Increased glipizide XL from 5mg to 10mg  per medicine consult.  4/28: Started Abilify 5 mg PO daily in the morning  4/30: Increased Abilify to 10 mg daily in the morning  4/30: Decreased olanzapine to only 15 mg HS  5/2: Increased Abilify to 15 mg daily in the morning  5/2: Decreased Zyprexa to 10 mg HS  5/5: Decrease Zyprexa to 5 mg HS  5/7: Discontinue Zyprexa 5mg    The risks, benefits, alternatives, and side effects were discussed and understood by the patient and other caregivers.     Medical Assessment and Plan     Medical diagnoses to be addressed this admission:      #Type 2 Diabetes Mellitis with Complications  - PTA metformin 1000 mg BID with meals  - BID glucose checks; hypoglycemia protocol  - PRN gabapentin 300 mg 3x/day for peripheral neuropathy  - Insulin aspart (Novolog) injection 1-5 units HS  - Insulin aspart (Novolog) injection 3x/day 1-7 units before meals  - Glipizide 24 hr tablet 10 mg daily with breakfast for better glycemic control  - Internal Medicine Consult  - Nutrition Consult  - Moderate Consistent Carb Diet  - Given patient is starting insulin, please notify medicine team of any impending discharge at least 2-3 days prior in order to have a safe management plan for her diabetes.  - As of 5/1, Medicine will sign off at this time. OK to stop sliding scale insulin.  - Nutrition consult was placed to addressed current dietary needs at Anastasiya's request    #Diarrhea  - Loperamide 2 mg PRN 4x/day    #Urinary Incontinence  - Tolterodine 2 mg daily    Medical course:  Patient was physically examined by the ED prior to being transferred to the unit and was found to be medically stable and appropriate for admission.    Consults:   4/16: Internal Medicine consulted for management of diabetes and blood pressure.   4/16: Increased metformin to 1000 mg BID.   4/18: Started PRN gabapentin 300 mg 3x/day for peripheral neuropathy.   4/18: Internal Medicine started Novolog injection 1-5 units HS and Novolog injection 3x/day 1-7 units before  meals for better glycemic control. Given patient is starting insulin, please notify medicine team of any impending discharge at least 2-3 days prior in order to have a safe management plan for her diabetes.  4/19: Internal Medicine increased to high intensity sliding scale. Novolog injection 1-7 units HS. Novolog injection 3x/day 1-10 units before meals for better glycemic control.   4/20: Started PO glipizide 24 hr tablet 5 mg daily with breakfast.   4/21: Started pt on a consistent carb diet.   4/22: Nutrition Consult for diabetes management.   4/24: Note from Medicine: Please send patient with glucometer on discharge. Diabetes education should be ordered prior to discharge.  4/25: Increase Glipizide XL to 10 mg daily starting on 4/26. Continue Metformin 1000 mg BID. Decreased to moderate intensity sliding scale. Novolog injection 1-5 units HS. Novolog injection 3x/day 1-7 units before meals.. Hopefully can discontinue in coming days. Moderate consistent CHO diet.   4/27: Continues to require 7U of insulin and is not quite ready to wean off of her sliding scale insulin yet. Will continue to monitor and adjust medications with goal of having her wean off sliding scale insulin prior to discharge.  5/1: Currently the patient is medically stable and Medicine will sign off at this time. Recommendations relayed to primary team via this progress note. Please notify on-call VINCENZO if new questions or concerns arise. OK to stop sliding scale insulin. Continue BG checks BID. Continue glipizide 10 mg daily. Continue Metformin 1000 mg BID. Follow up with PCP for continued BG monitoring within 1-2 weeks of discharge.   5/1: Chemical Dependency consulted. Consult acknowledge and will see patient on 5/2.     Checklist     Legal Status: MI Commitment and [place]   County: [place]  File Number: [id number]  Start and expiration date of commitment: 04/24/2025 - 10/24/2025  [place] meds: Zyprexa, Haldol, Abilify, Risperdal    Safety  Assessment:   Behavioral Orders   Procedures    Code 1 - Restrict to Unit    Code 3    Routine Programming     As clinically indicated    Status 15     Every 15 minutes.    Suicide precautions: Suicide Risk: MODERATE; Clinical rationale to override score: modification to the care environment, response to medication, lack of access to a plan for self-harm, Exhibiting Suicidal/self-harm behaviors or thoughts     Patients on Suicide Precautions should have a Combination Diet ordered that includes a Diet selection(s) AND a Behavioral Tray selection for Safe Tray - with utensils, or Safe Tray - NO utensils       Suicide Risk:   MODERATE     Clinical rationale to override score::   modification to the care environment     Clinical rationale to override score::   response to medication     Clinical rationale to override score::   lack of access to a plan for self-harm     Clinical rationale to override score::   Exhibiting Suicidal/self-harm behaviors or thoughts     Risk Assessment:  Risk for harm is low to moderate.  Risk factors: maladaptive coping, substance use, impulsive, and past behaviors.  Protective factors: family.    SIO: None    Disposition: TBD. Disposition pending clinical stabilization, medication optimization and development of an appropriate discharge plan.     Attestations     Laurel Ledbteter, MS3  Medical Student    Resident/Fellow Attestation   I, Lucy Rosa MD, was present with the medical/VINCENZO student who participated in the service and in the documentation of the note.  I have verified the history and personally performed the physical exam and medical decision making.  I agree with the assessment and plan of care as documented in the note.      This patient was seen and discussed with my attending physician.  Lucy Plata MD  [facility] Psychiatry Resident  05/14/2025

## 2025-05-14 NOTE — PROGRESS NOTES
Rehab Group    Start time: 1315  End time: 1400  Patient time total: 35 minutes    came in and out of group session    #5 attended   Group Type: OT Clinic   Group Topic Covered: balanced lifestyle, coping skills, healthy leisure time, and social skills     Group Session Detail:  Occupational therapy clinic to facilitate coping skill exploration, creative expression within personally meaningful activities, and clinical observation of social, cognitive, and kinesthetic performance skills     Patient Response/Contribution:  worked intermittently, engaged socially when prompted, distracted , and disorganized     Patient Detail:  Pt was initially not agreeable to participating in group cognitive creative activity (noise game), requesting to sit on periphery of group and look out window. Pt was observed to rest head on table intermittently. Pt then was mod A to initiate, gather materials, sequence, and adjust to workspace demands as needed. Demonstrated limited focus, planning, and problem solving for selected structured creative expression task. Able to ask for assistance as needed. Will continue to encourage attendance and participation.       [id number] OT Group (2 or more in attendance)      Patient Active Problem List   Diagnosis    Suicidal ideation    Type 2 diabetes mellitus (H)    Chronic hepatitis C (H)    Schizophrenia (H)    Acid reflux disease    Hoarding behavior

## 2025-05-14 NOTE — PLAN OF CARE
Problem: Adult Behavioral Health Plan of Care  Goal: Plan of Care Review  Outcome: Progressing  Flowsheets  Taken 5/14/2025 1400  Plan of Care Reviewed With: patient  Overall Patient Progress: improving  Patient Agreement with Plan of Care: agrees  Taken 5/14/2025 0946  Patient Agreement with Plan of Care: agrees     Problem: Adult Behavioral Health Plan of Care  Goal: Develops/Participates in The[facility] [place] to Support Successful Transition  Outcome: Progressing  Intervention: Foster Therapeutic [place]  Recent Flowsheet Documentation  Taken 5/14/2025 0946 by Lisbeth Gibson RN  Trust Relationship/Rapport:   care explained   choices provided   emotional support provided   empathic listening provided   questions answered   questions encouraged   reassurance provided   thoughts/feelings acknowledged   Goal Outcome Evaluation:    Plan of Care Reviewed With: patient     Overall Patient Progress: improving  Anastasiya has been visible in the milieu.Patient sociable with select peers.BG 94 before breakfast.Medication compliant.No prn given or requested.Denies suicidal thoughts,Anxiety,depression.Denies SIB/HI.Denies pain/discomfort.Attended groups.Hygiene is appropriate.Eats and drinks adequately.No escalation of behaviors noted.No bladder or bowel complains.Able to verbalize needs.Independent  with ADL's.  Temp: 97.1  F (36.2  C) Temp src: Temporal BP: 136/80 Pulse: 83   Resp: 18 SpO2: 98 % O2 Device: None (Room air)

## 2025-05-14 NOTE — PLAN OF CARE
Problem: Adult Behavioral Health Plan of Care  Goal: Adheres to Safety Considerations for Self and Others  Outcome: Progressing  Flowsheets (Taken 5/13/2025 2025)  Adheres to Safety Considerations for Self and Others: making progress toward outcome     Problem: Suicide Risk  Goal: Absence of Self-Harm  Outcome: Progressing     Problem: Adult Behavioral Health Plan of Care  Goal: Adheres to Safety Considerations for Self and Others  Outcome: Progressing  Flowsheets (Taken 5/13/2025 2025)  Adheres to Safety Considerations for Self and Others: making progress toward outcome   Goal Outcome Evaluation:    Plan of Care Reviewed With: patient Plan of Care Reviewed With: patient    Overall Patient Progress: improvingOverall Patient Progress: improving     Alert and oriented, able to communicate needs. Mostly in milieu watching tv, engaged with peers and staff members. Patient was pleasant, she was cooperative and compliant with medication. Patient denied any anxiety or depression, she denied suicidal thoughts, contracted for safety. She denied pain or discomfort, ADLs WNL. Adequate food and fluids. . Small emesis at 2115, denied stomach discomfort, denied nausea.

## 2025-05-14 NOTE — PROVIDER NOTIFICATION
05/14/25 1030   Individualization/Patient Specific Goals   Patient Personal Strengths resilient;resourceful;family/social support   Patient Vulnerabilities family/relationship conflict;housing insecurity;substance abuse/addiction   Interprofessional Rounds   Summary Discussed pt progress and barriers to placement. Pt has been declined from 2 IRTS referrals in the past week. New referral made to [facility].   Participants nursing;CTC;psychiatrist;other (see comments);OT   Behavioral Team Discussion   Participants Dr. Gant; Dr. Dacosta; Lisbeth Gibson RN; Rhoda Casarez [facility]; Gayle Kong OT; medical students   Progress Pt is progressing   Anticipated length of stay 20+ days   Continued Stay Criteria/Rationale Symptom stabilization, medication management, care coordination   Medical/Physical See H&P   Precautions See below   Plan Psychiatric assessment/Medication management. Therapeutic Milieu. Individual care planning and after care planning. Patient to participate in unit groups and activities. Individual and group support on unit.   Safety Plan Completed by unit therapist   Anticipated Discharge Disposition IRTS;another healthcare facility     PRECAUTIONS AND SAFETY    Behavioral Orders   Procedures    Code 1 - Restrict to Unit    Code 3    Routine Programming     As clinically indicated    Status 15     Every 15 minutes.    Suicide precautions: Suicide Risk: MODERATE; Clinical rationale to override score: modification to the care environment, response to medication, lack of access to a plan for self-harm, Exhibiting Suicidal/self-harm behaviors or thoughts     Patients on Suicide Precautions should have a Combination Diet ordered that includes a Diet selection(s) AND a Behavioral Tray selection for Safe Tray - with utensils, or Safe Tray - NO utensils       Suicide Risk:   MODERATE     Clinical rationale to override score::   modification to the care environment     Clinical rationale to override score::    response to medication     Clinical rationale to override score::   lack of access to a plan for self-harm     Clinical rationale to override score::   Exhibiting Suicidal/self-harm behaviors or thoughts       Safety  Safety WDL: WDL  Patient Location: [facility]  Observed Behavior: calm  Observed Behavior (Comment): OT  Safety Measures: environmental rounds completed, safety rounds completed  Diversional Activity: television  De-Escalation Techniques: verbally redirected, appropriate behavior reinforced  Suicidality: Status 15

## 2025-05-14 NOTE — PLAN OF CARE
Problem: Adult Behavioral Health Plan of Care  Goal: Plan of Care Review  Outcome: Progressing  Flowsheets  Taken 5/14/2025 1750  Patient Agreement with Plan of Care: agrees  Taken 5/14/2025 1631  Patient Agreement with Plan of Care: agrees   Goal Outcome Evaluation:    Plan of Care Reviewed With: patient          Pt was out in the lounge most of the shift. She spent most of the shift in the lounge watching TV with selected peers. She was observed socializing and interacting with selected peers. Affect was flat but pleasant upon approach. She was able to make her  needs known. Appearance is unkempt. Intake was adequate. She was cooperative with assessment. She denied pain and all mental health psych symptoms. She contracted for safety. She was medication compliant. No prn given this shift. Blood sugar was 201 at dinner. Vitals were WNL with b/p 151/87 but was rechecked for 136/78. No other concern noted at this time. Will continue to monitor and will assist if need arise

## 2025-05-15 VITALS
HEIGHT: 65 IN | WEIGHT: 159 LBS | SYSTOLIC BLOOD PRESSURE: 146 MMHG | TEMPERATURE: 97.8 F | RESPIRATION RATE: 18 BRPM | HEART RATE: 96 BPM | DIASTOLIC BLOOD PRESSURE: 77 MMHG | OXYGEN SATURATION: 96 % | BODY MASS INDEX: 26.49 KG/M2

## 2025-05-15 LAB
ALBUMIN SERPL BCG-MCNC: 4.3 G/DL (ref 3.5–5.2)
ALP SERPL-CCNC: 67 U/L (ref 40–150)
ALT SERPL W P-5'-P-CCNC: 17 U/L (ref 0–50)
AST SERPL W P-5'-P-CCNC: 20 U/L (ref 0–45)
BILIRUB DIRECT SERPL-MCNC: <0.08 MG/DL (ref 0–0.3)
BILIRUB SERPL-MCNC: 0.2 MG/DL
GLUCOSE BLDC GLUCOMTR-MCNC: 238 MG/DL (ref 70–99)
GLUCOSE BLDC GLUCOMTR-MCNC: 96 MG/DL (ref 70–99)
HOLD SPECIMEN: NORMAL
PROT SERPL-MCNC: 8.1 G/DL (ref 6.4–8.3)

## 2025-05-15 PROCEDURE — 250N000013 HC RX MED GY IP 250 OP 250 PS 637

## 2025-05-15 PROCEDURE — 97150 GROUP THERAPEUTIC PROCEDURES: CPT | Mod: GO

## 2025-05-15 PROCEDURE — 99233 SBSQ HOSP IP/OBS HIGH 50: CPT | Performed by: PSYCHIATRY & NEUROLOGY

## 2025-05-15 PROCEDURE — 124N000002 HC R&B MH UMMC

## 2025-05-15 PROCEDURE — 99207 PR NO CHARGE LOS: CPT | Performed by: PSYCHIATRY & NEUROLOGY

## 2025-05-15 PROCEDURE — 36415 COLL VENOUS BLD VENIPUNCTURE: CPT

## 2025-05-15 PROCEDURE — 250N000013 HC RX MED GY IP 250 OP 250 PS 637: Performed by: PHYSICIAN ASSISTANT

## 2025-05-15 PROCEDURE — 82040 ASSAY OF SERUM ALBUMIN: CPT

## 2025-05-15 RX ADMIN — TOLTERODINE TARTRATE 2 MG: 2 CAPSULE, EXTENDED RELEASE ORAL at 08:27

## 2025-05-15 RX ADMIN — Medication 1 TABLET: at 08:27

## 2025-05-15 RX ADMIN — METFORMIN HYDROCHLORIDE 1000 MG: 500 TABLET, FILM COATED ORAL at 18:10

## 2025-05-15 RX ADMIN — GLIPIZIDE 10 MG: 2.5 TABLET, FILM COATED, EXTENDED RELEASE ORAL at 08:27

## 2025-05-15 RX ADMIN — ARIPIPRAZOLE 15 MG: 15 TABLET ORAL at 08:27

## 2025-05-15 RX ADMIN — METFORMIN HYDROCHLORIDE 1000 MG: 500 TABLET, FILM COATED ORAL at 08:27

## 2025-05-15 RX ADMIN — DIVALPROEX SODIUM 1000 MG: 500 TABLET, FILM COATED, EXTENDED RELEASE ORAL at 21:14

## 2025-05-15 RX ADMIN — PANTOPRAZOLE SODIUM 40 MG: 40 TABLET, DELAYED RELEASE ORAL at 08:27

## 2025-05-15 ASSESSMENT — ACTIVITIES OF DAILY LIVING (ADL)
DRESS: INDEPENDENT
ADLS_ACUITY_SCORE: 47
LAUNDRY: WITH SUPERVISION
ADLS_ACUITY_SCORE: 47
ORAL_HYGIENE: INDEPENDENT
ADLS_ACUITY_SCORE: 47
ADLS_ACUITY_SCORE: 47
HYGIENE/GROOMING: HANDWASHING;INDEPENDENT
ADLS_ACUITY_SCORE: 47
ADLS_ACUITY_SCORE: 47

## 2025-05-15 NOTE — PLAN OF CARE
Team Note Due:  Wednesday    Assessment/Intervention/Current Symtoms and Care Coordination:  Chart review and met with team, discussed pt progress, symptomology, and response to treatment.  Discussed the discharge plan and any potential impediments to discharge.    Pt expressed confusion around discharge as she believed she would be discharging today. I let pt know nothing is solidified yet for placement but would continue to follow up on IRTS/[facility] referrals and let her know of any updates.    Discharge Plan or Goal:  [facility] vs [facility] vs [facility]    Discharge Date/ time: TBD  Transportation: TBD  Provisional Discharge: Yes[x]  No[]  AVS Completed: []   IP Tracker Form Completed: []     Barriers to Discharge:  Patient requires further psychiatric stabilization due to current symptomology, medication management with changes subject to provider, coordination with outside supports, and aftercare planning. Pt is also under civil commitment.      Referral Status:  [facility] ([facility]) - records sent 5/13  [place]: [phone]   [facility] IRTS - sent 4/28  Phone interview completed 5/5. Not accepted to [facility]. Reviewing for other sites as of 5/12.  [facility] I[facility] - sent 5/14  [facility] - sent 5/14  [facility] - sent 4/29  [facility] - sent 5/6. Declined due to substance use.  [facility] - sent 5/6. Declined due to medical concerns.  [facility] - sent 5/6. Declined due to pt's resistance to participate in programming.  [facility] - declined. Recommended pt attend CD tx/IRTS before being considered for a referral.    IOP CD referrals:  [facility] - made 5/2 by CD . Denied 5/5 due to acuity of MH.  [facility] - made 5/2 by CD   [facility] - made 5/2 by CD   [facility] - made 5/2 by CD      Legal Status:  MI Commitment and [facility]: [place]  File Number: [id number]  Start and expiration date of commitment: 04/24/2025 - 10/24/2025    [facility] meds: Zyprexa, Haldol, Abilify,  Risperdal    : Mental Health Resources (assigned CM pending)    Contacts:   Haydee Mares (CADI CM): [phone]   Tamika Higginbothamtariq- (sister): [phone]  Lucille Reddy (Intervention ): [phone]     Upcoming Meetings and Dates/Important Information and next steps:  Follow up on referrals  Schedule OP follow up  Notify medicine team of any impending discharge at least 2-3 days prior in order to have a safe management plan for her diabetes  Draft PD/COS  Send PD and discharge summary to CM  Send PD and COS to [place]

## 2025-05-15 NOTE — PROGRESS NOTES
"  ----------------------------------------------------------------------------------------------------------  [facility]  Psychiatry Progress Note  Hospital Day #30     Interim History:     The patient's care was discussed with the treatment team and chart notes were reviewed.    Identifier: Anastasiya Simon is a 59 year old female with a previous psychiatric diagnosis of schizophrenia and polysubstance use as well as type II diabetes (managed with metformin 1000mg and glipizide 10mg) admitted from [facility] ED on 04/15/2025 due to concern for SI and psychosis in the context of psychosocial stressors including living situation and argument with family member.    Vitals:Temp: 98.2  F (36.8  C) Temp src: Oral BP: 132/73 Pulse: 77   Resp: 18 SpO2: 96 % O2 Device: None (Room air)   Height: 165.1 cm (5' 5\") Weight: 72.1 kg (159 lb)  Estimated body mass index is 26.46 kg/m  as calculated from the following:    Height as of this encounter: 1.651 m (5' 5\").    Weight as of this encounter: 72.1 kg (159 lb).  Sleep: 6 hours (05/15/25 0620)  Scheduled medications: Took all scheduled medications as prescribed.  Psychiatric PRN medications:     Staff Report:   Yesterday, 5/14, BG 94 before breakfast. Medication compliant. Denies suicidal thoughts,Anxiety,depression. Attended groups.     Blood sugar was 201 at dinner. Vitals were WNL with b/p 151/87 but was rechecked for 136/78.     BG levels: 5/15 - 8AM - 96  BP - 132/73    Overnight reports:   Anastasiya appears to have slept for 6 hours.      Subjective:     Patient Interview:  Anastasiya was interviewed in the milieu. She reported feeling fine and when asked about her hospitalization here, she reports that she feels like she was treated very well by the staff. Her only was concern was about placement and we let her know that social work will provide updates.      Objective:     Vitals:  /73 (BP Location: Left arm)   " "Pulse 77   Temp 98.2  F (36.8  C) (Oral)   Resp 18   Ht 1.651 m (5' 5\")   Wt 72.1 kg (159 lb)   LMP 10/22/2013   SpO2 96%   BMI 26.46 kg/m      Allergies:  Allergies   Allergen Reactions    Tetracycline Unknown     It happened when pt was a baby       Current Medications:  Scheduled:  Current Facility-Administered Medications   Medication Dose Route Frequency Provider Last Rate Last Admin    ARIPiprazole (ABILIFY) tablet 15 mg  15 mg Oral Daily Lucy Chandler MD   15 mg at 05/15/25 0827    calcium carbonate (TUMS) chewable tablet 500 mg  500 mg Oral Daily PRN Tanja Gonzalez MD   500 mg at 05/05/25 2159    glucose gel 15-30 g  15-30 g Oral Q15 Min PRN Katina Armendariz MD        Or    dextrose 50 % injection 25-50 mL  25-50 mL Intravenous Q15 Min PRN Katina Armendariz MD        Or    glucagon injection 1 mg  1 mg Subcutaneous Q15 Min PRN Katina Armendariz MD        divalproex sodium extended-release (DEPAKOTE ER) 24 hr tablet 1,000 mg  1,000 mg Oral At Bedtime Tanja Gonzalez MD   1,000 mg at 05/14/25 2123    glipiZIDE (GLUCOTROL XL) 24 hr tablet 10 mg  10 mg Oral Daily with breakfast Katina Meza PA   10 mg at 05/15/25 0827    hydrOXYzine HCl (ATARAX) tablet 25 mg  25 mg Oral Q4H PRN Lucy Chandler MD   25 mg at 05/11/25 2116    loperamide (IMODIUM) capsule 2 mg  2 mg Oral 4x Daily PRN Tanja Gonzalez MD   2 mg at 05/12/25 0252    magnesium sulfate (EPSOM SALT) granules 1 Tablespoonful  1 Tablespoonful Topical Daily PRN Tanja Gonzalez MD        melatonin tablet 3 mg  3 mg Oral At Bedtime PRN Katina Armendariz MD   3 mg at 05/13/25 0219    metFORMIN (GLUCOPHAGE) tablet 1,000 mg  1,000 mg Oral BID w/meals Charlotte Jasmine PA-C   1,000 mg at 05/15/25 0827    multivitamin RENAL (RENAVITE RX/NEPHROVITE) tablet 1 tablet  1 tablet Oral Daily Lucy Chandler MD   1 tablet at 05/15/25 0827    OLANZapine (zyPREXA) " tablet 10 mg  10 mg Oral TID PRN Lucy Chandler MD        Or    OLANZapine (zyPREXA) injection 10 mg  10 mg Intramuscular TID PRN Lucy Chandler MD        pantoprazole (PROTONIX) EC tablet 40 mg  40 mg Oral QAM AC Lucy Chandler MD   40 mg at 05/15/25 0827    tolterodine ER (DETROL LA) 24 hr capsule 2 mg  2 mg Oral Daily Tanja Gonzalez MD   2 mg at 05/15/25 0827     PRN:  Current Facility-Administered Medications   Medication Dose Route Frequency Provider Last Rate Last Admin    ARIPiprazole (ABILIFY) tablet 15 mg  15 mg Oral Daily Lucy Chandler MD   15 mg at 05/15/25 0827    calcium carbonate (TUMS) chewable tablet 500 mg  500 mg Oral Daily PRN Tanja Gonzalez MD   500 mg at 05/05/25 2159    glucose gel 15-30 g  15-30 g Oral Q15 Min PRN Katina Armendariz MD        Or    dextrose 50 % injection 25-50 mL  25-50 mL Intravenous Q15 Min PRN Katina Armendariz MD        Or    glucagon injection 1 mg  1 mg Subcutaneous Q15 Min PRN Katina Armendariz MD        divalproex sodium extended-release (DEPAKOTE ER) 24 hr tablet 1,000 mg  1,000 mg Oral At Bedtime Tanja Gonzalez MD   1,000 mg at 05/14/25 2123    glipiZIDE (GLUCOTROL XL) 24 hr tablet 10 mg  10 mg Oral Daily with breakfast Katina Meza PA   10 mg at 05/15/25 0827    hydrOXYzine HCl (ATARAX) tablet 25 mg  25 mg Oral Q4H PRN Lucy Chandler MD   25 mg at 05/11/25 2116    loperamide (IMODIUM) capsule 2 mg  2 mg Oral 4x Daily PRN Tanja Gonzalez MD   2 mg at 05/12/25 0252    magnesium sulfate (EPSOM SALT) granules 1 Tablespoonful  1 Tablespoonful Topical Daily PRN Tanja Gonzalez MD        melatonin tablet 3 mg  3 mg Oral At Bedtime Katina Hanley MD   3 mg at 05/13/25 0219    metFORMIN (GLUCOPHAGE) tablet 1,000 mg  1,000 mg Oral BID w/meals Charlotte Jasmine PA-C   1,000 mg at 05/15/25 0827    multivitamin RENAL (RENAVITE  RX/NEPHROVITE) tablet 1 tablet  1 tablet Oral Daily Lucy Chandler MD   1 tablet at 05/15/25 0827    OLANZapine (zyPREXA) tablet 10 mg  10 mg Oral TID PRN Lucy Chandler MD        Or    OLANZapine (zyPREXA) injection 10 mg  10 mg Intramuscular TID PRN Lucy Chandler MD        pantoprazole (PROTONIX) EC tablet 40 mg  40 mg Oral QAM AC Lucy Chandler MD   40 mg at 05/15/25 0827    tolterodine ER (DETROL LA) 24 hr capsule 2 mg  2 mg Oral Daily Tanja Gonzalez MD   2 mg at 05/15/25 0827     Labs and Imaging:  New results:   Recent Results (from the past 24 hours)   Glucose by meter    Collection Time: 05/14/25  6:03 PM   Result Value Ref Range    GLUCOSE BY METER POCT 201 (H) 70 - 99 mg/dL   Glucose by meter    Collection Time: 05/15/25  7:52 AM   Result Value Ref Range    GLUCOSE BY METER POCT 96 70 - 99 mg/dL   Hepatic panel    Collection Time: 05/15/25  8:48 AM   Result Value Ref Range    Protein Total 8.1 6.4 - 8.3 g/dL    Albumin 4.3 3.5 - 5.2 g/dL    Bilirubin Total 0.2 <=1.2 mg/dL    Alkaline Phosphatase 67 40 - 150 U/L    AST 20 0 - 45 U/L    ALT 17 0 - 50 U/L    Bilirubin Direct <0.08 0.00 - 0.30 mg/dL   Extra Purple Top Tube    Collection Time: 05/15/25  8:48 AM   Result Value Ref Range    Hold Specimen JI      Data this admission:  CBC: WNL. WBC: 7.6, RBC: 4.37, Hb: 13.0, Platelets: 316.  CMP: Unremarkable (4/22) AST: 13, ALT: 10, Creatinine: 0.65.  UDS: Negative  RASHIDA: 0.00 (4/13)  HbA1c: 10.1%  TSH: 2.40  Vit: B12: 812  Folate: 13.7  Valproic Acid Free & Total: 67  Valproic Acid Free & Total STAT: 75.6  Phosphorus: 4.6 (H)  Magnesium: 1.6 (L)  BMP: unremarkable (4/28)    Imaging:  Bladder Scan: Completed on 4/22     Mental Status Exam:   Oriented to:  Grossly oriented.   General: Alert and awake.   Appearance:  Appears older than stated age. Grooming is unkempt.   Behavior/Attitude:  Tired appearing.    Eye Contact: Eyes  "stayed closed for most of interview.   Psychomotor: No evidence of tics, dystonia, or tardive dyskinesia. No catatonia present.  Speech: Appropriate rate/tone/volume.   Language: Fluent in English with appropriate syntax and vocabulary.  Mood:  \"I feel fine\"  Affect: Congruent with mood. Sleepy, tired  Thought Process: Perseverative, ruminative, tangential  Thought Content: No apparent SI, A/V/H  Associations:  questionable  Insight:  impaired unable to understand current condition  Judgment:  impaired, unable to take care for herself in ADLs  Impulse control: fair  Attention Span:  adequate for conversation  Concentration:  grossly intact  Recent and Remote Memory:  Not formally assessed. Pt reports memory issues. Staff notes forgetfulness.   Fund of Knowledge: Average.  Muscle Strength and Tone: Normal.  Gait and Station: Normal.     Psychiatric Assessment     Anastasiya Simon is a 59 year old female previously diagnosed with schizophrenia and polysubstance use disorder who presented with suicidal ideation, disorganization, and inability to care for self. Most recent psychiatric hospitalization was 03/20/2019 for disorganization and suicidal ideation. Significant symptoms on admission include increasing suicidal ideation, disorganization, erratic behavior such as filling buckets with ammonia and mixing with bleach, substance use, auditory hallucinations, and paranoia. The MSE on admission was pertinent for AVH, paranoia, limited insight into MH. Psychological contributions to mental health presentation include maladaptive coping through substance use and limited insight into MH. Social factors contributing to mental health presentation include interpersonal conflicts and unstable living environment. Protective factors include support from two sisters and absence of previous suicide attempts.     Anastasiya was re-started on Zyprexa 20 mg and her Depakote dose was increased to 1000 mg to address the ongoing psychosis " and mood symptoms. Tolterodine and PRN loperamide were started to address incontinence and diarrhea. Due to T2DM neuropathy has been a concern for which we started gabapentin 300 mg as well. Due to concerns of metabolic side effects regarding underline diabetes it was decided to cross-titrate to Abilify from Zyprexa. Zyprexa was discontinued on 5/7/25.      In summary, the patient's reported symptoms of SI, erractic behavior, AVH, and paranoia in the context of psychosocial stressers are consistent with decompensated schizophrenia and polysubstance use disorder.  She will likely benefit from outpatient MH, as well as referrals for placement that can give some structure considering her requirements, and working with her CM for future discharge plans. To further assist with this it was addressed the need to follow through with outpatient CDT which is something Anastasiya is in agreement. We decided to continue current medication management with Depakote and Abilify to avoid any decompensation that could occur with monotherapy Abilify.     Abilify MACKEY was also considered to make medication management easier for Anastasiya, however, Anastasiya is not open to change Abilify formulation to MACKEY as she is concerned about risk of skin infection from injection. We will consider this approach later on.    Per social work, Anastasiya was interested in Assisted Living Facility in [place] and  was left. Currently awaiting updates. At this point working on simplify medication list for future plans of discharge.       Psychiatric Plan by Diagnosis      Today's changes:  - Discontinue gabapentin 300mg    # Schizophrenia  Medications:  - depakote 1000 mg HS  - Abilify 15 mg daily in the morning    #Polysubstance Use Disorder  - CD consult once psychotic sx more stable, if pt amenable  - CD consulted on 5/1. CD acknowledged and will visit on 5/2.    2. Pertinent Labs/Monitoring:  - Labs done 04/13 at OSH. CBC wnl, glucose elevated to 332, BMP  wnl.  - UDS negative on 04/13  - 4/16 HbA1c: 10.1  - Depakote Levels: 75.6    3. Additional Plans:  - Patient will be treated in therapeutic milieu with appropriate individual and group therapies as described.   - Consulted Internal Medicine for diabetes management.   - Referrals  [facility] ([place]) - records sent 5/13  [place]: [phone]   [facility] - sent 4/28  Phone interview completed 5/5. Not accepted to [facility]. Reviewing for other sites as of 5/12.  [facility] - sent 5/14  [facility] - sent 5/14  [facility] - [facility] 4/29  [facility] - sent 5/6. Declined due to substance use.  [facility] - sent 5/6. Declined due to medical concerns.  [facility] - sent 5/6. Declined due to pt's resistance to participate in programming.  [facility] - declined. Recommended pt attend CD tx/IRTS before being considered for a referral.     IOP CD referrals:  [facility] - made 5/2 by CD . Denied 5/5 due to acuity of MH.  [facility] - made 5/2 by CD   [facility] - made 5/2 by CD      Psychiatric Hospital Course:      Anastasiya Simon was admitted to [facility] on a 72 hour hold (started 4/13/25 21:35) from [facility] on 04/15/25.   Medications:  Continued Medications:  Pt seen by psychiatry consult at [facility] who started zyprexa 20 mg at bedtime and depakote 500 mg at bedtime. Both of these medications were continued on admission to [facility].   New Medications:  Started loperamide for diarrhea  Started tolterodine for urinary incontinence  Started gabapentin for peripheral neuropathy    4/16: Increased depakote from 500 mg to 1000 mg HS for mood regulation  4/16: Increased metformin from 500 mg to 1000 mg BID per medicine consult  4/16: Zyprexa: 5 mg in the morning and 15 mg HS  4/16: Started PRN Loperamide 2 mg 4x/day for diarrhea  4/16: Started Tolterodine 2 mg daily for urinary incontinence  4/18: Started Gabapentin 300 mg PRN 3x/day for peripheral  neuropathy  4/20: Started glipizide 24 hr tablet 5 mg daily for better glycemic control per medicine consult  4/25: Increased glipizide XL from 5mg to 10mg per medicine consult.  4/28: Started Abilify 5 mg PO daily in the morning  4/30: Increased Abilify to 10 mg daily in the morning  4/30: Decreased olanzapine to only 15 mg HS  5/2: Increased Abilify to 15 mg daily in the morning  5/2: Decreased Zyprexa to 10 mg HS  5/5: Decrease Zyprexa to 5 mg HS  5/7: Discontinue Zyprexa 5mg    The risks, benefits, alternatives, and side effects were discussed and understood by the patient and other caregivers.     Medical Assessment and Plan     Medical diagnoses to be addressed this admission:      #Type 2 Diabetes Mellitis with Complications  - PTA metformin 1000 mg BID with meals  - BID glucose checks; hypoglycemia protocol  - PRN gabapentin 300 mg 3x/day for peripheral neuropathy  - Insulin aspart (Novolog) injection 1-5 units HS  - Insulin aspart (Novolog) injection 3x/day 1-7 units before meals  - Glipizide 24 hr tablet 10 mg daily with breakfast for better glycemic control  - Internal Medicine Consult  - Nutrition Consult  - Moderate Consistent Carb Diet  - Given patient is starting insulin, please notify medicine team of any impending discharge at least 2-3 days prior in order to have a safe management plan for her diabetes.  - As of 5/1, Medicine will sign off at this time. OK to stop sliding scale insulin.  - Nutrition consult was placed to addressed current dietary needs at Anastasiya's request    #Diarrhea  - Loperamide 2 mg PRN 4x/day    #Urinary Incontinence  - Tolterodine 2 mg daily    Medical course:  Patient was physically examined by the ED prior to being transferred to the unit and was found to be medically stable and appropriate for admission.    Consults:   4/16: Internal Medicine consulted for management of diabetes and blood pressure.   4/16: Increased metformin to 1000 mg BID.   4/18: Started PRN gabapentin  300 mg 3x/day for peripheral neuropathy.   4/18: Internal Medicine started Novolog injection 1-5 units HS and Novolog injection 3x/day 1-7 units before meals for better glycemic control. Given patient is starting insulin, please notify medicine team of any impending discharge at least 2-3 days prior in order to have a safe management plan for her diabetes.  4/19: Internal Medicine increased to high intensity sliding scale. Novolog injection 1-7 units HS. Novolog injection 3x/day 1-10 units before meals for better glycemic control.   4/20: Started PO glipizide 24 hr tablet 5 mg daily with breakfast.   4/21: Started pt on a consistent carb diet.   4/22: Nutrition Consult for diabetes management.   4/24: Note from Medicine: Please send patient with glucometer on discharge. Diabetes education should be ordered prior to discharge.  4/25: Increase Glipizide XL to 10 mg daily starting on 4/26. Continue Metformin 1000 mg BID. Decreased to moderate intensity sliding scale. Novolog injection 1-5 units HS. Novolog injection 3x/day 1-7 units before meals.. Hopefully can discontinue in coming days. Moderate consistent CHO diet.   4/27: Continues to require 7U of insulin and is not quite ready to wean off of her sliding scale insulin yet. Will continue to monitor and adjust medications with goal of having her wean off sliding scale insulin prior to discharge.  5/1: Currently the patient is medically stable and Medicine will sign off at this time. Recommendations relayed to primary team via this progress note. Please notify on-call [facility] if new questions or concerns arise. OK to stop sliding scale insulin. Continue BG checks BID. Continue glipizide 10 mg daily. Continue Metformin 1000 mg BID. Follow up with PCP for continued BG monitoring within 1-2 weeks of discharge.   5/1: Chemical Dependency consulted. Consult acknowledge and will see patient on 5/2.     Checklist     Legal Status: [facility]   County:  [place]  File Number: [id number]  Start and expiration date of commitment: 04/24/2025 - 10/24/2025  [facility] meds: Zyprexa, Haldol, Abilify, Risperdal    Safety Assessment:   Behavioral Orders   Procedures    Code 1 - Restrict to Unit    Routine Programming     As clinically indicated    Status 15     Every 15 minutes.    Suicide precautions: Suicide Risk: MODERATE; Clinical rationale to override score: modification to the care environment, response to medication, lack of access to a plan for self-harm, Exhibiting Suicidal/self-harm behaviors or thoughts     Patients on Suicide Precautions should have a Combination Diet ordered that includes a Diet selection(s) AND a Behavioral Tray selection for Safe Tray - with utensils, or Safe Tray - NO utensils       Suicide Risk:   MODERATE     Clinical rationale to override score::   modification to the care environment     Clinical rationale to override score::   response to medication     Clinical rationale to override score::   lack of access to a plan for self-harm     Clinical rationale to override score::   Exhibiting Suicidal/self-harm behaviors or thoughts     Risk Assessment:  Risk for harm is low to moderate.  Risk factors: maladaptive coping, substance use, impulsive, and past behaviors.  Protective factors: family.    SIO: None    Disposition: TBD. Disposition pending clinical stabilization, medication optimization and development of an appropriate discharge plan.     Attestations     Laurel Ledbetter, MS3  Medical Student    Resident/Fellow Attestation   I, Lucy Rosa MD, was present with the medical/[facility] student who participated in the service and in the documentation of the note.  I have verified the history and personally performed the physical exam and medical decision making.  I agree with the assessment and plan of care as documented in the note.      This patient was seen and discussed with my attending physician.  Lucy rooney  Juliana Dacosta MD  [facility] Psychiatry Resident  05/15/2025      Cosign only for note completeness  Raine West M.D., [facility]  Child, Adolescent, Adult Psychiatry and Addiction Medicine

## 2025-05-15 NOTE — PROGRESS NOTES
----------------------------------------------------------------------------------------------------------  [facility]   Psychiatric Progress Note  Hospital Day #30    Name: Anastasiya Simon   MRN #: [id number]  Age: 59 year old YOB: 1965  Date of Admission: 4/15/2025  Unit: 20  Attending Physician: Evangelina Gant,*  Legal Status: Orders Placed This Encounter      Legal status Patient is Committed       Interim History:     The patient's care was discussed with the treatment team during the daily team meeting and/or staff's chart notes were reviewed.     Collateral/ Team reports:    C-SSRS Shift/Daily Screen: no risk    Sleep: 6 hours (05/15/25 0620)  Scheduled Medications: took all scheduled medications as prescribed   PRN medications: no psychiatric PRNs given     Side effects to medication: denies  Sleep: slept through the night   Intake: eating/drinking without difficulty  Groups: appropriately participating and attending groups  Interactions & function: gets along well with peers    Safety: Patient has NOT  required locked seclusion or restraints in the past 24 hours to maintain safety.  Please refer to RN documentation for further details.    Per nursing report: Reported she did not like mattress and sheets, has been attending groups, denied all MH symptoms    5/14 Rn note   Pt was out in the lounge most of the shift. She spent most of the shift in the lounge watching TV with selected peers. She was observed socializing and interacting with selected peers. Affect was flat but pleasant upon approach. She was able to make her  needs known. Appearance is unkempt. Intake was adequate. She was cooperative with assessment. She denied pain and all mental health psych symptoms. She contracted for safety. She was medication compliant. No prn given this shift. Blood sugar was 201 at dinner. Vitals were WNL with b/p 151/87 but was rechecked for 136/78. No  other concern noted at this time. Will continue to monitor and will assist if need arise     Per clinical treatment coordinator:   Pt expressed confusion around discharge as she believed she would be discharging today. I let pt know nothing is solidified yet for placement but would continue to follow up on IRTS/GH referrals and let her know of any updates.        HPI:     The patient [facility] in [facility]. She had no complaints. Reported good mood , no significant anxiety, sleps well, has good energy. She wanted to know when she leaves and she was informed that she is ready to leave from the medical perspective but that she needed to transition to a supportive environment. The patient seemed to understand. She wanted to return to the group.    The 10 point Review of Systems is negative other than noted above     Medications:     Scheduled Medications:  Current Facility-Administered Medications   Medication Dose Route Frequency Provider Last Rate Last Admin    ARIPiprazole (ABILIFY) tablet 15 mg  15 mg Oral Daily Lucy Chandler MD   15 mg at 05/15/25 0827    divalproex sodium extended-release (DEPAKOTE ER) 24 hr tablet 1,000 mg  1,000 mg Oral At Bedtime Tanja Gonzalez MD   1,000 mg at 05/14/25 2123    glipiZIDE (GLUCOTROL XL) 24 hr tablet 10 mg  10 mg Oral Daily with breakfast Katina Meza PA   10 mg at 05/15/25 0827    metFORMIN (GLUCOPHAGE) tablet 1,000 mg  1,000 mg Oral BID w/meals Charlotte Jasmine PA-C   1,000 mg at 05/15/25 0827    multivitamin RENAL (RENAVITE RX/NEPHROVITE) tablet 1 tablet  1 tablet Oral Daily Lucy Chandler MD   1 tablet at 05/15/25 0827    pantoprazole (PROTONIX) EC tablet 40 mg  40 mg Oral QAM AC Lucy Chandler MD   40 mg at 05/15/25 0827    tolterodine ER (DETROL LA) 24 hr capsule 2 mg  2 mg Oral Daily Tanja Gonzaelz MD   2 mg at 05/15/25 0827       PRN Medications:  Current Facility-Administered Medications  "  Medication Dose Route Frequency Provider Last Rate Last Admin    calcium carbonate (TUMS) chewable tablet 500 mg  500 mg Oral Daily PRN Tanja Gonzalez MD   500 mg at 05/05/25 2159    glucose gel 15-30 g  15-30 g Oral Q15 Min PRN Katina Armendariz MD        Or    dextrose 50 % injection 25-50 mL  25-50 mL Intravenous Q15 Min PRN Katina Armendariz MD        Or    glucagon injection 1 mg  1 mg Subcutaneous Q15 Min PRN Katina Armendariz MD        hydrOXYzine HCl (ATARAX) tablet 25 mg  25 mg Oral Q4H PRN Lucy Chandler MD   25 mg at 05/11/25 2116    loperamide (IMODIUM) capsule 2 mg  2 mg Oral 4x Daily PRN Tanja Gonzalez MD   2 mg at 05/12/25 0252    magnesium sulfate (EPSOM SALT) granules 1 Tablespoonful  1 Tablespoonful Topical Daily PRN Tanja Gonzalez MD        melatonin tablet 3 mg  3 mg Oral At Bedtime PRN Katina Armendariz MD   3 mg at 05/13/25 0219    OLANZapine (zyPREXA) tablet 10 mg  10 mg Oral TID PRN Lucy Chandler MD        Or    OLANZapine (zyPREXA) injection 10 mg  10 mg Intramuscular TID PRN Lucy Chandler MD            Allergies:     Allergies   Allergen Reactions    Tetracycline Unknown     It happened when pt was a baby          Vitals and Labs:     BP (!) 150/78 (Patient Position: Sitting)   Pulse 96   Temp 97.8  F (36.6  C) (Oral)   Resp 18   Ht 1.651 m (5' 5\")   Wt 72.1 kg (159 lb)   LMP 10/22/2013   SpO2 96%   BMI 26.46 kg/m    Weight is 159 lbs 0 oz  Body mass index is 26.46 kg/m .  Orthostatic Vitals         Most Recent      Sitting Orthostatic /73 05/15 0830    Sitting Orthostatic Pulse (bpm) 77 05/15 0830    Standing Orthostatic /72 05/15 0830    Standing Orthostatic Pulse (bpm) 81 05/15 0830              Labs have been personally reviewed. Please see below for details.      Mental Status Examination:     Appearance: awake, alert, appeared older than stated age, and " "unkempt  Attitude:  guarded  Eye Contact:  poor   Mood:  good  Affect:  intensity is flat  Speech:  clear, coherent, increased speech latency, and normal prosody  Psychomotor Behavior:  no evidence of tardive dyskinesia, dystonia, or tics and intact station, gait and muscle tone  Thought Process:  logical, linear, and goal oriented in broef responses  Associations:  no loose associations  Thought Content:  no evidence of suicidal ideation or homicidal ideation, no evidence of psychotic thought, no auditory hallucinations present, and no visual hallucinations present  Insight:  limited  Judgement:  limited  Oriented to:  time, person, and place  Attention Span and Concentration:  limited  Recent and Remote Memory:  fair     Psychiatric Assessment and Plan:     Diagnoses:    Acute Psychosis  Suicidal ideation  Schizophrenia  Medication noncompliance  Alcohol use disorder  Methamphetamine use disorder  (U tox + on admission)  Urinary incontinence  Uncontrolled Type II DM  Hypertension  GERD        Clinically Significant Risk Factors                             # DMII: A1C = 10.1 % (Ref range: <5.7 %) within past 6 months   # Overweight: Estimated body mass index is 26.46 kg/m  as calculated from the following:    Height as of this encounter: 1.651 m (5' 5\").    Weight as of this encounter: 72.1 kg (159 lb).      # Financial/Environmental Concerns:    # Housing Instability: noted in nursing assessment           Formulation and Course:    Anastasiya Simon is a 59 year old female with previous psychiatric diagnoses of schizophrenia and polysubstance use admitted from the [facility] ED on 04/15/2025 due to concern for SI and psychosis in the context of psychosocial stressors. Allegedly Anastasiya got into fight with sisters re:moving to assisted living. Said she would kill herself if she had to live there.  Past history s remarkable for multiple admissions in 2013 and 2019 for SI and psychosis/substance use disorder. " Most recent hospitalization was on 03/20/2019  for suicidal ideation and disorganized behavior, prior Court Commitments: MICD commitment in 2019.    Per EHR: the patient has been refusing medications and has been using alcohol and methamphetamines.   She reported to her sister that she heard voices telling her there were monsters at the shelter and [facility] and, she believed there was a woman living inside her who was pouring worms out of her, as well as having had buckets of ammonia throughout her home, including in the oven. She forgot it was there and started the oven--melting the bucket. She also attempted to mix bleach and ammonia.  .   The patient reported improvement of her mood, denied SI  and seems to function well in a supportive environment of a MH unit with 24 hour supervision. The patient medication will be simplified today and will observe her for 24- 48 hrs before  ready for discharge. Placement has been challenging due to patient complex medical needs and history of KEITH.    Hospital Course:    Station 20 on a 72 hour hold (started 4/13/25 21:35) from [facility] on 04/15/25.    Medications:  ? Continued Medications:   Pt seen by psychiatry consult at [facility] who started zyprexa 20 mg at bedtime and depakote 500 mg at bedtime. Both of these medications were continued on admission to Station 20.   ? New Medications:   Started loperamide for diarrhea   Started tolterodine for urinary incontinence   Started gabapentin for peripheral neuropathy     4/16: Increased depakote from 500 mg to 1000 mg HS for mood regulation  4/16: Increased metformin from 500 mg to 1000 mg BID per medicine consult  4/16: Zyprexa: 5 mg in the morning and 15 mg HS  4/16: Started PRN Loperamide 2 mg 4x/day for diarrhea  4/16: Started Tolterodine 2 mg daily for urinary incontinence  4/18: Started Gabapentin 300 mg PRN 3x/day for peripheral neuropathy  4/20:  Started glipizide 24 hr tablet 5 mg daily for better glycemic control per medicine consult  4/25: Increased glipizide XL from 5mg to 10mg per medicine consult.  4/28: Started Abilify 5 mg PO daily in the morning  4/30: Increased Abilify to 10 mg daily in the morning  4/30: Decreased olanzapine to only 15 mg HS  5/2: Increased Abilify to 15 mg daily in the morning  5/2: Decreased Zyprexa to 10 mg HS  5/5: Decrease Zyprexa to 5 mg HS  5/7: Discontinue Zyprexa 5mg  5/15 stopped gabapentin    Plan:    Today's Changes:     Stopped Gabapentin  KEITH assessment ordered  LFT's ordered    Medications:     Abilify 15mg per day  Depakote 100mg at bedtime  Glipizde 10 mg per day  Metformin 1000 mg per day  Mvit  Pantoprazle    Consults:    Request substance use assessment or Rule 25 evaluation due to concern about substance use.    - Medicine Consult    Interventions:    - Patient has been treated in therapeutic milieu with appropriate individual and group therapies as indicated and as able.  - Collateral information, ROIs, legal documentation, prior testing results, and other pertinent information has been requested within 24 hours of admission.    Precautions:  Behavioral Orders   Procedures    Code 1 - Restrict to Unit    Routine Programming     As clinically indicated    Status 15     Every 15 minutes.    Suicide precautions: Suicide Risk: MODERATE; Clinical rationale to override score: modification to the care environment, response to medication, lack of access to a plan for self-harm, Exhibiting Suicidal/self-harm behaviors or thoughts     Patients on Suicide Precautions should have a Combination Diet ordered that includes a Diet selection(s) AND a Behavioral Tray selection for Safe Tray - with utensils, or Safe Tray - NO utensils       Suicide Risk:   MODERATE     Clinical rationale to override score::   modification to the care environment     Clinical rationale to override score::   response to medication     Clinical  rationale to override score::   lack of access to a plan for self-harm     Clinical rationale to override score::   Exhibiting Suicidal/self-harm behaviors or thoughts        Medical Assessment and Plan:     4/16 Medicine Consult    Anastasiya Simon is a 59 year old female with a history of diabetes mellitus type II, history of hepatitis C, schizophrenia, polysubstance use, who was admitted to [facility] on 4/15/2025 for further evaluation and treatment of psychosis and SI. Medicine was consulted for further evaluation and treatment of uncontrolled DM and high blood pressure.      Uncontrolled Type II DM  On admission, hemoglobin A1c of 10.1. PTA on metformin 500 mg BID, but patient reports she was only taking 500 mg daily. Prior prescriptions for glipizide as well from several years prior. Will increase her metformin to 1000 mg BID. Hesitant to start insulin or other medication given no recent relationship with PCP and un housed.   - Increase metformin to 1000 mg BID  - Please ensure patient has follow up with a PCP to recheck her hemoglobin A1c in ~3 months     Peripheral Neuropathy  Patient endorses long standing nerve pain in bilateral feet. Suspect this is likely due to uncontrolled DM. Vitamin B12 normal.   - Psych could consider low dose of gabapentin for treatment of neuropathy as this may benefit her mental health as well              - Defer decision to start gabapentin to primary team  - Management of DM as above     Elevated Blood Pressure  Patient with labile blood pressure. Highest BP noted to be 170 systolic on 4/15. Suspect this is likely due to her mental health. Given normal readings also obtained, would not start antihypertensive at this time.  - Continue to follow  - Management of mental health per psych  - Ensure patient is sitting down, resting and is calm at time of BP readings     History of hepatitis C  Per chart review, patient with history of hepatitis C though unclear if she ever  completed treatment. Last seen by hepatology in 2019. On admission, her hepatic panel was unremarkable. No indication to recheck any labs or imaging at this time given patient will need to establish follow-up in order to have treatment.   - Please ensure patient has follow-up with hepatology on discharge        Disposition:     Disposition Plan   Reason for ongoing admission: dangerous to self due to psychosis  Discharge location/Disposition: home with family  Discharge Medications: not ordered  Follow-up Appointments: not scheduled  Medically Ready for Discharge: Anticipated in 2-4 Days     Attestation:     Entered by: Raine Jolley MD on May 15, 2025 at 5:08 PM       Attestation:    This patient was seen and evaluated by me on 5/15/2025.     Vital signs, laboratory testing, and medications reviewed.    Total time was 55 minutes spent on the date of 5/15/2025 the encounter doing chart review, history and exam, documentation coordination of care,  further activities as noted above and discharge planning.    [facility]olley [facility]  Child, Adolescent and Adult Psychiatry and Addiction Medicine      Laboratory Results:     Recent Results (from the past 240 hours)   Glucose by meter    Collection Time: 05/05/25  5:57 PM   Result Value Ref Range    GLUCOSE BY METER POCT 177 (H) 70 - 99 mg/dL   Glucose by meter    Collection Time: 05/05/25 10:16 PM   Result Value Ref Range    GLUCOSE BY METER POCT 146 (H) 70 - 99 mg/dL   Glucose by meter    Collection Time: 05/06/25  7:50 AM   Result Value Ref Range    GLUCOSE BY METER POCT 139 (H) 70 - 99 mg/dL   Glucose by meter    Collection Time: 05/06/25 11:26 AM   Result Value Ref Range    GLUCOSE BY METER POCT 189 (H) 70 - 99 mg/dL   Glucose by meter    Collection Time: 05/06/25  5:15 PM   Result Value Ref Range    GLUCOSE BY METER POCT 239 (H) 70 - 99 mg/dL   Glucose by meter    Collection Time: 05/07/25  8:12 AM   Result Value Ref Range    GLUCOSE BY METER POCT 115  (H) 70 - 99 mg/dL   Glucose by meter    Collection Time: 05/08/25  8:04 AM   Result Value Ref Range    GLUCOSE BY METER POCT 108 (H) 70 - 99 mg/dL   Glucose by meter    Collection Time: 05/08/25 12:09 PM   Result Value Ref Range    GLUCOSE BY METER POCT 139 (H) 70 - 99 mg/dL   Glucose by meter    Collection Time: 05/08/25  6:03 PM   Result Value Ref Range    GLUCOSE BY METER POCT 177 (H) 70 - 99 mg/dL   Glucose by meter    Collection Time: 05/09/25  4:32 AM   Result Value Ref Range    GLUCOSE BY METER POCT 96 70 - 99 mg/dL   Glucose by meter    Collection Time: 05/09/25  7:48 AM   Result Value Ref Range    GLUCOSE BY METER POCT 83 70 - 99 mg/dL   Glucose by meter    Collection Time: 05/09/25 12:04 PM   Result Value Ref Range    GLUCOSE BY METER POCT 113 (H) 70 - 99 mg/dL   Glucose by meter    Collection Time: 05/09/25  6:02 PM   Result Value Ref Range    GLUCOSE BY METER POCT 198 (H) 70 - 99 mg/dL   Glucose by meter    Collection Time: 05/10/25  2:50 AM   Result Value Ref Range    GLUCOSE BY METER POCT 138 (H) 70 - 99 mg/dL   Glucose by meter    Collection Time: 05/10/25  7:29 AM   Result Value Ref Range    GLUCOSE BY METER POCT 127 (H) 70 - 99 mg/dL   Glucose by meter    Collection Time: 05/10/25 11:24 AM   Result Value Ref Range    GLUCOSE BY METER POCT 219 (H) 70 - 99 mg/dL   Glucose by meter    Collection Time: 05/10/25  5:59 PM   Result Value Ref Range    GLUCOSE BY METER POCT 127 (H) 70 - 99 mg/dL   Glucose by meter    Collection Time: 05/10/25  9:17 PM   Result Value Ref Range    GLUCOSE BY METER POCT 193 (H) 70 - 99 mg/dL   Glucose by meter    Collection Time: 05/11/25  4:27 AM   Result Value Ref Range    GLUCOSE BY METER POCT 113 (H) 70 - 99 mg/dL   Glucose by meter    Collection Time: 05/11/25  7:31 AM   Result Value Ref Range    GLUCOSE BY METER POCT 102 (H) 70 - 99 mg/dL   Glucose by meter    Collection Time: 05/11/25  5:59 PM   Result Value Ref Range    GLUCOSE BY METER POCT 169 (H) 70 - 99 mg/dL    Glucose by meter    Collection Time: 05/11/25  9:22 PM   Result Value Ref Range    GLUCOSE BY METER POCT 144 (H) 70 - 99 mg/dL   Glucose by meter    Collection Time: 05/12/25  2:28 AM   Result Value Ref Range    GLUCOSE BY METER POCT 72 70 - 99 mg/dL   Glucose by meter    Collection Time: 05/12/25  7:53 AM   Result Value Ref Range    GLUCOSE BY METER POCT 139 (H) 70 - 99 mg/dL   Glucose by meter    Collection Time: 05/12/25  5:33 PM   Result Value Ref Range    GLUCOSE BY METER POCT 141 (H) 70 - 99 mg/dL   Glucose by meter    Collection Time: 05/13/25  2:21 AM   Result Value Ref Range    GLUCOSE BY METER POCT 83 70 - 99 mg/dL   Glucose by meter    Collection Time: 05/13/25  8:01 AM   Result Value Ref Range    GLUCOSE BY METER POCT 83 70 - 99 mg/dL   Glucose by meter    Collection Time: 05/13/25  5:50 PM   Result Value Ref Range    GLUCOSE BY METER POCT 185 (H) 70 - 99 mg/dL   Glucose by meter    Collection Time: 05/14/25  2:16 AM   Result Value Ref Range    GLUCOSE BY METER POCT 93 70 - 99 mg/dL   Glucose by meter    Collection Time: 05/14/25  8:07 AM   Result Value Ref Range    GLUCOSE BY METER POCT 94 70 - 99 mg/dL   Glucose by meter    Collection Time: 05/14/25  6:03 PM   Result Value Ref Range    GLUCOSE BY METER POCT 201 (H) 70 - 99 mg/dL   Glucose by meter    Collection Time: 05/15/25  7:52 AM   Result Value Ref Range    GLUCOSE BY METER POCT 96 70 - 99 mg/dL   Hepatic panel    Collection Time: 05/15/25  8:48 AM   Result Value Ref Range    Protein Total 8.1 6.4 - 8.3 g/dL    Albumin 4.3 3.5 - 5.2 g/dL    Bilirubin Total 0.2 <=1.2 mg/dL    Alkaline Phosphatase 67 40 - 150 U/L    AST 20 0 - 45 U/L    ALT 17 0 - 50 U/L    Bilirubin Direct <0.08 0.00 - 0.30 mg/dL   Extra Purple Top Tube    Collection Time: 05/15/25  8:48 AM   Result Value Ref Range    Hold Specimen JI

## 2025-05-15 NOTE — PLAN OF CARE
No PRNs given or requested this shift.  Pt remained in his room the entire shift. No c/o pain or discomfort noted/reported. Safety checks completed every 15 minutes ; no concerns noted. Pt appears to have slept for  6 hours; will continue to monitor and offer support.    Problem: Sleep Disturbance  Goal: Adequate Sleep/Rest  Outcome: Progressing   Goal Outcome Evaluation:

## 2025-05-15 NOTE — PLAN OF CARE
BEH IP Unit Acuity Rating Score (UARS)  Patient is given one point for every criteria they meet.    CRITERIA SCORING   On a 72 hour hold, court hold, committed, stay of commitment, or revocation. 1    Patient LOS on BEH unit exceeds 20 days. 1  LOS: 30   Patient under guardianship, 55+, otherwise medically complex, or under age 11. 1   Suicide ideation without relief of precipitating factors. 0   Current plan for suicide. 0   Current plan for homicide. 0   Imminent risk or actual attempt to seriously harm another without relief of factors precipitating the attempt. 0   Severe dysfunction in daily living (ex: complete neglect for self care, extreme disruption in vegetative function, extreme deterioration in social interactions). 1   Recent (last 7 days) or current physical aggression in the ED or on unit. 0   Restraints or seclusion episode in past 72 hours. 0   Recent (last 7 days) or current verbal aggression, agitation, yelling, etc., while in the ED or unit. 0   Active psychosis. 1   Need for constant or near constant redirection (from leaving, from others, etc).  0   Intrusive or disruptive behaviors. 0   Patient requires 3 or more hours of individualized nursing care per 8-hour shift (i.e. for ADLs, meds, therapeutic interventions). 0   TOTAL 4

## 2025-05-15 NOTE — PROGRESS NOTES
"  Rehab Group    Start time: 1015  End time: 1050  Patient time total: 35 minutes    attended full group    #7 attended   Group Type: occupational therapy   Group Topic Covered: self-esteem     Group Session Detail:  Activity highlighting personal values, traits, hobbies, fears, anxieties, ect. Followed by group discussion      Patient Response/Contribution:  required repetition of directions, required prompts or assistance to participate, disorganized, and disruptive to the group     Patient Detail:  Pt arrived to group with labile affect, appearing disorganized at times, presenting as energetic and then as flat throughout. Pt was oriented to group and verbalized understanding. Pt was intermittently engaged in group disucssion, participating with limited insight, observed to be tangential and require redirection multiple times this group. 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 Pt was engaged  to group discussion, often off topic and speaking over writer and peers. Will continue to encourage attendance and participation.          [id number] OT Group (2 or more in attendance)      Patient Active Problem List   Diagnosis    Suicidal ideation    Type 2 diabetes mellitus (H)    Chronic hepatitis C (H)    Schizophrenia (H)    Acid reflux disease    Hoarding behavior       "

## 2025-05-15 NOTE — PROGRESS NOTES
Rehab Group    Start time: 1315  End time: 1400  Patient time total: 35 minutes    came in and out of group session    #6 attended   Group Type: occupational therapy   Group Topic Covered: activity therapy and healthy leisure time     Group Session Detail:  The focus of today's group was leisure exploration and engagement. Patient engaged in a therapeutic game to encourage new learning, problem solving, focus, socialization, and cognitive wellness. This game encouraged cognitive skill building, such as: initiation, planning, organization, sequencing, and attention.       Patient Response/Contribution:  cooperative with task, actively engaged, required prompts or assistance to participate, distracted , and disorganized     Patient Detail:  Pt arrived to group with disorganized and labile affect. Pt was oriented to group and verbalized understanding. Pt required mod verbal cues to track game and follow instructions as provided progressing to occasional cues with repetition and time. Pt   Reported enjoying activity. Will continue to encourage attendance and participation.         [id number] OT Group (2 or more in attendance)      Patient Active Problem List   Diagnosis    Suicidal ideation    Type 2 diabetes mellitus (H)    Chronic hepatitis C (H)    Schizophrenia (H)    Acid reflux disease    Hoarding behavior

## 2025-05-15 NOTE — PLAN OF CARE
Problem: Psychotic Signs/Symptoms  Goal: Improved Behavioral Control (Psychotic Signs/Symptoms)  Outcome: Progressing  Intervention: Manage Behavior  Recent Flowsheet Documentation  Taken 5/15/2025 0901 by Natalia Roberts RN  De-Escalation Techniques:   verbally redirected   appropriate behavior reinforced   Goal Outcome Evaluation:    Plan of Care Reviewed With: patient      Patient alert and conversant. She presented with full range affect. She denied pain and all mental health symptoms. Patient was visible in the milieu. She's cooperative with cares. She's on BG checks. Patient has poor insight. She came to the nurse's station if she can wash her clothes because she's going home today. There was no discharge order in place. Patient's eating and drinking well. No PRN given this shift.

## 2025-05-15 NOTE — PROGRESS NOTES
Rehab Group    Start time: 1050  End time: 1200  Patient time total: 50 minutes    came in and out of group session    #7 attended   Group Type: OT Clinic   Group Topic Covered: balanced lifestyle, coping skills, healthy leisure time, and social skills     Group Session Detail:  Occupational therapy clinic to facilitate coping skill exploration, creative expression within personally meaningful activities, and clinical observation of social, cognitive, and kinesthetic performance skills     Patient Response/Contribution:  socially appropriate, worked intermittently, and engaged socially when prompted     Patient Detail:  Pt required set up A to initiate, gather materials, sequence, and adjust to workspace demands as needed. Demonstrated good focus, planning, and problem solving for selected creative expression task, observed to have good attention span for task today. Although pt observed to have difficulty completing task following multi step directions. Pt observed to have good frustration tolerance for task. Will continue to encourage attendance and participation.         [id number] OT Group (2 or more in attendance)      Patient Active Problem List   Diagnosis    Suicidal ideation    Type 2 diabetes mellitus (H)    Chronic hepatitis C (H)    Schizophrenia (H)    Acid reflux disease    Hoarding behavior

## 2025-05-16 LAB
GLUCOSE BLDC GLUCOMTR-MCNC: 267 MG/DL (ref 70–99)
GLUCOSE BLDC GLUCOMTR-MCNC: 91 MG/DL (ref 70–99)

## 2025-05-16 PROCEDURE — 99233 SBSQ HOSP IP/OBS HIGH 50: CPT | Performed by: PSYCHIATRY & NEUROLOGY

## 2025-05-16 PROCEDURE — 250N000013 HC RX MED GY IP 250 OP 250 PS 637

## 2025-05-16 PROCEDURE — 250N000013 HC RX MED GY IP 250 OP 250 PS 637: Performed by: PHYSICIAN ASSISTANT

## 2025-05-16 PROCEDURE — H2032 ACTIVITY THERAPY, PER 15 MIN: HCPCS

## 2025-05-16 PROCEDURE — 97150 GROUP THERAPEUTIC PROCEDURES: CPT | Mod: GO

## 2025-05-16 PROCEDURE — 124N000002 HC R&B MH UMMC

## 2025-05-16 RX ADMIN — Medication 3 MG: at 20:55

## 2025-05-16 RX ADMIN — TOLTERODINE TARTRATE 2 MG: 2 CAPSULE, EXTENDED RELEASE ORAL at 08:26

## 2025-05-16 RX ADMIN — METFORMIN HYDROCHLORIDE 1000 MG: 500 TABLET, FILM COATED ORAL at 08:26

## 2025-05-16 RX ADMIN — Medication 1 TABLET: at 08:25

## 2025-05-16 RX ADMIN — ARIPIPRAZOLE 15 MG: 15 TABLET ORAL at 08:25

## 2025-05-16 RX ADMIN — METFORMIN HYDROCHLORIDE 1000 MG: 500 TABLET, FILM COATED ORAL at 18:26

## 2025-05-16 RX ADMIN — DIVALPROEX SODIUM 1000 MG: 500 TABLET, FILM COATED, EXTENDED RELEASE ORAL at 20:55

## 2025-05-16 RX ADMIN — GLIPIZIDE 10 MG: 2.5 TABLET, FILM COATED, EXTENDED RELEASE ORAL at 08:25

## 2025-05-16 RX ADMIN — PANTOPRAZOLE SODIUM 40 MG: 40 TABLET, DELAYED RELEASE ORAL at 08:26

## 2025-05-16 ASSESSMENT — ACTIVITIES OF DAILY LIVING (ADL)
ADLS_ACUITY_SCORE: 47
ORAL_HYGIENE: INDEPENDENT
ADLS_ACUITY_SCORE: 47
HYGIENE/GROOMING: HANDWASHING;INDEPENDENT
ADLS_ACUITY_SCORE: 47
DRESS: INDEPENDENT
ORAL_HYGIENE: INDEPENDENT
HYGIENE/GROOMING: INDEPENDENT
DRESS: INDEPENDENT
ADLS_ACUITY_SCORE: 47

## 2025-05-16 NOTE — PROGRESS NOTES
Rehab Group    Start time: 1100  End time: 1150  Patient time total: 40 minutes    attended partial group    #7 attended   Group Type: occupational therapy   Group Topic Covered: sensory intervention     Group Session Detail:  OT facilitated a sensory group for patients. Patients were provided with an opportunity to try out a variety of sensory interventions or coping tools including visual, auditory, olfactory, proprioceptive (deep pressure), and tactile. Patients then were asked to fill out a provided worksheet to identify which strategies worked for them and situations they could be appropriately used in, group discussion throughout.          Patient Response/Contribution:  cooperative with task, worked intermittently, and engaged socially when prompted     Patient Detail:  Pt arrived to group with affect congruent with previous observations. Pt was intermittently engaged in discussion, participationg with limited insight. Pt was somewhat receptive to writer suggestions and education throughout. Pt reported preferred sensory items as: hand held wooded massager, weighted blanket, and visual sensory tools. Will continue to encourage attendance and participation.         [id number] OT Group (2 or more in attendance)      Patient Active Problem List   Diagnosis    Suicidal ideation    Type 2 diabetes mellitus (H)    Chronic hepatitis C (H)    Schizophrenia (H)    Acid reflux disease    Hoarding behavior

## 2025-05-16 NOTE — PLAN OF CARE
No PRNs given or requested this shift.  No c/o pain or discomfort noted/reported. Safety checks completed every 15 minutes ; no concerns noted. Pt appears to have slept for 6.75  hours; will continue to monitor and offer support.     Problem: Sleep Disturbance  Goal: Adequate Sleep/Rest  Outcome: Progressing   Goal Outcome Evaluation:

## 2025-05-16 NOTE — PLAN OF CARE
"Blood sugar before breakfast was 91.  Pt ate all her breakfast and lunch.  Pt also seen snacking.  Pt has spent most of the shift in the lounge.  Pt watching TV social with select peers. Pt did go to one group.  Pt said she's been waiting for a long time for placement. Pt did talk to [facility] today. Pt has had no bx issues.  Problem: Adult Behavioral Health Plan of Care  Goal: Plan of Care Review  5/16/2025 1347 by Eda Appiah RN  Outcome: Not Progressing  5/16/2025 1328 by Eda Appiah RN  Outcome: Not Progressing  Goal: Patient-Specific Goal (Individualization)  Description: You can add care plan individualizations to a care plan. Examples of Individualization might be:  \"Parent requests to be called daily at 9am for status\", \"I have a hard time hearing out of my right ear\", or \"Do not touch me to wake me up as it startlesme\".  5/16/2025 1347 by Eda Appiah RN  Outcome: Not Progressing  5/16/2025 1328 by Eda Appiah RN  Outcome: Not Progressing  Goal: Adheres to Safety Considerations for Self and Others  5/16/2025 1347 by Eda Appiah RN  Outcome: Not Progressing  5/16/2025 1328 by Eda Appiah RN  Outcome: Not Progressing  Intervention: Develop and Maintain Individualized Safety Plan  Recent Flowsheet Documentation  Taken 5/16/2025 1324 by Eda Appiah RN  Safety Measures:   environmental rounds completed   safety rounds completed  Goal: Absence of New-Onset Illness or Injury  5/16/2025 1347 by Eda Appiah RN  Outcome: Not Progressing  5/16/2025 1328 by Eda Appiah RN  Outcome: Not Progressing  Goal: Optimized Coping Skills in Response to Life Stressors  5/16/2025 1347 by Eda Appiah RN  Outcome: Not Progressing  5/16/2025 1328 by Eda Appiah RN  Outcome: Not Progressing  Goal: Develops/Participates in Therapeutic [place] to Support Successful Transition  5/16/2025 1347 by Eda Appiah RN  Outcome: Not Progressing  5/16/2025 1328 by Eda Appiah RN  Outcome: Not Progressing   "   Problem: Suicide Risk  Goal: Absence of Self-Harm  5/16/2025 1347 by Eda Appiah RN  Outcome: Not Progressing  5/16/2025 1328 by Eda Appiah RN  Outcome: Not Progressing     Problem: Sleep Disturbance  Goal: Adequate Sleep/Rest  5/16/2025 1347 by Eda Appiah RN  Outcome: Not Progressing  5/16/2025 1328 by Eda Appiah RN  Outcome: Not Progressing     Problem: Diabetes  Goal: Optimal Coping  5/16/2025 1347 by Eda Appiah RN  Outcome: Not Progressing  5/16/2025 1328 by Eda Appiah RN  Outcome: Not Progressing  Goal: Optimal Functional Ability  5/16/2025 1347 by Eda Appiah RN  Outcome: Not Progressing  5/16/2025 1328 by Eda Appiah RN  Outcome: Not Progressing  Goal: Blood Glucose Level Within Target Range  5/16/2025 1347 by Eda Appiah RN  Outcome: Not Progressing  5/16/2025 1328 by Eda Appiah RN  Outcome: Not Progressing  Goal: Minimize Risk of Hypoglycemia  5/16/2025 1347 by Eda Appiah RN  Outcome: Not Progressing  5/16/2025 1328 by Eda Appiah RN  Outcome: Not Progressing     Problem: Psychotic Signs/Symptoms  Goal: Improved Behavioral Control (Psychotic Signs/Symptoms)  5/16/2025 1347 by Eda Appiah RN  Outcome: Not Progressing  5/16/2025 1328 by Eda Appiah RN  Outcome: Not Progressing  Goal: Optimal Cognitive Function (Psychotic Signs/Symptoms)  5/16/2025 1347 by Eda Appiah RN  Outcome: Not Progressing  5/16/2025 1328 by Eda Appiah RN  Outcome: Not Progressing  Goal: Increased Participation and Engagement (Psychotic Signs/Symptoms)  5/16/2025 1347 by Eda Appiah RN  Outcome: Not Progressing  5/16/2025 1328 by Eda Appiah RN  Outcome: Not Progressing  Goal: Improved Mood Symptoms (Psychotic Signs/Symptoms)  5/16/2025 1347 by Eda Appiah RN  Outcome: Not Progressing  5/16/2025 1328 by Eda Appiah RN  Outcome: Not Progressing  Goal: Improved Psychomotor Symptoms (Psychotic Signs/Symptoms)  5/16/2025 1347 by Eda Appiah RN  Outcome: Not  Progressing  5/16/2025 1328 by Eda Appiah RN  Outcome: Not Progressing  Goal: Decreased Sensory Symptoms (Psychotic Signs/Symptoms)  5/16/2025 1347 by Eda Appiah RN  Outcome: Not Progressing  5/16/2025 1328 by Eda Appiah RN  Outcome: Not Progressing  Goal: Improved Sleep (Psychotic Signs/Symptoms)  5/16/2025 1347 by Eda Appiah RN  Outcome: Not Progressing  5/16/2025 1328 by Eda Appiah RN  Outcome: Not Progressing  Goal: Enhanced Social, Occupational or Functional Skills (Psychotic Signs/Symptoms)  5/16/2025 1347 by Eda Appiah RN  Outcome: Not Progressing  5/16/2025 1328 by Eda Appiah RN  Outcome: Not Progressing   Goal Outcome Evaluation:

## 2025-05-16 NOTE — PLAN OF CARE
Team Note Due:  Wednesday    Assessment/Intervention/Current Symtoms and Care Coordination:  Chart review and met with team, discussed pt progress, symptomology, and response to treatment.  Discussed the discharge plan and any potential impediments to discharge.    Contacted Cheryle at [facility] requesting update on status of referral.    Contacted Tiffanie at [facility] requesting update. She stated she would remind sites with availability to review the referral.    Discharge Plan or Goal:  IRTS vs [facility] vs [facility]    Discharge Date/ time: TBD  Transportation: TBD  Provisional Discharge: Yes[x]  No[]  AVS Completed: []   IP Tracker Form Completed: []     Barriers to Discharge:  Patient requires further psychiatric stabilization due to current symptomology, medication management with changes subject to provider, coordination with outside supports, and aftercare planning. Pt is also under civil commitment.      Referral Status:  [facility]AYNE ([facility]) - records sent 5/13  Cheryle: [phone]   [facility] IRTS - sent 4/28  Phone interview completed 5/5. Not accepted to Transfer Home. Reviewing for other sites as of 5/12.  [facility] IRTS - sent 5/14  [facility] - sent 5/14  [facility] - sent 4/29  [facility] - sent 5/6. Declined due to substance use.  [facility] - sent 5/6. Declined due to medical concerns.  [facility] - sent 5/6. Declined due to pt's resistance to participate in programming.  [facility] - declined. Recommended pt attend CD tx/IRTS before being considered for a referral.    IOP CD referrals:  [facility] - made 5/2 by CD . Denied 5/5 due to acuity of MH.  [facility] - made 5/2 by CD   [facility] - made 5/2 by CD   [facility] - made 5/2 by CD      Legal Status:  MI Commitment and [facility]: [place]  File Number: [id number]  Start and expiration date of commitment: 04/24/2025 - 10/24/2025    [facility] meds: Zyprexa, Haldol, Abilify, Risperdal    :  [facility] (assigned CM pending)    Contacts:   Haydee Mares (CADI CM): [phone]   Tamika Johnson- (sister): [phone]  Lucille Reddy (Intervention ): [phone]     Upcoming Meetings and Dates/Important Information and next steps:  Follow up on referrals  Schedule OP follow up  Notify medicine team of any impending discharge at least 2-3 days prior in order to have a safe management plan for her diabetes  Draft PD/COS  Send PD and discharge summary to OLLIE  Send PD and COS to Fariha Ruiz

## 2025-05-16 NOTE — PROGRESS NOTES
Rehab Group    Start time: 1015  End time: 1100  Patient time total: 30 minutes    came in and out of group session    #6 attended   Group Type: occupational therapy   Group Topic Covered: coping skills and educational support     Group Session Detail:  Discussion group surrounding what mental health wellness vs breakdown leading to crisis looks like. Creation of wellness toolbox, creating an extensive list of coping skills to be used at different times and situation.Worksheets provided.        Patient Response/Contribution:  worked intermittently, required prompts or assistance to participate, distracted , and inattentive     Patient Detail:  Pt arrived to group with labile affect. Pt was oriented to group and verbalized understanding. Pt was intermittently engaged in group disucssion, participating with limited insight and then staring out window and not verbalizing at times. Pt became tangential discussing support people and her relationship with her sister. Pt was somewhat receptive to group discussion about use of coping skills, although was observed to not fill out provided worksheet. Will continue to encourage attendance and participation.            [id number] OT Group (2 or more in attendance)      Patient Active Problem List   Diagnosis    Suicidal ideation    Type 2 diabetes mellitus (H)    Chronic hepatitis C (H)    Schizophrenia (H)    Acid reflux disease    Hoarding behavior

## 2025-05-16 NOTE — PLAN OF CARE
BEH IP Unit Acuity Rating Score (UARS)  Patient is given one point for every criteria they meet.    CRITERIA SCORING   On a 72 hour hold, court hold, committed, stay of commitment, or revocation. 1    Patient LOS on BEH unit exceeds 20 days. 1  LOS: 31   Patient under guardianship, 55+, otherwise medically complex, or under age 11. 1   Suicide ideation without relief of precipitating factors. 0   Current plan for suicide. 0   Current plan for homicide. 0   Imminent risk or actual attempt to seriously harm another without relief of factors precipitating the attempt. 0   Severe dysfunction in daily living (ex: complete neglect for self care, extreme disruption in vegetative function, extreme deterioration in social interactions). 1   Recent (last 7 days) or current physical aggression in the ED or on unit. 0   Restraints or seclusion episode in past 72 hours. 0   Recent (last 7 days) or current verbal aggression, agitation, yelling, etc., while in the ED or unit. 0   Active psychosis. 1   Need for constant or near constant redirection (from leaving, from others, etc).  0   Intrusive or disruptive behaviors. 0   Patient requires 3 or more hours of individualized nursing care per 8-hour shift (i.e. for ADLs, meds, therapeutic interventions). 0   TOTAL 4

## 2025-05-16 NOTE — PLAN OF CARE
Problem: Adult Behavioral Health Plan of Care  Goal: Plan of Care Review  Outcome: Progressing  Flowsheets (Taken 5/15/2025 1621)  Patient Agreement with Plan of Care: agrees   Goal Outcome Evaluation:    Plan of Care Reviewed With: patient          Pt was visible in the milieu most of the shift. She was observed watching TV with selected peers. She socializing and interacting with selected peers. Affect was flat but pleasant upon approach. She was able to make her needs known. Appearance was unkempt. Intake was adequate. She was cooperative with assessment. She denied pain and all mental health psych symptoms. She contracted for safety. She was medication compliant. No prn given this shift. Blood sugar was 238 at dinner. Vitals were WNL with b/p 150/78 but was rechecked for 146/77. No other concern noted at this time. Will continue to monitor and will assist if need arise

## 2025-05-16 NOTE — PROGRESS NOTES
Rehab Group    Start time: 1315  End time: 1405  Patient time total: 15 minutes    attended partial group    #8 attended   Group Type: OT Clinic   Group Topic Covered: balanced lifestyle, coping skills, healthy leisure time, and social skills     Group Session Detail:  Occupational therapy clinic to facilitate coping skill exploration, creative expression within personally meaningful activities, and clinical observation of social, cognitive, and kinesthetic performance skills     Patient Response/Contribution:  cooperative with task, safe use of materials/supplies, and actively engaged     Patient Detail:  Pt arrived to group late, requesting to engage in preferred cognitive activity, reading cookbooks. Demonstrated good focus, planning, and problem solving for selected cognitive activity. Able to ask for assistance as needed. Pt left group early to eat a snack and did not return. Will continue to encourage attendance and participation.         [id number] OT Group (2 or more in attendance)      Patient Active Problem List   Diagnosis    Suicidal ideation    Type 2 diabetes mellitus (H)    Chronic hepatitis C (H)    Schizophrenia (H)    Acid reflux disease    Hoarding behavior

## 2025-05-16 NOTE — PROGRESS NOTES
"  ----------------------------------------------------------------------------------------------------------  [facility]  Psychiatry Progress Note  Hospital Day #31     Interim History:     The patient's care was discussed with the treatment team and chart notes were reviewed.    Identifier: Anastasiya Simon is a 59 year old female with a previous psychiatric diagnosis of schizophrenia and polysubstance use as well as type II diabetes (managed with metformin 1000mg and glipizide 10mg) admitted from [facility] ED on 04/15/2025 due to concern for SI and psychosis in the context of psychosocial stressors including living situation and argument with family member.    Vitals:Temp: 98.7  F (37.1  C) Temp src: Oral BP: (!) 146/77 Pulse: 96     SpO2: 96 % O2 Device: None (Room air)   Height: 165.1 cm (5' 5\") Weight: 72.1 kg (159 lb)  Estimated body mass index is 26.46 kg/m  as calculated from the following:    Height as of this encounter: 1.651 m (5' 5\").    Weight as of this encounter: 72.1 kg (159 lb).  Sleep: 6.75 hours (05/16/25 0600)  Scheduled medications: Took all scheduled medications as prescribed.  Psychiatric PRN medications:     Staff Report:   Patient alert and conversant. She presented with full range affect. She denied pain and all mental health symptoms. Patient was visible in the milieu. She's cooperative with cares. She's on BG checks. Patient has poor insight. She came to the nurse's station if she can wash her clothes because she's going home today. There was no discharge order in place. Patient's eating and drinking well. No PRN given this shift.    Medically cleared for discharge.    Blood sugar was 238 at dinner. Vitals were WNL with b/p 150/78 but was rechecked for 146/77.     Last BS was 91 at 800AM.    Pt appears to have slept for 6.75  hours.    LFTs came back normal.     Subjective:     Patient Interview:  Anastasiya was interviewed in the " "milieu. She reported feeling fine. She states that she continues to have difficulty with sleeping as she feels that her mattress is very uncomfortable. She requested to have new sheets and pillows and we let her know that we would let staff know. When asked about her higher glucose levels in the afternoons, she reports that she snacks a lot before dinner. We discussed eating more vegetables and fruits and she states that she is interested in eating more fruits. She denies any A/V/H. She also denies any SI/HI.       Objective:     Vitals:  BP (!) 146/77 (BP Location: Left arm, Patient Position: Sitting, Cuff Size: Adult Regular)   Pulse 96   Temp 98.7  F (37.1  C)   Resp 18   Ht 1.651 m (5' 5\")   Wt 72.1 kg (159 lb)   LMP 10/22/2013   SpO2 96%   BMI 26.46 kg/m      Allergies:  Allergies   Allergen Reactions    Tetracycline Unknown     It happened when pt was a baby       Current Medications:  Scheduled:  Current Facility-Administered Medications   Medication Dose Route Frequency Provider Last Rate Last Admin    ARIPiprazole (ABILIFY) tablet 15 mg  15 mg Oral Daily Lucy Chandler MD   15 mg at 05/16/25 0825    calcium carbonate (TUMS) chewable tablet 500 mg  500 mg Oral Daily PRN Tanja Gonzalez MD   500 mg at 05/05/25 2159    glucose gel 15-30 g  15-30 g Oral Q15 Min PRN Katina Armendariz MD        Or    dextrose 50 % injection 25-50 mL  25-50 mL Intravenous Q15 Min PRN Katina Armendariz MD        Or    glucagon injection 1 mg  1 mg Subcutaneous Q15 Min PRN Katina Armendariz MD        divalproex sodium extended-release (DEPAKOTE ER) 24 hr tablet 1,000 mg  1,000 mg Oral At Bedtime Tanja Gonzalez MD   1,000 mg at 05/15/25 2114    glipiZIDE (GLUCOTROL XL) 24 hr tablet 10 mg  10 mg Oral Daily with breakfast Katina Meza PA   10 mg at 05/16/25 0825    hydrOXYzine HCl (ATARAX) tablet 25 mg  25 mg Oral Q4H PRN Lucy Chandler MD   25 mg at " 05/11/25 2116    loperamide (IMODIUM) capsule 2 mg  2 mg Oral 4x Daily PRN Tanja Gonzalez MD   2 mg at 05/12/25 0252    magnesium sulfate (EPSOM SALT) granules 1 Tablespoonful  1 Tablespoonful Topical Daily PRN Tanja Gonzalez MD        melatonin tablet 3 mg  3 mg Oral At Bedtime PRN Katina Armendariz MD   3 mg at 05/13/25 0219    metFORMIN (GLUCOPHAGE) tablet 1,000 mg  1,000 mg Oral BID w/meals Charlotte Jasmine PA-C   1,000 mg at 05/16/25 0826    multivitamin RENAL (RENAVITE RX/NEPHROVITE) tablet 1 tablet  1 tablet Oral Daily Lucy Chandler MD   1 tablet at 05/16/25 0825    OLANZapine (zyPREXA) tablet 10 mg  10 mg Oral TID PRN Lucy Chandler MD        Or    OLANZapine (zyPREXA) injection 10 mg  10 mg Intramuscular TID PRN Lucy Chandler MD        pantoprazole (PROTONIX) EC tablet 40 mg  40 mg Oral QAM AC Lucy Chandler MD   40 mg at 05/16/25 0826    tolterodine ER (DETROL LA) 24 hr capsule 2 mg  2 mg Oral Daily Tanja Gonzalez MD   2 mg at 05/16/25 0826     PRN:  Current Facility-Administered Medications   Medication Dose Route Frequency Provider Last Rate Last Admin    ARIPiprazole (ABILIFY) tablet 15 mg  15 mg Oral Daily Lucy Chandler MD   15 mg at 05/16/25 0825    calcium carbonate (TUMS) chewable tablet 500 mg  500 mg Oral Daily PRN Tanja Gonzalez MD   500 mg at 05/05/25 2159    glucose gel 15-30 g  15-30 g Oral Q15 Min PRN Katina Armendariz MD        Or    dextrose 50 % injection 25-50 mL  25-50 mL Intravenous Q15 Min PRN Katina Armendariz MD        Or    glucagon injection 1 mg  1 mg Subcutaneous Q15 Min PRN Katina Armendariz MD        divalproex sodium extended-release (DEPAKOTE ER) 24 hr tablet 1,000 mg  1,000 mg Oral At Bedtime Tanja Gonzalez MD   1,000 mg at 05/15/25 2114    glipiZIDE (GLUCOTROL XL) 24 hr tablet 10 mg  10 mg Oral Daily with breakfast Katina Meza  PA   10 mg at 05/16/25 0825    hydrOXYzine HCl (ATARAX) tablet 25 mg  25 mg Oral Q4H PRN Lucy Chandler MD   25 mg at 05/11/25 2116    loperamide (IMODIUM) capsule 2 mg  2 mg Oral 4x Daily PRN Tanja Gonzalez MD   2 mg at 05/12/25 0252    magnesium sulfate (EPSOM SALT) granules 1 Tablespoonful  1 Tablespoonful Topical Daily PRN Tanja Gonzalez MD        melatonin tablet 3 mg  3 mg Oral At Bedtime PRN Katina Armendariz MD   3 mg at 05/13/25 0219    metFORMIN (GLUCOPHAGE) tablet 1,000 mg  1,000 mg Oral BID w/meals Charlotte Jasmine PA-C   1,000 mg at 05/16/25 0826    multivitamin RENAL (RENAVITE RX/NEPHROVITE) tablet 1 tablet  1 tablet Oral Daily Lucy Chandler MD   1 tablet at 05/16/25 0825    OLANZapine (zyPREXA) tablet 10 mg  10 mg Oral TID PRN Lucy Chandler MD        Or    OLANZapine (zyPREXA) injection 10 mg  10 mg Intramuscular TID PRN Lucy Chandler MD        pantoprazole (PROTONIX) EC tablet 40 mg  40 mg Oral QAM AC Lucy Chandler MD   40 mg at 05/16/25 0826    tolterodine ER (DETROL LA) 24 hr capsule 2 mg  2 mg Oral Daily Tanja Gonzalez MD   2 mg at 05/16/25 0826     Labs and Imaging:  New results:   Recent Results (from the past 24 hours)   Glucose by meter    Collection Time: 05/15/25  6:07 PM   Result Value Ref Range    GLUCOSE BY METER POCT 238 (H) 70 - 99 mg/dL   Glucose by meter    Collection Time: 05/16/25  7:50 AM   Result Value Ref Range    GLUCOSE BY METER POCT 91 70 - 99 mg/dL     Data this admission:  CBC: WNL. WBC: 7.6, RBC: 4.37, Hb: 13.0, Platelets: 316.  CMP: Unremarkable (4/22) AST: 13, ALT: 10, Creatinine: 0.65.  UDS: Negative  RASHIDA: 0.00 (4/13)  HbA1c: 10.1%  TSH: 2.40  Vit: B12: 812  Folate: 13.7  Valproic Acid Free & Total: 67  Valproic Acid Free & Total STAT: 75.6  Phosphorus: 4.6 (H)  Magnesium: 1.6 (L)  BMP: unremarkable (4/28)    Imaging:  Bladder Scan: Completed on 4/22      "Mental Status Exam:   Oriented to:  Grossly oriented.   General: Alert and awake.   Appearance:  Appears older than stated age. Grooming is unkempt.   Behavior/Attitude:  Tired appearing.    Eye Contact: Eyes stayed closed for most of interview.   Psychomotor: No evidence of tics, dystonia, or tardive dyskinesia. No catatonia present.  Speech: Appropriate rate/tone/volume.   Language: Fluent in English with appropriate syntax and vocabulary.  Mood:  \"I feel fine\"  Affect: Congruent with mood. Sleepy, tired  Thought Process: Perseverative, ruminative, tangential  Thought Content: No apparent SI, A/V/H  Associations:  questionable  Insight:  impaired unable to understand current condition  Judgment:  impaired, unable to take care for herself in ADLs  Impulse control: fair  Attention Span:  adequate for conversation  Concentration:  grossly intact  Recent and Remote Memory:  Not formally assessed. Pt reports memory issues. Staff notes forgetfulness.   Fund of Knowledge: Average.  Muscle Strength and Tone: Normal.  Gait and Station: Normal.     Psychiatric Assessment     Anastasiya Simon is a 59 year old female previously diagnosed with schizophrenia and polysubstance use disorder who presented with suicidal ideation, disorganization, and inability to care for self. Most recent psychiatric hospitalization was 03/20/2019 for disorganization and suicidal ideation. Significant symptoms on admission include increasing suicidal ideation, disorganization, erratic behavior such as filling buckets with ammonia and mixing with bleach, substance use, auditory hallucinations, and paranoia. The MSE on admission was pertinent for AVH, paranoia, limited insight into MH. Psychological contributions to mental health presentation include maladaptive coping through substance use and limited insight into MH. Social factors contributing to mental health presentation include interpersonal conflicts and unstable living environment. " Protective factors include support from two sisters and absence of previous suicide attempts.     Anastasiya was re-started on Zyprexa 20 mg and her Depakote dose was increased to 1000 mg to address the ongoing psychosis and mood symptoms. Tolterodine and PRN loperamide were started to address incontinence and diarrhea. Due to T2DM neuropathy has been a concern for which we started gabapentin 300 mg as well. Due to concerns of metabolic side effects regarding underline diabetes it was decided to cross-titrate to Abilify from Zyprexa. Zyprexa was discontinued on 5/7/25.      In summary, the patient's reported symptoms of SI, erractic behavior, AVH, and paranoia in the context of psychosocial stressers are consistent with decompensated schizophrenia and polysubstance use disorder.  She will likely benefit from outpatient MH, as well as referrals for placement that can give some structure considering her requirements, and working with her CM for future discharge plans. To further assist with this it was addressed the need to follow through with outpatient CDT which is something Anastasiya is in agreement. We decided to continue current medication regimen with Depakote and Abilify to minimize risk of decompensation given current stability.     Abilify MACKEY was also considered to make medication management easier for Anastasiya, however, Anastasiya is not open to change Abilify formulation to MACKEY as she is concerned about risk of skin infection from injection. We will consider this approach later on.    Per social work, still awaiting updates on group home and IRTS placement. At this point working on simplify medication list for future plans of discharge.       Psychiatric Plan by Diagnosis      Today's changes:  - None    # Schizophrenia  Medications:  - depakote 1000 mg HS  - Abilify 15 mg daily in the morning    #Polysubstance Use Disorder  - CD consult once psychotic sx more stable, if pt amenable  - CD consulted on 5/1. CD acknowledged  and will visit on 5/2.    2. Pertinent Labs/Monitoring:  - Labs done 04/13 at OSH. CBC wnl, glucose elevated to 332, BMP wnl.  - UDS negative on 04/13  - 4/16 HbA1c: 10.1  - Depakote Levels: 75.6    - 5/15 LFTs: Normal    3. Additional Plans:  - Patient will be treated in therapeutic milieu with appropriate individual and group therapies as described.   - Consulted Internal Medicine for diabetes management.   - Referrals  [facility] ([facility]) - records sent 5/13  [facility]: [phone]   [facility] IRTS - sent 4/28  Phone interview completed 5/5. Not accepted to [facility]. Reviewing for other sites as of 5/12.  [facility] I[facility]S - sent 5/14  [facility] - sent 5/14  [facility] - sent 4/29  [facility] IRTS - sent 5/6. Declined due to substance use.  [facility] I[facility]S - sent 5/6. Declined due to medical concerns.  [facility] IRTS - sent 5/6. Declined due to pt's resistance to participate in programming.  [facility] - declined. Recommended pt attend CD tx/IRTS before being considered for a referral.     IOP CD referrals:  [facility] - made 5/2 by CD . Denied 5/5 due to acuity of MH.  [facility] - made 5/2 by CD   [facility] - made 5/2 by CD      Psychiatric Hospital Course:      Anastasiya Simon was admitted to [facility] on a 72 hour hold (started 4/13/25 21:35) from [facility] ED on 04/15/25.   Medications:  Continued Medications:  Pt seen by psychiatry consult at [facility] who started zyprexa 20 mg at bedtime and depakote 500 mg at bedtime. Both of these medications were continued on admission to [facility].   New Medications:  Started loperamide for diarrhea  Started tolterodine for urinary incontinence  Started gabapentin for peripheral neuropathy    4/16: Increased depakote from 500 mg to 1000 mg HS for mood regulation  4/16: Increased metformin from 500 mg to 1000 mg BID per medicine consult  4/16: Zyprexa: 5 mg in the morning and 15 mg HS  4/16: Started PRN Loperamide 2 mg  4x/day for diarrhea  4/16: Started Tolterodine 2 mg daily for urinary incontinence  4/18: Started Gabapentin 300 mg PRN 3x/day for peripheral neuropathy  4/20: Started glipizide 24 hr tablet 5 mg daily for better glycemic control per medicine consult  4/25: Increased glipizide XL from 5mg to 10mg per medicine consult.  4/28: Started Abilify 5 mg PO daily in the morning  4/30: Increased Abilify to 10 mg daily in the morning  4/30: Decreased olanzapine to only 15 mg HS  5/2: Increased Abilify to 15 mg daily in the morning  5/2: Decreased Zyprexa to 10 mg HS  5/5: Decrease Zyprexa to 5 mg HS  5/7: Discontinue Zyprexa 5mg    The risks, benefits, alternatives, and side effects were discussed and understood by the patient and other caregivers.     Medical Assessment and Plan     Medical diagnoses to be addressed this admission:      #Type 2 Diabetes Mellitis with Complications  - PTA metformin 1000 mg BID with meals  - BID glucose checks; hypoglycemia protocol  - PRN gabapentin 300 mg 3x/day for peripheral neuropathy  - Insulin aspart (Novolog) injection 1-5 units HS  - Insulin aspart (Novolog) injection 3x/day 1-7 units before meals  - Glipizide 24 hr tablet 10 mg daily with breakfast for better glycemic control  - Internal Medicine Consult  - Nutrition Consult  - Moderate Consistent Carb Diet  - Given patient is starting insulin, please notify medicine team of any impending discharge at least 2-3 days prior in order to have a safe management plan for her diabetes.  - As of 5/1, Medicine will sign off at this time. OK to stop sliding scale insulin.  - Nutrition consult was placed to addressed current dietary needs at Anastasiya's request    #Diarrhea  - Loperamide 2 mg PRN 4x/day    #Urinary Incontinence  - Tolterodine 2 mg daily    Medical course:  Patient was physically examined by the ED prior to being transferred to the unit and was found to be medically stable and appropriate for admission.    Consults:   4/16:  Internal Medicine consulted for management of diabetes and blood pressure.   4/16: Increased metformin to 1000 mg BID.   4/18: Started PRN gabapentin 300 mg 3x/day for peripheral neuropathy.   4/18: Internal Medicine started Novolog injection 1-5 units HS and Novolog injection 3x/day 1-7 units before meals for better glycemic control. Given patient is starting insulin, please notify medicine team of any impending discharge at least 2-3 days prior in order to have a safe management plan for her diabetes.  4/19: Internal Medicine increased to high intensity sliding scale. Novolog injection 1-7 units HS. Novolog injection 3x/day 1-10 units before meals for better glycemic control.   4/20: Started PO glipizide 24 hr tablet 5 mg daily with breakfast.   4/21: Started pt on a consistent carb diet.   4/22: Nutrition Consult for diabetes management.   4/24: Note from Medicine: Please send patient with glucometer on discharge. Diabetes education should be ordered prior to discharge.  4/25: Increase Glipizide XL to 10 mg daily starting on 4/26. Continue Metformin 1000 mg BID. Decreased to moderate intensity sliding scale. Novolog injection 1-5 units HS. Novolog injection 3x/day 1-7 units before meals.. Hopefully can discontinue in coming days. Moderate consistent CHO diet.   4/27: Continues to require 7U of insulin and is not quite ready to wean off of her sliding scale insulin yet. Will continue to monitor and adjust medications with goal of having her wean off sliding scale insulin prior to discharge.  5/1: Currently the patient is medically stable and Medicine will sign off at this time. Recommendations relayed to primary team via this progress note. Please notify on-call [facility] if new questions or concerns arise. OK to stop sliding scale insulin. Continue BG checks BID. Continue glipizide 10 mg daily. Continue Metformin 1000 mg BID. Follow up with PCP for continued BG monitoring within 1-2 weeks of discharge.   5/1: Chemical  Dependency consulted. Consult acknowledge and will see patient on 5/2.     Checklist     Legal Status: [place] Commitment and [place]   County: [place]  File Number: [id number]  Start and expiration date of commitment: 04/24/2025 - 10/24/2025  [facility] meds: Zyprexa, Haldol, Abilify, Risperdal    Safety Assessment:   Behavioral Orders   Procedures    Code 1 - Restrict to Unit    Routine Programming     As clinically indicated    Status 15     Every 15 minutes.    Suicide precautions: Suicide Risk: MODERATE; Clinical rationale to override score: modification to the care environment, response to medication, lack of access to a plan for self-harm, Exhibiting Suicidal/self-harm behaviors or thoughts     Patients on Suicide Precautions should have a Combination Diet ordered that includes a Diet selection(s) AND a Behavioral Tray selection for Safe Tray - with utensils, or Safe Tray - NO utensils       Suicide Risk:   MODERATE     Clinical rationale to override score::   modification to the care environment     Clinical rationale to override score::   response to medication     Clinical rationale to override score::   lack of access to a plan for self-harm     Clinical rationale to override score::   Exhibiting Suicidal/self-harm behaviors or thoughts     Risk Assessment:  Risk for harm is low to moderate.  Risk factors: maladaptive coping, substance use, impulsive, and past behaviors.  Protective factors: family.    SIO: None    Disposition: TBD. Disposition pending clinical stabilization, medication optimization and development of an appropriate discharge plan.     Attestations     Laurel Ledbetter, MS3  Medical Student      Attending Attestation:  I saw and evaluated the patient. Vital signs, laboratory testing, and medications reviewed. I agree with the history, findings, assessment and plan documented by Laurel Ledbetter. I personally spent a total of 50 minutes on care for this patient on the date of the encounter. This  includes face-to-face as well as non-face-to-face time reviewing the chart, lab review, and documentation of coordination of care, further activities as noted above and discharge planning.     CLAUDIA Gant MD  [facility]  Department of Psychiatry & Behavioral Sciences  Date of Service (when I saw the patient): 05/16/25

## 2025-05-17 LAB
GLUCOSE BLDC GLUCOMTR-MCNC: 108 MG/DL (ref 70–99)
GLUCOSE BLDC GLUCOMTR-MCNC: 96 MG/DL (ref 70–99)

## 2025-05-17 PROCEDURE — 124N000002 HC R&B MH UMMC

## 2025-05-17 PROCEDURE — 97150 GROUP THERAPEUTIC PROCEDURES: CPT | Mod: GO

## 2025-05-17 PROCEDURE — 250N000013 HC RX MED GY IP 250 OP 250 PS 637: Performed by: PHYSICIAN ASSISTANT

## 2025-05-17 PROCEDURE — 250N000013 HC RX MED GY IP 250 OP 250 PS 637

## 2025-05-17 RX ADMIN — CALCIUM CARBONATE (ANTACID) CHEW TAB 500 MG 500 MG: 500 CHEW TAB at 21:26

## 2025-05-17 RX ADMIN — DIVALPROEX SODIUM 1000 MG: 500 TABLET, FILM COATED, EXTENDED RELEASE ORAL at 20:05

## 2025-05-17 RX ADMIN — GLIPIZIDE 10 MG: 2.5 TABLET, FILM COATED, EXTENDED RELEASE ORAL at 08:40

## 2025-05-17 RX ADMIN — METFORMIN HYDROCHLORIDE 1000 MG: 500 TABLET, FILM COATED ORAL at 08:40

## 2025-05-17 RX ADMIN — PANTOPRAZOLE SODIUM 40 MG: 40 TABLET, DELAYED RELEASE ORAL at 08:40

## 2025-05-17 RX ADMIN — METFORMIN HYDROCHLORIDE 1000 MG: 500 TABLET, FILM COATED ORAL at 18:41

## 2025-05-17 RX ADMIN — TOLTERODINE TARTRATE 2 MG: 2 CAPSULE, EXTENDED RELEASE ORAL at 08:40

## 2025-05-17 RX ADMIN — ARIPIPRAZOLE 15 MG: 15 TABLET ORAL at 08:40

## 2025-05-17 RX ADMIN — Medication 3 MG: at 20:05

## 2025-05-17 RX ADMIN — Medication 1 TABLET: at 08:40

## 2025-05-17 ASSESSMENT — ACTIVITIES OF DAILY LIVING (ADL)
ADLS_ACUITY_SCORE: 47
HYGIENE/GROOMING: INDEPENDENT
ADLS_ACUITY_SCORE: 47
ADLS_ACUITY_SCORE: 47
DRESS: INDEPENDENT
ADLS_ACUITY_SCORE: 47
DRESS: INDEPENDENT
ADLS_ACUITY_SCORE: 47
ADLS_ACUITY_SCORE: 47
ORAL_HYGIENE: INDEPENDENT
ADLS_ACUITY_SCORE: 47
HYGIENE/GROOMING: HANDWASHING;INDEPENDENT
ADLS_ACUITY_SCORE: 47
ADLS_ACUITY_SCORE: 47
ORAL_HYGIENE: INDEPENDENT

## 2025-05-17 NOTE — PLAN OF CARE
"  Rehab Group    Start time: 13:20  End time: 14:00  Patient time total: 30 minutes (only billable for one unit due to low attendance)    attended partial group    #2 attended   Group Type: occupational therapy   Group Topic Covered: coping skills and Physical activity     Group Session Detail:  OT provided education regarding the therapeutic benefits of physical activity for overall mind-body wellness. OT provided verbal instruction and physical demonstration of various full body exercises emphasizing the potential benefits of active engagement, such as: reduction in muscle tension and/or pain, improvement in blood circulation, enhanced cardiovascular health, improved/stabilized mood, improved self-esteem and self-confidence, and promotion of overall well-being.      Patient Response/Contribution:  cooperative with task, listened actively, and actively engaged     Patient Detail:  Upon check-in, patient reported that \"hiking\" is one activity she enjoys which promotes physical wellness in her life. Patient followed verbal instructions and demonstration for various full body exercises; appropriately asking for clarification/help as needed. Patient engaged in 100% of the guided movements within her physical capability. Patient was sociable throughout group; responding appropriately to therapeutic prompts. Patient reported a positive response to the activity at the conclusion, stating, \"I feel better actually.\" Patient remained pleasant in interactions and thanked writer for group upon exit.       [id number] OT Group (2 or more in attendance)      Patient Active Problem List   Diagnosis    Suicidal ideation    Type 2 diabetes mellitus (H)    Chronic hepatitis C (H)    Schizophrenia (H)    Acid reflux disease    Hoarding behavior        "

## 2025-05-17 NOTE — PLAN OF CARE
No PRNs given or requested this shift. No c/o pain or discomfort noted/reported. Pt remained in her room the entire shift. Safety checks completed every 15 minutes ; no concerns noted. Pt appears to have slept for 7 hours; will continue to monitor and offer support.     Problem: Sleep Disturbance  Goal: Adequate Sleep/Rest  5/17/2025 0406 by Michael Bear, RN  Outcome: Progressing  5/17/2025 0317 by Michael Bear, RN  Outcome: Progressing   Goal Outcome Evaluation:

## 2025-05-17 NOTE — PROGRESS NOTES
Rehab Group    Start time: 2000  End time: 2050  Patient time total: 30 minutes    attended partial group    #6 attended   Group Type: recreation   Group Topic Covered: activity therapy, healthy leisure time, and social skills       Group Session Detail:  TR leisure group     Patient Response/Contribution:  cooperative with task and actively engaged       Patient Detail:    Pt participated in a structured Therapeutic Recreation group with a focus on leisure participation, communication skills, and social engagement via a group game. Pt entered group, but remained engaged throughout the activity.  Pt mood was sociable and was appropriate with interactions, but she spoke softly and mumbled, making it difficult to understand what she was saying. On a couple occasions, pt shared sense of humor with peers during the group and appeared to brighten with social interaction.       Activity Therapy Per 15 min ()      Patient Active Problem List   Diagnosis    Suicidal ideation    Type 2 diabetes mellitus (H)    Chronic hepatitis C (H)    Schizophrenia (H)    Acid reflux disease    Hoarding behavior

## 2025-05-17 NOTE — PLAN OF CARE
Problem: Adult Behavioral Health Plan of Care  Goal: Adheres to Safety Considerations for Self and Others  Outcome: Progressing  Flowsheets (Taken 5/16/2025 1903)  Adheres to Safety Considerations for Self and Others: making progress toward outcome     Problem: Suicide Risk  Goal: Absence of Self-Harm  Outcome: Progressing     Problem: Adult Behavioral Health Plan of Care  Goal: Adheres to Safety Considerations for Self and Others  Outcome: Progressing  Flowsheets (Taken 5/16/2025 1903)  Adheres to Safety Considerations for Self and Others: making progress toward outcome   Goal Outcome Evaluation:    Plan of Care Reviewed With: patient Plan of Care Reviewed With: patient    Overall Patient Progress: improvingOverall Patient Progress: improving      Alert and oriented, able to communicate needs. Visible in milieu, interactive with peers and staff members. She had a brief verbal argument with a peer, easily de-escalated. Labile mood, cooperative and medication compliant. She denied any anxiety or depression. She denied any suicidal/homicidal ideation. ADLs WNL, no pain or discomfort. Food and fluid intake WNL.

## 2025-05-17 NOTE — PLAN OF CARE
"Pt denies SI.  Her blood sugar before breakfast was 96. Pt has appeared to be eating well. Pt has asked for several crystal lights.  Pt in lounge most of shift.  Pt continues to report being tired of her long stay in the hospital.  Pt has been medication compliant.  No bx issues.    Problem: Adult Behavioral Health Plan of Care  Goal: Plan of Care Review  Outcome: Not Progressing  Goal: Patient-Specific Goal (Individualization)  Description: You can add care plan individualizations to a care plan. Examples of Individualization might be:  \"Parent requests to be called daily at 9am for status\", \"I have a hard time hearing out of my right ear\", or \"Do not touch me to wake me up as it startlesme\".  Outcome: Not Progressing  Goal: Adheres to Safety Considerations for Self and Others  Outcome: Not Progressing  Intervention: Develop and Maintain Individualized Safety Plan  Recent Flowsheet Documentation  Taken 5/17/2025 1051 by Eda Appiah RN  Safety Measures:   safety rounds completed   environmental rounds completed  Goal: Absence of New-Onset Illness or Injury  Outcome: Not Progressing  Goal: Optimized Coping Skills in Response to Life Stressors  Outcome: Not Progressing  Goal: Develops/Participates in Therapeutic [place] to Support Successful Transition  Outcome: Not Progressing     Problem: Suicide Risk  Goal: Absence of Self-Harm  Outcome: Not Progressing     Problem: Sleep Disturbance  Goal: Adequate Sleep/Rest  Outcome: Not Progressing     Problem: Diabetes  Goal: Optimal Coping  Outcome: Not Progressing  Goal: Optimal Functional Ability  Outcome: Not Progressing  Goal: Blood Glucose Level Within Target Range  Outcome: Not Progressing  Goal: Minimize Risk of Hypoglycemia  Outcome: Not Progressing     Problem: Psychotic Signs/Symptoms  Goal: Improved Behavioral Control (Psychotic Signs/Symptoms)  Outcome: Not Progressing  Goal: Optimal Cognitive Function (Psychotic Signs/Symptoms)  Outcome: Not " Progressing  Goal: Increased Participation and Engagement (Psychotic Signs/Symptoms)  Outcome: Not Progressing  Goal: Improved Mood Symptoms (Psychotic Signs/Symptoms)  Outcome: Not Progressing  Goal: Improved Psychomotor Symptoms (Psychotic Signs/Symptoms)  Outcome: Not Progressing  Goal: Decreased Sensory Symptoms (Psychotic Signs/Symptoms)  Outcome: Not Progressing  Goal: Improved Sleep (Psychotic Signs/Symptoms)  Outcome: Not Progressing  Goal: Enhanced Social, Occupational or Functional Skills (Psychotic Signs/Symptoms)  Outcome: Not Progressing   Goal Outcome Evaluation:

## 2025-05-18 LAB
GLUCOSE BLDC GLUCOMTR-MCNC: 102 MG/DL (ref 70–99)
GLUCOSE BLDC GLUCOMTR-MCNC: 209 MG/DL (ref 70–99)

## 2025-05-18 PROCEDURE — 250N000013 HC RX MED GY IP 250 OP 250 PS 637

## 2025-05-18 PROCEDURE — 250N000013 HC RX MED GY IP 250 OP 250 PS 637: Performed by: PHYSICIAN ASSISTANT

## 2025-05-18 PROCEDURE — 124N000002 HC R&B MH UMMC

## 2025-05-18 RX ADMIN — Medication 3 MG: at 20:02

## 2025-05-18 RX ADMIN — GLIPIZIDE 10 MG: 2.5 TABLET, FILM COATED, EXTENDED RELEASE ORAL at 08:37

## 2025-05-18 RX ADMIN — Medication 1 TABLET: at 08:37

## 2025-05-18 RX ADMIN — DIVALPROEX SODIUM 1000 MG: 500 TABLET, FILM COATED, EXTENDED RELEASE ORAL at 20:02

## 2025-05-18 RX ADMIN — METFORMIN HYDROCHLORIDE 1000 MG: 500 TABLET, FILM COATED ORAL at 08:37

## 2025-05-18 RX ADMIN — TOLTERODINE TARTRATE 2 MG: 2 CAPSULE, EXTENDED RELEASE ORAL at 08:37

## 2025-05-18 RX ADMIN — METFORMIN HYDROCHLORIDE 1000 MG: 500 TABLET, FILM COATED ORAL at 18:16

## 2025-05-18 RX ADMIN — PANTOPRAZOLE SODIUM 40 MG: 40 TABLET, DELAYED RELEASE ORAL at 08:37

## 2025-05-18 RX ADMIN — ARIPIPRAZOLE 15 MG: 15 TABLET ORAL at 08:37

## 2025-05-18 ASSESSMENT — ACTIVITIES OF DAILY LIVING (ADL)
ADLS_ACUITY_SCORE: 47
HYGIENE/GROOMING: HANDWASHING;INDEPENDENT
ADLS_ACUITY_SCORE: 47
HYGIENE/GROOMING: INDEPENDENT
ADLS_ACUITY_SCORE: 47
ORAL_HYGIENE: INDEPENDENT
DRESS: INDEPENDENT
ADLS_ACUITY_SCORE: 47
ADLS_ACUITY_SCORE: 47
ORAL_HYGIENE: INDEPENDENT
ADLS_ACUITY_SCORE: 47
DRESS: INDEPENDENT
ADLS_ACUITY_SCORE: 47
ADLS_ACUITY_SCORE: 47

## 2025-05-18 NOTE — PLAN OF CARE
Problem: Adult Behavioral Health Plan of Care  Goal: Adheres to Safety Considerations for Self and Others  Outcome: Progressing  Flowsheets (Taken 5/17/2025 2015)  Adheres to Safety Considerations for Self and Others: making progress toward outcome     Problem: Suicide Risk  Goal: Absence of Self-Harm  Outcome: Progressing     Problem: Psychotic Signs/Symptoms  Goal: Enhanced Social, Occupational or Functional Skills (Psychotic Signs/Symptoms)  Intervention: Promote Social, Occupational and Functional Ability  Recent Flowsheet Documentation  Taken 5/17/2025 1900 by Harish Rivas RN  Trust Relationship/Rapport:   care explained   choices provided   emotional support provided   empathic listening provided   questions answered   questions encouraged   reassurance provided   thoughts/feelings acknowledged   Goal Outcome Evaluation:    Plan of Care Reviewed With: patient Plan of Care Reviewed With: patient    Overall Patient Progress: improvingOverall Patient Progress: improving     Alert and oriented, able to communicate needs. Visible in milieu, interactive with peers and staff members. Labile mood, cooperative and medication compliant.Fixated on intent to discharge, redirectable. She denied any anxiety or depression. She denied any suicidal/homicidal ideation. ADLs WNL, no pain or discomfort. Food and fluid intake WNL.

## 2025-05-18 NOTE — PLAN OF CARE
"Blood sugar before breakfast was 102.  Pt reports frustration about still being here.  Pt states that they can take some of my social security.  Pt reports feel like no one is working on getting her placement.  Pt spent most of shift in lounge.  Selectively social with peers. Other patients were reporting odor coming from pt.  Pt encouraged to shower.  Which she did.  Pt been eating her meals. Filled out menu for tomorrow after being reminded.  Pt denies SI.    Problem: Adult Behavioral Health Plan of Care  Goal: Plan of Care Review  Outcome: Not Progressing  Goal: Patient-Specific Goal (Individualization)  Description: You can add care plan individualizations to a care plan. Examples of Individualization might be:  \"Parent requests to be called daily at 9am for status\", \"I have a hard time hearing out of my right ear\", or \"Do not touch me to wake me up as it startlesme\".  Outcome: Not Progressing  Goal: Adheres to Safety Considerations for Self and Others  Outcome: Not Progressing  Intervention: Develop and Maintain Individualized Safety Plan  Recent Flowsheet Documentation  Taken 5/18/2025 1048 by Eda Appiah RN  Safety Measures:   environmental rounds completed   safety rounds completed  Goal: Absence of New-Onset Illness or Injury  Outcome: Not Progressing  Goal: Optimized Coping Skills in Response to Life Stressors  Outcome: Not Progressing  Goal: Develops/Participates in Therapeutic [place] to Support Successful Transition  Outcome: Not Progressing     Problem: Suicide Risk  Goal: Absence of Self-Harm  Outcome: Not Progressing     Problem: Sleep Disturbance  Goal: Adequate Sleep/Rest  Outcome: Not Progressing     Problem: Diabetes  Goal: Optimal Coping  Outcome: Not Progressing  Goal: Optimal Functional Ability  Outcome: Not Progressing  Goal: Blood Glucose Level Within Target Range  Outcome: Not Progressing  Goal: Minimize Risk of Hypoglycemia  Outcome: Not Progressing     Problem: Psychotic " Signs/Symptoms  Goal: Improved Behavioral Control (Psychotic Signs/Symptoms)  Outcome: Not Progressing  Goal: Optimal Cognitive Function (Psychotic Signs/Symptoms)  Outcome: Not Progressing  Goal: Increased Participation and Engagement (Psychotic Signs/Symptoms)  Outcome: Not Progressing  Goal: Improved Mood Symptoms (Psychotic Signs/Symptoms)  Outcome: Not Progressing  Goal: Improved Psychomotor Symptoms (Psychotic Signs/Symptoms)  Outcome: Not Progressing  Goal: Decreased Sensory Symptoms (Psychotic Signs/Symptoms)  Outcome: Not Progressing  Goal: Improved Sleep (Psychotic Signs/Symptoms)  Outcome: Not Progressing  Goal: Enhanced Social, Occupational or Functional Skills (Psychotic Signs/Symptoms)  Outcome: Not Progressing   Goal Outcome Evaluation:

## 2025-05-18 NOTE — PLAN OF CARE
No c/o pain or discomfort noted/reported this shift; No PRNs given or requested. Safety checks completed every 15 minutes ; no concerns noted. Pt appears to have slept for 7 hours; will continue to monitor and offer support.        Problem: Sleep Disturbance  Goal: Adequate Sleep/Rest  Outcome: Progressing   Goal Outcome Evaluation:

## 2025-05-18 NOTE — PLAN OF CARE
Problem: Suicide Risk  Goal: Absence of Self-Harm  5/18/2025 1847 by Harish Rivas, RN  Outcome: Progressing  5/18/2025 1847 by Harish Rivas RN  Outcome: Progressing     Problem: Adult Behavioral Health Plan of Care  Goal: Absence of New-Onset Illness or Injury  5/18/2025 1847 by Harish Rivas RN  Outcome: Progressing  5/18/2025 1847 by Harish Rivas RN  Outcome: Progressing     Problem: Adult Behavioral Health Plan of Care  Goal: Adheres to Safety Considerations for Self and Others  5/18/2025 1847 by Harish Rivas RN  Outcome: Progressing  Flowsheets (Taken 5/18/2025 1847)  Adheres to Safety Considerations for Self and Others: making progress toward outcome     Problem: Diabetes  Goal: Blood Glucose Level Within Target Range  Outcome: Progressing   Goal Outcome Evaluation:    Plan of Care Reviewed With: patient Plan of Care Reviewed With: patient    Overall Patient Progress: improvingOverall Patient Progress: improving     Patient presented as calm, pleasant, and cooperative. She was alert and oriented, able to communicate needs, she was social and engaged with peers and staff members in milieu. ADLs WNL, denied any pain or discomfort. Had adequate food and fluids intake. She denied any anxiety or depression, she denied SI/HI, contracted for safety. .

## 2025-05-19 LAB
GLUCOSE BLDC GLUCOMTR-MCNC: 116 MG/DL (ref 70–99)
GLUCOSE BLDC GLUCOMTR-MCNC: 187 MG/DL (ref 70–99)

## 2025-05-19 PROCEDURE — 250N000013 HC RX MED GY IP 250 OP 250 PS 637

## 2025-05-19 PROCEDURE — 250N000013 HC RX MED GY IP 250 OP 250 PS 637: Performed by: PHYSICIAN ASSISTANT

## 2025-05-19 PROCEDURE — 97150 GROUP THERAPEUTIC PROCEDURES: CPT | Mod: GO

## 2025-05-19 PROCEDURE — 124N000002 HC R&B MH UMMC

## 2025-05-19 PROCEDURE — 99232 SBSQ HOSP IP/OBS MODERATE 35: CPT | Mod: GC | Performed by: PSYCHIATRY & NEUROLOGY

## 2025-05-19 RX ADMIN — METFORMIN HYDROCHLORIDE 1000 MG: 500 TABLET, FILM COATED ORAL at 18:13

## 2025-05-19 RX ADMIN — GLIPIZIDE 10 MG: 2.5 TABLET, FILM COATED, EXTENDED RELEASE ORAL at 08:36

## 2025-05-19 RX ADMIN — DIVALPROEX SODIUM 1000 MG: 500 TABLET, FILM COATED, EXTENDED RELEASE ORAL at 20:13

## 2025-05-19 RX ADMIN — ARIPIPRAZOLE 15 MG: 15 TABLET ORAL at 08:36

## 2025-05-19 RX ADMIN — LOPERAMIDE HYDROCHLORIDE 2 MG: 2 CAPSULE ORAL at 20:13

## 2025-05-19 RX ADMIN — PANTOPRAZOLE SODIUM 40 MG: 40 TABLET, DELAYED RELEASE ORAL at 08:36

## 2025-05-19 RX ADMIN — Medication 1 TABLET: at 08:36

## 2025-05-19 RX ADMIN — TOLTERODINE TARTRATE 2 MG: 2 CAPSULE, EXTENDED RELEASE ORAL at 08:36

## 2025-05-19 RX ADMIN — METFORMIN HYDROCHLORIDE 1000 MG: 500 TABLET, FILM COATED ORAL at 08:36

## 2025-05-19 ASSESSMENT — ACTIVITIES OF DAILY LIVING (ADL)
ADLS_ACUITY_SCORE: 47
LAUNDRY: WITH SUPERVISION
DRESS: INDEPENDENT
ADLS_ACUITY_SCORE: 47
HYGIENE/GROOMING: HANDWASHING;INDEPENDENT
LAUNDRY: WITH SUPERVISION
ADLS_ACUITY_SCORE: 47
ORAL_HYGIENE: INDEPENDENT
ADLS_ACUITY_SCORE: 47
DRESS: INDEPENDENT
ADLS_ACUITY_SCORE: 47
ORAL_HYGIENE: INDEPENDENT
HYGIENE/GROOMING: HANDWASHING
ADLS_ACUITY_SCORE: 47

## 2025-05-19 NOTE — PLAN OF CARE
BEH IP Unit Acuity Rating Score (UARS)  Patient is given one point for every criteria they meet.    CRITERIA SCORING   On a 72 hour hold, court hold, committed, stay of commitment, or revocation. 1    Patient LOS on BEH unit exceeds 20 days. 1  LOS: 34   Patient under guardianship, 55+, otherwise medically complex, or under age 11. 1   Suicide ideation without relief of precipitating factors. 0   Current plan for suicide. 0   Current plan for homicide. 0   Imminent risk or actual attempt to seriously harm another without relief of factors precipitating the attempt. 0   Severe dysfunction in daily living (ex: complete neglect for self care, extreme disruption in vegetative function, extreme deterioration in social interactions). 1   Recent (last 7 days) or current physical aggression in the ED or on unit. 0   Restraints or seclusion episode in past 72 hours. 0   Recent (last 7 days) or current verbal aggression, agitation, yelling, etc., while in the ED or unit. 0   Active psychosis. 1   Need for constant or near constant redirection (from leaving, from others, etc).  0   Intrusive or disruptive behaviors. 0   Patient requires 3 or more hours of individualized nursing care per 8-hour shift (i.e. for ADLs, meds, therapeutic interventions). 0   TOTAL 4

## 2025-05-19 NOTE — PLAN OF CARE
"  Problem: Adult Behavioral Health Plan of Care  Goal: Plan of Care Review  Outcome: Progressing  Flowsheets  Taken 5/19/2025 1405  Plan of Care Reviewed With: patient  Taken 5/19/2025 0800  Patient Agreement with Plan of Care: agrees  Goal: Patient-Specific Goal (Individualization)  Description: You can add care plan individualizations to a care plan. Examples of Individualization might be:  \"Parent requests to be called daily at 9am for status\", \"I have a hard time hearing out of my right ear\", or \"Do not touch me to wake me up as it startlesme\".  Outcome: Progressing  Goal: Adheres to Safety Considerations for Self and Others  Outcome: Progressing  Intervention: Develop and Maintain Individualized Safety Plan  Recent Flowsheet Documentation  Taken 5/19/2025 0800 by Nadja Perez RN  Safety Measures:   environmental rounds completed   safety rounds completed  Goal: Absence of New-Onset Illness or Injury  Outcome: Progressing  Goal: Optimized Coping Skills in Response to Life Stressors  Outcome: Progressing  Intervention: Promote Effective Coping Strategies  Recent Flowsheet Documentation  Taken 5/19/2025 0905 by Nadja Perez RN  Supportive Measures: active listening utilized  Taken 5/19/2025 0800 by Nadja Perez RN  Supportive Measures: active listening utilized  Goal: Develops/Participates in Therapeutic [place] to Support Successful Transition  Outcome: Progressing  Intervention: Foster Therapeutic [place]  Recent Flowsheet Documentation  Taken 5/19/2025 0800 by Nadja Perez RN  Trust Relationship/Rapport:   care explained   choices provided   emotional support provided   empathic listening provided   questions answered   questions encouraged   reassurance provided   thoughts/feelings acknowledged       Goal Outcome Evaluation:    Plan of Care Reviewed With: patient     Patient was intermittently visible in the milieu. Patient was social and  interactive with peers and staff " member. She denied pain. Denied SI/HI/AVH and contracted for safety. Patient was cooperative and complaint with medication. Her hygiene was appropriate. Her intake was adequate. BG this shift was 116. There was no PRN requested this shift. Patient attended group OT X2  this shift. There was no behavioral escalations or safety concerns reported this shift. Will continue the current plan of care and notify the provider of any acute concerns.

## 2025-05-19 NOTE — PROGRESS NOTES
"  ----------------------------------------------------------------------------------------------------------  [facility]  Psychiatry Progress Note  Hospital Day #34     Interim History:     The patient's care was discussed with the treatment team and chart notes were reviewed.    Identifier: Anastasiya Simon is a 59 year old female with a previous psychiatric diagnosis of schizophrenia and polysubstance use as well as type II diabetes (managed with metformin 1000mg and glipizide 10mg) admitted from [facility] ED on 04/15/2025 due to concern for SI and psychosis in the context of psychosocial stressors including living situation and argument with family member.    Vitals:Temp: 97.7  F (36.5  C) Temp src: Temporal BP: 124/72 Pulse: 75   Resp: 18 SpO2: 97 % O2 Device: None (Room air)   Height: 165.1 cm (5' 5\") Weight: 72.4 kg (159 lb 11.2 oz)  Estimated body mass index is 26.58 kg/m  as calculated from the following:    Height as of this encounter: 1.651 m (5' 5\").    Weight as of this encounter: 72.4 kg (159 lb 11.2 oz).  Sleep: 6.5 hours (25 0638)  Scheduled medications: Took all scheduled medications as prescribed.  Psychiatric PRN medications:   Last 24H PRN:     melatonin tablet 3 mg, 3 mg at 25    Staff Report:   Over the weekend, it was uneventful. Continues to ask about discharge.      - B before breakfast and 209 before dinner. Snacking may contribute to the afternoon BS spikes. Bars and drinks.     Most recent B is 116.    Overnight events: Appears to have slept for 6.5 hours     PRNs: Melatonin 3mg given yesterday () at 8PM       Subjective:     Patient Interview:  Anastasiya was interviewed in the milieu. She reported feeling fine. She states that she has had improvement with her sleep after receiving another blanket. Her only concern was about placement and she asked if she any additional interviews today. She is " "scheduled to have an interview at 1PM today and states she is excited as she likes the location.       Objective:     Vitals:  /72 (BP Location: Right arm)   Pulse 75   Temp 97.7  F (36.5  C) (Temporal)   Resp 18   Ht 1.651 m (5' 5\")   Wt 72.4 kg (159 lb 11.2 oz)   LMP 10/22/2013   SpO2 97%   BMI 26.58 kg/m      Allergies:  Allergies   Allergen Reactions    Tetracycline Unknown     It happened when pt was a baby       Current Medications:  Scheduled:  Current Facility-Administered Medications   Medication Dose Route Frequency Provider Last Rate Last Admin    ARIPiprazole (ABILIFY) tablet 15 mg  15 mg Oral Daily Lucy Chandler MD   15 mg at 05/19/25 0836    calcium carbonate (TUMS) chewable tablet 500 mg  500 mg Oral Daily PRN Tanja Gonzalez MD   500 mg at 05/17/25 2126    glucose gel 15-30 g  15-30 g Oral Q15 Min PRN Katina Armendariz MD        Or    dextrose 50 % injection 25-50 mL  25-50 mL Intravenous Q15 Min PRN Katina Armendariz MD        Or    glucagon injection 1 mg  1 mg Subcutaneous Q15 Min PRN Katina Armendariz MD        divalproex sodium extended-release (DEPAKOTE ER) 24 hr tablet 1,000 mg  1,000 mg Oral At Bedtime Tanja Gonzalez MD   1,000 mg at 05/18/25 2002    glipiZIDE (GLUCOTROL XL) 24 hr tablet 10 mg  10 mg Oral Daily with breakfast Katina Meza PA   10 mg at 05/19/25 0836    hydrOXYzine HCl (ATARAX) tablet 25 mg  25 mg Oral Q4H PRN Lucy Chandler MD   25 mg at 05/11/25 2116    loperamide (IMODIUM) capsule 2 mg  2 mg Oral 4x Daily PRN Tanja Gonzalez MD   2 mg at 05/12/25 0252    magnesium sulfate (EPSOM SALT) granules 1 Tablespoonful  1 Tablespoonful Topical Daily PRN Tanja Gonzalez MD        melatonin tablet 3 mg  3 mg Oral At Bedtime PRN Katina Armendariz MD   3 mg at 05/18/25 2002    metFORMIN (GLUCOPHAGE) tablet 1,000 mg  1,000 mg Oral BID w/meals Charlotte Jasmine PA-C   1,000 mg at 05/19/25 0836    " multivitamin RENAL (RENAVITE RX/NEPHROVITE) tablet 1 tablet  1 tablet Oral Daily Lucy Chandler MD   1 tablet at 05/19/25 0836    OLANZapine (zyPREXA) tablet 10 mg  10 mg Oral TID PRN Lucy Chandler MD        Or    OLANZapine (zyPREXA) injection 10 mg  10 mg Intramuscular TID PRN Lucy Chandler MD        pantoprazole (PROTONIX) EC tablet 40 mg  40 mg Oral QAM AC Lucy Chandler MD   40 mg at 05/19/25 0836    tolterodine ER (DETROL LA) 24 hr capsule 2 mg  2 mg Oral Daily Tanja Gonzalez MD   2 mg at 05/19/25 0836     PRN:  Current Facility-Administered Medications   Medication Dose Route Frequency Provider Last Rate Last Admin    ARIPiprazole (ABILIFY) tablet 15 mg  15 mg Oral Daily Lucy Chandler MD   15 mg at 05/19/25 0836    calcium carbonate (TUMS) chewable tablet 500 mg  500 mg Oral Daily PRN Tanja Gonzalez MD   500 mg at 05/17/25 2126    glucose gel 15-30 g  15-30 g Oral Q15 Min PRN Katina Armendariz MD        Or    dextrose 50 % injection 25-50 mL  25-50 mL Intravenous Q15 Min PRN Katina Armendariz MD        Or    glucagon injection 1 mg  1 mg Subcutaneous Q15 Min PRN Katina Armendariz MD        divalproex sodium extended-release (DEPAKOTE ER) 24 hr tablet 1,000 mg  1,000 mg Oral At Bedtime Tanja Gonzalez MD   1,000 mg at 05/18/25 2002    glipiZIDE (GLUCOTROL XL) 24 hr tablet 10 mg  10 mg Oral Daily with breakfast Katina Meza PA   10 mg at 05/19/25 0836    hydrOXYzine HCl (ATARAX) tablet 25 mg  25 mg Oral Q4H PRN Lucy Chandler MD   25 mg at 05/11/25 2116    loperamide (IMODIUM) capsule 2 mg  2 mg Oral 4x Daily PRN Tanja Gonzalez MD   2 mg at 05/12/25 0252    magnesium sulfate (EPSOM SALT) granules 1 Tablespoonful  1 Tablespoonful Topical Daily PRN Tanja Gonzalez MD        melatonin tablet 3 mg  3 mg Oral At Bedtime PRN Katina Armendariz MD   3 mg  at 05/18/25 2002    metFORMIN (GLUCOPHAGE) tablet 1,000 mg  1,000 mg Oral BID w/meals Charlotte Jasmine PA-C   1,000 mg at 05/19/25 0836    multivitamin RENAL (RENAVITE RX/NEPHROVITE) tablet 1 tablet  1 tablet Oral Daily Lucy Chandler MD   1 tablet at 05/19/25 0836    OLANZapine (zyPREXA) tablet 10 mg  10 mg Oral TID PRN Lucy Chandler MD        Or    OLANZapine (zyPREXA) injection 10 mg  10 mg Intramuscular TID PRN Lucy Chandler MD        pantoprazole (PROTONIX) EC tablet 40 mg  40 mg Oral QAM AC Lucy Chandler MD   40 mg at 05/19/25 0836    tolterodine ER (DETROL LA) 24 hr capsule 2 mg  2 mg Oral Daily Tanja Gonzalez MD   2 mg at 05/19/25 0836     Labs and Imaging:  New results:   Recent Results (from the past 24 hours)   Glucose by meter    Collection Time: 05/18/25  6:12 PM   Result Value Ref Range    GLUCOSE BY METER POCT 209 (H) 70 - 99 mg/dL   Glucose by meter    Collection Time: 05/19/25  7:38 AM   Result Value Ref Range    GLUCOSE BY METER POCT 116 (H) 70 - 99 mg/dL     Data this admission:  CBC: WNL. WBC: 7.6, RBC: 4.37, Hb: 13.0, Platelets: 316.  CMP: Unremarkable (4/22) AST: 13, ALT: 10, Creatinine: 0.65.  UDS: Negative  RASHIDA: 0.00 (4/13)  HbA1c: 10.1%  TSH: 2.40  Vit: B12: 812  Folate: 13.7  Valproic Acid Free & Total: 67  Valproic Acid Free & Total STAT: 75.6  Phosphorus: 4.6 (H)  Magnesium: 1.6 (L)  BMP: unremarkable (4/28)    Imaging:  Bladder Scan: Completed on 4/22     Mental Status Exam:   Oriented to:  Grossly oriented.   General: Alert and awake.   Appearance:  Appears older than stated age. Grooming is unkempt.   Behavior/Attitude:  Tired appearing.    Eye Contact: Eyes stayed closed for most of interview.   Psychomotor: No evidence of tics, dystonia, or tardive dyskinesia. No catatonia present.  Speech: Appropriate rate/tone/volume.   Language: Fluent in English with appropriate syntax and vocabulary.  Mood:   "\"I feel fine\"  Affect: Congruent with mood. Sleepy, tired  Thought Process: Perseverative, ruminative, tangential  Thought Content: No apparent SI, A/V/H  Associations:  questionable  Insight:  impaired unable to understand current condition  Judgment:  impaired, unable to take care for herself in ADLs  Impulse control: fair  Attention Span:  adequate for conversation  Concentration:  grossly intact  Recent and Remote Memory:  Not formally assessed. Pt reports memory issues. Staff notes forgetfulness.   Fund of Knowledge: Average.  Muscle Strength and Tone: Normal.  Gait and Station: Normal.     Psychiatric Assessment     Anastasiya Simon is a 59 year old female previously diagnosed with schizophrenia and polysubstance use disorder who presented with suicidal ideation, disorganization, and inability to care for self. Most recent psychiatric hospitalization was 03/20/2019 for disorganization and suicidal ideation. Significant symptoms on admission include increasing suicidal ideation, disorganization, erratic behavior such as filling buckets with ammonia and mixing with bleach, substance use, auditory hallucinations, and paranoia. The MSE on admission was pertinent for AVH, paranoia, limited insight into MH. Psychological contributions to mental health presentation include maladaptive coping through substance use and limited insight into MH. Social factors contributing to mental health presentation include interpersonal conflicts and unstable living environment. Protective factors include support from two sisters and absence of previous suicide attempts.     Anastasiya was re-started on Zyprexa 20 mg and her Depakote dose was increased to 1000 mg to address the ongoing psychosis and mood symptoms. Tolterodine and PRN loperamide were started to address incontinence and diarrhea. Due to T2DM neuropathy has been a concern for which we started gabapentin 300 mg as well. Due to concerns of metabolic side effects regarding " underline diabetes it was decided to cross-titrate to Abilify from Zyprexa. Zyprexa was discontinued on 5/7/25.      In summary, the patient's reported symptoms of SI, erractic behavior, AVH, and paranoia in the context of psychosocial stressers are consistent with decompensated schizophrenia and polysubstance use disorder.  She will likely benefit from outpatient MH, as well as referrals for placement that can give some structure considering her requirements, and working with her CM for future discharge plans. To further assist with this it was addressed the need to follow through with outpatient CDT which is something Anastasiya is in agreement. We decided to continue current medication regimen with Depakote and Abilify to minimize risk of decompensation given current stability.     Abilify MACKEY was also considered to make medication management easier for Anastasiya, however, Anastasiya is not open to change Abilify formulation to MACKEY as she is concerned about risk of skin infection from injection. We will consider this approach later on.    Per social work, still awaiting updates on residential and IRTS placement. At this point working on simplify medication list for future plans of discharge.       Psychiatric Plan by Diagnosis      Today's changes:  - None    # Schizophrenia  Medications:  - depakote 1000 mg HS  - Abilify 15 mg daily in the morning    #Polysubstance Use Disorder  - CD consult once psychotic sx more stable, if pt amenable  - CD consulted on 5/1. CD acknowledged and will visit on 5/2.    2. Pertinent Labs/Monitoring:  - Labs done 04/13 at OSH. CBC wnl, glucose elevated to 332, BMP wnl.  - UDS negative on 04/13  - 4/16 HbA1c: 10.1  - Depakote Levels: 75.6    - 5/15 LFTs: Normal    3. Additional Plans:  - Patient will be treated in therapeutic milieu with appropriate individual and group therapies as described.   - Consulted Internal Medicine for diabetes management.   - Referrals  In Process:  [facility]  ([facility]) - records sent 5/13  [facility]: [phone]   [facility] - sent 4/28  Phone interview completed 5/5. Not accepted to [facility]. Reviewing for other sites as of 5/12.  [facility] - sent 4/29     Declined:  [facility] - sent 5/6. Declined due to substance use.  [facility] - sent 5/6. Declined due to medical concerns.  [facility] - sent 5/6. Declined due to pt's resistance to participate in programming.  [facility] - declined. Recommended pt attend CD tx/IRTS before being considered for a referral.  [facility] - sent 5/14. Declined due to high A1C and incontinence noted.  [facility] - sent 5/14. Declined due to concern for pt's ability to participate (pt confused, endorsing paranoia, extremely low insight during phone interview).     [facility] referrals - no longer pursuing.  [facility] - made 5/2 by CD . Denied 5/5 due to acuity of MH.  [facility] - made 5/2 by CD   [facility] - made 5/2 by CD   [facility] - made 5/2 by CD        Psychiatric Hospital Course:      Anastasiya Simon was admitted to [facility] on a 72 hour hold (started 4/13/25 21:35) from [facility] ED on 04/15/25.   Medications:  Continued Medications:  Pt seen by psychiatry consult at [facility] who started zyprexa 20 mg at bedtime and depakote 500 mg at bedtime. Both of these medications were continued on admission to [facility].   New Medications:  Started loperamide for diarrhea  Started tolterodine for urinary incontinence  Started gabapentin for peripheral neuropathy    4/16: Increased depakote from 500 mg to 1000 mg HS for mood regulation  4/16: Increased metformin from 500 mg to 1000 mg BID per medicine consult  4/16: Zyprexa: 5 mg in the morning and 15 mg HS  4/16: Started PRN Loperamide 2 mg 4x/day for diarrhea  4/16: Started Tolterodine 2 mg daily for urinary incontinence  4/18: Started Gabapentin 300 mg PRN 3x/day for peripheral neuropathy  4/20: Started glipizide 24 hr  tablet 5 mg daily for better glycemic control per medicine consult  4/25: Increased glipizide XL from 5mg to 10mg per medicine consult.  4/28: Started Abilify 5 mg PO daily in the morning  4/30: Increased Abilify to 10 mg daily in the morning  4/30: Decreased olanzapine to only 15 mg HS  5/2: Increased Abilify to 15 mg daily in the morning  5/2: Decreased Zyprexa to 10 mg HS  5/5: Decrease Zyprexa to 5 mg HS  5/7: Discontinue Zyprexa 5mg    The risks, benefits, alternatives, and side effects were discussed and understood by the patient and other caregivers.     Medical Assessment and Plan     Medical diagnoses to be addressed this admission:      #Type 2 Diabetes Mellitis with Complications  - PTA metformin 1000 mg BID with meals  - BID glucose checks; hypoglycemia protocol  - PRN gabapentin 300 mg 3x/day for peripheral neuropathy  - Insulin aspart (Novolog) injection 1-5 units HS  - Insulin aspart (Novolog) injection 3x/day 1-7 units before meals  - Glipizide 24 hr tablet 10 mg daily with breakfast for better glycemic control  - Internal Medicine Consult  - Nutrition Consult  - Moderate Consistent Carb Diet  - Given patient is starting insulin, please notify medicine team of any impending discharge at least 2-3 days prior in order to have a safe management plan for her diabetes.  - As of 5/1, Medicine will sign off at this time. OK to stop sliding scale insulin.  - Nutrition consult was placed to addressed current dietary needs at Anastasiya's request    #Diarrhea  - Loperamide 2 mg PRN 4x/day    #Urinary Incontinence  - Tolterodine 2 mg daily    Medical course:  Patient was physically examined by the ED prior to being transferred to the unit and was found to be medically stable and appropriate for admission.    Consults:   4/16: Internal Medicine consulted for management of diabetes and blood pressure.   4/16: Increased metformin to 1000 mg BID.   4/18: Started PRN gabapentin 300 mg 3x/day for peripheral neuropathy.    4/18: Internal Medicine started Novolog injection 1-5 units HS and Novolog injection 3x/day 1-7 units before meals for better glycemic control. Given patient is starting insulin, please notify medicine team of any impending discharge at least 2-3 days prior in order to have a safe management plan for her diabetes.  4/19: Internal Medicine increased to high intensity sliding scale. Novolog injection 1-7 units HS. Novolog injection 3x/day 1-10 units before meals for better glycemic control.   4/20: Started PO glipizide 24 hr tablet 5 mg daily with breakfast.   4/21: Started pt on a consistent carb diet.   4/22: Nutrition Consult for diabetes management.   4/24: Note from Medicine: Please send patient with glucometer on discharge. Diabetes education should be ordered prior to discharge.  4/25: Increase Glipizide XL to 10 mg daily starting on 4/26. Continue Metformin 1000 mg BID. Decreased to moderate intensity sliding scale. Novolog injection 1-5 units HS. Novolog injection 3x/day 1-7 units before meals.. Hopefully can discontinue in coming days. Moderate consistent CHO diet.   4/27: Continues to require 7U of insulin and is not quite ready to wean off of her sliding scale insulin yet. Will continue to monitor and adjust medications with goal of having her wean off sliding scale insulin prior to discharge.  5/1: Currently the patient is medically stable and Medicine will sign off at this time. Recommendations relayed to primary team via this progress note. Please notify on-call VINCENZO if new questions or concerns arise. OK to stop sliding scale insulin. Continue BG checks BID. Continue glipizide 10 mg daily. Continue Metformin 1000 mg BID. Follow up with PCP for continued BG monitoring within 1-2 weeks of discharge.   5/1: Chemical Dependency consulted. Consult acknowledge and will see patient on 5/2.     Checklist     Legal Status: [facility]: [place]  File Number: [id number]  Start and  expiration date of commitment: 04/24/2025 - 10/24/2025  Castellanos meds: Zyprexa, Haldol, Abilify, Risperdal    Safety Assessment:   Behavioral Orders   Procedures    Code 1 - Restrict to Unit    Routine Programming     As clinically indicated    Status 15     Every 15 minutes.    Suicide precautions: Suicide Risk: MODERATE; Clinical rationale to override score: modification to the care environment, response to medication, lack of access to a plan for self-harm, Exhibiting Suicidal/self-harm behaviors or thoughts     Patients on Suicide Precautions should have a Combination Diet ordered that includes a Diet selection(s) AND a Behavioral Tray selection for Safe Tray - with utensils, or Safe Tray - NO utensils       Suicide Risk:   MODERATE     Clinical rationale to override score::   modification to the care environment     Clinical rationale to override score::   response to medication     Clinical rationale to override score::   lack of access to a plan for self-harm     Clinical rationale to override score::   Exhibiting Suicidal/self-harm behaviors or thoughts     Risk Assessment:  Risk for harm is low to moderate.  Risk factors: maladaptive coping, substance use, impulsive, and past behaviors.  Protective factors: family.    SIO: None    Disposition: TBD. Disposition pending clinical stabilization, medication optimization and development of an appropriate discharge plan.     Attestations     Laurel Ledbetter, MS3  Medical Student    Resident/Fellow Attestation   I, Lucy Rosa MD, was present with the medical/[facility] student who participated in the service and in the documentation of the note.  I have verified the history and personally performed the physical exam and medical decision making.  I agree with the assessment and plan of care as documented in the note.      This patient was seen and discussed with my attending physician.  Lucy Plata MD  N Psychiatry  Resident  05/19/2025

## 2025-05-19 NOTE — PLAN OF CARE
"Team Note Due:  Wednesday    Assessment/Intervention/Current Symtoms and Care Coordination:  Chart review and met with team, discussed pt progress, symptomology, and response to treatment.  Discussed the discharge plan and any potential impediments to discharge.    [facility] declining pt due to feeling as though they cannot meet pt's medical needs (high A1C and incontinence).    Received call from Naveen with [facility] to discuss pt. He is able to complete phone screen at 1pm and confirmed he would follow up with writer afterwards.    Notified pt of phone screen.    Naveen called back to discuss phone screen. They are declining pt, stating \"she doesn't seem to be a good fit for program. Phone interview included a lot of confusion, started slow, can't identify with mental health, can't identify what she can get out of IRTS. Very fixated on find a [facility] for support, not open to therapist. Rapid speech, ruminating thoughts. Wanting to be able to have freedom, fearful of being with 16 other people. Did not endorse any substance use. Started endorsing paranoia about eating around other people, not wanting people to poison her food.\"    Discharge Plan or Goal:  [facility] vs [facility] vs [facility]    Discharge Date/ time: TBD  Transportation: TBD  Provisional Discharge: Yes[x]  No[]  AVS Completed: []   IP Tracker Form Completed: []     Barriers to Discharge:  Patient requires further psychiatric stabilization due to current symptomology, medication management with changes subject to provider, coordination with outside supports, and aftercare planning. Pt is also under civil commitment.      Referral Status:    In Process:  [facility] ([facility]) - records sent 5/13  [facility]: [phone]   [facility] - sent 4/28  Phone interview completed 5/5. Not accepted to [facility]. Reviewing for other sites as of 5/12.  [facility] - sent 4/29    Declined:  [facility] - sent 5/6. Declined due to substance use.  [facility] - sent 5/6. " Declined due to medical concerns.  [facility] - sent 5/6. Declined due to pt's resistance to participate in programming.  [facility] - declined. Recommended pt attend CD tx/IRTS before being considered for a referral.  [facility] - sent 5/14. Declined due to high A1C and incontinence noted.  [facility] - sent 5/14. Declined due to concern for pt's ability to participate (pt confused, endorsing paranoia, extremely low insight during phone interview).    [facility] CD referrals - no longer pursuing.  [facility] - made 5/2 by CD . Denied 5/5 due to acuity of MH.  [facility] - made 5/2 by CD   [facility] - made 5/2 by CD   [facility] - made 5/2 by CD      Legal Status:  MI Commitment and Castellanos   County: [place]  File Number: [id number]  Start and expiration date of commitment: 04/24/2025 - 10/24/2025    Castellanos meds: Zyprexa, Haldol, Abilify, Risperdal    : Mental Health Resources (assigned CM pending)    Contacts:   Haydee Mares (CADI CM): [phone]   Tamika Johnson- (sister): [phone]  Lucille Reddy (Intervention ): [phone]     Upcoming Meetings and Dates/Important Information and next steps:  Follow up on referrals  Schedule OP follow up  Notify medicine team of any impending discharge at least 2-3 days prior in order to have a safe management plan for her diabetes  Draft PD/COS  Send PD and discharge summary to   Send PD and COS to [facility]

## 2025-05-19 NOTE — PROGRESS NOTES
Rehab Group    Start time: 1020  End time: 1155  Patient time total: 30 minutes    attended partial group    #9 attended   Group Type: OT Clinic   Group Topic Covered: balanced lifestyle, coping skills, healthy leisure time, and social skills     Group Session Detail:  Occupational therapy clinic to facilitate coping skill exploration, creative expression within personally meaningful activities, and clinical observation of social, cognitive, and kinesthetic performance skills     Patient Response/Contribution:  socially appropriate, requested more information on topic, worked intermittently, and distracted      Patient Detail:  Pt arrived to group with blunted affect. Pt was oriented to group and required set up A to initiate, gather materials, sequence, and adjust to workspace demands as needed. Demonstrated limited focus, planning, and problem solving for selected cognitive activity today. Able to ask for assistance as needed, and had limited engagement in discussion with peers and staff. Pt appeared preoccupied with upcoming interview today, pt was receptive of support from writer. Will continue to encourage attendance and participation.            [id number] OT Group (2 or more in attendance)      Patient Active Problem List   Diagnosis    Suicidal ideation    Type 2 diabetes mellitus (H)    Chronic hepatitis C (H)    Schizophrenia (H)    Acid reflux disease    Hoarding behavior

## 2025-05-19 NOTE — PROGRESS NOTES
"  Rehab Group    Start time: 1415  End time: 1500  Patient time total: 25 minutes    attended partial group    #4 attended   Group Type: occupational therapy   Group Topic Covered: Physical activity, self-esteem, and social skills     Group Session Detail:  Patient engaged in a movement based group with incorporated mindfulness and discussion aspects. Group participants were asked to follow a sequence of light movements and stretches and were encouraged to reflect on mood with group members before, during, and after movement.      Patient Response/Contribution:  cooperative with task, worked intermittently, required prompts or assistance to participate, distracted , and inattentive     Patient Detail:  Pt arrived to group with flat and blunted affect, presenting as tired and somewhat disorganized this group. Pt was oriented to group and verbalized understanding. Pt was intermittently engaged in group disucssion, and provided exercises. Pt observed to be tangential this group, requiring redirection. Pt described self as \"shrew-wili\" and was unable to identify other attributes, writer provided positive description words and pt selected \"creative\" with encouragement. Pt appeared to become more tired and disorganized as group progressed, requiring increased prompting towards the end of group. Will continue to encourage attendance and participation.           [id number] OT Group (2 or more in attendance)      Patient Active Problem List   Diagnosis    Suicidal ideation    Type 2 diabetes mellitus (H)    Chronic hepatitis C (H)    Schizophrenia (H)    Acid reflux disease    Hoarding behavior       "

## 2025-05-19 NOTE — PLAN OF CARE
Pt remained in the room the whole shift. No PRNs given or requested. No c/o pain or discomfort noted/reported this shift. Safety checks completed every 15 minutes ; no concerns noted. Pt appears to have slept for 6.50 hours; will continue to monitor and offer support.     Problem: Sleep Disturbance  Goal: Adequate Sleep/Rest  Outcome: Progressing   Goal Outcome Evaluation:                             yes

## 2025-05-20 LAB
GLUCOSE BLDC GLUCOMTR-MCNC: 129 MG/DL (ref 70–99)
GLUCOSE BLDC GLUCOMTR-MCNC: 150 MG/DL (ref 70–99)

## 2025-05-20 PROCEDURE — 99232 SBSQ HOSP IP/OBS MODERATE 35: CPT | Mod: GC | Performed by: PSYCHIATRY & NEUROLOGY

## 2025-05-20 PROCEDURE — 250N000013 HC RX MED GY IP 250 OP 250 PS 637

## 2025-05-20 PROCEDURE — 97150 GROUP THERAPEUTIC PROCEDURES: CPT | Mod: GO

## 2025-05-20 PROCEDURE — 124N000002 HC R&B MH UMMC

## 2025-05-20 PROCEDURE — 250N000013 HC RX MED GY IP 250 OP 250 PS 637: Performed by: PHYSICIAN ASSISTANT

## 2025-05-20 RX ADMIN — METFORMIN HYDROCHLORIDE 1000 MG: 500 TABLET, FILM COATED ORAL at 07:54

## 2025-05-20 RX ADMIN — Medication 3 MG: at 20:56

## 2025-05-20 RX ADMIN — METFORMIN HYDROCHLORIDE 1000 MG: 500 TABLET, FILM COATED ORAL at 18:01

## 2025-05-20 RX ADMIN — DIVALPROEX SODIUM 1000 MG: 500 TABLET, FILM COATED, EXTENDED RELEASE ORAL at 20:54

## 2025-05-20 RX ADMIN — ARIPIPRAZOLE 15 MG: 15 TABLET ORAL at 07:54

## 2025-05-20 RX ADMIN — PANTOPRAZOLE SODIUM 40 MG: 40 TABLET, DELAYED RELEASE ORAL at 07:54

## 2025-05-20 RX ADMIN — TOLTERODINE TARTRATE 2 MG: 2 CAPSULE, EXTENDED RELEASE ORAL at 07:54

## 2025-05-20 RX ADMIN — GLIPIZIDE 10 MG: 2.5 TABLET, FILM COATED, EXTENDED RELEASE ORAL at 07:54

## 2025-05-20 RX ADMIN — Medication 1 TABLET: at 07:54

## 2025-05-20 ASSESSMENT — ACTIVITIES OF DAILY LIVING (ADL)
ADLS_ACUITY_SCORE: 47
DRESS: SCRUBS (BEHAVIORAL HEALTH);INDEPENDENT
ADLS_ACUITY_SCORE: 47
HYGIENE/GROOMING: HANDWASHING;INDEPENDENT
ADLS_ACUITY_SCORE: 47
ADLS_ACUITY_SCORE: 47
ORAL_HYGIENE: INDEPENDENT
ADLS_ACUITY_SCORE: 47
LAUNDRY: WITH SUPERVISION
LAUNDRY: WITH SUPERVISION
ORAL_HYGIENE: INDEPENDENT
ADLS_ACUITY_SCORE: 47
DRESS: INDEPENDENT
ADLS_ACUITY_SCORE: 47
HYGIENE/GROOMING: HANDWASHING;INDEPENDENT
ADLS_ACUITY_SCORE: 47
ADLS_ACUITY_SCORE: 47

## 2025-05-20 NOTE — PROGRESS NOTES
Rehab Group    Start time: 1045  End time: 1200  Patient time total: 40 minutes    came in and out of group session    #7 attended   Group Type: OT Clinic   Group Topic Covered: balanced lifestyle, coping skills, healthy leisure time, and social skills     Group Session Detail:  Occupational therapy clinic to facilitate coping skill exploration, creative expression within personally meaningful activities, and clinical observation of social, cognitive, and kinesthetic performance skills     Patient Response/Contribution:  cooperative with task, withdrawn, and did not share thoughts verbally     Patient Detail:  Pt required set up A to initiate, gather materials, sequence, and adjust to workspace demands as needed. Demonstrated limited focus, planning, and problem solving for selected creative expression task. Pt observed to be more withdrawn today than previous observations. When pt was prompted by writer pt often did not respond. Pt observed to mostly touch task items today. Will continue to encourage attendance and participation.         [id number] OT Group (2 or more in attendance)      Patient Active Problem List   Diagnosis    Suicidal ideation    Type 2 diabetes mellitus (H)    Chronic hepatitis C (H)    Schizophrenia (H)    Acid reflux disease    Hoarding behavior

## 2025-05-20 NOTE — PLAN OF CARE
Problem: Adult Behavioral Health Plan of Care  Goal: Plan of Care Review  5/19/2025 1908 by Cathy Grant RN  Outcome: Progressing   Goal Outcome Evaluation:    Plan of Care Reviewed With: patient          Pt was out in the milieu most of the shift. She was observed sitting in the lounge watching TV with selected peers. She socialized and interacted with selected peers. Affect was flat but pleasant upon approach. She was able to make needs known. Hygiene was poor looking very unkempt. Intake was adequate. She was cooperative with assessment. She denied pain and all mental health psych symptoms. She contracted for safety. She was medication compliant. She complained of having loose stools and was given prn given Imodium 2 mg x 1 this shift which was helpful. Blood sugar was 187 at dinner. No other concern noted at this time. Will continue to monitor and will assist if need arise.

## 2025-05-20 NOTE — PLAN OF CARE
No PRNs given or requested. No c/o pain or having loose stools noted/reported this shift. Safety checks completed every 15 minutes ; no concerns noted. Pt appears to have slept for 7 hours; will continue to monitor and offer support.     Problem: Sleep Disturbance  Goal: Adequate Sleep/Rest  Outcome: Progressing   Goal Outcome Evaluation:                             OUTPATIENT SLEEP STUDY ON DISCHARGE    Cardiac Rehabilitation: Discharge instructions        Cardiac rehabilitation is a program for people who have a heart problem, such as a heart attack, coronary stent placed, heart failure, or a heart valve disease. The program includes exercise, lifestyle changes, education, and emotional support. Cardiac rehab can help you improve the quality of your life through better overall health. It can help you lose weight and feel better about yourself.    On your cardiac rehab team, you may have your doctor, a nurse specialist, an exercise physiologist, and a dietitian. They will design your cardiac rehab program specifically for you. You will learn how to reduce your risk for heart problems, how to manage stress, and how to eat a heart-healthy diet. By the end of the program, you will be ready to maintain a healthier lifestyle on your own.    Follow-up care is a key part of your treatment and safety. Be sure to make and go to all appointments, and call your doctor if you are having problems. It's also a good idea to know your test results and keep a list of the medicines you take.    Please call to schedule your first appointment once you have been cleared by your cardiologist to attend Phase II Outpatient Cardiac Rehabilitation.       Cardiac Rehabilitation Options:  [facility]             [facility]  [address]                                                                                          Cardiology Services  [place], [place] [address]                                                                              [address]  P- [phone]                                                                                         [place] [address]  Hours: M/W/F 7am-7pm & T/Th 7am-12pm                                                  [phone]

## 2025-05-20 NOTE — PROGRESS NOTES
"  ----------------------------------------------------------------------------------------------------------  [facility]  Psychiatry Progress Note  Hospital Day #35     Interim History:     The patient's care was discussed with the treatment team and chart notes were reviewed.    Identifier: Anastasiya Simon is a 59 year old female with a previous psychiatric diagnosis of schizophrenia and polysubstance use as well as type II diabetes (managed with metformin 1000mg and glipizide 10mg) admitted from [facility] ED on 04/15/2025 due to concern for SI and psychosis in the context of psychosocial stressors including living situation and argument with family member.    Vitals:Temp: 98.1  F (36.7  C) Temp src: Temporal BP: (!) 148/78 Pulse: 77   Resp: 18 SpO2: 96 % O2 Device: None (Room air)   Height: 165.1 cm (5' 5\") Weight: 72.9 kg (160 lb 12.8 oz)  Estimated body mass index is 26.76 kg/m  as calculated from the following:    Height as of this encounter: 1.651 m (5' 5\").    Weight as of this encounter: 72.9 kg (160 lb 12.8 oz).  Sleep: 7 hours (05/20/25 0638)  Scheduled medications: Took all scheduled medications as prescribed.  Psychiatric PRN medications:   Last 24H PRN:     loperamide (IMODIUM) capsule 2 mg, 2 mg at 05/19/25 2013    Staff Report:   Per social work, [facility] declined Anastasiya as they feel that she might not be a good fit for the program.     BG was 116 at 0746 yesterday (5/19) and 187 at 530PM last night. This morning, 750AM BS was 129.     PRN Imodium 2mg was given at 813PM due to loose stools which she said was helpful.    Patient appears to have slept for 7 hours.       Subjective:     Patient Interview:  Anastasiya was interviewed in her room. She reported feeling fine and states she slept okay. She also states she was able to eat breakfast. She stated that she has a mouth infection and pointed to the left side of her cheek, but denied any mouth pain " "or symptoms. She also expressed concerned about having a Vit C deficiency and wanting to get Vit C supplement. Team explained she is able to get adequate Vit C from food, but she is still insistent on receiving Vit C supplements. She also had questions about placement and confusion over why placement is prolonged.        Objective:     Vitals:  BP (!) 148/78 (BP Location: Right arm)   Pulse 77   Temp 98.1  F (36.7  C) (Temporal)   Resp 18   Ht 1.651 m (5' 5\")   Wt 72.9 kg (160 lb 12.8 oz)   LMP 10/22/2013   SpO2 96%   BMI 26.76 kg/m      Allergies:  Allergies   Allergen Reactions    Tetracycline Unknown     It happened when pt was a baby       Current Medications:  Scheduled:  Current Facility-Administered Medications   Medication Dose Route Frequency Provider Last Rate Last Admin    ARIPiprazole (ABILIFY) tablet 15 mg  15 mg Oral Daily Lucy Chandler MD   15 mg at 05/20/25 0754    calcium carbonate (TUMS) chewable tablet 500 mg  500 mg Oral Daily PRN Tanja Gonzalez MD   500 mg at 05/17/25 2126    glucose gel 15-30 g  15-30 g Oral Q15 Min PRN Katina Armendariz MD        Or    dextrose 50 % injection 25-50 mL  25-50 mL Intravenous Q15 Min PRN Katina Armendariz MD        Or    glucagon injection 1 mg  1 mg Subcutaneous Q15 Min PRN Katina Armendariz MD        divalproex sodium extended-release (DEPAKOTE ER) 24 hr tablet 1,000 mg  1,000 mg Oral At Bedtime Tanja Gonzalez MD   1,000 mg at 05/19/25 2013    glipiZIDE (GLUCOTROL XL) 24 hr tablet 10 mg  10 mg Oral Daily with breakfast Katina Meza PA   10 mg at 05/20/25 0754    hydrOXYzine HCl (ATARAX) tablet 25 mg  25 mg Oral Q4H PRN Lucy Chandler MD   25 mg at 05/11/25 2116    loperamide (IMODIUM) capsule 2 mg  2 mg Oral 4x Daily PRN Tanja Gonzalez MD   2 mg at 05/19/25 2013    magnesium sulfate (EPSOM SALT) granules 1 Tablespoonful  1 Tablespoonful Topical Daily PRN Tanja Gonzalez MD        " melatonin tablet 3 mg  3 mg Oral At Bedtime PRN Katina Armendariz MD   3 mg at 05/18/25 2002    metFORMIN (GLUCOPHAGE) tablet 1,000 mg  1,000 mg Oral BID w/meals Charlotte Jasmine PA-C   1,000 mg at 05/20/25 0754    multivitamin RENAL (RENAVITE RX/NEPHROVITE) tablet 1 tablet  1 tablet Oral Daily Lucy Chandler MD   1 tablet at 05/20/25 0754    OLANZapine (zyPREXA) tablet 10 mg  10 mg Oral TID PRN Lucy Chandler MD        Or    OLANZapine (zyPREXA) injection 10 mg  10 mg Intramuscular TID PRN Lucy Chandler MD        pantoprazole (PROTONIX) EC tablet 40 mg  40 mg Oral QAM AC Lucy Chandler MD   40 mg at 05/20/25 0754    tolterodine ER (DETROL LA) 24 hr capsule 2 mg  2 mg Oral Daily Tanja Gonzalez MD   2 mg at 05/20/25 0754     PRN:  Current Facility-Administered Medications   Medication Dose Route Frequency Provider Last Rate Last Admin    ARIPiprazole (ABILIFY) tablet 15 mg  15 mg Oral Daily Lucy Chandler MD   15 mg at 05/20/25 0754    calcium carbonate (TUMS) chewable tablet 500 mg  500 mg Oral Daily PRN Tanja Gonzalez MD   500 mg at 05/17/25 2126    glucose gel 15-30 g  15-30 g Oral Q15 Min PRN Katina Armendariz MD        Or    dextrose 50 % injection 25-50 mL  25-50 mL Intravenous Q15 Min PRN Katina Armendariz MD        Or    glucagon injection 1 mg  1 mg Subcutaneous Q15 Min PRN Katina Armendariz MD        divalproex sodium extended-release (DEPAKOTE ER) 24 hr tablet 1,000 mg  1,000 mg Oral At Bedtime Tanja Gonzalez MD   1,000 mg at 05/19/25 2013    glipiZIDE (GLUCOTROL XL) 24 hr tablet 10 mg  10 mg Oral Daily with breakfast Katina Meza PA   10 mg at 05/20/25 0754    hydrOXYzine HCl (ATARAX) tablet 25 mg  25 mg Oral Q4H PRN Lucy Chandler MD   25 mg at 05/11/25 2116    loperamide (IMODIUM) capsule 2 mg  2 mg Oral 4x Daily PRN Tanja Gonzalez MD    2 mg at 05/19/25 2013    magnesium sulfate (EPSOM SALT) granules 1 Tablespoonful  1 Tablespoonful Topical Daily PRN Tnaja Gonzalez MD        melatonin tablet 3 mg  3 mg Oral At Bedtime PRN Katina Armendariz MD   3 mg at 05/18/25 2002    metFORMIN (GLUCOPHAGE) tablet 1,000 mg  1,000 mg Oral BID w/meals Charlotte Jasmine PA-C   1,000 mg at 05/20/25 0754    multivitamin RENAL (RENAVITE RX/NEPHROVITE) tablet 1 tablet  1 tablet Oral Daily Lucy Chandler MD   1 tablet at 05/20/25 0754    OLANZapine (zyPREXA) tablet 10 mg  10 mg Oral TID PRN Lucy Chandler MD        Or    OLANZapine (zyPREXA) injection 10 mg  10 mg Intramuscular TID PRN Lucy Chandler MD        pantoprazole (PROTONIX) EC tablet 40 mg  40 mg Oral QAM AC Lucy Chandler MD   40 mg at 05/20/25 0754    tolterodine ER (DETROL LA) 24 hr capsule 2 mg  2 mg Oral Daily Tanja Gonzalez MD   2 mg at 05/20/25 0754     Labs and Imaging:  New results:   Recent Results (from the past 24 hours)   Glucose by meter    Collection Time: 05/19/25  5:21 PM   Result Value Ref Range    GLUCOSE BY METER POCT 187 (H) 70 - 99 mg/dL   Glucose by meter    Collection Time: 05/20/25  7:38 AM   Result Value Ref Range    GLUCOSE BY METER POCT 129 (H) 70 - 99 mg/dL     Data this admission:  CBC: WNL. WBC: 7.6, RBC: 4.37, Hb: 13.0, Platelets: 316.  CMP: Unremarkable (4/22) AST: 13, ALT: 10, Creatinine: 0.65.  UDS: Negative  RASHIDA: 0.00 (4/13)  HbA1c: 10.1%  TSH: 2.40  Vit: B12: 812  Folate: 13.7  Valproic Acid Free & Total: 67  Valproic Acid Free & Total STAT: 75.6  Phosphorus: 4.6 (H)  Magnesium: 1.6 (L)  BMP: unremarkable (4/28)    Imaging:  Bladder Scan: Completed on 4/22     Mental Status Exam:   Oriented to:  Grossly oriented.   General: Tired and sleepy appearing.   Appearance:  Appears older than stated age. Grooming is unkempt.   Behavior/Attitude:  Tired appearing.    Eye Contact: Eyes stayed  "closed for most of interview.   Psychomotor: No evidence of tics, dystonia, or tardive dyskinesia. No catatonia present.  Speech: Appropriate rate/tone/volume.   Language: Fluent in English with appropriate syntax and vocabulary.  Mood:  \"I feel fine\"  Affect: Congruent with mood. Sleepy, tired, confused  Thought Process: Perseverative, ruminative, tangential  Thought Content: No apparent SI, A/V/H  Associations:  questionable  Insight:  impaired unable to understand current condition  Judgment:  impaired, unable to take care for herself in ADLs  Impulse control: fair  Attention Span:  adequate for conversation  Concentration:  grossly intact  Recent and Remote Memory:  Not formally assessed. Pt reports memory issues. Staff notes forgetfulness.   Fund of Knowledge: Average.  Muscle Strength and Tone: Normal.  Gait and Station: Normal.     Psychiatric Assessment     Anastasiya Simon is a 59 year old female previously diagnosed with schizophrenia and polysubstance use disorder who presented with suicidal ideation, disorganization, and inability to care for self. Most recent psychiatric hospitalization was 03/20/2019 for disorganization and suicidal ideation. Significant symptoms on admission include increasing suicidal ideation, disorganization, erratic behavior such as filling buckets with ammonia and mixing with bleach, substance use, auditory hallucinations, and paranoia. The MSE on admission was pertinent for AVH, paranoia, limited insight into MH. Psychological contributions to mental health presentation include maladaptive coping through substance use and limited insight into MH. Social factors contributing to mental health presentation include interpersonal conflicts and unstable living environment. Protective factors include support from two sisters and absence of previous suicide attempts.     Anastasiya was re-started on Zyprexa 20 mg and her Depakote dose was increased to 1000 mg to address the ongoing " psychosis and mood symptoms. Tolterodine and PRN loperamide were started to address incontinence and diarrhea. Due to T2DM neuropathy has been a concern for which we started gabapentin 300 mg as well. Due to concerns of metabolic side effects regarding underline diabetes it was decided to cross-titrate to Abilify from Zyprexa. Zyprexa was discontinued on 5/7/25.      In summary, the patient's reported symptoms of SI, erractic behavior, AVH, and paranoia in the context of psychosocial stressers are consistent with decompensated schizophrenia and polysubstance use disorder.  She will likely benefit from outpatient MH, as well as referrals for placement that can give some structure considering her requirements, and working with her CM for future discharge plans. To further assist with this it was addressed the need to follow through with outpatient CDT which is something Anastasiya is in agreement. We decided to continue current medication regimen with Depakote and Abilify to minimize risk of decompensation given current stability.     Abilify MACKEY was also considered to make medication management easier for Anastasiya, however, Anastasiya is not open to change Abilify formulation to MACKEY as she is concerned about risk of skin infection from injection. We will consider this approach later on.    Per social work, still awaiting updates on [facility] and IRTS placement. At this point working on simplify medication list for future plans of discharge.       Psychiatric Plan by Diagnosis      Today's changes:  - None    # Schizophrenia  Medications:  - depakote 1000 mg HS  - Abilify 15 mg daily in the morning    #Polysubstance Use Disorder  - CD consult once psychotic sx more stable, if pt amenable  - CD consulted on 5/1. CD acknowledged and will visit on 5/2.    2. Pertinent Labs/Monitoring:  - Labs done 04/13 at OSH. CBC wnl, glucose elevated to 332, BMP wnl.  - UDS negative on 04/13  - 4/16 HbA1c: 10.1  - Depakote Levels: 75.6    - 5/15  LFTs: Normal    3. Additional Plans:  - Patient will be treated in therapeutic milieu with appropriate individual and group therapies as described.   - Consulted Internal Medicine for diabetes management.   - Referrals  In Process:  [facility] ([facility]) - records sent 5/13  [facility]: [phone]   [facility] I[facility]S - sent 4/28  Phone interview completed 5/5. Not accepted to [facility]. Reviewing for other sites as of 5/12.  [facility] - [facility]/29     Declined:  [facility] - sent 5/6. Declined due to substance use.  [facility] - sent 5/6. Declined due to medical concerns.  [facility] - sent 5/6. Declined due to pt's resistance to participate in programming.  [facility] - declined. Recommended pt attend CD tx/IRTS before being considered for a referral.  [facility] - sent 5/14. Declined due to high A1C and incontinence noted.  [facility] - sent 5/14. Declined due to concern for pt's ability to participate (pt confused, endorsing paranoia, extremely low insight during phone interview).     [facility] referrals - no longer pursuing.  [facility] - made 5/2 by CD . Denied 5/5 due to acuity of MH.  [facility] - made 5/2 by CD   [facility] - made 5/2 by CD   [facility] - made 5/2 by CD        Psychiatric Hospital Course:      Anastasiya Simon was admitted to [facility] on a 72 hour hold (started 4/13/25 21:35) from [facility] ED on 04/15/25.   Medications:  Continued Medications:  Pt seen by psychiatry consult at [facility] who started zyprexa 20 mg at bedtime and depakote 500 mg at bedtime. Both of these medications were continued on admission to [facility].   New Medications:  Started loperamide for diarrhea  Started tolterodine for urinary incontinence  Started gabapentin for peripheral neuropathy    4/16: Increased depakote from 500 mg to 1000 mg HS for mood regulation  4/16: Increased metformin from 500 mg to 1000 mg BID per medicine consult  4/16: Zyprexa: 5  mg in the morning and 15 mg HS  4/16: Started PRN Loperamide 2 mg 4x/day for diarrhea  4/16: Started Tolterodine 2 mg daily for urinary incontinence  4/18: Started Gabapentin 300 mg PRN 3x/day for peripheral neuropathy  4/20: Started glipizide 24 hr tablet 5 mg daily for better glycemic control per medicine consult  4/25: Increased glipizide XL from 5mg to 10mg per medicine consult.  4/28: Started Abilify 5 mg PO daily in the morning  4/30: Increased Abilify to 10 mg daily in the morning  4/30: Decreased olanzapine to only 15 mg HS  5/2: Increased Abilify to 15 mg daily in the morning  5/2: Decreased Zyprexa to 10 mg HS  5/5: Decrease Zyprexa to 5 mg HS  5/7: Discontinue Zyprexa 5mg    The risks, benefits, alternatives, and side effects were discussed and understood by the patient and other caregivers.     Medical Assessment and Plan     Medical diagnoses to be addressed this admission:      #Type 2 Diabetes Mellitis with Complications  - PTA metformin 1000 mg BID with meals  - BID glucose checks; hypoglycemia protocol  - PRN gabapentin 300 mg 3x/day for peripheral neuropathy  - Insulin aspart (Novolog) injection 1-5 units HS  - Insulin aspart (Novolog) injection 3x/day 1-7 units before meals  - Glipizide 24 hr tablet 10 mg daily with breakfast for better glycemic control  - Internal Medicine Consult  - Nutrition Consult  - Moderate Consistent Carb Diet  - Given patient is starting insulin, please notify medicine team of any impending discharge at least 2-3 days prior in order to have a safe management plan for her diabetes.  - As of 5/1, Medicine will sign off at this time. OK to stop sliding scale insulin.  - Nutrition consult was placed to addressed current dietary needs at Anastasiya's request    #Diarrhea  - Loperamide 2 mg PRN 4x/day    #Urinary Incontinence  - Tolterodine 2 mg daily    Medical course:  Patient was physically examined by the ED prior to being transferred to the unit and was found to be medically  stable and appropriate for admission.    Consults:   4/16: Internal Medicine consulted for management of diabetes and blood pressure.   4/16: Increased metformin to 1000 mg BID.   4/18: Started PRN gabapentin 300 mg 3x/day for peripheral neuropathy.   4/18: Internal Medicine started Novolog injection 1-5 units HS and Novolog injection 3x/day 1-7 units before meals for better glycemic control. Given patient is starting insulin, please notify medicine team of any impending discharge at least 2-3 days prior in order to have a safe management plan for her diabetes.  4/19: Internal Medicine increased to high intensity sliding scale. Novolog injection 1-7 units HS. Novolog injection 3x/day 1-10 units before meals for better glycemic control.   4/20: Started PO glipizide 24 hr tablet 5 mg daily with breakfast.   4/21: Started pt on a consistent carb diet.   4/22: Nutrition Consult for diabetes management.   4/24: Note from Medicine: Please send patient with glucometer on discharge. Diabetes education should be ordered prior to discharge.  4/25: Increase Glipizide XL to 10 mg daily starting on 4/26. Continue Metformin 1000 mg BID. Decreased to moderate intensity sliding scale. Novolog injection 1-5 units HS. Novolog injection 3x/day 1-7 units before meals.. Hopefully can discontinue in coming days. Moderate consistent CHO diet.   4/27: Continues to require 7U of insulin and is not quite ready to wean off of her sliding scale insulin yet. Will continue to monitor and adjust medications with goal of having her wean off sliding scale insulin prior to discharge.  5/1: Currently the patient is medically stable and Medicine will sign off at this time. Recommendations relayed to primary team via this progress note. Please notify on-call VINCENZO if new questions or concerns arise. OK to stop sliding scale insulin. Continue BG checks BID. Continue glipizide 10 mg daily. Continue Metformin 1000 mg BID. Follow up with PCP for continued BG  monitoring within 1-2 weeks of discharge.   5/1: Chemical Dependency consulted. Consult acknowledge and will see patient on 5/2.     Checklist     Legal Status: MI Commitment and [facility]   [place]rell: Fariha  File Number: [id number]  Start and expiration date of commitment: 04/24/2025 - 10/24/2025  Castellanos meds: Zyprexa, Haldol, Abilify, Risperdal    Safety Assessment:   Behavioral Orders   Procedures    Code 1 - Restrict to Unit    Routine Programming     As clinically indicated    Status 15     Every 15 minutes.    Suicide precautions: Suicide Risk: MODERATE; Clinical rationale to override score: modification to the care environment, response to medication, lack of access to a plan for self-harm, Exhibiting Suicidal/self-harm behaviors or thoughts     Patients on Suicide Precautions should have a Combination Diet ordered that includes a Diet selection(s) AND a Behavioral Tray selection for Safe Tray - with utensils, or Safe Tray - NO utensils       Suicide Risk:   MODERATE     Clinical rationale to override score::   modification to the care environment     Clinical rationale to override score::   response to medication     Clinical rationale to override score::   lack of access to a plan for self-harm     Clinical rationale to override score::   Exhibiting Suicidal/self-harm behaviors or thoughts     Risk Assessment:  Risk for harm is low to moderate.  Risk factors: maladaptive coping, substance use, impulsive, and past behaviors.  Protective factors: family.    SIO: None    Disposition: TBD. Disposition pending clinical stabilization, medication optimization and development of an appropriate discharge plan.     Attestations     Laurel Ledbetter, MS3  Medical Student    Resident/Fellow Attestation   I, Lucy Rosa MD, was present with the medical/[facility]ZO student who participated in the service and in the documentation of the note.  I have verified the history and personally performed the  physical exam and medical decision making.  I agree with the assessment and plan of care as documented in the note.      This patient was seen and discussed with my attending physician.  Lucy Plata MD  [facility] Psychiatry Resident  05/20/2025

## 2025-05-20 NOTE — PLAN OF CARE
Problem: Adult Behavioral Health Plan of Care  Goal: Plan of Care Review  Outcome: Progressing  Flowsheets  Taken 5/20/2025 1241  Plan of Care Reviewed With: patient  Overall Patient Progress: improving  Patient Agreement with Plan of Care: agrees  Taken 5/20/2025 0821  Patient Agreement with Plan of Care: agrees     Problem: Adult Behavioral Health Plan of Care  Goal: Adheres to Safety Considerations for Self and Others  Outcome: Progressing  Intervention: Develop and Maintain Individualized Safety Plan  Recent Flowsheet Documentation  Taken 5/20/2025 0821 by Lisbeth Gibson RN  Safety Measures:   environmental rounds completed   safety rounds completed   Goal Outcome Evaluation:    Plan of Care Reviewed With: patient     Overall Patient Progress: improving  Anastasiya has been visible in the milieu.Sociable with select peers.Blood sugar BID,this morning BG was 129.Patient takes orals no insulin is scheduled.Patient is compliant with medication and cooperative with cares.No prn needed or requested.Adequate food and fluid intake.Hygiene is fair.No bladder or bowel complains.Denies physical pain/discomfort.Patient denies SI/SIB/AH/VH.Patient contracted for safety.  Temp: 98.1  F (36.7  C) Temp src: Temporal BP: (!) 148/78 Pulse: 77   Resp: 18 SpO2: 96 % O2 Device: None (Room air)

## 2025-05-20 NOTE — PROGRESS NOTES
Rehab Group    Start time: 1415  End time: 1500  Patient time total: 40 minutes    came in and out of group session    #5 attended   Group Type: occupational therapy   Group Topic Covered: activity therapy and problem solving     Group Session Detail:  The focus of today's group was leisure exploration and engagement (tapple and picwicks). Patient engaged in a therapeutic game to encourage new learning, problem solving, focus, socialization, and cognitive wellness. This game encouraged cognitive skill building, such as: initiation, planning, organization, sequencing, and attention.         Patient Response/Contribution:  cooperative with task, socially appropriate, actively engaged, and required prompts or assistance to participate     Patient Detail:  Pt arrived to group with blunted affect, appearing tired/subdued today. Pt was oriented to group and verbalized understanding. Pt was oriented to group and verbalized understanding. Pt required extended processing time to day, although gave creative and accurate answers for all categories. Pt observed to brighten with participation at times today. Pt observed to have unsteady gait today, having difficultly navigating around group room. Will continue to encourage attendance and participation.         [id number] OT Group (2 or more in attendance)      Patient Active Problem List   Diagnosis    Suicidal ideation    Type 2 diabetes mellitus (H)    Chronic hepatitis C (H)    Schizophrenia (H)    Acid reflux disease    Hoarding behavior     1415   Called and spoke to pt and reviewed lab results and normal xray. Pt had no further questions. Regarding: [place]  Vaginal irritation   ----- Message from Sheryl Rodríguez sent at 6/18/2022  6:40 PM CDT -----  Patient Name: Kelsey LINK Kierra    Full Name of Provider seen for current symptoms: Fior Trejo NP     Symptoms:  Vaginal irritation     Pregnant (If Yes, how long?): No     Call Back #:[phone]     Call Center Account # for provider seen for current symptoms: [id number]    Which State are you currently located in? (enter State name in Summary field):  [place]    Patients needing callback from the RN are informed of the following:   Please be aware the return phone call may come from an unidentified phone number and also keep in mind that call back times vary based on call volumes.  If your condition becomes life threatening while you wait for a callback, you should seek immediate medical assistance by calling 911 or going to the Emergency Department for evaluation.     Reviewed transmission. 2 SVT (fast A&V) episodes noted - both occurred 8/14/2019, longest 30 seconds. Average HR's 150's.  None since. Patient remains on Toprol XL 50 mg BID.  Continue to monitor.  - [place]

## 2025-05-20 NOTE — PLAN OF CARE
BEH IP Unit Acuity Rating Score (UARS)  Patient is given one point for every criteria they meet.    CRITERIA SCORING   On a 72 hour hold, court hold, committed, stay of commitment, or revocation. 1    Patient LOS on BEH unit exceeds 20 days. 1  LOS: 35   Patient under guardianship, 55+, otherwise medically complex, or under age 11. 1   Suicide ideation without relief of precipitating factors. 0   Current plan for suicide. 0   Current plan for homicide. 0   Imminent risk or actual attempt to seriously harm another without relief of factors precipitating the attempt. 0   Severe dysfunction in daily living (ex: complete neglect for self care, extreme disruption in vegetative function, extreme deterioration in social interactions). 1   Recent (last 7 days) or current physical aggression in the ED or on unit. 0   Restraints or seclusion episode in past 72 hours. 0   Recent (last 7 days) or current verbal aggression, agitation, yelling, etc., while in the ED or unit. 0   Active psychosis. 1   Need for constant or near constant redirection (from leaving, from others, etc).  0   Intrusive or disruptive behaviors. 0   Patient requires 3 or more hours of individualized nursing care per 8-hour shift (i.e. for ADLs, meds, therapeutic interventions). 0   TOTAL 5

## 2025-05-20 NOTE — PLAN OF CARE
Team Note Due:  Wednesday    Assessment/Intervention/Current Symtoms and Care Coordination:  Chart review and met with team, discussed pt progress, symptomology, and response to treatment.  Discussed the discharge plan and any potential impediments to discharge.    Attempted to check in with pt 2x but she was in OT group both times. Will check in tomorrow to discuss discharge plans/barriers.    Discharge Plan or Goal:  IRTS vs [facility] vs GH    Discharge Date/ time: TBD  Transportation: TBD  Provisional Discharge: Yes[x]  No[]  AVS Completed: []   IP Tracker Form Completed: []     Barriers to Discharge:  Patient requires further psychiatric stabilization due to current symptomology, medication management with changes subject to provider, coordination with outside supports, and aftercare planning. Pt is also under civil commitment.      Referral Status:    In Process:  [facility] ([facility]) - records sent 5/13  Cheryle: [phone]   [facility] IRTS - sent 4/28  Phone interview completed 5/5. Not accepted to [facility]. Reviewing for other sites as of 5/12.  [facility]    Declined:  [facility] - sent 5/6. Declined due to substance use.  [facility] - sent 5/6. Declined due to medical concerns.  [facility] - sent 5/6. Declined due to pt's resistance to participate in programming.  [facility] - declined. Recommended pt attend CD tx/IRTS before being considered for a referral.  [facility] - sent 5/14. Declined due to high A1C and incontinence noted.  [facility] - sent 5/14. Declined due to concern for pt's ability to participate (pt confused, endorsing paranoia, extremely low insight during phone interview).    [facility] CD referrals - no longer pursuing.  [facility] - made 5/2 by CD . Denied 5/5 due to acuity of MH.  [facility] - made 5/2 by CD   [facility] - made 5/2 by CD   [facility] - made 5/2 by CD      Legal Status:  MI Commitment and Castellanos   County:  [place]  File Number: [id number]  Start and expiration date of commitment: 04/24/2025 - 10/24/2025    Castellanos meds: Zyprexa, Haldol, Abilify, Risperdal    : Mental Health Resources (assigned CM pending)    Contacts:   Haydee Mares (CADI CM): [phone]   Tamika Johnson- (sister): [phone]  Lucille Reddy (Intervention ): [phone]     Upcoming Meetings and Dates/Important Information and next steps:  Follow up on referrals  Schedule OP follow up  Notify medicine team of any impending discharge at least 2-3 days prior in order to have a safe management plan for her diabetes  Draft PD/COS  Send PD and discharge summary to OLLIE  Send PD and COS to [place] Ruiz

## 2025-05-21 LAB
GLUCOSE BLDC GLUCOMTR-MCNC: 114 MG/DL (ref 70–99)
GLUCOSE BLDC GLUCOMTR-MCNC: 177 MG/DL (ref 70–99)

## 2025-05-21 PROCEDURE — 250N000013 HC RX MED GY IP 250 OP 250 PS 637

## 2025-05-21 PROCEDURE — H2032 ACTIVITY THERAPY, PER 15 MIN: HCPCS

## 2025-05-21 PROCEDURE — 97150 GROUP THERAPEUTIC PROCEDURES: CPT | Mod: GO

## 2025-05-21 PROCEDURE — 250N000013 HC RX MED GY IP 250 OP 250 PS 637: Performed by: PHYSICIAN ASSISTANT

## 2025-05-21 PROCEDURE — 124N000002 HC R&B MH UMMC

## 2025-05-21 PROCEDURE — 99233 SBSQ HOSP IP/OBS HIGH 50: CPT | Mod: GC | Performed by: PSYCHIATRY & NEUROLOGY

## 2025-05-21 RX ADMIN — TOLTERODINE TARTRATE 2 MG: 2 CAPSULE, EXTENDED RELEASE ORAL at 08:34

## 2025-05-21 RX ADMIN — HYDROXYZINE HYDROCHLORIDE 25 MG: 25 TABLET, FILM COATED ORAL at 14:30

## 2025-05-21 RX ADMIN — GLIPIZIDE 10 MG: 2.5 TABLET, FILM COATED, EXTENDED RELEASE ORAL at 08:34

## 2025-05-21 RX ADMIN — METFORMIN HYDROCHLORIDE 1000 MG: 500 TABLET, FILM COATED ORAL at 18:12

## 2025-05-21 RX ADMIN — METFORMIN HYDROCHLORIDE 1000 MG: 500 TABLET, FILM COATED ORAL at 08:34

## 2025-05-21 RX ADMIN — PANTOPRAZOLE SODIUM 40 MG: 40 TABLET, DELAYED RELEASE ORAL at 08:34

## 2025-05-21 RX ADMIN — ARIPIPRAZOLE 15 MG: 15 TABLET ORAL at 08:33

## 2025-05-21 RX ADMIN — DIVALPROEX SODIUM 1000 MG: 500 TABLET, FILM COATED, EXTENDED RELEASE ORAL at 20:30

## 2025-05-21 RX ADMIN — Medication 1 TABLET: at 08:34

## 2025-05-21 ASSESSMENT — ACTIVITIES OF DAILY LIVING (ADL)
ADLS_ACUITY_SCORE: 47
HYGIENE/GROOMING: INDEPENDENT
ADLS_ACUITY_SCORE: 47
ADLS_ACUITY_SCORE: 47
ORAL_HYGIENE: INDEPENDENT
LAUNDRY: WITH SUPERVISION
LAUNDRY: WITH SUPERVISION
ADLS_ACUITY_SCORE: 47
DRESS: INDEPENDENT
ADLS_ACUITY_SCORE: 47
ADLS_ACUITY_SCORE: 47
HYGIENE/GROOMING: INDEPENDENT
ADLS_ACUITY_SCORE: 47
ORAL_HYGIENE: INDEPENDENT
ADLS_ACUITY_SCORE: 47
DRESS: INDEPENDENT

## 2025-05-21 NOTE — PROGRESS NOTES
Rehab Group    Start time: 1315  End time: 1400  Patient time total: 25 minutes    came in and out of group session    #5 attended   Group Type: OT Clinic   Group Topic Covered: balanced lifestyle, coping skills, healthy leisure time, and social skills     Group Session Detail:  Occupational therapy clinic to facilitate coping skill exploration, creative expression within personally meaningful activities, and clinical observation of social, cognitive, and kinesthetic performance skills     Patient Response/Contribution:  socially appropriate, worked intermittently, engaged socially when prompted, and distracted      Patient Detail:  Pt required set up A to initiate, gather materials, sequence, and adjust to workspace demands as needed. Demonstrated limited focus, planning, and problem solving for selected creative expression task. Able to ask for assistance as needed, and intermittently engaged in discussion with peers and staff throughout. Will continue to encourage attendance and participation.         [id number] OT Group (2 or more in attendance)      Patient Active Problem List   Diagnosis    Suicidal ideation    Type 2 diabetes mellitus (H)    Chronic hepatitis C (H)    Schizophrenia (H)    Acid reflux disease    Hoarding behavior

## 2025-05-21 NOTE — PLAN OF CARE
Pt remained in the room the through out the shift. No PRNs given or requested. No c/o pain / discomfort noted/reported.  Safety checks completed every 15 minutes ; no concerns noted. Pt appears to have slept for 6 hours; will continue to monitor and offer support.     Problem: Sleep Disturbance  Goal: Adequate Sleep/Rest  Outcome: Progressing   Goal Outcome Evaluation:

## 2025-05-21 NOTE — PROGRESS NOTES
"  Rehab Group    Start time: 1015   End time: 1115   Patient time total: 30 minutes    attended partial group    #6 attended   Group Type: art   Group Topic Covered: activity therapy       Group Session Detail:  Art Therapy directive was to create group collaborative drawings by moving to each art \"station\" and adding on to previous pts work.  Goals of directive: social interest, assessing how individual functions within a group dynamic, emotional regulation, media exploration.     Patient Response/Contribution:  cooperative with task       Patient Detail:    Pt contributed positively to a few of the group drawings today. Pt had some difficulty following directions. Pt would take drawing materials from other drawing stations and refused to rotate to another drawing at one point (wanted to create individual vs group art) Pt was distractible, disorganized.      Activity Therapy Per 15 min ()      Patient Active Problem List   Diagnosis    Suicidal ideation    Type 2 diabetes mellitus (H)    Chronic hepatitis C (H)    Schizophrenia (H)    Acid reflux disease    Hoarding behavior      "

## 2025-05-21 NOTE — PLAN OF CARE
Problem: Adult Behavioral Health Plan of Care  Goal: Plan of Care Review  Outcome: Progressing  Flowsheets (Taken 5/20/2025 1645)  Patient Agreement with Plan of Care: agrees   Goal Outcome Evaluation:    Plan of Care Reviewed With: patient          Pt spent most of the shift in the lounge watching TV with selected peers. She was observed socializing and interacting with selected peers. Affect was flat but pleasant upon approach. She was able to make needs known. Hygiene was poor. Intake was adequate. She was observed pacing on the hallway. She was cooperative with assessment. She denied pain and all mental health psych symptoms. She contracted for safety. She was medication compliant. Prn melatonin 3 mg was given to patient per her request for sleep. Blood sugar was 150 at dinner. No other concern noted at this time. Will continue to monitor and will assist if need arise.

## 2025-05-21 NOTE — PLAN OF CARE
Team Note Due:  Wednesday    Assessment/Intervention/Current Symtoms and Care Coordination:  Chart review and met with team, discussed pt progress, symptomology, and response to treatment.  Discussed the discharge plan and any potential impediments to discharge.    Referral submitted to [facility].    Left vm for Cheryle with [facility] requesting an update.    Met with pt to discuss barriers to placement and looking at alternative options. Pt has no insight into mental health. Writer brought up concerns about pt's ability to live independently and safety concerns with the example of pt mixing ammonia/bleach, putting this in the oven, and turning it on overnight. Pt does not believe this is dangerous. Agreed IRTS is no longer an option but waiting on GH/custodial will take weeks or longer. Discussed [facility], which pt said she would love to return there. Updated MD    Contacted OLLIE Parr with an update on tentative plan for discharge to [facility].    Request for PCP and psychiatry appts made through coordinators.    Discharge Plan or Goal:  IRTS vs [facility] vs GH/[facility] vs Shelter  -PCP  -Psychiatry    Discharge Date/ time: 5/23 at [facility]  Transportation: Medical ride  Provisional Discharge: Yes[x]  No[]  AVS Completed: []   IP Tracker Form Completed: []     Barriers to Discharge:  Patient requires further psychiatric stabilization due to current symptomology, medication management with changes subject to provider, coordination with outside supports, and aftercare planning. Pt is also under civil commitment.      Referral Status:    In Process:  [facility] ([facility]) - records sent 5/13. Actively following up.  [place]her[place]: [phone]   [facility] - sent 4/28  Phone interview completed 5/5. Not accepted to [facility]. Reviewing for other sites as of 5/12.  [facility] - sent 4/29  [facility] - sent 5/21  [email or web address], [phone]    Declined:  [facility] - sent 5/6. Declined due to  substance use.  [facility] - sent 5/6. Declined due to medical concerns.  [facility] - sent 5/6. Declined due to pt's resistance to participate in programming.  [facility] - declined. Recommended pt attend CD tx/IRTS before being considered for a referral.  [facility] I[facility] - sent 5/14. Declined due to high A1C and incontinence noted.  [facility] - sent 5/14. Declined due to concern for pt's ability to participate (pt confused, endorsing paranoia, extremely low insight during phone interview).    IOP CD referrals - no longer pursuing.  [facility] - made 5/2 by CD . Denied 5/5 due to acuity of MH.  [facility] - made 5/2 by CD   [facility] - made 5/2 by CD   [facility] - made 5/2 by CD      Legal Status:  MI Commitment and Castellanos   [facility]: [place]  File Number: [id number]  Start and expiration date of commitment: 04/24/2025 - 10/24/2025    Castellanos meds: Zyprexa, Haldol, Abilify, Risperdal    : Mental Health Resources (assigned CM pending)    Contacts:   Haydee Mares (CADI CM): [phone]   Tamika Johnson- (sister): [phone]  Lucille Reddy (Intervention ): [phone]     Upcoming Meetings and Dates/Important Information and next steps:  Draft PD/COS  Send PD and discharge summary to   Send PD and COS to [facility]

## 2025-05-21 NOTE — PLAN OF CARE
BEH IP Unit Acuity Rating Score (UARS)  Patient is given one point for every criteria they meet.    CRITERIA SCORING   On a 72 hour hold, court hold, committed, stay of commitment, or revocation. 1    Patient LOS on BEH unit exceeds 20 days. 1  LOS: 36   Patient under guardianship, 55+, otherwise medically complex, or under age 11. 1   Suicide ideation without relief of precipitating factors. 0   Current plan for suicide. 0   Current plan for homicide. 0   Imminent risk or actual attempt to seriously harm another without relief of factors precipitating the attempt. 0   Severe dysfunction in daily living (ex: complete neglect for self care, extreme disruption in vegetative function, extreme deterioration in social interactions). 1   Recent (last 7 days) or current physical aggression in the ED or on unit. 0   Restraints or seclusion episode in past 72 hours. 0   Recent (last 7 days) or current verbal aggression, agitation, yelling, etc., while in the ED or unit. 0   Active psychosis. 1   Need for constant or near constant redirection (from leaving, from others, etc).  0   Intrusive or disruptive behaviors. 0   Patient requires 3 or more hours of individualized nursing care per 8-hour shift (i.e. for ADLs, meds, therapeutic interventions). 0   TOTAL 5

## 2025-05-21 NOTE — PLAN OF CARE
IP Treatment Plan    Client's Name: Anastasiya Simon  YOB: 1965      Treatment Plan Date: May 21, 2025      Anticipated number of sessions for this episode of care: 2-3    Current Concerns/Problem Areas:    Goal 1: Pt will decrease frequency, intensity and duration of their anxiety to increase improvement in their daily functioning. Identify 2 current coping skills. Learn and develop 1 additional coping skill for anxiety to improve distress tolerance and manage self through crisis.      Intervention(s)    Coached on coping techniques/relaxation skills to help improve distress tolerance and managing intense emotions. , Identified and practiced coping skills, Discussed TIPP (body temperature, intense exercise, PMR), Engaged in relaxation training (e.g. meditation, progressive muscle relaxation, etc.), Identified stress relief practices, and Explored strategies for self-soothing

## 2025-05-21 NOTE — PLAN OF CARE
"Individual Therapy Note      Date of Service: May 21, 2025    Patient: Anastasiya goes by \"Anastasiya,\" uses she/her pronouns    Individuals Present: Anastasiya [facility]    Session start: 1405  Session end: 1421  Session duration in minutes: 16      Modality Used: DBT, Person Centered, and Solution Focused    Goals: Pt will decrease frequency, intensity and duration of their anxiety to increase improvement in their daily functioning.     Patient Description of current symptoms: Anxious, on edge, nervous     Mental Status Exam:   Attitude: somewhat cooperative  Eye Contact: poor   Mood: anxious and good  Affect: appropriate and in normal range and mood congruent  Speech: clear, coherent  Psychomotor Behavior: no evidence of tardive dyskinesia, dystonia, or tics  Thought Process:  linear and goal oriented  Associations: no loose associations  Thought Content: no evidence of suicidal ideation or homicidal ideation  Insight: fair  Judgement: fair  Attention Span and Concentration: limited    Pt progress: Met with Pt in her room to discuss progress on mental health symptoms and practice coping skills for anxiety. Pt was initially in her bed, endorses anxiety as a side effect from medication. Writer validated her feelings, encouraged deep breathing or ice pack. Pt declined, stated \"it's a pill thing.\" Writer further encouraged Pt to explore coping skills, Pt mentioned a massage tool she used in OT; OT was able to let Pt borrow tool for use for coping. Writer sat with Pt in group room while Pt engaged in relaxation and massage practice. Afterward Pt reported that she felt more relaxed.     Treatment Objective(s) Addressed:   The focus of this session was on rapport building, orienting the patient to therapy, identifying and practicing coping strategies, and safety planning     Progress Towards Goals and Assessment of Patient:   Patient is making progress towards treatment goals as evidenced by more calm, organized, " improved concentration and focus.       Therapeutic Intervention(s):   Provided active listening, unconditional positive regard, and validation.   Engaged in safety planning identifying coping skills, warning signs, health support and resources, Coached on coping techniques/relaxation skills to help improve distress tolerance and managing intense emotions. , Identified and practiced coping skills, Engaged in relaxation training (e.g. meditation, progressive muscle relaxation, etc.), and Identified stress relief practices    Safety Plan: completed with patient    Plan for future action (include changes needed if current intervention is ineffective):  Writer will continue to check in with Pt, encouraged Pt to attend group sessions.      78595 - Psychotherapy (with patient) - 30 (16-37*) min    Patient Active Problem List   Diagnosis    Suicidal ideation    Type 2 diabetes mellitus (H)    Chronic hepatitis C (H)    Schizophrenia (H)    Acid reflux disease    Hoarding behavior

## 2025-05-21 NOTE — PROGRESS NOTES
Care Coordinator Note(s):    Care Request(s):   PCP   Preferences: Time Frame: 1 Week, , In Person,   Notes: Pt discharging Friday, needs PCP follow up within 1 week. Pt is diabetic, so follow up will include diabetes management    Psychiatry   Preferences: Time Frame: 2 Weeks, , In Person,   Notes: Discharging Friday, needs psychiatry. Appt in 2-3 weeks if possible      Care Outcome(s): Writer scheduled patient for primary care at [facility] in [place] (no openings in [facility]).     Per [facility], patient's PCP needs to refer to psychiatry and can do so at patient's new patient appointment on 6/2/25 with her PCP. (PCP is aware of need for diabetes management and psych referral).    CC Progress Note(s)/ Documentation:      Appointment: Primary Care  Date/time: Monday, June 2nd at 8:40am - In Person  Provider: Dr. Karo Pitts   Address: [place] [address]  Phone: [phone]  Fax: [phone]  Note: Dr. Pitts will refer to psychiatry at this initial appointment        [facility] please fax discharge AVS to [facility] at [phone]      CTC Updated  Appointment added to [facility]      Shirley Peralta  Adult Behavioral Health Care Coordinator

## 2025-05-21 NOTE — PROGRESS NOTES
Rehab Group    Start time: 1115  End time: 1145  Patient time total: 15 minutes    came in and out of group session    #6 attended   Group Type: occupational therapy   Group Topic Covered: coping skills and emotional regulation     Group Session Detail:  Discussion group focusing on defining gratitude, how identifying what we are grateful for can affect overall mindset, education on benefits of practicing gratitude, and identifying individual strengths/protective factors.      Patient Response/Contribution:  required prompts or assistance to participate and distracted      Patient Detail:  Pt arrived to group with blunted affect. Pt was oriented to group and verbalized understanding. Pt was intermittently engaged in group disucssion, participating with low insight. Pt observed to doodle on provided worksheet, pt was dismissive of prompts from writer this group. Pt was in and out of group room with no notice.Will continue to encourage attendance and participation.         [id number] OT Group (2 or more in attendance)      Patient Active Problem List   Diagnosis    Suicidal ideation    Type 2 diabetes mellitus (H)    Chronic hepatitis C (H)    Schizophrenia (H)    Acid reflux disease    Hoarding behavior

## 2025-05-21 NOTE — PLAN OF CARE
The pt was observed in the milieu for most of the shift, seated in the lounge watching TV and engaging socially with selected peers. Mood appeared calm with a flat affect, though speech remained rambling and preoccupied. The pt expressed uncertainty about the anticipated discharge but not elaborate further. Pain and MH concerns were denied, and the pt contracted for safety. Despite prompting, the pt declined to shower and appeared unkempt but was receptive to changing into fresh scrubs. Pre-supper BS was 177. The pt maintained good appetite and adequate fluid intake. The pt complied with med and care.     Problem: Adult Behavioral Health Plan of Care  Goal: Plan of Care Review  Outcome: Progressing  Flowsheets (Taken 5/21/2025 1630)  Plan of Care Reviewed With: patient  Overall Patient Progress: improving  Patient Agreement with Plan of Care: agrees   Goal Outcome Evaluation:    Plan of Care Reviewed With: patient Plan of Care Reviewed With: patient    Overall Patient Progress: improvingOverall Patient Progress: improving

## 2025-05-21 NOTE — PROGRESS NOTES
"  ----------------------------------------------------------------------------------------------------------  [facility]  Psychiatry Progress Note  Hospital Day #36     Interim History:     The patient's care was discussed with the treatment team and chart notes were reviewed.    Identifier: Anastasiya Simon is a 59 year old female with a previous psychiatric diagnosis of schizophrenia and polysubstance use as well as type II diabetes (managed with metformin 1000mg and glipizide 10mg) admitted from [facility] ED on 04/15/2025 due to concern for SI and psychosis in the context of psychosocial stressors including living situation and argument with family member.    Vitals:Temp: 98.5  F (36.9  C) Temp src: Oral BP: 126/77 Pulse: 78     SpO2: 95 % O2 Device: None (Room air)   Height: 165.1 cm (5' 5\") Weight: 72.9 kg (160 lb 12.8 oz)  Estimated body mass index is 26.76 kg/m  as calculated from the following:    Height as of this encounter: 1.651 m (5' 5\").    Weight as of this encounter: 72.9 kg (160 lb 12.8 oz).  Sleep: 6 hours (05/21/25 0600)  Scheduled medications: Took all scheduled medications as prescribed.  Psychiatric PRN medications:   Last 24H PRN:     melatonin tablet 3 mg, 3 mg at 05/20/25 2056    Staff Report:   Blood sugar yesterday morning was 129 and at dinner it was 150. No bladder or bowel complains. Denies physical pain/discomfort. Patient denies SI/SIB/AH/VH.    Anastasiya also reported having diarrhea.    BS at 0800 this morning was 114.    Prn melatonin 3 mg was given to patient per her request for sleep.     Patient appears to have slept for 6 hours.     Subjective:     Patient Interview:  Anastasiya was interviewed in the milieu. She reported feeling fine and states she slept okay. She had questions about discharge plans and mentioned that she read documents that had offensive and false statements about her and her previous living situation. She " "states that the documents claimed she was lying about the black mold found in her previous home and these documents are the reason why she is unable to get IRTS placement. We explained that Rhoda would be speaking to her about the next steps for placement.         Objective:     Vitals:  /77 (BP Location: Left arm, Patient Position: Sitting, Cuff Size: Adult Regular)   Pulse 78   Temp 98.5  F (36.9  C) (Oral)   Resp 18   Ht 1.651 m (5' 5\")   Wt 72.9 kg (160 lb 12.8 oz)   LMP 10/22/2013   SpO2 95%   BMI 26.76 kg/m      Allergies:  Allergies   Allergen Reactions    Tetracycline Unknown     It happened when pt was a baby       Current Medications:  Scheduled:  Current Facility-Administered Medications   Medication Dose Route Frequency Provider Last Rate Last Admin    ARIPiprazole (ABILIFY) tablet 15 mg  15 mg Oral Daily Lucy Chandler MD   15 mg at 05/21/25 0833    calcium carbonate (TUMS) chewable tablet 500 mg  500 mg Oral Daily PRN Tanja Gonzalez MD   500 mg at 05/17/25 2126    glucose gel 15-30 g  15-30 g Oral Q15 Min PRN Katina Armendariz MD        Or    dextrose 50 % injection 25-50 mL  25-50 mL Intravenous Q15 Min PRN Katina Armendariz MD        Or    glucagon injection 1 mg  1 mg Subcutaneous Q15 Min PRN Katina Armendariz MD        divalproex sodium extended-release (DEPAKOTE ER) 24 hr tablet 1,000 mg  1,000 mg Oral At Bedtime Tanja Gonzalez MD   1,000 mg at 05/20/25 2054    glipiZIDE (GLUCOTROL XL) 24 hr tablet 10 mg  10 mg Oral Daily with breakfast Katina Meza PA   10 mg at 05/21/25 0834    hydrOXYzine HCl (ATARAX) tablet 25 mg  25 mg Oral Q4H PRN Lucy Chandler MD   25 mg at 05/11/25 2116    loperamide (IMODIUM) capsule 2 mg  2 mg Oral 4x Daily PRN Tanja Gonzalez MD   2 mg at 05/19/25 2013    magnesium sulfate (EPSOM SALT) granules 1 Tablespoonful  1 Tablespoonful Topical Daily PRN Tanja Gonzalez MD        melatonin " tablet 3 mg  3 mg Oral At Bedtime PRN Katina Armendariz MD   3 mg at 05/20/25 2056    metFORMIN (GLUCOPHAGE) tablet 1,000 mg  1,000 mg Oral BID w/meals Charlotte Jasmine PA-C   1,000 mg at 05/21/25 0834    multivitamin RENAL (RENAVITE RX/NEPHROVITE) tablet 1 tablet  1 tablet Oral Daily Lucy Chandler MD   1 tablet at 05/21/25 0834    OLANZapine (zyPREXA) tablet 10 mg  10 mg Oral TID PRN Lucy Chandler MD        Or    OLANZapine (zyPREXA) injection 10 mg  10 mg Intramuscular TID PRN Lucy Chandler MD        pantoprazole (PROTONIX) EC tablet 40 mg  40 mg Oral QAM AC Lucy Chandler MD   40 mg at 05/21/25 0834    tolterodine ER (DETROL LA) 24 hr capsule 2 mg  2 mg Oral Daily Tanja Gonzalez MD   2 mg at 05/21/25 0834     PRN:  Current Facility-Administered Medications   Medication Dose Route Frequency Provider Last Rate Last Admin    ARIPiprazole (ABILIFY) tablet 15 mg  15 mg Oral Daily Lucy Chandler MD   15 mg at 05/21/25 0833    calcium carbonate (TUMS) chewable tablet 500 mg  500 mg Oral Daily PRN Tanja Gonzalez MD   500 mg at 05/17/25 2126    glucose gel 15-30 g  15-30 g Oral Q15 Min PRN Katina Armendariz MD        Or    dextrose 50 % injection 25-50 mL  25-50 mL Intravenous Q15 Min PRN Katina Armendariz MD        Or    glucagon injection 1 mg  1 mg Subcutaneous Q15 Min PRN Katina Armendariz MD        divalproex sodium extended-release (DEPAKOTE ER) 24 hr tablet 1,000 mg  1,000 mg Oral At Bedtime Tanja Gonzalez MD   1,000 mg at 05/20/25 2054    glipiZIDE (GLUCOTROL XL) 24 hr tablet 10 mg  10 mg Oral Daily with breakfast Katina Meza PA   10 mg at 05/21/25 0834    hydrOXYzine HCl (ATARAX) tablet 25 mg  25 mg Oral Q4H PRN Lucy Chandler MD   25 mg at 05/11/25 2116    loperamide (IMODIUM) capsule 2 mg  2 mg Oral 4x Daily PRN Tanja Gonzalez MD   2 mg at  05/19/25 2013    magnesium sulfate (EPSOM SALT) granules 1 Tablespoonful  1 Tablespoonful Topical Daily PRN Tanja Gonzalez MD        melatonin tablet 3 mg  3 mg Oral At Bedtime PRN Katina Armendariz MD   3 mg at 05/20/25 2056    metFORMIN (GLUCOPHAGE) tablet 1,000 mg  1,000 mg Oral BID w/meals Charlotte Jasmine PA-C   1,000 mg at 05/21/25 0834    multivitamin RENAL (RENAVITE RX/NEPHROVITE) tablet 1 tablet  1 tablet Oral Daily Lucy Chandler MD   1 tablet at 05/21/25 0834    OLANZapine (zyPREXA) tablet 10 mg  10 mg Oral TID PRN Lucy Chandler MD        Or    OLANZapine (zyPREXA) injection 10 mg  10 mg Intramuscular TID PRN Lucy Chandler MD        pantoprazole (PROTONIX) EC tablet 40 mg  40 mg Oral QAM AC Lucy Chandler MD   40 mg at 05/21/25 0834    tolterodine ER (DETROL LA) 24 hr capsule 2 mg  2 mg Oral Daily Tanja Gonzalez MD   2 mg at 05/21/25 0834     Labs and Imaging:  New results:   Recent Results (from the past 24 hours)   Glucose by meter    Collection Time: 05/20/25  5:57 PM   Result Value Ref Range    GLUCOSE BY METER POCT 150 (H) 70 - 99 mg/dL   Glucose by meter    Collection Time: 05/21/25  7:58 AM   Result Value Ref Range    GLUCOSE BY METER POCT 114 (H) 70 - 99 mg/dL     Data this admission:  CBC: WNL. WBC: 7.6, RBC: 4.37, Hb: 13.0, Platelets: 316.  CMP: Unremarkable (4/22) AST: 13, ALT: 10, Creatinine: 0.65.  UDS: Negative  RASHIDA: 0.00 (4/13)  HbA1c: 10.1%  TSH: 2.40  Vit: B12: 812  Folate: 13.7  Valproic Acid Free & Total: 67  Valproic Acid Free & Total STAT: 75.6  Phosphorus: 4.6 (H)  Magnesium: 1.6 (L)  BMP: unremarkable (4/28)    Imaging:  Bladder Scan: Completed on 4/22     Mental Status Exam:   Oriented to:  Grossly oriented.   General: Tired and sleepy appearing.   Appearance:  Appears older than stated age. Grooming is unkempt.   Behavior/Attitude:  Tired appearing.    Eye Contact: Eyes stayed closed for  "most of interview.   Psychomotor: No evidence of tics, dystonia, or tardive dyskinesia. No catatonia present.  Speech: Appropriate rate/tone/volume.   Language: Fluent in English with appropriate syntax and vocabulary.  Mood:  \"I feel fine\"  Affect: Congruent with mood. Sleepy, tired, confused  Thought Process: Perseverative, ruminative, tangential  Thought Content: No apparent SI, A/V/H  Associations:  questionable  Insight:  impaired unable to understand current condition  Judgment:  impaired, unable to take care for herself in ADLs  Impulse control: fair  Attention Span:  adequate for conversation  Concentration:  grossly intact  Recent and Remote Memory:  Not formally assessed. Pt reports memory issues. Staff notes forgetfulness.   Fund of Knowledge: Average.  Muscle Strength and Tone: Normal.  Gait and Station: Normal.     Psychiatric Assessment     Anastasiya Simon is a 59 year old female previously diagnosed with schizophrenia and polysubstance use disorder admitted for worsening suicidal ideation, disorganization, erratic behavior (e.g., mixing ammonia with bleach to clean home), AVH, paranoia, and functional decline. She has a history of maladaptive coping via substance use and limited insight into mental illness, compounded by psychosocial stressors including interpersonal conflict and new housing instability; last psychiatric admission was in 2019 for similar symptoms.    On admission, Zyprexa 20?mg was restarted and Depakote increased to 1000?mg for mood and psychotic stabilization. Due to [id number] neuropathy, gabapentin 300?mg was initiated, along with tolterodine and PRN loperamide for incontinence and diarrhea. Given metabolic concerns, Zyprexa was cross-tapered to Abilify, which the patient has tolerated; she declined MACEKY formulation due to concerns about skin infection. Current presentation is consistent with decompensated schizophrenia and polysubstance use, now showing stabilization. Plan " includes continued Abilify and Depakote, outpatient MH and CDT follow-up, and coordination with social work for structured placement ([facility]/IRTS). Over the course of hospitalization, multiple structured housing options have been pursued without success due to the patient s limited ability to engage in therapeutic programming and disinterest in available placements. Given that she is now psychiatrically at baseline, current placement options have been exhausted, and inpatient level of care is deemed both restrictive and no longer medically indicated, plan to discharge to a shelter on Friday to continue working with her outpatient  toward a housing solution aligned with her preferences and needs.        Psychiatric Plan by Diagnosis      Today's changes:  - None    # Schizophrenia  Medications:  - depakote 1000 mg HS  - Abilify 15 mg daily in the morning    #Polysubstance Use Disorder  - CD consult once psychotic sx more stable, if pt amenable  - CD consulted on 5/1. CD acknowledged and will visit on 5/2.    2. Pertinent Labs/Monitoring:  - Labs done 04/13 at OSH. CBC wnl, glucose elevated to 332, BMP wnl.  - UDS negative on 04/13  - 4/16 HbA1c: 10.1  - Depakote Levels: 75.6    - 5/15 LFTs: Normal    3. Additional Plans:  - Patient will be treated in therapeutic milieu with appropriate individual and group therapies as described.   - Consulted Internal Medicine for diabetes management.   - Referrals  In Process:  [facility] ([facility]) - records sent 5/13  [facility]: [phone]   [facility] I[facility]S - sent 4/28  Phone interview completed 5/5. Not accepted to [facility]. Reviewing for other sites as of 5/12.  [facility] - sent 4/29     Declined:  [facility] - sent 5/6. Declined due to substance use.  [facility] - sent 5/6. Declined due to medical concerns.  [facility] - sent 5/6. Declined due to pt's resistance to participate in programming.  [facility] - declined. Recommended pt attend CD  tx/IRTS before being considered for a referral.  [facility] - sent 5/14. Declined due to high A1C and incontinence noted.  [facility] - sent 5/14. Declined due to concern for pt's ability to participate (pt confused, endorsing paranoia, extremely low insight during phone interview).     [facility] referrals - no longer pursuing.  [facility] - made 5/2 by CD . Denied 5/5 due to acuity of MH.  [facility] - made 5/2 by CD   [facility] - made 5/2 by CD   [facility] - made 5/2 by CD        Psychiatric Hospital Course:      Anastasiya Simon was admitted to [facility] on a 72 hour hold (started 4/13/25 21:35) from [facility] ED on 04/15/25.   Medications:  Continued Medications:  Pt seen by psychiatry consult at [facility] who started zyprexa 20 mg at bedtime and depakote 500 mg at bedtime. Both of these medications were continued on admission to [facility].   New Medications:  Started loperamide for diarrhea  Started tolterodine for urinary incontinence  Started gabapentin for peripheral neuropathy    4/16: Increased depakote from 500 mg to 1000 mg HS for mood regulation  4/16: Increased metformin from 500 mg to 1000 mg BID per medicine consult  4/16: Zyprexa: 5 mg in the morning and 15 mg HS  4/16: Started PRN Loperamide 2 mg 4x/day for diarrhea  4/16: Started Tolterodine 2 mg daily for urinary incontinence  4/18: Started Gabapentin 300 mg PRN 3x/day for peripheral neuropathy  4/19: High intensity sliding scale insulin started per medicine consult  4/20: Started glipizide 24 hr tablet 5 mg daily for better glycemic control per medicine consult  4/25: Increased glipizide XL from 5mg to 10mg and decrease to medium sliding scale insulin per medicine consult.   4/28: Started Abilify 5 mg PO daily in the morning  4/30: Increased Abilify to 10 mg daily in the morning  4/30: Decreased olanzapine to only 15 mg HS  5/1: Stop sliding scale insulin per medicine consult  5/2: Increased Abilify  to 15 mg daily in the morning  5/2: Decreased Zyprexa to 10 mg HS  5/5: Decrease Zyprexa to 5 mg HS  5/7: Discontinue Zyprexa 5mg       Medical Assessment and Plan     Medical diagnoses to be addressed this admission:      #Type 2 Diabetes Mellitis with Complications  - PTA metformin 1000 mg BID with meals  - BID glucose checks; hypoglycemia protocol  - PRN gabapentin 300 mg 3x/day for peripheral neuropathy  - Insulin aspart (Novolog) injection 1-5 units HS  - Insulin aspart (Novolog) injection 3x/day 1-7 units before meals  - Glipizide 24 hr tablet 10 mg daily with breakfast for better glycemic control  - Internal Medicine Consult  - Nutrition Consult  - Moderate Consistent Carb Diet  - Follow up with PCP for continued BG monitoring within 1-2 weeks of discharge (per Medicine consult of 5/1/25)  - Nutrition consult was placed to addressed current dietary needs at Anastasiya's request    #Diarrhea  - Loperamide 2 mg PRN 4x/day    #Urinary Incontinence  - Tolterodine 2 mg daily    Medical course:  Patient was physically examined by the ED prior to being transferred to the unit and was found to be medically stable and appropriate for admission.    Consults:   4/16: Internal Medicine consulted for management of diabetes and blood pressure.   4/16: Increased metformin to 1000 mg BID.   4/18: Started PRN gabapentin 300 mg 3x/day for peripheral neuropathy.   4/18: Internal Medicine started Novolog injection 1-5 units HS and Novolog injection 3x/day 1-7 units before meals for better glycemic control.   4/19: Internal Medicine increased to high intensity sliding scale. Novolog injection 1-7 units HS. Novolog injection 3x/day 1-10 units before meals for better glycemic control.   4/20: Started PO glipizide 24 hr tablet 5 mg daily with breakfast.   4/21: Started pt on a consistent carb diet.   4/22: Nutrition Consult for diabetes management.   4/24: Note from Medicine: Please send patient with glucometer on discharge. Diabetes  education should be ordered prior to discharge.  4/25: Increase Glipizide XL to 10 mg daily starting on 4/26. Continue Metformin 1000 mg BID. Decreased to moderate intensity sliding scale. Novolog injection 1-5 units HS. Novolog injection 3x/day 1-7 units before meals.. Hopefully can discontinue in coming days. Moderate consistent CHO diet.   4/27: Continues to require 7U of insulin and is not quite ready to wean off of her sliding scale insulin yet. Will continue to monitor and adjust medications with goal of having her wean off sliding scale insulin prior to discharge.  5/1: Currently the patient is medically stable and Medicine will sign off at this time. Recommendations relayed to primary team via this progress note. Please notify on-call VINCENZO if new questions or concerns arise. OK to stop sliding scale insulin. Continue BG checks BID. Continue glipizide 10 mg daily. Continue Metformin 1000 mg BID. Follow up with PCP for continued BG monitoring within 1-2 weeks of discharge.   5/1: Chemical Dependency consulted. Consult acknowledge and will see patient on 5/2.     Checklist     [facility] Status[facility]  File Number: [id number]  Start and expiration date of commitment: 04/24/2025 - 10/24/2025  [facility] meds: Zyprexa, Haldol, Abilify, Risperdal    Safety Assessment:   Behavioral Orders   Procedures    Code 1 - Restrict to Unit    Routine Programming     As clinically indicated    Status 15     Every 15 minutes.    Suicide precautions: Suicide Risk: MODERATE; Clinical rationale to override score: modification to the care environment, response to medication, lack of access to a plan for self-harm, Exhibiting Suicidal/self-harm behaviors or thoughts     Patients on Suicide Precautions should have a Combination Diet ordered that includes a Diet selection(s) AND a Behavioral Tray selection for Safe Tray - with utensils, or Safe Tray - NO utensils       Suicide Risk:   MODERATE      Clinical rationale to override score::   modification to the care environment     Clinical rationale to override score::   response to medication     Clinical rationale to override score::   lack of access to a plan for self-harm     Clinical rationale to override score::   Exhibiting Suicidal/self-harm behaviors or thoughts     Risk Assessment:  Risk for harm is low to moderate.  Risk factors: maladaptive coping, substance use, impulsive, and past behaviors.  Protective factors: family.    SIO: None    Disposition: TBD. Disposition pending clinical stabilization, medication optimization and development of an appropriate discharge plan.     Attestations     Laurel Ledbetter, MS3  Medical Student    Resident/Fellow Attestation   I, Lucy Rosa MD, was present with the medical/VINCENZO student who participated in the service and in the documentation of the note.  I have verified the history and personally performed the physical exam and medical decision making.  I agree with the assessment and plan of care as documented in the note.      This patient was seen and discussed with my attending physician.  Lucy Plata MD  [facility] Psychiatry Resident  05/21/2025    Attending Attestation:  I saw and evaluated the patient. Vital signs, laboratory testing, and medications reviewed.  I agree with the history, findings, assessment and plan documented by Lucy Chandler MD. I personally spent a total of 50 minutes on care for this patient on the date of the encounter. This includes face-to-face history and exam as well as non-face-to-face time reviewing the chart, lab review, documentation of coordination of care, further activities as noted above, and discharge planning    CDestiny Gant MD  [facility]  Department of Psychiatry & Behavioral Sciences  Date of Service (when I saw the patient): 05/21/25

## 2025-05-21 NOTE — PLAN OF CARE
"Pt denies SI. [place] hallucinations, denies SIB, vinita anxiety and depression.   Pt in lounge most of shift. Pt social with select peers.  Pt appears to be eating well and is medication compliant.  Pt was trying to comb here hair.  Pt hair is very matted.  Pt states she just needs the right brush to be able to comb her hair.  Pt states she wants to discharge to [facility] today.  Pt states she does not need any help with anything.    Problem: Adult Behavioral Health Plan of Care  Goal: Plan of Care Review  Outcome: Not Progressing  Goal: Patient-Specific Goal (Individualization)  Description: You can add care plan individualizations to a care plan. Examples of Individualization might be:  \"Parent requests to be called daily at 9am for status\", \"I have a hard time hearing out of my right ear\", or \"Do not touch me to wake me up as it startlesme\".  Outcome: Not Progressing  Goal: Adheres to Safety Considerations for Self and Others  Outcome: Not Progressing  Intervention: Develop and Maintain Individualized Safety Plan  Recent Flowsheet Documentation  Taken 5/21/2025 1054 by Eda Appiah RN  Safety Measures:   environmental rounds completed   safety rounds completed  Goal: Absence of New-Onset Illness or Injury  Outcome: Not Progressing  Goal: Optimized Coping Skills in Response to Life Stressors  Outcome: Not Progressing  Goal: Develops/Participates in Therapeutic [place] to Support Successful Transition  Outcome: Not Progressing     Problem: Suicide Risk  Goal: Absence of Self-Harm  Outcome: Not Progressing     Problem: Sleep Disturbance  Goal: Adequate Sleep/Rest  Outcome: Not Progressing     Problem: Diabetes  Goal: Optimal Coping  Outcome: Not Progressing  Goal: Optimal Functional Ability  Outcome: Not Progressing  Goal: Blood Glucose Level Within Target Range  Outcome: Not Progressing  Goal: Minimize Risk of Hypoglycemia  Outcome: Not Progressing     Problem: Psychotic Signs/Symptoms  Goal: Improved " Behavioral Control (Psychotic Signs/Symptoms)  Outcome: Not Progressing  Goal: Optimal Cognitive Function (Psychotic Signs/Symptoms)  Outcome: Not Progressing  Goal: Increased Participation and Engagement (Psychotic Signs/Symptoms)  Outcome: Not Progressing  Goal: Improved Mood Symptoms (Psychotic Signs/Symptoms)  Outcome: Not Progressing  Goal: Improved Psychomotor Symptoms (Psychotic Signs/Symptoms)  Outcome: Not Progressing  Intervention: Manage Psychomotor Movement  Recent Flowsheet Documentation  Taken 5/21/2025 1054 by Eda Appiah RN  Activity (Behavioral Health): up ad belen  Goal: Decreased Sensory Symptoms (Psychotic Signs/Symptoms)  Outcome: Not Progressing  Goal: Improved Sleep (Psychotic Signs/Symptoms)  Outcome: Not Progressing  Goal: Enhanced Social, Occupational or Functional Skills (Psychotic Signs/Symptoms)  Outcome: Not Progressing   Goal Outcome Evaluation:

## 2025-05-21 NOTE — PROVIDER NOTIFICATION
05/21/25 0941   Individualization/Patient Specific Goals   Patient Personal Strengths resilient;resourceful;family/social support   Patient Vulnerabilities family/relationship conflict;housing insecurity;substance abuse/addiction   Interprofessional Rounds   Summary Discussed patient progress and barriers to placement. IRTS continues to be difficult as a placement option. Considering custodial/GH vs crisis bed/shelter   Participants nursing;CTC;psychiatrist;other (see comments)   Behavioral Team Discussion   Participants Dr. Gant; Dr. Dacosta; Eda Acosta RN; Rhoda Casarez [facility]; medical students   Progress Pt is progressing   Anticipated length of stay 20+ days   Continued Stay Criteria/Rationale Symptom stabilization, medication management, care coordination   Medical/Physical See H&P   Precautions See below   Plan Psychiatric assessment/Medication management. Therapeutic Milieu. Individual care planning and after care planning. Patient to participate in unit groups and activities. Individual and group support on unit.   Safety Plan Completed by unit therapist   Anticipated Discharge Disposition IRTS;homeless shelter;assisted living     PRECAUTIONS AND SAFETY    Behavioral Orders   Procedures    Code 1 - Restrict to Unit    Routine Programming     As clinically indicated    Status 15     Every 15 minutes.    Suicide precautions: Suicide Risk: MODERATE; Clinical rationale to override score: modification to the care environment, response to medication, lack of access to a plan for self-harm, Exhibiting Suicidal/self-harm behaviors or thoughts     Patients on Suicide Precautions should have a Combination Diet ordered that includes a Diet selection(s) AND a Behavioral Tray selection for Safe Tray - with utensils, or Safe Tray - NO utensils       Suicide Risk:   MODERATE     Clinical rationale to override score::   modification to the care environment     Clinical rationale to override score::   response to medication      Clinical rationale to override score::   lack of access to a plan for self-harm     Clinical rationale to override score::   Exhibiting Suicidal/self-harm behaviors or thoughts       Safety  Safety WDL: WDL  Patient Location: patient room, own, lounge, hallway  Observed Behavior: calm, pacing, sitting  Observed Behavior (Comment): pacing on the hallway.  Safety Measures: environmental rounds completed, safety rounds completed  Diversional Activity: art work, journaling, music, play, puzzles, television, reading  De-Escalation Techniques: verbally redirected, appropriate behavior reinforced  Suicidality: Status 15, Minimal personal belongings in room

## 2025-05-22 VITALS
WEIGHT: 160.8 LBS | HEART RATE: 83 BPM | RESPIRATION RATE: 16 BRPM | SYSTOLIC BLOOD PRESSURE: 123 MMHG | DIASTOLIC BLOOD PRESSURE: 72 MMHG | BODY MASS INDEX: 26.79 KG/M2 | OXYGEN SATURATION: 99 % | TEMPERATURE: 97.1 F | HEIGHT: 65 IN

## 2025-05-22 LAB
GLUCOSE BLDC GLUCOMTR-MCNC: 116 MG/DL (ref 70–99)
GLUCOSE BLDC GLUCOMTR-MCNC: 207 MG/DL (ref 70–99)
GLUCOSE BLDC GLUCOMTR-MCNC: 80 MG/DL (ref 70–99)

## 2025-05-22 PROCEDURE — 99207 PR NO BILLABLE SERVICE THIS VISIT: CPT

## 2025-05-22 PROCEDURE — 250N000013 HC RX MED GY IP 250 OP 250 PS 637

## 2025-05-22 PROCEDURE — 124N000002 HC R&B MH UMMC

## 2025-05-22 PROCEDURE — 250N000013 HC RX MED GY IP 250 OP 250 PS 637: Performed by: PHYSICIAN ASSISTANT

## 2025-05-22 PROCEDURE — 99232 SBSQ HOSP IP/OBS MODERATE 35: CPT | Mod: GC | Performed by: STUDENT IN AN ORGANIZED HEALTH CARE EDUCATION/TRAINING PROGRAM

## 2025-05-22 RX ORDER — PROPRANOLOL HYDROCHLORIDE 10 MG/1
10 TABLET ORAL 3 TIMES DAILY PRN
Status: DISCONTINUED | OUTPATIENT
Start: 2025-05-22 | End: 2025-05-23 | Stop reason: HOSPADM

## 2025-05-22 RX ORDER — GLIPIZIDE 10 MG/1
10 TABLET, FILM COATED, EXTENDED RELEASE ORAL
Qty: 30 TABLET | Refills: 0 | Status: SHIPPED | OUTPATIENT
Start: 2025-05-23 | End: 2025-06-22

## 2025-05-22 RX ORDER — TOLTERODINE 2 MG/1
2 CAPSULE, EXTENDED RELEASE ORAL DAILY
Qty: 30 CAPSULE | Refills: 0 | Status: SHIPPED | OUTPATIENT
Start: 2025-05-23 | End: 2025-06-22

## 2025-05-22 RX ORDER — PROPRANOLOL HYDROCHLORIDE 10 MG/1
10 TABLET ORAL 3 TIMES DAILY PRN
Qty: 30 TABLET | Refills: 0 | Status: SHIPPED | OUTPATIENT
Start: 2025-05-22

## 2025-05-22 RX ORDER — VIT B COMP NO.3/FOLIC/C/BIOTIN 1 MG-60 MG
1 TABLET ORAL DAILY
Qty: 30 TABLET | Refills: 0 | Status: SHIPPED | OUTPATIENT
Start: 2025-05-23 | End: 2025-06-22

## 2025-05-22 RX ORDER — DIVALPROEX SODIUM 500 MG/1
1000 TABLET, FILM COATED, EXTENDED RELEASE ORAL AT BEDTIME
Qty: 60 TABLET | Refills: 0 | Status: SHIPPED | OUTPATIENT
Start: 2025-05-22 | End: 2025-06-21

## 2025-05-22 RX ORDER — LOPERAMIDE HYDROCHLORIDE 2 MG/1
2 CAPSULE ORAL PRN
Qty: 60 CAPSULE | Refills: 0 | Status: SHIPPED | OUTPATIENT
Start: 2025-05-22 | End: 2025-06-21

## 2025-05-22 RX ORDER — PANTOPRAZOLE SODIUM 40 MG/1
40 TABLET, DELAYED RELEASE ORAL
Qty: 30 TABLET | Refills: 0 | Status: SHIPPED | OUTPATIENT
Start: 2025-05-23 | End: 2025-06-22

## 2025-05-22 RX ORDER — ARIPIPRAZOLE 15 MG/1
15 TABLET ORAL DAILY
Qty: 30 TABLET | Refills: 0 | Status: SHIPPED | OUTPATIENT
Start: 2025-05-23 | End: 2025-06-22

## 2025-05-22 RX ADMIN — PANTOPRAZOLE SODIUM 40 MG: 40 TABLET, DELAYED RELEASE ORAL at 08:12

## 2025-05-22 RX ADMIN — METFORMIN HYDROCHLORIDE 1000 MG: 500 TABLET, FILM COATED ORAL at 08:12

## 2025-05-22 RX ADMIN — DIVALPROEX SODIUM 1000 MG: 500 TABLET, FILM COATED, EXTENDED RELEASE ORAL at 20:49

## 2025-05-22 RX ADMIN — HYDROXYZINE HYDROCHLORIDE 25 MG: 25 TABLET, FILM COATED ORAL at 11:11

## 2025-05-22 RX ADMIN — TOLTERODINE TARTRATE 2 MG: 2 CAPSULE, EXTENDED RELEASE ORAL at 08:12

## 2025-05-22 RX ADMIN — Medication 1 TABLET: at 08:12

## 2025-05-22 RX ADMIN — GLIPIZIDE 10 MG: 2.5 TABLET, FILM COATED, EXTENDED RELEASE ORAL at 08:12

## 2025-05-22 RX ADMIN — PROPRANOLOL HYDROCHLORIDE 10 MG: 10 TABLET ORAL at 12:21

## 2025-05-22 RX ADMIN — ARIPIPRAZOLE 15 MG: 15 TABLET ORAL at 08:12

## 2025-05-22 RX ADMIN — METFORMIN HYDROCHLORIDE 1000 MG: 500 TABLET, FILM COATED ORAL at 18:19

## 2025-05-22 ASSESSMENT — ACTIVITIES OF DAILY LIVING (ADL)
ADLS_ACUITY_SCORE: 47
HYGIENE/GROOMING: INDEPENDENT
ORAL_HYGIENE: INDEPENDENT
ADLS_ACUITY_SCORE: 47
LAUNDRY: WITH SUPERVISION
DRESS: INDEPENDENT
ADLS_ACUITY_SCORE: 47

## 2025-05-22 NOTE — DISCHARGE SUMMARY
"                                                                                                                 ----------------------------------------------------------------------------------------------------------  [facility]   Psychiatric Discharge Summary      Anastasiya Simon MRN# [id number]   Age: 59 year old YOB: 1965     Date of Admission:  4/15/2025  Date of Discharge:  5/23/2025  Admitting Physician:  Katina Carty MD  Discharge Physician:  CLAUDIA Gant MD  **Update date of service and hit refresh**    This document serves as a transfer of care to Anastasiya Simon's outpatient providers.     Events Leading to Hospitalization:     \"Anastasiya Simon is a 59 year old female with previous psychiatric diagnoses of schizophrenia and polysubstance use admitted from the [facility] ED on 04/15/2025 due to concern for SI and psychosis in the context of psychosocial stressors including living situation and argument with family member.\"    See H&P by Lucy Plata MD, MD on 4/15/2025 for additional details.      Diagnoses:   Primary Psychiatric Diagnosis  # Schizophrenia    Secondary Psychiatric Diagnoses  #Polysubstance Use Disorder    Psychiatric Assessment:   Anastasiya Simon is a 59 year old female previously diagnosed with schizophrenia and polysubstance use disorder admitted for worsening suicidal ideation, disorganization, erratic behavior (e.g., mixing ammonia with bleach to clean home), AVH, paranoia, and functional decline. She has a history of maladaptive coping via substance use and limited insight into mental illness, compounded by psychosocial stressors including interpersonal conflict and new housing instability; last psychiatric admission was in 2019 for similar symptoms.     On admission, Zyprexa 20?mg was restarted and Depakote increased to 1000?mg for mood and psychotic stabilization. Due to [id number] " neuropathy, gabapentin 300?mg was initiated, along with tolterodine and PRN loperamide for incontinence and diarrhea. Given metabolic concerns, Zyprexa was cross-tapered to Abilify, which she tolerated; she declined MACKEY formulation due to concerns about skin infection. Anastasiya's presentation was consistent with decompensated schizophrenia and polysubstance use, which showed stabilization. Plan included continued Abilify and Depakote, outpatient MH and CDT follow-up, and coordination with social work for structured placement ([facility]/IRTS). Over the course of hospitalization, multiple structured housing options have been pursued without success due to the patient s limited ability to engage in therapeutic programming and disinterest in available placements. Given that she was psychiatrically at baseline, current placement options have been exhausted, and inpatient level of care is deemed both restrictive and no longer medically indicated, plan to discharge to a shelter on Friday to continue working with her outpatient  toward a housing solution aligned with her preferences and needs.       Psychiatric Hospital Course:   Anastasiya Simon was admitted to [facility] on a 72 hour hold (started 4/13/25 21:35) from [facility] on 04/15/25.   Medications:  Continued Medications:  Pt seen by psychiatry consult at [facility] who started zyprexa 20 mg at bedtime and depakote 500 mg at bedtime. Both of these medications were continued on admission to [facility].   New Medications:  Started loperamide for diarrhea  Started tolterodine for urinary incontinence  Started gabapentin for peripheral neuropathy     4/16: Increased depakote from 500 mg to 1000 mg HS for mood regulation  4/16: Increased metformin from 500 mg to 1000 mg BID per medicine consult  4/16: Zyprexa: 5 mg in the morning and 15 mg HS  4/16: Started PRN Loperamide 2 mg 4x/day for diarrhea  4/16: Started Tolterodine 2 mg daily for urinary  incontinence  4/18: Started Gabapentin 300 mg PRN 3x/day for peripheral neuropathy  4/19: High intensity sliding scale insulin started per medicine consult  4/20: Started glipizide 24 hr tablet 5 mg daily for better glycemic control per medicine consult  4/25: Increased glipizide XL from 5mg to 10mg and decrease to medium sliding scale insulin per medicine consult.   4/28: Started Abilify 5 mg PO daily in the morning  4/30: Increased Abilify to 10 mg daily in the morning  4/30: Decreased olanzapine to only 15 mg HS  5/1: Stop sliding scale insulin per medicine consult  5/2: Increased Abilify to 15 mg daily in the morning  5/2: Decreased Zyprexa to 10 mg HS  5/5: Decrease Zyprexa to 5 mg HS  5/7: Discontinue Zyprexa 5mg      Behaviors: The patient was safe and appropriate and did not require chemical/physical restraints during admission. She was cooperative with cares, had good group attendance, and was visible in the milieu.  Change in psychiatric symptoms: Over the course of this hospitalization the patient's symptoms are now most consistent with baseline.     Anastasiya was transfered to [facility]. At the time of discharge she was determined to not be a danger to herself or others.     Risk Assessment:      Today Anastasiya Simon denies SI. Patient has notable risk factors for self-harm, including psychosis and substance abuse. However, risk is mitigated by relationship with sisters and plans to continue hobbies. Therefore, based on all available evidence including the factors cited above, she does not appear to be at imminent risk for self-harm, does not meet criteria for a 72-hr hold, and therefore remains appropriate for ongoing outpatient level of care. Additional steps taken to minimize risk include: medication optimization, close psychiatric follow up and provision of crisis resources.    Psychiatric Examination:   ***  Oriented to:  Grossly oriented.   General: Tired and sleepy appearing.   Appearance:   "Appears older than stated age. Grooming is unkempt.   Behavior/Attitude:  Tired appearing.    Eye Contact: Eyes stayed closed for most of interview.   Psychomotor: No evidence of tics, dystonia, or tardive dyskinesia. No catatonia present.  Speech: Appropriate rate/tone/volume.   Language: Fluent in English with appropriate syntax and vocabulary.  Mood:  \"I feel fine\"  Affect: Congruent with mood. Sleepy, tired, confused  Thought Process: Perseverative, ruminative, tangential  Thought Content: No apparent SI, A/V/H  Associations:  questionable  Insight:  impaired unable to understand current condition  Judgment:  impaired, unable to take care for herself in ADLs  Impulse control: fair  Attention Span:  adequate for conversation  Concentration:  grossly intact  Recent and Remote Memory:  Not formally assessed. Pt reports memory issues. Staff notes forgetfulness.   Fund of Knowledge: Average.  Muscle Strength and Tone: Normal.  Gait and Station: Normal.     Medical Hospital Course:   Anastasiya Simon was medically cleared by the ED prior to admission to the unit. PTA medications were continued on admission.     #Type 2 Diabetes Mellitis with Complications  - PTA metformin 1000 mg BID with meals  - BID glucose checks; hypoglycemia protocol  - PRN gabapentin 300 mg 3x/day for peripheral neuropathy  - Insulin aspart (Novolog) injection 1-5 units HS  - Insulin aspart (Novolog) injection 3x/day 1-7 units before meals  - Glipizide 24 hr tablet 10 mg daily with breakfast for better glycemic control  - Internal Medicine Consult  - Nutrition Consult  - Moderate Consistent Carb Diet  - Follow up with PCP for continued BG monitoring within 1-2 weeks of discharge (per Medicine consult of 5/1/25)  - Nutrition consult was placed to addressed current dietary needs at Anastasiya's request     #Diarrhea  - Loperamide 2 mg PRN 4x/day     #Urinary Incontinence  - Tolterodine 2 mg daily     Medical course:  Patient was physically examined " by the ED prior to being transferred to the unit and was found to be medically stable and appropriate for admission.     Consults:   4/16: Internal Medicine consulted for management of diabetes and blood pressure.   4/16: Increased metformin to 1000 mg BID.   4/18: Started PRN gabapentin 300 mg 3x/day for peripheral neuropathy.   4/18: Internal Medicine started Novolog injection 1-5 units HS and Novolog injection 3x/day 1-7 units before meals for better glycemic control.   4/19: Internal Medicine increased to high intensity sliding scale. Novolog injection 1-7 units HS. Novolog injection 3x/day 1-10 units before meals for better glycemic control.   4/20: Started PO glipizide 24 hr tablet 5 mg daily with breakfast.   4/21: Started pt on a consistent carb diet.   4/22: Nutrition Consult for diabetes management.   4/24: Note from Medicine: Please send patient with glucometer on discharge. Diabetes education should be ordered prior to discharge.  4/25: Increase Glipizide XL to 10 mg daily starting on 4/26. Continue Metformin 1000 mg BID. Decreased to moderate intensity sliding scale. Novolog injection 1-5 units HS. Novolog injection 3x/day 1-7 units before meals.. Hopefully can discontinue in coming days. Moderate consistent CHO diet.   4/27: Continues to require 7U of insulin and is not quite ready to wean off of her sliding scale insulin yet. Will continue to monitor and adjust medications with goal of having her wean off sliding scale insulin prior to discharge.  5/1: Currently the patient is medically stable and Medicine will sign off at this time. Recommendations relayed to primary team via this progress note. Please notify on-call [facility] if new questions or concerns arise. OK to stop sliding scale insulin. Continue BG checks BID. Continue glipizide 10 mg daily. Continue Metformin 1000 mg BID. Follow up with PCP for continued BG monitoring within 1-2 weeks of discharge.   5/1: Chemical Dependency consulted. Consult  acknowledge and will see patient on 5/2.  5/9: Pharmacy was consulted to run a test claim for Abilify Maintena    Labs were notable for the following:    Data this admission:  CBC: WNL. WBC: 7.6, RBC: 4.37, Hb: 13.0, Platelets: 316.  CMP: Unremarkable (4/22) AST: 13, ALT: 10, Creatinine: 0.65.  UDS: Negative  BAC: 0.00 (4/13)  HbA1c: 10.1%  TSH: 2.40  Vit: B12: 812  Folate: 13.7  Valproic Acid Free & Total: 67  Valproic Acid Free & Total STAT: 75.6  Phosphorus: 4.6 (H)  Magnesium: 1.6 (L)  BMP: unremarkable (4/28)     Discharge Medications:     Current Discharge Medication List        START taking these medications    Details   ARIPiprazole (ABILIFY) 15 MG tablet Take 1 tablet (15 mg) by mouth daily.  Qty: 30 tablet, Refills: 0    Associated Diagnoses: Schizophrenia, unspecified type (H)      divalproex sodium extended-release (DEPAKOTE ER) 500 MG 24 hr tablet Take 2 tablets (1,000 mg) by mouth at bedtime.  Qty: 60 tablet, Refills: 0    Associated Diagnoses: Schizophrenia, unspecified type (H)      glipiZIDE (GLUCOTROL XL) 10 MG 24 hr tablet Take 1 tablet (10 mg) by mouth daily (with breakfast).  Qty: 30 tablet, Refills: 0    Associated Diagnoses: Type 2 diabetes mellitus with other specified complication, without long-term current use of insulin (H)      loperamide (IMODIUM) 2 MG capsule Take 1 capsule (2 mg) by mouth as needed for diarrhea.  Qty: 60 capsule, Refills: 0    Associated Diagnoses: Irritable bowel syndrome with both constipation and diarrhea      multivitamin RENAL (RENAVITE RX/NEPHROVITE) 1 tablet tablet Take 1 tablet by mouth daily.  Qty: 30 tablet, Refills: 0    Associated Diagnoses: Type 2 diabetes mellitus with other specified complication, without long-term current use of insulin (H)      pantoprazole (PROTONIX) 40 MG EC tablet Take 1 tablet (40 mg) by mouth every morning (before breakfast).  Qty: 30 tablet, Refills: 0    Associated Diagnoses: Gastroesophageal reflux disease, unspecified whether  esophagitis present      tolterodine ER (DETROL LA) 2 MG 24 hr capsule Take 1 capsule (2 mg) by mouth daily.  Qty: 30 capsule, Refills: 0    Associated Diagnoses: Overactive bladder           CONTINUE these medications which have CHANGED    Details   metFORMIN (GLUCOPHAGE) 1000 MG tablet Take 1 tablet (1,000 mg) by mouth 2 times daily (with meals).  Qty: 60 tablet, Refills: 0    Associated Diagnoses: Type 2 diabetes mellitus with other specified complication, without long-term current use of insulin (H)           STOP taking these medications       omeprazole (PRILOSEC) 20 MG DR capsule Comments:   Reason for Stopping:                Discharge Plan:   Medications as above  Psychiatric Appointments: ***   Psychotherapy Appointments: ***  Referrals: ***  About Pending sale to [facility] is located in [place], [place]. This is a nonprofit organization that provides a range of services to community members and veterans who are experiencing hardship. They also offer support if you re navigating the road to recovery from a substance use struggle.     They offer an array of shelter programs and typically house hundreds of people each night. There is a gender  overnight shelter. These shelters offer hot meals and warm showers. Beds are offered on a first come first serve basis.     There is also transitional housing available here. This is helpful support if you re navigating a substance use struggle and need a supportive and safe place to live. Their transitional housing facilities are located in the [facility]. Here, you ll have support for two years.     You ll also have access to case management services. This will allow you to live an independent lifestyle while helping connect you to critical resources. A portion of these transitional living homes are reserved for veterans who need supportive care. Veterans may be referred to a [facility] if  they have acute conditions.     Other shelter options include facilities where you can work with a . This advocate will help connect you to community resources and services. Among these services are referrals for gainful employment.     There s also emergency housing funded by [facility]. You ll have access to meals and medical respite services. There are also [place] based supports including fellowship services. You can also get connected to 12 Step style programs in your community.    Resources:   Mental Health Crisis Resources  Throughout [place]: call **CRISIS (**229689)  Crisis Text Line: is available for free, 24/7 by texting [place] to 408551  Suicide Awareness Voices of Education (SAVE) (www.save.org): 453-662-PMUF (1135)  The National Suicide Prevention Lifeline is now: 988 Suicide and Crisis Lifeline. Call 988 anytime.  National Guin on Mental Illness (www.[place].marla.org): 458-958-9875 or 160-525-8850.  Jhxb6dwng: text the word LIFE to 26022 for immediate support and crisis intervention  Mental Health Consumer/Survivor Network of [place] (www.mhcsn.net): 348-890-1161 or 131-437-3660  Mental Health Association of [place] (www.mentalhealth.org): 300.581.8351 or 698-008-8436  Peer Support Connection [place] Warmline ([facility]) 1-103.712.7375 Available from 5pm - 9am (7 days a week/365 days a year)  [facility] 1-316.815.4271 Community Outreach for Psych Emergencies    Medical follow up: ***       Attestations:     Resident with med student: Laurel Ledbetter, MS3  [facility] Medical Student     I was present with the medical student who participated in the service and in the documentation of the note.  I have verified the history and personally performed the physical exam and medical decision making. I agree with the assessment and plan of care as documented in the note.    This patient was seen and discussed with my attending physician.  ***  Psychiatry Resident Physician     also [place] based supports including fellowship services. You can also get connected to 12 Step style programs in your community.    Resources:   Mental Health Crisis Resources  Throughout [place]: call **CRISIS (**457604)  Crisis Text Line: is available for free, 24/7 by texting [place] to 032054  Suicide Awareness Voices of Education (SAVE) (www.save.org): 697-892-IQDZ (8512)  The National Suicide Prevention Lifeline is now: 988 Suicide and Crisis Lifeline. Call 988 anytime.  National [place] on Mental Illness (www.[place].marla.org): 742.294.7484 or 116-587-2609.  Dcew2vjzr: text the word LIFE to 57352 for immediate support and crisis intervention  Mental Health Consumer/Survivor Network of [place] (www.mhcsn.net): 250.903.4935 or 486-868-4342  Mental Health Association of [place] (www.mentalhealth.org): 198.162.9713 or 502-011-0613  Peer Support Connection [place] Warmline (PSC) 1-589.910.4115 Available from 5pm - 9am (7 days a week/365 days a year)  [facility] 1-603.562.2632 Community Outreach for Psych Emergencies         Attestations:     Laurel Ledbetter, MS3  [facility] Medical Student     I was present with the medical student who participated in the service and in the documentation of the note.  I have verified the history and personally performed the physical exam and medical decision making. I agree with the assessment and plan of care as documented in the note.    This patient was seen and discussed with my attending physician.  Lucy Plata MD  [facility] Psychiatry Resident  05/23/2025

## 2025-05-22 NOTE — PROGRESS NOTES
Brief Internal Medicine Progress Note:    Following peripherally for diabetic medication management and discharge assistance.     Type 2 DM  Blood glucose levels have been relatively stable over the last few days, occasional dinnertime highs in the 170s-180s but also tends to be lower in the AM. Given that this patient is un-housed, would prefer not to discharge on insulin. Can consider adding additional PO medication (current PO regimen at maximum doses) however given patient is scheduled to discharge tomorrow (5/23), would opt not to due to inability to monitor medication effects.     At this time, discharge recommendations are as follows:   - continue metformin 1000 mg BID with meals   - continue glipizide 10 mg daily   - carb consistent diet   - establish with PCP for continued monitoring and medication adjustment     Camille Cordero DNP, ACN-AG  Internal Medicine [facility]

## 2025-05-22 NOTE — PLAN OF CARE
"Pt blood sugar before breakfast was 116.  Pt packing belongings in her room.  Pt reports looking forward to discharge tomorrow.  Pt appears to be eating well. Pt reported anxiety and requested PRN.  Pt stated she wanted one form yesterday.  Hydroxyzine 25mg given at 1111.  Then pt stated after Hydroxyzine that MD's had order him another PRN. Pt stated the MD's wanted her to try this medication  and the may order it for discharge if its helpful. . So PRN Propanolol 10mg given at 1221.   Pt currently appears resting in her room.   Problem: Adult Behavioral Health Plan of Care  Goal: Plan of Care Review  Outcome: Not Progressing  Goal: Patient-Specific Goal (Individualization)  Description: You can add care plan individualizations to a care plan. Examples of Individualization might be:  \"Parent requests to be called daily at 9am for status\", \"I have a hard time hearing out of my right ear\", or \"Do not touch me to wake me up as it startlesme\".  Outcome: Not Progressing  Goal: Adheres to Safety Considerations for Self and Others  Outcome: Not Progressing  Goal: Absence of New-Onset Illness or Injury  Outcome: Not Progressing  Goal: Optimized Coping Skills in Response to Life Stressors  Outcome: Not Progressing  Goal: Develops/Participates in Therapeutic [place] to Support Successful Transition  Outcome: Not Progressing     Problem: Suicide Risk  Goal: Absence of Self-Harm  Outcome: Not Progressing     Problem: Sleep Disturbance  Goal: Adequate Sleep/Rest  Outcome: Not Progressing     Problem: Diabetes  Goal: Optimal Coping  Outcome: Not Progressing  Goal: Optimal Functional Ability  Outcome: Not Progressing  Goal: Blood Glucose Level Within Target Range  Outcome: Not Progressing  Goal: Minimize Risk of Hypoglycemia  Outcome: Not Progressing     Problem: Psychotic Signs/Symptoms  Goal: Improved Behavioral Control (Psychotic Signs/Symptoms)  Outcome: Not Progressing  Goal: Optimal Cognitive Function (Psychotic " Signs/Symptoms)  Outcome: Not Progressing  Goal: Increased Participation and Engagement (Psychotic Signs/Symptoms)  Outcome: Not Progressing  Goal: Improved Mood Symptoms (Psychotic Signs/Symptoms)  Outcome: Not Progressing  Goal: Improved Psychomotor Symptoms (Psychotic Signs/Symptoms)  Outcome: Not Progressing  Goal: Decreased Sensory Symptoms (Psychotic Signs/Symptoms)  Outcome: Not Progressing  Goal: Improved Sleep (Psychotic Signs/Symptoms)  Outcome: Not Progressing  Goal: Enhanced Social, Occupational or Functional Skills (Psychotic Signs/Symptoms)  Outcome: Not Progressing   Goal Outcome Evaluation:

## 2025-05-22 NOTE — PLAN OF CARE
Problem: Sleep Disturbance  Goal: Adequate Sleep/Rest  Outcome: Progressing   Goal Outcome Evaluation:    Patient appears to have slept a total of 6 hours.     0200 B  Safety/environment checks conducted every 15 minutes with no concerns noted. No complaints of pain/discomfort.

## 2025-05-22 NOTE — PLAN OF CARE
At the beginning of the shift, the pt was observed napping in their room. Later, the pt was seen in the milieu, seated in the lounge, watching TV and engaging socially with selected peers. No significant changes were noted from the previous encounter. The pt's mood remained calm, with a flat affect, and rambling speech. The pt denied any MH concerns while radha for safety. The pt expressed excitement about their discharged tomorrow. No behavioral escalations or safety concerns were observed or reported. Pre-supper BS was 207.The pt complied with med and care. The pt's sister visited and went well.    Problem: Adult Behavioral Health Plan of Care  Goal: Plan of Care Review  Outcome: Progressing  Flowsheets (Taken 5/22/2025 1630)  Plan of Care Reviewed With: patient  Overall Patient Progress: improving  Patient Agreement with Plan of Care: agrees   Goal Outcome Evaluation:    Plan of Care Reviewed With: patient Plan of Care Reviewed With: patient    Overall Patient Progress: improvingOverall Patient Progress: improving

## 2025-05-22 NOTE — PLAN OF CARE
"Team Note Due:  Wednesday    Assessment/Intervention/Current Symtoms and Care Coordination:  Chart review and met with team, discussed pt progress, symptomology, and response to treatment.  Discussed the discharge plan and any potential impediments to discharge.    Spoke with Tamika to provide update on plan for discharge to [facility] tomorrow. She was disappointed to hear that pt is \"choosing homelessness\" over agreeing to a /long-term. Agreed that pt needs support but pt does not believe she does, so she cannot be forced into a living situation even with commitment. Provided Tamika with contact information for [facility].    PD and COS drafted. Met with pt to review PD, obtained signature. Explained housing resources through [facility] vs shelter only. Pt agreed to work with [facility] and her CMs on longer-term placement options.    Scheduled medical ride for 10am  tomorrow: [employer] [phone]. Confirmation #: [id number]    Discharge Plan or Goal:  [facility]TS vs [facility] vs [facility] vs Shelter  -PCP  -Psychiatry    Discharge Date/ time: 5/23 at 10:00am  Transportation: Medical ride scheduled  Provisional Discharge: Yes[x]  No[]  AVS Completed: []   IP Tracker Form Completed: []     Barriers to Discharge:  Patient requires further psychiatric stabilization due to current symptomology, medication management with changes subject to provider, coordination with outside supports, and aftercare planning. Pt is also under civil commitment.      Referral Status:    In Process:  [facility] ([facility]) - records sent 5/13. Actively following up.  [facility]: [phone]   [facility] - sent 4/28  Phone interview completed 5/5. Not accepted to [facility]. Reviewing for other sites as of 5/12.  [facility] - sent 4/29  [facility] - sent 5/21  [email or web address], [phone]    Declined:  [facility] - sent 5/6. Declined due to substance use.  [facility] - sent 5/6. Declined due to " medical concerns.  [facility] - sent 5/6. Declined due to pt's resistance to participate in programming.  [facility] - declined. Recommended pt attend CD tx/IRTS before being considered for a referral.  [facility] - sent 5/14. Declined due to high A1C and incontinence noted.  [facility] - sent 5/14. Declined due to concern for pt's ability to participate (pt confused, endorsing paranoia, extremely low insight during phone interview).    IOP CD referrals - no longer pursuing.  [facility] - made 5/2 by CD . Denied 5/5 due to acuity of MH.  [facility] - made 5/2 by CD   [facility] - made 5/2 by CD   [facility] - made 5/2 by CD      Legal Status:  MI Commitment and [facility]: [place]  File Number: [id number]  Start and expiration date of commitment: 04/24/2025 - 10/24/2025    [facility] meds: Zyprexa, Haldol, Abilify, Risperdal    :   Karolyn Parker (MHR): [phone]   [email or web address]    Contacts:   Haydee Mares (CADI CM): [phone]   Tamika Johnson- (sister): [phone]  Lucille Reddy (Intervention ): [phone]     Upcoming Meetings and Dates/Important Information and next steps:  Send PD and discharge summary to   Send PD and COS to [facility]

## 2025-05-22 NOTE — PLAN OF CARE
BEH IP Unit Acuity Rating Score (UARS)  Patient is given one point for every criteria they meet.    CRITERIA SCORING   On a 72 hour hold, court hold, committed, stay of commitment, or revocation. 1    Patient LOS on BEH unit exceeds 20 days. 1  LOS: 37   Patient under guardianship, 55+, otherwise medically complex, or under age 11. 1   Suicide ideation without relief of precipitating factors. 0   Current plan for suicide. 0   Current plan for homicide. 0   Imminent risk or actual attempt to seriously harm another without relief of factors precipitating the attempt. 0   Severe dysfunction in daily living (ex: complete neglect for self care, extreme disruption in vegetative function, extreme deterioration in social interactions). 1   Recent (last 7 days) or current physical aggression in the ED or on unit. 0   Restraints or seclusion episode in past 72 hours. 0   Recent (last 7 days) or current verbal aggression, agitation, yelling, etc., while in the ED or unit. 0   Active psychosis. 1   Need for constant or near constant redirection (from leaving, from others, etc).  0   Intrusive or disruptive behaviors. 0   Patient requires 3 or more hours of individualized nursing care per 8-hour shift (i.e. for ADLs, meds, therapeutic interventions). 0   TOTAL 5

## 2025-05-22 NOTE — PROGRESS NOTES
"  ----------------------------------------------------------------------------------------------------------  [facility]  Psychiatry Progress Note  Hospital Day #37     Interim History:     The patient's care was discussed with the treatment team and chart notes were reviewed.    Identifier: Anastasiya Simon is a 59 year old female with a previous psychiatric diagnosis of schizophrenia and polysubstance use as well as type II diabetes (managed with metformin 1000mg and glipizide 10mg) admitted from [facility] ED on 04/15/2025 due to concern for SI and psychosis in the context of psychosocial stressors including living situation and argument with family member.    Vitals:Temp: 98.3  F (36.8  C) Temp src: Oral BP: 135/81 Pulse: 93     SpO2: 96 % O2 Device: None (Room air)   Height: 165.1 cm (5' 5\") Weight: 72.9 kg (160 lb 12.8 oz)  Estimated body mass index is 26.76 kg/m  as calculated from the following:    Height as of this encounter: 1.651 m (5' 5\").    Weight as of this encounter: 72.9 kg (160 lb 12.8 oz).  Sleep: 6 hours (25 0600)  Scheduled medications: Took all scheduled medications as prescribed.  Psychiatric PRN medications:   Last 24H PRN:     hydrOXYzine HCl (ATARAX) tablet 25 mg, 25 mg at 25 1111    Staff Report:   She denies SI/hallucinations/SIB/anxiety and depression.  States she wants to discharge to [facility] today.         Yesterday ():  Morning BS was 114  Pre-dinner BS was 177.        Today ():  0200 B  0749: 116  PRN: Hydroxyzine 25mg given on  at 230PM  Patient appears to have slept for 6 hours.     Subjective:     Patient Interview:  Anastasiya was interviewed in the milieu. She reported feeling fine. When asked about feeling anxious yesterday, she stated that she feels like the cause of her anxiety was after she received one of her medications. She described the medication as a \"blue pill.\" She noted that she " "felt much better after receiving hydroxyzine and today she is not feeling anxious. Due to increased anxiety yesterday which could possible be due to akathisia, we discussed adding propanolol to her medication list. She was agreeable to this plan.       She also asked about plans for discharge tomorrow to [facility] and we directed her to the [facility] for any updates.       Objective:     Vitals:  /81 (BP Location: Left arm, Patient Position: Sitting, Cuff Size: Adult Regular)   Pulse 93   Temp 98.3  F (36.8  C) (Oral)   Resp 18   Ht 1.651 m (5' 5\")   Wt 72.9 kg (160 lb 12.8 oz)   LMP 10/22/2013   SpO2 96%   BMI 26.76 kg/m      Allergies:  Allergies   Allergen Reactions    Tetracycline Unknown     It happened when pt was a baby       Current Medications:  Scheduled:  Current Facility-Administered Medications   Medication Dose Route Frequency Provider Last Rate Last Admin    ARIPiprazole (ABILIFY) tablet 15 mg  15 mg Oral Daily Lucy Chandler MD   15 mg at 05/22/25 0812    calcium carbonate (TUMS) chewable tablet 500 mg  500 mg Oral Daily PRN Tanja Gonzalez MD   500 mg at 05/17/25 2126    glucose gel 15-30 g  15-30 g Oral Q15 Min PRN Katina Armendariz MD        Or    dextrose 50 % injection 25-50 mL  25-50 mL Intravenous Q15 Min PRN Katina Armendariz MD        Or    glucagon injection 1 mg  1 mg Subcutaneous Q15 Min PRN Katina Armendariz MD        divalproex sodium extended-release (DEPAKOTE ER) 24 hr tablet 1,000 mg  1,000 mg Oral At Bedtime Tanja Gonzalez MD   1,000 mg at 05/21/25 2030    glipiZIDE (GLUCOTROL XL) 24 hr tablet 10 mg  10 mg Oral Daily with breakfast Katina Meza PA   10 mg at 05/22/25 0812    hydrOXYzine HCl (ATARAX) tablet 25 mg  25 mg Oral Q4H PRN Lucy Chandler MD   25 mg at 05/22/25 1111    loperamide (IMODIUM) capsule 2 mg  2 mg Oral 4x Daily PRN Tanja Gonzalez MD   2 mg at 05/19/25 2013    magnesium sulfate " (EPSOM SALT) granules 1 Tablespoonful  1 Tablespoonful Topical Daily PRN Tanja Gonzalez MD        melatonin tablet 3 mg  3 mg Oral At Bedtime PRN Katina Armendariz MD   3 mg at 05/20/25 2056    metFORMIN (GLUCOPHAGE) tablet 1,000 mg  1,000 mg Oral BID w/meals Charlotte Jasmine PA-C   1,000 mg at 05/22/25 0812    multivitamin RENAL (RENAVITE RX/NEPHROVITE) tablet 1 tablet  1 tablet Oral Daily Lucy Chandler MD   1 tablet at 05/22/25 0812    OLANZapine (zyPREXA) tablet 10 mg  10 mg Oral TID PRN Lucy Chandler MD        Or    OLANZapine (zyPREXA) injection 10 mg  10 mg Intramuscular TID PRN Lucy Chandler MD        pantoprazole (PROTONIX) EC tablet 40 mg  40 mg Oral QAM AC Lucy Chandler MD   40 mg at 05/22/25 0812    propranolol (INDERAL) tablet 10 mg  10 mg Oral TID PRN Lucy Chandler MD        tolterodine ER (DETROL LA) 24 hr capsule 2 mg  2 mg Oral Daily Tanja Gonzalez MD   2 mg at 05/22/25 0812     PRN:  Current Facility-Administered Medications   Medication Dose Route Frequency Provider Last Rate Last Admin    ARIPiprazole (ABILIFY) tablet 15 mg  15 mg Oral Daily Lucy Chandler MD   15 mg at 05/22/25 0812    calcium carbonate (TUMS) chewable tablet 500 mg  500 mg Oral Daily PRN Tanja Gonzalez MD   500 mg at 05/17/25 2126    glucose gel 15-30 g  15-30 g Oral Q15 Min PRN Katina Armendariz MD        Or    dextrose 50 % injection 25-50 mL  25-50 mL Intravenous Q15 Min PRN Katina Armendariz MD        Or    glucagon injection 1 mg  1 mg Subcutaneous Q15 Min PRN Katina Armendariz MD        divalproex sodium extended-release (DEPAKOTE ER) 24 hr tablet 1,000 mg  1,000 mg Oral At Bedtime Tanja Gonzalez MD   1,000 mg at 05/21/25 2030    glipiZIDE (GLUCOTROL XL) 24 hr tablet 10 mg  10 mg Oral Daily with breakfast Katina Meza PA   10 mg at 05/22/25 0812     hydrOXYzine HCl (ATARAX) tablet 25 mg  25 mg Oral Q4H PRN Lucy Chandler MD   25 mg at 05/22/25 1111    loperamide (IMODIUM) capsule 2 mg  2 mg Oral 4x Daily PRN Tanja Gonzalez MD   2 mg at 05/19/25 2013    magnesium sulfate (EPSOM SALT) granules 1 Tablespoonful  1 Tablespoonful Topical Daily PRN Tanja Gonzalez MD        melatonin tablet 3 mg  3 mg Oral At Bedtime PRN Katina Armendariz MD   3 mg at 05/20/25 2056    metFORMIN (GLUCOPHAGE) tablet 1,000 mg  1,000 mg Oral BID w/meals Charlotte Jasmine PA-C   1,000 mg at 05/22/25 0812    multivitamin RENAL (RENAVITE RX/NEPHROVITE) tablet 1 tablet  1 tablet Oral Daily Lucy Chandler MD   1 tablet at 05/22/25 0812    OLANZapine (zyPREXA) tablet 10 mg  10 mg Oral TID PRN Lucy Chandler MD        Or    OLANZapine (zyPREXA) injection 10 mg  10 mg Intramuscular TID PRN Lucy Chandler MD        pantoprazole (PROTONIX) EC tablet 40 mg  40 mg Oral QAM AC Lucy Chandler MD   40 mg at 05/22/25 0812    propranolol (INDERAL) tablet 10 mg  10 mg Oral TID PRN Lucy Chandler MD        tolterodine ER (DETROL LA) 24 hr capsule 2 mg  2 mg Oral Daily Tanja Gonzalez MD   2 mg at 05/22/25 0812     Labs and Imaging:  New results:   Recent Results (from the past 24 hours)   Glucose by meter    Collection Time: 05/21/25  5:43 PM   Result Value Ref Range    GLUCOSE BY METER POCT 177 (H) 70 - 99 mg/dL   Glucose by meter    Collection Time: 05/22/25  2:12 AM   Result Value Ref Range    GLUCOSE BY METER POCT 80 70 - 99 mg/dL   Glucose by meter    Collection Time: 05/22/25  7:41 AM   Result Value Ref Range    GLUCOSE BY METER POCT 116 (H) 70 - 99 mg/dL     Data this admission:  CBC: WNL. WBC: 7.6, RBC: 4.37, Hb: 13.0, Platelets: 316.  CMP: Unremarkable (4/22) AST: 13, ALT: 10, Creatinine: 0.65.  UDS: Negative  RASHIDA: 0.00 (4/13)  HbA1c: 10.1%  TSH: 2.40  Vit: B12:  "812  Folate: 13.7  Valproic Acid Free & Total: 67  Valproic Acid Free & Total STAT: 75.6  Phosphorus: 4.6 (H)  Magnesium: 1.6 (L)  BMP: unremarkable (4/28)    Imaging:  Bladder Scan: Completed on 4/22     Mental Status Exam:   Oriented to:  Grossly oriented.   General: Tired and sleepy appearing.   Appearance:  Appears older than stated age. Grooming is unkempt.   Behavior/Attitude:  Tired appearing.    Eye Contact: Eyes stayed closed for most of interview.   Psychomotor: No evidence of tics, dystonia, or tardive dyskinesia. No catatonia present.  Speech: Appropriate rate/tone/volume.   Language: Fluent in English with appropriate syntax and vocabulary.  Mood:  \"I feel fine\"  Affect: Congruent with mood. Sleepy, tired, confused  Thought Process: Perseverative, ruminative, tangential  Thought Content: No apparent SI, A/V/H  Associations:  questionable  Insight:  impaired unable to understand current condition  Judgment:  impaired, unable to take care for herself in ADLs  Impulse control: fair  Attention Span:  adequate for conversation  Concentration:  grossly intact  Recent and Remote Memory:  Not formally assessed. Pt reports memory issues. Staff notes forgetfulness.   Fund of Knowledge: Average.  Muscle Strength and Tone: Normal.  Gait and Station: limited, walks slowly with difficutly     Psychiatric Assessment     Anastasiya Simon is a 59 year old female previously diagnosed with schizophrenia and polysubstance use disorder admitted for worsening suicidal ideation, disorganization, erratic behavior (e.g., mixing ammonia with bleach to clean home), AVH, paranoia, and functional decline. She has a history of maladaptive coping via substance use and limited insight into mental illness, compounded by psychosocial stressors including interpersonal conflict and new housing instability; last psychiatric admission was in 2019 for similar symptoms.    On admission, Zyprexa 20?mg was restarted and Depakote increased to " 1000?mg for mood and psychotic stabilization. Due to [id number] neuropathy, gabapentin 300?mg was initiated, along with tolterodine and PRN loperamide for incontinence and diarrhea. Given metabolic concerns, Zyprexa was cross-tapered to Abilify, which the patient has tolerated; she declined MACKEY formulation due to concerns about skin infection. On 5/22, we discussed adding Propanolol 10mg PRN to her medication list to help with reported anxiety which may be due to akathisias.    Current presentation is consistent with decompensated schizophrenia and polysubstance use, now showing stabilization. Plan includes continued Abilify, Depakote, Propanolol outpatient MH and CDT follow-up, and coordination with social work for structured placement ([facility]/IRTS).     Per Medicine, blood glucose levels have been relatively stable over the last few days with lower levels in the morning and occasional higher levels during dinnertime. Given Anastasiya's current un-housed status, it is not recommended to discharge on insulin. Her current PO regimen is at maximum doses, and since Anastasiya is discharging tomorrow, we will not add an additional PO medication due to inability to monitor possible medication effects. Discharge recommendations for Anastasiya include continuing metformin 100-mg BID with meals, glipizide 10mg daily, carb consistent diet, and establish with PCP for continued monitoring and medication adjustment.    Over the course of hospitalization, multiple structured housing options have been pursued without success due to the patient s limited ability to engage in therapeutic programming and disinterest in available placements. Given that she is now psychiatrically at baseline, current placement options have been exhausted, and inpatient level of care is deemed both restrictive and no longer medically indicated, plan to discharge to a shelter on Friday to continue working with her outpatient  toward a housing solution  aligned with her preferences and needs.     At evaluation today determined that current self-reported anxiety seems to be a more physical restlessness (akathisia), possibly due to Abilify so we decided to start Propranolol 10 mg TID for it.      Psychiatric Plan by Diagnosis      Today's changes:  - Add propanolol 10mg PRN for akathisias     # Schizophrenia  Medications:  - depakote 1000 mg HS  - Abilify 15 mg daily in the morning    #Akathisia  - Propanolol 10mg PRN    #Polysubstance Use Disorder  - CD consult once psychotic sx more stable, if pt amenable  - CD consulted on 5/1. CD acknowledged and will visit on 5/2.    2. Pertinent Labs/Monitoring:  - Labs done 04/13 at OSH. CBC wnl, glucose elevated to 332, BMP wnl.  - UDS negative on 04/13  - 4/16 HbA1c: 10.1  - Depakote Levels: 75.6    - 5/15 LFTs: Normal    3. Additional Plans:  - Patient will be treated in therapeutic milieu with appropriate individual and group therapies as described.   - Consulted Internal Medicine for diabetes management.   - Referrals  In Process:  [facility] - records sent 5/13  [facility]: [phone]   [facility] - sent 4/28  Phone interview completed 5/5. Not accepted to [facility]. Reviewing for other sites as of 5/12.  [facility] - sent 4/29  [facility]    Declined:  [facility] - sent 5/6. Declined due to substance use.  [facility] - sent 5/6. Declined due to medical concerns.  [facility] - sent 5/6. Declined due to pt's resistance to participate in programming.  [facility] - declined. Recommended pt attend CD tx/IRTS before being considered for a referral.  [facility] I[facility]S - sent 5/14. Declined due to high A1C and incontinence noted.  [facility] - sent 5/14. Declined due to concern for pt's ability to participate (pt confused, endorsing paranoia, extremely low insight during phone interview).     [facility] CD referrals - no longer pursuing.  [facility] - made 5/2 by CD . Denied 5/5  due to acuity of MH.  [facility] - made 5/2 by CD   [facility] - made 5/2 by CD   [facility] - made 5/2 by CD        Psychiatric Hospital Course:      Anastasiya Simon was admitted to [facility] on a 72 hour hold (started 4/13/25 21:35) from [facility] on 04/15/25.   Medications:  Continued Medications:  Pt seen by psychiatry consult at [facility] who started zyprexa 20 mg at bedtime and depakote 500 mg at bedtime. Both of these medications were continued on admission to [facility].   New Medications:  Started loperamide for diarrhea  Started tolterodine for urinary incontinence  Started gabapentin for peripheral neuropathy    4/16: Increased depakote from 500 mg to 1000 mg HS for mood regulation  4/16: Increased metformin from 500 mg to 1000 mg BID per medicine consult  4/16: Zyprexa: 5 mg in the morning and 15 mg HS  4/16: Started PRN Loperamide 2 mg 4x/day for diarrhea  4/16: Started Tolterodine 2 mg daily for urinary incontinence  4/18: Started Gabapentin 300 mg PRN 3x/day for peripheral neuropathy  4/19: High intensity sliding scale insulin started per medicine consult  4/20: Started glipizide 24 hr tablet 5 mg daily for better glycemic control per medicine consult  4/25: Increased glipizide XL from 5mg to 10mg and decrease to medium sliding scale insulin per medicine consult.   4/28: Started Abilify 5 mg PO daily in the morning  4/30: Increased Abilify to 10 mg daily in the morning  4/30: Decreased olanzapine to only 15 mg HS  5/1: Stop sliding scale insulin per medicine consult  5/2: Increased Abilify to 15 mg daily in the morning  5/2: Decreased Zyprexa to 10 mg HS  5/5: Decrease Zyprexa to 5 mg HS  5/7: Discontinue Zyprexa 5mg  5/22: Add propanolol 10 mg PRN       Medical Assessment and Plan     Medical diagnoses to be addressed this admission:      #Type 2 Diabetes Mellitis with Complications  - PTA metformin 1000 mg BID with meals  - BID glucose checks; hypoglycemia  protocol  - PRN gabapentin 300 mg 3x/day for peripheral neuropathy  - Insulin aspart (Novolog) injection 1-5 units HS  - Insulin aspart (Novolog) injection 3x/day 1-7 units before meals  - Glipizide 24 hr tablet 10 mg daily with breakfast for better glycemic control  - Internal Medicine Consult  - Nutrition Consult  - Moderate Consistent Carb Diet  - Follow up with PCP for continued BG monitoring within 1-2 weeks of discharge (per Medicine consult of 5/1/25)  - Nutrition consult was placed to addressed current dietary needs at Anastasiya's request    #Diarrhea  - Loperamide 2 mg PRN 4x/day    #Urinary Incontinence  - Tolterodine 2 mg daily    Medical course:  Patient was physically examined by the ED prior to being transferred to the unit and was found to be medically stable and appropriate for admission.    Consults:   4/16: Internal Medicine consulted for management of diabetes and blood pressure.   4/16: Increased metformin to 1000 mg BID.   4/18: Started PRN gabapentin 300 mg 3x/day for peripheral neuropathy.   4/18: Internal Medicine started Novolog injection 1-5 units HS and Novolog injection 3x/day 1-7 units before meals for better glycemic control.   4/19: Internal Medicine increased to high intensity sliding scale. Novolog injection 1-7 units HS. Novolog injection 3x/day 1-10 units before meals for better glycemic control.   4/20: Started PO glipizide 24 hr tablet 5 mg daily with breakfast.   4/21: Started pt on a consistent carb diet.   4/22: Nutrition Consult for diabetes management.   4/24: Note from Medicine: Please send patient with glucometer on discharge. Diabetes education should be ordered prior to discharge.  4/25: Increase Glipizide XL to 10 mg daily starting on 4/26. Continue Metformin 1000 mg BID. Decreased to moderate intensity sliding scale. Novolog injection 1-5 units HS. Novolog injection 3x/day 1-7 units before meals.. Hopefully can discontinue in coming days. Moderate consistent CHO diet.    4/27: Continues to require 7U of insulin and is not quite ready to wean off of her sliding scale insulin yet. Will continue to monitor and adjust medications with goal of having her wean off sliding scale insulin prior to discharge.  5/1: Currently the patient is medically stable and Medicine will sign off at this time. Recommendations relayed to primary team via this progress note. Please notify on-call VINCENZO if new questions or concerns arise. OK to stop sliding scale insulin. Continue BG checks BID. Continue glipizide 10 mg daily. Continue Metformin 1000 mg BID. Follow up with PCP for continued BG monitoring within 1-2 weeks of discharge.   5/1: Chemical Dependency consulted. Consult acknowledge and will see patient on 5/2.         Checklist     Legal Status: MI Commitment and Castellanos   [facility]: [place]  File Number: [id number]  Start and expiration date of commitment: 04/24/2025 - 10/24/2025  Castellanos meds: Zyprexa, Haldol, Abilify, Risperdal    Safety Assessment:   Behavioral Orders   Procedures    Code 1 - Restrict to Unit    Routine Programming     As clinically indicated    Status 15     Every 15 minutes.    Suicide precautions: Suicide Risk: MODERATE; Clinical rationale to override score: modification to the care environment, response to medication, lack of access to a plan for self-harm, Exhibiting Suicidal/self-harm behaviors or thoughts     Patients on Suicide Precautions should have a Combination Diet ordered that includes a Diet selection(s) AND a Behavioral Tray selection for Safe Tray - with utensils, or Safe Tray - NO utensils       Suicide Risk:   MODERATE     Clinical rationale to override score::   modification to the care environment     Clinical rationale to override score::   response to medication     Clinical rationale to override score::   lack of access to a plan for self-harm     Clinical rationale to override score::   Exhibiting Suicidal/self-harm behaviors or thoughts     Risk  Assessment:  Risk for harm is low to moderate.  Risk factors: maladaptive coping, substance use, impulsive, and past behaviors.  Protective factors: family.    SIO: None    Disposition: TBD. Disposition pending clinical stabilization, medication optimization and development of an appropriate discharge plan.     Attestations     Laurel Ledbetter, MS3  Medical Student    Resident/Fellow Attestation   I, Lucy Rosa MD, was present with the medical/VINC[facility]ZO student who participated in the service and in the documentation of the note.  I have verified the history and personally performed the physical exam and medical decision making.  I agree with the assessment and plan of care as documented in the note.      This patient was seen and discussed with my attending physician.  Lucy Plata MD  [facility] Psychiatry Resident  05/22/2025

## 2025-05-23 VITALS
TEMPERATURE: 97.3 F | DIASTOLIC BLOOD PRESSURE: 81 MMHG | OXYGEN SATURATION: 96 % | WEIGHT: 160.8 LBS | HEIGHT: 65 IN | SYSTOLIC BLOOD PRESSURE: 129 MMHG | RESPIRATION RATE: 16 BRPM | HEART RATE: 71 BPM | BODY MASS INDEX: 26.79 KG/M2

## 2025-05-23 LAB
GLUCOSE BLDC GLUCOMTR-MCNC: 100 MG/DL (ref 70–99)
GLUCOSE BLDC GLUCOMTR-MCNC: 119 MG/DL (ref 70–99)

## 2025-05-23 PROCEDURE — 250N000013 HC RX MED GY IP 250 OP 250 PS 637

## 2025-05-23 PROCEDURE — 99239 HOSP IP/OBS DSCHRG MGMT >30: CPT | Mod: GC | Performed by: PSYCHIATRY & NEUROLOGY

## 2025-05-23 PROCEDURE — 250N000013 HC RX MED GY IP 250 OP 250 PS 637: Performed by: PHYSICIAN ASSISTANT

## 2025-05-23 RX ADMIN — ARIPIPRAZOLE 15 MG: 15 TABLET ORAL at 07:35

## 2025-05-23 RX ADMIN — TOLTERODINE TARTRATE 2 MG: 2 CAPSULE, EXTENDED RELEASE ORAL at 07:35

## 2025-05-23 RX ADMIN — METFORMIN HYDROCHLORIDE 1000 MG: 500 TABLET, FILM COATED ORAL at 07:35

## 2025-05-23 RX ADMIN — GLIPIZIDE 10 MG: 2.5 TABLET, FILM COATED, EXTENDED RELEASE ORAL at 07:35

## 2025-05-23 RX ADMIN — Medication 1 TABLET: at 07:35

## 2025-05-23 RX ADMIN — PANTOPRAZOLE SODIUM 40 MG: 40 TABLET, DELAYED RELEASE ORAL at 07:35

## 2025-05-23 ASSESSMENT — ACTIVITIES OF DAILY LIVING (ADL)
ORAL_HYGIENE: INDEPENDENT
HYGIENE/GROOMING: INDEPENDENT
ADLS_ACUITY_SCORE: 47
ADLS_ACUITY_SCORE: 47
LAUNDRY: WITH SUPERVISION
ADLS_ACUITY_SCORE: 47
DRESS: INDEPENDENT
ADLS_ACUITY_SCORE: 47
ADLS_ACUITY_SCORE: 47

## 2025-05-23 NOTE — PLAN OF CARE
Problem: Sleep Disturbance  Goal: Adequate Sleep/Rest  Outcome: Progressing   Goal Outcome Evaluation:    Patient appears to have slept a total of 5.75 hours.     0200 B   Safety/environment checks conducted every 15 minutes with no concerns noted. No complaints of pain/discomfort.

## 2025-05-23 NOTE — PLAN OF CARE
Pt is visible in the milieu, selectively social with peers. She denies all mental health symptoms upon assessment. Pt very eager to discharge today. She took a shower this morning, intake is adequate. She is med compliant, no PRNs received this shift. Pt is discharging to [facility] via cab around 1000 today.    Problem: Psychotic Signs/Symptoms  Goal: Enhanced Social, Occupational or Functional Skills (Psychotic Signs/Symptoms)  Intervention: Promote Social, Occupational and Functional Ability  Recent Flowsheet Documentation  Taken 5/23/2025 1000 by Marni River, RN   Goal Outcome Evaluation:    Plan of Care Reviewed With: patient

## 2025-05-23 NOTE — PLAN OF CARE
Team Note Due:  Wednesday    Assessment/Intervention/Current Symtoms and Care Coordination:  Chart review and met with team, discussed pt progress, symptomology, and response to treatment.  Discussed the discharge plan and any potential impediments to discharge.    Pt left via medical ride.    Copy of discharge summary and PD sent to .    PD and COS sent to [employer]    Discharge summary faxed to [facility] [place] for PCP appt.    Discharge Plan or Goal:  IRTS vs [facility] vs /senior care vs Shelter  -PCP  -Psychiatry    Discharge Date/ time: 5/23 at 10:00am  Transportation: Medical ride scheduled  Provisional Discharge: Yes[x]  No[]  AVS Completed: []   IP Tracker Form Completed: []     Barriers to Discharge:  Patient requires further psychiatric stabilization due to current symptomology, medication management with changes subject to provider, coordination with outside supports, and aftercare planning. Pt is also under civil commitment.      Referral Status:    In Process:  [facility] - records sent 5/13. Actively following up.  [facility]: [phone]   [facility] IRTS - sent 4/28  Phone interview completed 5/5. Not accepted to [facility]. Reviewing for other sites as of 5/12.  [facility] - sent 4/29  [facility] - sent 5/21  [email or web address], [phone]    Declined:  [facility] - sent 5/6. Declined due to substance use.  [facility] - sent 5/6. Declined due to medical concerns.  [facility] - sent 5/6. Declined due to pt's resistance to participate in programming.  [facility] - declined. Recommended pt attend CD tx/IRTS before being considered for a referral.  [facility] - sent 5/14. Declined due to high A1C and incontinence noted.  [facility] - sent 5/14. Declined due to concern for pt's ability to participate (pt confused, endorsing paranoia, extremely low insight during phone interview).    [facility] CD referrals - no longer pursuing.  [facility] - made 5/2 by CD .  Denied 5/5 due to acuity of MH.  [facility] - made 5/2 by CD   [facility] - made 5/2 by CD   [facility] - made 5/2 by CD      Legal Status:  MI Commitment and Castellanos   County: [place]  File Number: [id number]  Start and expiration date of commitment: 04/24/2025 - 10/24/2025    Castellanos meds: Zyprexa, Haldol, Abilify, Risperdal    :   Karolyn Parker (MHR): [phone]   [email or web address]    Contacts:   Haydee Mares (CADI CM): [phone]   Tamika Johnson- (sister): [phone]  Lucille Redyd (Intervention ): [phone]     Upcoming Meetings and Dates/Important Information and next steps:

## 2025-05-23 NOTE — PLAN OF CARE
BEH IP Unit Acuity Rating Score (UARS)  Patient is given one point for every criteria they meet.    CRITERIA SCORING   On a 72 hour hold, court hold, committed, stay of commitment, or revocation. 1    Patient LOS on BEH unit exceeds 20 days. 1  LOS: 38   Patient under guardianship, 55+, otherwise medically complex, or under age 11. 1   Suicide ideation without relief of precipitating factors. 0   Current plan for suicide. 0   Current plan for homicide. 0   Imminent risk or actual attempt to seriously harm another without relief of factors precipitating the attempt. 0   Severe dysfunction in daily living (ex: complete neglect for self care, extreme disruption in vegetative function, extreme deterioration in social interactions). 1   Recent (last 7 days) or current physical aggression in the ED or on unit. 0   Restraints or seclusion episode in past 72 hours. 0   Recent (last 7 days) or current verbal aggression, agitation, yelling, etc., while in the ED or unit. 0   Active psychosis. 1   Need for constant or near constant redirection (from leaving, from others, etc).  0   Intrusive or disruptive behaviors. 0   Patient requires 3 or more hours of individualized nursing care per 8-hour shift (i.e. for ADLs, meds, therapeutic interventions). 0   TOTAL 5